# Patient Record
Sex: FEMALE | Race: BLACK OR AFRICAN AMERICAN | Employment: OTHER | URBAN - METROPOLITAN AREA
[De-identification: names, ages, dates, MRNs, and addresses within clinical notes are randomized per-mention and may not be internally consistent; named-entity substitution may affect disease eponyms.]

---

## 2017-01-01 ENCOUNTER — OFFICE VISIT (OUTPATIENT)
Dept: PULMONOLOGY | Facility: CLINIC | Age: 77
End: 2017-01-01
Attending: INTERNAL MEDICINE
Payer: COMMERCIAL

## 2017-01-01 ENCOUNTER — OFFICE VISIT (OUTPATIENT)
Dept: OPHTHALMOLOGY | Facility: CLINIC | Age: 77
End: 2017-01-01
Attending: OPHTHALMOLOGY
Payer: COMMERCIAL

## 2017-01-01 ENCOUNTER — HOSPITAL ENCOUNTER (OUTPATIENT)
Facility: CLINIC | Age: 77
End: 2017-01-01
Attending: INTERNAL MEDICINE | Admitting: INTERNAL MEDICINE

## 2017-01-01 ENCOUNTER — TRANSFERRED RECORDS (OUTPATIENT)
Dept: HEALTH INFORMATION MANAGEMENT | Facility: CLINIC | Age: 77
End: 2017-01-01

## 2017-01-01 ENCOUNTER — OFFICE VISIT (OUTPATIENT)
Dept: INFUSION THERAPY | Facility: CLINIC | Age: 77
End: 2017-01-01
Attending: INTERNAL MEDICINE
Payer: COMMERCIAL

## 2017-01-01 ENCOUNTER — APPOINTMENT (OUTPATIENT)
Dept: INTERVENTIONAL RADIOLOGY/VASCULAR | Facility: CLINIC | Age: 77
End: 2017-01-01
Attending: INTERNAL MEDICINE
Payer: COMMERCIAL

## 2017-01-01 ENCOUNTER — RADIANT APPOINTMENT (OUTPATIENT)
Dept: ULTRASOUND IMAGING | Facility: CLINIC | Age: 77
End: 2017-01-01
Attending: INTERNAL MEDICINE
Payer: COMMERCIAL

## 2017-01-01 ENCOUNTER — HOSPITAL ENCOUNTER (OUTPATIENT)
Facility: CLINIC | Age: 77
Discharge: HOME | End: 2017-08-17
Attending: RADIOLOGY | Admitting: RADIOLOGY
Payer: COMMERCIAL

## 2017-01-01 ENCOUNTER — APPOINTMENT (OUTPATIENT)
Dept: MEDSURG UNIT | Facility: CLINIC | Age: 77
End: 2017-01-01
Attending: INTERNAL MEDICINE
Payer: COMMERCIAL

## 2017-01-01 ENCOUNTER — TELEPHONE (OUTPATIENT)
Dept: ONCOLOGY | Facility: CLINIC | Age: 77
End: 2017-01-01

## 2017-01-01 ENCOUNTER — APPOINTMENT (OUTPATIENT)
Dept: LAB | Facility: CLINIC | Age: 77
End: 2017-01-01
Attending: RADIOLOGY
Payer: COMMERCIAL

## 2017-01-01 ENCOUNTER — DOCUMENTATION ONLY (OUTPATIENT)
Dept: FAMILY MEDICINE | Facility: CLINIC | Age: 77
End: 2017-01-01

## 2017-01-01 ENCOUNTER — HOSPITAL ENCOUNTER (OUTPATIENT)
Facility: CLINIC | Age: 77
Discharge: HOME | End: 2017-08-10
Attending: INTERNAL MEDICINE | Admitting: INTERNAL MEDICINE
Payer: COMMERCIAL

## 2017-01-01 ENCOUNTER — TELEPHONE (OUTPATIENT)
Dept: INTERVENTIONAL RADIOLOGY/VASCULAR | Facility: CLINIC | Age: 77
End: 2017-01-01

## 2017-01-01 ENCOUNTER — TELEPHONE (OUTPATIENT)
Dept: OPHTHALMOLOGY | Facility: CLINIC | Age: 77
End: 2017-01-01

## 2017-01-01 ENCOUNTER — HOSPITAL ENCOUNTER (OUTPATIENT)
Dept: GENERAL RADIOLOGY | Facility: CLINIC | Age: 77
End: 2017-10-30
Attending: INTERNAL MEDICINE
Payer: COMMERCIAL

## 2017-01-01 ENCOUNTER — ONCOLOGY VISIT (OUTPATIENT)
Dept: ONCOLOGY | Facility: CLINIC | Age: 77
End: 2017-01-01
Attending: INTERNAL MEDICINE
Payer: COMMERCIAL

## 2017-01-01 ENCOUNTER — OFFICE VISIT (OUTPATIENT)
Dept: RADIOLOGY | Facility: CLINIC | Age: 77
End: 2017-01-01
Attending: RADIOLOGY
Payer: COMMERCIAL

## 2017-01-01 ENCOUNTER — PRE VISIT (OUTPATIENT)
Dept: PULMONOLOGY | Facility: CLINIC | Age: 77
End: 2017-01-01

## 2017-01-01 ENCOUNTER — OFFICE VISIT (OUTPATIENT)
Dept: FAMILY MEDICINE | Facility: CLINIC | Age: 77
End: 2017-01-01
Payer: COMMERCIAL

## 2017-01-01 ENCOUNTER — DOCUMENTATION ONLY (OUTPATIENT)
Dept: ONCOLOGY | Facility: CLINIC | Age: 77
End: 2017-01-01

## 2017-01-01 ENCOUNTER — OFFICE VISIT (OUTPATIENT)
Dept: GASTROENTEROLOGY | Facility: CLINIC | Age: 77
End: 2017-01-01
Attending: INTERNAL MEDICINE
Payer: COMMERCIAL

## 2017-01-01 ENCOUNTER — APPOINTMENT (OUTPATIENT)
Dept: MEDSURG UNIT | Facility: CLINIC | Age: 77
End: 2017-01-01
Attending: RADIOLOGY
Payer: COMMERCIAL

## 2017-01-01 ENCOUNTER — APPOINTMENT (OUTPATIENT)
Dept: INTERVENTIONAL RADIOLOGY/VASCULAR | Facility: CLINIC | Age: 77
End: 2017-01-01
Attending: PHYSICIAN ASSISTANT
Payer: COMMERCIAL

## 2017-01-01 ENCOUNTER — OFFICE VISIT (OUTPATIENT)
Dept: FAMILY MEDICINE | Facility: CLINIC | Age: 77
End: 2017-01-01

## 2017-01-01 ENCOUNTER — APPOINTMENT (OUTPATIENT)
Dept: INTERVENTIONAL RADIOLOGY/VASCULAR | Facility: CLINIC | Age: 77
End: 2017-01-01
Attending: RADIOLOGY
Payer: COMMERCIAL

## 2017-01-01 ENCOUNTER — TELEPHONE (OUTPATIENT)
Dept: GASTROENTEROLOGY | Facility: CLINIC | Age: 77
End: 2017-01-01

## 2017-01-01 ENCOUNTER — HOSPITAL ENCOUNTER (OUTPATIENT)
Facility: CLINIC | Age: 77
Discharge: HOME | End: 2017-10-30
Attending: INTERNAL MEDICINE | Admitting: INTERNAL MEDICINE
Payer: COMMERCIAL

## 2017-01-01 VITALS
HEART RATE: 96 BPM | OXYGEN SATURATION: 97 % | TEMPERATURE: 98.2 F | DIASTOLIC BLOOD PRESSURE: 77 MMHG | SYSTOLIC BLOOD PRESSURE: 139 MMHG | BODY MASS INDEX: 21.27 KG/M2 | WEIGHT: 116.3 LBS

## 2017-01-01 VITALS
RESPIRATION RATE: 16 BRPM | BODY MASS INDEX: 21.66 KG/M2 | HEART RATE: 92 BPM | TEMPERATURE: 98.1 F | WEIGHT: 118.4 LBS | SYSTOLIC BLOOD PRESSURE: 110 MMHG | DIASTOLIC BLOOD PRESSURE: 72 MMHG | OXYGEN SATURATION: 98 %

## 2017-01-01 VITALS
HEIGHT: 62 IN | DIASTOLIC BLOOD PRESSURE: 79 MMHG | BODY MASS INDEX: 22.26 KG/M2 | TEMPERATURE: 98.2 F | RESPIRATION RATE: 18 BRPM | HEART RATE: 98 BPM | OXYGEN SATURATION: 99 % | SYSTOLIC BLOOD PRESSURE: 119 MMHG | WEIGHT: 121 LBS

## 2017-01-01 VITALS
BODY MASS INDEX: 22.31 KG/M2 | DIASTOLIC BLOOD PRESSURE: 73 MMHG | HEART RATE: 87 BPM | WEIGHT: 122 LBS | RESPIRATION RATE: 18 BRPM | TEMPERATURE: 98 F | SYSTOLIC BLOOD PRESSURE: 117 MMHG | OXYGEN SATURATION: 99 %

## 2017-01-01 VITALS
RESPIRATION RATE: 14 BRPM | DIASTOLIC BLOOD PRESSURE: 78 MMHG | SYSTOLIC BLOOD PRESSURE: 118 MMHG | OXYGEN SATURATION: 99 % | TEMPERATURE: 96.9 F | BODY MASS INDEX: 19.44 KG/M2 | HEART RATE: 103 BPM | WEIGHT: 106.3 LBS

## 2017-01-01 VITALS
OXYGEN SATURATION: 99 % | DIASTOLIC BLOOD PRESSURE: 68 MMHG | RESPIRATION RATE: 16 BRPM | BODY MASS INDEX: 19.38 KG/M2 | HEART RATE: 100 BPM | WEIGHT: 106 LBS | SYSTOLIC BLOOD PRESSURE: 106 MMHG

## 2017-01-01 VITALS
OXYGEN SATURATION: 98 % | HEIGHT: 62 IN | SYSTOLIC BLOOD PRESSURE: 136 MMHG | HEART RATE: 86 BPM | DIASTOLIC BLOOD PRESSURE: 82 MMHG | TEMPERATURE: 97.8 F | BODY MASS INDEX: 23.42 KG/M2 | WEIGHT: 127.3 LBS

## 2017-01-01 VITALS
BODY MASS INDEX: 22.54 KG/M2 | DIASTOLIC BLOOD PRESSURE: 63 MMHG | HEART RATE: 107 BPM | SYSTOLIC BLOOD PRESSURE: 112 MMHG | TEMPERATURE: 96.3 F | RESPIRATION RATE: 16 BRPM | WEIGHT: 119.3 LBS

## 2017-01-01 VITALS
TEMPERATURE: 97.6 F | WEIGHT: 109.4 LBS | BODY MASS INDEX: 20 KG/M2 | SYSTOLIC BLOOD PRESSURE: 133 MMHG | HEART RATE: 101 BPM | OXYGEN SATURATION: 98 % | DIASTOLIC BLOOD PRESSURE: 81 MMHG

## 2017-01-01 VITALS
DIASTOLIC BLOOD PRESSURE: 74 MMHG | SYSTOLIC BLOOD PRESSURE: 132 MMHG | WEIGHT: 126.4 LBS | HEART RATE: 95 BPM | RESPIRATION RATE: 16 BRPM | TEMPERATURE: 97.6 F | OXYGEN SATURATION: 95 % | BODY MASS INDEX: 23.26 KG/M2 | HEIGHT: 62 IN

## 2017-01-01 VITALS
DIASTOLIC BLOOD PRESSURE: 64 MMHG | TEMPERATURE: 97.7 F | RESPIRATION RATE: 16 BRPM | HEART RATE: 80 BPM | WEIGHT: 112 LBS | SYSTOLIC BLOOD PRESSURE: 110 MMHG | OXYGEN SATURATION: 96 % | BODY MASS INDEX: 21.16 KG/M2

## 2017-01-01 VITALS
OXYGEN SATURATION: 95 % | TEMPERATURE: 97.7 F | HEIGHT: 61 IN | HEART RATE: 92 BPM | DIASTOLIC BLOOD PRESSURE: 79 MMHG | WEIGHT: 116.2 LBS | SYSTOLIC BLOOD PRESSURE: 125 MMHG | BODY MASS INDEX: 21.94 KG/M2 | RESPIRATION RATE: 16 BRPM

## 2017-01-01 VITALS
TEMPERATURE: 98.4 F | DIASTOLIC BLOOD PRESSURE: 68 MMHG | OXYGEN SATURATION: 100 % | RESPIRATION RATE: 16 BRPM | WEIGHT: 119.05 LBS | HEART RATE: 102 BPM | HEIGHT: 62 IN | SYSTOLIC BLOOD PRESSURE: 89 MMHG | BODY MASS INDEX: 21.91 KG/M2

## 2017-01-01 VITALS
RESPIRATION RATE: 16 BRPM | WEIGHT: 127.21 LBS | SYSTOLIC BLOOD PRESSURE: 118 MMHG | HEART RATE: 92 BPM | BODY MASS INDEX: 24.02 KG/M2 | TEMPERATURE: 98.3 F | HEIGHT: 61 IN | DIASTOLIC BLOOD PRESSURE: 66 MMHG | OXYGEN SATURATION: 96 %

## 2017-01-01 VITALS
RESPIRATION RATE: 18 BRPM | HEART RATE: 96 BPM | SYSTOLIC BLOOD PRESSURE: 137 MMHG | TEMPERATURE: 97.5 F | OXYGEN SATURATION: 98 % | DIASTOLIC BLOOD PRESSURE: 79 MMHG

## 2017-01-01 VITALS
DIASTOLIC BLOOD PRESSURE: 60 MMHG | SYSTOLIC BLOOD PRESSURE: 105 MMHG | OXYGEN SATURATION: 99 % | TEMPERATURE: 98.4 F | RESPIRATION RATE: 20 BRPM | HEART RATE: 92 BPM

## 2017-01-01 DIAGNOSIS — C22.0 HCC (HEPATOCELLULAR CARCINOMA) (H): Primary | ICD-10-CM

## 2017-01-01 DIAGNOSIS — K59.00 CONSTIPATION, UNSPECIFIED CONSTIPATION TYPE: ICD-10-CM

## 2017-01-01 DIAGNOSIS — R18.8 CIRRHOSIS OF LIVER WITH ASCITES, UNSPECIFIED HEPATIC CIRRHOSIS TYPE (H): Primary | ICD-10-CM

## 2017-01-01 DIAGNOSIS — R18.8 CIRRHOSIS OF LIVER WITH ASCITES, UNSPECIFIED HEPATIC CIRRHOSIS TYPE (H): ICD-10-CM

## 2017-01-01 DIAGNOSIS — R63.0 ANOREXIA: Primary | ICD-10-CM

## 2017-01-01 DIAGNOSIS — R05.9 COUGH: ICD-10-CM

## 2017-01-01 DIAGNOSIS — K74.60 CIRRHOSIS OF LIVER WITH ASCITES, UNSPECIFIED HEPATIC CIRRHOSIS TYPE (H): ICD-10-CM

## 2017-01-01 DIAGNOSIS — B18.2 CHRONIC HEPATITIS C WITHOUT HEPATIC COMA (H): ICD-10-CM

## 2017-01-01 DIAGNOSIS — K59.00 CONSTIPATION, UNSPECIFIED CONSTIPATION TYPE: Primary | ICD-10-CM

## 2017-01-01 DIAGNOSIS — C22.0 HCC (HEPATOCELLULAR CARCINOMA) (H): ICD-10-CM

## 2017-01-01 DIAGNOSIS — K74.60 CIRRHOSIS OF LIVER WITH ASCITES, UNSPECIFIED HEPATIC CIRRHOSIS TYPE (H): Primary | ICD-10-CM

## 2017-01-01 DIAGNOSIS — R18.8 OTHER ASCITES: ICD-10-CM

## 2017-01-01 DIAGNOSIS — Z96.1 PSEUDOPHAKIA: Primary | ICD-10-CM

## 2017-01-01 DIAGNOSIS — R18.8 OTHER ASCITES: Primary | ICD-10-CM

## 2017-01-01 DIAGNOSIS — Z01.818 PREOP GENERAL PHYSICAL EXAM: Primary | ICD-10-CM

## 2017-01-01 DIAGNOSIS — H04.123 DRY EYES, BILATERAL: ICD-10-CM

## 2017-01-01 DIAGNOSIS — B18.2: ICD-10-CM

## 2017-01-01 DIAGNOSIS — K74.69 OTHER CIRRHOSIS OF LIVER (H): Primary | ICD-10-CM

## 2017-01-01 DIAGNOSIS — J91.8 PLEURAL EFFUSION ASSOCIATED WITH HEPATIC DISORDER: Primary | ICD-10-CM

## 2017-01-01 DIAGNOSIS — J90 PLEURAL EFFUSION: ICD-10-CM

## 2017-01-01 DIAGNOSIS — J90 RECURRENT RIGHT PLEURAL EFFUSION: ICD-10-CM

## 2017-01-01 DIAGNOSIS — R91.8 PULMONARY NODULES: ICD-10-CM

## 2017-01-01 DIAGNOSIS — H92.03 OTALGIA OF BOTH EARS: ICD-10-CM

## 2017-01-01 DIAGNOSIS — J45.21 MILD INTERMITTENT ASTHMA WITH ACUTE EXACERBATION: ICD-10-CM

## 2017-01-01 DIAGNOSIS — K76.9 PLEURAL EFFUSION ASSOCIATED WITH HEPATIC DISORDER: Primary | ICD-10-CM

## 2017-01-01 DIAGNOSIS — R05.9 COUGH: Primary | ICD-10-CM

## 2017-01-01 DIAGNOSIS — K70.31 ALCOHOLIC CIRRHOSIS OF LIVER WITH ASCITES (H): ICD-10-CM

## 2017-01-01 DIAGNOSIS — H25.043 POSTERIOR SUBCAPSULAR AGE-RELATED CATARACT OF BOTH EYES: ICD-10-CM

## 2017-01-01 DIAGNOSIS — H25.9 AGE-RELATED CATARACT OF RIGHT EYE, UNSPECIFIED AGE-RELATED CATARACT TYPE: ICD-10-CM

## 2017-01-01 DIAGNOSIS — J45.30 MILD PERSISTENT ASTHMA WITHOUT COMPLICATION: Primary | ICD-10-CM

## 2017-01-01 DIAGNOSIS — K74.69 OTHER CIRRHOSIS OF LIVER (H): ICD-10-CM

## 2017-01-01 DIAGNOSIS — K21.9 GASTROESOPHAGEAL REFLUX DISEASE WITHOUT ESOPHAGITIS: ICD-10-CM

## 2017-01-01 DIAGNOSIS — H25.10 SENILE NUCLEAR SCLEROSIS, UNSPECIFIED LATERALITY: Primary | ICD-10-CM

## 2017-01-01 DIAGNOSIS — H25.13 SENILE NUCLEAR SCLEROSIS, BILATERAL: Primary | ICD-10-CM

## 2017-01-01 DIAGNOSIS — B18.2 CHRONIC HEPATITIS C (H): ICD-10-CM

## 2017-01-01 DIAGNOSIS — K74.60 HEPATIC CIRRHOSIS (H): ICD-10-CM

## 2017-01-01 DIAGNOSIS — H26.9 CATARACT OF BOTH EYES, UNSPECIFIED CATARACT TYPE: Primary | ICD-10-CM

## 2017-01-01 LAB
AFP SERPL-MCNC: 10.8 UG/L (ref 0–8)
AFP SERPL-MCNC: 19.3 UG/L (ref 0–8)
ALBUMIN SERPL-MCNC: 2 G/DL (ref 3.4–5)
ALBUMIN SERPL-MCNC: 2.2 G/DL (ref 3.4–5)
ALBUMIN SERPL-MCNC: 2.3 G/DL (ref 3.4–5)
ALP SERPL-CCNC: 199 U/L (ref 40–150)
ALP SERPL-CCNC: 228 U/L (ref 40–150)
ALP SERPL-CCNC: 239 U/L (ref 40–150)
ALT SERPL W P-5'-P-CCNC: 45 U/L (ref 0–50)
ALT SERPL W P-5'-P-CCNC: 47 U/L (ref 0–50)
ALT SERPL W P-5'-P-CCNC: 66 U/L (ref 0–50)
ANION GAP SERPL CALCULATED.3IONS-SCNC: 5 MMOL/L (ref 3–14)
ANION GAP SERPL CALCULATED.3IONS-SCNC: 7 MMOL/L (ref 3–14)
ANION GAP SERPL CALCULATED.3IONS-SCNC: 8 MMOL/L (ref 3–14)
AST SERPL W P-5'-P-CCNC: 105 U/L (ref 0–45)
AST SERPL W P-5'-P-CCNC: 85 U/L (ref 0–45)
AST SERPL W P-5'-P-CCNC: ABNORMAL U/L (ref 0–45)
BASOPHILS # BLD AUTO: 0 10E9/L (ref 0–0.2)
BASOPHILS # BLD AUTO: 0 10E9/L (ref 0–0.2)
BASOPHILS NFR BLD AUTO: 0.3 %
BASOPHILS NFR BLD AUTO: 0.7 %
BILIRUB DIRECT SERPL-MCNC: 0.7 MG/DL (ref 0–0.2)
BILIRUB DIRECT SERPL-MCNC: 0.7 MG/DL (ref 0–0.2)
BILIRUB DIRECT SERPL-MCNC: 1.1 MG/DL (ref 0–0.2)
BILIRUB SERPL-MCNC: 1.7 MG/DL (ref 0.2–1.3)
BILIRUB SERPL-MCNC: 1.9 MG/DL (ref 0.2–1.3)
BILIRUB SERPL-MCNC: 2.7 MG/DL (ref 0.2–1.3)
BUN SERPL-MCNC: 12 MG/DL (ref 7–30)
BUN SERPL-MCNC: 13 MG/DL (ref 7–30)
BUN SERPL-MCNC: 14 MG/DL (ref 7–30)
CALCIUM SERPL-MCNC: 7.9 MG/DL (ref 8.5–10.1)
CALCIUM SERPL-MCNC: 8.1 MG/DL (ref 8.5–10.1)
CALCIUM SERPL-MCNC: 8.3 MG/DL (ref 8.5–10.1)
CHLORIDE SERPL-SCNC: 103 MMOL/L (ref 94–109)
CHLORIDE SERPL-SCNC: 105 MMOL/L (ref 94–109)
CHLORIDE SERPL-SCNC: 110 MMOL/L (ref 94–109)
CO2 SERPL-SCNC: 21 MMOL/L (ref 20–32)
CO2 SERPL-SCNC: 24 MMOL/L (ref 20–32)
CO2 SERPL-SCNC: 25 MMOL/L (ref 20–32)
CREAT SERPL-MCNC: 0.74 MG/DL (ref 0.52–1.04)
CREAT SERPL-MCNC: 0.75 MG/DL (ref 0.52–1.04)
CREAT SERPL-MCNC: 0.79 MG/DL (ref 0.52–1.04)
DIFFERENTIAL METHOD BLD: ABNORMAL
DIFFERENTIAL METHOD BLD: ABNORMAL
DLCOUNC-%PRED-PRE: 44 %
DLCOUNC-PRE: 8.22 ML/MIN/MMHG
DLCOUNC-PRED: 18.6 ML/MIN/MMHG
EOSINOPHIL # BLD AUTO: 0.1 10E9/L (ref 0–0.7)
EOSINOPHIL # BLD AUTO: 0.2 10E9/L (ref 0–0.7)
EOSINOPHIL NFR BLD AUTO: 3.8 %
EOSINOPHIL NFR BLD AUTO: 5.9 %
ERV-%PRED-PRE: 37 %
ERV-PRE: 0.31 L
ERV-PRED: 0.82 L
ERYTHROCYTE [DISTWIDTH] IN BLOOD BY AUTOMATED COUNT: 14.6 % (ref 10–15)
ERYTHROCYTE [DISTWIDTH] IN BLOOD BY AUTOMATED COUNT: 14.8 % (ref 10–15)
ERYTHROCYTE [DISTWIDTH] IN BLOOD BY AUTOMATED COUNT: 15.6 % (ref 10–15)
ERYTHROCYTE [DISTWIDTH] IN BLOOD BY AUTOMATED COUNT: 15.9 % (ref 10–15)
EXPTIME-PRE: 6.51 SEC
FEF2575-%PRED-PRE: 92 %
FEF2575-PRE: 1.21 L/SEC
FEF2575-PRED: 1.31 L/SEC
FEFMAX-%PRED-PRE: 80 %
FEFMAX-PRE: 2.91 L/SEC
FEFMAX-PRED: 3.61 L/SEC
FEV1-%PRED-PRE: 62 %
FEV1-PRE: 0.97 L
FEV1FEV6-PRE: 86 %
FEV1FEV6-PRED: 79 %
FEV1FVC-PRE: 86 %
FEV1FVC-PRED: 78 %
FEV1SVC-PRE: 84 %
FEV1SVC-PRED: 60 %
FIFMAX-PRE: 1.5 L/SEC
FVC-%PRED-PRE: 55 %
FVC-PRE: 1.13 L
FVC-PRED: 2.03 L
GFR SERPL CREATININE-BSD FRML MDRD: 70 ML/MIN/1.7M2
GFR SERPL CREATININE-BSD FRML MDRD: 74 ML/MIN/1.7M2
GFR SERPL CREATININE-BSD FRML MDRD: 76 ML/MIN/1.7M2
GLUCOSE SERPL-MCNC: 125 MG/DL (ref 70–99)
GLUCOSE SERPL-MCNC: 86 MG/DL (ref 70–99)
GLUCOSE SERPL-MCNC: 92 MG/DL (ref 70–99)
HCT VFR BLD AUTO: 33.2 % (ref 35–47)
HCT VFR BLD AUTO: 34.3 % (ref 35–47)
HCT VFR BLD AUTO: 34.7 % (ref 35–47)
HCT VFR BLD AUTO: 36.3 % (ref 35–47)
HCV RNA SERPL NAA+PROBE-ACNC: ABNORMAL [IU]/ML
HCV RNA SERPL NAA+PROBE-LOG IU: 5.4 LOG IU/ML
HGB BLD-MCNC: 11.3 G/DL (ref 11.7–15.7)
HGB BLD-MCNC: 11.4 G/DL (ref 11.7–15.7)
HGB BLD-MCNC: 11.7 G/DL (ref 11.7–15.7)
HGB BLD-MCNC: 12 G/DL (ref 11.7–15.7)
IC-%PRED-PRE: 47 %
IC-PRE: 0.85 L
IC-PRED: 1.78 L
IMM GRANULOCYTES # BLD: 0 10E9/L (ref 0–0.4)
IMM GRANULOCYTES # BLD: 0 10E9/L (ref 0–0.4)
IMM GRANULOCYTES NFR BLD: 0.2 %
IMM GRANULOCYTES NFR BLD: 0.3 %
INR BLD: 0.9 (ref 0.86–1.14)
INR PPP: 1.46 (ref 0.86–1.14)
INR PPP: 1.5 (ref 0.86–1.14)
INR PPP: 1.56 (ref 0.86–1.14)
INR PPP: 1.64 (ref 0.86–1.14)
LYMPHOCYTES # BLD AUTO: 0.9 10E9/L (ref 0.8–5.3)
LYMPHOCYTES # BLD AUTO: 1 10E9/L (ref 0.8–5.3)
LYMPHOCYTES NFR BLD AUTO: 21 %
LYMPHOCYTES NFR BLD AUTO: 31.1 %
MCH RBC QN AUTO: 33 PG (ref 26.5–33)
MCH RBC QN AUTO: 33.6 PG (ref 26.5–33)
MCH RBC QN AUTO: 34 PG (ref 26.5–33)
MCH RBC QN AUTO: 34.2 PG (ref 26.5–33)
MCHC RBC AUTO-ENTMCNC: 33.1 G/DL (ref 31.5–36.5)
MCHC RBC AUTO-ENTMCNC: 33.2 G/DL (ref 31.5–36.5)
MCHC RBC AUTO-ENTMCNC: 33.7 G/DL (ref 31.5–36.5)
MCHC RBC AUTO-ENTMCNC: 34 G/DL (ref 31.5–36.5)
MCV RBC AUTO: 100 FL (ref 78–100)
MCV RBC AUTO: 102 FL (ref 78–100)
MCV RBC AUTO: 103 FL (ref 78–100)
MCV RBC AUTO: 98 FL (ref 78–100)
MONOCYTES # BLD AUTO: 0.3 10E9/L (ref 0–1.3)
MONOCYTES # BLD AUTO: 0.3 10E9/L (ref 0–1.3)
MONOCYTES NFR BLD AUTO: 7.9 %
MONOCYTES NFR BLD AUTO: 8.3 %
NEUTROPHILS # BLD AUTO: 1.8 10E9/L (ref 1.6–8.3)
NEUTROPHILS # BLD AUTO: 2.6 10E9/L (ref 1.6–8.3)
NEUTROPHILS NFR BLD AUTO: 56.2 %
NEUTROPHILS NFR BLD AUTO: 64.3 %
NRBC # BLD AUTO: 0 10*3/UL
NRBC # BLD AUTO: 0 10*3/UL
NRBC BLD AUTO-RTO: 0 /100
NRBC BLD AUTO-RTO: 0 /100
PLATELET # BLD AUTO: 56 10E9/L (ref 150–450)
PLATELET # BLD AUTO: 62 10E9/L (ref 150–450)
PLATELET # BLD AUTO: 65 10E9/L (ref 150–450)
PLATELET # BLD AUTO: 73 10E9/L (ref 150–450)
PLATELET # BLD AUTO: 79 10E9/L (ref 150–450)
PLATELET # BLD AUTO: 88 10E9/L (ref 150–450)
POTASSIUM SERPL-SCNC: 4.1 MMOL/L (ref 3.4–5.3)
POTASSIUM SERPL-SCNC: 4.4 MMOL/L (ref 3.4–5.3)
POTASSIUM SERPL-SCNC: 4.5 MMOL/L (ref 3.4–5.3)
PROT SERPL-MCNC: 6.6 G/DL (ref 6.8–8.8)
PROT SERPL-MCNC: 6.7 G/DL (ref 6.8–8.8)
PROT SERPL-MCNC: 7 G/DL (ref 6.8–8.8)
RBC # BLD AUTO: 3.32 10E12/L (ref 3.8–5.2)
RBC # BLD AUTO: 3.33 10E12/L (ref 3.8–5.2)
RBC # BLD AUTO: 3.55 10E12/L (ref 3.8–5.2)
RBC # BLD AUTO: 3.57 10E12/L (ref 3.8–5.2)
SODIUM SERPL-SCNC: 134 MMOL/L (ref 133–144)
SODIUM SERPL-SCNC: 135 MMOL/L (ref 133–144)
SODIUM SERPL-SCNC: 140 MMOL/L (ref 133–144)
VA-%PRED-PRE: 38 %
VA-PRE: 1.82 L
VC-%PRED-PRE: 44 %
VC-PRE: 1.15 L
VC-PRED: 2.6 L
WBC # BLD AUTO: 3.1 10E9/L (ref 4–11)
WBC # BLD AUTO: 3.2 10E9/L (ref 4–11)
WBC # BLD AUTO: 3.3 10E9/L (ref 4–11)
WBC # BLD AUTO: 4.1 10E9/L (ref 4–11)

## 2017-01-01 PROCEDURE — 82040 ASSAY OF SERUM ALBUMIN: CPT

## 2017-01-01 PROCEDURE — 85049 AUTOMATED PLATELET COUNT: CPT | Performed by: INTERNAL MEDICINE

## 2017-01-01 PROCEDURE — 40000166 ZZH STATISTIC PP CARE STAGE 1

## 2017-01-01 PROCEDURE — 25000128 H RX IP 250 OP 636: Performed by: PHYSICIAN ASSISTANT

## 2017-01-01 PROCEDURE — 40000168 ZZH STATISTIC PP CARE STAGE 3

## 2017-01-01 PROCEDURE — 25000125 ZZHC RX 250: Mod: ZF | Performed by: INTERNAL MEDICINE

## 2017-01-01 PROCEDURE — 85025 COMPLETE CBC W/AUTO DIFF WBC: CPT | Performed by: INTERNAL MEDICINE

## 2017-01-01 PROCEDURE — 99213 OFFICE O/P EST LOW 20 MIN: CPT | Mod: ZF

## 2017-01-01 PROCEDURE — 99215 OFFICE O/P EST HI 40 MIN: CPT | Mod: ZF

## 2017-01-01 PROCEDURE — 32555 ASPIRATE PLEURA W/ IMAGING: CPT

## 2017-01-01 PROCEDURE — 36415 COLL VENOUS BLD VENIPUNCTURE: CPT | Performed by: RADIOLOGY

## 2017-01-01 PROCEDURE — 85610 PROTHROMBIN TIME: CPT | Performed by: RADIOLOGY

## 2017-01-01 PROCEDURE — 27210903 ZZH KIT CR5

## 2017-01-01 PROCEDURE — 99212 OFFICE O/P EST SF 10 MIN: CPT | Mod: ZF

## 2017-01-01 PROCEDURE — 27211039 ZZH NEEDLE CR2

## 2017-01-01 PROCEDURE — 82247 BILIRUBIN TOTAL: CPT

## 2017-01-01 PROCEDURE — 84075 ASSAY ALKALINE PHOSPHATASE: CPT

## 2017-01-01 PROCEDURE — 84460 ALANINE AMINO (ALT) (SGPT): CPT

## 2017-01-01 PROCEDURE — P9047 ALBUMIN (HUMAN), 25%, 50ML: HCPCS | Mod: ZF | Performed by: INTERNAL MEDICINE

## 2017-01-01 PROCEDURE — 84155 ASSAY OF PROTEIN SERUM: CPT

## 2017-01-01 PROCEDURE — 85027 COMPLETE CBC AUTOMATED: CPT | Performed by: RADIOLOGY

## 2017-01-01 PROCEDURE — 27210732 ZZH ACCESSORY CR1

## 2017-01-01 PROCEDURE — 40000141 ZZH STATISTIC PERIPHERAL IV START W/O US GUIDANCE

## 2017-01-01 PROCEDURE — 82105 ALPHA-FETOPROTEIN SERUM: CPT | Performed by: INTERNAL MEDICINE

## 2017-01-01 PROCEDURE — 99214 OFFICE O/P EST MOD 30 MIN: CPT | Performed by: PHYSICIAN ASSISTANT

## 2017-01-01 PROCEDURE — 25000128 H RX IP 250 OP 636: Mod: ZF | Performed by: INTERNAL MEDICINE

## 2017-01-01 PROCEDURE — 80048 BASIC METABOLIC PNL TOTAL CA: CPT | Performed by: INTERNAL MEDICINE

## 2017-01-01 PROCEDURE — 25000125 ZZHC RX 250: Performed by: RADIOLOGY

## 2017-01-01 PROCEDURE — 85610 PROTHROMBIN TIME: CPT | Mod: QW

## 2017-01-01 PROCEDURE — 82105 ALPHA-FETOPROTEIN SERUM: CPT | Performed by: RADIOLOGY

## 2017-01-01 PROCEDURE — T1013 SIGN LANG/ORAL INTERPRETER: HCPCS | Mod: U3

## 2017-01-01 PROCEDURE — 93000 ELECTROCARDIOGRAM COMPLETE: CPT | Performed by: PHYSICIAN ASSISTANT

## 2017-01-01 PROCEDURE — 99214 OFFICE O/P EST MOD 30 MIN: CPT | Mod: ZF

## 2017-01-01 PROCEDURE — 27210190 US PARACENTESIS

## 2017-01-01 PROCEDURE — 87522 HEPATITIS C REVRS TRNSCRPJ: CPT | Performed by: INTERNAL MEDICINE

## 2017-01-01 PROCEDURE — 76519 ECHO EXAM OF EYE: CPT | Mod: ZF | Performed by: OPHTHALMOLOGY

## 2017-01-01 PROCEDURE — 92015 DETERMINE REFRACTIVE STATE: CPT | Mod: ZF

## 2017-01-01 PROCEDURE — 80076 HEPATIC FUNCTION PANEL: CPT | Performed by: INTERNAL MEDICINE

## 2017-01-01 PROCEDURE — C1769 GUIDE WIRE: HCPCS

## 2017-01-01 PROCEDURE — 99215 OFFICE O/P EST HI 40 MIN: CPT | Mod: ZP | Performed by: INTERNAL MEDICINE

## 2017-01-01 PROCEDURE — 80048 BASIC METABOLIC PNL TOTAL CA: CPT | Performed by: RADIOLOGY

## 2017-01-01 PROCEDURE — 85610 PROTHROMBIN TIME: CPT | Performed by: INTERNAL MEDICINE

## 2017-01-01 PROCEDURE — 76705 ECHO EXAM OF ABDOMEN: CPT

## 2017-01-01 PROCEDURE — 49083 ABD PARACENTESIS W/IMAGING: CPT

## 2017-01-01 PROCEDURE — 36415 COLL VENOUS BLD VENIPUNCTURE: CPT

## 2017-01-01 PROCEDURE — 71020 XR CHEST 2 VW: CPT

## 2017-01-01 PROCEDURE — 36415 COLL VENOUS BLD VENIPUNCTURE: CPT | Performed by: INTERNAL MEDICINE

## 2017-01-01 PROCEDURE — 25000125 ZZHC RX 250

## 2017-01-01 PROCEDURE — 80076 HEPATIC FUNCTION PANEL: CPT | Performed by: RADIOLOGY

## 2017-01-01 RX ORDER — BISACODYL 5 MG/1
5 TABLET, DELAYED RELEASE ORAL DAILY PRN
Qty: 30 TABLET | Refills: 2 | Status: SHIPPED | OUTPATIENT
Start: 2017-01-01 | End: 2018-01-01

## 2017-01-01 RX ORDER — FUROSEMIDE 20 MG
20 TABLET ORAL DAILY
Qty: 180 TABLET | Refills: 1 | Status: SHIPPED | OUTPATIENT
Start: 2017-01-01 | End: 2018-01-01

## 2017-01-01 RX ORDER — PREDNISOLONE ACETATE 10 MG/ML
1 SUSPENSION/ DROPS OPHTHALMIC 4 TIMES DAILY
COMMUNITY
End: 2017-01-01

## 2017-01-01 RX ORDER — PREDNISOLONE ACETATE 10 MG/ML
1 SUSPENSION/ DROPS OPHTHALMIC 3 TIMES DAILY
Qty: 1 BOTTLE | Refills: 1 | Status: SHIPPED | OUTPATIENT
Start: 2017-01-01 | End: 2017-01-01

## 2017-01-01 RX ORDER — CYPROHEPTADINE HYDROCHLORIDE 4 MG/1
4 TABLET ORAL 3 TIMES DAILY
Qty: 90 TABLET | Refills: 11 | Status: SHIPPED | OUTPATIENT
Start: 2017-01-01 | End: 2017-01-01

## 2017-01-01 RX ORDER — POLYETHYLENE GLYCOL 3350 17 G/17G
1 POWDER, FOR SOLUTION ORAL 3 TIMES DAILY PRN
Qty: 510 G | Refills: 3 | Status: SHIPPED | OUTPATIENT
Start: 2017-01-01 | End: 2018-01-01

## 2017-01-01 RX ORDER — ALBUMIN (HUMAN) 12.5 G/50ML
12.5-5 SOLUTION INTRAVENOUS CONTINUOUS PRN
Status: DISCONTINUED | OUTPATIENT
Start: 2017-01-01 | End: 2017-01-01 | Stop reason: HOSPADM

## 2017-01-01 RX ORDER — ALBUMIN (HUMAN) 12.5 G/50ML
12.5 SOLUTION INTRAVENOUS 4 TIMES DAILY PRN
Status: CANCELLED
Start: 2017-01-01

## 2017-01-01 RX ORDER — PREDNISOLONE ACETATE 10 MG/ML
1 SUSPENSION/ DROPS OPHTHALMIC DAILY
COMMUNITY
End: 2017-01-01

## 2017-01-01 RX ORDER — SPIRONOLACTONE 50 MG/1
50 TABLET, FILM COATED ORAL DAILY
Qty: 30 TABLET | Refills: 1 | OUTPATIENT
Start: 2017-01-01

## 2017-01-01 RX ORDER — PREDNISOLONE ACETATE 10 MG/ML
1 SUSPENSION/ DROPS OPHTHALMIC 4 TIMES DAILY
Qty: 5 ML | Refills: 0 | Status: SHIPPED | OUTPATIENT
Start: 2017-01-01 | End: 2017-01-01

## 2017-01-01 RX ORDER — OMEPRAZOLE 40 MG/1
40 CAPSULE, DELAYED RELEASE ORAL DAILY
Qty: 90 CAPSULE | Refills: 3 | Status: SHIPPED | OUTPATIENT
Start: 2017-01-01

## 2017-01-01 RX ORDER — ALBUMIN (HUMAN) 12.5 G/50ML
12.5 SOLUTION INTRAVENOUS 4 TIMES DAILY PRN
Status: DISCONTINUED | OUTPATIENT
Start: 2017-01-01 | End: 2017-01-01 | Stop reason: HOSPADM

## 2017-01-01 RX ORDER — LIDOCAINE 40 MG/G
CREAM TOPICAL
Status: CANCELLED | OUTPATIENT
Start: 2017-01-01

## 2017-01-01 RX ORDER — KETOROLAC TROMETHAMINE 5 MG/ML
1 SOLUTION OPHTHALMIC
COMMUNITY
Start: 2017-01-01 | End: 2017-01-01

## 2017-01-01 RX ORDER — OFLOXACIN 3 MG/ML
1 SOLUTION/ DROPS OPHTHALMIC
COMMUNITY
Start: 2017-01-01 | End: 2017-01-01

## 2017-01-01 RX ORDER — SPIRONOLACTONE 50 MG/1
50 TABLET, FILM COATED ORAL DAILY
Qty: 30 TABLET | OUTPATIENT
Start: 2017-01-01

## 2017-01-01 RX ORDER — PREDNISOLONE ACETATE 10 MG/ML
1 SUSPENSION/ DROPS OPHTHALMIC
COMMUNITY
Start: 2017-01-01 | End: 2017-01-01

## 2017-01-01 RX ORDER — SPIRONOLACTONE 50 MG/1
50 TABLET, FILM COATED ORAL DAILY
Qty: 30 TABLET | Refills: 1 | Status: SHIPPED | OUTPATIENT
Start: 2017-01-01 | End: 2018-01-01

## 2017-01-01 RX ORDER — LIDOCAINE 40 MG/G
CREAM TOPICAL
Status: DISCONTINUED | OUTPATIENT
Start: 2017-01-01 | End: 2017-01-01 | Stop reason: HOSPADM

## 2017-01-01 RX ORDER — OFLOXACIN 3 MG/ML
1 SOLUTION/ DROPS OPHTHALMIC 4 TIMES DAILY
Qty: 2 ML | Refills: 0 | Status: SHIPPED | OUTPATIENT
Start: 2017-01-01 | End: 2017-01-01

## 2017-01-01 RX ORDER — LACTULOSE 20 G/30ML
30 SOLUTION ORAL DAILY
Qty: 946 ML | Refills: 3 | Status: SHIPPED | OUTPATIENT
Start: 2017-01-01 | End: 2018-01-01

## 2017-01-01 RX ORDER — LACTULOSE 20 G/30ML
30 SOLUTION ORAL DAILY
Qty: 946 ML | Refills: 3 | Status: SHIPPED | OUTPATIENT
Start: 2017-01-01 | End: 2017-01-01

## 2017-01-01 RX ORDER — ONDANSETRON 2 MG/ML
4 INJECTION INTRAMUSCULAR; INTRAVENOUS ONCE
Status: COMPLETED | OUTPATIENT
Start: 2017-01-01 | End: 2017-01-01

## 2017-01-01 RX ORDER — ALBUTEROL SULFATE 0.83 MG/ML
1 SOLUTION RESPIRATORY (INHALATION) EVERY 6 HOURS PRN
Qty: 25 VIAL | Refills: 3 | Status: SHIPPED | OUTPATIENT
Start: 2017-01-01

## 2017-01-01 RX ORDER — OFLOXACIN 3 MG/ML
1 SOLUTION/ DROPS OPHTHALMIC 4 TIMES DAILY
COMMUNITY
End: 2017-01-01

## 2017-01-01 RX ORDER — KETOROLAC TROMETHAMINE 5 MG/ML
1 SOLUTION OPHTHALMIC 4 TIMES DAILY
COMMUNITY
End: 2017-01-01

## 2017-01-01 RX ORDER — POLYETHYLENE GLYCOL 3350 17 G/17G
1 POWDER, FOR SOLUTION ORAL DAILY
Qty: 30 PACKET | Refills: 3 | Status: SHIPPED | OUTPATIENT
Start: 2017-01-01 | End: 2017-01-01

## 2017-01-01 RX ADMIN — ALBUMIN HUMAN 25 G: 0.25 SOLUTION INTRAVENOUS at 14:48

## 2017-01-01 RX ADMIN — Medication 10 ML: at 10:05

## 2017-01-01 RX ADMIN — Medication 10 ML: at 13:45

## 2017-01-01 RX ADMIN — LIDOCAINE HYDROCHLORIDE 4 ML: 10 INJECTION, SOLUTION INFILTRATION; PERINEURAL at 09:36

## 2017-01-01 RX ADMIN — LIDOCAINE HYDROCHLORIDE 20 ML: 10 INJECTION, SOLUTION INFILTRATION; PERINEURAL at 09:08

## 2017-01-01 RX ADMIN — LIDOCAINE HYDROCHLORIDE 20 ML: 10 INJECTION, SOLUTION INFILTRATION; PERINEURAL at 14:30

## 2017-01-01 RX ADMIN — ALBUMIN HUMAN 12.5 G: 0.25 SOLUTION INTRAVENOUS at 09:08

## 2017-01-01 RX ADMIN — ALBUMIN HUMAN 12.5 G: 0.25 SOLUTION INTRAVENOUS at 09:14

## 2017-01-01 RX ADMIN — LIDOCAINE HYDROCHLORIDE 20 ML: 10 INJECTION, SOLUTION INFILTRATION; PERINEURAL at 14:48

## 2017-01-01 RX ADMIN — ONDANSETRON 4 MG: 2 INJECTION INTRAMUSCULAR; INTRAVENOUS at 13:30

## 2017-01-01 RX ADMIN — ALBUMIN HUMAN 12.5 G: 0.25 SOLUTION INTRAVENOUS at 15:10

## 2017-01-01 ASSESSMENT — REFRACTION_MANIFEST
OD_CYLINDER: +0.00
OD_SPHERE: +0.50
OD_ADD: +2.50
OD_CYLINDER: +1.00
OS_CYLINDER: +0.75
OS_SPHERE: -0.50
OD_SPHERE: -0.75
OS_CYLINDER: +0.00
OD_AXIS: 000
OD_AXIS: 175
OD_ADD: +2.50
OS_SPHERE: +0.75
OS_AXIS: 000
OS_AXIS: 160
OS_ADD: +2.50
OS_ADD: +2.50

## 2017-01-01 ASSESSMENT — VISUAL ACUITY
OD_SC+: +2
OS_SC: 20/40
OD_SC: 20/100
METHOD: SNELLEN - LINEAR
OD_PH_SC: 20/80
OS_SC+: +2
OS_SC: 20/80
METHOD: SNELLEN - LINEAR
OD_SC: 20/40
OS_SC+: +1
OS_SC: 20/50ECC
OD_SC: 20/50
OS_PH_SC: 20/70+1
OD_SC+: +2
OS_SC+: -2
OS_SC+: -2
OS_SC: 20/70
OS_SC: 20/80
METHOD: SNELLEN - LINEAR
OS_PH_SC: 20/60
OD_PH_SC: 20/50
OD_SC: 20/80
OS_PH_SC: 20/30
METHOD: SNELLEN - LINEAR
METHOD: SNELLEN - LINEAR
OD_PH_SC: 20/50
OD_SC: 20/60
METHOD: SNELLEN - LINEAR
OD_SC: 20/60
OS_SC: 20/40
OD_SC+: +1

## 2017-01-01 ASSESSMENT — PAIN SCALES - GENERAL
PAINLEVEL: NO PAIN (0)
PAINLEVEL: NO PAIN (0)
PAINLEVEL: EXTREME PAIN (8)
PAINLEVEL: MODERATE PAIN (4)
PAINLEVEL: NO PAIN (0)

## 2017-01-01 ASSESSMENT — TONOMETRY
OD_IOP_MMHG: 10
OD_IOP_MMHG: 12
OS_IOP_MMHG: 12
IOP_METHOD: ICARE
OS_IOP_MMHG: 15
OS_IOP_MMHG: 15
IOP_METHOD: TONOPEN
OS_IOP_MMHG: 13
OD_IOP_MMHG: 13
IOP_METHOD: TONOPEN
IOP_METHOD: ICARE
OS_IOP_MMHG: 10
IOP_METHOD: TONOPEN
OD_IOP_MMHG: 08
OD_IOP_MMHG: 15
OS_IOP_MMHG: 11
IOP_METHOD: TONOPEN
OD_IOP_MMHG: 10

## 2017-01-01 ASSESSMENT — REFRACTION_WEARINGRX
OD_CYLINDER: +0.00
OD_ADD: +2.50
OD_AXIS: 000
OS_ADD: +2.50
OD_SPHERE: +0.50
OS_CYLINDER: +0.00
OD_AXIS: 000
OS_CYLINDER: +0.00
OD_AXIS: 000
OS_AXIS: 000
OS_SPHERE: +0.75
OD_SPHERE: +0.50
OD_CYLINDER: +0.00
OS_SPHERE: +0.75
OD_SPHERE: +0.50
OS_CYLINDER: +0.00
OD_ADD: +2.50
OD_ADD: +2.50
OD_CYLINDER: +0.00
OS_AXIS: 000
OS_SPHERE: +0.75
OS_ADD: +2.50
OS_AXIS: 000
OS_ADD: +2.50

## 2017-01-01 ASSESSMENT — ASTHMA QUESTIONNAIRES: ACT_TOTALSCORE: 8

## 2017-01-01 ASSESSMENT — PATIENT HEALTH QUESTIONNAIRE - PHQ9: SUM OF ALL RESPONSES TO PHQ QUESTIONS 1-9: 6

## 2017-01-01 ASSESSMENT — SLIT LAMP EXAM - LIDS
COMMENTS: MEIBOMIAN GLAND DYSFUNCTION, BLEPHARITIS

## 2017-01-01 ASSESSMENT — EXTERNAL EXAM - RIGHT EYE
OD_EXAM: NORMAL

## 2017-01-01 ASSESSMENT — CONF VISUAL FIELD
OD_NORMAL: 1
OD_NORMAL: 1
OS_NORMAL: 1
OD_NORMAL: 1
OS_NORMAL: 1
OD_NORMAL: 1
OS_NORMAL: 1
OS_NORMAL: 1

## 2017-01-01 ASSESSMENT — CUP TO DISC RATIO
OS_RATIO: 0.45
OS_RATIO: 0.3
OS_RATIO: 0.5
OS_RATIO: 0.5
OD_RATIO: 0.4
OD_RATIO: 0.3

## 2017-01-01 ASSESSMENT — EXTERNAL EXAM - LEFT EYE
OS_EXAM: NORMAL

## 2017-01-01 ASSESSMENT — PAIN DESCRIPTION - DESCRIPTORS: DESCRIPTORS: ACHING

## 2017-01-03 ENCOUNTER — OFFICE VISIT (OUTPATIENT)
Dept: GASTROENTEROLOGY | Facility: CLINIC | Age: 77
End: 2017-01-03
Attending: INTERNAL MEDICINE
Payer: COMMERCIAL

## 2017-01-03 VITALS
SYSTOLIC BLOOD PRESSURE: 125 MMHG | TEMPERATURE: 97.7 F | WEIGHT: 109.4 LBS | HEART RATE: 85 BPM | BODY MASS INDEX: 20 KG/M2 | DIASTOLIC BLOOD PRESSURE: 75 MMHG

## 2017-01-03 DIAGNOSIS — K74.69 OTHER CIRRHOSIS OF LIVER (H): ICD-10-CM

## 2017-01-03 DIAGNOSIS — C22.0 HCC (HEPATOCELLULAR CARCINOMA) (H): ICD-10-CM

## 2017-01-03 DIAGNOSIS — C22.0 HCC (HEPATOCELLULAR CARCINOMA) (H): Primary | ICD-10-CM

## 2017-01-03 LAB
ALBUMIN SERPL-MCNC: 2.2 G/DL (ref 3.4–5)
ALP SERPL-CCNC: 256 U/L (ref 40–150)
ALT SERPL W P-5'-P-CCNC: 44 U/L (ref 0–50)
ANION GAP SERPL CALCULATED.3IONS-SCNC: 6 MMOL/L (ref 3–14)
AST SERPL W P-5'-P-CCNC: 76 U/L (ref 0–45)
BILIRUB DIRECT SERPL-MCNC: 0.7 MG/DL (ref 0–0.2)
BILIRUB SERPL-MCNC: 1.6 MG/DL (ref 0.2–1.3)
BUN SERPL-MCNC: 13 MG/DL (ref 7–30)
CALCIUM SERPL-MCNC: 8.6 MG/DL (ref 8.5–10.1)
CHLORIDE SERPL-SCNC: 107 MMOL/L (ref 94–109)
CO2 SERPL-SCNC: 24 MMOL/L (ref 20–32)
CREAT SERPL-MCNC: 0.75 MG/DL (ref 0.52–1.04)
ERYTHROCYTE [DISTWIDTH] IN BLOOD BY AUTOMATED COUNT: 15.9 % (ref 10–15)
GFR SERPL CREATININE-BSD FRML MDRD: 75 ML/MIN/1.7M2
GLUCOSE SERPL-MCNC: 95 MG/DL (ref 70–99)
HCT VFR BLD AUTO: 33 % (ref 35–47)
HGB BLD-MCNC: 11.1 G/DL (ref 11.7–15.7)
INR PPP: 1.42 (ref 0.86–1.14)
MCH RBC QN AUTO: 33.9 PG (ref 26.5–33)
MCHC RBC AUTO-ENTMCNC: 33.6 G/DL (ref 31.5–36.5)
MCV RBC AUTO: 101 FL (ref 78–100)
PLATELET # BLD AUTO: 78 10E9/L (ref 150–450)
POTASSIUM SERPL-SCNC: 3.9 MMOL/L (ref 3.4–5.3)
PROT SERPL-MCNC: 7.6 G/DL (ref 6.8–8.8)
RBC # BLD AUTO: 3.27 10E12/L (ref 3.8–5.2)
SODIUM SERPL-SCNC: 137 MMOL/L (ref 133–144)
WBC # BLD AUTO: 2.7 10E9/L (ref 4–11)

## 2017-01-03 PROCEDURE — 80076 HEPATIC FUNCTION PANEL: CPT | Performed by: INTERNAL MEDICINE

## 2017-01-03 PROCEDURE — 36415 COLL VENOUS BLD VENIPUNCTURE: CPT | Performed by: INTERNAL MEDICINE

## 2017-01-03 PROCEDURE — 85610 PROTHROMBIN TIME: CPT | Performed by: INTERNAL MEDICINE

## 2017-01-03 PROCEDURE — 85027 COMPLETE CBC AUTOMATED: CPT | Performed by: INTERNAL MEDICINE

## 2017-01-03 PROCEDURE — 80048 BASIC METABOLIC PNL TOTAL CA: CPT | Performed by: INTERNAL MEDICINE

## 2017-01-03 PROCEDURE — 99212 OFFICE O/P EST SF 10 MIN: CPT | Mod: ZF

## 2017-01-03 ASSESSMENT — PAIN SCALES - GENERAL: PAINLEVEL: MILD PAIN (2)

## 2017-01-03 NOTE — NURSING NOTE
"Chief Complaint   Patient presents with     RECHECK     F/U viral carrier of hepatitis C and hepatocellular carcinoma       Initial /75 mmHg  Pulse 85  Temp(Src) 97.7  F (36.5  C) (Oral)  Wt 49.624 kg (109 lb 6.4 oz) Estimated body mass index is 20 kg/(m^2) as calculated from the following:    Height as of 7/16/15: 1.575 m (5' 2\").    Weight as of this encounter: 49.624 kg (109 lb 6.4 oz).  BP completed using cuff size: small regular  "

## 2017-01-03 NOTE — PROGRESS NOTES
CONSULTING HEALTHCARE PROFESSIONAL:  Felicitas Horton M.D.      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      HISTORY OF PRESENT ILLNESS:  Ms. Portillo is a 77-year-old Citizen of the [place] immigrant who I last saw in 05/2015.  Initially she was followed at [facility] and the diagnosis of hepatitis C was made when she came to us here in 2004.  She declined to have a liver biopsy or treatment with interferon based medications.  Her genotype was 5.  Regardless, since then, she developed cirrhosis of the liver and developed also some fluid retention and eventually with imaging in [place] at the [facility] in 12/2014, she was found to have a 2.5 x 2.4 cm lesion in segment 6.  Regardless, she went to [place], where there are other family members, to get a second opinion and somebody told her that she did not have it and not quite sure how that happened.  When she came back here and we saw, we did do an MRI and it showed a 2.7 cm lesion in segment 6, OPTN 5 and we advised her to have her health care needs done here or go to the [place] and she went to [place].  From there, I do not have the details, but it appears that she had treatment of that and she was declined to go forward for transplantation because of her advanced age.  Symptom wise, today she is telling me she does not have any abdominal distension and no fluid retention in her legs.  She is doing relatively well.  Her appetite is good.  Her weight has gone down a bit.  Otherwise, no nausea, vomiting or abdominal pain.  She is having regular bowel movements.  She did not have any GI bleed nor does she have any signs of hepatic encephalopathy.      Current Outpatient Prescriptions   Medication     ketotifen (ZADITOR/REFRESH ANTI-ITCH) 0.025 % SOLN ophthalmic solution     albuterol (2.5 MG/3ML) 0.083% nebulizer solution     albuterol (PROAIR HFA, PROVENTIL HFA, VENTOLIN HFA) 108 (90 BASE)  MCG/ACT inhaler     fluticasone (FLONASE) 50 MCG/ACT nasal spray     Multiple Vitamins-Iron (DAILY MULTIPLE VITAMIN/IRON) TABS     omeprazole (PRILOSEC) 40 MG capsule     Nutritional Supplements (BOOST CALORIE SMART) LIQD     mometasone-formoterol (DULERA) 200-5 MCG/ACT oral inhaler     No current facility-administered medications for this visit.       PHYSICAL EXAMINATION:   VITAL SIGNS:  As of today, 01/03/2017, blood pressure is 125/75, temperature is 97.7, pulse is 85.  Weight is 49.624 kg, BMI is 27.35.   HEENT:  Eyes are not icteric.  Mucosa moist.   NECK:  Supple, no lymphadenopathy.   CHEST:  Clear to auscultation.   ABDOMEN:  Not distended, bowel sounds are present.  No hepatomegaly and no splenomegaly.   EXTREMITIES:  No edema, no clubbing.   CENTRAL NERVOUS SYSTEM:  Alert and oriented to place, person and time.  No asterixis noted.      IMPRESSION AND PLAN:  Hepatocellular carcinoma.  Ms. Portillo has hepatocellular carcinoma, not quite sure what her status is now.  She did have treatment of her segment 6 lesion at [place].  Results are not clear to me.  She is planning to stay here and not go back to [place].  We will get imaging to see where we are and we will also get her outside records.  Besides that, she will have an appointment with one of our oncologists here and since she is not a transplant candidate because of her advanced age, she will follow with our Oncology group here.  We did explain that to her and we will see what the prognosis is.  Her hepatitis C was not treated because of her HCC.  We will discuss that on her return.  We will see her here in 3 months, at which time we expect to have most of that information.  She has a low platelet count and not quite sure if she had an upper endoscopy.  She might need one.      This was a 25-minute visit of which more than 50% was spent in explaining to the patient what our plan of care was.  We answered all of her questions.      cc:  Primary care  physician

## 2017-01-03 NOTE — MR AVS SNAPSHOT
After Visit Summary   1/3/2017    Yue Portillo    MRN: [id number]           Patient Information     Date Of Birth          1940        Visit Information        Provider Department      1/3/2017 11:05 AM Tala Meyer MD; [employer] [facility] Hepatology        Today's Diagnoses     HCC (hepatocellular carcinoma) (H)    -  1     Other cirrhosis of liver (H)            Follow-ups after your visit        Additional Services     Onc/Heme Adult Referral                 Follow-up notes from your care team     Return in about 3 months (around 4/3/2017).      Your next 10 appointments already scheduled     Jan 17, 2017  9:00 AM   MR ABDOMEN W/O & W CONTRAST with UUMR1   [facility], [place], [facility] ([facility], [facility])    [address] [address] [phone]           Take your medicines as usual, unless your doctor tells you not to. Bring a list of your current medicines to your exam (including vitamins, minerals and over-the-counter drugs). Also bring the results of similar scans you may have had.    The day before your exam, drink extra fluids at least six 8-ounce glasses (unless your doctor tells you to restrict your fluids).   Have a blood test (creatinine test) within 30 days of your exam. Go to your clinic or Diagnostic Imaging Department for this test.   Do not eat or drink for 6 hours prior to exam.  The MRI machine uses a strong magnet. Please wear clothes without metal (snaps, zippers). A sweatsuit works well, or we may give you a hospital gown.  Please remove any body piercings and hair extensions before you arrive. You will also remove watches, jewelry, hairpins, wallets, dentures, partial dental plates and hearing aids. You may wear contact lenses, and you may be able to wear your rings. We have a safe place to keep your personal items, but it is safer to leave them at home.   **IMPORTANT** THE  INSTRUCTIONS BELOW ARE ONLY FOR THOSE PATIENTS WHO HAVE BEEN TOLD THEY WILL RECEIVE SEDATION OR GENERAL ANESTHESIA DURING THEIR MRI PROCEDURE:  IF YOU WILL RECEIVE SEDATION (take medicine to help you relax during your exam):   You must get the medicine from your doctor before you arrive. Bring the medicine to the exam. Do not take it at home.   Arrive one hour early. Bring someone who can take you home after the test. Your medicine will make you sleepy. After the exam, you may not drive, take a bus or take a taxi by yourself.   No eating 8 hours before your exam. You may have clear liquids up until 4 hours before your exam. (Clear liquids include water, clear tea, black coffee and fruit juice without pulp.)  IF YOU WILL RECEIVE ANESTHESIA (be asleep for your exam):   Arrive 1 1/2 hours early. Bring someone who can take you home after the test. You may not drive, take a bus or take a taxi by yourself.   No eating 8 hours before your exam. You may have clear liquids up until 4 hours before your exam. (Clear liquids include water, clear tea, black coffee and fruit juice without pulp.)  If you have any questions, please contact your Imaging Department exam site.            Apr 05, 2017 10:00 AM   Lab with  LAB   [facility] ([facility])    [address] [place] [address] [id number] [phone]            Apr 05, 2017 10:50 AM   (Arrive by 10:35 AM)   Return General Liver with Tala Meyer MD   [facility]at[facility] ([facility])    [address] [place] [address] [id number] [phone]              Future tests that were ordered for you today     Open Future Orders        Priority Expected Expires Ordered    MRI Abdomen w & w/o contrast* Routine  2/2/2017 1/3/2017    Hepatitis C RNA quantitative Routine  2/2/2017 1/3/2017    AFP tumor marker Routine  2/2/2017 1/3/2017            Who to contact     If you have  "questions or need follow up information about today's clinic visit or your schedule please contact [facility]PAT[facility] directly at [phone].  Normal or non-critical lab and imaging results will be communicated to you by [employer], letter or phone within 4 business days after the clinic has received the results. If you do not hear from us within 7 days, please contact the clinic through [employer] or phone. If you have a critical or abnormal lab result, we will notify you by phone as soon as possible.  Submit refill requests through [employer] or call your pharmacy and they will forward the refill request to us. Please allow 3 business days for your refill to be completed.          Additional Information About Your Visit        [employer] Information     [employer] lets you send messages to your doctor, view your test results, renew your prescriptions, schedule appointments and more. To sign up, go to [email or web address] . Click on \"Log in\" on the left side of the screen, which will take you to the Welcome page. Then click on \"Sign up Now\" on the right side of the page.     You will be asked to enter the access code listed below, as well as some personal information. Please follow the directions to create your username and password.     Your access code is: [email or web address]  Expires: 3/29/2017  6:30 AM     Your access code will  in 90 days. If you need help or a new code, please call your [place] clinic or [phone].        Care EveryWhere ID     This is your Care EveryWhere ID. This could be used by other organizations to access your [place] medical records  [id number]        Your Vitals Were     Pulse Temperature                85 97.7  F (36.5  C) (Oral)           Blood Pressure from Last 3 Encounters:   17 125/75   16 125/79   16 125/79    Weight from Last 3 Encounters:   17 49.624 kg (109 lb 6.4 oz)   16 52.073 kg (114 lb 12.8 oz)   16 52.073 kg (114 lb 12.8 oz)            "   We Performed the Following     Onc/Heme Adult Referral        Primary Care Provider Office Phone # Fax #    Felicitas Horton [phone]       [facility] [address] [address]        Thank you!     Thank you for choosing [facility]  for your care. Our goal is always to provide you with excellent care. Hearing back from our patients is one way we can continue to improve our services. Please take a few minutes to complete the written survey that you may receive in the mail after your visit with us. Thank you!             Your Updated Medication List - Protect others around you: Learn how to safely use, store and throw away your medicines at www.disposemymeds.org.          This list is accurate as of: 1/3/17 12:38 PM.  Always use your most recent med list.                   Brand Name Dispense Instructions for use    * albuterol (2.5 MG/3ML) 0.083% neb solution     25 vial    Take 1 vial (2.5 mg) by nebulization every 6 hours as needed for shortness of breath / dyspnea or wheezing       * albuterol 108 (90 BASE) MCG/ACT Inhaler    PROAIR HFA/PROVENTIL HFA/VENTOLIN HFA    1 Inhaler    Inhale 2 puffs into the lungs every 6 hours as needed for shortness of breath / dyspnea or wheezing       BOOST CALORIE SMART Liqd     60 Bottle    Take 2 Cans by mouth daily       DAILY MULTIPLE VITAMIN/IRON Tabs     30 tablet    Take 1 tablet by mouth daily       fluticasone 50 MCG/ACT spray    FLONASE    1 Bottle    Spray 2 sprays into both nostrils daily       ketotifen 0.025 % Soln ophthalmic solution    ZADITOR/REFRESH ANTI-ITCH    1 Bottle    Place 1 drop into both eyes every 12 hours as needed       mometasone-formoterol 200-5 MCG/ACT oral inhaler    DULERA    1 Inhaler    Inhale 2 puffs into the lungs 2 times daily       omeprazole 40 MG capsule    priLOSEC    90 capsule    Take 1 capsule (40 mg) by mouth daily Take 30-60 minutes before a meal.       * Notice:  This  list has 2 medication(s) that are the same as other medications prescribed for you. Read the directions carefully, and ask your doctor or other care provider to review them with you.

## 2017-01-06 ENCOUNTER — TELEPHONE (OUTPATIENT)
Dept: FAMILY MEDICINE | Facility: CLINIC | Age: 77
End: 2017-01-06

## 2017-01-06 NOTE — TELEPHONE ENCOUNTER
[facility] phone call message- patient requesting results:    Test: Lab    Date of test: 12/22/2016    Additional Comments: Patients son, Rivera, called requesting Hepatitis C results.  Please call to discuss.  Thank you!    OK to leave a message on voice mail? Yes    Primary language: Palestinian      needed? No    Call taken on January 6, 2017 at 4:11 PM by Kimberli Bajwa

## 2017-01-06 NOTE — TELEPHONE ENCOUNTER
Message routed to PCP to place lab letter or result comment for Hep C results.    Lay Encarnacion RN

## 2017-01-09 NOTE — TELEPHONE ENCOUNTER
Called patient's son via language line. Relayed lab result letter. Verbalized understanding. No further questions at this time.    Teresa Mcdonough RN

## 2017-01-17 ENCOUNTER — HOSPITAL ENCOUNTER (OUTPATIENT)
Dept: MRI IMAGING | Facility: CLINIC | Age: 77
Discharge: HOME | End: 2017-01-17
Attending: INTERNAL MEDICINE | Admitting: INTERNAL MEDICINE
Payer: COMMERCIAL

## 2017-01-17 DIAGNOSIS — C22.0 HCC (HEPATOCELLULAR CARCINOMA) (H): ICD-10-CM

## 2017-01-17 DIAGNOSIS — K74.69 OTHER CIRRHOSIS OF LIVER (H): ICD-10-CM

## 2017-01-17 PROCEDURE — A9585 GADOBUTROL INJECTION: HCPCS | Performed by: INTERNAL MEDICINE

## 2017-01-17 PROCEDURE — 25500045 ZZH RX 255: Performed by: INTERNAL MEDICINE

## 2017-01-17 PROCEDURE — 74183 MRI ABD W/O CNTR FLWD CNTR: CPT

## 2017-01-17 RX ORDER — GADOBUTROL 604.72 MG/ML
7.5 INJECTION INTRAVENOUS ONCE
Status: COMPLETED | OUTPATIENT
Start: 2017-01-17 | End: 2017-01-17

## 2017-01-17 RX ADMIN — GADOBUTROL 5 ML: 604.72 INJECTION INTRAVENOUS at 09:38

## 2017-01-17 NOTE — LETTER
January 24, 2017       TO: Yue A Bandar  [address]  [place] [address]       Dear Ms. Portillo,    We are writing to inform you of your test results. Your MRI shows cirrhosis and evidence of portal hypertension. It also shows posttreatment changes in hepatic segment 6 without convincing evidence for recurrent or residual hepatocellular carcinoma.  Will follow that with imaging every 3-6 months.    Resulted Orders   MRI Abdomen w & w/o contrast*    Narrative    MRI ABDOMEN    CLINICAL HISTORY:  Hepatocellular carcinoma, reportedly, history of  treatment to a segment 6 lesion at male.    TECHNIQUE: Images were acquired with and without intravenous contrast  through the abdomen. The following MR images were acquired: TrueFISP,  multiplanar T2 weighted, axial T1 in/out of phase, diffusion-weighted.  Multiplanar T1-weighted images with fat saturation were before  contrast administration and at multiple time points following the  administration of intravenous contrast. Contrast dose: 7.5 mL Gadavist    FINDINGS:    Comparison study: 1/13/2015    Liver: Nodular liver contour, with lacy T2 hyperintensity suggesting  fibrosis and cirrhosis. Post treatment changes and hepatic segment 6  (best visualized on series 6 image 34). No concerning perilesional  nodular enhancement to suggest recurrent/residual disease. No  arterially enhancing lesion is identified. No suspicious washout or  pseudocapsule.    Gallbladder: Collapsed, with apparent gallbladder wall thickening and  mild pericholecystic fluid, which may be in part due to ascites and  portal hypertension.    Spleen: Borderline enlarged.    Kidneys: Nonenhancing cyst in the posterior portion of the right  kidney, with high T1 signal on precontrast imaging, likely a  hemorrhagic/proteinaceous cyst. No solid mass.    Adrenal glands: Normal.    Pancreas: Normal.    Bowel: There are a few areas of small bowel wall thickening, possibly  related to portal  enteropathy. No evidence for obstruction.    Lymph nodes: No adenopathy.    Blood vessels: Patent where visualized. The portal vein is enlarged.  Mild paraesophageal varices. Recanalized umbilical vein.    Lung bases: Clear.    Bones and soft tissues: No aggressive appearing bony lesion or acute  fracture. Subcutaneous fat and musculature is unremarkable. The    Mesentery and abdominal wall: Normal.    Ascites: Mild to moderate ascites.      Impression    IMPRESSION:    1. Cirrhosis and evidence of portal hypertension.  2. Posttreatment changes in hepatic segment 6 without convincing  evidence for recurrent or residual hepatocellular carcinoma.  3. Based on this exam only, the patient is within Arpit criteria.      OPTN/LIRADS definitions.  LIRADS v2014.    LIRADS 1:  Definitely benign.  LIRADS 2:  Probably benign.  LIRADS 3:  Indeterminate for HCC.  LIRADS 4:  Probably HCC.  LIRADS M:  Probably malignant.  Not specific for HCC.    OPTN 5a:  Diagnostic for HCC.  < 2 cm.  OPTN 5b:  Diagnostic for HCC.  > Or = 2 cm and < 5 cm.  OPTN 5x:  Diagnostic for HCC.  > Or = 5 cm.  OPTN 5T:  Previously treated HCC.  At the [facility],  this category is invoked if there is suspicion of residual tumor.    Arpit criteria for liver transplantation:    1. Presence of no HCCs greater than 5 cm. One HCC measuring 3-5 cm is  allowed if no other HCCs are present.  2. Maximum of 3 HCCs measuring 3 cm or less.  3. No vascular invasion.  4. No extrahepatic metastases.    I have personally reviewed the examination and initial interpretation  and I agree with the findings.    MILO VOSS MD         It was a pleasure to see you at your recent visit. Please let me know if you have any questions or concerns.     Clinic Staff - [phone] option 3     Sincerely,     Tala Meyer MD  [address], Mail [id number]  [place]  [address].

## 2017-01-23 ENCOUNTER — TELEPHONE (OUTPATIENT)
Dept: GASTROENTEROLOGY | Facility: CLINIC | Age: 77
End: 2017-01-23

## 2017-01-23 NOTE — TELEPHONE ENCOUNTER
Fred (son) called re: 1/17/17 ab MRI. Would like interpretation. Can be reached at [phone]. Detailed VM fine. Message sent to hep pool.

## 2017-01-24 NOTE — ADDENDUM NOTE
Encounter addended by: Danielito Navarrete LPN on: 1/24/2017  9:09 AM<BR>     Documentation filed: Letters, Message

## 2017-01-25 NOTE — TELEPHONE ENCOUNTER
Writer spoke with son regarding the interpretations of the MRI. Pt's son requested sooner appointment with Dr. Meyer to discuss in person along with Hep C treatment. Writer will pass along the request but Pt's son was also made aware that Dr. Meyer tends to run 3 months out.

## 2017-03-09 ENCOUNTER — OFFICE VISIT (OUTPATIENT)
Dept: FAMILY MEDICINE | Facility: CLINIC | Age: 77
End: 2017-03-09

## 2017-03-09 VITALS
OXYGEN SATURATION: 99 % | SYSTOLIC BLOOD PRESSURE: 109 MMHG | DIASTOLIC BLOOD PRESSURE: 74 MMHG | HEART RATE: 77 BPM | WEIGHT: 111.2 LBS | TEMPERATURE: 98 F | BODY MASS INDEX: 20.34 KG/M2 | RESPIRATION RATE: 18 BRPM

## 2017-03-09 DIAGNOSIS — H92.03 OTALGIA OF BOTH EARS: ICD-10-CM

## 2017-03-09 DIAGNOSIS — J31.0 CHRONIC RHINITIS: ICD-10-CM

## 2017-03-09 DIAGNOSIS — Z00.00 ROUTINE GENERAL MEDICAL EXAMINATION AT A HEALTH CARE FACILITY: Primary | ICD-10-CM

## 2017-03-09 DIAGNOSIS — J45.40 MODERATE PERSISTENT ASTHMA WITHOUT COMPLICATION: ICD-10-CM

## 2017-03-09 DIAGNOSIS — J45.21 MILD INTERMITTENT ASTHMA WITH ACUTE EXACERBATION: ICD-10-CM

## 2017-03-09 LAB
CHOLEST SERPL-MCNC: 96.2 MG/DL (ref 0–200)
CHOLEST/HDLC SERPL: 2.6 {RATIO} (ref 0–5)
HDLC SERPL-MCNC: 36.8 MG/DL
LDLC SERPL CALC-MCNC: 50 MG/DL (ref 0–129)
TRIGL SERPL-MCNC: 48.8 MG/DL (ref 0–150)
VLDL CHOLESTEROL: 9.8 MG/DL (ref 7–32)

## 2017-03-09 NOTE — LETTER
March 10, 2017      Yue Portillo  [address]   [place] [address]        Dear Yue,    Thank you for getting your care at [facility]. Please see below for your test results.    Resulted Orders   Lipid Panel ([facility])   Result Value Ref Range    Cholesterol 96.2 0.0 - 200.0 mg/dL    Cholesterol/HDL Ratio 2.6 0.0 - 5.0    HDL Cholesterol 36.8 (L) >40.0 mg/dL    LDL Cholesterol Calculated 50 0 - 129 mg/dL    Triglycerides 48.8 0.0 - 150.0 mg/dL    VLDL Cholesterol 9.8 7.0 - 32.0 mg/dL     Your cholesterol is very good.    Sincerely,    Felicitas Horton MD

## 2017-03-09 NOTE — MR AVS SNAPSHOT
After Visit Summary   3/9/2017    Yue Portillo    MRN: [id number]           Patient Information     Date Of Birth          1940        Visit Information        Provider Department      3/9/2017 2:40 PM Felicitas Horton MD [facility]        Today's Diagnoses     Routine general medical examination at a health care facility    -  1    Otalgia of both ears           Follow-ups after your visit        Your next 10 appointments already scheduled     Apr 05, 2017 10:00 AM CDT   Lab with  LAB   [facility] ([facility])    [address] [place] [id number] [phone]            Apr 05, 2017 10:50 AM CDT   (Arrive by 10:35 AM)   Return General Liver with Tala Meyer MD   [facility]atology ([facility])    [address] [place] [id number] [phone]              Who to contact     Please call your clinic at [phone] to:    Ask questions about your health    Make or cancel appointments    Discuss your medicines    Learn about your test results    Speak to your doctor   If you have compliments or concerns about an experience at your clinic, or if you wish to file a complaint, please contact [facility] Patient Relations at [phone] or email us at [email or web address]         Additional Information About Your Visit        MyChart Information     Skoovyt is an electronic gateway that provides easy, online access to your medical records. With MVP Vault, you can request a clinic appointment, read your test results, renew a prescription or communicate with your care team.     To sign up for Skoovyt visit the website at [email or web address]   You will be asked to enter the access code listed below, as well as some personal information. Please follow the directions to create your username and password.     Your  access code is: [email or web address]  Expires: 3/29/2017  6:30 AM     Your access code will  in 90 days. If you need help or a new code, please contact your [facility] or call [phone] for assistance.        Care EveryWhere ID     This is your Care EveryWhere ID. This could be used by other organizations to access your [facility] medical records  [id number]        Your Vitals Were     Pulse Temperature Respirations Pulse Oximetry Breastfeeding? BMI (Body Mass Index)    77 98  F (36.7  C) (Oral) 18 99% No 20.34 kg/m2       Blood Pressure from Last 3 Encounters:   17 109/74   17 125/75   16 125/79    Weight from Last 3 Encounters:   17 111 lb 3.2 oz (50.4 kg)   17 109 lb 6.4 oz (49.6 kg)   16 114 lb 12.8 oz (52.1 kg)              We Performed the Following     Lipid Panel ([facility])          Today's Medication Changes          These changes are accurate as of: 3/9/17  3:59 PM.  If you have any questions, ask your nurse or doctor.               Start taking these medicines.        Dose/Directions    Acetic Acid-Aluminum Acetate 2 % Soln   Used for:  Otalgia of both ears   Started by:  Felicitas Horton MD        4 drops every 3 hours   Quantity:  60 mL   Refills:  1            Where to get your medicines      These medications were sent to [address] [address]     Phone:  [phone]     Acetic Acid-Aluminum Acetate 2 % Soln                Primary Care Provider Office Phone # Fax #    Felicitas Horton [phone]       [facility] [address]  [facility] [address]        Thank you!     Thank you for choosing [facility]  for your care. Our goal is always to provide you with excellent care. Hearing back from our patients is one way we can continue to improve our services. Please take a few  minutes to complete the written survey that you may receive in the mail after your visit with us. Thank you!             Your Updated Medication List - Protect others around you: Learn how to safely use, store and throw away your medicines at www.disposemymeds.org.          This list is accurate as of: 3/9/17  3:59 PM.  Always use your most recent med list.                   Brand Name Dispense Instructions for use    Acetic Acid-Aluminum Acetate 2 % Soln     60 mL    4 drops every 3 hours       * albuterol (2.5 MG/3ML) 0.083% neb solution     25 vial    Take 1 vial (2.5 mg) by nebulization every 6 hours as needed for shortness of breath / dyspnea or wheezing       * albuterol 108 (90 BASE) MCG/ACT Inhaler    PROAIR HFA/PROVENTIL HFA/VENTOLIN HFA    1 Inhaler    Inhale 2 puffs into the lungs every 6 hours as needed for shortness of breath / dyspnea or wheezing       BOOST CALORIE SMART Liqd     60 Bottle    Take 2 Cans by mouth daily       DAILY MULTIPLE VITAMIN/IRON Tabs     30 tablet    Take 1 tablet by mouth daily       fluticasone 50 MCG/ACT spray    FLONASE    1 Bottle    Spray 2 sprays into both nostrils daily       ketotifen 0.025 % Soln ophthalmic solution    ZADITOR/REFRESH ANTI-ITCH    1 Bottle    Place 1 drop into both eyes every 12 hours as needed       mometasone-formoterol 200-5 MCG/ACT oral inhaler    DULERA    1 Inhaler    Inhale 2 puffs into the lungs 2 times daily       omeprazole 40 MG capsule    priLOSEC    90 capsule    Take 1 capsule (40 mg) by mouth daily Take 30-60 minutes before a meal.       * Notice:  This list has 2 medication(s) that are the same as other medications prescribed for you. Read the directions carefully, and ask your doctor or other care provider to review them with you.

## 2017-03-10 RX ORDER — FLUTICASONE PROPIONATE 50 MCG
2 SPRAY, SUSPENSION (ML) NASAL DAILY
Qty: 1 BOTTLE | Refills: 11 | Status: SHIPPED | OUTPATIENT
Start: 2017-03-10 | End: 2018-01-01

## 2017-03-10 RX ORDER — ALBUTEROL SULFATE 90 UG/1
2 AEROSOL, METERED RESPIRATORY (INHALATION) EVERY 6 HOURS PRN
Qty: 1 INHALER | Refills: 11 | Status: ON HOLD | OUTPATIENT
Start: 2017-03-10 | End: 2018-01-01

## 2017-03-10 NOTE — PROGRESS NOTES
SUBJECTIVE:  The patient is here for followup.  She had a few complaints.  She was in a little bit of right upper quadrant pain and some itching and pain in her ears.  She was interested in being checked for diabetes and wanted some refills of her medications.  She was also asking about her MRI report from January that Dr. Meyer, her GI doctor had ordered.  Otherwise sounds like she is pretty stable as far as her liver disease.  She is due for checking her lipids today and was fine for doing that.  When I looked at her chart, she actually had a glucose fairly recently with the labs that Dr. Meyer had ordered so she was fine with not checking that again.      OBJECTIVE:  On exam, the patient is in no acute distress.  Vital signs are stable, again with the pain that she was mentioning was actually a right upper quadrant pain which I told her was probably related to her liver.  I reviewed her MRI report which actually sounded quite good as far as not showing signs of her liver cancer anymore but showing cirrhosis which I told her she was known to have that for a long time from her hepatitis C. I did look at her ears which looked normal as far as not having any sign of infection.      ASSESSMENT:  Stable medical problems.      PLAN:  I wrote for [place] drops for the discomfort in her ears.  I refilled a bunch of medications that she wanted refilled.  We will check her lipids today and looking at Dr. Meyer's note from January, he was going to send her to Oncology, but that did not seem to happen, so I said I would go ahead and send him a note did that fall through or what was the situation with that.  Otherwise, she does have an appointment with Dr. Meyer fairly soon.      Visit length was 25 minutes, all spent counseling about her chronic liver disease and other medications.

## 2017-03-14 ENCOUNTER — TELEPHONE (OUTPATIENT)
Dept: GASTROENTEROLOGY | Facility: CLINIC | Age: 77
End: 2017-03-14

## 2017-03-14 NOTE — TELEPHONE ENCOUNTER
[id number]  used to contact patient and updated her via phone. Patient repeated back information. Confirmed with  scheduling that  is scheduled for upcoming 3/16/17 appointment with Dr. Ayala. No further actions needed.

## 2017-03-16 ENCOUNTER — ONCOLOGY VISIT (OUTPATIENT)
Dept: ONCOLOGY | Facility: CLINIC | Age: 77
End: 2017-03-16
Attending: INTERNAL MEDICINE
Payer: COMMERCIAL

## 2017-03-16 VITALS
HEIGHT: 62 IN | RESPIRATION RATE: 18 BRPM | SYSTOLIC BLOOD PRESSURE: 109 MMHG | OXYGEN SATURATION: 100 % | BODY MASS INDEX: 20.8 KG/M2 | TEMPERATURE: 97.4 F | WEIGHT: 113 LBS | DIASTOLIC BLOOD PRESSURE: 75 MMHG | HEART RATE: 85 BPM

## 2017-03-16 DIAGNOSIS — C22.0 HCC (HEPATOCELLULAR CARCINOMA) (H): Primary | ICD-10-CM

## 2017-03-16 PROCEDURE — 99212 OFFICE O/P EST SF 10 MIN: CPT | Mod: ZF

## 2017-03-16 PROCEDURE — 99205 OFFICE O/P NEW HI 60 MIN: CPT | Mod: ZP | Performed by: INTERNAL MEDICINE

## 2017-03-16 ASSESSMENT — PAIN SCALES - GENERAL: PAINLEVEL: NO PAIN (0)

## 2017-03-16 NOTE — LETTER
3/16/2017       RE: Yue Portillo  [address]  [facility] [address]     Dear Colleague,    Thank you for referring your patient, Yue Portillo, to the [facility]. Please see a copy of my visit note below.    Oncology Initial visit:  Date on this visit: 3/16/2017    Yue Portillo  is referred by Dr.Mohamed Martinez Meyer for an oncology consultation. She requires evaluation for diagnosis of HCC    Primary Physician: Felicitas Horton     History Of Present Illness:  A professional interpretor helps with the translation.Patient's two sons also accompany her for the visit today.       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 Initially she did not get treatment for that but then in Jan 2015 she had a repeat MRI done showing a 2.7 cm OPTN5B lesion in segment 6.  She went to [place] and apparently had no evidence of metastasis, She had liver directed therapy done in May 2015. Because of advanced age, liver transplantation was not offered.Then around 12/2015 or 01/2016, she was found to have another small lesion in the liver, but I am not sure exactly where, for which she underwent what seems like radioembolization procedure in early 2016.  Repeat scans after that showed no evidence of residual cancer as per the patient.       She recently again saw Dr Meyer and had repeat MRI showing post treatment changes with no evidence of residual/recurrent cancer.She tells me that she has been doing well.  She has very occasional mild burning in the right upper quadrant but otherwise, she continues to eat and drink well without any nausea, vomiting, diarrhea or constipation.  Her weight is stable.  She denies any new swellings.  Denies any neuropathy.  No trouble breathing.  She has decent energy and otherwise she continues to do well.          ROS:  A comprehensive ROS was otherwise neg      Past Medical/Surgical History:  Past Medical History   Diagnosis Date     Mild intermittent asthma    Hep C- untreated  HCC as mentioned above    Past Surgical History   Procedure Laterality Date     No history of surgery       H pylori mike screen       was treated for h pylori     Eye surgery       Cancer History:   As above      Allergies:  Allergies as of 2017 - Tello as Reviewed 2017   Allergen Reaction Noted     Dust mites Cough 10/03/2016     Seasonal allergies  2014     Current Medications:  Current Outpatient Prescriptions   Medication Sig Dispense Refill     mometasone-formoterol (DULERA) 200-5 MCG/ACT oral inhaler Inhale 2 puffs into the lungs 2 times daily 1 Inhaler 12     fluticasone (FLONASE) 50 MCG/ACT spray Spray 2 sprays into both nostrils daily 1 Bottle 11     albuterol (PROAIR HFA/PROVENTIL HFA/VENTOLIN HFA) 108 (90 BASE) MCG/ACT Inhaler Inhale 2 puffs into the lungs every 6 hours as needed for shortness of breath / dyspnea or wheezing 1 Inhaler 11     ketotifen (ZADITOR/REFRESH ANTI-ITCH) 0.025 % SOLN ophthalmic solution Place 1 drop into both eyes every 12 hours as needed 1 Bottle 9     albuterol (2.5 MG/3ML) 0.083% nebulizer solution Take 1 vial (2.5 mg) by nebulization every 6 hours as needed for shortness of breath / dyspnea or wheezing 25 vial 3     Multiple Vitamins-Iron (DAILY MULTIPLE VITAMIN/IRON) TABS Take 1 tablet by mouth daily 30 tablet 11     omeprazole (PRILOSEC) 40 MG capsule Take 1 capsule (40 mg) by mouth daily Take 30-60 minutes before a meal. 90 capsule 3     Nutritional Supplements (BOOST CALORIE SMART) LIQD Take 2 Cans by mouth daily 60 Bottle 11     Acetic Acid-Aluminum Acetate 2 % SOLN 4 drops every 3 hours 60 mL 1      Family History:  Family History   Problem Relation Age of Onset     Respiratory Father      asthma   No history of cancers.  Patient had 10 kids altogether.  One  in an accident, 1  " after surgery and 2  in childhood.  Six kids are living and all are healthy.       Social History:  Social History     Social History     Marital status: Single     Spouse name: N/A     Number of children: N/A     Years of education: N/A     Occupational History     Not on file.     Social History Main Topics     Smoking status: Never Smoker     Smokeless tobacco: Never Used     Alcohol use No     Drug use: No     Sexual activity: No     Other Topics Concern     Not on file     Social History Narrative   She does not smoke.  Does not drink any alcohol.  She lives with her daughter.       Physical Exam:  /75 (BP Location: Left arm, Patient Position: Chair, Cuff Size: Adult Regular)  Pulse 85  Temp 97.4  F (36.3  C) (Oral)  Resp 18  Ht 1.575 m (5' 2.01\")  Wt 51.3 kg (113 lb)  SpO2 100%  BMI 20.66 kg/m2  CONSTITUTIONAL: no acute distress  EYES: PERRLA, no palor or icterus.   ENT/MOUTH: no mouth lesions. Oropharynx normal  CVS: s1s2 no m r g .   RESPIRATORY: clear to auscultation b/l  GI: soft non tender no hepatosplenomegaly  NEURO: AAOX3  Grossly non focal neuro exam  INTEGUMENT: no obvious rashes  LYMPHATIC: no palpable cervical, supraclavicular, axillary or inguinal LAD  MUSCULOSKELETAL: Unremarkable. No bony tenderness.   EXTREMITIES: no edema  PSYCH: Mentation, mood and affect are normal. Decision making capacity is intact      Laboratory/Imaging Studies  Results for WEI ELDRIDGE (MRN [id number]) as of 3/16/2017 13:58   Ref. Range 1/3/2017 10:50   Sodium Latest Ref Range: 133 - 144 mmol/L 137   Potassium Latest Ref Range: 3.4 - 5.3 mmol/L 3.9   Chloride Latest Ref Range: 94 - 109 mmol/L 107   Carbon Dioxide Latest Ref Range: 20 - 32 mmol/L 24   Urea Nitrogen Latest Ref Range: 7 - 30 mg/dL 13   Creatinine Latest Ref Range: 0.52 - 1.04 mg/dL 0.75   GFR Estimate Latest Ref Range: >60 mL/min/1.7m2 75   GFR Estimate If Black Latest Ref Range: >60 mL/min/1.7m2 >90...   Calcium Latest Ref Range: 8.5 - " 10.1 mg/dL 8.6   Anion Gap Latest Ref Range: 3 - 14 mmol/L 6   Albumin Latest Ref Range: 3.4 - 5.0 g/dL 2.2 (L)   Protein Total Latest Ref Range: 6.8 - 8.8 g/dL 7.6   Bilirubin Total Latest Ref Range: 0.2 - 1.3 mg/dL 1.6 (H)   Alkaline Phosphatase Latest Ref Range: 40 - 150 U/L 256 (H)   ALT Latest Ref Range: 0 - 50 U/L 44   AST Latest Ref Range: 0 - 45 U/L 76 (H)   Bilirubin Direct Latest Ref Range: 0.0 - 0.2 mg/dL 0.7 (H)   Glucose Latest Ref Range: 70 - 99 mg/dL 95   WBC Latest Ref Range: 4.0 - 11.0 10e9/L 2.7 (L)   Hemoglobin Latest Ref Range: 11.7 - 15.7 g/dL 11.1 (L)   Hematocrit Latest Ref Range: 35.0 - 47.0 % 33.0 (L)   Platelet Count Latest Ref Range: 150 - 450 10e9/L 78 (L)   RBC Count Latest Ref Range: 3.8 - 5.2 10e12/L 3.27 (L)   MCV Latest Ref Range: 78 - 100 fl 101 (H)   MCH Latest Ref Range: 26.5 - 33.0 pg 33.9 (H)   MCHC Latest Ref Range: 31.5 - 36.5 g/dL 33.6   RDW Latest Ref Range: 10.0 - 15.0 % 15.9 (H)   INR Latest Ref Range: 0.86 - 1.14  1.42 (H)     MRI 1/17/17  IMPRESSION:   1. Cirrhosis and evidence of portal hypertension.  2. Posttreatment changes in hepatic segment 6 without convincing  evidence for recurrent or residual hepatocellular carcinoma.  3. Based on this exam only, the patient is within Arpit criteria.      ASSESSMENT/PLAN:  Stage I [id number] HCC of the hepatic segment 6 measuring 2.7 cm in greatest dimension, s/p liver directed therapy at [facility] in, now with no evidence of recurrence/residual cancer. Not a transplant candidate due to advanced age    We will try to obtain records from [facility]. If She has had CT Chest/Bone Scan, then I will not repeat it, otherwise I will consider checking CT Chest/Bone Scan to complete metastatic workup  Going forward, I would recommend imaging and labs every 3 months.  She will be due for repeat scans next month.  I will see her after that    Pancytopenia in the setting of cirrhosis and portal hypertension - observe for now. Repeat labs next  month    She will follow with Dr Meyer for Hep C related cirrhosis    All questions answered and she is agreeable and comfortable with the plan      Antwon Ayala     Dictation on: 03/21/2017  3:23 PM by: ANTWON AYALA [[email or web address]]     I was unable to see if any CT Chest or Bone scan was done or not.  We will try to get CT Chest and Bone scan as well    Antwon Ayala      Again, thank you for allowing me to participate in the care of your patient.      Sincerely,    Antwon Ayala MD

## 2017-03-16 NOTE — LETTER
3/16/2017      RE: Yue Portillo  [address]  [facility] [address]       Oncology Initial visit:  Date on this visit: 3/16/2017    Yue Portillo  is referred by Dr.Mohamed Martinez Meyer for an oncology consultation. She requires evaluation for diagnosis of HCC    Primary Physician: Felicitas Horton     History Of Present Illness:  A professional interpretor helps with the translation.Patient's two sons also accompany her for the visit today.       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 Initially she did not get treatment for that but then in Jan 2015 she had a repeat MRI done showing a 2.7 cm OPTN5B lesion in segment 6.  She went to [place] and apparently had no evidence of metastasis, She had liver directed therapy done in May 2015. Because of advanced age, liver transplantation was not offered.Then around 12/2015 or 01/2016, she was found to have another small lesion in the liver, but I am not sure exactly where, for which she underwent what seems like radioembolization procedure in early 2016.  Repeat scans after that showed no evidence of residual cancer as per the patient.       She recently again saw Dr Meyer and had repeat MRI showing post treatment changes with no evidence of residual/recurrent cancer.She tells me that she has been doing well.  She has very occasional mild burning in the right upper quadrant but otherwise, she continues to eat and drink well without any nausea, vomiting, diarrhea or constipation.  Her weight is stable.  She denies any new swellings.  Denies any neuropathy.  No trouble breathing.  She has decent energy and otherwise she continues to do well.         ROS:  A comprehensive ROS was otherwise neg      Past Medical/Surgical History:  Past Medical History   Diagnosis Date     Mild intermittent asthma     Hep C- untreated  HCC as mentioned above    Past Surgical History   Procedure Laterality Date     No history of surgery       H pylori mike screen       was treated for h pylori     Eye surgery       Cancer History:   As above      Allergies:  Allergies as of 2017 - Tello as Reviewed 2017   Allergen Reaction Noted     Dust mites Cough 10/03/2016     Seasonal allergies  2014     Current Medications:  Current Outpatient Prescriptions   Medication Sig Dispense Refill     mometasone-formoterol (DULERA) 200-5 MCG/ACT oral inhaler Inhale 2 puffs into the lungs 2 times daily 1 Inhaler 12     fluticasone (FLONASE) 50 MCG/ACT spray Spray 2 sprays into both nostrils daily 1 Bottle 11     albuterol (PROAIR HFA/PROVENTIL HFA/VENTOLIN HFA) 108 (90 BASE) MCG/ACT Inhaler Inhale 2 puffs into the lungs every 6 hours as needed for shortness of breath / dyspnea or wheezing 1 Inhaler 11     ketotifen (ZADITOR/REFRESH ANTI-ITCH) 0.025 % SOLN ophthalmic solution Place 1 drop into both eyes every 12 hours as needed 1 Bottle 9     albuterol (2.5 MG/3ML) 0.083% nebulizer solution Take 1 vial (2.5 mg) by nebulization every 6 hours as needed for shortness of breath / dyspnea or wheezing 25 vial 3     Multiple Vitamins-Iron (DAILY MULTIPLE VITAMIN/IRON) TABS Take 1 tablet by mouth daily 30 tablet 11     omeprazole (PRILOSEC) 40 MG capsule Take 1 capsule (40 mg) by mouth daily Take 30-60 minutes before a meal. 90 capsule 3     Nutritional Supplements (BOOST CALORIE SMART) LIQD Take 2 Cans by mouth daily 60 Bottle 11     Acetic Acid-Aluminum Acetate 2 % SOLN 4 drops every 3 hours 60 mL 1      Family History:  Family History   Problem Relation Age of Onset     Respiratory Father      asthma   No history of cancers.  Patient had 10 kids altogether.  One  in an accident, 1  after surgery and 2  in childhood.  Six kids are living and all are healthy.       Social History:  Social History     Social History     Marital  "status: Single     Spouse name: N/A     Number of children: N/A     Years of education: N/A     Occupational History     Not on file.     Social History Main Topics     Smoking status: Never Smoker     Smokeless tobacco: Never Used     Alcohol use No     Drug use: No     Sexual activity: No     Other Topics Concern     Not on file     Social History Narrative   She does not smoke.  Does not drink any alcohol.  She lives with her daughter.       Physical Exam:  /75 (BP Location: Left arm, Patient Position: Chair, Cuff Size: Adult Regular)  Pulse 85  Temp 97.4  F (36.3  C) (Oral)  Resp 18  Ht 1.575 m (5' 2.01\")  Wt 51.3 kg (113 lb)  SpO2 100%  BMI 20.66 kg/m2  CONSTITUTIONAL: no acute distress  EYES: PERRLA, no palor or icterus.   ENT/MOUTH: no mouth lesions. Oropharynx normal  CVS: s1s2 no m r g .   RESPIRATORY: clear to auscultation b/l  GI: soft non tender no hepatosplenomegaly  NEURO: AAOX3  Grossly non focal neuro exam  INTEGUMENT: no obvious rashes  LYMPHATIC: no palpable cervical, supraclavicular, axillary or inguinal LAD  MUSCULOSKELETAL: Unremarkable. No bony tenderness.   EXTREMITIES: no edema  PSYCH: Mentation, mood and affect are normal. Decision making capacity is intact      Laboratory/Imaging Studies  Results for WEI ELDRIDGE (MRN [id number]) as of 3/16/2017 13:58   Ref. Range 1/3/2017 10:50   Sodium Latest Ref Range: 133 - 144 mmol/L 137   Potassium Latest Ref Range: 3.4 - 5.3 mmol/L 3.9   Chloride Latest Ref Range: 94 - 109 mmol/L 107   Carbon Dioxide Latest Ref Range: 20 - 32 mmol/L 24   Urea Nitrogen Latest Ref Range: 7 - 30 mg/dL 13   Creatinine Latest Ref Range: 0.52 - 1.04 mg/dL 0.75   GFR Estimate Latest Ref Range: >60 mL/min/1.7m2 75   GFR Estimate If Black Latest Ref Range: >60 mL/min/1.7m2 >90...   Calcium Latest Ref Range: 8.5 - 10.1 mg/dL 8.6   Anion Gap Latest Ref Range: 3 - 14 mmol/L 6   Albumin Latest Ref Range: 3.4 - 5.0 g/dL 2.2 (L)   Protein Total Latest Ref Range: 6.8 - 8.8 " g/dL 7.6   Bilirubin Total Latest Ref Range: 0.2 - 1.3 mg/dL 1.6 (H)   Alkaline Phosphatase Latest Ref Range: 40 - 150 U/L 256 (H)   ALT Latest Ref Range: 0 - 50 U/L 44   AST Latest Ref Range: 0 - 45 U/L 76 (H)   Bilirubin Direct Latest Ref Range: 0.0 - 0.2 mg/dL 0.7 (H)   Glucose Latest Ref Range: 70 - 99 mg/dL 95   WBC Latest Ref Range: 4.0 - 11.0 10e9/L 2.7 (L)   Hemoglobin Latest Ref Range: 11.7 - 15.7 g/dL 11.1 (L)   Hematocrit Latest Ref Range: 35.0 - 47.0 % 33.0 (L)   Platelet Count Latest Ref Range: 150 - 450 10e9/L 78 (L)   RBC Count Latest Ref Range: 3.8 - 5.2 10e12/L 3.27 (L)   MCV Latest Ref Range: 78 - 100 fl 101 (H)   MCH Latest Ref Range: 26.5 - 33.0 pg 33.9 (H)   MCHC Latest Ref Range: 31.5 - 36.5 g/dL 33.6   RDW Latest Ref Range: 10.0 - 15.0 % 15.9 (H)   INR Latest Ref Range: 0.86 - 1.14  1.42 (H)     MRI 1/17/17  IMPRESSION:   1. Cirrhosis and evidence of portal hypertension.  2. Posttreatment changes in hepatic segment 6 without convincing  evidence for recurrent or residual hepatocellular carcinoma.  3. Based on this exam only, the patient is within Arpit criteria.      ASSESSMENT/PLAN:  Stage I [id number] HCC of the hepatic segment 6 measuring 2.7 cm in greatest dimension, s/p liver directed therapy at [place] in, now with no evidence of recurrence/residual cancer. Not a transplant candidate due to advanced age    We will try to obtain records from [place]. If She has had CT Chest/Bone Scan, then I will not repeat it, otherwise I will consider checking CT Chest/Bone Scan to complete metastatic workup  Going forward, I would recommend imaging and labs every 3 months.  She will be due for repeat scans next month.  I will see her after that    Pancytopenia in the setting of cirrhosis and portal hypertension - observe for now. Repeat labs next month    She will follow with Dr Meyer for Hep C related cirrhosis    All questions answered and she is agreeable and comfortable with the plan      Viktoria LUEVANO  Jamie    We got some records from the [facility].  In 05/2015 she underwent a TACE procedure for her original hepatocellular carcinoma.  After that, in 02/2016 she was found to have a 1-cm recurrence.  At that time, her AFP was 5.6.  On 03/07/2016 she underwent microwave ablation of the recurrent tumor.  In 05/2016 her AFP was 5.5 and repeat scans did not show any evidence of recurrence and only showed post-treatment changes.  Then on 09/27/2016 there was suspicion for recurrence at the site of ablation.  She was offered another treatment at that point.  Her AFP was 6.6, but she did not have another treatment.     I was unable to see if any CT Chest or Bone scan was done or not.  We will try to get CT Chest and Bone scan as well        Viktoria Ayala MD

## 2017-03-16 NOTE — MR AVS SNAPSHOT
After Visit Summary   3/16/2017    Yue Portillo    MRN: [id number]           Patient Information     Date Of Birth          1940        Visit Information        Provider Department      3/16/2017 1:45 PM Viktoria Ayala MD; [employer] [facility]        Today's Diagnoses     HCC (hepatocellular carcinoma) (H)    -  1      Care Instructions    Please get records from [place] about treatment for liver cancer and also any imaging / procedures done there    See me back in end of April with scans and labs a few days prior        Follow-ups after your visit        Your next 10 appointments already scheduled     Apr 05, 2017 10:00 AM CDT   Lab with  LAB   [facility] ([facility])    [address] [place] [id number] [phone]            Apr 05, 2017 10:50 AM CDT   (Arrive by 10:35 AM)   Return General Liver with Tala Meyer MD   [facility] Hepatology ([facility])    [address] [place] [id number] [phone]            Apr 26, 2017 10:00 AM CDT   (Arrive by 9:45 AM)   MR ABDOMEN W/O & W CONTRAST with [address] MRI ([facility])    [address] [place] [id number] [phone]           Take your medicines as usual, unless your doctor tells you not to. Bring a list of your current medicines to your exam (including vitamins, minerals and over-the-counter drugs). Also bring the results of similar scans you may have had.    The day before your exam, drink extra fluids at least six 8-ounce glasses (unless your doctor tells you to restrict your fluids).   Have a blood test (creatinine test) within 30 days of your exam. Go to your clinic or Diagnostic Imaging Department for this test.   Do not eat or drink for 6 hours prior to exam.  The MRI machine uses a strong magnet. Please wear  clothes without metal (snaps, zippers). A sweatsuit works well, or we may give you a hospital gown.  Please remove any body piercings and hair extensions before you arrive. You will also remove watches, jewelry, hairpins, wallets, dentures, partial dental plates and hearing aids. You may wear contact lenses, and you may be able to wear your rings. We have a safe place to keep your personal items, but it is safer to leave them at home.   **IMPORTANT** THE INSTRUCTIONS BELOW ARE ONLY FOR THOSE PATIENTS WHO HAVE BEEN TOLD THEY WILL RECEIVE SEDATION OR GENERAL ANESTHESIA DURING THEIR MRI PROCEDURE:  IF YOU WILL RECEIVE SEDATION (take medicine to help you relax during your exam):   You must get the medicine from your doctor before you arrive. Bring the medicine to the exam. Do not take it at home.   Arrive one hour early. Bring someone who can take you home after the test. Your medicine will make you sleepy. After the exam, you may not drive, take a bus or take a taxi by yourself.   No eating 8 hours before your exam. You may have clear liquids up until 4 hours before your exam. (Clear liquids include water, clear tea, black coffee and fruit juice without pulp.)  IF YOU WILL RECEIVE ANESTHESIA (be asleep for your exam):   Arrive 1 1/2 hours early. Bring someone who can take you home after the test. You may not drive, take a bus or take a taxi by yourself.   No eating 8 hours before your exam. You may have clear liquids up until 4 hours before your exam. (Clear liquids include water, clear tea, black coffee and fruit juice without pulp.)  If you have any questions, please contact your Imaging Department exam site.            Apr 26, 2017 11:00 AM CDT   LAB with  LAB    [facility] ([facility])    [address]  [facility] [address] [phone]           Patient must bring picture ID.  Patient should be prepared to give a urine specimen  Please do not eat 10-12 hours  before your appointment if you are coming in fasting for labs on lipids, cholesterol, or glucose (sugar).  Pregnant women should follow their Care Team instructions. Water with medications is okay. Do not drink coffee or other fluids.   If you have concerns about taking  your medications, please ask at office or if scheduling via [employer], send a message by clicking on Secure Messaging, Message Your Care Team.            Apr 27, 2017  4:30 PM CDT   (Arrive by 4:15 PM)   Return Visit with Viktoria Ayala MD   [facility] ([facility])    [address] [place] [id number] [phone]              Future tests that were ordered for you today     Open Future Orders        Priority Expected Expires Ordered    Blood morphology pathology review Routine 4/17/2017 3/16/2018 3/16/2017    Comprehensive metabolic panel Routine 4/17/2017 3/16/2018 3/16/2017    AFP tumor marker Routine 4/17/2017 3/16/2018 3/16/2017    MRI Abdomen w & w/o contrast Routine 4/17/2017 3/16/2018 3/16/2017    *CBC with platelets differential Routine 4/17/2017 3/16/2018 3/16/2017    Reticulocyte count Routine 4/17/2017 3/16/2018 3/16/2017            Who to contact     If you have questions or need follow up information about today's clinic visit or your schedule please contact [facility] directly at [phone].  Normal or non-critical lab and imaging results will be communicated to you by MyChart, letter or phone within 4 business days after the clinic has received the results. If you do not hear from us within 7 days, please contact the clinic through MyChart or phone. If you have a critical or abnormal lab result, we will notify you by phone as soon as possible.  Submit refill requests through [employer] or call your pharmacy and they will forward the refill request to us. Please allow 3 business days for your refill to be completed.          Additional Information  "About Your Visit        MyChart Information     [employer] lets you send messages to your doctor, view your test results, renew your prescriptions, schedule appointments and more. To sign up, go to [email or web address] . Click on \"Log in\" on the left side of the screen, which will take you to the Welcome page. Then click on \"Sign up Now\" on the right side of the page.     You will be asked to enter the access code listed below, as well as some personal information. Please follow the directions to create your username and password.     Your access code is: [email or web address]  Expires: 3/29/2017  7:30 AM     Your access code will  in 90 days. If you need help or a new code, please call your [place] clinic or [phone].        Care EveryWhere ID     This is your Care EveryWhere ID. This could be used by other organizations to access your [place] medical records  [id number]        Your Vitals Were     Pulse Temperature Respirations Height Pulse Oximetry BMI (Body Mass Index)    85 97.4  F (36.3  C) (Oral) 18 1.575 m (5' 2.01\") 100% 20.66 kg/m2       Blood Pressure from Last 3 Encounters:   17 109/75   17 109/74   17 125/75    Weight from Last 3 Encounters:   17 51.3 kg (113 lb)   17 50.4 kg (111 lb 3.2 oz)   17 49.6 kg (109 lb 6.4 oz)               Primary Care Provider Office Phone # Fax #    Felicitas Horton [phone]       [facility] [address] [address]        Thank you!     Thank you for choosing [facility]  for your care. Our goal is always to provide you with excellent care. Hearing back from our patients is one way we can continue to improve our services. Please take a few minutes to complete the written survey that you may receive in the mail after your visit with us. Thank you!             Your Updated Medication List - Protect others around you: Learn how to safely use, store and throw away " your medicines at www.disposemymeds.org.          This list is accurate as of: 3/16/17  3:06 PM.  Always use your most recent med list.                   Brand Name Dispense Instructions for use    Acetic Acid-Aluminum Acetate 2 % Soln     60 mL    4 drops every 3 hours       * albuterol (2.5 MG/3ML) 0.083% neb solution     25 vial    Take 1 vial (2.5 mg) by nebulization every 6 hours as needed for shortness of breath / dyspnea or wheezing       * albuterol 108 (90 BASE) MCG/ACT Inhaler    PROAIR HFA/PROVENTIL HFA/VENTOLIN HFA    1 Inhaler    Inhale 2 puffs into the lungs every 6 hours as needed for shortness of breath / dyspnea or wheezing       BOOST CALORIE SMART Liqd     60 Bottle    Take 2 Cans by mouth daily       DAILY MULTIPLE VITAMIN/IRON Tabs     30 tablet    Take 1 tablet by mouth daily       fluticasone 50 MCG/ACT spray    FLONASE    1 Bottle    Spray 2 sprays into both nostrils daily       ketotifen 0.025 % Soln ophthalmic solution    ZADITOR/REFRESH ANTI-ITCH    1 Bottle    Place 1 drop into both eyes every 12 hours as needed       mometasone-formoterol 200-5 MCG/ACT oral inhaler    DULERA    1 Inhaler    Inhale 2 puffs into the lungs 2 times daily       omeprazole 40 MG capsule    priLOSEC    90 capsule    Take 1 capsule (40 mg) by mouth daily Take 30-60 minutes before a meal.       * Notice:  This list has 2 medication(s) that are the same as other medications prescribed for you. Read the directions carefully, and ask your doctor or other care provider to review them with you.

## 2017-03-16 NOTE — NURSING NOTE
"Yue Portillo is a 77 year old female who presents for:  Chief Complaint   Patient presents with     Oncology Clinic Visit     [facility] Transplant Team        Initial Vitals:  /75 (BP Location: Left arm, Patient Position: Chair, Cuff Size: Adult Regular)  Pulse 85  Temp 97.4  F (36.3  C) (Oral)  Resp 18  Ht 1.575 m (5' 2.01\")  Wt 51.3 kg (113 lb)  SpO2 100%  BMI 20.66 kg/m2 Estimated body mass index is 20.66 kg/(m^2) as calculated from the following:    Height as of this encounter: 1.575 m (5' 2.01\").    Weight as of this encounter: 51.3 kg (113 lb).. Body surface area is 1.5 meters squared. BP completed using cuff size: regular  No Pain (0) No LMP recorded. Patient is postmenopausal. Allergies and medications reviewed.     Medications: Medication refills not needed today.  Pharmacy name entered into [employer]: [facility], [facility] [address] [place] [address] AVE    Comments:     7 minutes for nursing intake (face to face time)   Aretha Preciado LPN        "

## 2017-03-16 NOTE — PROGRESS NOTES
Oncology Initial visit:  Date on this visit: 3/16/2017    Yue Portillo  is referred by Dr.Mohamed Martinez Meyer for an oncology consultation. She requires evaluation for diagnosis of HCC    Primary Physician: Felicitas Horton     History Of Present Illness:  A professional interpretor helps with the translation.Patient's two sons also accompany her for the visit today.       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 Initially she did not get treatment for that but then in Jan 2015 she had a repeat MRI done showing a 2.7 cm OPTN5B lesion in segment 6.  She went to [place] and apparently had no evidence of metastasis, She had liver directed therapy done in May 2015. Because of advanced age, liver transplantation was not offered.Then around 12/2015 or 01/2016, she was found to have another small lesion in the liver, but I am not sure exactly where, for which she underwent what seems like radioembolization procedure in early 2016.  Repeat scans after that showed no evidence of residual cancer as per the patient.       She recently again saw Dr Meyer and had repeat MRI showing post treatment changes with no evidence of residual/recurrent cancer.She tells me that she has been doing well.  She has very occasional mild burning in the right upper quadrant but otherwise, she continues to eat and drink well without any nausea, vomiting, diarrhea or constipation.  Her weight is stable.  She denies any new swellings.  Denies any neuropathy.  No trouble breathing.  She has decent energy and otherwise she continues to do well.         ROS:  A comprehensive ROS was otherwise neg      Past Medical/Surgical History:  Past Medical History   Diagnosis Date     Mild intermittent asthma    Hep C- untreated  HCC as mentioned above    Past Surgical History   Procedure  Laterality Date     No history of surgery       H pylori mike screen       was treated for h pylori     Eye surgery       Cancer History:   As above      Allergies:  Allergies as of 2017 - Tello as Reviewed 2017   Allergen Reaction Noted     Dust mites Cough 10/03/2016     Seasonal allergies  2014     Current Medications:  Current Outpatient Prescriptions   Medication Sig Dispense Refill     mometasone-formoterol (DULERA) 200-5 MCG/ACT oral inhaler Inhale 2 puffs into the lungs 2 times daily 1 Inhaler 12     fluticasone (FLONASE) 50 MCG/ACT spray Spray 2 sprays into both nostrils daily 1 Bottle 11     albuterol (PROAIR HFA/PROVENTIL HFA/VENTOLIN HFA) 108 (90 BASE) MCG/ACT Inhaler Inhale 2 puffs into the lungs every 6 hours as needed for shortness of breath / dyspnea or wheezing 1 Inhaler 11     ketotifen (ZADITOR/REFRESH ANTI-ITCH) 0.025 % SOLN ophthalmic solution Place 1 drop into both eyes every 12 hours as needed 1 Bottle 9     albuterol (2.5 MG/3ML) 0.083% nebulizer solution Take 1 vial (2.5 mg) by nebulization every 6 hours as needed for shortness of breath / dyspnea or wheezing 25 vial 3     Multiple Vitamins-Iron (DAILY MULTIPLE VITAMIN/IRON) TABS Take 1 tablet by mouth daily 30 tablet 11     omeprazole (PRILOSEC) 40 MG capsule Take 1 capsule (40 mg) by mouth daily Take 30-60 minutes before a meal. 90 capsule 3     Nutritional Supplements (BOOST CALORIE SMART) LIQD Take 2 Cans by mouth daily 60 Bottle 11     Acetic Acid-Aluminum Acetate 2 % SOLN 4 drops every 3 hours 60 mL 1      Family History:  Family History   Problem Relation Age of Onset     Respiratory Father      asthma   No history of cancers.  Patient had 10 kids altogether.  One  in an accident, 1  after surgery and 2  in childhood.  Six kids are living and all are healthy.       Social History:  Social History     Social History     Marital status: Single     Spouse name: N/A     Number of children: N/A     Years of  "education: N/A     Occupational History     Not on file.     Social History Main Topics     Smoking status: Never Smoker     Smokeless tobacco: Never Used     Alcohol use No     Drug use: No     Sexual activity: No     Other Topics Concern     Not on file     Social History Narrative   She does not smoke.  Does not drink any alcohol.  She lives with her daughter.       Physical Exam:  /75 (BP Location: Left arm, Patient Position: Chair, Cuff Size: Adult Regular)  Pulse 85  Temp 97.4  F (36.3  C) (Oral)  Resp 18  Ht 1.575 m (5' 2.01\")  Wt 51.3 kg (113 lb)  SpO2 100%  BMI 20.66 kg/m2  CONSTITUTIONAL: no acute distress  EYES: PERRLA, no palor or icterus.   ENT/MOUTH: no mouth lesions. Oropharynx normal  CVS: s1s2 no m r g .   RESPIRATORY: clear to auscultation b/l  GI: soft non tender no hepatosplenomegaly  NEURO: AAOX3  Grossly non focal neuro exam  INTEGUMENT: no obvious rashes  LYMPHATIC: no palpable cervical, supraclavicular, axillary or inguinal LAD  MUSCULOSKELETAL: Unremarkable. No bony tenderness.   EXTREMITIES: no edema  PSYCH: Mentation, mood and affect are normal. Decision making capacity is intact      Laboratory/Imaging Studies  Results for WEI ELDRIDGE (MRN [id number]) as of 3/16/2017 13:58   Ref. Range 1/3/2017 10:50   Sodium Latest Ref Range: 133 - 144 mmol/L 137   Potassium Latest Ref Range: 3.4 - 5.3 mmol/L 3.9   Chloride Latest Ref Range: 94 - 109 mmol/L 107   Carbon Dioxide Latest Ref Range: 20 - 32 mmol/L 24   Urea Nitrogen Latest Ref Range: 7 - 30 mg/dL 13   Creatinine Latest Ref Range: 0.52 - 1.04 mg/dL 0.75   GFR Estimate Latest Ref Range: >60 mL/min/1.7m2 75   GFR Estimate If Black Latest Ref Range: >60 mL/min/1.7m2 >90...   Calcium Latest Ref Range: 8.5 - 10.1 mg/dL 8.6   Anion Gap Latest Ref Range: 3 - 14 mmol/L 6   Albumin Latest Ref Range: 3.4 - 5.0 g/dL 2.2 (L)   Protein Total Latest Ref Range: 6.8 - 8.8 g/dL 7.6   Bilirubin Total Latest Ref Range: 0.2 - 1.3 mg/dL 1.6 (H) "   Alkaline Phosphatase Latest Ref Range: 40 - 150 U/L 256 (H)   ALT Latest Ref Range: 0 - 50 U/L 44   AST Latest Ref Range: 0 - 45 U/L 76 (H)   Bilirubin Direct Latest Ref Range: 0.0 - 0.2 mg/dL 0.7 (H)   Glucose Latest Ref Range: 70 - 99 mg/dL 95   WBC Latest Ref Range: 4.0 - 11.0 10e9/L 2.7 (L)   Hemoglobin Latest Ref Range: 11.7 - 15.7 g/dL 11.1 (L)   Hematocrit Latest Ref Range: 35.0 - 47.0 % 33.0 (L)   Platelet Count Latest Ref Range: 150 - 450 10e9/L 78 (L)   RBC Count Latest Ref Range: 3.8 - 5.2 10e12/L 3.27 (L)   MCV Latest Ref Range: 78 - 100 fl 101 (H)   MCH Latest Ref Range: 26.5 - 33.0 pg 33.9 (H)   MCHC Latest Ref Range: 31.5 - 36.5 g/dL 33.6   RDW Latest Ref Range: 10.0 - 15.0 % 15.9 (H)   INR Latest Ref Range: 0.86 - 1.14  1.42 (H)     MRI 1/17/17  IMPRESSION:   1. Cirrhosis and evidence of portal hypertension.  2. Posttreatment changes in hepatic segment 6 without convincing  evidence for recurrent or residual hepatocellular carcinoma.  3. Based on this exam only, the patient is within [place] criteria.      ASSESSMENT/PLAN:  Stage I [id number] HCC of the hepatic segment 6 measuring 2.7 cm in greatest dimension, s/p liver directed therapy at [facility] in, now with no evidence of recurrence/residual cancer. Not a transplant candidate due to advanced age    We will try to obtain records from [facility]. If She has had CT Chest/Bone Scan, then I will not repeat it, otherwise I will consider checking CT Chest/Bone Scan to complete metastatic workup  Going forward, I would recommend imaging and labs every 3 months.  She will be due for repeat scans next month.  I will see her after that    Pancytopenia in the setting of cirrhosis and portal hypertension - observe for now. Repeat labs next month    She will follow with Dr Meyer for Hep C related cirrhosis    All questions answered and she is agreeable and comfortable with the plan      Viktoria Ayala    We got some records from the [facility].  In 05/2015 she underwent  a TACE procedure for her original hepatocellular carcinoma.  After that, in 02/2016 she was found to have a 1-cm recurrence.  At that time, her AFP was 5.6.  On 03/07/2016 she underwent microwave ablation of the recurrent tumor.  In 05/2016 her AFP was 5.5 and repeat scans did not show any evidence of recurrence and only showed post-treatment changes.  Then on 09/27/2016 there was suspicion for recurrence at the site of ablation.  She was offered another treatment at that point.  Her AFP was 6.6, but she did not have another treatment.     I was unable to see if any CT Chest or Bone scan was done or not.  We will try to get CT Chest and Bone scan as well    Viktoria Ayala

## 2017-03-16 NOTE — PATIENT INSTRUCTIONS
Please get records from [facility] about treatment for liver cancer and also any imaging / procedures done there    See me back in end of April with scans and labs a few days prior

## 2017-03-23 ENCOUNTER — OFFICE VISIT (OUTPATIENT)
Dept: FAMILY MEDICINE | Facility: CLINIC | Age: 77
End: 2017-03-23

## 2017-03-23 VITALS
SYSTOLIC BLOOD PRESSURE: 105 MMHG | OXYGEN SATURATION: 99 % | TEMPERATURE: 97.8 F | WEIGHT: 116.2 LBS | RESPIRATION RATE: 16 BRPM | BODY MASS INDEX: 21.38 KG/M2 | DIASTOLIC BLOOD PRESSURE: 67 MMHG | HEART RATE: 81 BPM | HEIGHT: 62 IN

## 2017-03-23 DIAGNOSIS — R52 GENERALIZED PAIN: ICD-10-CM

## 2017-03-23 DIAGNOSIS — Z00.00 ROUTINE HEALTH MAINTENANCE: ICD-10-CM

## 2017-03-23 DIAGNOSIS — E55.9 VITAMIN D DEFICIENCY: Primary | ICD-10-CM

## 2017-03-23 NOTE — MR AVS SNAPSHOT
After Visit Summary   3/23/2017    Yue Portillo    MRN: [id number]           Patient Information     Date Of Birth          1940        Visit Information        Provider Department      3/23/2017 10:20 AM Felicitas Horton MD [facility]        Today's Diagnoses     Vitamin D deficiency    -  1       Follow-ups after your visit        Your next 10 appointments already scheduled     Apr 05, 2017 10:00 AM CDT   Lab with  LAB   [facility] ([facility])    [address] [place] [id number]   [phone]            Apr 05, 2017 10:50 AM CDT   (Arrive by 10:35 AM)   Return General Liver with Tala Meyer MD   [facility] Hepatology ([facility])    [address] [place] [id number]   [phone]            Apr 26, 2017 10:00 AM CDT   NM INJECTION with [id number]   [facility], Nuclear Medicine ([facility])    [address] [facility] [id number] [phone]            Apr 26, 2017 10:20 AM CDT   CT CHEST W/O CONTRAST with UUCT1   [facility], CT ([facility])    [address] [facility] [id number] [phone]           Please bring any scans or X-rays taken at other hospitals, if similar tests were done. Also bring a list of your medicines, including vitamins, minerals and over-the-counter drugs. It is safest to leave personal items at home.  Be sure to tell your doctor:   If you have any allergies.   If there s any chance you are pregnant.   If you are breastfeeding.   If you have any special needs.  You do not need to do anything special to prepare.  Please wear loose clothing, such as a sweat suit or jogging clothes. Avoid snaps, zippers and other metal. We may ask you to undress and put on a hospital gown.            Apr 26, 2017 11:00 AM  CDT   MR ABDOMEN W/O & W CONTRAST with UUMR2   [facility] [place], MRI ([facility])    [address] [facility] [address] [phone]           Take your medicines as usual, unless your doctor tells you not to. Bring a list of your current medicines to your exam (including vitamins, minerals and over-the-counter drugs). Also bring the results of similar scans you may have had.    The day before your exam, drink extra fluids at least six 8-ounce glasses (unless your doctor tells you to restrict your fluids).   Have a blood test (creatinine test) within 30 days of your exam. Go to your clinic or Diagnostic Imaging Department for this test.   Do not eat or drink for 6 hours prior to exam.  The MRI machine uses a strong magnet. Please wear clothes without metal (snaps, zippers). A sweatsuit works well, or we may give you a hospital gown.  Please remove any body piercings and hair extensions before you arrive. You will also remove watches, jewelry, hairpins, wallets, dentures, partial dental plates and hearing aids. You may wear contact lenses, and you may be able to wear your rings. We have a safe place to keep your personal items, but it is safer to leave them at home.   **IMPORTANT** THE INSTRUCTIONS BELOW ARE ONLY FOR THOSE PATIENTS WHO HAVE BEEN TOLD THEY WILL RECEIVE SEDATION OR GENERAL ANESTHESIA DURING THEIR MRI PROCEDURE:  IF YOU WILL RECEIVE SEDATION (take medicine to help you relax during your exam):   You must get the medicine from your doctor before you arrive. Bring the medicine to the exam. Do not take it at home.   Arrive one hour early. Bring someone who can take you home after the test. Your medicine will make you sleepy. After the exam, you may not drive, take a bus or take a taxi by yourself.   No eating 8 hours before your exam. You may have clear liquids up until 4 hours before your exam. (Clear liquids include water, clear tea, black  coffee and fruit juice without pulp.)  IF YOU WILL RECEIVE ANESTHESIA (be asleep for your exam):   Arrive 1 1/2 hours early. Bring someone who can take you home after the test. You may not drive, take a bus or take a taxi by yourself.   No eating 8 hours before your exam. You may have clear liquids up until 4 hours before your exam. (Clear liquids include water, clear tea, black coffee and fruit juice without pulp.)  If you have any questions, please contact your Imaging Department exam site.            Apr 26, 2017  1:00 PM CDT   NM BONE SCAN WHOLE BODY with [address], Nuclear Medicine ([facility])    [address] [facility] [address] [phone]           Please bring a list of your medicines to the exam. (Include vitamins, minerals and over-the-counter drugs.) You should wear comfortable clothes. Leave your valuables at home. Please bring related prior results and films. Tell your doctor:   If you are breastfeeding or may be pregnant.   If you have had a barium test within the past few days. Barium may change the results of certain exams.   If you think you may need sedation (medicine to help you relax).  You may eat and drink as normal.  Drink plenty of fluids and empty bladder frequently between injection and scans.            Apr 26, 2017  2:30 PM CDT   LAB with ACUTE CARE LAB [facility], Lab ([facility], [facility])    [address] [place] [phone]              Patient must bring picture ID.  Patient should be prepared to give a urine specimen  Please do not eat 10-12 hours before your appointment if you are coming in fasting for labs on lipids, cholesterol, or glucose (sugar).  Pregnant women should follow their Care Team instructions. Water with medications is okay. Do not drink coffee or other fluids.   If you have concerns about taking  your medications, please ask at office  "or if scheduling via Cordium, send a message by clicking on Secure Messaging, Message Your Care Team.            2017  4:30 PM CDT   (Arrive by 4:15 PM)   Return Visit with Viktoria Ayala MD   [facility] ([facility] [place])    [address] [address] [phone]              Who to contact     Please call your clinic at [phone] to:    Ask questions about your health    Make or cancel appointments    Discuss your medicines    Learn about your test results    Speak to your doctor   If you have compliments or concerns about an experience at your clinic, or if you wish to file a complaint, please contact [facility] Patient Relations at [phone] or email us at [email or web address]         Additional Information About Your Visit        Cordium Information     Cordium is an electronic gateway that provides easy, online access to your medical records. With Cordium, you can request a clinic appointment, read your test results, renew a prescription or communicate with your care team.     To sign up for Cordium visit the website at [email or web address]"   You will be asked to enter the access code listed below, as well as some personal information. Please follow the directions to create your username and password.     Your access code is: [email or web address]  Expires: 3/29/2017  7:30 AM     Your access code will  in 90 days. If you need help or a new code, please contact your [facility] or call [phone] for assistance.        Care EveryWhere ID     This is your Care EveryWhere ID. This could be used by other organizations to access your [place] medical records  [id number]        Your Vitals Were     Pulse Temperature Respirations Height Pulse Oximetry Breastfeeding?    81 97.8  F (36.6  C) (Oral) 16 5' 2.01\" (157.5 cm) 99% No    BMI (Body Mass Index)       "             21.25 kg/m2            Blood Pressure from Last 3 Encounters:   03/23/17 105/67   03/16/17 109/75   03/09/17 109/74    Weight from Last 3 Encounters:   03/23/17 116 lb 3.2 oz (52.7 kg)   03/16/17 113 lb (51.3 kg)   03/09/17 111 lb 3.2 oz (50.4 kg)              Today, you had the following     No orders found for display         Today's Medication Changes          These changes are accurate as of: 3/23/17 10:55 AM.  If you have any questions, ask your nurse or doctor.               Start taking these medicines.        Dose/Directions    cholecalciferol 07790 UNITS capsule   Commonly known as:  VITAMIN D3   Used for:  Vitamin D deficiency   Started by:  Felicitas Horton MD        Dose:  1 capsule   Take 1 capsule (50,000 Units) by mouth once a week   Quantity:  12 capsule   Refills:  0            Where to get your medicines      These medications were sent to [facility], [address] [address]     Phone:  [phone]     cholecalciferol 23181 UNITS capsule                Primary Care Provider Office Phone # Fax #    Felicitas Horton [phone]       [facility] [address] [place] [phone]        Thank you!     Thank you for choosing hospitals [facility]  for your care. Our goal is always to provide you with excellent care. Hearing back from our patients is one way we can continue to improve our services. Please take a few minutes to complete the written survey that you may receive in the mail after your visit with us. Thank you!             Your Updated Medication List - Protect others around you: Learn how to safely use, store and throw away your medicines at www.disposemymeds.org.          This list is accurate as of: 3/23/17 10:55 AM.  Always use your most recent med list.                   Brand Name Dispense Instructions for use    Acetic Acid-Aluminum  Acetate 2 % Soln     60 mL    4 drops every 3 hours       * albuterol (2.5 MG/3ML) 0.083% neb solution     25 vial    Take 1 vial (2.5 mg) by nebulization every 6 hours as needed for shortness of breath / dyspnea or wheezing       * albuterol 108 (90 BASE) MCG/ACT Inhaler    PROAIR HFA/PROVENTIL HFA/VENTOLIN HFA    1 Inhaler    Inhale 2 puffs into the lungs every 6 hours as needed for shortness of breath / dyspnea or wheezing       BOOST CALORIE SMART Liqd     60 Bottle    Take 2 Cans by mouth daily       cholecalciferol 24690 UNITS capsule    VITAMIN D3    12 capsule    Take 1 capsule (50,000 Units) by mouth once a week       DAILY MULTIPLE VITAMIN/IRON Tabs     30 tablet    Take 1 tablet by mouth daily       fluticasone 50 MCG/ACT spray    FLONASE    1 Bottle    Spray 2 sprays into both nostrils daily       ketotifen 0.025 % Soln ophthalmic solution    ZADITOR/REFRESH ANTI-ITCH    1 Bottle    Place 1 drop into both eyes every 12 hours as needed       mometasone-formoterol 200-5 MCG/ACT oral inhaler    DULERA    1 Inhaler    Inhale 2 puffs into the lungs 2 times daily       omeprazole 40 MG capsule    priLOSEC    90 capsule    Take 1 capsule (40 mg) by mouth daily Take 30-60 minutes before a meal.       * Notice:  This list has 2 medication(s) that are the same as other medications prescribed for you. Read the directions carefully, and ask your doctor or other care provider to review them with you.

## 2017-03-23 NOTE — PROGRESS NOTES
SUBJECTIVE:  The patient is here with an .  She had some questions about her medications.  Some of them such as spironolactone got stopped by Dr. Meyer at her last visit with him.  She was wondering about being on vitamin D.  She has been having cramps in her fingers and toes and was wondering if that might be because of being low on vitamin D.  I did look at her lab results and her last set of electrolytes I think was in January and was okay.  She is on so little meds right now and none should be really affecting her electrolytes since then.  She really did not want to have any blood drawn today at all although I would have liked to have checked her electrolytes.  She is in general complaining of diffuse myalgias.  She wanted a referral to [facility] for her legs and her shoulders.  She is also complaining of a cough at night and a dry mouth.  Looking over her med list, she was not taking her controller inhaler and so the  tried to explain which medication that was and reminded her to her to take that regularly.      OBJECTIVE:  On exam, the patient is in no acute distress.  Vital signs were stable.  Heart had a regular rate and rhythm without murmurs.  Lungs were actually clear.  She did fill out an ACT form which had a score of 25.  PHQ-9 had a score of zero and DULCE MARIA-7 had a score of zero.      IMPRESSION:  Patient with some myalgias and some muscular cramps, uncertain etiology.      PLAN:  We will go ahead and put her on some high-dose vitamin D since she has been low on that in the past.  I would have preferred to check her level first but she really did not want that.  As far as her breathing, encouraged her to take her controller med regularly and did a referral for [facility].      Visit length was 15 minutes, more than 50% spent counseling about symptoms and plan.        Felicitas Olivera MD

## 2017-03-24 DIAGNOSIS — K74.69 OTHER CIRRHOSIS OF LIVER (H): Primary | ICD-10-CM

## 2017-03-24 ASSESSMENT — ASTHMA QUESTIONNAIRES: ACT_TOTALSCORE: 25

## 2017-03-27 DIAGNOSIS — C22.0 HCC (HEPATOCELLULAR CARCINOMA) (H): ICD-10-CM

## 2017-03-29 ENCOUNTER — PRE VISIT (OUTPATIENT)
Dept: GASTROENTEROLOGY | Facility: CLINIC | Age: 77
End: 2017-03-29

## 2017-03-29 NOTE — TELEPHONE ENCOUNTER
Was the patient contacted by phone and reminded of the upcoming visit? message left via [employer]      Was the patient instructed to bring a current list of all medications to the appointment or instructed to bring in all medication bottles? Yes     Was the patient instructed to arrive prior to the appointment time to have ordered labs drawn? Yes     Were the needed lab orders placed? Yes    Sally Estrada CMA

## 2017-04-05 ENCOUNTER — OFFICE VISIT (OUTPATIENT)
Dept: GASTROENTEROLOGY | Facility: CLINIC | Age: 77
End: 2017-04-05
Attending: INTERNAL MEDICINE
Payer: COMMERCIAL

## 2017-04-05 VITALS
BODY MASS INDEX: 21.64 KG/M2 | OXYGEN SATURATION: 98 % | DIASTOLIC BLOOD PRESSURE: 67 MMHG | HEART RATE: 72 BPM | SYSTOLIC BLOOD PRESSURE: 104 MMHG | TEMPERATURE: 98 F | RESPIRATION RATE: 16 BRPM | WEIGHT: 117.6 LBS | HEIGHT: 62 IN

## 2017-04-05 DIAGNOSIS — C22.0 HCC (HEPATOCELLULAR CARCINOMA) (H): ICD-10-CM

## 2017-04-05 DIAGNOSIS — K74.69 OTHER CIRRHOSIS OF LIVER (H): Primary | ICD-10-CM

## 2017-04-05 DIAGNOSIS — K74.69 OTHER CIRRHOSIS OF LIVER (H): ICD-10-CM

## 2017-04-05 LAB
AFP SERPL-MCNC: 9.5 UG/L (ref 0–8)
ALBUMIN SERPL-MCNC: 2.2 G/DL (ref 3.4–5)
ALP SERPL-CCNC: 250 U/L (ref 40–150)
ALT SERPL W P-5'-P-CCNC: 69 U/L (ref 0–50)
ANION GAP SERPL CALCULATED.3IONS-SCNC: 6 MMOL/L (ref 3–14)
AST SERPL W P-5'-P-CCNC: 98 U/L (ref 0–45)
BASOPHILS # BLD AUTO: 0 10E9/L (ref 0–0.2)
BASOPHILS NFR BLD AUTO: 0 %
BILIRUB DIRECT SERPL-MCNC: 0.9 MG/DL (ref 0–0.2)
BILIRUB SERPL-MCNC: 2.2 MG/DL (ref 0.2–1.3)
BUN SERPL-MCNC: 10 MG/DL (ref 7–30)
CALCIUM SERPL-MCNC: 8.1 MG/DL (ref 8.5–10.1)
CHLORIDE SERPL-SCNC: 107 MMOL/L (ref 94–109)
CO2 SERPL-SCNC: 25 MMOL/L (ref 20–32)
COPATH REPORT: NORMAL
CREAT SERPL-MCNC: 0.81 MG/DL (ref 0.52–1.04)
DIFFERENTIAL METHOD BLD: ABNORMAL
EOSINOPHIL # BLD AUTO: 0.1 10E9/L (ref 0–0.7)
EOSINOPHIL NFR BLD AUTO: 2.9 %
ERYTHROCYTE [DISTWIDTH] IN BLOOD BY AUTOMATED COUNT: 15.6 % (ref 10–15)
GFR SERPL CREATININE-BSD FRML MDRD: 68 ML/MIN/1.7M2
GLUCOSE SERPL-MCNC: 83 MG/DL (ref 70–99)
HCT VFR BLD AUTO: 33.3 % (ref 35–47)
HGB BLD-MCNC: 10.9 G/DL (ref 11.7–15.7)
IMM GRANULOCYTES # BLD: 0 10E9/L (ref 0–0.4)
IMM GRANULOCYTES NFR BLD: 0 %
INR PPP: 1.46 (ref 0.86–1.14)
LYMPHOCYTES # BLD AUTO: 0.8 10E9/L (ref 0.8–5.3)
LYMPHOCYTES NFR BLD AUTO: 24.8 %
MCH RBC QN AUTO: 33.4 PG (ref 26.5–33)
MCHC RBC AUTO-ENTMCNC: 32.7 G/DL (ref 31.5–36.5)
MCV RBC AUTO: 102 FL (ref 78–100)
MONOCYTES # BLD AUTO: 0.2 10E9/L (ref 0–1.3)
MONOCYTES NFR BLD AUTO: 7 %
NEUTROPHILS # BLD AUTO: 2.1 10E9/L (ref 1.6–8.3)
NEUTROPHILS NFR BLD AUTO: 65.3 %
NRBC # BLD AUTO: 0 10*3/UL
NRBC BLD AUTO-RTO: 0 /100
PLATELET # BLD AUTO: 64 10E9/L (ref 150–450)
POTASSIUM SERPL-SCNC: 4.3 MMOL/L (ref 3.4–5.3)
PROT SERPL-MCNC: 7.1 G/DL (ref 6.8–8.8)
RBC # BLD AUTO: 3.26 10E12/L (ref 3.8–5.2)
RETICS # AUTO: 125.2 10E9/L (ref 25–95)
RETICS/RBC NFR AUTO: 3.8 % (ref 0.5–2)
SODIUM SERPL-SCNC: 139 MMOL/L (ref 133–144)
WBC # BLD AUTO: 3.1 10E9/L (ref 4–11)

## 2017-04-05 PROCEDURE — 40000611 ZZHCL STATISTIC MORPHOLOGY W/INTERP HEMEPATH TC 85060: Performed by: INTERNAL MEDICINE

## 2017-04-05 PROCEDURE — 36415 COLL VENOUS BLD VENIPUNCTURE: CPT | Performed by: INTERNAL MEDICINE

## 2017-04-05 PROCEDURE — 82105 ALPHA-FETOPROTEIN SERUM: CPT | Performed by: INTERNAL MEDICINE

## 2017-04-05 PROCEDURE — 82248 BILIRUBIN DIRECT: CPT | Performed by: INTERNAL MEDICINE

## 2017-04-05 PROCEDURE — 99213 OFFICE O/P EST LOW 20 MIN: CPT | Mod: ZF

## 2017-04-05 PROCEDURE — 85610 PROTHROMBIN TIME: CPT | Performed by: INTERNAL MEDICINE

## 2017-04-05 RX ORDER — SPIRONOLACTONE 50 MG/1
50 TABLET, FILM COATED ORAL DAILY
COMMUNITY
End: 2017-01-01

## 2017-04-05 ASSESSMENT — PAIN SCALES - GENERAL: PAINLEVEL: MILD PAIN (3)

## 2017-04-05 NOTE — LETTER
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      HISTORY OF PRESENT ILLNESS:  Ms. Portillo is a 77-year-old [place] immigrant who tells me that she is younger than that age.  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  She did develop a liver lesion which was in segment 6 and it was 2.5 x 2.4.  She was seen in [place] and she had a TIPS procedure and then in 02/2016, she was found to have a 1 cm recurrence and on 03/07/2016, she did have microwave ablation of the recurrent tumor.  In 05/2016, there were no signs of recurrence seen.  On 01/17/2017, I did order an MRI and that MRI shows cirrhosis and evidence of portal hypertension and it also showed post-treatment changes in segment 6 without convincing evidence of recurrence or residual tumor.  Today she comes in after also seeing the oncologist.  Denies any fluid retention.  Appetite is good according to the son and he has gained some weight.  Denies any abdominal pain, nausea, vomiting.  She is moving her bowels like every other day or maybe daily.  She used to have more frequent bowel movements.      Current Outpatient Prescriptions   Medication     spironolactone (ALDACTONE) 50 MG tablet     Nutritional Supplements (BOOST CALORIE SMART) LIQD     cholecalciferol (VITAMIN D3) 43039 UNITS capsule     mometasone-formoterol (DULERA) 200-5 MCG/ACT oral inhaler     fluticasone (FLONASE) 50 MCG/ACT spray     albuterol (PROAIR HFA/PROVENTIL HFA/VENTOLIN HFA) 108 (90 BASE) MCG/ACT Inhaler     albuterol (2.5 MG/3ML) 0.083% nebulizer solution     Multiple Vitamins-Iron (DAILY MULTIPLE VITAMIN/IRON) TABS     omeprazole (PRILOSEC) 40 MG capsule     Acetic Acid-Aluminum Acetate 2 % SOLN     ketotifen (ZADITOR/REFRESH ANTI-ITCH) 0.025 % SOLN ophthalmic solution     No current  facility-administered medications for this visit.        PHYSICAL EXAMINATION:   VITAL SIGNS:  As of today, 04/05/2017, blood pressure is 104/67, temperature is 98 degrees Fahrenheit, pulse is 72, respirations 16, height is 1.575 meters, weight is 117 pounds and 9.6 ounces, BMI is 21.55.   HEENT:  Eyes are not icteric.  Mucosa moist.   NECK:  Supple, no lymphadenopathy.   CHEST:  Clear to auscultation.   ABDOMEN:  Not distended, bowel sounds are present.  No hepatomegaly, no splenomegaly, no shifting dullness noted.   EXTREMITIES:  No edema, no clubbing.   CENTRAL NERVOUS SYSTEM:  Alert and oriented to place, person and time.  No asterixis noted.      IMPRESSION AND PLAN:  Ms. Portillo is a 77-year-old female on paper but claims to be 63 years old.   She has hepatitis C, genotype 5, which was not treated and which led to cirrhosis.  She also had this complicated by hepatocellular carcinoma, which was treated at [place] twice, first with TACE and then with microwave ablation.  On her January MRI, there was no residual tumor.  She saw Oncology and she is going to have more staging to see if she has any lesions outside the liver (metastasis).  Her alpha fetoproteins were not that elevated and were normal.  Plan is to go ahead with the oncologist's ideas regarding that.  Because of her on paper 77 years of age and the fact that the patient is not comfortable with going forward with transplantation, we are not going to pursue that.  She showed some interest in the treatment for hepatitis C but if we have to treat hepatitis C and she is not going for transplantation, for whatever reason it is either that she does not want to or because of the advanced age on paper, it is going to be futile as sooner or later she will have recurrence of the HCC.  We did express that opinion.  They will talk with the family.  They will also see the oncologist.  We can present her at the Selection Conference regarding that.      This was a  25-minute visit of which more than 50% was spent in explaining to the patient what our plan of care was.  We answered all of her questions.      cc:  Primary care physician           Tala Meyer MD

## 2017-04-05 NOTE — MR AVS SNAPSHOT
After Visit Summary   4/5/2017    Yue Portillo    MRN: [id number]           Patient Information     Date Of Birth          1940        Visit Information        Provider Department      4/5/2017 10:30 AM Tala Meyer MD; OLEKSANDR MOLINA [facility] Hepatology         Follow-ups after your visit        Follow-up notes from your care team     Return in about 3 months (around 7/5/2017).      Your next 10 appointments already scheduled     Apr 26, 2017 10:00 AM CDT   NM INJECTION with [id number]   [facility], Nuclear Medicine ([facility])    [address] [place] [id number] [phone]            Apr 26, 2017 10:20 AM CDT   CT CHEST W/O CONTRAST with UUCT1   [facility], CT ([facility])    [address] [place] [id number] [phone]           Please bring any scans or X-rays taken at other hospitals, if similar tests were done. Also bring a list of your medicines, including vitamins, minerals and over-the-counter drugs. It is safest to leave personal items at home.  Be sure to tell your doctor:   If you have any allergies.   If there s any chance you are pregnant.   If you are breastfeeding.   If you have any special needs.  You do not need to do anything special to prepare.  Please wear loose clothing, such as a sweat suit or jogging clothes. Avoid snaps, zippers and other metal. We may ask you to undress and put on a hospital gown.            Apr 26, 2017 11:00 AM CDT   MR ABDOMEN W/O & W CONTRAST with UUMR2   [facility], MRI ([facility])    [address] [facility] [address] [phone]           Take your medicines as usual, unless your doctor tells you not to. Bring a list of your current medicines to your exam (including vitamins, minerals and over-the-counter drugs).  Also bring the results of similar scans you may have had.    The day before your exam, drink extra fluids at least six 8-ounce glasses (unless your doctor tells you to restrict your fluids).   Have a blood test (creatinine test) within 30 days of your exam. Go to your clinic or Diagnostic Imaging Department for this test.   Do not eat or drink for 6 hours prior to exam.  The MRI machine uses a strong magnet. Please wear clothes without metal (snaps, zippers). A sweatsuit works well, or we may give you a hospital gown.  Please remove any body piercings and hair extensions before you arrive. You will also remove watches, jewelry, hairpins, wallets, dentures, partial dental plates and hearing aids. You may wear contact lenses, and you may be able to wear your rings. We have a safe place to keep your personal items, but it is safer to leave them at home.   **IMPORTANT** THE INSTRUCTIONS BELOW ARE ONLY FOR THOSE PATIENTS WHO HAVE BEEN TOLD THEY WILL RECEIVE SEDATION OR GENERAL ANESTHESIA DURING THEIR MRI PROCEDURE:  IF YOU WILL RECEIVE SEDATION (take medicine to help you relax during your exam):   You must get the medicine from your doctor before you arrive. Bring the medicine to the exam. Do not take it at home.   Arrive one hour early. Bring someone who can take you home after the test. Your medicine will make you sleepy. After the exam, you may not drive, take a bus or take a taxi by yourself.   No eating 8 hours before your exam. You may have clear liquids up until 4 hours before your exam. (Clear liquids include water, clear tea, black coffee and fruit juice without pulp.)  IF YOU WILL RECEIVE ANESTHESIA (be asleep for your exam):   Arrive 1 1/2 hours early. Bring someone who can take you home after the test. You may not drive, take a bus or take a taxi by yourself.   No eating 8 hours before your exam. You may have clear liquids up until 4 hours before your exam. (Clear liquids include water, clear tea, black  coffee and fruit juice without pulp.)  If you have any questions, please contact your Imaging Department exam site.            Apr 26, 2017  1:00 PM CDT   NM BONE SCAN WHOLE BODY [address], Nuclear Medicine ([facility])    [address] [place] [id number] [phone]           Please bring a list of your medicines to the exam. (Include vitamins, minerals and over-the-counter drugs.) You should wear comfortable clothes. Leave your valuables at home. Please bring related prior results and films. Tell your doctor:   If you are breastfeeding or may be pregnant.   If you have had a barium test within the past few days. Barium may change the results of certain exams.   If you think you may need sedation (medicine to help you relax).  You may eat and drink as normal.  Drink plenty of fluids and empty bladder frequently between injection and scans.            Apr 26, 2017  2:30 PM CDT   LAB with ACUTE CARE LAB [facility], [place], Lab ([facility])    [address] [place] [id number]              Patient must bring picture ID.  Patient should be prepared to give a urine specimen  Please do not eat 10-12 hours before your appointment if you are coming in fasting for labs on lipids, cholesterol, or glucose (sugar).  Pregnant women should follow their Care Team instructions. Water with medications is okay. Do not drink coffee or other fluids.   If you have concerns about taking  your medications, please ask at office or if scheduling via [employer], send a message by clicking on Secure Messaging, Message Your Care Team.            Apr 27, 2017  4:30 PM CDT   (Arrive by 4:15 PM)   Return Visit with Viktoria Ayala MD   [facility] ([facility])    [address]  2nd Floor  [facility] [id number] [phone]            Jul 26, 2017  8:15 AM CDT  "  Lab with  LAB   [facility] Lab ([facility])    [address]  1st Floor  [facility] [id number] [phone]            2017  9:10 AM CDT   (Arrive by 8:55 AM)   Return General Liver with Tala Meyer MD   [facility] Hepatology ([facility])    [address]  3rd Floor  [facility] [address] [phone]              Who to contact     If you have questions or need follow up information about today's clinic visit or your schedule please contact [facility] HEPATOLOGY directly at [phone].  Normal or non-critical lab and imaging results will be communicated to you by MyChart, letter or phone within 4 business days after the clinic has received the results. If you do not hear from us within 7 days, please contact the clinic through [employer] or phone. If you have a critical or abnormal lab result, we will notify you by phone as soon as possible.  Submit refill requests through [employer] or call your pharmacy and they will forward the refill request to us. Please allow 3 business days for your refill to be completed.          Additional Information About Your Visit        [employer] Information     [employer] lets you send messages to your doctor, view your test results, renew your prescriptions, schedule appointments and more. To sign up, go to [email or web address] . Click on \"Log in\" on the left side of the screen, which will take you to the Welcome page. Then click on \"Sign up Now\" on the right side of the page.     You will be asked to enter the access code listed below, as well as some personal information. Please follow the directions to create your username and password.     Your access code is: [email or web address]  Expires: 7/3/2017  6:30 AM     Your access code will  in 90 days. If you need help or a new code, please call your [place] clinic or [phone].        Care EveryWhere ID     This is your Care EveryWhere ID. " "This could be used by other organizations to access your [facility] medical records  [id number]        Your Vitals Were     Pulse Temperature Respirations Height Pulse Oximetry BMI (Body Mass Index)    72 98  F (36.7  C) (Oral) 16 1.575 m (5' 2.01\") 98% 21.5 kg/m2       Blood Pressure from Last 3 Encounters:   04/05/17 104/67   03/23/17 105/67   03/16/17 109/75    Weight from Last 3 Encounters:   04/05/17 53.3 kg (117 lb 9.6 oz)   03/23/17 52.7 kg (116 lb 3.2 oz)   03/16/17 51.3 kg (113 lb)              Today, you had the following     No orders found for display       Primary Care Provider Office Phone # Fax #    Felicitas Horton [phone]       [facility] [address] [address]        Thank you!     Thank you for choosing [facility] HEPATOLOGY  for your care. Our goal is always to provide you with excellent care. Hearing back from our patients is one way we can continue to improve our services. Please take a few minutes to complete the written survey that you may receive in the mail after your visit with us. Thank you!             Your Updated Medication List - Protect others around you: Learn how to safely use, store and throw away your medicines at www.disposemymeds.org.          This list is accurate as of: 4/5/17 11:49 AM.  Always use your most recent med list.                   Brand Name Dispense Instructions for use    Acetic Acid-Aluminum Acetate 2 % Soln     60 mL    4 drops every 3 hours       * albuterol (2.5 MG/3ML) 0.083% neb solution     25 vial    Take 1 vial (2.5 mg) by nebulization every 6 hours as needed for shortness of breath / dyspnea or wheezing       * albuterol 108 (90 BASE) MCG/ACT Inhaler    PROAIR HFA/PROVENTIL HFA/VENTOLIN HFA    1 Inhaler    Inhale 2 puffs into the lungs every 6 hours as needed for shortness of breath / dyspnea or wheezing       BOOST CALORIE SMART Liqd     90 Bottle    Take 3 Cans by mouth daily       " cholecalciferol 64395 UNITS capsule    VITAMIN D3    12 capsule    Take 1 capsule (50,000 Units) by mouth once a week       DAILY MULTIPLE VITAMIN/IRON Tabs     30 tablet    Take 1 tablet by mouth daily       fluticasone 50 MCG/ACT spray    FLONASE    1 Bottle    Spray 2 sprays into both nostrils daily       ketotifen 0.025 % Soln ophthalmic solution    ZADITOR/REFRESH ANTI-ITCH    1 Bottle    Place 1 drop into both eyes every 12 hours as needed       mometasone-formoterol 200-5 MCG/ACT oral inhaler    DULERA    1 Inhaler    Inhale 2 puffs into the lungs 2 times daily       omeprazole 40 MG capsule    priLOSEC    90 capsule    Take 1 capsule (40 mg) by mouth daily Take 30-60 minutes before a meal.       spironolactone 50 MG tablet    ALDACTONE     Take 50 mg by mouth daily       * Notice:  This list has 2 medication(s) that are the same as other medications prescribed for you. Read the directions carefully, and ask your doctor or other care provider to review them with you.

## 2017-04-05 NOTE — PROGRESS NOTES
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      HISTORY OF PRESENT ILLNESS:  Ms. Portillo is a 77-year-old [place] immigrant who tells me that she is younger than that age.  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  She did develop a liver lesion which was in segment 6 and it was 2.5 x 2.4.  She was seen in [place] and she had a TIPS procedure and then in 02/2016, she was found to have a 1 cm recurrence and on 03/07/2016, she did have microwave ablation of the recurrent tumor.  In 05/2016, there were no signs of recurrence seen.  On 01/17/2017, I did order an MRI and that MRI shows cirrhosis and evidence of portal hypertension and it also showed post-treatment changes in segment 6 without convincing evidence of recurrence or residual tumor.  Today she comes in after also seeing the oncologist.  Denies any fluid retention.  Appetite is good according to the son and he has gained some weight.  Denies any abdominal pain, nausea, vomiting.  She is moving her bowels like every other day or maybe daily.  She used to have more frequent bowel movements.      Current Outpatient Prescriptions   Medication     spironolactone (ALDACTONE) 50 MG tablet     Nutritional Supplements (BOOST CALORIE SMART) LIQD     cholecalciferol (VITAMIN D3) 14742 UNITS capsule     mometasone-formoterol (DULERA) 200-5 MCG/ACT oral inhaler     fluticasone (FLONASE) 50 MCG/ACT spray     albuterol (PROAIR HFA/PROVENTIL HFA/VENTOLIN HFA) 108 (90 BASE) MCG/ACT Inhaler     albuterol (2.5 MG/3ML) 0.083% nebulizer solution     Multiple Vitamins-Iron (DAILY MULTIPLE VITAMIN/IRON) TABS     omeprazole (PRILOSEC) 40 MG capsule     Acetic Acid-Aluminum Acetate 2 % SOLN     ketotifen (ZADITOR/REFRESH ANTI-ITCH) 0.025 % SOLN ophthalmic solution     No current facility-administered medications for this visit.        PHYSICAL EXAMINATION:   VITAL  SIGNS:  As of today, 04/05/2017, blood pressure is 104/67, temperature is 98 degrees Fahrenheit, pulse is 72, respirations 16, height is 1.575 meters, weight is 117 pounds and 9.6 ounces, BMI is 21.55.   HEENT:  Eyes are not icteric.  Mucosa moist.   NECK:  Supple, no lymphadenopathy.   CHEST:  Clear to auscultation.   ABDOMEN:  Not distended, bowel sounds are present.  No hepatomegaly, no splenomegaly, no shifting dullness noted.   EXTREMITIES:  No edema, no clubbing.   CENTRAL NERVOUS SYSTEM:  Alert and oriented to place, person and time.  No asterixis noted.      IMPRESSION AND PLAN:  Ms. Portillo is a 77-year-old female on paper but claims to be 63 years old.   She has hepatitis C, genotype 5, which was not treated and which led to cirrhosis.  She also had this complicated by hepatocellular carcinoma, which was treated at [place] twice, first with TACE and then with microwave ablation.  On her January MRI, there was no residual tumor.  She saw Oncology and she is going to have more staging to see if she has any lesions outside the liver (metastasis).  Her alpha fetoproteins were not that elevated and were normal.  Plan is to go ahead with the oncologist's ideas regarding that.  Because of her on paper 77 years of age and the fact that the patient is not comfortable with going forward with transplantation, we are not going to pursue that.  She showed some interest in the treatment for hepatitis C but if we have to treat hepatitis C and she is not going for transplantation, for whatever reason it is either that she does not want to or because of the advanced age on paper, it is going to be futile as sooner or later she will have recurrence of the HCC.  We did express that opinion.  They will talk with the family.  They will also see the oncologist.  We can present her at the Selection Conference regarding that.      This was a 25-minute visit of which more than 50% was spent in explaining to the patient what our plan  of care was.  We answered all of her questions.      cc:  Primary care physician

## 2017-04-05 NOTE — NURSING NOTE
"Chief Complaint   Patient presents with     RECHECK     Hep C cirrhosis, HCC       Initial /67 (BP Location: Right arm, Patient Position: Chair, Cuff Size: Adult Small)  Pulse 72  Temp 98  F (36.7  C) (Oral)  Resp 16  Ht 1.575 m (5' 2.01\")  Wt 53.3 kg (117 lb 9.6 oz)  SpO2 98%  BMI 21.5 kg/m2 Estimated body mass index is 21.5 kg/(m^2) as calculated from the following:    Height as of this encounter: 1.575 m (5' 2.01\").    Weight as of this encounter: 53.3 kg (117 lb 9.6 oz).  Medication Reconciliation: complete      "

## 2017-04-26 ENCOUNTER — HOSPITAL ENCOUNTER (OUTPATIENT)
Dept: NUCLEAR MEDICINE | Facility: CLINIC | Age: 77
Setting detail: NUCLEAR MEDICINE
End: 2017-04-26
Attending: INTERNAL MEDICINE
Payer: COMMERCIAL

## 2017-04-26 ENCOUNTER — APPOINTMENT (OUTPATIENT)
Dept: LAB | Facility: CLINIC | Age: 77
End: 2017-04-26
Attending: INTERNAL MEDICINE
Payer: COMMERCIAL

## 2017-04-26 ENCOUNTER — HOSPITAL ENCOUNTER (OUTPATIENT)
Dept: MRI IMAGING | Facility: CLINIC | Age: 77
End: 2017-04-26
Attending: INTERNAL MEDICINE
Payer: COMMERCIAL

## 2017-04-26 ENCOUNTER — HOSPITAL ENCOUNTER (OUTPATIENT)
Dept: CT IMAGING | Facility: CLINIC | Age: 77
Discharge: HOME | End: 2017-04-26
Attending: INTERNAL MEDICINE | Admitting: INTERNAL MEDICINE
Payer: COMMERCIAL

## 2017-04-26 DIAGNOSIS — C22.0 HCC (HEPATOCELLULAR CARCINOMA) (H): ICD-10-CM

## 2017-04-26 PROCEDURE — 34300033 ZZH RX 343: Performed by: INTERNAL MEDICINE

## 2017-04-26 PROCEDURE — 78306 BONE IMAGING WHOLE BODY: CPT

## 2017-04-26 PROCEDURE — A9503 TC99M MEDRONATE: HCPCS | Performed by: INTERNAL MEDICINE

## 2017-04-26 RX ORDER — TC 99M MEDRONATE 20 MG/10ML
20-30 INJECTION, POWDER, LYOPHILIZED, FOR SOLUTION INTRAVENOUS ONCE
Status: COMPLETED | OUTPATIENT
Start: 2017-04-26 | End: 2017-04-26

## 2017-04-26 RX ORDER — GADOBUTROL 604.72 MG/ML
7.5 INJECTION INTRAVENOUS ONCE
Status: COMPLETED | OUTPATIENT
Start: 2017-04-26 | End: 2017-04-26

## 2017-04-26 RX ADMIN — TC 99M MEDRONATE 25.78 MCI.: 20 INJECTION, POWDER, LYOPHILIZED, FOR SOLUTION INTRAVENOUS at 10:30

## 2017-04-26 RX ADMIN — GADOBUTROL 5.3 ML: 604.72 INJECTION INTRAVENOUS at 11:30

## 2017-04-27 ENCOUNTER — ONCOLOGY VISIT (OUTPATIENT)
Dept: ONCOLOGY | Facility: CLINIC | Age: 77
End: 2017-04-27
Attending: INTERNAL MEDICINE
Payer: COMMERCIAL

## 2017-04-27 VITALS
WEIGHT: 121.47 LBS | HEIGHT: 62 IN | TEMPERATURE: 98.1 F | SYSTOLIC BLOOD PRESSURE: 132 MMHG | RESPIRATION RATE: 16 BRPM | BODY MASS INDEX: 22.35 KG/M2 | DIASTOLIC BLOOD PRESSURE: 79 MMHG | OXYGEN SATURATION: 100 % | HEART RATE: 95 BPM

## 2017-04-27 DIAGNOSIS — J90 PLEURAL EFFUSION, RIGHT: Primary | ICD-10-CM

## 2017-04-27 DIAGNOSIS — C22.0 HCC (HEPATOCELLULAR CARCINOMA) (H): ICD-10-CM

## 2017-04-27 PROCEDURE — 99215 OFFICE O/P EST HI 40 MIN: CPT | Mod: ZP | Performed by: INTERNAL MEDICINE

## 2017-04-27 PROCEDURE — 99212 OFFICE O/P EST SF 10 MIN: CPT | Mod: ZF

## 2017-04-27 ASSESSMENT — PAIN SCALES - GENERAL: PAINLEVEL: NO PAIN (0)

## 2017-04-27 NOTE — PATIENT INSTRUCTIONS
Schedule Thoracentesis ASAP. She should get labs the same day as Thoracentesis  Refer to IR asap for liver directed therapy    See me back in 6 weeks

## 2017-04-27 NOTE — NURSING NOTE
"Oncology Rooming Note    April 27, 2017 4:26 PM   Yue Portillo is a 50 year old female who presents for: Oncology Clinic Visit    Initial Vitals: /79  Pulse 95  Temp 98.1  F (36.7  C) (Oral)  Resp 16  Ht 1.575 m (5' 2.01\")  Wt 55.1 kg (121 lb 7.6 oz)  SpO2 100%  BMI 22.21 kg/m2 Estimated body mass index is 22.21 kg/(m^2) as calculated from the following:    Height as of this encounter: 1.575 m (5' 2.01\").    Weight as of this encounter: 55.1 kg (121 lb 7.6 oz). Body surface area is 1.55 meters squared.  No Pain (0) Comment: finger stiffness    No LMP recorded. Patient is postmenopausal.  Allergies reviewed: Yes  Medications reviewed: Yes    Medications: Medication refills not needed today.  Pharmacy name entered into [employer]: [facility], [facility] - [address]    Clinical concerns: finger stiffness Dr. Ayala was notified.    5 minutes for nursing intake (face to face time)     Yoana Flores CMA                "

## 2017-04-27 NOTE — LETTER
4/27/2017       RE: Yue Portillo  [address]  [facility] [address]     Dear Colleague,    Thank you for referring your patient, Yue Portillo, to the [facility]. Please see a copy of my visit note below.    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 Initially she did not get treatment for that but then in Jan 2015 she had a repeat MRI done showing a 2.7 cm OPTN5B lesion in segment 6.   In 05/2015 she underwent a TACE procedure for her original hepatocellular carcinoma.  After that, in 02/2016 she was found to have a 1-cm recurrence.  At that time, her AFP was 5.6.  On 03/07/2016 she underwent microwave ablation of the recurrent tumor.  In 05/2016 her AFP was 5.5 and repeat scans did not show any evidence of recurrence and only showed post-treatment changes.  Then on 09/27/2016 there was suspicion for recurrence at the site of ablation.  She was offered another treatment at that point.  Her AFP was 6.6, but she did not have another treatment.     Because of her advanced age, liver transplantation was not offered.    In Jan 2017, her MRI only showed post treatment changes with no evidence of residual/recurrent cancer.    Interval history  Yue comes in today accompanied by her son and tells me that she has been doing well.  She has occasional mild pain beneath the right lower rib/right upper quadrant which is very mild and she is not taking any medications for it.  She denies any nausea, vomiting or change in her bowel movements.  Very occasionally she feels constipated, but for the most her bowel movements are normal.  She denies any recent weight  loss.  She denies any interval infection.  She is not short of breath.  She has occasional cough which is dry.  She denies any bleeding.  She has no new swellings.  She believes her energy is stable to maybe slightly improved.  She is doing regular light household chores.       ROS:  Otherwise a comprehensive ROS was performed and it was negative    I reviewed other hx as below      Past Medical/Surgical History:  Past Medical History:   Diagnosis Date     Mild intermittent asthma    Hep C- untreated  HCC as mentioned above    Past Surgical History:   Procedure Laterality Date     EYE SURGERY       H PYLORI SHYLA SCREEN      was treated for h pylori     NO HISTORY OF SURGERY         Allergies:  Allergies as of 04/27/2017 - Tello as Reviewed 04/26/2017   Allergen Reaction Noted     Dust mites Cough 10/03/2016     Seasonal allergies  06/12/2014     Current Medications:  Current Outpatient Prescriptions   Medication Sig Dispense Refill     spironolactone (ALDACTONE) 50 MG tablet Take 50 mg by mouth daily       Nutritional Supplements (BOOST CALORIE SMART) LIQD Take 3 Cans by mouth daily 90 Bottle 11     cholecalciferol (VITAMIN D3) 67909 UNITS capsule Take 1 capsule (50,000 Units) by mouth once a week 12 capsule 0     mometasone-formoterol (DULERA) 200-5 MCG/ACT oral inhaler Inhale 2 puffs into the lungs 2 times daily 1 Inhaler 12     fluticasone (FLONASE) 50 MCG/ACT spray Spray 2 sprays into both nostrils daily 1 Bottle 11     albuterol (PROAIR HFA/PROVENTIL HFA/VENTOLIN HFA) 108 (90 BASE) MCG/ACT Inhaler Inhale 2 puffs into the lungs every 6 hours as needed for shortness of breath / dyspnea or wheezing 1 Inhaler 11     Acetic Acid-Aluminum Acetate 2 % SOLN 4 drops every 3 hours (Patient not taking: Reported on 4/5/2017) 60 mL 1     ketotifen (ZADITOR/REFRESH ANTI-ITCH) 0.025 % SOLN ophthalmic solution Place 1 drop into both eyes every 12 hours as needed (Patient not taking: Reported on 4/5/2017) 1 Bottle 9     albuterol  (2.5 MG/3ML) 0.083% nebulizer solution Take 1 vial (2.5 mg) by nebulization every 6 hours as needed for shortness of breath / dyspnea or wheezing 25 vial 3     Multiple Vitamins-Iron (DAILY MULTIPLE VITAMIN/IRON) TABS Take 1 tablet by mouth daily 30 tablet 11     omeprazole (PRILOSEC) 40 MG capsule Take 1 capsule (40 mg) by mouth daily Take 30-60 minutes before a meal. 90 capsule 3      Family History:  Family History   Problem Relation Age of Onset     Respiratory Father      asthma   No history of cancers.  Patient had 10 kids altogether.  One  in an accident, 1  after surgery and 2  in childhood.  Six kids are living and all are healthy.       Social History:  Social History     Social History     Marital status: Single     Spouse name: N/A     Number of children: N/A     Years of education: N/A     Occupational History     Not on file.     Social History Main Topics     Smoking status: Never Smoker     Smokeless tobacco: Never Used     Alcohol use No     Drug use: No     Sexual activity: No     Other Topics Concern     Not on file     Social History Narrative   She does not smoke.  Does not drink any alcohol.  She lives with her daughter.       Physical Exam:  There were no vitals taken for this visit.  CONSTITUTIONAL: elderly female in no acute distress  EYES: PERRLA, no palor no icterus.   ENT/MOUTH: no oral lesions. Ears normal  CVS: s1s2 normal   RESPIRATORY: decreased breath sounds at right lung base, otherwise clear chest   GI: Abdomen is soft  Minimal tenderness in RUQ no organomegaly  NEURO: AAOX3  Grossly non focal neuro exam  INTEGUMENT: no concerning skin rashes  LYMPHATIC: no palpable LNs  MUSCULOSKELETAL: Unremarkable. No bony tenderness.   EXTREMITIES: trace pedal edema  PSYCH: Mentation, mood and affect are appropriate. Decision making capacity is intact      Laboratory/Imaging Studies  CBC RESULTS:   Recent Labs   Lab Test  17   1038   WBC  3.1*   RBC  3.26*   HGB  10.9*   HCT   33.3*   MCV  102*   MCH  33.4*   MCHC  32.7   RDW  15.6*   PLT  64*     Recent Labs   Lab Test  04/05/17   1038  01/03/17   1050   NA  139  137   POTASSIUM  4.3  3.9   CHLORIDE  107  107   CO2  25  24   ANIONGAP  6  6   GLC  83  95   BUN  10  13   CR  0.81  0.75   MACY  8.1*  8.6     Liver Function Studies -   Recent Labs   Lab Test  04/05/17   1038   PROTTOTAL  7.1   ALBUMIN  2.2*   BILITOTAL  2.2*   ALKPHOS  250*   AST  98*   ALT  69*     MRI Abdomen 4/26/17  MRI ABDOMEN     CLINICAL HISTORY: Follow-up hepatocellular carcinoma. Additional  history per electronic medical record includes chemoembolization of a  3.3 cm hepatocellular carcinoma in segment 6 5/22/2015. Hepatitis C.     TECHNIQUE: Images were acquired with and without intravenous contrast  through the abdomen. The following MR images were acquired: TrueFISP,  multiplanar T2 weighted, axial T1 in/out of phase, diffusion-weighted.  Multiplanar T1-weighted images with fat saturation were before  contrast administration and at multiple time points following the  administration of intravenous contrast. Contrast dose: 5.3 mL Gadavist     FINDINGS:     Comparison study: MRI abdomen 1/17/2017, same day CT chest for  20/6/2017, CT abdomen 9/27/2016, MRI 1/13/2015      Liver: There is a nodular hepatic contour with lacy T2 hyperintensity  suggestive of fibrosis and cirrhosis. Again noted are posttreatment  changes in hepatic segment 6 (series 7 image 13).      Lesion 1: There is a 1.4 x 1.0 arterially enhancing lesion in hepatic  segment 3 (series 7 image 20). It has questionable washout on portal  venous phase or delayed phase imaging. Subtle pseudocapsule is  identified. There is associated increased T2 signal. There is T2  shine through on diffusion weighted images. This was 8 mm on the prior  exam. There is not evidence of venous invasion. OPTN/LIRADS class  5a-g.     Lesion 2: There is a 2.8 x 1.9 cm arterially enhancing lesion in  hepatic segment 6 (series 7  image 14). This abuts a region of  previous treatment. A small component appears to washout on delayed  imaging. Pseudocapsule is not identified. There is associated  increased T2 signal. There is no increased signal on diffusion  weighted images. Previously this was less distinct. There is not  evidence of venous invasion. OPTN/LIRADS class 5T.     Lesion 3: There is a 1.2 cm arterially enhancing lesion at the hepatic  dome adjacent to the IVC on series 7 image 29, hepatic segment 8. This  demonstrates mild increased signal on T2 and diffusion weighted  imaging. No washout or pseudocapsule. Previously, this measured 9 mm.  It is suspicious for HCC. OPTN/LI-RADS 4.     Additionally, there is a 7 mm focus of arterial phase enhancement  anteriorly in segment 8 on series 7 image 31 near the dome. No  diffusion restriction, washout, or pseudocapsule. It is indeterminate.  LI-RADS 3.     Gallbladder: Partially decompressed. Small filling defects likely  represent calculi. There is a small amount of pericholecystic fluid  which may be related to ascites and portal hypertension.     Spleen: Normal.     Kidneys: A T1 hyperintense nonenhancing structure in the superior pole  of the right kidney likely represents a hemorrhagic or proteinaceous  cyst, unchanged.     Adrenal glands: Normal.     Pancreas: Normal T1 hyperintensity.     Bowel: Normal.     Lymph nodes: No lymphadenopathy.     Blood vessels: The major visualized abdominal vasculature is patent.  Unchanged recanalization of the portal vein.     Lung bases: There is a large right pleural effusion which is better  evaluated on same-day CT chest.     Bones and soft tissues: No acute or suspicious osseous finding.     Mesentery and abdominal wall: Unremarkable.     Ascites: Moderate volume of abdominal ascites.         IMPRESSION:   1. Cirrhosis and evidence of portal hypertension.  2. Adjacent to the treatment zone in hepatic segment 6 is an enlarging  area of arterial  enhancement with abnormal T2 and diffusion signal and  small component of washout suspicious for residual malignancy  measuring 2.8 cm. OPTN 5T.  3. Interval growth of a 1.4 cm lesion in segment 3 which now meets  criteria for HCC. OPTN/5a.  4. Additional 1.2 cm suspicious lesion at the hepatic dome. LI-RADS 4.  5. Indeterminate 7 mm lesion at the hepatic dome. LI-RADS 3.  6. A large right pleural effusion is better evaluated on same-day CT  Chest.    CT Chest 4/26/17      Impression:   1. No evidence of metastatic disease to the chest.  2. 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 Infection cannot completely be excluded.  Attention on follow-up is recommended.  3. Cirrhosis with portal hypertension, new partially visualized  ascites around the liver.  4. Ground glass and nodular opacities in the lingula, atypical  appearance for metastatic disease, favors infection or aspiration.   Recommend 3 month CT chest follow-up.    Bone Scan 4/26/17      IMPRESSION: No evidence of osseous metastatic disease.       ASSESSMENT/PLAN:  HCC of the hepatic segment 6 measuring 2.7 cm in greatest dimension. In 05/2015 she underwent a TACE.  In 02/2016 she was found to have a 1-cm recurrence. On 03/07/2016 she underwent microwave ablation of the recurrent tumor.  She again has evidence of tumor recurrence with a 1.4 cm OPTN 5a lesion in segment 3. She also has OPTN5T lesion in the hepatic segment 6 where she had previous treatment.   Another OPTN 4 lesion is seen in hepatic segment 8   A LIRADS 3 lesion is seen in segment 8    Her AFP is also increased from her previous one but not dramatically.  I would refer her to IR for liver directed therapy    There is a Right sided pleural effusion but no definitive evidence of metastatic disease in chest.  I would like to do both diagnostic and therapeutic thoracentesis      Pancytopenia in the setting of cirrhosis and portal hypertension - stable- We will keep observing her serially    She will follow with Dr Meyer for Hep C related cirrhosis- at this time he is not offering treatment due to active HCC. She and her son had a lot of questions about treating Hep C while she has active cancer. I asked them to discuss their concerns with Dr Meyer as he is the expert when it comes to treating Hep C as e will be able to answer them in a much better way than myself    I would like to see her back in 6 weeks    All questions answered and she is agreeable and comfortable with the plan    A professional interpretor was present throughout the visit            Again, thank you for allowing me to participate in the care of your patient.      Sincerely,    Viktoria Ayaal MD

## 2017-04-27 NOTE — MR AVS SNAPSHOT
After Visit Summary   4/27/2017    Yue Portillo    MRN: [id number]           Patient Information     Date Of Birth          1940        Visit Information        Provider Department      4/27/2017 4:15 PM Viktoria Ayala MD; OLEKSANDR MOLINA [facility]        Today's Diagnoses     Pleural effusion, right    -  1    HCC (hepatocellular carcinoma) (H)          Care Instructions    Schedule Thoracentesis ASAP. She should get labs the same day as Thoracentesis  Refer to IR asap for liver directed therapy    See me back in 6 weeks            Follow-ups after your visit        Additional Services     IR REFERRAL       [facility] IR REFERRAL  Call [phone] to schedule.    IR ONCOLOGY referral  Procedure requested: Liver directed therapy for recurrent HCC  Associated Diagnosis: HCC  Date preferred: asap       The Interventional Radiologist will consult patients for these procedures:    Chemoembolization for Tumor -Referral from ONCOLOGIST only at this time  RFA (radio frequency ablation) Liver lesions, Kidney lesion, Lung lesion  Prostate Embolization for benign prostatic hyperplasia (BPH) ONLY at this time  Y90 embolization of liver lesions- Referral from ONCOLOGIST only at this time    If the procedure requested is not in the list above, please place this referral and call [phone].    The purpose of this referral is to make sure that   You are a candidate for this procedure  Adequate imaging is available to perform necessary procedures if indicated.    Please be aware that coverage of these services is subject to the terms and limitations of your health insurance plan.  Call member services at your health plan with any benefit or coverage questions.       Please bring the following to your appointment:  >>   Any x-rays, CTs or MRIs which have been performed related to this condition.  Please contact the facility where they were done to arrange for  prior to your  scheduled appointment.   We will need both images as well as reports.  Any new CT, MRI or other procedures ordered by your specialist must be performed at a [place] facility or coordinated by your clinic's referral office to ensure proper imaging can be obtained.     >>   List of current medications doses and, schedules.  >>   This referral request   >>   Any documents/labs given to you for this referral                  Your next 10 appointments already scheduled     May 23, 2017  1:00 PM CDT   (Arrive by 12:45 PM)   New Patient Visit with William Ulloa MD   [facility] ([facility])    [address] [place] [address]  2nd Floor  [facility] [id number] [phone]            Jul 26, 2017  8:15 AM CDT   Lab with  LAB   [facility] ([facility])    [address] [place] [address]  1st Floor  [facility] [id number] [phone]            Jul 26, 2017  9:10 AM CDT   (Arrive by 8:55 AM)   Return General Liver with Tala Meyer MD   [facility] ([facility])    [address] [place] [address]  3rd Floor  [facility] [address] [phone]              Who to contact     If you have questions or need follow up information about today's clinic visit or your schedule please contact [facility] directly at [phone].  Normal or non-critical lab and imaging results will be communicated to you by MyChart, letter or phone within 4 business days after the clinic has received the results. If you do not hear from us within 7 days, please contact the clinic through MyChart or phone. If you have a critical or abnormal lab result, we will notify you by phone as soon as possible.  Submit refill requests through [employer] or call your pharmacy and they will forward the refill request to us. Please allow 3 business days for your refill to be completed.          Additional Information About Your Visit       "  MyChart Information     [employer] lets you send messages to your doctor, view your test results, renew your prescriptions, schedule appointments and more. To sign up, go to [email or web address] . Click on \"Log in\" on the left side of the screen, which will take you to the Welcome page. Then click on \"Sign up Now\" on the right side of the page.     You will be asked to enter the access code listed below, as well as some personal information. Please follow the directions to create your username and password.     Your access code is: [email or web address]  Expires: 7/3/2017  6:30 AM     Your access code will  in 90 days. If you need help or a new code, please call your [place] clinic or [phone].        Care EveryWhere ID     This is your Care EveryWhere ID. This could be used by other organizations to access your [place] medical records  [id number]        Your Vitals Were     Pulse Temperature Respirations Height Pulse Oximetry BMI (Body Mass Index)    95 98.1  F (36.7  C) (Oral) 16 1.575 m (5' 2.01\") 100% 22.21 kg/m2       Blood Pressure from Last 3 Encounters:   17 132/79   17 104/67   17 105/67    Weight from Last 3 Encounters:   17 55.1 kg (121 lb 7.6 oz)   17 53.3 kg (117 lb 9.6 oz)   17 52.7 kg (116 lb 3.2 oz)              We Performed the Following     IR REFERRAL        Primary Care Provider Office Phone # Fax #    Felicitas Horton [phone]       [facility] [address] [place] [id number]        Thank you!     Thank you for choosing [facility]  for your care. Our goal is always to provide you with excellent care. Hearing back from our patients is one way we can continue to improve our services. Please take a few minutes to complete the written survey that you may receive in the mail after your visit with us. Thank you!             Your Updated Medication List - Protect others around you: Learn how to " safely use, store and throw away your medicines at www.disposemymeds.org.          This list is accurate as of: 4/27/17 11:59 PM.  Always use your most recent med list.                   Brand Name Dispense Instructions for use    Acetic Acid-Aluminum Acetate 2 % Soln     60 mL    4 drops every 3 hours       * albuterol (2.5 MG/3ML) 0.083% neb solution     25 vial    Take 1 vial (2.5 mg) by nebulization every 6 hours as needed for shortness of breath / dyspnea or wheezing       * albuterol 108 (90 BASE) MCG/ACT Inhaler    PROAIR HFA/PROVENTIL HFA/VENTOLIN HFA    1 Inhaler    Inhale 2 puffs into the lungs every 6 hours as needed for shortness of breath / dyspnea or wheezing       BOOST CALORIE SMART Liqd     90 Bottle    Take 3 Cans by mouth daily       cholecalciferol 21255 UNITS capsule    VITAMIN D3    12 capsule    Take 1 capsule (50,000 Units) by mouth once a week       DAILY MULTIPLE VITAMIN/IRON Tabs     30 tablet    Take 1 tablet by mouth daily       fluticasone 50 MCG/ACT spray    FLONASE    1 Bottle    Spray 2 sprays into both nostrils daily       ketotifen 0.025 % Soln ophthalmic solution    ZADITOR/REFRESH ANTI-ITCH    1 Bottle    Place 1 drop into both eyes every 12 hours as needed       mometasone-formoterol 200-5 MCG/ACT oral inhaler    DULERA    1 Inhaler    Inhale 2 puffs into the lungs 2 times daily       omeprazole 40 MG capsule    priLOSEC    90 capsule    Take 1 capsule (40 mg) by mouth daily Take 30-60 minutes before a meal.       spironolactone 50 MG tablet    ALDACTONE     Take 50 mg by mouth daily       * Notice:  This list has 2 medication(s) that are the same as other medications prescribed for you. Read the directions carefully, and ask your doctor or other care provider to review them with you.

## 2017-04-27 NOTE — PROGRESS NOTES
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 Initially she did not get treatment for that but then in Jan 2015 she had a repeat MRI done showing a 2.7 cm OPTN5B lesion in segment 6.   In 05/2015 she underwent a TACE procedure for her original hepatocellular carcinoma.  After that, in 02/2016 she was found to have a 1-cm recurrence.  At that time, her AFP was 5.6.  On 03/07/2016 she underwent microwave ablation of the recurrent tumor.  In 05/2016 her AFP was 5.5 and repeat scans did not show any evidence of recurrence and only showed post-treatment changes.  Then on 09/27/2016 there was suspicion for recurrence at the site of ablation.  She was offered another treatment at that point.  Her AFP was 6.6, but she did not have another treatment.     Because of her advanced age, liver transplantation was not offered.    In Jan 2017, her MRI only showed post treatment changes with no evidence of residual/recurrent cancer.    Interval history  Yue comes in today accompanied by her son and tells me that she has been doing well.  She has occasional mild pain beneath the right lower rib/right upper quadrant which is very mild and she is not taking any medications for it.  She denies any nausea, vomiting or change in her bowel movements.  Very occasionally she feels constipated, but for the most her bowel movements are normal.  She denies any recent weight loss.  She denies any interval infection.  She is not short of breath.  She has occasional cough which is dry.  She denies any bleeding.  She has no new swellings.  She believes her energy is stable to maybe slightly improved.  She is doing  regular light household chores.       ROS:  Otherwise a comprehensive ROS was performed and it was negative    I reviewed other hx as below      Past Medical/Surgical History:  Past Medical History:   Diagnosis Date     Mild intermittent asthma    Hep C- untreated  HCC as mentioned above    Past Surgical History:   Procedure Laterality Date     EYE SURGERY       H PYLORI SHYLA SCREEN      was treated for h pylori     NO HISTORY OF SURGERY         Allergies:  Allergies as of 04/27/2017 - Tello as Reviewed 04/26/2017   Allergen Reaction Noted     Dust mites Cough 10/03/2016     Seasonal allergies  06/12/2014     Current Medications:  Current Outpatient Prescriptions   Medication Sig Dispense Refill     spironolactone (ALDACTONE) 50 MG tablet Take 50 mg by mouth daily       Nutritional Supplements (BOOST CALORIE SMART) LIQD Take 3 Cans by mouth daily 90 Bottle 11     cholecalciferol (VITAMIN D3) 74721 UNITS capsule Take 1 capsule (50,000 Units) by mouth once a week 12 capsule 0     mometasone-formoterol (DULERA) 200-5 MCG/ACT oral inhaler Inhale 2 puffs into the lungs 2 times daily 1 Inhaler 12     fluticasone (FLONASE) 50 MCG/ACT spray Spray 2 sprays into both nostrils daily 1 Bottle 11     albuterol (PROAIR HFA/PROVENTIL HFA/VENTOLIN HFA) 108 (90 BASE) MCG/ACT Inhaler Inhale 2 puffs into the lungs every 6 hours as needed for shortness of breath / dyspnea or wheezing 1 Inhaler 11     Acetic Acid-Aluminum Acetate 2 % SOLN 4 drops every 3 hours (Patient not taking: Reported on 4/5/2017) 60 mL 1     ketotifen (ZADITOR/REFRESH ANTI-ITCH) 0.025 % SOLN ophthalmic solution Place 1 drop into both eyes every 12 hours as needed (Patient not taking: Reported on 4/5/2017) 1 Bottle 9     albuterol (2.5 MG/3ML) 0.083% nebulizer solution Take 1 vial (2.5 mg) by nebulization every 6 hours as needed for shortness of breath / dyspnea or wheezing 25 vial 3     Multiple Vitamins-Iron (DAILY MULTIPLE VITAMIN/IRON) TABS Take 1 tablet by mouth  daily 30 tablet 11     omeprazole (PRILOSEC) 40 MG capsule Take 1 capsule (40 mg) by mouth daily Take 30-60 minutes before a meal. 90 capsule 3      Family History:  Family History   Problem Relation Age of Onset     Respiratory Father      asthma   No history of cancers.  Patient had 10 kids altogether.  One  in an accident, 1  after surgery and 2  in childhood.  Six kids are living and all are healthy.       Social History:  Social History     Social History     Marital status: Single     Spouse name: N/A     Number of children: N/A     Years of education: N/A     Occupational History     Not on file.     Social History Main Topics     Smoking status: Never Smoker     Smokeless tobacco: Never Used     Alcohol use No     Drug use: No     Sexual activity: No     Other Topics Concern     Not on file     Social History Narrative   She does not smoke.  Does not drink any alcohol.  She lives with her daughter.       Physical Exam:  There were no vitals taken for this visit.  CONSTITUTIONAL: elderly female in no acute distress  EYES: PERRLA, no palor no icterus.   ENT/MOUTH: no oral lesions. Ears normal  CVS: s1s2 normal   RESPIRATORY: decreased breath sounds at right lung base, otherwise clear chest   GI: Abdomen is soft  Minimal tenderness in RUQ no organomegaly  NEURO: AAOX3  Grossly non focal neuro exam  INTEGUMENT: no concerning skin rashes  LYMPHATIC: no palpable LNs  MUSCULOSKELETAL: Unremarkable. No bony tenderness.   EXTREMITIES: trace pedal edema  PSYCH: Mentation, mood and affect are appropriate. Decision making capacity is intact      Laboratory/Imaging Studies  CBC RESULTS:   Recent Labs   Lab Test  17   1038   WBC  3.1*   RBC  3.26*   HGB  10.9*   HCT  33.3*   MCV  102*   MCH  33.4*   MCHC  32.7   RDW  15.6*   PLT  64*     Recent Labs   Lab Test  17   1038  17   1050   NA  139  137   POTASSIUM  4.3  3.9   CHLORIDE  107  107   CO2  25  24   ANIONGAP  6  6   GLC  83  95   BUN  10   13   CR  0.81  0.75   MACY  8.1*  8.6     Liver Function Studies -   Recent Labs   Lab Test  04/05/17   1038   PROTTOTAL  7.1   ALBUMIN  2.2*   BILITOTAL  2.2*   ALKPHOS  250*   AST  98*   ALT  69*     MRI Abdomen 4/26/17  MRI ABDOMEN     CLINICAL HISTORY: Follow-up hepatocellular carcinoma. Additional  history per electronic medical record includes chemoembolization of a  3.3 cm hepatocellular carcinoma in segment 6 5/22/2015. Hepatitis C.     TECHNIQUE: Images were acquired with and without intravenous contrast  through the abdomen. The following MR images were acquired: TrueFISP,  multiplanar T2 weighted, axial T1 in/out of phase, diffusion-weighted.  Multiplanar T1-weighted images with fat saturation were before  contrast administration and at multiple time points following the  administration of intravenous contrast. Contrast dose: 5.3 mL Gadavist     FINDINGS:     Comparison study: MRI abdomen 1/17/2017, same day CT chest for  20/6/2017, CT abdomen 9/27/2016, MRI 1/13/2015      Liver: There is a nodular hepatic contour with lacy T2 hyperintensity  suggestive of fibrosis and cirrhosis. Again noted are posttreatment  changes in hepatic segment 6 (series 7 image 13).      Lesion 1: There is a 1.4 x 1.0 arterially enhancing lesion in hepatic  segment 3 (series 7 image 20). It has questionable washout on portal  venous phase or delayed phase imaging. Subtle pseudocapsule is  identified. There is associated increased T2 signal. There is T2  shine through on diffusion weighted images. This was 8 mm on the prior  exam. There is not evidence of venous invasion. OPTN/LIRADS class  5a-g.     Lesion 2: There is a 2.8 x 1.9 cm arterially enhancing lesion in  hepatic segment 6 (series 7 image 14). This abuts a region of  previous treatment. A small component appears to washout on delayed  imaging. Pseudocapsule is not identified. There is associated  increased T2 signal. There is no increased signal on diffusion  weighted  images. Previously this was less distinct. There is not  evidence of venous invasion. OPTN/LIRADS class 5T.     Lesion 3: There is a 1.2 cm arterially enhancing lesion at the hepatic  dome adjacent to the IVC on series 7 image 29, hepatic segment 8. This  demonstrates mild increased signal on T2 and diffusion weighted  imaging. No washout or pseudocapsule. Previously, this measured 9 mm.  It is suspicious for HCC. OPTN/LI-RADS 4.     Additionally, there is a 7 mm focus of arterial phase enhancement  anteriorly in segment 8 on series 7 image 31 near the dome. No  diffusion restriction, washout, or pseudocapsule. It is indeterminate.  LI-RADS 3.     Gallbladder: Partially decompressed. Small filling defects likely  represent calculi. There is a small amount of pericholecystic fluid  which may be related to ascites and portal hypertension.     Spleen: Normal.     Kidneys: A T1 hyperintense nonenhancing structure in the superior pole  of the right kidney likely represents a hemorrhagic or proteinaceous  cyst, unchanged.     Adrenal glands: Normal.     Pancreas: Normal T1 hyperintensity.     Bowel: Normal.     Lymph nodes: No lymphadenopathy.     Blood vessels: The major visualized abdominal vasculature is patent.  Unchanged recanalization of the portal vein.     Lung bases: There is a large right pleural effusion which is better  evaluated on same-day CT chest.     Bones and soft tissues: No acute or suspicious osseous finding.     Mesentery and abdominal wall: Unremarkable.     Ascites: Moderate volume of abdominal ascites.         IMPRESSION:   1. Cirrhosis and evidence of portal hypertension.  2. Adjacent to the treatment zone in hepatic segment 6 is an enlarging  area of arterial enhancement with abnormal T2 and diffusion signal and  small component of washout suspicious for residual malignancy  measuring 2.8 cm. OPTN 5T.  3. Interval growth of a 1.4 cm lesion in segment 3 which now meets  criteria for HCC.  OPTN/5a.  4. Additional 1.2 cm suspicious lesion at the hepatic dome. LI-RADS 4.  5. Indeterminate 7 mm lesion at the hepatic dome. LI-RADS 3.  6. A large right pleural effusion is better evaluated on same-day CT  Chest.    CT Chest 4/26/17      Impression:   1. No evidence of metastatic disease to the chest.  2. 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 Infection cannot completely be excluded.  Attention on follow-up is recommended.  3. Cirrhosis with portal hypertension, new partially visualized  ascites around the liver.  4. Ground glass and nodular opacities in the lingula, atypical  appearance for metastatic disease, favors infection or aspiration.   Recommend 3 month CT chest follow-up.    Bone Scan 4/26/17      IMPRESSION: No evidence of osseous metastatic disease.       ASSESSMENT/PLAN:  HCC of the hepatic segment 6 measuring 2.7 cm in greatest dimension. In 05/2015 she underwent a TACE.  In 02/2016 she was found to have a 1-cm recurrence. On 03/07/2016 she underwent microwave ablation of the recurrent tumor.  She again has evidence of tumor recurrence with a 1.4 cm OPTN 5a lesion in segment 3. She also has OPTN5T lesion in the hepatic segment 6 where she had previous treatment.   Another OPTN 4 lesion is seen in hepatic segment 8   A LIRADS 3 lesion is seen in segment 8    Her AFP is also increased from her previous one but not dramatically.  I would refer her to IR for liver directed therapy    There is a Right sided pleural effusion but no definitive evidence of metastatic disease in chest.  I would like to do both diagnostic and therapeutic thoracentesis     Pancytopenia in the setting of cirrhosis and portal hypertension - stable- We will keep observing her serially    She will follow with Dr Meyer for Hep C related cirrhosis- at this time he is not offering treatment due to active  HCC. She and her son had a lot of questions about treating Hep C while she has active cancer. I asked them to discuss their concerns with Dr Meyer as he is the expert when it comes to treating Hep C as e will be able to answer them in a much better way than myself    I would like to see her back in 6 weeks    All questions answered and she is agreeable and comfortable with the plan    A professional interpretor was present throughout the visit      Viktoria Ayala

## 2017-05-02 ENCOUNTER — TELEPHONE (OUTPATIENT)
Dept: INTERVENTIONAL RADIOLOGY/VASCULAR | Facility: CLINIC | Age: 77
End: 2017-05-02

## 2017-05-04 ENCOUNTER — APPOINTMENT (OUTPATIENT)
Dept: MEDSURG UNIT | Facility: CLINIC | Age: 77
End: 2017-05-04
Payer: COMMERCIAL

## 2017-05-04 ENCOUNTER — HOSPITAL ENCOUNTER (OUTPATIENT)
Facility: CLINIC | Age: 77
Discharge: HOME | End: 2017-05-04
Attending: INTERNAL MEDICINE | Admitting: INTERNAL MEDICINE
Payer: COMMERCIAL

## 2017-05-04 ENCOUNTER — APPOINTMENT (OUTPATIENT)
Dept: INTERVENTIONAL RADIOLOGY/VASCULAR | Facility: CLINIC | Age: 77
End: 2017-05-04
Attending: INTERNAL MEDICINE
Payer: COMMERCIAL

## 2017-05-04 ENCOUNTER — APPOINTMENT (OUTPATIENT)
Dept: LAB | Facility: CLINIC | Age: 77
End: 2017-05-04
Attending: INTERNAL MEDICINE
Payer: COMMERCIAL

## 2017-05-04 VITALS
TEMPERATURE: 98 F | HEART RATE: 78 BPM | SYSTOLIC BLOOD PRESSURE: 100 MMHG | OXYGEN SATURATION: 98 % | DIASTOLIC BLOOD PRESSURE: 60 MMHG | RESPIRATION RATE: 18 BRPM | HEIGHT: 62 IN | BODY MASS INDEX: 22.31 KG/M2 | WEIGHT: 121.25 LBS

## 2017-05-04 DIAGNOSIS — C22.0 HCC (HEPATOCELLULAR CARCINOMA) (H): ICD-10-CM

## 2017-05-04 DIAGNOSIS — J90 PLEURAL EFFUSION, RIGHT: ICD-10-CM

## 2017-05-04 LAB
APPEARANCE FLD: CLEAR
COLOR FLD: YELLOW
ERYTHROCYTE [DISTWIDTH] IN BLOOD BY AUTOMATED COUNT: 14.9 % (ref 10–15)
GRAM STN SPEC: NORMAL
HCT VFR BLD AUTO: 33.1 % (ref 35–47)
HGB BLD-MCNC: 11.2 G/DL (ref 11.7–15.7)
INR PPP: 1.45 (ref 0.86–1.14)
LDH FLD L TO P-CCNC: 36 U/L
LYMPHOCYTES NFR FLD MANUAL: 32 %
MCH RBC QN AUTO: 33.4 PG (ref 26.5–33)
MCHC RBC AUTO-ENTMCNC: 33.8 G/DL (ref 31.5–36.5)
MCV RBC AUTO: 99 FL (ref 78–100)
MICRO REPORT STATUS: NORMAL
MONOS+MACROS NFR FLD MANUAL: 56 %
NEUTS BAND NFR FLD MANUAL: 12 %
PLATELET # BLD AUTO: 63 10E9/L (ref 150–450)
PROT FLD-MCNC: 0.8 G/DL
RBC # BLD AUTO: 3.35 10E12/L (ref 3.8–5.2)
RBC # FLD: NORMAL /UL
SPECIMEN SOURCE FLD: NORMAL
SPECIMEN SOURCE: NORMAL
WBC # BLD AUTO: 3 10E9/L (ref 4–11)
WBC # FLD AUTO: 155 /UL

## 2017-05-04 PROCEDURE — 32555 ASPIRATE PLEURA W/ IMAGING: CPT

## 2017-05-04 PROCEDURE — 00000102 ZZHCL STATISTIC CYTO WRIGHT STAIN TC: Performed by: INTERNAL MEDICINE

## 2017-05-04 PROCEDURE — 85610 PROTHROMBIN TIME: CPT | Performed by: RADIOLOGY

## 2017-05-04 PROCEDURE — 87070 CULTURE OTHR SPECIMN AEROBIC: CPT | Performed by: INTERNAL MEDICINE

## 2017-05-04 PROCEDURE — 83615 LACTATE (LD) (LDH) ENZYME: CPT | Performed by: INTERNAL MEDICINE

## 2017-05-04 PROCEDURE — 00000155 ZZHCL STATISTIC H-CELL BLOCK W/STAIN: Performed by: INTERNAL MEDICINE

## 2017-05-04 PROCEDURE — 87205 SMEAR GRAM STAIN: CPT | Performed by: INTERNAL MEDICINE

## 2017-05-04 PROCEDURE — 84157 ASSAY OF PROTEIN OTHER: CPT | Performed by: INTERNAL MEDICINE

## 2017-05-04 PROCEDURE — 85027 COMPLETE CBC AUTOMATED: CPT | Performed by: RADIOLOGY

## 2017-05-04 PROCEDURE — 27210732 ZZH ACCESSORY CR1

## 2017-05-04 PROCEDURE — 27211039 ZZH NEEDLE CR2

## 2017-05-04 PROCEDURE — 88112 CYTOPATH CELL ENHANCE TECH: CPT | Performed by: INTERNAL MEDICINE

## 2017-05-04 PROCEDURE — 27210903 ZZH KIT CR5

## 2017-05-04 PROCEDURE — 88305 TISSUE EXAM BY PATHOLOGIST: CPT | Performed by: INTERNAL MEDICINE

## 2017-05-04 PROCEDURE — 40000166 ZZH STATISTIC PP CARE STAGE 1

## 2017-05-04 PROCEDURE — 89051 BODY FLUID CELL COUNT: CPT | Performed by: INTERNAL MEDICINE

## 2017-05-04 RX ORDER — LIDOCAINE 40 MG/G
CREAM TOPICAL
Status: DISCONTINUED | OUTPATIENT
Start: 2017-05-04 | End: 2017-05-04 | Stop reason: HOSPADM

## 2017-05-04 NOTE — PLAN OF CARE
Pt arrived via cart from IR. Right chest incision CDI, no hematoma.  at bedside. Awaiting post procedure CT.

## 2017-05-04 NOTE — PROGRESS NOTES
Interventional Radiology Intra-procedural Nursing Note    Patient Name: Yue Portillo  Medical Record Number: [id number]  Today's Date: May 4, 2017    Start Time: 1250  End of procedure time: 1300  Procedure: Right thoracentesis  Report given to: JYOTI RN  : Julissa [phone]      Rcd pt from 2A accompanied by son and . Consent verified. Pt transported via stretcher to IR-6.     870ml serous pleural fluid from right side removed labs sent as ordered    Pt tolerated procedure well. KASSANDRA Manrique RN,BSN

## 2017-05-04 NOTE — PROCEDURES
Completed ultrasound guided RIGHT-sided thoracentesis. A total of 870 mL clear yellow colored fluid drained from the chest.  A sample of fluid was collected for requested labs. No immediate complication.  Dx: Pleural effusion, right. Althea.

## 2017-05-04 NOTE — DISCHARGE INSTRUCTIONS
[facility],   Interventional Radiology Discharge Instructions Following Thoracentesis        AFTER YOU GO HOME:  ? Resume previous diet and medications  ? Limit strenuous physical activity such as lifting, straining, or exercising for 48 hours.  You may resume normal activity in 24 hours  ? Change gauze at the puncture site as needed to keep site dry.  Leaking of additional fluid is not uncommon during the first 24-48 hours    CALL THE PHYSICIAN:  ? If you develop a fever, shortness of breath, chest pain, cough up blood, excessive bleeding from the thoracentesis site, severe lightheadedness, or fainting call you doctor immediately or come to the closest Emergency Department.  Do not drive yourself.   ? If you have questions or concerns regarding your procedure call the radiologist.      ADDITIONAL INSTRUCTIONS      [facility] INTERVENTIONAL RADIOLOGY DEPARTMENT  Procedure Physician: Basil Oh PAC          Date of procedure: May 4, 2017  Telephone Numbers: [phone]       Monday-Friday 8:00 am to 4:30 pm                                                   [phone]   After 4:30 pm Monday - Friday, Ask for the Interventional Radiologist on call.  Someone is on call 24 hrs/day  [facility] toll free number: [phone]    Monday-Friday 8:00 am to 4:30 pm  [facility] Emergency Dept: [phone]  _

## 2017-05-04 NOTE — IP AVS SNAPSHOT
[address] [address]                                       After Visit Summary   5/4/2017    Yue Portillo    MRN: [id number]           After Visit Summary Signature Page     I have received my discharge instructions, and my questions have been answered. I have discussed any challenges I see with this plan with the nurse or doctor.    ..........................................................................................................................................  Patient/Patient Representative Signature      ..........................................................................................................................................  Patient Representative Print Name and Relationship to Patient    ..................................................               ................................................  Date                                            Time    ..........................................................................................................................................  Reviewed by Signature/Title    ...................................................              ..............................................  Date                                                            Time

## 2017-05-04 NOTE — PROGRESS NOTES
IV discontinued. DC paperwork reviewed with patient and . Escorted via wheelchair to lobby for transport home with son.

## 2017-05-04 NOTE — IP AVS SNAPSHOT
MRN:[id number]                      After Visit Summary   5/4/2017    Yue Portillo    MRN: [id number]           Visit Information        Department      5/4/2017 11:23 AM [address] [facility]          Review of your medicines      UNREVIEWED medicines. Ask your doctor about these medicines        Dose / Directions    Acetic Acid-Aluminum Acetate 2 % Soln   Used for:  Otalgia of both ears        4 drops every 3 hours   Quantity:  60 mL   Refills:  1       * albuterol (2.5 MG/3ML) 0.083% neb solution   Used for:  Mild intermittent asthma with acute exacerbation        Dose:  1 vial   Take 1 vial (2.5 mg) by nebulization every 6 hours as needed for shortness of breath / dyspnea or wheezing   Quantity:  25 vial   Refills:  3       * albuterol 108 (90 BASE) MCG/ACT Inhaler   Commonly known as:  PROAIR HFA/PROVENTIL HFA/VENTOLIN HFA   Used for:  Moderate persistent asthma without complication        Dose:  2 puff   Inhale 2 puffs into the lungs every 6 hours as needed for shortness of breath / dyspnea or wheezing   Quantity:  1 Inhaler   Refills:  11       BOOST CALORIE SMART Liqd   Used for:  HCC (hepatocellular carcinoma) (H)        Dose:  3 Can   Take 3 Cans by mouth daily   Quantity:  90 Bottle   Refills:  11       cholecalciferol 94429 UNITS capsule   Commonly known as:  VITAMIN D3   Used for:  Vitamin D deficiency        Dose:  1 capsule   Take 1 capsule (50,000 Units) by mouth once a week   Quantity:  12 capsule   Refills:  0       DAILY MULTIPLE VITAMIN/IRON Tabs   Used for:  Chronic hepatitis C without hepatic coma (H)        Dose:  1 tablet   Take 1 tablet by mouth daily   Quantity:  30 tablet   Refills:  11       fluticasone 50 MCG/ACT spray   Commonly known as:  FLONASE   Used for:  Chronic rhinitis        Dose:  2 spray   Spray 2 sprays into both nostrils daily   Quantity:  1 Bottle   Refills:  11       ketotifen 0.025 % Soln ophthalmic solution   Commonly known as:  ZADITOR/REFRESH  ANTI-ITCH   Used for:  Chronic allergic conjunctivitis        Dose:  1 drop   Place 1 drop into both eyes every 12 hours as needed   Quantity:  1 Bottle   Refills:  9       mometasone-formoterol 200-5 MCG/ACT oral inhaler   Commonly known as:  DULERA   Used for:  Moderate persistent asthma without complication        Dose:  2 puff   Inhale 2 puffs into the lungs 2 times daily   Quantity:  1 Inhaler   Refills:  12       omeprazole 40 MG capsule   Commonly known as:  priLOSEC   Used for:  Gastroesophageal reflux disease without esophagitis        Dose:  40 mg   Take 1 capsule (40 mg) by mouth daily Take 30-60 minutes before a meal.   Quantity:  90 capsule   Refills:  3       spironolactone 50 MG tablet   Commonly known as:  ALDACTONE        Dose:  50 mg   Take 50 mg by mouth daily   Refills:  0       * Notice:  This list has 2 medication(s) that are the same as other medications prescribed for you. Read the directions carefully, and ask your doctor or other care provider to review them with you.             Protect others around you: Learn how to safely use, store and throw away your medicines at www.disposemymeds.org.         Follow-ups after your visit        Your next 10 appointments already scheduled     May 23, 2017  1:00 PM CDT   (Arrive by 12:45 PM)   New Patient Visit with William Ulloa MD   [facility] ([facility])    [address] [place] [id number]   [phone]            Jun 08, 2017  3:30 PM CDT   (Arrive by 3:15 PM)   Return Visit with Viktoria Ayala MD   [facility] ([facility])    [address] [place] [address]  2nd [facility] [address]   [phone]            Jul 26, 2017  8:15 AM CDT   Lab with  LAB   [facility])    [address] [place] [address]   [phone]            Jul 26, 2017  9:10 AM CDT   (Arrive by  "8:55 AM)   Return General Liver with Tala Meyer MD   [facility] ([facility])    [address]  3rd Floor  [facility] [address] [phone]               Care Instructions        Further instructions from your care team       [facility],   Interventional Radiology Discharge Instructions Following Thoracentesis        AFTER YOU GO HOME:  ? Resume previous diet and medications  ? Limit strenuous physical activity such as lifting, straining, or exercising for 48 hours.  You may resume normal activity in 24 hours  ? Change gauze at the puncture site as needed to keep site dry.  Leaking of additional fluid is not uncommon during the first 24-48 hours    CALL THE PHYSICIAN:  ? If you develop a fever, shortness of breath, chest pain, cough up blood, excessive bleeding from the thoracentesis site, severe lightheadedness, or fainting call you doctor immediately or come to the closest Emergency Department.  Do not drive yourself.   ? If you have questions or concerns regarding your procedure call the radiologist.      ADDITIONAL INSTRUCTIONS      [facility] INTERVENTIONAL RADIOLOGY DEPARTMENT  Procedure Physician: Basil Oh PAC          Date of procedure: May 4, 2017  Telephone Numbers: [phone]       Monday-Friday 8:00 am to 4:30 pm                                                   [phone]   After 4:30 pm Monday - Friday, Ask for the Interventional Radiologist on call.  Someone is on call 24 hrs/day  [facility] toll free number: [phone]    Monday-Friday 8:00 am to 4:30 pm  [facility] Emergency Dept: [phone]  _             Additional Information About Your Visit        [employer] Information     [employer] lets you send messages to your doctor, view your test results, renew your prescriptions, schedule appointments and more. To sign up, go to [email or web address] . Click on \"Log in\" on the left side of the screen, which will take you to " "the Welcome page. Then click on \"Sign up Now\" on the right side of the page.     You will be asked to enter the access code listed below, as well as some personal information. Please follow the directions to create your username and password.     Your access code is: [email or web address]  Expires: 7/3/2017  6:30 AM     Your access code will  in 90 days. If you need help or a new code, please call your [facility] clinic or [phone].        Care EveryWhere ID     This is your Care EveryWhere ID. This could be used by other organizations to access your [facility] medical records  [id number]        Your Vitals Were     Blood Pressure Pulse Temperature Respirations Height Weight    100/60 (BP Location: Right arm) 90 98  F (36.7  C) (Oral) 18 1.575 m (5' 2\") 55 kg (121 lb 4.1 oz)    Pulse Oximetry BMI (Body Mass Index)                98% 22.18 kg/m2           Primary Care Provider Office Phone # Fax #    Felicitas Horton [phone]      Thank you!     Thank you for choosing [facility] for your care. Our goal is always to provide you with excellent care. Hearing back from our patients is one way we can continue to improve our services. Please take a few minutes to complete the written survey that you may receive in the mail after you visit with us. Thank you!             Medication List: This is a list of all your medications and when to take them. Check marks below indicate your daily home schedule. Keep this list as a reference.      Medications           Morning Afternoon Evening Bedtime As Needed    Acetic Acid-Aluminum Acetate 2 % Soln   4 drops every 3 hours                                * albuterol (2.5 MG/3ML) 0.083% neb solution   Take 1 vial (2.5 mg) by nebulization every 6 hours as needed for shortness of breath / dyspnea or wheezing                                * albuterol 108 (90 BASE) MCG/ACT Inhaler   Commonly known as:  PROAIR HFA/PROVENTIL HFA/VENTOLIN HFA   Inhale 2 puffs into the " lungs every 6 hours as needed for shortness of breath / dyspnea or wheezing                                BOOST CALORIE SMART Liqd   Take 3 Cans by mouth daily                                cholecalciferol 82200 UNITS capsule   Commonly known as:  VITAMIN D3   Take 1 capsule (50,000 Units) by mouth once a week                                DAILY MULTIPLE VITAMIN/IRON Tabs   Take 1 tablet by mouth daily                                fluticasone 50 MCG/ACT spray   Commonly known as:  FLONASE   Spray 2 sprays into both nostrils daily                                ketotifen 0.025 % Soln ophthalmic solution   Commonly known as:  ZADITOR/REFRESH ANTI-ITCH   Place 1 drop into both eyes every 12 hours as needed                                mometasone-formoterol 200-5 MCG/ACT oral inhaler   Commonly known as:  DULERA   Inhale 2 puffs into the lungs 2 times daily                                omeprazole 40 MG capsule   Commonly known as:  priLOSEC   Take 1 capsule (40 mg) by mouth daily Take 30-60 minutes before a meal.                                spironolactone 50 MG tablet   Commonly known as:  ALDACTONE   Take 50 mg by mouth daily                                * Notice:  This list has 2 medication(s) that are the same as other medications prescribed for you. Read the directions carefully, and ask your doctor or other care provider to review them with you.

## 2017-05-08 LAB — COPATH REPORT: NORMAL

## 2017-05-09 LAB
BACTERIA SPEC CULT: NO GROWTH
MICRO REPORT STATUS: NORMAL
SPECIMEN SOURCE: NORMAL

## 2017-05-10 ENCOUNTER — OFFICE VISIT (OUTPATIENT)
Dept: FAMILY MEDICINE | Facility: CLINIC | Age: 77
End: 2017-05-10

## 2017-05-10 VITALS
RESPIRATION RATE: 16 BRPM | TEMPERATURE: 98.1 F | BODY MASS INDEX: 21.91 KG/M2 | OXYGEN SATURATION: 96 % | SYSTOLIC BLOOD PRESSURE: 111 MMHG | WEIGHT: 119.8 LBS | DIASTOLIC BLOOD PRESSURE: 68 MMHG | HEART RATE: 82 BPM

## 2017-05-10 DIAGNOSIS — K59.00 CONSTIPATION, UNSPECIFIED CONSTIPATION TYPE: ICD-10-CM

## 2017-05-10 DIAGNOSIS — J45.21 MILD INTERMITTENT ASTHMA WITH ACUTE EXACERBATION: ICD-10-CM

## 2017-05-10 DIAGNOSIS — J45.30 MILD PERSISTENT ASTHMA WITHOUT COMPLICATION: ICD-10-CM

## 2017-05-10 DIAGNOSIS — H10.45 CHRONIC ALLERGIC CONJUNCTIVITIS: Primary | ICD-10-CM

## 2017-05-10 RX ORDER — BISACODYL 5 MG/1
5 TABLET, DELAYED RELEASE ORAL DAILY PRN
Qty: 30 TABLET | Refills: 2 | Status: SHIPPED | OUTPATIENT
Start: 2017-05-10 | End: 2017-01-01

## 2017-05-10 RX ORDER — CETIRIZINE HYDROCHLORIDE 10 MG/1
10 TABLET ORAL EVERY EVENING
Qty: 30 TABLET | Refills: 6 | Status: SHIPPED | OUTPATIENT
Start: 2017-05-10 | End: 2018-01-01

## 2017-05-10 RX ORDER — ALBUTEROL SULFATE 0.83 MG/ML
1 SOLUTION RESPIRATORY (INHALATION) EVERY 6 HOURS PRN
Qty: 25 VIAL | Refills: 3 | Status: SHIPPED | OUTPATIENT
Start: 2017-05-10 | End: 2017-01-01

## 2017-05-10 RX ORDER — OLOPATADINE HYDROCHLORIDE 1 MG/ML
1 SOLUTION/ DROPS OPHTHALMIC 2 TIMES DAILY
Qty: 5 ML | Refills: 3 | Status: SHIPPED | OUTPATIENT
Start: 2017-05-10 | End: 2017-01-01

## 2017-05-10 NOTE — PATIENT INSTRUCTIONS
Here is the plan from today's visit    1. Mild intermittent asthma with acute exacerbation  - albuterol (2.5 MG/3ML) 0.083% neb solution; Take 1 vial (2.5 mg) by nebulization every 6 hours as needed for shortness of breath / dyspnea or wheezing  Dispense: 25 vial; Refill: 3    2. Chronic allergic conjunctivitis  - olopatadine (PATANOL) 0.1 % ophthalmic solution; Place 1 drop into both eyes 2 times daily  Dispense: 5 mL; Refill: 3  - cetirizine (ZYRTEC) 10 MG tablet; Take 1 tablet (10 mg) by mouth every evening  Dispense: 30 tablet; Refill: 6    3. Constipation, unspecified constipation type  - bisacodyl (DULCOLAX) 5 MG EC tablet; Take 1 tablet (5 mg) by mouth daily as needed for constipation  Dispense: 30 tablet; Refill: 2    Follow-up with no improvement or worsening of symptoms.     Thank you for coming to [facility] today.  Lab Testing:  **If you had lab testing today and your results are reassuring or normal they will be mailed to you or sent through [employer] within 7 days.   **If the lab tests need quick action we will call you with the results.  The phone number we will call with results is # [phone] (home) . If this is not the best number please call our clinic and change the number.  Medication Refills:  If you need any refills please call your pharmacy and they will contact us.   If you need to  your refill at a new pharmacy, please contact the new pharmacy directly. The new pharmacy will help you get your medications transferred faster.   Scheduling:  If you have any concerns about today's visit or wish to schedule another appointment please call our office during normal business hours [phone] (8-5:00 M-F)  If a referral was made to a [facility] and you don't get a call from central scheduling please call [phone].  If a Mammogram was ordered for you at [facility] call [phone] to schedule or change your appointment.  If you had an  XRay/CT/Ultrasound/MRI ordered the number is [phone] to schedule or change your radiology appointment.   Medical Concerns:  If you have urgent medical concerns please call [phone] at any time of the day.  If you have a medical emergency please call [phone].

## 2017-05-10 NOTE — MR AVS SNAPSHOT
After Visit Summary   5/10/2017    Yue Portillo    MRN: [id number]           Patient Information     Date Of Birth          1940        Visit Information        Provider Department      5/10/2017 4:20 PM Yelena Astorga APRN CNP [facility]        Today's Diagnoses     Chronic allergic conjunctivitis    -  1    Mild intermittent asthma with acute exacerbation        Constipation, unspecified constipation type          Care Instructions    Here is the plan from today's visit    1. Mild intermittent asthma with acute exacerbation  - albuterol (2.5 MG/3ML) 0.083% neb solution; Take 1 vial (2.5 mg) by nebulization every 6 hours as needed for shortness of breath / dyspnea or wheezing  Dispense: 25 vial; Refill: 3    2. Chronic allergic conjunctivitis  - olopatadine (PATANOL) 0.1 % ophthalmic solution; Place 1 drop into both eyes 2 times daily  Dispense: 5 mL; Refill: 3  - cetirizine (ZYRTEC) 10 MG tablet; Take 1 tablet (10 mg) by mouth every evening  Dispense: 30 tablet; Refill: 6    3. Constipation, unspecified constipation type  - bisacodyl (DULCOLAX) 5 MG EC tablet; Take 1 tablet (5 mg) by mouth daily as needed for constipation  Dispense: 30 tablet; Refill: 2    Follow-up with no improvement or worsening of symptoms.     Thank you for coming to [facility] today.  Lab Testing:  **If you had lab testing today and your results are reassuring or normal they will be mailed to you or sent through [employer] within 7 days.   **If the lab tests need quick action we will call you with the results.  The phone number we will call with results is # [phone] (home) . If this is not the best number please call our clinic and change the number.  Medication Refills:  If you need any refills please call your pharmacy and they will contact us.   If you need to  your refill at a new pharmacy, please contact the new pharmacy directly. The new pharmacy will help you get your  medications transferred faster.   Scheduling:  If you have any concerns about today's visit or wish to schedule another appointment please call our office during normal business hours [phone] (8-5:00 M-F)  If a referral was made to a [facility] and you don't get a call from central scheduling please call [phone].  If a Mammogram was ordered for you at [facility] call [phone] to schedule or change your appointment.  If you had an XRay/CT/Ultrasound/MRI ordered the number is [phone] to schedule or change your radiology appointment.   Medical Concerns:  If you have urgent medical concerns please call [phone] at any time of the day.  If you have a medical emergency please call [phone].          Follow-ups after your visit        Your next 10 appointments already scheduled     May 23, 2017  1:00 PM CDT   (Arrive by 12:45 PM)   New Patient Visit with William Ulloa MD   [facility] ([facility])    [address] [place] [address]  2nd [facility] [id number] [phone]            Jun 08, 2017  3:30 PM CDT   (Arrive by 3:15 PM)   Return Visit with Viktoria Ayala MD   [facility] ([facility])    [address] [place] [address]  2nd [facility] [id number] [phone]            Jul 26, 2017  8:15 AM CDT   Lab with  LAB   [facility] ([facility])    [address] [place] [address]  1st [facility] [id number] [phone]            Jul 26, 2017  9:10 AM CDT   (Arrive by 8:55 AM)   Return General Liver with Tala Meyer MD   [facility] ([facility])    [address] [place] [address]  3rd [facility] [id number] [phone]              Who to contact     Please call your clinic at [phone] to:    Ask questions about your health    Make or cancel appointments    Discuss your medicines    Learn about your  test results    Speak to your doctor   If you have compliments or concerns about an experience at your clinic, or if you wish to file a complaint, please contact [facility] Patient Relations at [phone] or email us at [email or web address]         Additional Information About Your Visit        [employer] Information     [employer] is an electronic gateway that provides easy, online access to your medical records. With [employer], you can request a clinic appointment, read your test results, renew a prescription or communicate with your care team.     To sign up for [employer] visit the website at [email or web address]   You will be asked to enter the access code listed below, as well as some personal information. Please follow the directions to create your username and password.     Your access code is: [email or web address]  Expires: 7/3/2017  6:30 AM     Your access code will  in 90 days. If you need help or a new code, please contact your [facility] or call [phone] for assistance.        Care EveryWhere ID     This is your Care EveryWhere ID. This could be used by other organizations to access your [place] medical records  [id number]        Your Vitals Were     Pulse Temperature Respirations Pulse Oximetry BMI (Body Mass Index)       82 98.1  F (36.7  C) (Oral) 16 96% 21.91 kg/m2        Blood Pressure from Last 3 Encounters:   05/10/17 111/68   17 100/60   17 132/79    Weight from Last 3 Encounters:   05/10/17 119 lb 12.8 oz (54.3 kg)   17 121 lb 4.1 oz (55 kg)   17 121 lb 7.6 oz (55.1 kg)              Today, you had the following     No orders found for display         Today's Medication Changes          These changes are accurate as of: 5/10/17  4:37 PM.  If you have any questions, ask your nurse or doctor.               Start taking these medicines.        Dose/Directions    bisacodyl 5 MG EC tablet   Commonly known  as:  DULCOLAX   Used for:  Constipation, unspecified constipation type   Started by:  Yelena Astorga APRN CNP        Dose:  5 mg   Take 1 tablet (5 mg) by mouth daily as needed for constipation   Quantity:  30 tablet   Refills:  2       cetirizine 10 MG tablet   Commonly known as:  zyrTEC   Used for:  Chronic allergic conjunctivitis   Started by:  Yelena Astorga APRN CNP        Dose:  10 mg   Take 1 tablet (10 mg) by mouth every evening   Quantity:  30 tablet   Refills:  6       olopatadine 0.1 % ophthalmic solution   Commonly known as:  PATANOL   Used for:  Chronic allergic conjunctivitis   Started by:  Yelena Astorga APRN CNP        Dose:  1 drop   Place 1 drop into both eyes 2 times daily   Quantity:  5 mL   Refills:  3         Stop taking these medicines if you haven't already. Please contact your care team if you have questions.     Acetic Acid-Aluminum Acetate 2 % Soln   Stopped by:  Yelena Astorga APRN CNP           ketotifen 0.025 % Soln ophthalmic solution   Commonly known as:  ZADITOR/REFRESH ANTI-ITCH   Stopped by:  Yelena Astorga APRN CNP                Where to get your medicines      These medications were sent to [facility], [address] [address]     Phone:  [phone]     albuterol (2.5 MG/3ML) 0.083% neb solution    bisacodyl 5 MG EC tablet    cetirizine 10 MG tablet    olopatadine 0.1 % ophthalmic solution                Primary Care Provider Office Phone # Fax #    Felicitasfinn Horton [phone]       [facility] [address]   [facility] [address]        Thank you!     Thank you for choosing [facility]  for your care. Our goal is always to provide you with excellent care. Hearing back from our patients is one way we can continue to improve our services. Please take a few minutes to complete the written survey that you may  receive in the mail after your visit with us. Thank you!             Your Updated Medication List - Protect others around you: Learn how to safely use, store and throw away your medicines at www.disposemymeds.org.          This list is accurate as of: 5/10/17  4:37 PM.  Always use your most recent med list.                   Brand Name Dispense Instructions for use    * albuterol 108 (90 BASE) MCG/ACT Inhaler    PROAIR HFA/PROVENTIL HFA/VENTOLIN HFA    1 Inhaler    Inhale 2 puffs into the lungs every 6 hours as needed for shortness of breath / dyspnea or wheezing       * albuterol (2.5 MG/3ML) 0.083% neb solution     25 vial    Take 1 vial (2.5 mg) by nebulization every 6 hours as needed for shortness of breath / dyspnea or wheezing       bisacodyl 5 MG EC tablet    DULCOLAX    30 tablet    Take 1 tablet (5 mg) by mouth daily as needed for constipation       BOOST CALORIE SMART Liqd     90 Bottle    Take 3 Cans by mouth daily       cetirizine 10 MG tablet    zyrTEC    30 tablet    Take 1 tablet (10 mg) by mouth every evening       cholecalciferol 78348 UNITS capsule    VITAMIN D3    12 capsule    Take 1 capsule (50,000 Units) by mouth once a week       DAILY MULTIPLE VITAMIN/IRON Tabs     30 tablet    Take 1 tablet by mouth daily       fluticasone 50 MCG/ACT spray    FLONASE    1 Bottle    Spray 2 sprays into both nostrils daily       mometasone-formoterol 200-5 MCG/ACT oral inhaler    DULERA    1 Inhaler    Inhale 2 puffs into the lungs 2 times daily       olopatadine 0.1 % ophthalmic solution    PATANOL    5 mL    Place 1 drop into both eyes 2 times daily       omeprazole 40 MG capsule    priLOSEC    90 capsule    Take 1 capsule (40 mg) by mouth daily Take 30-60 minutes before a meal.       spironolactone 50 MG tablet    ALDACTONE     Take 50 mg by mouth daily       * Notice:  This list has 2 medication(s) that are the same as other medications prescribed for you. Read the directions carefully, and ask your doctor  or other care provider to review them with you.

## 2017-05-10 NOTE — PROGRESS NOTES
HPI:       Yue Portillo is a 77 year old who presents for the following  Patient presents with:  Ear Problem: x's 4 mo. Itching and painful bilateral ears  Cough: 4 months of a dry cough and worsening.     1.  Bilateral ear itching and irritation in both canals.  No hearing changes.  No tinnitus.    Previously prescribed Vosol for ear itching and she states that itching has worsened after medication.     2.  Cough - non-productive.  No shortness of breath or wheezing currently.  Has had shortness of breath with lying down at night.    History of asthma.  She reports that she is using Dulera inhaler 2 times daily and Albuterol inhaler at night primarily and as needed during the day.     3. Wants a different medication than ketotifen eye drops - states that this hasn't been helpful for itchy eyes.     Is not currently taking an antihistamine.        A [employer]  was used for  this visit.        Problem, Medication and Allergy Lists were reviewed and are current.  Patient is an established patient of this clinic.         Review of Systems:   Review of Systems   Constitutional: Negative.    HENT: Positive for ear pain, rhinorrhea and sneezing. Negative for hearing loss and tinnitus.    Eyes: Positive for itching.   Respiratory: Positive for cough. Negative for shortness of breath and wheezing.    Cardiovascular: Negative.    Gastrointestinal: Negative.              Physical Exam:   Patient Vitals for the past 24 hrs:   BP Temp Temp src Pulse Resp SpO2 Weight   05/10/17 1616 111/68 98.1  F (36.7  C) Oral 82 16 96 % 119 lb 12.8 oz (54.3 kg)     Body mass index is 21.91 kg/(m^2).  Vitals were reviewed and were normal     Physical Exam   Constitutional: She appears well-nourished. No distress.   HENT:   Right Ear: Tympanic membrane and ear canal normal.   Left Ear: Tympanic membrane and ear canal normal.   Nose: Mucosal edema present.   Mouth/Throat: No posterior oropharyngeal edema or posterior oropharyngeal  erythema.   Eyes: Conjunctivae are normal. Right eye exhibits no discharge. Left eye exhibits no discharge.   Cardiovascular: Normal rate and regular rhythm.    Pulmonary/Chest: Effort normal and breath sounds normal. No respiratory distress. She has no wheezes. She has no rales.     Assessment and Plan     Yue was seen today for ear problem and cough.    Diagnoses and all orders for this visit:    Chronic allergic conjunctivitis  -     olopatadine (PATANOL) 0.1 % ophthalmic solution; Place 1 drop into both eyes 2 times daily  -     cetirizine (ZYRTEC) 10 MG tablet; Take 1 tablet (10 mg) by mouth every evening    Mild persistent asthma  -     albuterol (2.5 MG/3ML) 0.083% neb solution; Take 1 vial (2.5 mg) by nebulization every 6 hours as needed for shortness of breath / dyspnea or wheezing  -     Patient education completed regarding Dulera inhaler use as a controller medication and prn use of Albuterol.     Constipation, unspecified constipation type  -     bisacodyl (DULCOLAX) 5 MG EC tablet; Take 1 tablet (5 mg) by mouth daily as needed for constipation  -     Patient education completed regarding dietary modifications.      Follow-up with no improvement or worsening.    Over 50% of 25 minute appointment spent on counseling and education regarding above issues.     Options for treatment and follow-up care were reviewed with the patient. Yue BARRON Portillo  engaged in the decision making process and verbalized understanding of the options discussed and agreed with the final plan.    Yelena Astorga, CORBY CNP

## 2017-05-11 ASSESSMENT — ENCOUNTER SYMPTOMS
COUGH: 1
GASTROINTESTINAL NEGATIVE: 1
WHEEZING: 0
SHORTNESS OF BREATH: 0
RHINORRHEA: 1
EYE ITCHING: 1
CARDIOVASCULAR NEGATIVE: 1
CONSTITUTIONAL NEGATIVE: 1

## 2017-05-22 DIAGNOSIS — C22.0 HCC (HEPATOCELLULAR CARCINOMA) (H): Primary | ICD-10-CM

## 2017-06-08 NOTE — PATIENT INSTRUCTIONS
Please schedule appointment with IR- referral already given on 4/27/17    Start Miralax once daily for constipation    Increase Prilosec to 20 mg twice daily for acid reflux    Depending on your follow up with IR, I will see you back in a couple of months with labs prior

## 2017-06-08 NOTE — LETTER
6/8/2017       RE: Yue Portillo  [address]   [facility] [address]     Dear Colleague,    Thank you for referring your patient, Yue Portillo, to the [facility]. Please see a copy of my visit note below.    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 Initially she did not get treatment for that but then in Jan 2015 she had a repeat MRI done showing a 2.7 cm OPTN5B lesion in segment 6.   In 05/2015 she underwent a TACE procedure for her original hepatocellular carcinoma.  After that, in 02/2016 she was found to have a 1-cm recurrence.  At that time, her AFP was 5.6.  On 03/07/2016 she underwent microwave ablation of the recurrent tumor.  In 05/2016 her AFP was 5.5 and repeat scans did not show any evidence of recurrence and only showed post-treatment changes.  Then on 09/27/2016 there was suspicion for recurrence at the site of ablation.  She was offered another treatment at that point.  Her AFP was 6.6, but she did not have another treatment.     Because of her advanced age, liver transplantation was not offered.    In Jan 2017, her MRI only showed post treatment changes with no evidence of residual/recurrent cancer.    In April 2017, she again had evidence of tumor recurrence with a 1.4 cm OPTN 5a lesion in segment 3. She also has OPTN5T lesion in the hepatic segment 6 where she had previous treatment.   Another OPTN 4 lesion is seen in hepatic segment 8   A LIRADS 3 lesion is seen in segment 8    She also had right-sided pleural effusion.  I referred her to IR and also got the diagnostic and therapeutic thoracentesis done.  It was a nonmalignant  effusion.  She comes in for followup today.      INTERVAL HISTORY:  She tells me that she did not go to IR because she did not want to talk to them.  Over this time, she has felt relatively okay.  She does complain of off and on bloating and pain in her abdomen without any nausea or vomiting.  She does complain of constipation.  She has tried Dulcolax and milk of magnesia, but it does not help her too much.  She does have off and on dry cough.  She thinks her energy is about the same.  She has not noticed any new swelling.  She denies any other pain.  She has had no interval infections and no bleeding.      REVIEW OF SYSTEMS:  Otherwise, a comprehensive review of the systems was negative.      I reviewed other history in Epic as below.       Does complain of acid reflux      Past Medical/Surgical History:  Past Medical History:   Diagnosis Date     Mild intermittent asthma    Hep C- untreated  HCC as mentioned above    Past Surgical History:   Procedure Laterality Date     EYE SURGERY       H PYLORI SHYLA SCREEN      was treated for h pylori     NO HISTORY OF SURGERY         Allergies:  Allergies as of 06/08/2017 - Tello as Reviewed 06/08/2017   Allergen Reaction Noted     Dust mites Cough 10/03/2016     Seasonal allergies  06/12/2014     Current Medications:  Current Outpatient Prescriptions   Medication Sig Dispense Refill     albuterol (2.5 MG/3ML) 0.083% neb solution Take 1 vial (2.5 mg) by nebulization every 6 hours as needed for shortness of breath / dyspnea or wheezing 25 vial 3     olopatadine (PATANOL) 0.1 % ophthalmic solution Place 1 drop into both eyes 2 times daily 5 mL 3     cetirizine (ZYRTEC) 10 MG tablet Take 1 tablet (10 mg) by mouth every evening 30 tablet 6     bisacodyl (DULCOLAX) 5 MG EC tablet Take 1 tablet (5 mg) by mouth daily as needed for constipation 30 tablet 2     Nutritional Supplements (BOOST CALORIE SMART) LIQD Take 3 Cans by mouth daily 90 Bottle 11     cholecalciferol (VITAMIN D3)  "88214 UNITS capsule Take 1 capsule (50,000 Units) by mouth once a week 12 capsule 0     mometasone-formoterol (DULERA) 200-5 MCG/ACT oral inhaler Inhale 2 puffs into the lungs 2 times daily 1 Inhaler 12     fluticasone (FLONASE) 50 MCG/ACT spray Spray 2 sprays into both nostrils daily 1 Bottle 11     albuterol (PROAIR HFA/PROVENTIL HFA/VENTOLIN HFA) 108 (90 BASE) MCG/ACT Inhaler Inhale 2 puffs into the lungs every 6 hours as needed for shortness of breath / dyspnea or wheezing 1 Inhaler 11     Multiple Vitamins-Iron (DAILY MULTIPLE VITAMIN/IRON) TABS Take 1 tablet by mouth daily 30 tablet 11     omeprazole (PRILOSEC) 40 MG capsule Take 1 capsule (40 mg) by mouth daily Take 30-60 minutes before a meal. 90 capsule 3      Family History:  Family History   Problem Relation Age of Onset     Respiratory Father      asthma   No history of cancers.  Patient had 10 kids altogether.  One  in an accident, 1  after surgery and 2  in childhood.  Six kids are living and all are healthy.       Social History:  Social History     Social History     Marital status: Single     Spouse name: N/A     Number of children: N/A     Years of education: N/A     Occupational History     Not on file.     Social History Main Topics     Smoking status: Never Smoker     Smokeless tobacco: Never Used     Alcohol use No     Drug use: No     Sexual activity: No     Other Topics Concern     Not on file     Social History Narrative   She does not smoke.  Does not drink any alcohol.  She lives with her daughter.       Physical Exam:  /74  Pulse 95  Temp 97.6  F (36.4  C) (Oral)  Resp 16  Ht 1.575 m (5' 2.01\")  Wt 57.3 kg (126 lb 6.4 oz)  SpO2 95%  BMI 23.11 kg/m2  PHYSICAL EXAMINATION:    CONSTITUTIONAL:  She is an elderly female in no apparent distress.   EYES:  Pupils are equal and reactive to light and accommodation without any pallor or icterus.   ENT:  Ears are unremarkable.  Mouth without oral lesions.   CARDIOVASCULAR:  " S1, S2 audible.  No murmurs, rubs or gallops.   LUNGS:  She has decreased breath sounds at the right lung base.  Otherwise, chest is  clear to auscultation bilaterally.    GASTROINTESTINAL:  Abdomen is  soft.  It is bloated.  There is possible ascites, no hepatosplenomegaly, and it is nontender.     INTEGUMENT:  No concerning skin rashes.   LYMPHATICS:  No cervical, supraclavicular or axillary lymph nodes palpable.   EXTREMITIES:  Trace pedal edema bilaterally.   PSYCHIATRIC:  Mood and affect are normal.       Laboratory/Imaging Studies  Previous results reviewed    CBC RESULTS:   Recent Labs   Lab Test  04/05/17   1038   WBC  3.1*   RBC  3.26*   HGB  10.9*   HCT  33.3*   MCV  102*   MCH  33.4*   MCHC  32.7   RDW  15.6*   PLT  64*     Recent Labs   Lab Test  04/05/17   1038  01/03/17   1050   NA  139  137   POTASSIUM  4.3  3.9   CHLORIDE  107  107   CO2  25  24   ANIONGAP  6  6   GLC  83  95   BUN  10  13   CR  0.81  0.75   MACY  8.1*  8.6     Liver Function Studies -   Recent Labs   Lab Test  04/05/17   1038   PROTTOTAL  7.1   ALBUMIN  2.2*   BILITOTAL  2.2*   ALKPHOS  250*   AST  98*   ALT  69*     MRI Abdomen 4/26/17  MRI ABDOMEN     CLINICAL HISTORY: Follow-up hepatocellular carcinoma. Additional  history per electronic medical record includes chemoembolization of a  3.3 cm hepatocellular carcinoma in segment 6 5/22/2015. Hepatitis C.     TECHNIQUE: Images were acquired with and without intravenous contrast  through the abdomen. The following MR images were acquired: TrueFISP,  multiplanar T2 weighted, axial T1 in/out of phase, diffusion-weighted.  Multiplanar T1-weighted images with fat saturation were before  contrast administration and at multiple time points following the  administration of intravenous contrast. Contrast dose: 5.3 mL Gadavist     FINDINGS:     Comparison study: MRI abdomen 1/17/2017, same day CT chest for  20/6/2017, CT abdomen 9/27/2016, MRI 1/13/2015      Liver: There is a nodular hepatic  contour with lacy T2 hyperintensity  suggestive of fibrosis and cirrhosis. Again noted are posttreatment  changes in hepatic segment 6 (series 7 image 13).      Lesion 1: There is a 1.4 x 1.0 arterially enhancing lesion in hepatic  segment 3 (series 7 image 20). It has questionable washout on portal  venous phase or delayed phase imaging. Subtle pseudocapsule is  identified. There is associated increased T2 signal. There is T2  shine through on diffusion weighted images. This was 8 mm on the prior  exam. There is not evidence of venous invasion. OPTN/LIRADS class  5a-g.     Lesion 2: There is a 2.8 x 1.9 cm arterially enhancing lesion in  hepatic segment 6 (series 7 image 14). This abuts a region of  previous treatment. A small component appears to washout on delayed  imaging. Pseudocapsule is not identified. There is associated  increased T2 signal. There is no increased signal on diffusion  weighted images. Previously this was less distinct. There is not  evidence of venous invasion. OPTN/LIRADS class 5T.     Lesion 3: There is a 1.2 cm arterially enhancing lesion at the hepatic  dome adjacent to the IVC on series 7 image 29, hepatic segment 8. This  demonstrates mild increased signal on T2 and diffusion weighted  imaging. No washout or pseudocapsule. Previously, this measured 9 mm.  It is suspicious for HCC. OPTN/LI-RADS 4.     Additionally, there is a 7 mm focus of arterial phase enhancement  anteriorly in segment 8 on series 7 image 31 near the dome. No  diffusion restriction, washout, or pseudocapsule. It is indeterminate.  LI-RADS 3.     Gallbladder: Partially decompressed. Small filling defects likely  represent calculi. There is a small amount of pericholecystic fluid  which may be related to ascites and portal hypertension.     Spleen: Normal.     Kidneys: A T1 hyperintense nonenhancing structure in the superior pole  of the right kidney likely represents a hemorrhagic or proteinaceous  cyst,  unchanged.     Adrenal glands: Normal.     Pancreas: Normal T1 hyperintensity.     Bowel: Normal.     Lymph nodes: No lymphadenopathy.     Blood vessels: The major visualized abdominal vasculature is patent.  Unchanged recanalization of the portal vein.     Lung bases: There is a large right pleural effusion which is better  evaluated on same-day CT chest.     Bones and soft tissues: No acute or suspicious osseous finding.     Mesentery and abdominal wall: Unremarkable.     Ascites: Moderate volume of abdominal ascites.         IMPRESSION:   1. Cirrhosis and evidence of portal hypertension.  2. Adjacent to the treatment zone in hepatic segment 6 is an enlarging  area of arterial enhancement with abnormal T2 and diffusion signal and  small component of washout suspicious for residual malignancy  measuring 2.8 cm. OPTN 5T.  3. Interval growth of a 1.4 cm lesion in segment 3 which now meets  criteria for HCC. OPTN/5a.  4. Additional 1.2 cm suspicious lesion at the hepatic dome. LI-RADS 4.  5. Indeterminate 7 mm lesion at the hepatic dome. LI-RADS 3.  6. A large right pleural effusion is better evaluated on same-day CT  Chest.    CT Chest 4/26/17      Impression:   1. No evidence of metastatic disease to the chest.  2. 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 Infection cannot completely be excluded.  Attention on follow-up is recommended.  3. Cirrhosis with portal hypertension, new partially visualized  ascites around the liver.  4. Ground glass and nodular opacities in the lingula, atypical  appearance for metastatic disease, favors infection or aspiration.   Recommend 3 month CT chest follow-up.    Bone Scan 4/26/17      IMPRESSION: No evidence of osseous metastatic disease.       ASSESSMENT/PLAN:  HCC of the hepatic segment 6 measuring 2.7 cm in greatest dimension. In 05/2015 she underwent a TACE.   In 02/2016 she was found to have a 1-cm recurrence. On 03/07/2016 she underwent microwave ablation of the recurrent tumor.  She again has evidence of tumor recurrence with a 1.4 cm OPTN 5a lesion in segment 3. She also has OPTN5T lesion in the hepatic segment 6 where she had previous treatment.   Another OPTN 4 lesion is seen in hepatic segment 8   A LIRADS 3 lesion is seen in segment 8      ASSESSMENT AND PLAN:  Her alpha-fetoprotein also increased a little bit, but not very significantly.  I referred her to Interventional Radiology last time, but she did not follow up on that.  We again discussed this whole situation in detail and I tried to convince her to talk to an interventional radiologist to see if any further liver-directed therapy is possible or not.  If no additional liver-directed therapy is possible, then potentially we can use sorafenib or potentially can be a candidate for Pexa-Vec trial, but at this time she is not interested in any chemotherapy whatsoever, so I doubt that she would agree to taking sorafenib at any point.  At this time, I believe that we have to make sure whether further liver-directed therapy could be performed or not.  I again told her that she should make the appointment with IR as soon as possible.  Further imaging would be done based upon their recommendation.  I have not scheduled further imaging as of now.        We discussed about the dry cough and that this is likely related to the right-sided pleural effusion which is basically because of her underlying liver disease.  I did diagnostic and therapeutic paracentesis, and that did not actually help her a lot.  It seems like a transudate, and the cytology was also negative.  I do not plan to do more thoracentesis at this time.      We discussed about her constipation.  I prescribed MiraLax for that.      We also discussed about acid reflux.  I told her to increase the omeprazole dose to twice a day.        She has pancytopenia  in the setting of cirrhosis and portal hypertension and currently since she is asymptomatic, we will keep observing that.        She has hepatitis C-related cirrhosis and she has followup planned with Dr. Meyer.       She tells me that she is going to talk to the interventional radiologist.  I would like to see her back in a couple of months with labs prior.       A professional  was present throughout my interaction with her.  All of her questions were answered to her satisfaction.  She seems to be agreeable and comfortable with this plan.         Viktoria Ayala

## 2017-06-08 NOTE — PROGRESS NOTES
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 Initially she did not get treatment for that but then in Jan 2015 she had a repeat MRI done showing a 2.7 cm OPTN5B lesion in segment 6.   In 05/2015 she underwent a TACE procedure for her original hepatocellular carcinoma.  After that, in 02/2016 she was found to have a 1-cm recurrence.  At that time, her AFP was 5.6.  On 03/07/2016 she underwent microwave ablation of the recurrent tumor.  In 05/2016 her AFP was 5.5 and repeat scans did not show any evidence of recurrence and only showed post-treatment changes.  Then on 09/27/2016 there was suspicion for recurrence at the site of ablation.  She was offered another treatment at that point.  Her AFP was 6.6, but she did not have another treatment.     Because of her advanced age, liver transplantation was not offered.    In Jan 2017, her MRI only showed post treatment changes with no evidence of residual/recurrent cancer.    In April 2017, she again had evidence of tumor recurrence with a 1.4 cm OPTN 5a lesion in segment 3. She also has OPTN5T lesion in the hepatic segment 6 where she had previous treatment.   Another OPTN 4 lesion is seen in hepatic segment 8   A LIRADS 3 lesion is seen in segment 8    She also had right-sided pleural effusion.  I referred her to IR and also got the diagnostic and therapeutic thoracentesis done.  It was a nonmalignant effusion.  She comes in for followup today.      INTERVAL HISTORY:  She tells me that she did not go to IR because she did not want to talk to them.  Over this time, she has felt relatively okay.  She does complain of off and on bloating and pain  in her abdomen without any nausea or vomiting.  She does complain of constipation.  She has tried Dulcolax and milk of magnesia, but it does not help her too much.  She does have off and on dry cough.  She thinks her energy is about the same.  She has not noticed any new swelling.  She denies any other pain.  She has had no interval infections and no bleeding.      REVIEW OF SYSTEMS:  Otherwise, a comprehensive review of the systems was negative.      I reviewed other history in Epic as below.       Does complain of acid reflux      Past Medical/Surgical History:  Past Medical History:   Diagnosis Date     Mild intermittent asthma    Hep C- untreated  HCC as mentioned above    Past Surgical History:   Procedure Laterality Date     EYE SURGERY       H PYLORI SHYLA SCREEN      was treated for h pylori     NO HISTORY OF SURGERY         Allergies:  Allergies as of 06/08/2017 - Tello as Reviewed 06/08/2017   Allergen Reaction Noted     Dust mites Cough 10/03/2016     Seasonal allergies  06/12/2014     Current Medications:  Current Outpatient Prescriptions   Medication Sig Dispense Refill     albuterol (2.5 MG/3ML) 0.083% neb solution Take 1 vial (2.5 mg) by nebulization every 6 hours as needed for shortness of breath / dyspnea or wheezing 25 vial 3     olopatadine (PATANOL) 0.1 % ophthalmic solution Place 1 drop into both eyes 2 times daily 5 mL 3     cetirizine (ZYRTEC) 10 MG tablet Take 1 tablet (10 mg) by mouth every evening 30 tablet 6     bisacodyl (DULCOLAX) 5 MG EC tablet Take 1 tablet (5 mg) by mouth daily as needed for constipation 30 tablet 2     Nutritional Supplements (BOOST CALORIE SMART) LIQD Take 3 Cans by mouth daily 90 Bottle 11     cholecalciferol (VITAMIN D3) 05306 UNITS capsule Take 1 capsule (50,000 Units) by mouth once a week 12 capsule 0     mometasone-formoterol (DULERA) 200-5 MCG/ACT oral inhaler Inhale 2 puffs into the lungs 2 times daily 1 Inhaler 12     fluticasone (FLONASE) 50 MCG/ACT spray  "Spray 2 sprays into both nostrils daily 1 Bottle 11     albuterol (PROAIR HFA/PROVENTIL HFA/VENTOLIN HFA) 108 (90 BASE) MCG/ACT Inhaler Inhale 2 puffs into the lungs every 6 hours as needed for shortness of breath / dyspnea or wheezing 1 Inhaler 11     Multiple Vitamins-Iron (DAILY MULTIPLE VITAMIN/IRON) TABS Take 1 tablet by mouth daily 30 tablet 11     omeprazole (PRILOSEC) 40 MG capsule Take 1 capsule (40 mg) by mouth daily Take 30-60 minutes before a meal. 90 capsule 3      Family History:  Family History   Problem Relation Age of Onset     Respiratory Father      asthma   No history of cancers.  Patient had 10 kids altogether.  One  in an accident, 1  after surgery and 2  in childhood.  Six kids are living and all are healthy.       Social History:  Social History     Social History     Marital status: Single     Spouse name: N/A     Number of children: N/A     Years of education: N/A     Occupational History     Not on file.     Social History Main Topics     Smoking status: Never Smoker     Smokeless tobacco: Never Used     Alcohol use No     Drug use: No     Sexual activity: No     Other Topics Concern     Not on file     Social History Narrative   She does not smoke.  Does not drink any alcohol.  She lives with her daughter.       Physical Exam:  /74  Pulse 95  Temp 97.6  F (36.4  C) (Oral)  Resp 16  Ht 1.575 m (5' 2.01\")  Wt 57.3 kg (126 lb 6.4 oz)  SpO2 95%  BMI 23.11 kg/m2  PHYSICAL EXAMINATION:    CONSTITUTIONAL:  She is an elderly female in no apparent distress.   EYES:  Pupils are equal and reactive to light and accommodation without any pallor or icterus.   ENT:  Ears are unremarkable.  Mouth without oral lesions.   CARDIOVASCULAR:  S1, S2 audible.  No murmurs, rubs or gallops.   LUNGS:  She has decreased breath sounds at the right lung base.  Otherwise, chest is  clear to auscultation bilaterally.    GASTROINTESTINAL:  Abdomen is  soft.  It is bloated.  There is possible " ascites, no hepatosplenomegaly, and it is nontender.     INTEGUMENT:  No concerning skin rashes.   LYMPHATICS:  No cervical, supraclavicular or axillary lymph nodes palpable.   EXTREMITIES:  Trace pedal edema bilaterally.   PSYCHIATRIC:  Mood and affect are normal.       Laboratory/Imaging Studies  Previous results reviewed    CBC RESULTS:   Recent Labs   Lab Test  04/05/17   1038   WBC  3.1*   RBC  3.26*   HGB  10.9*   HCT  33.3*   MCV  102*   MCH  33.4*   MCHC  32.7   RDW  15.6*   PLT  64*     Recent Labs   Lab Test  04/05/17   1038  01/03/17   1050   NA  139  137   POTASSIUM  4.3  3.9   CHLORIDE  107  107   CO2  25  24   ANIONGAP  6  6   GLC  83  95   BUN  10  13   CR  0.81  0.75   MACY  8.1*  8.6     Liver Function Studies -   Recent Labs   Lab Test  04/05/17   1038   PROTTOTAL  7.1   ALBUMIN  2.2*   BILITOTAL  2.2*   ALKPHOS  250*   AST  98*   ALT  69*     MRI Abdomen 4/26/17  MRI ABDOMEN     CLINICAL HISTORY: Follow-up hepatocellular carcinoma. Additional  history per electronic medical record includes chemoembolization of a  3.3 cm hepatocellular carcinoma in segment 6 5/22/2015. Hepatitis C.     TECHNIQUE: Images were acquired with and without intravenous contrast  through the abdomen. The following MR images were acquired: TrueFISP,  multiplanar T2 weighted, axial T1 in/out of phase, diffusion-weighted.  Multiplanar T1-weighted images with fat saturation were before  contrast administration and at multiple time points following the  administration of intravenous contrast. Contrast dose: 5.3 mL Gadavist     FINDINGS:     Comparison study: MRI abdomen 1/17/2017, same day CT chest for  20/6/2017, CT abdomen 9/27/2016, MRI 1/13/2015      Liver: There is a nodular hepatic contour with lacy T2 hyperintensity  suggestive of fibrosis and cirrhosis. Again noted are posttreatment  changes in hepatic segment 6 (series 7 image 13).      Lesion 1: There is a 1.4 x 1.0 arterially enhancing lesion in hepatic  segment 3  (series 7 image 20). It has questionable washout on portal  venous phase or delayed phase imaging. Subtle pseudocapsule is  identified. There is associated increased T2 signal. There is T2  shine through on diffusion weighted images. This was 8 mm on the prior  exam. There is not evidence of venous invasion. OPTN/LIRADS class  5a-g.     Lesion 2: There is a 2.8 x 1.9 cm arterially enhancing lesion in  hepatic segment 6 (series 7 image 14). This abuts a region of  previous treatment. A small component appears to washout on delayed  imaging. Pseudocapsule is not identified. There is associated  increased T2 signal. There is no increased signal on diffusion  weighted images. Previously this was less distinct. There is not  evidence of venous invasion. OPTN/LIRADS class 5T.     Lesion 3: There is a 1.2 cm arterially enhancing lesion at the hepatic  dome adjacent to the IVC on series 7 image 29, hepatic segment 8. This  demonstrates mild increased signal on T2 and diffusion weighted  imaging. No washout or pseudocapsule. Previously, this measured 9 mm.  It is suspicious for HCC. OPTN/LI-RADS 4.     Additionally, there is a 7 mm focus of arterial phase enhancement  anteriorly in segment 8 on series 7 image 31 near the dome. No  diffusion restriction, washout, or pseudocapsule. It is indeterminate.  LI-RADS 3.     Gallbladder: Partially decompressed. Small filling defects likely  represent calculi. There is a small amount of pericholecystic fluid  which may be related to ascites and portal hypertension.     Spleen: Normal.     Kidneys: A T1 hyperintense nonenhancing structure in the superior pole  of the right kidney likely represents a hemorrhagic or proteinaceous  cyst, unchanged.     Adrenal glands: Normal.     Pancreas: Normal T1 hyperintensity.     Bowel: Normal.     Lymph nodes: No lymphadenopathy.     Blood vessels: The major visualized abdominal vasculature is patent.  Unchanged recanalization of the portal  vein.     Lung bases: There is a large right pleural effusion which is better  evaluated on same-day CT chest.     Bones and soft tissues: No acute or suspicious osseous finding.     Mesentery and abdominal wall: Unremarkable.     Ascites: Moderate volume of abdominal ascites.         IMPRESSION:   1. Cirrhosis and evidence of portal hypertension.  2. Adjacent to the treatment zone in hepatic segment 6 is an enlarging  area of arterial enhancement with abnormal T2 and diffusion signal and  small component of washout suspicious for residual malignancy  measuring 2.8 cm. OPTN 5T.  3. Interval growth of a 1.4 cm lesion in segment 3 which now meets  criteria for HCC. OPTN/5a.  4. Additional 1.2 cm suspicious lesion at the hepatic dome. LI-RADS 4.  5. Indeterminate 7 mm lesion at the hepatic dome. LI-RADS 3.  6. A large right pleural effusion is better evaluated on same-day CT  Chest.    CT Chest 4/26/17      Impression:   1. No evidence of metastatic disease to the chest.  2. 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 Infection cannot completely be excluded.  Attention on follow-up is recommended.  3. Cirrhosis with portal hypertension, new partially visualized  ascites around the liver.  4. Ground glass and nodular opacities in the lingula, atypical  appearance for metastatic disease, favors infection or aspiration.   Recommend 3 month CT chest follow-up.    Bone Scan 4/26/17      IMPRESSION: No evidence of osseous metastatic disease.       ASSESSMENT/PLAN:  HCC of the hepatic segment 6 measuring 2.7 cm in greatest dimension. In 05/2015 she underwent a TACE.  In 02/2016 she was found to have a 1-cm recurrence. On 03/07/2016 she underwent microwave ablation of the recurrent tumor.  She again has evidence of tumor recurrence with a 1.4 cm OPTN 5a lesion in segment 3. She also has OPTN5T lesion in the  hepatic segment 6 where she had previous treatment.   Another OPTN 4 lesion is seen in hepatic segment 8   A LIRADS 3 lesion is seen in segment 8      ASSESSMENT AND PLAN:  Her alpha-fetoprotein also increased a little bit, but not very significantly.  I referred her to Interventional Radiology last time, but she did not follow up on that.  We again discussed this whole situation in detail and I tried to convince her to talk to an interventional radiologist to see if any further liver-directed therapy is possible or not.  If no additional liver-directed therapy is possible, then potentially we can use sorafenib or potentially can be a candidate for Pexa-Vec trial, but at this time she is not interested in any chemotherapy whatsoever, so I doubt that she would agree to taking sorafenib at any point.  At this time, I believe that we have to make sure whether further liver-directed therapy could be performed or not.  I again told her that she should make the appointment with IR as soon as possible.  Further imaging would be done based upon their recommendation.  I have not scheduled further imaging as of now.        We discussed about the dry cough and that this is likely related to the right-sided pleural effusion which is basically because of her underlying liver disease.  I did diagnostic and therapeutic paracentesis, and that did not actually help her a lot.  It seems like a transudate, and the cytology was also negative.  I do not plan to do more thoracentesis at this time.      We discussed about her constipation.  I prescribed MiraLax for that.      We also discussed about acid reflux.  I told her to increase the omeprazole dose to twice a day.        She has pancytopenia in the setting of cirrhosis and portal hypertension and currently since she is asymptomatic, we will keep observing that.        She has hepatitis C-related cirrhosis and she has followup planned with Dr. Meyer.       She tells me that she is  going to talk to the interventional radiologist.  I would like to see her back in a couple of months with labs prior.       A professional  was present throughout my interaction with her.  All of her questions were answered to her satisfaction.  She seems to be agreeable and comfortable with this plan.         Viktoria Ayala

## 2017-06-08 NOTE — MR AVS SNAPSHOT
After Visit Summary   6/8/2017    Yue Portillo    MRN: [id number]           Patient Information     Date Of Birth          1940        Visit Information        Provider Department      6/8/2017 3:15 PM Viktoria Ayala MD; [employer] [facility]        Today's Diagnoses     Constipation, unspecified constipation type    -  1    HCC (hepatocellular carcinoma) (H)          Care Instructions    Please schedule appointment with IR- referral already given on 4/27/17    Start Miralax once daily for constipation    Increase Prilosec to 20 mg twice daily for acid reflux    Depending on your follow up with IR, I will see you back in a couple of months with labs prior              Follow-ups after your visit        Your next 10 appointments already scheduled     Jun 20, 2017  2:45 PM CDT   (Arrive by 2:30 PM)   New Patient Visit with William Ulloa MD   [facility] ([facility])    [address] [place] [address]  2nd [facility] [id number] [phone]            Jul 19, 2017  8:30 AM CDT   Lab with  LAB   [facility] ([facility])    [address] [place] [address]  [address]st [facility] [id number] [phone]            Jul 19, 2017  9:30 AM CDT   (Arrive by 9:15 AM)   Return General Liver with Tala Meyer MD   [facility] ([facility])    [address] [place] [address]  3rd Floor  [facility] [id number] [phone]              Who to contact     If you have questions or need follow up information about today's clinic visit or your schedule please contact [facility] directly at [phone].  Normal or non-critical lab and imaging results will be communicated to you by MyChart, letter or phone within 4 business days after the clinic has received the results. If you do not hear from us within 7 days, please contact the clinic through MyChart or  "phone. If you have a critical or abnormal lab result, we will notify you by phone as soon as possible.  Submit refill requests through [employer] or call your pharmacy and they will forward the refill request to us. Please allow 3 business days for your refill to be completed.          Additional Information About Your Visit        [employer] Information     [employer] lets you send messages to your doctor, view your test results, renew your prescriptions, schedule appointments and more. To sign up, go to [email or web address] . Click on \"Log in\" on the left side of the screen, which will take you to the Welcome page. Then click on \"Sign up Now\" on the right side of the page.     You will be asked to enter the access code listed below, as well as some personal information. Please follow the directions to create your username and password.     Your access code is: [email or web address]  Expires: 7/3/2017  6:30 AM     Your access code will  in 90 days. If you need help or a new code, please call your [place] clinic or [phone].        Care EveryWhere ID     This is your Care EveryWhere ID. This could be used by other organizations to access your [place] medical records  [id number]        Your Vitals Were     Pulse Temperature Respirations Height Pulse Oximetry BMI (Body Mass Index)    95 97.6  F (36.4  C) (Oral) 16 1.575 m (5' 2.01\") 95% 23.11 kg/m2       Blood Pressure from Last 3 Encounters:   17 132/74   05/10/17 111/68   17 100/60    Weight from Last 3 Encounters:   17 57.3 kg (126 lb 6.4 oz)   05/10/17 54.3 kg (119 lb 12.8 oz)   17 55 kg (121 lb 4.1 oz)              Today, you had the following     No orders found for display         Today's Medication Changes          These changes are accurate as of: 17  4:32 PM.  If you have any questions, ask your nurse or doctor.               Start taking these medicines.        Dose/Directions    polyethylene glycol Packet   Commonly known as:  " MIRALAX/GLYCOLAX   Used for:  Constipation, unspecified constipation type   Started by:  Viktoria Ayala MD        Dose:  1 packet   Take 17 g by mouth daily   Quantity:  30 packet   Refills:  3         Stop taking these medicines if you haven't already. Please contact your care team if you have questions.     spironolactone 50 MG tablet   Commonly known as:  ALDACTONE   Stopped by:  Viktoria Ayala MD                Where to get your medicines      These medications were sent to [facility] [address] [facility]  [address] [facility] [address]     Phone:  [phone]     polyethylene glycol Packet                Primary Care Provider Office Phone # Fax #    Felicitas Horton [phone]       [facility] [address]   [facility] [address]        Thank you!     Thank you for choosing [facility]  for your care. Our goal is always to provide you with excellent care. Hearing back from our patients is one way we can continue to improve our services. Please take a few minutes to complete the written survey that you may receive in the mail after your visit with us. Thank you!             Your Updated Medication List - Protect others around you: Learn how to safely use, store and throw away your medicines at www.disposemymeds.org.          This list is accurate as of: 6/8/17  4:32 PM.  Always use your most recent med list.                   Brand Name Dispense Instructions for use    * albuterol 108 (90 BASE) MCG/ACT Inhaler    PROAIR HFA/PROVENTIL HFA/VENTOLIN HFA    1 Inhaler    Inhale 2 puffs into the lungs every 6 hours as needed for shortness of breath / dyspnea or wheezing       * albuterol (2.5 MG/3ML) 0.083% neb solution     25 vial    Take 1 vial (2.5 mg) by nebulization every 6 hours as needed for shortness of breath / dyspnea or wheezing       bisacodyl 5 MG EC tablet    DULCOLAX    30 tablet    Take 1  tablet (5 mg) by mouth daily as needed for constipation       BOOST CALORIE SMART Liqd     90 Bottle    Take 3 Cans by mouth daily       cetirizine 10 MG tablet    zyrTEC    30 tablet    Take 1 tablet (10 mg) by mouth every evening       cholecalciferol 88003 UNITS capsule    VITAMIN D3    12 capsule    Take 1 capsule (50,000 Units) by mouth once a week       DAILY MULTIPLE VITAMIN/IRON Tabs     30 tablet    Take 1 tablet by mouth daily       fluticasone 50 MCG/ACT spray    FLONASE    1 Bottle    Spray 2 sprays into both nostrils daily       mometasone-formoterol 200-5 MCG/ACT oral inhaler    DULERA    1 Inhaler    Inhale 2 puffs into the lungs 2 times daily       olopatadine 0.1 % ophthalmic solution    PATANOL    5 mL    Place 1 drop into both eyes 2 times daily       omeprazole 40 MG capsule    priLOSEC    90 capsule    Take 1 capsule (40 mg) by mouth daily Take 30-60 minutes before a meal.       polyethylene glycol Packet    MIRALAX/GLYCOLAX    30 packet    Take 17 g by mouth daily       * Notice:  This list has 2 medication(s) that are the same as other medications prescribed for you. Read the directions carefully, and ask your doctor or other care provider to review them with you.

## 2017-06-08 NOTE — NURSING NOTE
"Oncology Rooming Note    June 8, 2017 3:22 PM   Yue Portillo is a 77 year old female who presents for:    Chief Complaint   Patient presents with     Oncology Clinic Visit     Return: HCC hepatocellular carcinoma     Initial Vitals: /74  Pulse 95  Temp 97.6  F (36.4  C) (Oral)  Resp 16  Ht 1.575 m (5' 2.01\")  Wt 57.3 kg (126 lb 6.4 oz)  SpO2 95%  BMI 23.11 kg/m2 Estimated body mass index is 23.11 kg/(m^2) as calculated from the following:    Height as of this encounter: 1.575 m (5' 2.01\").    Weight as of this encounter: 57.3 kg (126 lb 6.4 oz). Body surface area is 1.58 meters squared.  No Pain (0) Comment: Data Unavailable   No LMP recorded. Patient is postmenopausal.  Allergies reviewed: Yes  Medications reviewed: Yes    Medications: Medication refills not needed today.  Pharmacy name entered into [employer]: [facility], [facility] [place], [place] - [address] [place] [address]    Clinical concerns: no new concerns     6 minutes for nursing intake (face to face time)     Marysol Chandler CMA                "

## 2017-06-12 NOTE — TELEPHONE ENCOUNTER
ONCOLOGY SOCIAL WORK  D) Yue is a 77-yr-old Somalian-speaking woman with liver cancer, hx of Hepatitis C, headaches and depression  I) distress screen tool for depression  A) Chart review  [facility].  She receives services for PCA from her dtr Zuly (in the past she received Adult Day care two days a week that helped with her depression to get out, but this has become more difficult the past year or so, so does not sound like she still receives that service).  P) Left message for  Mary Bedolla to see if she is still involved or would be in the future, given she has a rapport with the pt.  Will further assess case at that time for SW involvement    Elida Ramirez, [facility]  [phone]    Response from  Mary Bedolla that she is following up with the pt/family.  Asked that she address pt's depression, and informed her both myself and psychologist Pablo Perez are available for support as needed.  Elida Ramirez, [facility]  [phone]

## 2017-06-13 NOTE — PROGRESS NOTES
Oncology Nutrition:  Reason for Contact:  Patient was contacted by phone due to reporting concerns about ability to eat (8) and her request to see dietitian on the Oncology Distress Screening tool.   Sent message to [facility] to schedule pt to see/speak with oncology dietitian per her request on the distress screening.        Sofia Pimentel RD, LD  Oncology Dietitian  [phone]  Pager: [phone]

## 2017-06-20 NOTE — LETTER
6/20/2017      RE: Yue Portillo  [address]   [facility] [address]       No notes on file    William Ulloa MD

## 2017-06-20 NOTE — MR AVS SNAPSHOT
After Visit Summary   6/20/2017    Yue Portillo    MRN: [id number]           Patient Information     Date Of Birth          1940        Visit Information        Provider Department      6/20/2017 2:30 PM William Ulloa MD; [employer] [facility]        Today's Diagnoses     HCC (hepatocellular carcinoma) (H)           Follow-ups after your visit        Your next 10 appointments already scheduled     Jul 17, 2017  7:45 AM CDT   (Arrive by 7:30 AM)   MR ABDOMEN W/O & W CONTRAST with [id number]   [facility] [place] [facility] ([facility])    [address] [address] [phone]           Take your medicines as usual, unless your doctor tells you not to. Bring a list of your current medicines to your exam (including vitamins, minerals and over-the-counter drugs). Also bring the results of similar scans you may have had.    The day before your exam, drink extra fluids at least six 8-ounce glasses (unless your doctor tells you to restrict your fluids).   Have a blood test (creatinine test) within 30 days of your exam. Go to your clinic or Diagnostic Imaging Department for this test.   Do not eat or drink for 6 hours prior to exam.  The MRI machine uses a strong magnet. Please wear clothes without metal (snaps, zippers). A sweatsuit works well, or we may give you a hospital gown.  Please remove any body piercings and hair extensions before you arrive. You will also remove watches, jewelry, hairpins, wallets, dentures, partial dental plates and hearing aids. You may wear contact lenses, and you may be able to wear your rings. We have a safe place to keep your personal items, but it is safer to leave them at home.   **IMPORTANT** THE INSTRUCTIONS BELOW ARE ONLY FOR THOSE PATIENTS WHO HAVE BEEN TOLD THEY WILL RECEIVE SEDATION OR GENERAL ANESTHESIA DURING THEIR MRI PROCEDURE:  IF YOU WILL RECEIVE SEDATION (take  medicine to help you relax during your exam):   You must get the medicine from your doctor before you arrive. Bring the medicine to the exam. Do not take it at home.   Arrive one hour early. Bring someone who can take you home after the test. Your medicine will make you sleepy. After the exam, you may not drive, take a bus or take a taxi by yourself.   No eating 8 hours before your exam. You may have clear liquids up until 4 hours before your exam. (Clear liquids include water, clear tea, black coffee and fruit juice without pulp.)  IF YOU WILL RECEIVE ANESTHESIA (be asleep for your exam):   Arrive 1 1/2 hours early. Bring someone who can take you home after the test. You may not drive, take a bus or take a taxi by yourself.   No eating 8 hours before your exam. You may have clear liquids up until 4 hours before your exam. (Clear liquids include water, clear tea, black coffee and fruit juice without pulp.)  If you have any questions, please contact your Imaging Department exam site.            Jul 19, 2017  8:30 AM CDT   Lab with  LAB   [facility] ([facility])    [address] [place] [address] [phone]            Jul 19, 2017  9:30 AM CDT   (Arrive by 9:15 AM)   Return General Liver with Tala Meyer MD   [facility] ([facility])    [address] [place] [address] [phone]              Future tests that were ordered for you today     Open Future Orders        Priority Expected Expires Ordered    MR Abdomen w/o & w Contrast Routine 6/20/2017 6/20/2018 6/20/2017            Who to contact     If you have questions or need follow up information about today's clinic visit or your schedule please contact [facility] directly at [phone].  Normal or non-critical lab and imaging results will be communicated to you by MyChart, letter or phone within 4 business  "days after the clinic has received the results. If you do not hear from us within 7 days, please contact the clinic through [employer] or phone. If you have a critical or abnormal lab result, we will notify you by phone as soon as possible.  Submit refill requests through [employer] or call your pharmacy and they will forward the refill request to us. Please allow 3 business days for your refill to be completed.          Additional Information About Your Visit        [employer] Information     [employer] lets you send messages to your doctor, view your test results, renew your prescriptions, schedule appointments and more. To sign up, go to [email or web address] . Click on \"Log in\" on the left side of the screen, which will take you to the Welcome page. Then click on \"Sign up Now\" on the right side of the page.     You will be asked to enter the access code listed below, as well as some personal information. Please follow the directions to create your username and password.     Your access code is: [email or web address]  Expires: 7/3/2017  6:30 AM     Your access code will  in 90 days. If you need help or a new code, please call your [place] clinic or [phone].        Care EveryWhere ID     This is your Care EveryWhere ID. This could be used by other organizations to access your [facility] medical records  [id number]         Blood Pressure from Last 3 Encounters:   17 132/74   05/10/17 111/68   17 100/60    Weight from Last 3 Encounters:   17 57.3 kg (126 lb 6.4 oz)   05/10/17 54.3 kg (119 lb 12.8 oz)   17 55 kg (121 lb 4.1 oz)              We Performed the Following     AFP tumor marker     Basic metabolic panel     CBC with platelets     Hepatic panel     INR        Primary Care Provider Office Phone # Fax #    Felicitas Horton [phone]       [facility] [address] [address]        Thank you!     Thank you for choosing [facility]  for your care. Our goal is always to provide you with excellent care. Hearing back from our patients is one way we can continue to improve our services. Please take a few minutes to complete the written survey that you may receive in the mail after your visit with us. Thank you!             Your Updated Medication List - Protect others around you: Learn how to safely use, store and throw away your medicines at www.disposemymeds.org.          This list is accurate as of: 6/20/17  3:33 PM.  Always use your most recent med list.                   Brand Name Dispense Instructions for use    * albuterol 108 (90 BASE) MCG/ACT Inhaler    PROAIR HFA/PROVENTIL HFA/VENTOLIN HFA    1 Inhaler    Inhale 2 puffs into the lungs every 6 hours as needed for shortness of breath / dyspnea or wheezing       * albuterol (2.5 MG/3ML) 0.083% neb solution     25 vial    Take 1 vial (2.5 mg) by nebulization every 6 hours as needed for shortness of breath / dyspnea or wheezing       bisacodyl 5 MG EC tablet    DULCOLAX    30 tablet    Take 1 tablet (5 mg) by mouth daily as needed for constipation       BOOST CALORIE SMART Liqd     90 Bottle    Take 3 Cans by mouth daily       cetirizine 10 MG tablet    zyrTEC    30 tablet    Take 1 tablet (10 mg) by mouth every evening       cholecalciferol 75533 UNITS capsule    VITAMIN D3    12 capsule    Take 1 capsule (50,000 Units) by mouth once a week       DAILY MULTIPLE VITAMIN/IRON Tabs     30 tablet    Take 1 tablet by mouth daily       fluticasone 50 MCG/ACT spray    FLONASE    1 Bottle    Spray 2 sprays into both nostrils daily       mometasone-formoterol 200-5 MCG/ACT oral inhaler    DULERA    1 Inhaler    Inhale 2 puffs into the lungs 2 times daily       olopatadine 0.1 % ophthalmic solution    PATANOL    5 mL    Place 1 drop into both eyes 2 times daily       omeprazole 40 MG capsule    priLOSEC    90 capsule    Take 1 capsule (40 mg) by mouth daily Take 30-60 minutes before a  meal.       polyethylene glycol Packet    MIRALAX/GLYCOLAX    30 packet    Take 17 g by mouth daily       * Notice:  This list has 2 medication(s) that are the same as other medications prescribed for you. Read the directions carefully, and ask your doctor or other care provider to review them with you.

## 2017-06-20 NOTE — Clinical Note
6/20/2017       RE: Yue Portillo  [address]   [facility] [address]     Dear Colleague,    Thank you for referring your patient, Yue Portillo, to the [facility]. Please see a copy of my visit note below.    No notes on file    Again, thank you for allowing me to participate in the care of your patient.      Sincerely,    William Ulloa MD

## 2017-07-06 NOTE — NURSING NOTE
name: Pia ENCARNACION  Language: Pap[place]inean   Agency: [employer]   Phone number: [phone]    Sadia Atkinson CMA

## 2017-07-06 NOTE — PROGRESS NOTES
S: Pt c/o blurry eyes for 3 mos. Saw eye doctor not at  who said has cataracts and should have surgery. Cataracts bilateral, but L worse than R. Pt concerned that other health problems might affect surgery, so wants to see eye doctor at the  who will have access to records.  Otherwise doing OK. Has an MRI scheduled for mid July with f/u visit with Dr. Meyer shortly afterwards.    Also had a form for me to fill out re changing her apartment. Currently is next to noisy stairs with banging of door to stairs. Wakes her up at night and she gets frightened. Wants to be moved to a quieter location.    O: VSS. Pr in no distress.    A: Cataracts per pt report.    P: Ophthalmology referral at the .  Form completed. Copy made for chart. Original to pt.    Visit length 25 min, all spent counseling re sxs and plan and completing form.

## 2017-07-06 NOTE — MR AVS SNAPSHOT
After Visit Summary   7/6/2017    Yue Portillo    MRN: [id number]           Patient Information     Date Of Birth          1940        Visit Information        Provider Department      7/6/2017 8:20 AM Felicitas Horton MD [facility]         Follow-ups after your visit        Your next 10 appointments already scheduled     Jul 17, 2017  7:45 AM CDT   (Arrive by 7:30 AM)   MR ABDOMEN W/O & W CONTRAST with [id number]   [facility] ([facility])    [address] [place] [address] [phone]           Take your medicines as usual, unless your doctor tells you not to. Bring a list of your current medicines to your exam (including vitamins, minerals and over-the-counter drugs). Also bring the results of similar scans you may have had.    The day before your exam, drink extra fluids at least six 8-ounce glasses (unless your doctor tells you to restrict your fluids).   Have a blood test (creatinine test) within 30 days of your exam. Go to your clinic or Diagnostic Imaging Department for this test.   Do not eat or drink for 6 hours prior to exam.  The MRI machine uses a strong magnet. Please wear clothes without metal (snaps, zippers). A sweatsuit works well, or we may give you a hospital gown.  Please remove any body piercings and hair extensions before you arrive. You will also remove watches, jewelry, hairpins, wallets, dentures, partial dental plates and hearing aids. You may wear contact lenses, and you may be able to wear your rings. We have a safe place to keep your personal items, but it is safer to leave them at home.   **IMPORTANT** THE INSTRUCTIONS BELOW ARE ONLY FOR THOSE PATIENTS WHO HAVE BEEN TOLD THEY WILL RECEIVE SEDATION OR GENERAL ANESTHESIA DURING THEIR MRI PROCEDURE:  IF YOU WILL RECEIVE SEDATION (take medicine to help you relax during your exam):   You must get the medicine from your doctor  before you arrive. Bring the medicine to the exam. Do not take it at home.   Arrive one hour early. Bring someone who can take you home after the test. Your medicine will make you sleepy. After the exam, you may not drive, take a bus or take a taxi by yourself.   No eating 8 hours before your exam. You may have clear liquids up until 4 hours before your exam. (Clear liquids include water, clear tea, black coffee and fruit juice without pulp.)  IF YOU WILL RECEIVE ANESTHESIA (be asleep for your exam):   Arrive 1 1/2 hours early. Bring someone who can take you home after the test. You may not drive, take a bus or take a taxi by yourself.   No eating 8 hours before your exam. You may have clear liquids up until 4 hours before your exam. (Clear liquids include water, clear tea, black coffee and fruit juice without pulp.)  If you have any questions, please contact your Imaging Department exam site.            Jul 19, 2017  8:30 AM CDT   Lab with  LAB   [facility] ([facility])    [address] [place] [address] [phone]            Jul 19, 2017  9:30 AM CDT   (Arrive by 9:15 AM)   Return General Liver with Tala Meyer MD   [facility]at[facility] ([facility])    [address] [place] [address] [phone]              Who to contact     Please call your clinic at [phone] to:    Ask questions about your health    Make or cancel appointments    Discuss your medicines    Learn about your test results    Speak to your doctor   If you have compliments or concerns about an experience at your clinic, or if you wish to file a complaint, please contact [facility] Patient Relations at [phone] or email us at [email or web address]         Additional Information About Your Visit        [employer] Information     [employer] is an electronic gateway that provides easy,  online access to your medical records. With [employer], you can request a clinic appointment, read your test results, renew a prescription or communicate with your care team.     To sign up for [employer] visit the website at [email or web address]   You will be asked to enter the access code listed below, as well as some personal information. Please follow the directions to create your username and password.     Your access code is: [email or web address]  Expires: 10/1/2017  6:30 AM     Your access code will  in 90 days. If you need help or a new code, please contact your [facility] or call [phone] for assistance.        Care EveryWhere ID     This is your Care EveryWhere ID. This could be used by other organizations to access your [place] medical records  [id number]        Your Vitals Were     Pulse Temperature Respirations Pulse Oximetry Breastfeeding? BMI (Body Mass Index)    87 98  F (36.7  C) (Oral) 18 99% No 22.31 kg/m2       Blood Pressure from Last 3 Encounters:   17 117/73   17 132/74   05/10/17 111/68    Weight from Last 3 Encounters:   17 122 lb (55.3 kg)   17 126 lb 6.4 oz (57.3 kg)   05/10/17 119 lb 12.8 oz (54.3 kg)              Today, you had the following     No orders found for display       Primary Care Provider Office Phone # Fax #    Felicitas Truman Horton [phone]       [facility] [address] [address]        Equal Access to Services     [facility] CenterVICTORIANO : Hadii nico muñoz hadasho Solou, waaxda luqadaha, qaybta veronicaalrosana priest. So [facility] [phone].    ATENCIÓN: Si habla español, tiene a martinez disposición servicios gratuitos de asistencia lingüística. Llame al [phone].    We comply with applicable federal civil rights laws and [place] laws. We do not discriminate on the basis of race, color, national origin, age, disability sex, sexual  orientation or gender identity.            Thank you!     Thank you for choosing [facility]  for your care. Our goal is always to provide you with excellent care. Hearing back from our patients is one way we can continue to improve our services. Please take a few minutes to complete the written survey that you may receive in the mail after your visit with us. Thank you!             Your Updated Medication List - Protect others around you: Learn how to safely use, store and throw away your medicines at www.disposemymeds.org.          This list is accurate as of: 7/6/17  8:40 AM.  Always use your most recent med list.                   Brand Name Dispense Instructions for use Diagnosis    * albuterol 108 (90 BASE) MCG/ACT Inhaler    PROAIR HFA/PROVENTIL HFA/VENTOLIN HFA    1 Inhaler    Inhale 2 puffs into the lungs every 6 hours as needed for shortness of breath / dyspnea or wheezing    Moderate persistent asthma without complication       * albuterol (2.5 MG/3ML) 0.083% neb solution     25 vial    Take 1 vial (2.5 mg) by nebulization every 6 hours as needed for shortness of breath / dyspnea or wheezing    Mild intermittent asthma with acute exacerbation       bisacodyl 5 MG EC tablet    DULCOLAX    30 tablet    Take 1 tablet (5 mg) by mouth daily as needed for constipation    Constipation, unspecified constipation type       BOOST CALORIE SMART Liqd     90 Bottle    Take 3 Cans by mouth daily    HCC (hepatocellular carcinoma) (H)       cetirizine 10 MG tablet    zyrTEC    30 tablet    Take 1 tablet (10 mg) by mouth every evening    Chronic allergic conjunctivitis       cholecalciferol 42287 UNITS capsule    VITAMIN D3    12 capsule    Take 1 capsule (50,000 Units) by mouth once a week    Vitamin D deficiency       DAILY MULTIPLE VITAMIN/IRON Tabs     30 tablet    Take 1 tablet by mouth daily    Chronic hepatitis C without hepatic coma (H)       fluticasone 50 MCG/ACT spray    FLONASE    1 Bottle     Spray 2 sprays into both nostrils daily    Chronic rhinitis       mometasone-formoterol 200-5 MCG/ACT oral inhaler    DULERA    1 Inhaler    Inhale 2 puffs into the lungs 2 times daily    Moderate persistent asthma without complication       olopatadine 0.1 % ophthalmic solution    PATANOL    5 mL    Place 1 drop into both eyes 2 times daily    Chronic allergic conjunctivitis       omeprazole 40 MG capsule    priLOSEC    90 capsule    Take 1 capsule (40 mg) by mouth daily Take 30-60 minutes before a meal.    Gastroesophageal reflux disease without esophagitis       polyethylene glycol Packet    MIRALAX/GLYCOLAX    30 packet    Take 17 g by mouth daily    Constipation, unspecified constipation type       * Notice:  This list has 2 medication(s) that are the same as other medications prescribed for you. Read the directions carefully, and ask your doctor or other care provider to review them with you.

## 2017-07-19 NOTE — MR AVS SNAPSHOT
After Visit Summary   7/19/2017    Yue Portillo    MRN: [id number]           Patient Information     Date Of Birth          1940        Visit Information        Provider Department      7/19/2017 9:15 AM Tala Meyer MD; [employer] [facility] Hepatology        Today's Diagnoses     Cirrhosis of liver with ascites, unspecified hepatic cirrhosis type (H)    -  1    HCC (hepatocellular carcinoma) (H)           Follow-ups after your visit        Follow-up notes from your care team     Return in about 3 months (around 10/19/2017).      Your next 10 appointments already scheduled     Jul 26, 2017  9:45 AM CDT   NEW CORNEA with Ahmet Farr MD   [facility] Clinic ([facility])    [address]  [address]  [address] [address]  [facility] [address]   [phone]            Aug 08, 2017  2:30 PM CDT   (Arrive by 2:15 PM)   New Patient Visit with William Ulloa MD   [facility] ([facility])    [address] [place] [address]  [address] [facility] [address] [phone]            Aug 10, 2017  6:00 PM CDT   (Arrive by 5:45 PM)   Return Visit with Viktoria Ayala MD   [facility] ([facility])    [address] [place] [address]  [address] [facility] [address]   [phone]            Nov 08, 2017 10:30 AM CST   Lab with  LAB   [facility] ([facility])    [address] [place] [address]  [address] [facility] [address]   [phone]            Nov 08, 2017 11:30 AM CST   (Arrive by 11:15 AM)   Return General Liver with Kierra Morrison MD   [facility] Hepatology ([facility])    [address] [place] [address]  [address] [facility] [address] [phone]              Who to contact     If you have questions or need follow up information about today's clinic visit or your schedule please contact [facility] HEPATOLOGY directly at  "[phone].  Normal or non-critical lab and imaging results will be communicated to you by MyChart, letter or phone within 4 business days after the clinic has received the results. If you do not hear from us within 7 days, please contact the clinic through [employer] or phone. If you have a critical or abnormal lab result, we will notify you by phone as soon as possible.  Submit refill requests through [employer] or call your pharmacy and they will forward the refill request to us. Please allow 3 business days for your refill to be completed.          Additional Information About Your Visit        [employer] Information     [employer] lets you send messages to your doctor, view your test results, renew your prescriptions, schedule appointments and more. To sign up, go to [email or web address] . Click on \"Log in\" on the left side of the screen, which will take you to the Welcome page. Then click on \"Sign up Now\" on the right side of the page.     You will be asked to enter the access code listed below, as well as some personal information. Please follow the directions to create your username and password.     Your access code is: [email or web address]  Expires: 10/1/2017  6:30 AM     Your access code will  in 90 days. If you need help or a new code, please call your [place] clinic or [phone].        Care EveryWhere ID     This is your Care EveryWhere ID. This could be used by other organizations to access your [place] medical records  [id number]        Your Vitals Were     Pulse Temperature Height Pulse Oximetry BMI (Body Mass Index)       86 97.8  F (36.6  C) (Oral) 1.568 m (5' 1.75\") 98% 23.47 kg/m2        Blood Pressure from Last 3 Encounters:   17 136/82   17 117/73   17 132/74    Weight from Last 3 Encounters:   17 57.7 kg (127 lb 4.8 oz)   17 55.3 kg (122 lb)   17 57.3 kg (126 lb 6.4 oz)              Today, you had the following     No orders found for display         Today's " Medication Changes          These changes are accurate as of: 7/19/17 10:43 AM.  If you have any questions, ask your nurse or doctor.               Start taking these medicines.        Dose/Directions    lactulose 20 GM/30ML Soln   Used for:  Cirrhosis of liver with ascites, unspecified hepatic cirrhosis type (H), HCC (hepatocellular carcinoma) (H)   Started by:  Tala Meyer MD        Dose:  30 mL   Take 30 mLs by mouth daily   Quantity:  946 mL   Refills:  3            Where to get your medicines      These medications were sent to [facility], [facility] [address]  [address] [facility] [address]     Phone:  [phone]     lactulose 20 GM/30ML Soln                Primary Care Provider Office Phone # Fax #    Felicitas Horton [phone]       [facility] [address]   [facility] [address]        Equal Access to Services     LUKE DUVAL : Hadii nico muñoz hadasho Soomaali, waaxda luqadaha, qaybta kaalmada adeegyada, rosana ricks . So [facility] [phone].    ATENCIÓN: Si habla español, tiene a martniez disposición servicios gratuitos de asistencia lingüística. Llame al [phone].    We comply with applicable federal civil rights laws and [place] laws. We do not discriminate on the basis of race, color, national origin, age, disability sex, sexual orientation or gender identity.            Thank you!     Thank you for choosing [facility] HEPATOLOGY  for your care. Our goal is always to provide you with excellent care. Hearing back from our patients is one way we can continue to improve our services. Please take a few minutes to complete the written survey that you may receive in the mail after your visit with us. Thank you!             Your Updated Medication List - Protect others around you: Learn how to safely use, store and throw away your medicines at www.disposemymeds.org.          This  list is accurate as of: 7/19/17 10:43 AM.  Always use your most recent med list.                   Brand Name Dispense Instructions for use Diagnosis    * albuterol 108 (90 BASE) MCG/ACT Inhaler    PROAIR HFA/PROVENTIL HFA/VENTOLIN HFA    1 Inhaler    Inhale 2 puffs into the lungs every 6 hours as needed for shortness of breath / dyspnea or wheezing    Moderate persistent asthma without complication       * albuterol (2.5 MG/3ML) 0.083% neb solution     25 vial    Take 1 vial (2.5 mg) by nebulization every 6 hours as needed for shortness of breath / dyspnea or wheezing    Mild intermittent asthma with acute exacerbation       bisacodyl 5 MG EC tablet    DULCOLAX    30 tablet    Take 1 tablet (5 mg) by mouth daily as needed for constipation    Constipation, unspecified constipation type       BOOST CALORIE SMART Liqd     90 Bottle    Take 3 Cans by mouth daily    HCC (hepatocellular carcinoma) (H)       cetirizine 10 MG tablet    zyrTEC    30 tablet    Take 1 tablet (10 mg) by mouth every evening    Chronic allergic conjunctivitis       cholecalciferol 48955 UNITS capsule    VITAMIN D3    12 capsule    Take 1 capsule (50,000 Units) by mouth once a week    Vitamin D deficiency       DAILY MULTIPLE VITAMIN/IRON Tabs     30 tablet    Take 1 tablet by mouth daily    Chronic hepatitis C without hepatic coma (H)       fluticasone 50 MCG/ACT spray    FLONASE    1 Bottle    Spray 2 sprays into both nostrils daily    Chronic rhinitis       lactulose 20 GM/30ML Soln     946 mL    Take 30 mLs by mouth daily    Cirrhosis of liver with ascites, unspecified hepatic cirrhosis type (H), HCC (hepatocellular carcinoma) (H)       mometasone-formoterol 200-5 MCG/ACT oral inhaler    DULERA    1 Inhaler    Inhale 2 puffs into the lungs 2 times daily    Moderate persistent asthma without complication       olopatadine 0.1 % ophthalmic solution    PATANOL    5 mL    Place 1 drop into both eyes 2 times daily    Chronic allergic  conjunctivitis       omeprazole 40 MG capsule    priLOSEC    90 capsule    Take 1 capsule (40 mg) by mouth daily Take 30-60 minutes before a meal.    Gastroesophageal reflux disease without esophagitis       * Notice:  This list has 2 medication(s) that are the same as other medications prescribed for you. Read the directions carefully, and ask your doctor or other care provider to review them with you.

## 2017-07-19 NOTE — LETTER
7/19/2017       RE: Yue Portillo  [address]   [facility] [facility] [address]     Dear Colleague,    Thank you for referring your patient, Yue Portillo, to the [facility] HEPATOLOGY at [facility]. Please see a copy of my visit note below.    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     Subjective:  77-year-old French immigrant who tells me that she is younger than that age.  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  She did develop a liver lesion which was in segment 6 and it was 2.5 x 2.4.  She was seen in [place] and she had a TACE procedure and then in 02/2016, she was found to have a 1 cm recurrence and on 03/07/2016, she did have microwave ablation of the recurrent tumor.  In 05/2016, there were no signs of recurrence seen.  On 01/17/2017, I did order an MRI and that MRI shows cirrhosis and evidence of portal hypertension and it also showed post-treatment changes in segment 6 without convincing evidence of recurrence or residual tumor. On 4/26/2017 the MRI shows adjacent to the treatment zone in hepatic segment 6 is an enlarging area of arterial enhancement with abnormal T2 and diffusion signal and small component of washout suspicious for residual malignancy measuring 2.8 cm. OPTN 5T and interval growth of a 1.4 cm lesion in segment 3 which now meets  criteria for HCC. OPTN/5a. These lesions on the last MRI on 7/17/2017 shows the first to have increased in size.    Today she  denies jaundice, has lower extremity edema. Has  abdominal distension but no lethargy or confusion. Has no  melena, hematemesis or hematochezia. Patient denies fevers, sweats or chills.  Weight stable.        Medical hx Surgical hx   Past Medical History:   Diagnosis Date     Mild intermittent asthma       Past Surgical History:    Procedure Laterality Date     EYE SURGERY       H PYLORI SHYLA SCREEN      was treated for h pylori     NO HISTORY OF SURGERY            Medications  Prior to Admission medications    Medication Sig Start Date End Date Taking? Authorizing Provider   lactulose 20 GM/30ML SOLN Take 30 mLs by mouth daily 7/19/17   Tala Meyer MD   albuterol (2.5 MG/3ML) 0.083% neb solution Take 1 vial (2.5 mg) by nebulization every 6 hours as needed for shortness of breath / dyspnea or wheezing 5/10/17   Yelena Astorga APRN CNP   olopatadine (PATANOL) 0.1 % ophthalmic solution Place 1 drop into both eyes 2 times daily 5/10/17   Yelena Astorga APRN CNP   cetirizine (ZYRTEC) 10 MG tablet Take 1 tablet (10 mg) by mouth every evening 5/10/17   Yelena Astorga APRN CNP   bisacodyl (DULCOLAX) 5 MG EC tablet Take 1 tablet (5 mg) by mouth daily as needed for constipation 5/10/17   Yelena Astorga APRN CNP   Nutritional Supplements (BOOST CALORIE SMART) LIQD Take 3 Cans by mouth daily 3/27/17   Felicitas Horton MD   cholecalciferol (VITAMIN D3) 62512 UNITS capsule Take 1 capsule (50,000 Units) by mouth once a week 3/23/17   Felicitas Horton MD   mometasone-formoterol (DULERA) 200-5 MCG/ACT oral inhaler Inhale 2 puffs into the lungs 2 times daily 3/10/17   Felictias Horton MD   fluticasone (FLONASE) 50 MCG/ACT spray Spray 2 sprays into both nostrils daily 3/10/17   Felicitas Horton MD   albuterol (PROAIR HFA/PROVENTIL HFA/VENTOLIN HFA) 108 (90 BASE) MCG/ACT Inhaler Inhale 2 puffs into the lungs every 6 hours as needed for shortness of breath / dyspnea or wheezing 3/10/17   Felicitas Horton MD   Multiple Vitamins-Iron (DAILY MULTIPLE VITAMIN/IRON) TABS Take 1 tablet by mouth daily 9/28/16   Felicitas Horton MD   omeprazole (PRILOSEC) 40 MG capsule Take 1 capsule (40 mg) by mouth daily Take 30-60 minutes before a meal. 9/28/16   Felicitas Horton MD  "      Allergies  Allergies   Allergen Reactions     Dust Mites Cough     Sneezing      Seasonal Allergies        Review of systems  A 10-point review of systems was negative    Examination  /82 (BP Location: Left arm, Patient Position: Chair, Cuff Size: Adult Small)  Pulse 86  Temp 97.8  F (36.6  C) (Oral)  Ht 1.568 m (5' 1.75\")  Wt 57.7 kg (127 lb 4.8 oz)  SpO2 98%  BMI 23.47 kg/m2  Body mass index is 23.47 kg/(m^2).    Gen- well, NAD, A+Ox3, normal color  Lym- no palpable LAD  CVS- RRR  RS- CTA  Abd- Distended, bowel sounds are present.  No hepatomegaly, no splenomegaly. There is positive  shifting dullness noted.   Extr- hands normal, no RODRIGUE  Skin- no rash or jaundice  Neuro- no asterixis  Psych- normal mood    Laboratory  Lab Results   Component Value Date     07/19/2017    POTASSIUM 4.4 07/19/2017    CHLORIDE 110 07/19/2017    CO2 25 07/19/2017    BUN 12 07/19/2017    CR 0.79 07/19/2017       Lab Results   Component Value Date    BILITOTAL 1.7 07/19/2017    ALT 47 07/19/2017    AST 85 07/19/2017    ALKPHOS 199 07/19/2017       Lab Results   Component Value Date    ALBUMIN 2.2 07/19/2017    PROTTOTAL 6.6 07/19/2017        Lab Results   Component Value Date    WBC 3.1 07/19/2017    HGB 11.4 07/19/2017     07/19/2017    PLT 56 07/19/2017       Lab Results   Component Value Date    INR 1.46 07/19/2017       Radiology    Assessment  77 year old female on paper but  Who claims to be 63 years old.   She has hepatitis C, genotype 5, which was not treated and which led to cirrhosis.  She also had this complicated by hepatocellular carcinoma, which was treated at [place] twice, first with TACE and then with microwave ablation.  On her January MRI, there was no residual tumor but now has two OPTN5 lesions which are growing. Needs to see IR and oncology. Her alpha fetoproteins were not that elevated and were normal. She is as of now outside the Arpit criteria.        Plan  -F/U with oncology and " IR.  -We can present her at the Selection Conference regarding that.   -Return visit in 3 months.    This was a 25-minute visit of which more than 50% was spent in explaining to the patient what our plan of care was.  We answered all of her questions        Tala Meyer MD  Hepatology  [facility]

## 2017-07-19 NOTE — PROGRESS NOTES
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     Subjective:  77-year-old [place]inamese immigrant who tells me that she is younger than that age.  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  She did develop a liver lesion which was in segment 6 and it was 2.5 x 2.4.  She was seen in [place] and she had a TACE procedure and then in 02/2016, she was found to have a 1 cm recurrence and on 03/07/2016, she did have microwave ablation of the recurrent tumor.  In 05/2016, there were no signs of recurrence seen.  On 01/17/2017, I did order an MRI and that MRI shows cirrhosis and evidence of portal hypertension and it also showed post-treatment changes in segment 6 without convincing evidence of recurrence or residual tumor. On 4/26/2017 the MRI shows adjacent to the treatment zone in hepatic segment 6 is an enlarging area of arterial enhancement with abnormal T2 and diffusion signal and small component of washout suspicious for residual malignancy measuring 2.8 cm. OPTN 5T and interval growth of a 1.4 cm lesion in segment 3 which now meets  criteria for HCC. OPTN/5a. These lesions on the last MRI on 7/17/2017 shows the first to have increased in size.    Today she  denies jaundice, has lower extremity edema. Has  abdominal distension but no lethargy or confusion. Has no  melena, hematemesis or hematochezia. Patient denies fevers, sweats or chills.  Weight stable.        Medical hx Surgical hx   Past Medical History:   Diagnosis Date     Mild intermittent asthma       Past Surgical History:   Procedure Laterality Date     EYE SURGERY       H PYLORI SHYLA SCREEN      was treated for h pylori     NO HISTORY OF SURGERY            Medications  Prior to Admission medications    Medication Sig Start Date End Date Taking? Authorizing Provider   lactulose 20 GM/30ML SOLN Take 30 mLs  by mouth daily 7/19/17   Tala Meyer MD   albuterol (2.5 MG/3ML) 0.083% neb solution Take 1 vial (2.5 mg) by nebulization every 6 hours as needed for shortness of breath / dyspnea or wheezing 5/10/17   Yelena Astorga APRN CNP   olopatadine (PATANOL) 0.1 % ophthalmic solution Place 1 drop into both eyes 2 times daily 5/10/17   Yelena Astorga APRN CNP   cetirizine (ZYRTEC) 10 MG tablet Take 1 tablet (10 mg) by mouth every evening 5/10/17   Yelena Astorga APRN CNP   bisacodyl (DULCOLAX) 5 MG EC tablet Take 1 tablet (5 mg) by mouth daily as needed for constipation 5/10/17   Yelena Astorga APRN CNP   Nutritional Supplements (BOOST CALORIE SMART) LIQD Take 3 Cans by mouth daily 3/27/17   Felicitas Horton MD   cholecalciferol (VITAMIN D3) 18104 UNITS capsule Take 1 capsule (50,000 Units) by mouth once a week 3/23/17   Felicitas Horton MD   mometasone-formoterol (DULERA) 200-5 MCG/ACT oral inhaler Inhale 2 puffs into the lungs 2 times daily 3/10/17   Felicitas Horton MD   fluticasone (FLONASE) 50 MCG/ACT spray Spray 2 sprays into both nostrils daily 3/10/17   Felicitas Horton MD   albuterol (PROAIR HFA/PROVENTIL HFA/VENTOLIN HFA) 108 (90 BASE) MCG/ACT Inhaler Inhale 2 puffs into the lungs every 6 hours as needed for shortness of breath / dyspnea or wheezing 3/10/17   Felicitas Horton MD   Multiple Vitamins-Iron (DAILY MULTIPLE VITAMIN/IRON) TABS Take 1 tablet by mouth daily 9/28/16   Felicitas Horton MD   omeprazole (PRILOSEC) 40 MG capsule Take 1 capsule (40 mg) by mouth daily Take 30-60 minutes before a meal. 9/28/16   Felicitas Horton MD       Allergies  Allergies   Allergen Reactions     Dust Mites Cough     Sneezing      Seasonal Allergies        Review of systems  A 10-point review of systems was negative    Examination  /82 (BP Location: Left arm, Patient Position: Chair, Cuff Size: Adult Small)  Pulse 86  Temp 97.8  " F (36.6  C) (Oral)  Ht 1.568 m (5' 1.75\")  Wt 57.7 kg (127 lb 4.8 oz)  SpO2 98%  BMI 23.47 kg/m2  Body mass index is 23.47 kg/(m^2).    Gen- well, NAD, A+Ox3, normal color  Lym- no palpable LAD  CVS- RRR  RS- CTA  Abd- Distended, bowel sounds are present.  No hepatomegaly, no splenomegaly. There is positive  shifting dullness noted.   Extr- hands normal, no RODRIGUE  Skin- no rash or jaundice  Neuro- no asterixis  Psych- normal mood    Laboratory  Lab Results   Component Value Date     07/19/2017    POTASSIUM 4.4 07/19/2017    CHLORIDE 110 07/19/2017    CO2 25 07/19/2017    BUN 12 07/19/2017    CR 0.79 07/19/2017       Lab Results   Component Value Date    BILITOTAL 1.7 07/19/2017    ALT 47 07/19/2017    AST 85 07/19/2017    ALKPHOS 199 07/19/2017       Lab Results   Component Value Date    ALBUMIN 2.2 07/19/2017    PROTTOTAL 6.6 07/19/2017        Lab Results   Component Value Date    WBC 3.1 07/19/2017    HGB 11.4 07/19/2017     07/19/2017    PLT 56 07/19/2017       Lab Results   Component Value Date    INR 1.46 07/19/2017       Radiology    Assessment  77 year old female on paper but  Who claims to be 63 years old.   She has hepatitis C, genotype 5, which was not treated and which led to cirrhosis.  She also had this complicated by hepatocellular carcinoma, which was treated at [place] twice, first with TACE and then with microwave ablation.  On her January MRI, there was no residual tumor but now has two OPTN5 lesions which are growing. Needs to see IR and oncology. Her alpha fetoproteins were not that elevated and were normal. She is as of now outside the Arpit criteria.        Plan  -F/U with oncology and IR.  -We can present her at the Selection Conference regarding that.   -Return visit in 3 months.    This was a 25-minute visit of which more than 50% was spent in explaining to the patient what our plan of care was.  We answered all of her questions        Tala Meyer, " MD  Hepatology  [facility]

## 2017-07-26 NOTE — MR AVS SNAPSHOT
After Visit Summary   7/26/2017    Yue Portillo    MRN: [id number]           Patient Information     Date Of Birth          1940        Visit Information        Provider Department      7/26/2017 9:30 AM Ahmet Farr MD; [facility]        Today's Diagnoses     Senile nuclear sclerosis, bilateral    -  1    Posterior subcapsular age-related cataract of both eyes        Dry eyes, bilateral           Follow-ups after your visit        Follow-up notes from your care team     Return for Follow Up after cataract surgery.      Your next 10 appointments already scheduled     Aug 08, 2017  2:30 PM CDT   (Arrive by 2:15 PM)   New Patient Visit with William Ulloa MD   [facility] ([facility])    [address] [place] [address]  [address]nd [facility] [id number] [phone]            Aug 10, 2017  6:00 PM CDT   (Arrive by 5:45 PM)   Return Visit with Viktoria Ayala MD   [facility] ([facility])    [address] [facility]  2nd [facility] [id number] [phone]            Aug 23, 2017  3:15 PM CDT   Post-Op with Ahmet Farr MD   Eye Clinic ([facility])    [address]  [address]  9[address] Clin [address] [place] [id number] [phone]            Aug 30, 2017  2:15 PM CDT   Post-Op with Ahmet Farr MD   Eye Clinic ([facility])    [address]  [address]  9[address] Clin [address] [place] [address] [phone]            Sep 13, 2017  2:15 PM CDT   Post-Op with Ahmet Farr MD   Eye Clinic ([facility])    [address]  [address]  9[address] Clin [address]a  [facility] [address] [phone]            Sep 20, 2017  2:15 PM CDT   Post-Op with Ahmet Farr MD   Eye Clinic ([facility])    [address]  [address]  [address] Clin [address] [place] [address]    [phone]            Oct 11, 2017  2:15 PM CDT   Post-Op with Ahmet Farr MD   Eye Clinic ([facility])    [address]  [address] [facility]  9th Fl Clin 9a  [facility] [address] [phone]            Nov 08, 2017 10:30 AM CST   Lab with  LAB   [facility] ([facility])    [address]  1st Floor  [facility] [id number] [phone]            Nov 08, 2017 11:30 AM CST   (Arrive by 11:15 AM)   Return General Liver with Kierra Morrison MD   [facility] ([facility])    [address]  3rd Floor  [facility] [address] [phone]              Future tests that were ordered for you today     Open Future Orders        Priority Expected Expires Ordered    IOL Biometry w/ IOL calc OU (both eye) Routine  1/26/2019 7/26/2017            Who to contact     Please call your clinic at [phone] to:    Ask questions about your health    Make or cancel appointments    Discuss your medicines    Learn about your test results    Speak to your doctor   If you have compliments or concerns about an experience at your clinic, or if you wish to file a complaint, please contact [facility] Patient Relations at [phone] or email us at [email or web address]         Additional Information About Your Visit        [employer] Information     [employer] is an electronic gateway that provides easy, online access to your medical records. With [employer], you can request a clinic appointment, read your test results, renew a prescription or communicate with your care team.     To sign up for [employer] visit the website at [email or web address]   You will be asked to enter the access code listed below, as well as some personal information. Please follow the directions to create your username and password.     Your access code is: [email or web address]  Expires: 10/1/2017  6:30 AM     Your  access code will  in 90 days. If you need help or a new code, please contact your [facility] or call [phone] for assistance.        Care EveryWhere ID     This is your Care EveryWhere ID. This could be used by other organizations to access your [place] medical records  [id number]         Blood Pressure from Last 3 Encounters:   17 136/82   17 117/73   17 132/74    Weight from Last 3 Encounters:   17 57.7 kg (127 lb 4.8 oz)   17 55.3 kg (122 lb)   17 57.3 kg (126 lb 6.4 oz)              We Performed the Following     Paige-Operative Worksheet (Ant Seg)        Primary Care Provider Office Phone # Fax #    Felicitas Horton [phone]       [facility] [address] [address]        Equal Access to Services     LUKE DUVAL : Hadii aad ku hadasho Soomaali, waaxda luqadaha, qaybta kaalmada adeegyada, waxay idiin haydesiraen lexi ricks . So [facility] [phone].    ATENCIÓN: Si habla español, tiene a martinez disposición servicios gratuitos de asistencia lingüística. Yonathan al [phone].    We comply with applicable federal civil rights laws and [place] laws. We do not discriminate on the basis of race, color, national origin, age, disability sex, sexual orientation or gender identity.            Thank you!     Thank you for choosing EYE CLINIC  for your care. Our goal is always to provide you with excellent care. Hearing back from our patients is one way we can continue to improve our services. Please take a few minutes to complete the written survey that you may receive in the mail after your visit with us. Thank you!             Your Updated Medication List - Protect others around you: Learn how to safely use, store and throw away your medicines at www.disposemymeds.org.          This list is accurate as of: 17  1:10 PM.  Always use your most recent med list.                   Brand  Name Dispense Instructions for use Diagnosis    * albuterol 108 (90 BASE) MCG/ACT Inhaler    PROAIR HFA/PROVENTIL HFA/VENTOLIN HFA    1 Inhaler    Inhale 2 puffs into the lungs every 6 hours as needed for shortness of breath / dyspnea or wheezing    Moderate persistent asthma without complication       * albuterol (2.5 MG/3ML) 0.083% neb solution     25 vial    Take 1 vial (2.5 mg) by nebulization every 6 hours as needed for shortness of breath / dyspnea or wheezing    Mild intermittent asthma with acute exacerbation       bisacodyl 5 MG EC tablet    DULCOLAX    30 tablet    Take 1 tablet (5 mg) by mouth daily as needed for constipation    Constipation, unspecified constipation type       BOOST CALORIE SMART Liqd     90 Bottle    Take 3 Cans by mouth daily    HCC (hepatocellular carcinoma) (H)       cetirizine 10 MG tablet    zyrTEC    30 tablet    Take 1 tablet (10 mg) by mouth every evening    Chronic allergic conjunctivitis       cholecalciferol 84515 UNITS capsule    VITAMIN D3    12 capsule    Take 1 capsule (50,000 Units) by mouth once a week    Vitamin D deficiency       DAILY MULTIPLE VITAMIN/IRON Tabs     30 tablet    Take 1 tablet by mouth daily    Chronic hepatitis C without hepatic coma (H)       fluticasone 50 MCG/ACT spray    FLONASE    1 Bottle    Spray 2 sprays into both nostrils daily    Chronic rhinitis       lactulose 20 GM/30ML Soln     946 mL    Take 30 mLs by mouth daily    Cirrhosis of liver with ascites, unspecified hepatic cirrhosis type (H), HCC (hepatocellular carcinoma) (H)       mometasone-formoterol 200-5 MCG/ACT oral inhaler    DULERA    1 Inhaler    Inhale 2 puffs into the lungs 2 times daily    Moderate persistent asthma without complication       olopatadine 0.1 % ophthalmic solution    PATANOL    5 mL    Place 1 drop into both eyes 2 times daily    Chronic allergic conjunctivitis       omeprazole 40 MG capsule    priLOSEC    90 capsule    Take 1 capsule (40 mg) by mouth daily Take  30-60 minutes before a meal.    Gastroesophageal reflux disease without esophagitis       * Notice:  This list has 2 medication(s) that are the same as other medications prescribed for you. Read the directions carefully, and ask your doctor or other care provider to review them with you.

## 2017-07-26 NOTE — PROGRESS NOTES
CC: Cataract OU    HPI: Yue Portillo is a 77 year old female who is referred from [facility] by Dr Horton for evaluation for vision decrease over the last few months. Patient reports that vision has been gradually getting worse over the last few months. Also has foreign body sensation and itching in both of her eyes, and she uses artificial tears intermittently.     Was given MRx by eye doctor about 4 months ago.    Denies photopsias, dark spots. Few floaters.     POHx  Denies    Medical  Asthma  Liver disease ? Hepatocellular cancer    Soc Hx  Denies Smoking    Fam Hx  Denies glaucoma, ARMD    Dry eyes/MGD  -Artificial tears QID    Cataracts  Dilates to: 5.5 OD, 5.0 OS mm  Alpha blockers/Flomax: None  Trauma/Pseudoxfoliation: None  Fuchs dystrophy/guttae: None    Diabetes: No  Anticoagulation: None    We discussed the risks and benefits of cataract surgery in the both eyes, and informed consent was obtained.  Proceed with CE/IOL OD and then OS.    Surgical plan:  RBB/MAC (non english)    Howard Lan MD    ~~~~~~~~~~~~~~~~~~~~~~~~~~~~~~~~~~~~~~~~~~~~~~~~~~~~~~~~~~~~~~~~    Complete documentation of historical and exam elements from today's encounter can be found in the full encounter summary report (not reduplicated in this progress note). I personally obtained the chief complaint(s) and history of present illness.  I confirmed and edited as necessary the review of systems, past medical/surgical history, family history, social history, and examination findings as documented by others; and I examined the patient myself. I personally reviewed the relevant tests, images, and reports as documented above. I formulated and edited as necessary the assessment and plan and discussed the findings and management plan with the patient and family.    I personally viewed the [imaging] and I agree with the interpretation as documented by the resident/fellow and edited by me as appropriate.    Ahmet Farr MD

## 2017-08-07 NOTE — TELEPHONE ENCOUNTER
Request for medication refill:    Date of last visit at clinic: 7-6-17    Please complete refill if appropriate and CLOSE ENCOUNTER.    Closing the encounter signifies the refill is complete.    If refill has been denied, please complete the smart phrase .smirefuse and route it to the [facility] RN TRIAGE pool to inform the patient and the pharmacy.    Katey Garduno

## 2017-08-08 NOTE — PROGRESS NOTES
INTERVENTIONAL RADIOLOGY CONSULTATION    Name: Yue Portillo  Age: 77 year old   Referring Physician: Dr. Ayala   REASON FOR REFERRAL: HCC     HPI: Mrs Portillo is a HCV patient with cirrhosis and HCC previously treated with TACE followed by ablation at [place].  She presents with recurrent disease in the right lobe and a new left lobe lesion on imaging.    Liver function appears stable over the past couple of years with Tbilis in the 2 range.    She has recently been experiencing severe abdominal bloating and some loss of appetite.  She explains that she had significant weight loss and was very sick after her 2015 chemoembolization.  She also did not tolerate the microwave ablation well.    She has increasing nightime cough.    PAST MEDICAL HISTORY:   Past Medical History:   Diagnosis Date     Liver disease      Mild intermittent asthma        PAST SURGICAL HISTORY:   Past Surgical History:   Procedure Laterality Date     EYE SURGERY       H PYLORI SHYLA SCREEN      was treated for h pylori     NO HISTORY OF SURGERY         FAMILY HISTORY:   Family History   Problem Relation Age of Onset     Respiratory Father      asthma     DIABETES Son      Glaucoma No family hx of      Macular Degeneration No family hx of      Retinal detachment No family hx of      Amblyopia No family hx of        SOCIAL HISTORY:   Social History   Substance Use Topics     Smoking status: Never Smoker     Smokeless tobacco: Never Used     Alcohol use No       PROBLEM LIST:   Patient Active Problem List    Diagnosis Date Noted     Mild persistent asthma without complication 05/09/2016     Priority: Medium     HCC (hepatocellular carcinoma) (H) 12/22/2014     Priority: Medium     Diagnosed at [place]. In records from September 2014. Son does not believe pt has cancer. See note 7/13/15.       Cirrhosis of liver (H) 01/23/2014     Priority: Medium     Postmenopausal bleeding 04/01/2013     Priority: Medium     4/2013-HPV negative, EMB done, pelvic U/S  done; referred to OB/GYN for possible D&C.       Vitamin D deficiency 01/04/2013     Priority: Medium     Ear pain 11/26/2012     Priority: Medium     Unknown etiology, controlled with dermotic       Health Care Home 09/11/2012     Priority: Medium     Tier 1  DX V65.8 REPLACED WITH [facility]ALTH CARE HOME (04/08/2013)       Allergic rhinitis 09/11/2012     Priority: Medium     Problem list name updated by automated process. 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) 09/11/2012     Priority: Medium     Chronic allergic conjunctivitis 09/11/2012     Priority: Medium     Chronic tension type headache 09/11/2012     Priority: Medium     Constipation 09/11/2012     Priority: Medium     Depression 09/11/2012     Priority: Medium     Esophageal reflux 09/11/2012     Priority: Medium     Generalized osteoarthritis 09/11/2012     Priority: Medium     Generalized pain 09/11/2012     Priority: Medium     Heartburn 09/11/2012     Priority: Medium     Insomnia 09/11/2012     Priority: Medium     Lightheadedness 09/11/2012     Priority: Medium     Lower back pain 09/11/2012     Priority: Medium       MEDICATIONS:   Prescription Medications as of 8/8/2017             omeprazole (PRILOSEC) 40 MG capsule Take 1 capsule (40 mg) by mouth daily Take 30-60 minutes before a meal.    lactulose 20 GM/30ML SOLN Take 30 mLs by mouth daily    albuterol (2.5 MG/3ML) 0.083% neb solution Take 1 vial (2.5 mg) by nebulization every 6 hours as needed for shortness of breath / dyspnea or wheezing    olopatadine (PATANOL) 0.1 % ophthalmic solution Place 1 drop into both eyes 2 times daily    cetirizine (ZYRTEC) 10 MG tablet Take 1 tablet (10 mg) by mouth every evening    bisacodyl (DULCOLAX) 5 MG EC tablet Take 1 tablet (5 mg) by mouth daily as needed for constipation    Nutritional Supplements (BOOST CALORIE SMART) LIQD Take 3 Cans by mouth daily    cholecalciferol (VITAMIN D3) 98212 UNITS capsule Take 1 capsule (50,000 Units) by  mouth once a week    mometasone-formoterol (DULERA) 200-5 MCG/ACT oral inhaler Inhale 2 puffs into the lungs 2 times daily    fluticasone (FLONASE) 50 MCG/ACT spray Spray 2 sprays into both nostrils daily    albuterol (PROAIR HFA/PROVENTIL HFA/VENTOLIN HFA) 108 (90 BASE) MCG/ACT Inhaler Inhale 2 puffs into the lungs every 6 hours as needed for shortness of breath / dyspnea or wheezing    Multiple Vitamins-Iron (DAILY MULTIPLE VITAMIN/IRON) TABS Take 1 tablet by mouth daily          ALLERGIES:   Dust mites and Seasonal allergies      Physical Examination:   VITALS:   /79 (BP Location: Right leg, Patient Position: Chair, Cuff Size: Adult Regular)  Pulse 96  Temp 97.5  F (36.4  C) (Oral)  Resp 18  SpO2 98%  Constitutional: healthy, alert and no distress    Labs:    AFP:  7/17: 10.8  6/17: 19.3  4/17 9.5  5/15 6.9    BMP RESULTS:  Lab Results   Component Value Date     07/19/2017    POTASSIUM 4.4 07/19/2017    CHLORIDE 110 (H) 07/19/2017    CO2 25 07/19/2017    ANIONGAP 5 07/19/2017    GLC 86 07/19/2017    BUN 12 07/19/2017    CR 0.79 07/19/2017    GFRESTIMATED 70 07/19/2017    GFRESTBLACK 84 07/19/2017    MACY 8.1 (L) 07/19/2017        CBC RESULTS:  Lab Results   Component Value Date    WBC 3.1 (L) 07/19/2017    RBC 3.33 (L) 07/19/2017    HGB 11.4 (L) 07/19/2017    HCT 34.3 (L) 07/19/2017     (H) 07/19/2017    MCH 34.2 (H) 07/19/2017    MCHC 33.2 07/19/2017    RDW 15.6 (H) 07/19/2017    PLT 56 (L) 07/19/2017       INR/PTT:  Lab Results   Component Value Date    INR 1.46 (H) 07/19/2017    PTT 32 03/20/2007       Diagnostic studies:  MRI 7/17/17 shows increasing size and more mass like shape of the treated s6 lesion.  There has also been an increase in size of the LR5 segment 2 lesion measuring 1.5 cm since 4/26/17.  This lesion is new since 1/17/17.    Assessment   1. This HCV cirrhotic patient had a segment 6 HCC which was previously treated with TACE in 2015 and then ablated in 2016 at [place].   MRIs fom 4/26/17 and 7/17/17 both show increasing mass like enhancement in the segment 6 ablation bed.  These also show an enlarging left lobe mass.  Her AFP, while not significantly elevated, has been increasing since 2015.  She reports not having tolerated microwave ablation or TACE well previously.    2. Patients abdominal distention and bloating is likely secondary to increasing ascites as shown on MRI.  She hasn't yet had a paracentesis here.  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        Plan   1. Tentatively plan cTACE of both segment 6 and segment 2 HCCs. She also wants to consider Y90 radioembolization because she did not tolerate TACE well before. This would of course require 2 staged deliveries as well as planning.    If we do move forward with TACE, we will plan to perform microwave ablation or TARE if there is any evidence of residual tumor at 1 mo f/u MRI due to resistance of the segment 6 lesion to prior treatments.    2. Paracentesis and thoracentesis asap.    I have seen the patient with the resident and agree with the note.      Vladimir Bond MD  Vascular and Interventional Radiology Fellow  [facility]      CC  Patient Care Team:  Felicitas Horton MD as PCP - General (Family Practice)  Tala Meyer MD as MD (Gastroenterology)  Mary Rosenberg as [facility] Coordinator  Antwon Ayala MD as MD (Hematology & Oncology)  William Ulloa MD as MD (Vascular and Interventional Radiology)  Ahmet Farr MD as MD (Ophthalmology)  William Ulloa MD as MD (Vascular and Interventional Radiology)  ANTWON AYALA

## 2017-08-08 NOTE — NURSING NOTE
Chief Complaint   Patient presents with     Consult For     hcc     Brittaney Samuel RN, BSN  Interventional Radiology Nurse Coordinator   Phone: [phone]

## 2017-08-08 NOTE — LETTER
8/8/2017       RE: Yue Portillo  [address]   [facility] [address]     Dear Colleague,    Thank you for referring your patient, Yue Portillo, to the [facility]. Please see a copy of my visit note below.        INTERVENTIONAL RADIOLOGY CONSULTATION    Name: Yue Portillo  Age: 77 year old   Referring Physician: Dr. Ayala   REASON FOR REFERRAL: HCC     HPI: Mrs Portillo is a HCV patient with cirrhosis and HCC previously treated with TACE followed by ablation at [place].  She presents with recurrent disease in the right lobe and a new left lobe lesion on imaging.    Liver function appears stable over the past couple of years with Tbilis in the 2 range.    She has recently been experiencing severe abdominal bloating and some loss of appetite.  She explains that she had significant weight loss and was very sick after her 2015 chemoembolization.  She also did not tolerate the microwave ablation well.    She has increasing nightime cough.    PAST MEDICAL HISTORY:   Past Medical History:   Diagnosis Date     Liver disease      Mild intermittent asthma        PAST SURGICAL HISTORY:   Past Surgical History:   Procedure Laterality Date     EYE SURGERY       H PYLORI SHYLA SCREEN      was treated for h pylori     NO HISTORY OF SURGERY         FAMILY HISTORY:   Family History   Problem Relation Age of Onset     Respiratory Father      asthma     DIABETES Son      Glaucoma No family hx of      Macular Degeneration No family hx of      Retinal detachment No family hx of      Amblyopia No family hx of        SOCIAL HISTORY:   Social History   Substance Use Topics     Smoking status: Never Smoker     Smokeless tobacco: Never Used     Alcohol use No       PROBLEM LIST:   Patient Active Problem List    Diagnosis Date Noted     Mild persistent asthma without complication 05/09/2016     Priority: Medium     HCC (hepatocellular carcinoma) (H) 12/22/2014     Priority: Medium     Diagnosed at [place]. In records from  September 2014. Son does not believe pt has cancer. See note 7/13/15.       Cirrhosis of liver (H) 01/23/2014     Priority: Medium     Postmenopausal bleeding 04/01/2013     Priority: Medium     4/2013-HPV negative, EMB done, pelvic U/S done; referred to OB/GYN for possible D&C.       Vitamin D deficiency 01/04/2013     Priority: Medium     Ear pain 11/26/2012     Priority: Medium     Unknown etiology, controlled with dermotic       Health Care Home 09/11/2012     Priority: Medium     Tier 1  DX V65.8 REPLACED WITH 80531 HEALTH CARE HOME (04/08/2013)       Allergic rhinitis 09/11/2012     Priority: Medium     Problem list name updated by automated process. 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) 09/11/2012     Priority: Medium     Chronic allergic conjunctivitis 09/11/2012     Priority: Medium     Chronic tension type headache 09/11/2012     Priority: Medium     Constipation 09/11/2012     Priority: Medium     Depression 09/11/2012     Priority: Medium     Esophageal reflux 09/11/2012     Priority: Medium     Generalized osteoarthritis 09/11/2012     Priority: Medium     Generalized pain 09/11/2012     Priority: Medium     Heartburn 09/11/2012     Priority: Medium     Insomnia 09/11/2012     Priority: Medium     Lightheadedness 09/11/2012     Priority: Medium     Lower back pain 09/11/2012     Priority: Medium       MEDICATIONS:   Prescription Medications as of 8/8/2017             omeprazole (PRILOSEC) 40 MG capsule Take 1 capsule (40 mg) by mouth daily Take 30-60 minutes before a meal.    lactulose 20 GM/30ML SOLN Take 30 mLs by mouth daily    albuterol (2.5 MG/3ML) 0.083% neb solution Take 1 vial (2.5 mg) by nebulization every 6 hours as needed for shortness of breath / dyspnea or wheezing    olopatadine (PATANOL) 0.1 % ophthalmic solution Place 1 drop into both eyes 2 times daily    cetirizine (ZYRTEC) 10 MG tablet Take 1 tablet (10 mg) by mouth every evening    bisacodyl (DULCOLAX) 5 MG EC  tablet Take 1 tablet (5 mg) by mouth daily as needed for constipation    Nutritional Supplements (BOOST CALORIE SMART) LIQD Take 3 Cans by mouth daily    cholecalciferol (VITAMIN D3) 84534 UNITS capsule Take 1 capsule (50,000 Units) by mouth once a week    mometasone-formoterol (DULERA) 200-5 MCG/ACT oral inhaler Inhale 2 puffs into the lungs 2 times daily    fluticasone (FLONASE) 50 MCG/ACT spray Spray 2 sprays into both nostrils daily    albuterol (PROAIR HFA/PROVENTIL HFA/VENTOLIN HFA) 108 (90 BASE) MCG/ACT Inhaler Inhale 2 puffs into the lungs every 6 hours as needed for shortness of breath / dyspnea or wheezing    Multiple Vitamins-Iron (DAILY MULTIPLE VITAMIN/IRON) TABS Take 1 tablet by mouth daily          ALLERGIES:   Dust mites and Seasonal allergies      Physical Examination:   VITALS:   /79 (BP Location: Right leg, Patient Position: Chair, Cuff Size: Adult Regular)  Pulse 96  Temp 97.5  F (36.4  C) (Oral)  Resp 18  SpO2 98%  Constitutional: healthy, alert and no distress    Labs:    AFP:  7/17: 10.8  6/17: 19.3  4/17 9.5  5/15 6.9    BMP RESULTS:  Lab Results   Component Value Date     07/19/2017    POTASSIUM 4.4 07/19/2017    CHLORIDE 110 (H) 07/19/2017    CO2 25 07/19/2017    ANIONGAP 5 07/19/2017    GLC 86 07/19/2017    BUN 12 07/19/2017    CR 0.79 07/19/2017    GFRESTIMATED 70 07/19/2017    GFRESTBLACK 84 07/19/2017    MACY 8.1 (L) 07/19/2017        CBC RESULTS:  Lab Results   Component Value Date    WBC 3.1 (L) 07/19/2017    RBC 3.33 (L) 07/19/2017    HGB 11.4 (L) 07/19/2017    HCT 34.3 (L) 07/19/2017     (H) 07/19/2017    MCH 34.2 (H) 07/19/2017    MCHC 33.2 07/19/2017    RDW 15.6 (H) 07/19/2017    PLT 56 (L) 07/19/2017       INR/PTT:  Lab Results   Component Value Date    INR 1.46 (H) 07/19/2017    PTT 32 03/20/2007       Diagnostic studies:  MRI 7/17/17 shows increasing size and more mass like shape of the treated s6 lesion.  There has also been an increase in size of the LR5  segment 2 lesion measuring 1.5 cm since 4/26/17.  This lesion is new since 1/17/17.    Assessment   1. This HCV cirrhotic patient had a segment 6 HCC which was previously treated with TACE in 2015 and then ablated in 2016 at [place].  MRIs fom 4/26/17 and 7/17/17 both show increasing mass like enhancement in the segment 6 ablation bed.  These also show an enlarging left lobe mass.  Her AFP, while not significantly elevated, has been increasing since 2015.  She reports not having tolerated microwave ablation or TACE well previously.    2. Patients abdominal distention and bloating is likely secondary to increasing ascites as shown on MRI.  She hasn't yet had a paracentesis here.  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        Plan   1. Tentatively plan cTACE of both segment 6 and segment 2 HCCs. She also wants to consider Y90 radioembolization because she did not tolerate TACE well before. This would of course require 2 staged deliveries as well as planning.    If we do move forward with TACE, we will plan to perform microwave ablation or TARE if there is any evidence of residual tumor at 1 mo f/u MRI due to resistance of the segment 6 lesion to prior treatments.    2. Paracentesis and thoracentesis asap.    I have seen the patient with the resident and agree with the note.      Vladimir Bond MD  Vascular and Interventional Radiology Fellow  [facility]  Patient Care Team:  Felicitas Horton MD as PCP - General (Family Practice)  Tala Meyer MD as MD (Gastroenterology)  Mary Rosenberg as [facility] Coordinator  Viktoria Ayala MD as MD (Hematology & Oncology)  Ahmet Farr MD as MD (Ophthalmology)

## 2017-08-08 NOTE — MR AVS SNAPSHOT
After Visit Summary   8/8/2017    Yue Portillo    MRN: [id number]           Patient Information     Date Of Birth          1940        Visit Information        Provider Department      8/8/2017 2:15 PM William lUloa MD; [facility]        Today's Diagnoses     HCC (hepatocellular carcinoma) (H)    -  1      Care Instructions    Please check into the Gold waiting room at the [facility] at 730am for a 9am procedure    Please have no food and milk products 6 hours prior to 9am  No clear liquids 2 hours prior to 9am    Please make sure you have a  to bring you home afterwards.     All morning meds are o.k to take prior to 7am.    Brittaney Samuel, RN, BSN  Interventional Radiology Nurse Coordinator   Phone: [phone]            Follow-ups after your visit        Your next 10 appointments already scheduled     Aug 10, 2017  5:45 PM CDT   Return Visit with Viktoria Ayala MD   [facility] ([facility])    [address]  2nd Floor  [facility] [address] [phone]            Aug 23, 2017  3:15 PM CDT   Post-Op with Ahmet Farr MD   [facility] Clinic ([facility])    [address]  [address]  [address]th [address] Clin 9a  [facility] [id number] [phone]            Aug 30, 2017  2:15 PM CDT   Post-Op with Ahmet Farr MD   Eye Clinic ([facility])    [address]  [address]  [address] [place] [id number] [phone]            Sep 13, 2017  2:15 PM CDT   Post-Op with Ahmet Farr MD   Eye Clinic ([facility])    [address]  [address]  [address] Clin 9a  [facility] [id number] [phone]            Sep 20, 2017  2:15 PM CDT   Post-Op with Ahmet Farr MD   Eye Clinic ([facility])    [address]  [address]  [address] Clin 9a  [facility] [id number] [phone]     "        Oct 11, 2017  2:15 PM CDT   Post-Op with Ahmet Farr MD   Eye Clinic ([facility])    [address]  [address]  9th [address] Clin 9a  [facility] [id number] [phone]            Nov 08, 2017 10:30 AM CST   Lab with  LAB   [facility] ([facility])    [address]  1st Floor  [facility] [id number] [phone]            Nov 08, 2017 11:30 AM CST   (Arrive by 11:15 AM)   Return General Liver with Kierra Morrison MD   [facility] ([facility])    [address]  3rd Floor  [facility] [id number] [phone]              Future tests that were ordered for you today     Open Future Orders        Priority Expected Expires Ordered    IR Thoracentesis Routine  8/8/2018 8/8/2017    IR Paracentesis Routine  8/8/2018 8/8/2017            Who to contact     If you have questions or need follow up information about today's clinic visit or your schedule please contact [facility] directly at [phone].  Normal or non-critical lab and imaging results will be communicated to you by MyChart, letter or phone within 4 business days after the clinic has received the results. If you do not hear from us within 7 days, please contact the clinic through [facility] or phone. If you have a critical or abnormal lab result, we will notify you by phone as soon as possible.  Submit refill requests through [employer] or call your pharmacy and they will forward the refill request to us. Please allow 3 business days for your refill to be completed.          Additional Information About Your Visit        [employer] Information     [employer] lets you send messages to your doctor, view your test results, renew your prescriptions, schedule appointments and more. To sign up, go to [email or web address] . Click on \"Log in\" on the left side of the screen, which will take you to the Welcome page. Then click " "on \"Sign up Now\" on the right side of the page.     You will be asked to enter the access code listed below, as well as some personal information. Please follow the directions to create your username and password.     Your access code is: [email or web address]  Expires: 10/1/2017  6:30 AM     Your access code will  in 90 days. If you need help or a new code, please call your [facility] or [phone].        Care EveryWhere ID     This is your Care EveryWhere ID. This could be used by other organizations to access your [facility] medical records  [id number]        Your Vitals Were     Pulse Temperature Respirations Pulse Oximetry          96 97.5  F (36.4  C) (Oral) 18 98%         Blood Pressure from Last 3 Encounters:   17 137/79   17 136/82   17 117/73    Weight from Last 3 Encounters:   17 57.7 kg (127 lb 4.8 oz)   17 55.3 kg (122 lb)   17 57.3 kg (126 lb 6.4 oz)               Primary Care Provider Office Phone # Fax #    Felicitas Truman Horton [phone]       [facility] [address] [address]        Equal Access to Services     LUKE DUVAL : Hadii nico stoneo Solou, waaxda luqadaha, qaybta kaalmada aderenny, rosana ricks . So [facility] [phone].    ATENCIÓN: Si habla español, tiene a martienz disposición servicios gratuitos de asistencia lingüística. Llame al [phone].    We comply with applicable federal civil rights laws and [place] laws. We do not discriminate on the basis of race, color, national origin, age, disability sex, sexual orientation or gender identity.            Thank you!     Thank you for choosing [facility]  for your care. Our goal is always to provide you with excellent care. Hearing back from our patients is one way we can continue to improve our services. Please take a few minutes to complete the written survey that you may receive in the mail after " your visit with us. Thank you!             Your Updated Medication List - Protect others around you: Learn how to safely use, store and throw away your medicines at www.disposemymeds.org.          This list is accurate as of: 8/8/17  3:29 PM.  Always use your most recent med list.                   Brand Name Dispense Instructions for use Diagnosis    * albuterol 108 (90 BASE) MCG/ACT Inhaler    PROAIR HFA/PROVENTIL HFA/VENTOLIN HFA    1 Inhaler    Inhale 2 puffs into the lungs every 6 hours as needed for shortness of breath / dyspnea or wheezing    Moderate persistent asthma without complication       * albuterol (2.5 MG/3ML) 0.083% neb solution     25 vial    Take 1 vial (2.5 mg) by nebulization every 6 hours as needed for shortness of breath / dyspnea or wheezing    Mild intermittent asthma with acute exacerbation       bisacodyl 5 MG EC tablet    DULCOLAX    30 tablet    Take 1 tablet (5 mg) by mouth daily as needed for constipation    Constipation, unspecified constipation type       BOOST CALORIE SMART Liqd     90 Bottle    Take 3 Cans by mouth daily    HCC (hepatocellular carcinoma) (H)       cetirizine 10 MG tablet    zyrTEC    30 tablet    Take 1 tablet (10 mg) by mouth every evening    Chronic allergic conjunctivitis       cholecalciferol 30157 UNITS capsule    VITAMIN D3    12 capsule    Take 1 capsule (50,000 Units) by mouth once a week    Vitamin D deficiency       DAILY MULTIPLE VITAMIN/IRON Tabs     30 tablet    Take 1 tablet by mouth daily    Chronic hepatitis C without hepatic coma (H)       fluticasone 50 MCG/ACT spray    FLONASE    1 Bottle    Spray 2 sprays into both nostrils daily    Chronic rhinitis       lactulose 20 GM/30ML Soln     946 mL    Take 30 mLs by mouth daily    Cirrhosis of liver with ascites, unspecified hepatic cirrhosis type (H), HCC (hepatocellular carcinoma) (H)       mometasone-formoterol 200-5 MCG/ACT oral inhaler    DULERA    1 Inhaler    Inhale 2 puffs into the lungs 2  times daily    Moderate persistent asthma without complication       olopatadine 0.1 % ophthalmic solution    PATANOL    5 mL    Place 1 drop into both eyes 2 times daily    Chronic allergic conjunctivitis       omeprazole 40 MG capsule    priLOSEC    90 capsule    Take 1 capsule (40 mg) by mouth daily Take 30-60 minutes before a meal.    Gastroesophageal reflux disease without esophagitis       * Notice:  This list has 2 medication(s) that are the same as other medications prescribed for you. Read the directions carefully, and ask your doctor or other care provider to review them with you.

## 2017-08-08 NOTE — PATIENT INSTRUCTIONS
Please check into the Gold waiting room at the [facility] at 730am for a 9am procedure    Please have no food and milk products 6 hours prior to 9am  No clear liquids 2 hours prior to 9am    Please make sure you have a  to bring you home afterwards.     All morning meds are o.k to take prior to 7am.    Brittaney Samuel RN, BSN  Interventional Radiology Nurse Coordinator   Phone: [phone]

## 2017-08-10 NOTE — PROGRESS NOTES
Pt received post Thora only, para not done per no need per US. Pt awake and alert, denies pain; site right lateral chest is dry and intact; declines any oral intake. VIGNESH Bradley here requests thora appt in one week.  present for all cares. Son at bedside.

## 2017-08-10 NOTE — PROGRESS NOTES
Interventional Radiology Intra-procedural Nursing Note    Patient Name: Yue Portillo  Medical Record Number: [id number]  Today's Date: August 10, 2017    Start Time: 0925  (thoracentesis [id number])  End of procedure time: 0952  Procedure: right thoracentesis, (paracentesis was not indicated by chong rey after ultrasound)  Report given to: 2a RN  Time pt departs:  1000  : Present for entire case.  Provider:  Chong rey pa-c and cheryl hutchins Pa-C    Patient transported to IR room 6 via cart with  present.  Patient attached to vital sign monitors.      Right Thoracentesis removed 1600 cc fluid    Lidocaine used for topical anesthetic.    Patient transported back to 2a via cart.  Patient tolerated procedure well.    Other Notes:     Uriel Jones

## 2017-08-10 NOTE — IP AVS SNAPSHOT
[address] [address]                                       After Visit Summary   8/10/2017    Yue Portillo    MRN: [id number]           After Visit Summary Signature Page     I have received my discharge instructions, and my questions have been answered. I have discussed any challenges I see with this plan with the nurse or doctor.    ..........................................................................................................................................  Patient/Patient Representative Signature      ..........................................................................................................................................  Patient Representative Print Name and Relationship to Patient    ..................................................               ................................................  Date                                            Time    ..........................................................................................................................................  Reviewed by Signature/Title    ...................................................              ..............................................  Date                                                            Time

## 2017-08-10 NOTE — DISCHARGE INSTRUCTIONS
[facility],   Interventional Radiology Discharge Instructions Following Thoracentesis        AFTER YOU GO HOME:  ? Resume previous diet and medications  ? Limit strenuous physical activity such as lifting, straining, or exercising for 48 hours.  You may resume normal activity in 24 hours  ? Change gauze at the puncture site as needed to keep site dry.  Leaking of additional fluid is not uncommon during the first 24-48 hours    CALL THE PHYSICIAN:  ? If you develop a fever, shortness of breath, chest pain, cough up blood, excessive bleeding from the thoracentesis site, severe lightheadedness, or fainting call you doctor immediately or come to the closest Emergency Department.  Do not drive yourself.   ? If you have questions or concerns regarding your procedure call the radiologist.      ADDITIONAL INSTRUCTIONS --NONE      [facility] INTERVENTIONAL RADIOLOGY DEPARTMENT  Procedure Physician:   VIGNESH WU                              Date of procedure: August 10, 2017  Telephone Numbers: [phone]       Monday-Friday 8:00 am to 4:30 pm                                                   [phone]   After 4:30 pm Monday - Friday, Ask for the Interventional Radiologist on call.  Someone is on call 24 hrs/day  [facility] toll free number: [phone]    Monday-Friday 8:00 am to 4:30 pm  [facility] Emergency Dept: [phone]  _

## 2017-08-10 NOTE — IP AVS SNAPSHOT
MRN:[id number]                      After Visit Summary   8/10/2017    Yue Portillo    MRN: [id number]           Visit Information        Department      8/10/2017  7:30 AM [address] [facility]          Review of your medicines      UNREVIEWED medicines. Ask your doctor about these medicines        Dose / Directions    * albuterol 108 (90 BASE) MCG/ACT Inhaler   Commonly known as:  PROAIR HFA/PROVENTIL HFA/VENTOLIN HFA   Used for:  Moderate persistent asthma without complication        Dose:  2 puff   Inhale 2 puffs into the lungs every 6 hours as needed for shortness of breath / dyspnea or wheezing   Quantity:  1 Inhaler   Refills:  11       * albuterol (2.5 MG/3ML) 0.083% neb solution   Used for:  Mild intermittent asthma with acute exacerbation        Dose:  1 vial   Take 1 vial (2.5 mg) by nebulization every 6 hours as needed for shortness of breath / dyspnea or wheezing   Quantity:  25 vial   Refills:  3       bisacodyl 5 MG EC tablet   Commonly known as:  DULCOLAX   Used for:  Constipation, unspecified constipation type        Dose:  5 mg   Take 1 tablet (5 mg) by mouth daily as needed for constipation   Quantity:  30 tablet   Refills:  2       BOOST CALORIE SMART Liqd   Used for:  HCC (hepatocellular carcinoma) (H)        Dose:  3 Can   Take 3 Cans by mouth daily   Quantity:  90 Bottle   Refills:  11       cetirizine 10 MG tablet   Commonly known as:  zyrTEC   Used for:  Chronic allergic conjunctivitis        Dose:  10 mg   Take 1 tablet (10 mg) by mouth every evening   Quantity:  30 tablet   Refills:  6       cholecalciferol 94757 UNITS capsule   Commonly known as:  VITAMIN D3   Used for:  Vitamin D deficiency        Dose:  1 capsule   Take 1 capsule (50,000 Units) by mouth once a week   Quantity:  12 capsule   Refills:  0       DAILY MULTIPLE VITAMIN/IRON Tabs   Used for:  Chronic hepatitis C without hepatic coma (H)        Dose:  1 tablet   Take 1 tablet by mouth daily   Quantity:  30  tablet   Refills:  11       fluticasone 50 MCG/ACT spray   Commonly known as:  FLONASE   Used for:  Chronic rhinitis        Dose:  2 spray   Spray 2 sprays into both nostrils daily   Quantity:  1 Bottle   Refills:  11       lactulose 20 GM/30ML Soln   Used for:  Cirrhosis of liver with ascites, unspecified hepatic cirrhosis type (H), HCC (hepatocellular carcinoma) (H)        Dose:  30 mL   Take 30 mLs by mouth daily   Quantity:  946 mL   Refills:  3       mometasone-formoterol 200-5 MCG/ACT oral inhaler   Commonly known as:  DULERA   Used for:  Moderate persistent asthma without complication        Dose:  2 puff   Inhale 2 puffs into the lungs 2 times daily   Quantity:  1 Inhaler   Refills:  12       olopatadine 0.1 % ophthalmic solution   Commonly known as:  PATANOL   Used for:  Chronic allergic conjunctivitis        Dose:  1 drop   Place 1 drop into both eyes 2 times daily   Quantity:  5 mL   Refills:  3       omeprazole 40 MG capsule   Commonly known as:  priLOSEC   Used for:  Gastroesophageal reflux disease without esophagitis        Dose:  40 mg   Take 1 capsule (40 mg) by mouth daily Take 30-60 minutes before a meal.   Quantity:  90 capsule   Refills:  3       * Notice:  This list has 2 medication(s) that are the same as other medications prescribed for you. Read the directions carefully, and ask your doctor or other care provider to review them with you.             Protect others around you: Learn how to safely use, store and throw away your medicines at www.disposemymeds.org.         Follow-ups after your visit        Your next 10 appointments already scheduled     Aug 10, 2017  5:45 PM CDT   Return Visit with Viktoria Ayala MD   [facility] ([facility])    [address] [place] [id number] [phone]            Aug 17, 2017  9:30 AM CDT   Procedure - 2.5 hour with U2A ROOM 7   [address] [facility]  [place], [facility])    [address] [facility] [address]               Aug 17, 2017 11:00 AM CDT   IR THORACENTESIS with UUIR6   [facility] [place], Interventional Radiology ([facility])    [address] [address]   [phone]           1. Laboratory test are to be obtained by your doctor prior to the exam (Hgb/Hct, platelet count, INR) 2. Someone will need to drive you to and from the hospital if you feel you may need sedation for the procedure. 3. Bring a list of all drugs you are taking; include supplements and over-the-counter medications. 4. Wear comfortable clothes and leave your valuables at home. 5. If you are or may be pregnant, be sure to contact your doctor or a Radiology nurse prior to the day of the exam. 6. If you have diabetes, check with your doctor or a Radiology nurse to see if your insulin needs to be adjusted for the exam. 7. If you are taking Coumadin (to thin your blood) please contact your doctor or a Radiology nurse at least 3 days before the exam for special instructions (only need the INR to be <2.0). 8. The day before your exam you may eat your regular diet. Drink no alcoholic beverages for 24 hours prior to the exam. 9. Do not eat any solid food or milk products for 6 hours prior to the exam. You may drink clear liquids until 2 hours prior to the exam. Clear liquids include the following: water, Jell-O, clear broth, apple juice or any noncarbonated drink that you can see through (no pop!) 10. The morning of the exam you may brush your teeth and take medications as directed with a sip of water. 11. Tell the Radiology nurse if you have any allergies. 12. If the tube needs to remain in place you will remain in the hospital until the tube can be removed 13. If the tube is removed immediately you may leave the hospital following your exam. However, if you received sedation you will need to stay 1-2 hours. You may  be asked to stay longer and have a chest x-ray sometime after the procedure. 14. If you received sedation, you cannot drive until morning, and an adult must be with you until then. If you live more than one hour away you should stay in the [place] area overnight.            Aug 23, 2017  3:15 PM CDT   Post-Op with Ahmet Farr MD   [facility] ([facility])    [address]  [address]  [address] Clin [address] [place] [address]   [phone]            Aug 30, 2017  2:15 PM CDT   Post-Op with Ahmet Farr MD   [facility] ([facility])    [address]  [address]  [address] Clin [address] [place] [address]   [phone]            Sep 13, 2017  2:15 PM CDT   Post-Op with Ahmet Farr MD   [facility] ([facility])    [address]  [address]  [address] Clin [address] [place] [address]   [phone]            Sep 20, 2017  2:15 PM CDT   Post-Op with Ahmet Farr MD   [facility] Clinic ([facility])    [address]  [address]  [address] Clin [address] [place] [address]   [phone]            Oct 11, 2017  2:15 PM CDT   Post-Op with Ahmet Farr MD   [facility] Clinic ([facility])    [address]  [address]  [address] Clin [address] [place] [address]   [phone]            Nov 08, 2017 10:30 AM CST   Lab with  LAB   [facility] ([facility])    [address]  1st Floor  [facility] [id number] [phone]            Nov 08, 2017 11:30 AM CST   (Arrive by 11:15 AM)   Return General Liver with Kierra Morrison MD   [facility] Hepatology ([facility])    [address]  3rd Floor  [facility] [id number] [phone]               Care Instructions        Further instructions from your care team       [facility],   Interventional Radiology Discharge Instructions Following  "Thoracentesis        AFTER YOU GO HOME:  ? Resume previous diet and medications  ? Limit strenuous physical activity such as lifting, straining, or exercising for 48 hours.  You may resume normal activity in 24 hours  ? Change gauze at the puncture site as needed to keep site dry.  Leaking of additional fluid is not uncommon during the first 24-48 hours    CALL THE PHYSICIAN:  ? If you develop a fever, shortness of breath, chest pain, cough up blood, excessive bleeding from the thoracentesis site, severe lightheadedness, or fainting call you doctor immediately or come to the closest Emergency Department.  Do not drive yourself.   ? If you have questions or concerns regarding your procedure call the radiologist.      ADDITIONAL INSTRUCTIONS --NONE      [facility] INTERVENTIONAL RADIOLOGY DEPARTMENT  Procedure Physician:   VIGNESH WU                              Date of procedure: August 10, 2017  Telephone Numbers: [phone]       Monday-Friday 8:00 am to 4:30 pm                                                   [phone]   After 4:30 pm Monday - Friday, Ask for the Interventional Radiologist on call.  Someone is on call 24 hrs/day  [facility] toll free number: [phone]    Monday-Friday 8:00 am to 4:30 pm  [facility] Emergency Dept: [phone]  _             Additional Information About Your Visit        [employer]ver Information     [employer]ver lets you send messages to your doctor, view your test results, renew your prescriptions, schedule appointments and more. To sign up, go to [email or web address] . Click on \"Log in\" on the left side of the screen, which will take you to the Welcome page. Then click on \"Sign up Now\" on the right side of the page.     You will be asked to enter the access code listed below, as well as some personal information. Please follow the directions to create your username and password.     Your access code is: [email or web address]  Expires: 10/1/2017  6:30 AM     Your access code will  in 90 days. " "If you need help or a new code, please call your [facility] clinic or [phone].        Care EveryWhere ID     This is your Care EveryWhere ID. This could be used by other organizations to access your [facility] medical records  [id number]        Your Vitals Were     Blood Pressure Pulse Temperature Respirations Height Weight    118/63 96 98.3  F (36.8  C) (Oral) 16 1.549 m (5' 1\") 57.7 kg (127 lb 3.3 oz)    Pulse Oximetry BMI (Body Mass Index)                100% 24.04 kg/m2           Primary Care Provider Office Phone # Fax #    Felicitas Truman Horton [phone]      Equal Access to Services     LUKE DUVAL : Hadii nico stoneo Solou, waaxda luqadaha, qaybta kaalmada adeegyada, rosana ricks . So [facility] [phone].    ATENCIÓN: Si habla español, tiene a martinez disposición servicios gratuitos de asistencia lingüística. Ll[facility] [phone].    We comply with applicable federal civil rights laws and [place] laws. We do not discriminate on the basis of race, color, national origin, age, disability sex, sexual orientation or gender identity.            Thank you!     Thank you for choosing [facility] for your care. Our goal is always to provide you with excellent care. Hearing back from our patients is one way we can continue to improve our services. Please take a few minutes to complete the written survey that you may receive in the mail after you visit with us. Thank you!             Medication List: This is a list of all your medications and when to take them. Check marks below indicate your daily home schedule. Keep this list as a reference.      Medications           Morning Afternoon Evening Bedtime As Needed    * albuterol 108 (90 BASE) MCG/ACT Inhaler   Commonly known as:  PROAIR HFA/PROVENTIL HFA/VENTOLIN HFA   Inhale 2 puffs into the lungs every 6 hours as needed for shortness of breath / dyspnea or wheezing                                * albuterol (2.5 " MG/3ML) 0.083% neb solution   Take 1 vial (2.5 mg) by nebulization every 6 hours as needed for shortness of breath / dyspnea or wheezing                                bisacodyl 5 MG EC tablet   Commonly known as:  DULCOLAX   Take 1 tablet (5 mg) by mouth daily as needed for constipation                                BOOST CALORIE SMART Liqd   Take 3 Cans by mouth daily                                cetirizine 10 MG tablet   Commonly known as:  zyrTEC   Take 1 tablet (10 mg) by mouth every evening                                cholecalciferol 20206 UNITS capsule   Commonly known as:  VITAMIN D3   Take 1 capsule (50,000 Units) by mouth once a week                                DAILY MULTIPLE VITAMIN/IRON Tabs   Take 1 tablet by mouth daily                                fluticasone 50 MCG/ACT spray   Commonly known as:  FLONASE   Spray 2 sprays into both nostrils daily                                lactulose 20 GM/30ML Soln   Take 30 mLs by mouth daily                                mometasone-formoterol 200-5 MCG/ACT oral inhaler   Commonly known as:  DULERA   Inhale 2 puffs into the lungs 2 times daily                                olopatadine 0.1 % ophthalmic solution   Commonly known as:  PATANOL   Place 1 drop into both eyes 2 times daily                                omeprazole 40 MG capsule   Commonly known as:  priLOSEC   Take 1 capsule (40 mg) by mouth daily Take 30-60 minutes before a meal.                                * Notice:  This list has 2 medication(s) that are the same as other medications prescribed for you. Read the directions carefully, and ask your doctor or other care provider to review them with you.

## 2017-08-10 NOTE — PROGRESS NOTES
Pt steady on her feet; declined PO; Site remains flat and dry. Discharge instructions reviewed with pt and son per ; copy to son. Dishcharged to home per wheelchair with family. Future appt given to pt.

## 2017-08-10 NOTE — PROCEDURES
Interventional Radiology Brief Post Procedure Note    Procedure: IR THORACENTESIS, IR PARACENTESIS    Proceduralist: Jaron Steiner PA-C and Seven Colon PA-C    Assistant: None    Time Out: Prior to the start of the procedure and with procedural staff participation, I verbally confirmed the patient s identity using two indicators, relevant allergies, that the procedure was appropriate and matched the consent or emergent situation, and that the correct equipment/implants were available. Immediately prior to starting the procedure I conducted the Time Out with the procedural staff and re-confirmed the patient s name, procedure, and site/side. (The Joint Commission universal protocol was followed.)  Yes    Medications   Medication Event Details Admin User Admin Time   lidocaine 1 % 1 mL Medication Given Dose: 4 mL; Route: Other; Comment: totals for case Uriel Jones RN 8/10/2017  9:36 AM       Sedation: None. Local Anesthestic used    Findings: Completed ultrasound guided therapeutic thoracentesis. A total of 1600 mL of clear yellow fluid was drained from the pleural space. Staged thoracentesis was offered but patient was not able to wait for a second round of drainage. Following the patient's thoracentesis we evaluated the patient's abdomen with limited ultrasound exam. The previously noted abdominal fluid collection was no longer visible. Additionally, we evaluated the left side of the abdomen and there was not a fluid pocket present that would be amenable to drainage. At this time we did not perform a paracentesis.     Estimated Blood Loss: Minimal    SPECIMENS: None    Complications: 1. None     Condition: Stable    Plan: Follow-up per primary team.     Comments: See dictated procedure note for full details.    Jaron Steiner PA-C

## 2017-08-10 NOTE — PROGRESS NOTES
Pt arrives to 2a for thora and para. Pre procedure assessment completed. H&P is up to date. Consent needs to be signed. Pts son in gold waiting room.  at bedside.

## 2017-08-17 NOTE — PROGRESS NOTES
Patient here today for therapeutic thoracentesis. 1000 mL of clear yellow fluid drained from right pleural space. Patient began coughing consistently so drainage was stopped. Cathter kept in place for a staged thoracentesis. Large amount of ascites remains.     Limited US of abdomen performed prior to procedure today showing a small amount of ascites fluid. Will recheck with limited abdominal US after thoracentesis completed and possibly perform paracentesis if indicated.     Cornelia Fleming PA-C

## 2017-08-17 NOTE — PROGRESS NOTES
Interventional Radiology Intra-procedural Nursing Note    Patient Name: Yue Portillo  Medical Record Number: [id number]  Today's Date: August 17, 2017    Start Time: 1130  End of procedure time: 1145  Procedure: thoracentesis  Report given to: Kisha HOLLOWAY  Time pt departs:  1150  : lili Mercado Notes: pt from 2 a to IR 6. Pt a/o x 3, consent on file, all questions answered. Lido 5 cc, 1000 cc fluid obtained.  BALDOMERO MEDEIROS

## 2017-08-17 NOTE — DISCHARGE INSTRUCTIONS
[facility],   Interventional Radiology Discharge Instructions Following Thoracentesis        AFTER YOU GO HOME:  ? Resume previous diet and medications  ? Limit strenuous physical activity such as lifting, straining, or exercising for 48 hours.  You may resume normal activity in 24 hours  ? Change gauze at the puncture site as needed to keep site dry.  Leaking of additional fluid is not uncommon during the first 24-48 hours    CALL THE PHYSICIAN:  ? If you develop a fever, shortness of breath, chest pain, cough up blood, excessive bleeding from the thoracentesis site, severe lightheadedness, or fainting call you doctor immediately or come to the closest Emergency Department.  Do not drive yourself.   ? If you have questions or concerns regarding your procedure call the radiologist.          [facility] INTERVENTIONAL RADIOLOGY DEPARTMENT  Procedure Physician: Cornelia MEDELLIN                              Date of procedure: August 17, 2017  Telephone Numbers: [phone]       Monday-Friday 8:00 am to 4:30 pm                                                   [phone]   After 4:30 pm Monday - Friday, Ask for the Interventional Radiologist on call.  Someone is on call 24 hrs/day  [facility] toll free number: [phone]    Monday-Friday 8:00 am to 4:30 pm  [facility] Emergency Dept: [phone]  _

## 2017-08-17 NOTE — PROCEDURES
Interventional Radiology Brief Post Procedure Note    Procedure: IR PARACENTESIS    Proceduralist: Temo Barahona PA-C    Assistant: MAGDA Lees    Time Out: Prior to the start of the procedure and with procedural staff participation, I verbally confirmed the patient s identity using two indicators, relevant allergies, that the procedure was appropriate and matched the consent or emergent situation, and that the correct equipment/implants were available. Immediately prior to starting the procedure I conducted the Time Out with the procedural staff and re-confirmed the patient s name, procedure, and site/side. (The Joint Commission universal protocol was followed.)  Yes    Medications   Medication Event Details Admin User Admin Time       Sedation: None. Local Anesthestic used    Findings: Ultrasound-guided paracentesis at RLQ. Return or straw-colored fluid.     Estimated Blood Loss: Minimal    Fluoroscopy Time:  none    SPECIMENS: None    Complications: 1. None     Condition: Stable    Plan: Follow-up per primary team.     Comments: See dictated procedure note for full details.    Alban Barahona PA-C

## 2017-08-17 NOTE — PROGRESS NOTES
Pt received from IR with RN post second thoracentesis and paracentesis. Pt awake and alert, family at bedside;  present for all cares. Pt declined any PO. Sites R mid back and R Abdomen.

## 2017-08-17 NOTE — IP AVS SNAPSHOT
[address] [address]                                       After Visit Summary   8/17/2017    Yue Portillo    MRN: [id number]           After Visit Summary Signature Page     I have received my discharge instructions, and my questions have been answered. I have discussed any challenges I see with this plan with the nurse or doctor.    ..........................................................................................................................................  Patient/Patient Representative Signature      ..........................................................................................................................................  Patient Representative Print Name and Relationship to Patient    ..................................................               ................................................  Date                                            Time    ..........................................................................................................................................  Reviewed by Signature/Title    ...................................................              ..............................................  Date                                                            Time

## 2017-08-17 NOTE — PROGRESS NOTES
Yue returned to 2a after having 1 liter of fluid removed via thoracentesis.  The drain remains in place and she will return to Interventional radiology in one hour.  She is declining food or fluids at this time.

## 2017-08-17 NOTE — PROGRESS NOTES
Yue is here for a thoracentesis.  Pre-procedure assessment is complete.  Labs have been collected.  INR is 0.9.  She was consented for this same procedure a week ago.

## 2017-08-17 NOTE — PROGRESS NOTES
Interventional Radiology Intra-procedural Nursing Note    Patient Name: Yue Portillo  Medical Record Number: [id number]  Today's Date: August 17, 2017    Start Time: 1305  End of procedure time: 1415  Procedure: staged thoracentesis and paracentesis  Report given to: david HOLLOWAY  Time pt departs:  1420  : lili Mercado Notes: pt returns to IR for 2nd thoracentesis. 750 cc fld obtained. Pt nauseated after end of thoracentesis with zofran given and charted. 1400cc ascites fluid obtained.    BALDOMERO MEDEIROS

## 2017-08-17 NOTE — IP AVS SNAPSHOT
MRN:[id number]                      After Visit Summary   8/17/2017    Yue Portillo    MRN: [id number]           Visit Information        Department      8/17/2017  9:31 AM [address] [facility]          Review of your medicines      UNREVIEWED medicines. Ask your doctor about these medicines        Dose / Directions    * albuterol 108 (90 BASE) MCG/ACT Inhaler   Commonly known as:  PROAIR HFA/PROVENTIL HFA/VENTOLIN HFA   Used for:  Moderate persistent asthma without complication        Dose:  2 puff   Inhale 2 puffs into the lungs every 6 hours as needed for shortness of breath / dyspnea or wheezing   Quantity:  1 Inhaler   Refills:  11       * albuterol (2.5 MG/3ML) 0.083% neb solution   Used for:  Mild intermittent asthma with acute exacerbation        Dose:  1 vial   Take 1 vial (2.5 mg) by nebulization every 6 hours as needed for shortness of breath / dyspnea or wheezing   Quantity:  25 vial   Refills:  3       bisacodyl 5 MG EC tablet   Commonly known as:  DULCOLAX   Used for:  Constipation, unspecified constipation type        Dose:  5 mg   Take 1 tablet (5 mg) by mouth daily as needed for constipation   Quantity:  30 tablet   Refills:  2       BOOST CALORIE SMART Liqd   Used for:  HCC (hepatocellular carcinoma) (H)        Dose:  3 Can   Take 3 Cans by mouth daily   Quantity:  90 Bottle   Refills:  11       cetirizine 10 MG tablet   Commonly known as:  zyrTEC   Used for:  Chronic allergic conjunctivitis        Dose:  10 mg   Take 1 tablet (10 mg) by mouth every evening   Quantity:  30 tablet   Refills:  6       cholecalciferol 26879 UNITS capsule   Commonly known as:  VITAMIN D3   Used for:  Vitamin D deficiency        Dose:  1 capsule   Take 1 capsule (50,000 Units) by mouth once a week   Quantity:  12 capsule   Refills:  0       DAILY MULTIPLE VITAMIN/IRON Tabs   Used for:  Chronic hepatitis C without hepatic coma (H)        Dose:  1 tablet   Take 1 tablet by mouth daily   Quantity:  30  tablet   Refills:  11       fluticasone 50 MCG/ACT spray   Commonly known as:  FLONASE   Used for:  Chronic rhinitis        Dose:  2 spray   Spray 2 sprays into both nostrils daily   Quantity:  1 Bottle   Refills:  11       lactulose 20 GM/30ML Soln   Used for:  Cirrhosis of liver with ascites, unspecified hepatic cirrhosis type (H), HCC (hepatocellular carcinoma) (H)        Dose:  30 mL   Take 30 mLs by mouth daily   Quantity:  946 mL   Refills:  3       mometasone-formoterol 200-5 MCG/ACT oral inhaler   Commonly known as:  DULERA   Used for:  Moderate persistent asthma without complication        Dose:  2 puff   Inhale 2 puffs into the lungs 2 times daily   Quantity:  1 Inhaler   Refills:  12       olopatadine 0.1 % ophthalmic solution   Commonly known as:  PATANOL   Used for:  Chronic allergic conjunctivitis        Dose:  1 drop   Place 1 drop into both eyes 2 times daily   Quantity:  5 mL   Refills:  3       omeprazole 40 MG capsule   Commonly known as:  priLOSEC   Used for:  Gastroesophageal reflux disease without esophagitis        Dose:  40 mg   Take 1 capsule (40 mg) by mouth daily Take 30-60 minutes before a meal.   Quantity:  90 capsule   Refills:  3       * Notice:  This list has 2 medication(s) that are the same as other medications prescribed for you. Read the directions carefully, and ask your doctor or other care provider to review them with you.             Protect others around you: Learn how to safely use, store and throw away your medicines at www.disposemymeds.org.         Follow-ups after your visit        Your next 10 appointments already scheduled     Aug 23, 2017  3:15 PM CDT   Post-Op with Ahmet Farr MD   Eye Clinic ([facility])    [address]  [address] [place] [address]  9th Fl Clin [address] [place] [id number]   [phone]            Aug 30, 2017  2:15 PM CDT   Post-Op with Ahmet Farr MD   Eye Clinic ([facility])    [facility]  [address] [place] [address]  St [address]  9th Fl Clin 9a  [facility] [address]   [phone]            Sep 13, 2017  2:15 PM CDT   Post-Op with Ahmet Farr MD   Eye Clinic ([facility])    [address]  [address]   9th [address] Clin 9a  [facility] [address]   [phone]            Sep 20, 2017  2:15 PM CDT   Post-Op with Ahmet Farr MD   Eye Clinic ([facility])    [address]  [address]   9th [address] Clin 9a  [facility] [address]   [phone]            Oct 11, 2017  2:15 PM CDT   Post-Op with Ahmet Farr MD   Eye Clinic ([facility])    [address]  [address] [facility]  9th Fl Clin 9a  [facility] [address]   [phone]            Nov 08, 2017 10:30 AM CST   Lab with  LAB   [facility] Lab ([facility])    [address] [facility]  1st [facility] [address]   [phone]            Nov 08, 2017 11:30 AM CST   (Arrive by 11:15 AM)   Return General Liver with Kierra Morrison MD   [facility] Hepatology ([facility])    [address] [place] [id number] [phone]               Care Instructions        Further instructions from your care team       [facility],   Interventional Radiology Discharge Instructions Following Thoracentesis        AFTER YOU GO HOME:  ? Resume previous diet and medications  ? Limit strenuous physical activity such as lifting, straining, or exercising for 48 hours.  You may resume normal activity in 24 hours  ? Change gauze at the puncture site as needed to keep site dry.  Leaking of additional fluid is not uncommon during the first 24-48 hours    CALL THE PHYSICIAN:  ? If you develop a fever, shortness of breath, chest pain, cough up blood, excessive bleeding from the thoracentesis site, severe lightheadedness, or fainting call you doctor immediately or come to the closest Emergency Department.  Do not drive  "yourself.   ? If you have questions or concerns regarding your procedure call the radiologist.          [facility] INTERVENTIONAL RADIOLOGY DEPARTMENT  Procedure Physician: Cornelia MEDELLIN                              Date of procedure: 2017  Telephone Numbers: [phone]       Monday-Friday 8:00 am to 4:30 pm                                                   [phone]   After 4:30 pm Monday - Friday, Ask for the Interventional Radiologist on call.  Someone is on call 24 hrs/day  [facility] toll free number: [phone]    Monday-Friday 8:00 am to 4:30 pm  [facility] Emergency Dept: [phone]  _             Additional Information About Your Visit        [employer] Information     [employer] lets you send messages to your doctor, view your test results, renew your prescriptions, schedule appointments and more. To sign up, go to [email or web address] . Click on \"Log in\" on the left side of the screen, which will take you to the Welcome page. Then click on \"Sign up Now\" on the right side of the page.     You will be asked to enter the access code listed below, as well as some personal information. Please follow the directions to create your username and password.     Your access code is: [email or web address]  Expires: 10/1/2017  6:30 AM     Your access code will  in 90 days. If you need help or a new code, please call your [facility] clinic or [phone].        Care EveryWhere ID     This is your Care EveryWhere ID. This could be used by other organizations to access your [facility] medical records  [id number]        Your Vitals Were     Blood Pressure Pulse Temperature Respirations Pulse Oximetry       106/56 92 98.4  F (36.9  C) 20 99%        Primary Care Provider Office Phone # Fax #    Felicitas Horton [phone]      Equal Access to Services     LUKE DUVAL AH: Mukul stoneo Somichealali, waaxda luqadaha, qaybta kaalmada adeegyada, rosana lance. So [facility] " [phone].    ATENCIÓN: Si fiordaliza jose, tiene a martinez disposición servicios gratuitos de asistencia lingüística. Yonathan rodriguez [phone].    We comply with applicable federal civil rights laws and [place] laws. We do not discriminate on the basis of race, color, national origin, age, disability sex, sexual orientation or gender identity.            Thank you!     Thank you for choosing [facility] for your care. Our goal is always to provide you with excellent care. Hearing back from our patients is one way we can continue to improve our services. Please take a few minutes to complete the written survey that you may receive in the mail after you visit with us. Thank you!             Medication List: This is a list of all your medications and when to take them. Check marks below indicate your daily home schedule. Keep this list as a reference.      Medications           Morning Afternoon Evening Bedtime As Needed    * albuterol 108 (90 BASE) MCG/ACT Inhaler   Commonly known as:  PROAIR HFA/PROVENTIL HFA/VENTOLIN HFA   Inhale 2 puffs into the lungs every 6 hours as needed for shortness of breath / dyspnea or wheezing                                * albuterol (2.5 MG/3ML) 0.083% neb solution   Take 1 vial (2.5 mg) by nebulization every 6 hours as needed for shortness of breath / dyspnea or wheezing                                bisacodyl 5 MG EC tablet   Commonly known as:  DULCOLAX   Take 1 tablet (5 mg) by mouth daily as needed for constipation                                BOOST CALORIE SMART Liqd   Take 3 Cans by mouth daily                                cetirizine 10 MG tablet   Commonly known as:  zyrTEC   Take 1 tablet (10 mg) by mouth every evening                                cholecalciferol 67577 UNITS capsule   Commonly known as:  VITAMIN D3   Take 1 capsule (50,000 Units) by mouth once a week                                DAILY MULTIPLE VITAMIN/IRON Tabs   Take 1 tablet by mouth daily                                 fluticasone 50 MCG/ACT spray   Commonly known as:  FLONASE   Spray 2 sprays into both nostrils daily                                lactulose 20 GM/30ML Soln   Take 30 mLs by mouth daily                                mometasone-formoterol 200-5 MCG/ACT oral inhaler   Commonly known as:  DULERA   Inhale 2 puffs into the lungs 2 times daily                                olopatadine 0.1 % ophthalmic solution   Commonly known as:  PATANOL   Place 1 drop into both eyes 2 times daily                                omeprazole 40 MG capsule   Commonly known as:  priLOSEC   Take 1 capsule (40 mg) by mouth daily Take 30-60 minutes before a meal.                                * Notice:  This list has 2 medication(s) that are the same as other medications prescribed for you. Read the directions carefully, and ask your doctor or other care provider to review them with you.

## 2017-08-17 NOTE — PROGRESS NOTES
Pt did not want to eat anything, just wants to go home.  D/C instructions went over with and given to pt and her son, all questions answered.   at the bedside.  PIV D/C'ed, catheter intact.  1515-Pt D/C'ed to home with his son.

## 2017-08-21 NOTE — NURSING NOTE
"Chief Complaint   Patient presents with     Pre-Op Exam       Initial /64  Pulse 80  Temp 97.7  F (36.5  C) (Tympanic)  Resp 16  Wt 112 lb (50.8 kg)  SpO2 96%  BMI 21.16 kg/m2 Estimated body mass index is 21.16 kg/(m^2) as calculated from the following:    Height as of 8/10/17: 5' 1\" (1.549 m).    Weight as of this encounter: 112 lb (50.8 kg).  Medication Reconciliation: complete       Marysol Galo MA     "

## 2017-08-21 NOTE — PROGRESS NOTES
Formerly named [facility]  [address] [place] [address] [phone]  Dept: [phone]    PRE-OP EVALUATION:  Today's date: 2017    Yue Portillo (: 1940) presents for pre-operative evaluation assessment as requested by Dr. BAKER.  She requires evaluation and anesthesia risk assessment prior to undergoing surgery/procedure for treatment of cataract removal.  Proposed procedure: Right eye cataract surgery     Date of Surgery/ Procedure: 17  Time of Surgery/ Procedure: 7:15 am   Hospital/Surgical Facility: [facility]   Primary Physician: Felicitas Horton  Type of Anesthesia Anticipated: to be determined    Patient has a Health Care Directive or Living Will:  NO    1. NO - Do you have a history of heart attack, stroke, stent, bypass or surgery on an artery in the head, neck, heart or legs?  2. NO - Do you ever have any pain or discomfort in your chest?  3. NO - Do you have a history of  Heart Failure?  4. NO - Are you troubled by shortness of breath when: walking on the level, up a slight hill or at night?  5. NO - Do you currently have a cold, bronchitis or other respiratory infection?  6. YES - Do you have a cough, shortness of breath or wheezing? Recent cold and history of asthma  7. NO - Do you sometimes get pains in the calves of your legs when you walk?  8. NO - Do you or anyone in your family have previous history of blood clots?  9. NO - Do you or does anyone in your family have a serious bleeding problem such as prolonged bleeding following surgeries or cuts?  10. NO - Have you ever had problems with anemia or been told to take iron pills?  11. NO - Have you had any abnormal blood loss such as black, tarry or bloody stools, or abnormal vaginal bleeding?  12. NO - Have you ever had a blood transfusion?  13. NO - Have you or any of your relatives ever had problems with anesthesia?  14. NO - Do you have sleep apnea, excessive snoring or daytime drowsiness?  15.  NO - Do you have any prosthetic heart valves?  16. NO - Do you have prosthetic joints?  17. NO - Is there any chance that you may be pregnant?        HPI:                                                      Brief HPI related to upcoming procedure: history of right eye cataract with need for removal    See problem list for active medical problems.  Problems all longstanding and stable, except as noted/documented.  See ROS for pertinent symptoms related to these conditions.    ASTHMA - Patient has a longstanding history of moderate-severe Asthma. Patient has been doing well overall noting COUGH and continues on medication regimen consisting of dulera daily with albuterol rescue inhaler without adverse reactions or side effects.      MEDICAL HISTORY:                                                    Patient Active Problem List    Diagnosis Date Noted     Mild persistent asthma without complication 05/09/2016     Priority: Medium     HCC (hepatocellular carcinoma) (H) 12/22/2014     Priority: Medium     Diagnosed at [facility]. In records from September 2014. Son does not believe pt has cancer. See note 7/13/15.       Cirrhosis of liver (H) 01/23/2014     Priority: Medium     Postmenopausal bleeding 04/01/2013     Priority: Medium     4/2013-HPV negative, EMB done, pelvic U/S done; referred to OB/GYN for possible D&C.       Vitamin D deficiency 01/04/2013     Priority: Medium     Ear pain 11/26/2012     Priority: Medium     Unknown etiology, controlled with dermotic       Health Care Home 09/11/2012     Priority: Medium     Tier 1  DX V65.8 REPLACED WITH 55[facility] HEALTH CARE HOME (04/08/2013)       Allergic rhinitis 09/11/2012     Priority: Medium     Problem list name updated by automated process. 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) 09/11/2012     Priority: Medium     Chronic allergic conjunctivitis 09/11/2012     Priority: Medium     Chronic tension type headache 09/11/2012     Priority: Medium      Constipation 09/11/2012     Priority: Medium     Depression 09/11/2012     Priority: Medium     Esophageal reflux 09/11/2012     Priority: Medium     Generalized osteoarthritis 09/11/2012     Priority: Medium     Generalized pain 09/11/2012     Priority: Medium     Heartburn 09/11/2012     Priority: Medium     Insomnia 09/11/2012     Priority: Medium     Lightheadedness 09/11/2012     Priority: Medium     Lower back pain 09/11/2012     Priority: Medium      Past Medical History:   Diagnosis Date     Liver disease      Mild intermittent asthma      Past Surgical History:   Procedure Laterality Date     EYE SURGERY       H PYLORI SHYLA SCREEN      was treated for h pylori     NO HISTORY OF SURGERY       Current Outpatient Prescriptions   Medication Sig Dispense Refill     omeprazole (PRILOSEC) 40 MG capsule Take 1 capsule (40 mg) by mouth daily Take 30-60 minutes before a meal. 90 capsule 3     lactulose 20 GM/30ML SOLN Take 30 mLs by mouth daily 946 mL 3     albuterol (2.5 MG/3ML) 0.083% neb solution Take 1 vial (2.5 mg) by nebulization every 6 hours as needed for shortness of breath / dyspnea or wheezing 25 vial 3     olopatadine (PATANOL) 0.1 % ophthalmic solution Place 1 drop into both eyes 2 times daily 5 mL 3     cetirizine (ZYRTEC) 10 MG tablet Take 1 tablet (10 mg) by mouth every evening 30 tablet 6     bisacodyl (DULCOLAX) 5 MG EC tablet Take 1 tablet (5 mg) by mouth daily as needed for constipation 30 tablet 2     Nutritional Supplements (BOOST CALORIE SMART) LIQD Take 3 Cans by mouth daily 90 Bottle 11     cholecalciferol (VITAMIN D3) 59358 UNITS capsule Take 1 capsule (50,000 Units) by mouth once a week 12 capsule 0     mometasone-formoterol (DULERA) 200-5 MCG/ACT oral inhaler Inhale 2 puffs into the lungs 2 times daily 1 Inhaler 12     fluticasone (FLONASE) 50 MCG/ACT spray Spray 2 sprays into both nostrils daily 1 Bottle 11     albuterol (PROAIR HFA/PROVENTIL HFA/VENTOLIN HFA) 108 (90 BASE) MCG/ACT Inhaler  Inhale 2 puffs into the lungs every 6 hours as needed for shortness of breath / dyspnea or wheezing 1 Inhaler 11     Multiple Vitamins-Iron (DAILY MULTIPLE VITAMIN/IRON) TABS Take 1 tablet by mouth daily 30 tablet 11     OTC products: None, except as noted above    Allergies   Allergen Reactions     Dust Mites Cough     Sneezing      Seasonal Allergies       Latex Allergy: NO    Social History   Substance Use Topics     Smoking status: Never Smoker     Smokeless tobacco: Never Used     Alcohol use No     History   Drug Use No       REVIEW OF SYSTEMS:                                                    C: NEGATIVE for fever, chills, change in weight  E/M: NEGATIVE for ear, mouth and throat problems  R: NEGATIVE for significant cough or SOB  CV: NEGATIVE for chest pain, palpitations or peripheral edema    EXAM:                                                    /64  Pulse 80  Temp 97.7  F (36.5  C) (Tympanic)  Resp 16  Wt 112 lb (50.8 kg)  SpO2 96%  BMI 21.16 kg/m2  GENERAL APPEARANCE: healthy, alert and no distress  HENT: ear canals and TM's normal and nose and mouth without ulcers or lesions  RESP: lungs clear to auscultation - no rales, rhonchi or wheezes  CV: regular rate and rhythm, normal S1 S2, no S3 or S4 and no murmur, click or rub   ABDOMEN: soft, nontender, no HSM or masses and bowel sounds normal  NEURO: Normal strength and tone, sensory exam grossly normal, mentation intact and speech normal    DIAGNOSTICS:                                                    EKG: appears normal, NSR, normal axis, normal intervals, no acute ST/T changes c/w ischemia, no LVH by voltage criteria, unchanged from previous tracings    Recent Labs   Lab Test  08/17/17   1033  08/17/17   1030  07/19/17   0853  06/20/17   1533   01/30/12   1408   HGB   --   11.7  11.4*  12.0   < >   --    PLT   --   73*  56*  62*   < >   --    INR  0.9   --   1.46*  1.50*   < >   --    NA   --    --   140  135   < >   --    POTASSIUM    --    --   4.4  4.1   < >   --    CR   --    --   0.79  0.74   < >   --    A1C   --    --    --    --    --   6.3*    < > = values in this interval not displayed.        IMPRESSION:                                                    Reason for surgery/procedure: right eye cataract removal  Diagnosis/reason for consult: medical management/pre-op    The proposed surgical procedure is considered LOW risk.    REVISED CARDIAC RISK INDEX  The patient has the following serious cardiovascular risks for perioperative complications such as (MI, PE, VFib and 3  AV Block):  No serious cardiac risks  INTERPRETATION: 0 risks: Class I (very low risk - 0.4% complication rate)    The patient has the following additional risks for perioperative complications:  No identified additional risks      ICD-10-CM    1. Preop general physical exam Z01.818 EKG 12-lead complete w/read - Clinics     CANCELED: Comprehensive metabolic panel     CANCELED: Hemoglobin   2. Age-related cataract of right eye, unspecified age-related cataract type H25.9        RECOMMENDATIONS:                                                      --Patient is to take all scheduled medications on the day of surgery EXCEPT for modifications listed below.    APPROVAL GIVEN to proceed with proposed procedure, without further diagnostic evaluation       Signed Electronically by: Lynn Murray PA-C    Copy of this evaluation report is provided to requesting physician.    [facility] Preop Guidelines

## 2017-08-21 NOTE — LETTER
2017      [facility] HIAWATHA  [address] [place] [id number] [phone]  Dept: [phone]    PRE-OP EVALUATION:  Today's date: 2017    Yue Portillo (: 1940) presents for pre-operative evaluation assessment as requested by Dr. BAKER.  She requires evaluation and anesthesia risk assessment prior to undergoing surgery/procedure for treatment of cataract removal.  Proposed procedure: Right eye cataract surgery     Date of Surgery/ Procedure: 17  Time of Surgery/ Procedure: 7:15 am   Hospital/Surgical Facility: [facility]   Primary Physician: Felicitas Horton  Type of Anesthesia Anticipated: to be determined    Patient has a Health Care Directive or Living Will:  NO    1. NO - Do you have a history of heart attack, stroke, stent, bypass or surgery on an artery in the head, neck, heart or legs?  2. NO - Do you ever have any pain or discomfort in your chest?  3. NO - Do you have a history of  Heart Failure?  4. NO - Are you troubled by shortness of breath when: walking on the level, up a slight hill or at night?  5. NO - Do you currently have a cold, bronchitis or other respiratory infection?  6. YES - Do you have a cough, shortness of breath or wheezing? Recent cold and history of asthma  7. NO - Do you sometimes get pains in the calves of your legs when you walk?  8. NO - Do you or anyone in your family have previous history of blood clots?  9. NO - Do you or does anyone in your family have a serious bleeding problem such as prolonged bleeding following surgeries or cuts?  10. NO - Have you ever had problems with anemia or been told to take iron pills?  11. NO - Have you had any abnormal blood loss such as black, tarry or bloody stools, or abnormal vaginal bleeding?  12. NO - Have you ever had a blood transfusion?  13. NO - Have you or any of your relatives ever had problems with anesthesia?  14. NO - Do you have sleep apnea, excessive snoring or  daytime drowsiness?  15. NO - Do you have any prosthetic heart valves?  16. NO - Do you have prosthetic joints?  17. NO - Is there any chance that you may be pregnant?        HPI:                                                      Brief HPI related to upcoming procedure: history of right eye cataract with need for removal    See problem list for active medical problems.  Problems all longstanding and stable, except as noted/documented.  See ROS for pertinent symptoms related to these conditions.    ASTHMA - Patient has a longstanding history of moderate-severe Asthma . Patient has been doing well overall noting COUGH and continues on medication regimen consisting of dulera daily with albuterol rescue inhaler without adverse reactions or side effects.      MEDICAL HISTORY:                                                    Patient Active Problem List    Diagnosis Date Noted     Mild persistent asthma without complication 05/09/2016     Priority: Medium     HCC (hepatocellular carcinoma) (H) 12/22/2014     Priority: Medium     Diagnosed at [facility]. In records from September 2014. Son does not believe pt has cancer. See note 7/13/15.       Cirrhosis of liver (H) 01/23/2014     Priority: Medium     Postmenopausal bleeding 04/01/2013     Priority: Medium     4/2013-HPV negative, EMB done, pelvic U/S done; referred to OB/GYN for possible D&C.       Vitamin D deficiency 01/04/2013     Priority: Medium     Ear pain 11/26/2012     Priority: Medium     Unknown etiology, controlled with dermotic       Health Care Home 09/11/2012     Priority: Medium     Tier 1  DX V65.8 REPLACED WITH 13667 HEALTH CARE HOME (04/08/2013)       Allergic rhinitis 09/11/2012     Priority: Medium     Problem list name updated by automated process. 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) 09/11/2012     Priority: Medium     Chronic allergic conjunctivitis 09/11/2012     Priority: Medium     Chronic tension type headache 09/11/2012      Priority: Medium     Constipation 09/11/2012     Priority: Medium     Depression 09/11/2012     Priority: Medium     Esophageal reflux 09/11/2012     Priority: Medium     Generalized osteoarthritis 09/11/2012     Priority: Medium     Generalized pain 09/11/2012     Priority: Medium     Heartburn 09/11/2012     Priority: Medium     Insomnia 09/11/2012     Priority: Medium     Lightheadedness 09/11/2012     Priority: Medium     Lower back pain 09/11/2012     Priority: Medium      Past Medical History:   Diagnosis Date     Liver disease      Mild intermittent asthma      Past Surgical History:   Procedure Laterality Date     EYE SURGERY       H PYLORI SHYLA SCREEN      was treated for h pylori     NO HISTORY OF SURGERY       Current Outpatient Prescriptions   Medication Sig Dispense Refill     omeprazole (PRILOSEC) 40 MG capsule Take 1 capsule (40 mg) by mouth daily Take 30-60 minutes before a meal. 90 capsule 3     lactulose 20 GM/30ML SOLN Take 30 mLs by mouth daily 946 mL 3     albuterol (2.5 MG/3ML) 0.083% neb solution Take 1 vial (2.5 mg) by nebulization every 6 hours as needed for shortness of breath / dyspnea or wheezing 25 vial 3     olopatadine (PATANOL) 0.1 % ophthalmic solution Place 1 drop into both eyes 2 times daily 5 mL 3     cetirizine (ZYRTEC) 10 MG tablet Take 1 tablet (10 mg) by mouth every evening 30 tablet 6     bisacodyl (DULCOLAX) 5 MG EC tablet Take 1 tablet (5 mg) by mouth daily as needed for constipation 30 tablet 2     Nutritional Supplements (BOOST CALORIE SMART) LIQD Take 3 Cans by mouth daily 90 Bottle 11     cholecalciferol (VITAMIN D3) 35201 UNITS capsule Take 1 capsule (50,000 Units) by mouth once a week 12 capsule 0     mometasone-formoterol (DULERA) 200-5 MCG/ACT oral inhaler Inhale 2 puffs into the lungs 2 times daily 1 Inhaler 12     fluticasone (FLONASE) 50 MCG/ACT spray Spray 2 sprays into both nostrils daily 1 Bottle 11     albuterol (PROAIR HFA/PROVENTIL HFA/VENTOLIN HFA) 108 (90  BASE) MCG/ACT Inhaler Inhale 2 puffs into the lungs every 6 hours as needed for shortness of breath / dyspnea or wheezing 1 Inhaler 11     Multiple Vitamins-Iron (DAILY MULTIPLE VITAMIN/IRON) TABS Take 1 tablet by mouth daily 30 tablet 11     OTC products: None, except as noted above    Allergies   Allergen Reactions     Dust Mites Cough     Sneezing      Seasonal Allergies       Latex Allergy: NO    Social History   Substance Use Topics     Smoking status: Never Smoker     Smokeless tobacco: Never Used     Alcohol use No     History   Drug Use No       REVIEW OF SYSTEMS:                                                    C: NEGATIVE for fever, chills, change in weight  E/M: NEGATIVE for ear, mouth and throat problems  R: NEGATIVE for significant cough or SOB  CV: NEGATIVE for chest pain, palpitations or peripheral edema    EXAM:                                                    /64  Pulse 80  Temp 97.7  F (36.5  C) (Tympanic)  Resp 16  Wt 112 lb (50.8 kg)  SpO2 96%  BMI 21.16 kg/m2  GENERAL APPEARANCE: healthy, alert and no distress  HENT: ear canals and TM's normal and nose and mouth without ulcers or lesions  RESP: lungs clear to auscultation - no rales, rhonchi or wheezes  CV: regular rate and rhythm, normal S1 S2, no S3 or S4 and no murmur, click or rub   ABDOMEN: soft, nontender, no HSM or masses and bowel sounds normal  NEURO: Normal strength and tone, sensory exam grossly normal, mentation intact and speech normal    DIAGNOSTICS:                                                    EKG: appears normal, NSR, normal axis, normal intervals, no acute ST/T changes c/w ischemia, no LVH by voltage criteria, unchanged from previous tracings    Recent Labs   Lab Test  08/17/17   1033  08/17/17   1030  07/19/17   0853  06/20/17   1533   01/30/12   1408   HGB   --   11.7  11.4*  12.0   < >   --    PLT   --   73*  56*  62*   < >   --    INR  0.9   --   1.46*  1.50*   < >   --    NA   --    --   140  135   < >    --    POTASSIUM   --    --   4.4  4.1   < >   --    CR   --    --   0.79  0.74   < >   --    A1C   --    --    --    --    --   6.3*    < > = values in this interval not displayed.        IMPRESSION:                                                    Reason for surgery/procedure: right eye cataract removal  Diagnosis/reason for consult: medical management/pre-op    The proposed surgical procedure is considered LOW risk.    REVISED CARDIAC RISK INDEX  The patient has the following serious cardiovascular risks for perioperative complications such as (MI, PE, VFib and 3  AV Block):  No serious cardiac risks  INTERPRETATION: 0 risks: Class I (very low risk - 0.4% complication rate)    The patient has the following additional risks for perioperative complications:  No identified additional risks      ICD-10-CM    1. Preop general physical exam Z01.818 EKG 12-lead complete w/read - Clinics     CANCELED: Comprehensive metabolic panel     CANCELED: Hemoglobin   2. Age-related cataract of right eye, unspecified age-related cataract type H25.9        RECOMMENDATIONS:                                                      --Patient is to take all scheduled medications on the day of surgery EXCEPT for modifications listed below.    APPROVAL GIVEN to proceed with proposed procedure, without further diagnostic evaluation       Signed Electronically by: Lynn Murray PA-C    Copy of this evaluation report is provided to requesting physician.    [facility] Preop Guidelines

## 2017-08-21 NOTE — MR AVS SNAPSHOT
After Visit Summary   8/21/2017    Yue Portillo    MRN: [id number]           Patient Information     Date Of Birth          1940        Visit Information        Provider Department      8/21/2017 2:40 PM Lynn Murray PA-C Formerly named [facility]        Today's Diagnoses     Preop general physical exam    -  1    Age-related cataract of right eye, unspecified age-related cataract type          Care Instructions      Before Your Surgery      Call your surgeon if there is any change in your health. This includes signs of a cold or flu (such as a sore throat, runny nose, cough, rash or fever).    Do not smoke, drink alcohol or take over the counter medicine (unless your surgeon or primary care doctor tells you to) for the 24 hours before and after surgery.    If you take prescribed drugs: Follow your doctor s orders about which medicines to take and which to stop until after surgery.    Eating and drinking prior to surgery: follow the instructions from your surgeon    Take a shower or bath the night before surgery. Use the soap your surgeon gave you to gently clean your skin. If you do not have soap from your surgeon, use your regular soap. Do not shave or scrub the surgery site.  Wear clean pajamas and have clean sheets on your bed.           Follow-ups after your visit        Follow-up notes from your care team     Return if symptoms worsen or fail to improve.      Your next 10 appointments already scheduled     Aug 21, 2017  2:40 PM CDT   Pre-Op physical with Lynn Murray PA-C   Formerly named [facility] (Formerly named [facility])    [address] [place] [address] [phone]            Aug 23, 2017  3:00 PM CDT   Post-Op with Ahmet Farr MD   Eye Clinic ([facility])    [address] [facility]  [address] [facility]  [address] [id number] [phone]            Aug 30, 2017  2:15 PM CDT   Post-Op with Ahmet Watts  MD Yordan   Eye Clinic ([facility])    [address]  [address]  [address]  [facility] [address]   [phone]            Sep 13, 2017  2:15 PM CDT   Post-Op with Ahmet Farr MD   Eye Clinic ([facility])    [address]  [address]  [address]  [facility] [address]   [phone]            Sep 20, 2017  2:15 PM CDT   Post-Op with Ahmet Farr MD   Eye Clinic ([facility])    [address]  [address]  [address]  [facility] [address]   [phone]            Oct 11, 2017  2:15 PM CDT   Post-Op with Ahmet Farr MD   Eye Clinic ([facility])    [address]  [address]  [address]  [facility] [address]   [phone]            Nov 08, 2017 10:30 AM CST   Lab with  LAB   [facility] ([facility])    [address]  1st Floor  [facility] [address] [phone]            Nov 08, 2017 11:30 AM CST   (Arrive by 11:15 AM)   Return General Liver with Kierra Morrison MD   [facility]atology ([facility])    [address]  3rd Floor  [facility] [id number] [phone]              Who to contact     If you have questions or need follow up information about today's clinic visit or your schedule please contact [facility] directly at [phone].  Normal or non-critical lab and imaging results will be communicated to you by MyChart, letter or phone within 4 business days after the clinic has received the results. If you do not hear from us within 7 days, please contact the clinic through MyChart or phone. If you have a critical or abnormal lab result, we will notify you by phone as soon as possible.  Submit refill requests through [employer] or call your pharmacy and they will forward the refill request to us. Please allow 3 business days for your refill to be completed.        "   Additional Information About Your Visit        MyChart Information     [employer] lets you send messages to your doctor, view your test results, renew your prescriptions, schedule appointments and more. To sign up, go to [email or web address] . Click on \"Log in\" on the left side of the screen, which will take you to the Welcome page. Then click on \"Sign up Now\" on the right side of the page.     You will be asked to enter the access code listed below, as well as some personal information. Please follow the directions to create your username and password.     Your access code is: [email or web address]  Expires: 10/1/2017  6:30 AM     Your access code will  in 90 days. If you need help or a new code, please call your [facility] clinic or [phone].        Care EveryWhere ID     This is your [facility] EveryWhere ID. This could be used by other organizations to access your [facility] medical records  [id number]        Your Vitals Were     Pulse Temperature Respirations Pulse Oximetry BMI (Body Mass Index)       80 97.7  F (36.5  C) (Tympanic) 16 96% 21.16 kg/m2        Blood Pressure from Last 3 Encounters:   17 110/64   17 105/60   08/10/17 118/66    Weight from Last 3 Encounters:   17 112 lb (50.8 kg)   08/10/17 127 lb 3.3 oz (57.7 kg)   17 127 lb 4.8 oz (57.7 kg)              We Performed the Following     EKG 12-lead complete w/read - Clinics        Primary Care Provider Office Phone # Fax #    Felicitas Horton [phone]       [address] [address]        Equal Access to Services     LUKE DUVAL : Mukul Jose, lyudmila rivera, [address]. So [facility] [phone].    ATENCIÓN: Si habla español, tiene a martinez disposición servicios gratuitos de asistencia lingüística. Llame al [phone].    We comply with applicable federal civil rights laws and [place] laws. We do not " discriminate on the basis of race, color, national origin, age, disability sex, sexual orientation or gender identity.            Thank you!     Thank you for choosing [facility]  for your care. Our goal is always to provide you with excellent care. Hearing back from our patients is one way we can continue to improve our services. Please take a few minutes to complete the written survey that you may receive in the mail after your visit with us. Thank you!             Your Updated Medication List - Protect others around you: Learn how to safely use, store and throw away your medicines at www.disposemymeds.org.          This list is accurate as of: 8/21/17 10:59 AM.  Always use your most recent med list.                   Brand Name Dispense Instructions for use Diagnosis    * albuterol 108 (90 BASE) MCG/ACT Inhaler    PROAIR HFA/PROVENTIL HFA/VENTOLIN HFA    1 Inhaler    Inhale 2 puffs into the lungs every 6 hours as needed for shortness of breath / dyspnea or wheezing    Moderate persistent asthma without complication       * albuterol (2.5 MG/3ML) 0.083% neb solution     25 vial    Take 1 vial (2.5 mg) by nebulization every 6 hours as needed for shortness of breath / dyspnea or wheezing    Mild intermittent asthma with acute exacerbation       bisacodyl 5 MG EC tablet    DULCOLAX    30 tablet    Take 1 tablet (5 mg) by mouth daily as needed for constipation    Constipation, unspecified constipation type       BOOST CALORIE SMART Liqd     90 Bottle    Take 3 Cans by mouth daily    HCC (hepatocellular carcinoma) (H)       cetirizine 10 MG tablet    zyrTEC    30 tablet    Take 1 tablet (10 mg) by mouth every evening    Chronic allergic conjunctivitis       cholecalciferol 41674 UNITS capsule    VITAMIN D3    12 capsule    Take 1 capsule (50,000 Units) by mouth once a week    Vitamin D deficiency       DAILY MULTIPLE VITAMIN/IRON Tabs     30 tablet    Take 1 tablet by mouth daily    Chronic hepatitis C  without hepatic coma (H)       fluticasone 50 MCG/ACT spray    FLONASE    1 Bottle    Spray 2 sprays into both nostrils daily    Chronic rhinitis       lactulose 20 GM/30ML Soln     946 mL    Take 30 mLs by mouth daily    Cirrhosis of liver with ascites, unspecified hepatic cirrhosis type (H), HCC (hepatocellular carcinoma) (H)       mometasone-formoterol 200-5 MCG/ACT oral inhaler    DULERA    1 Inhaler    Inhale 2 puffs into the lungs 2 times daily    Moderate persistent asthma without complication       olopatadine 0.1 % ophthalmic solution    PATANOL    5 mL    Place 1 drop into both eyes 2 times daily    Chronic allergic conjunctivitis       omeprazole 40 MG capsule    priLOSEC    90 capsule    Take 1 capsule (40 mg) by mouth daily Take 30-60 minutes before a meal.    Gastroesophageal reflux disease without esophagitis       * Notice:  This list has 2 medication(s) that are the same as other medications prescribed for you. Read the directions carefully, and ask your doctor or other care provider to review them with you.

## 2017-08-22 NOTE — PROGRESS NOTES
Called pt to further follow up on her availability for TACE treatment of her HCC. Used outside [facility] .   I did offer Monday 8/28 in which she states that at this time she has a lot of appts especially with her eye doctor.     She states that she will call me back when she is ready to schedule and move forward.     Brittaney Saumel RN, BSN  Interventional Radiology Nurse Coordinator   Phone: [phone]

## 2017-08-23 NOTE — PROGRESS NOTES
Yue Portillo is a 77 year old  who is 1 day s/p cataract extraction with intraocular lens placement right eye    Off to a good start.    Medications in the surgical eye:    prednisolone acetate 1% four times a day     Ketorolac 0.4% four times a day     Ofloxacin four times a day      Limit heavy lifting, bending over, and heavy exertion. Light aerobic activity is acceptable. Avoid swimming pools, hot tubs, or saunas for 3-4 weeks.   Wear the protective shield at night for the next seven days, and call for increased redness, discharge, pain, or decreased vision.    Return to clinic in approximately 1 week, earlier as needed.     Howard Lan MD    ~~~~~~~~~~~~~~~~~~~~~~~~~~~~~~~~~~~~~~~~~~~~~~~~~~~~~~~~~~~~~~~~    Complete documentation of historical and exam elements from today's encounter can be found in the full encounter summary report (not reduplicated in this progress note). I personally obtained the chief complaint(s) and history of present illness.  I confirmed and edited as necessary the review of systems, past medical/surgical history, family history, social history, and examination findings as documented by others; and I examined the patient myself. I personally reviewed the relevant tests, images, and reports as documented above. I formulated and edited as necessary the assessment and plan and discussed the findings and management plan with the patient and family.    Ahmet Farr MD

## 2017-08-23 NOTE — MR AVS SNAPSHOT
After Visit Summary   8/23/2017    Yue Portillo    MRN: [id number]           Patient Information     Date Of Birth          1940        Visit Information        Provider Department      8/23/2017 3:00 PM Ahmet Farr MD; [facility]        Today's Diagnoses     Pseudophakia - Right Eye    -  1    Dry eyes, bilateral - Both Eyes           Follow-ups after your visit        Follow-up notes from your care team     Return in about 1 week (around 8/30/2017) for Follow Up.      Your next 10 appointments already scheduled     Aug 30, 2017  2:15 PM CDT   Post-Op with Ahmet Farr MD   Eye Clinic ([facility])    [address]  [address]  [address] Clin [address]a  [facility] [address]   [phone]            Sep 13, 2017  2:15 PM CDT   Post-Op with Ahmet Farr MD   Eye Clinic ([facility])    [address]  [address]  [address] [place] [address] [phone]            Sep 20, 2017  2:15 PM CDT   Post-Op with Ahmet Farr MD   Eye Clinic ([facility])    [address]  [address]  [address] Clin 9a  [facility] [address] [phone]            Oct 11, 2017  2:15 PM CDT   Post-Op with Ahmet Farr MD   Eye Clinic ([facility])    [address]  [address]  [address] Clin [address]a  [facility] [address]   [phone]            Nov 08, 2017 10:30 AM CST   Lab with  LAB   [facility] ([facility])    [address]  1st Floor  [facility] [id number] [phone]            Nov 08, 2017 11:30 AM CST   (Arrive by 11:15 AM)   Return General Liver with Kierra Morrison MD   [facility] ([facility])    [address]  3rd Floor  [facility] [id number] [phone]              Who to contact     Please call your clinic at [phone] to:    Ask questions about your  health    Make or cancel appointments    Discuss your medicines    Learn about your test results    Speak to your doctor   If you have compliments or concerns about an experience at your clinic, or if you wish to file a complaint, please contact [facility] Patient Relations at [phone] or email us at [email or web address]         Additional Information About Your Visit        [employer] Information     [employer] is an electronic gateway that provides easy, online access to your medical records. With Ewireless, you can request a clinic appointment, read your test results, renew a prescription or communicate with your care team.     To sign up for Ewireless visit the website at [email or web address]   You will be asked to enter the access code listed below, as well as some personal information. Please follow the directions to create your username and password.     Your access code is: [email or web address]  Expires: 10/1/2017  6:30 AM     Your access code will  in 90 days. If you need help or a new code, please contact your [facility] or call [phone] for assistance.        Care EveryWhere ID     This is your Care EveryWhere ID. This could be used by other organizations to access your [place] medical records  [id number]         Blood Pressure from Last 3 Encounters:   17 110/64   17 105/60   08/10/17 118/66    Weight from Last 3 Encounters:   17 50.8 kg (112 lb)   08/10/17 57.7 kg (127 lb 3.3 oz)   17 57.7 kg (127 lb 4.8 oz)              Today, you had the following     No orders found for display       Primary Care Provider Office Phone # Fax #    Felicitas Horton [phone]       [address] [address]        Equal Access to Services     LUKE DUVAL : Mukul Jose, wapauline rivera, qarosana arevalo.  So [facility] [phone].    ATENCIÓN: Si fiordaliza jose, tiene a martinez disposición servicios gratuitos de asistencia lingüística. Yonathan rodriguez [phone].    We comply with applicable federal civil rights laws and [place] laws. We do not discriminate on the basis of race, color, national origin, age, disability sex, sexual orientation or gender identity.            Thank you!     Thank you for choosing EYE CLINIC  for your care. Our goal is always to provide you with excellent care. Hearing back from our patients is one way we can continue to improve our services. Please take a few minutes to complete the written survey that you may receive in the mail after your visit with us. Thank you!             Your Updated Medication List - Protect others around you: Learn how to safely use, store and throw away your medicines at www.disposemymeds.org.          This list is accurate as of: 8/23/17 11:59 PM.  Always use your most recent med list.                   Brand Name Dispense Instructions for use Diagnosis    * albuterol 108 (90 BASE) MCG/ACT Inhaler    PROAIR HFA/PROVENTIL HFA/VENTOLIN HFA    1 Inhaler    Inhale 2 puffs into the lungs every 6 hours as needed for shortness of breath / dyspnea or wheezing    Moderate persistent asthma without complication       * albuterol (2.5 MG/3ML) 0.083% neb solution     25 vial    Take 1 vial (2.5 mg) by nebulization every 6 hours as needed for shortness of breath / dyspnea or wheezing    Mild intermittent asthma with acute exacerbation       bisacodyl 5 MG EC tablet    DULCOLAX    30 tablet    Take 1 tablet (5 mg) by mouth daily as needed for constipation    Constipation, unspecified constipation type       BOOST CALORIE SMART Liqd     90 Bottle    Take 3 Cans by mouth daily    HCC (hepatocellular carcinoma) (H)       cetirizine 10 MG tablet    zyrTEC    30 tablet    Take 1 tablet (10 mg) by mouth every evening    Chronic allergic conjunctivitis       cholecalciferol 52927 UNITS capsule     VITAMIN D3    12 capsule    Take 1 capsule (50,000 Units) by mouth once a week    Vitamin D deficiency       DAILY MULTIPLE VITAMIN/IRON Tabs     30 tablet    Take 1 tablet by mouth daily    Chronic hepatitis C without hepatic coma (H)       fluticasone 50 MCG/ACT spray    FLONASE    1 Bottle    Spray 2 sprays into both nostrils daily    Chronic rhinitis       lactulose 20 GM/30ML Soln     946 mL    Take 30 mLs by mouth daily    Cirrhosis of liver with ascites, unspecified hepatic cirrhosis type (H), HCC (hepatocellular carcinoma) (H)       mometasone-formoterol 200-5 MCG/ACT oral inhaler    DULERA    1 Inhaler    Inhale 2 puffs into the lungs 2 times daily    Moderate persistent asthma without complication       olopatadine 0.1 % ophthalmic solution    PATANOL    5 mL    Place 1 drop into both eyes 2 times daily    Chronic allergic conjunctivitis       omeprazole 40 MG capsule    priLOSEC    90 capsule    Take 1 capsule (40 mg) by mouth daily Take 30-60 minutes before a meal.    Gastroesophageal reflux disease without esophagitis       * Notice:  This list has 2 medication(s) that are the same as other medications prescribed for you. Read the directions carefully, and ask your doctor or other care provider to review them with you.

## 2017-08-30 NOTE — MR AVS SNAPSHOT
After Visit Summary   8/30/2017    Yue Portillo    MRN: [id number]           Patient Information     Date Of Birth          1940        Visit Information        Provider Department      8/30/2017 2:00 PM Ahmet Farr MD; [facility]        Today's Diagnoses     Pseudophakia - Right Eye    -  1    Dry eyes, bilateral - Both Eyes          Care Instructions    Right eye    Prednisolone 1 drop 4 times a day for 1 week, then 3 times a day for 1 week, 2 times a day for 1 week, then once a day for 1 week and stop    Stop ofloxacin and ketorolac eyedrops          Follow-ups after your visit        Your next 10 appointments already scheduled     Sep 13, 2017  2:15 PM CDT   Post-Op with Ahmet Farr MD   Eye Clinic ([facility])    [address]  [address]  [facility] [address]   [phone]            Sep 20, 2017  2:15 PM CDT   Post-Op with Ahmet Farr MD   Eye Clinic ([facility])    [address]  [address]   [address] [address]  [facility] [address]   [phone]            Oct 11, 2017  2:15 PM CDT   Post-Op with Ahmet Farr MD   Eye Clinic ([facility])    [address]  [address]   [address] [address]  [facility] [address]   [phone]            Nov 08, 2017 10:30 AM CST   Lab with  LAB   [facility] ([facility])    [address] [id number] [phone]            Nov 08, 2017 11:30 AM CST   (Arrive by 11:15 AM)   Return General Liver with Kierra Morrison MD   [facility]at[facility] ([facility])    [address] [place] [id number] [phone]              Who to contact     Please call your clinic at [phone] to:    Ask questions about your health    Make or cancel appointments    Discuss your medicines    Learn about your test  results    Speak to your doctor   If you have compliments or concerns about an experience at your clinic, or if you wish to file a complaint, please contact [facility] Patient Relations at [phone] or email us at [email or web address]         Additional Information About Your Visit        GetIntentharYellowBrck Information     KloudCatch is an electronic gateway that provides easy, online access to your medical records. With KloudCatch, you can request a clinic appointment, read your test results, renew a prescription or communicate with your care team.     To sign up for KloudCatch visit the website at [email or web address]   You will be asked to enter the access code listed below, as well as some personal information. Please follow the directions to create your username and password.     Your access code is: [email or web address]  Expires: 10/1/2017  6:30 AM     Your access code will  in 90 days. If you need help or a new code, please contact your [facility] or call [phone] for assistance.        Care EveryWhere ID     This is your Care EveryWhere ID. This could be used by other organizations to access your [place] medical records  [id number]         Blood Pressure from Last 3 Encounters:   17 110/64   17 105/60   08/10/17 118/66    Weight from Last 3 Encounters:   17 50.8 kg (112 lb)   08/10/17 57.7 kg (127 lb 3.3 oz)   17 57.7 kg (127 lb 4.8 oz)              Today, you had the following     No orders found for display         Today's Medication Changes          These changes are accurate as of: 17  5:22 PM.  If you have any questions, ask your nurse or doctor.               These medicines have changed or have updated prescriptions.        Dose/Directions    prednisoLONE acetate 1 % ophthalmic susp   Commonly known as:  PRED FORTE   This may have changed:    - how to take this  - when to take this   Used for:  Pseudophakia    Changed by:  Ahmet Farr MD        Dose:  1 drop   Place 1 drop into the right eye 3 times daily   Quantity:  1 Bottle   Refills:  1            Where to get your medicines      These medications were sent to [facility] [address] [facility]  [address] [facility] [address]     Phone:  [phone]     prednisoLONE acetate 1 % ophthalmic susp                Primary Care Provider Office Phone # Fax #    Felicitas Truman Horton [phone]       [address]   [facility] [address]        Equal Access to Services     [facility]: Hadii nico muñoz hadasho Solou, waaxda luqadaha, qaybta kaalmada fatimah, rosana ricks . So [facility] [phone].    ATENCIÓN: Si habla español, tiene a martinez disposición servicios gratuitos de asistencia lingüística. [facility] [phone].    We comply with applicable federal civil rights laws and [place] laws. We do not discriminate on the basis of race, color, national origin, age, disability sex, sexual orientation or gender identity.            Thank you!     Thank you for choosing EYE CLINIC  for your care. Our goal is always to provide you with excellent care. Hearing back from our patients is one way we can continue to improve our services. Please take a few minutes to complete the written survey that you may receive in the mail after your visit with us. Thank you!             Your Updated Medication List - Protect others around you: Learn how to safely use, store and throw away your medicines at www.disposemymeds.org.          This list is accurate as of: 8/30/17  5:22 PM.  Always use your most recent med list.                   Brand Name Dispense Instructions for use Diagnosis    * albuterol 108 (90 BASE) MCG/ACT Inhaler    PROAIR HFA/PROVENTIL HFA/VENTOLIN HFA    1 Inhaler    Inhale 2 puffs into the lungs every 6 hours as needed for shortness of breath / dyspnea or  wheezing    Moderate persistent asthma without complication       * albuterol (2.5 MG/3ML) 0.083% neb solution     25 vial    Take 1 vial (2.5 mg) by nebulization every 6 hours as needed for shortness of breath / dyspnea or wheezing    Mild intermittent asthma with acute exacerbation       bisacodyl 5 MG EC tablet    DULCOLAX    30 tablet    Take 1 tablet (5 mg) by mouth daily as needed for constipation    Constipation, unspecified constipation type       BOOST CALORIE SMART Liqd     90 Bottle    Take 3 Cans by mouth daily    HCC (hepatocellular carcinoma) (H)       cetirizine 10 MG tablet    zyrTEC    30 tablet    Take 1 tablet (10 mg) by mouth every evening    Chronic allergic conjunctivitis       cholecalciferol 18647 UNITS capsule    VITAMIN D3    12 capsule    Take 1 capsule (50,000 Units) by mouth once a week    Vitamin D deficiency       DAILY MULTIPLE VITAMIN/IRON Tabs     30 tablet    Take 1 tablet by mouth daily    Chronic hepatitis C without hepatic coma (H)       fluticasone 50 MCG/ACT spray    FLONASE    1 Bottle    Spray 2 sprays into both nostrils daily    Chronic rhinitis       lactulose 20 GM/30ML Soln     946 mL    Take 30 mLs by mouth daily    Cirrhosis of liver with ascites, unspecified hepatic cirrhosis type (H), HCC (hepatocellular carcinoma) (H)       mometasone-formoterol 200-5 MCG/ACT oral inhaler    DULERA    1 Inhaler    Inhale 2 puffs into the lungs 2 times daily    Moderate persistent asthma without complication       olopatadine 0.1 % ophthalmic solution    PATANOL    5 mL    Place 1 drop into both eyes 2 times daily    Chronic allergic conjunctivitis       omeprazole 40 MG capsule    priLOSEC    90 capsule    Take 1 capsule (40 mg) by mouth daily Take 30-60 minutes before a meal.    Gastroesophageal reflux disease without esophagitis       prednisoLONE acetate 1 % ophthalmic susp    PRED FORTE    1 Bottle    Place 1 drop into the right eye 3 times daily    Pseudophakia       * Notice:   This list has 2 medication(s) that are the same as other medications prescribed for you. Read the directions carefully, and ask your doctor or other care provider to review them with you.

## 2017-08-30 NOTE — PROGRESS NOTES
Yue Portillo is a 77 year old  who is 1 week s/p cataract extraction with intraocular lens placement right eye    Meds: pred QID, oflox QID, acular QID    Off to a good start. AC well formed. IOL well positioned. Vision should improve once corneal sutures are removed - defer suture removal until POM#1    Medications in the surgical eye:    Taper prednisolone acetate 1% 4-3-2-1 x 1 week each and stop   Stop Ketorolac  And Ofloxacin four times a day      Limit heavy lifting, bending over, and heavy exertion. Light aerobic activity is acceptable. Avoid swimming pools, hot tubs, or saunas for 3-4 weeks.  Call for increased redness, discharge, pain, or decreased vision.    Left eye scheduled on 9/12/17. Follow up postop, earlier as needed.     FERNANDO Fernandez  Cornea fellow    ~~~~~~~~~~~~~~~~~~~~~~~~~~~~~~~~~~~~~~~~~~~~~~~~~~~~~~~~~~~~~~~~    Complete documentation of historical and exam elements from today's encounter can be found in the full encounter summary report (not reduplicated in this progress note). I personally obtained the chief complaint(s) and history of present illness.  I confirmed and edited as necessary the review of systems, past medical/surgical history, family history, social history, and examination findings as documented by others; and I examined the patient myself. I personally reviewed the relevant tests, images, and reports as documented above. I formulated and edited as necessary the assessment and plan and discussed the findings and management plan with the patient and family.    Ahmet Farr MD

## 2017-08-30 NOTE — PATIENT INSTRUCTIONS
Right eye    Prednisolone 1 drop 4 times a day for 1 week, then 3 times a day for 1 week, 2 times a day for 1 week, then once a day for 1 week and stop    Stop ofloxacin and ketorolac eyedrops

## 2017-09-08 NOTE — MR AVS SNAPSHOT
After Visit Summary   9/8/2017    Yue Portillo    MRN: [id number]           Patient Information     Date Of Birth          1940        Visit Information        Provider Department      9/8/2017 12:50 PM Felicitas Horton MD [facility]        Today's Diagnoses     Anorexia    -  1    Otalgia of both ears           Follow-ups after your visit        Your next 10 appointments already scheduled     Sep 13, 2017  2:15 PM CDT   Post-Op with Ahmet Farr MD   Eye Clinic ([facility])    [address]  [address]   [address] [address]  [place] [address]   [phone]            Sep 20, 2017  2:15 PM CDT   Post-Op with Ahmet Farr MD   Eye Clinic ([facility])    [address]  [address]  [address] [address]  [place] [address]   [phone]            Oct 11, 2017  2:15 PM CDT   Post-Op with Ahmet Farr MD   Eye Clinic ([facility])    [address]  [address]  [address] [address]  [place] [address]   [phone]            Nov 08, 2017 10:30 AM CST   Lab with  LAB   [facility] ([facility])    [address]  [address] [place] [facility] [address] [phone]            Nov 08, 2017 11:30 AM CST   (Arrive by 11:15 AM)   Return General Liver with Kierra Morrison MD   [facility] ([facility])    [address]  [address] [facility] [address] [phone]              Who to contact     Please call your clinic at [phone] to:    Ask questions about your health    Make or cancel appointments    Discuss your medicines    Learn about your test results    Speak to your doctor   If you have compliments or concerns about an experience at your clinic, or if you wish to file a complaint, please contact [facility] Patient Relations at [phone] or email us at  "[email or web address]         Additional Information About Your Visit        Ko[employer] Information     Travel.ru is an electronic gateway that provides easy, online access to your medical records. With Travel.ru, you can request a clinic appointment, read your test results, renew a prescription or communicate with your care team.     To sign up for [email or web address] visit the website at [email or web address]   You will be asked to enter the access code listed below, as well as some personal information. Please follow the directions to create your username and password.     Your access code is: [email or web address]  Expires: 10/1/2017  6:30 AM     Your access code will  in 90 days. If you need help or a new code, please contact your [facility] or call [phone] for assistance.        Care EveryWhere ID     This is your Care EveryWhere ID. This could be used by other organizations to access your [facility] medical records  [id number]        Your Vitals Were     Pulse Temperature Respirations Height Pulse Oximetry BMI (Body Mass Index)    92 97.7  F (36.5  C) (Oral) 16 5' 1\" (154.9 cm) 95% 21.96 kg/m2       Blood Pressure from Last 3 Encounters:   17 125/79   17 110/64   17 105/60    Weight from Last 3 Encounters:   17 116 lb 3.2 oz (52.7 kg)   17 112 lb (50.8 kg)   08/10/17 127 lb 3.3 oz (57.7 kg)              Today, you had the following     No orders found for display         Today's Medication Changes          These changes are accurate as of: 17  1:40 PM.  If you have any questions, ask your nurse or doctor.               Start taking these medicines.        Dose/Directions    Acetic Acid-Aluminum Acetate 2 % Soln   Used for:  Otalgia of both ears   Started by:  Felicitas Horton MD        4 drops every 3 hours   Quantity:  60 mL   Refills:  1       cyproheptadine 4 MG tablet   Commonly known as:  PERIACTIN   Used for:  Anorexia   Started " by:  Felicitas Horton MD        Dose:  4 mg   Take 1 tablet (4 mg) by mouth 3 times daily For stimulating the appetite   Quantity:  90 tablet   Refills:  11            Where to get your medicines      [facility] [place] [place] - [address] [facility] [address] [facility]  [address] [address]     Phone:  [phone]     Acetic Acid-Aluminum Acetate 2 % Soln    cyproheptadine 4 MG tablet                Primary Care Provider Office Phone # Fax #    Felicitas Horton [phone]       [address]   [facility] [address]        Equal Access to Services     LUKE DUVAL : Mukul stoneo Solou, waaxda luqadaha, qaybta kaalmada adekeliyada, rosana ricks . So [facility] [phone].    ATENCIÓN: Si habla español, tiene a martinez disposición servicios gratuitos de asistencia lingüística. Llame al [phone].    We comply with applicable federal civil rights laws and [place] laws. We do not discriminate on the basis of race, color, national origin, age, disability sex, sexual orientation or gender identity.            Thank you!     Thank you for choosing [facility]  for your care. Our goal is always to provide you with excellent care. Hearing back from our patients is one way we can continue to improve our services. Please take a few minutes to complete the written survey that you may receive in the mail after your visit with us. Thank you!             Your Updated Medication List - Protect others around you: Learn how to safely use, store and throw away your medicines at www.disposemymeds.org.          This list is accurate as of: 9/8/17  1:40 PM.  Always use your most recent med list.                   Brand Name Dispense Instructions for use Diagnosis    Acetic Acid-Aluminum Acetate 2 % Soln     60 mL    4 drops every 3 hours    Otalgia of both ears       * albuterol 108 (90  BASE) MCG/ACT Inhaler    PROAIR HFA/PROVENTIL HFA/VENTOLIN HFA    1 Inhaler    Inhale 2 puffs into the lungs every 6 hours as needed for shortness of breath / dyspnea or wheezing    Moderate persistent asthma without complication       * albuterol (2.5 MG/3ML) 0.083% neb solution     25 vial    Take 1 vial (2.5 mg) by nebulization every 6 hours as needed for shortness of breath / dyspnea or wheezing    Mild intermittent asthma with acute exacerbation       bisacodyl 5 MG EC tablet    DULCOLAX    30 tablet    Take 1 tablet (5 mg) by mouth daily as needed for constipation    Constipation, unspecified constipation type       BOOST CALORIE SMART Liqd     90 Bottle    Take 3 Cans by mouth daily    HCC (hepatocellular carcinoma) (H)       cetirizine 10 MG tablet    zyrTEC    30 tablet    Take 1 tablet (10 mg) by mouth every evening    Chronic allergic conjunctivitis       cholecalciferol 35642 UNITS capsule    VITAMIN D3    12 capsule    Take 1 capsule (50,000 Units) by mouth once a week    Vitamin D deficiency       cyproheptadine 4 MG tablet    PERIACTIN    90 tablet    Take 1 tablet (4 mg) by mouth 3 times daily For stimulating the appetite    Anorexia       DAILY MULTIPLE VITAMIN/IRON Tabs     30 tablet    Take 1 tablet by mouth daily    Chronic hepatitis C without hepatic coma (H)       fluticasone 50 MCG/ACT spray    FLONASE    1 Bottle    Spray 2 sprays into both nostrils daily    Chronic rhinitis       lactulose 20 GM/30ML Soln     946 mL    Take 30 mLs by mouth daily    Cirrhosis of liver with ascites, unspecified hepatic cirrhosis type (H), HCC (hepatocellular carcinoma) (H)       mometasone-formoterol 200-5 MCG/ACT oral inhaler    DULERA    1 Inhaler    Inhale 2 puffs into the lungs 2 times daily    Moderate persistent asthma without complication       olopatadine 0.1 % ophthalmic solution    PATANOL    5 mL    Place 1 drop into both eyes 2 times daily    Chronic allergic conjunctivitis       omeprazole 40 MG  capsule    priLOSEC    90 capsule    Take 1 capsule (40 mg) by mouth daily Take 30-60 minutes before a meal.    Gastroesophageal reflux disease without esophagitis       prednisoLONE acetate 1 % ophthalmic susp    PRED FORTE    1 Bottle    Place 1 drop into the right eye 3 times daily    Pseudophakia       * Notice:  This list has 2 medication(s) that are the same as other medications prescribed for you. Read the directions carefully, and ask your doctor or other care provider to review them with you.

## 2017-09-09 NOTE — PROGRESS NOTES
SUBJECTIVE:  The patient is here with a couple of issues.  One has to do with her apartment.  I had filled out a form before for her.  At that time the issue was mainly that her apartment was next to the elevator, and it was very noisy and keeping her up at night.  People were knocking on her door.  Apparently, that was not enough to make any change in her apartment, so now she has another form to see if some other issue will be enough to get her apartment changed.  One that sounds pretty reasonable to me is that her shower is not handicapped accessible, so like a shower chair will not fit in it, which she needs because of her weakness.  Another issue is on the 6th floor, there is only one elevator that works, and certainly if there is a fire, she would be too weak to go up and down the stairs.  Then a third issue, which I am not sure like it will have an impact, is all of her neighbors are male.  If she is feeling ill and needs to ask for help, she would not feel comfortable asking any of her neighbors for help.  I said certainly I can put down all of these reasons.  I think the one that would have the most weight is that the shower is not handicapped accessible.  She also is wondering if there is anything that can be taken for improving her appetite, and I talked about Periactin.  She feels like her abdominal fluid is reaccumulating.  She does have her chronic hepatitis, and she said that she had a lot of fluid removed in August.  Just on a quick look in the chart, I was unable to see that.  She is usually followed by Dr. Meyer for her liver problems.  She said she was on one medicine now for removing the fluid, where she used to be on two.  Again, I am having trouble finding this information in the chart.  I saw that she used to be on furosemide and spironolactone.  I do not see either of those on her medicine list right now.  I am a little reluctant to make any change not knowing what she is taking right now,  and generally Dr. Meyer has been the one who has been dealing with her ascites.        OBJECTIVE:  On exam, the patient is in no acute distress.  Vital signs are stable.  I see her weight is up 4 pounds since 08/21.  Again, she says her appetite is down, and this possibly could be related to fluid reaccumulation.  No further exam was done.      IMPRESSION:  Form filling out related to her apartment.  Other symptoms probably related to her cancer and chronic liver disease.      PLAN:  I filled out her form and made a copy for us for the chart.  I did prescribe Periactin for her, but did not do anything about her diuretic meds, since it is not clear in the chart what she is even taking now.  I think it would be better for Dr. Meyer to potentially handle that.        Visit length was 25 minutes, all spent counseling about these issues and plan.

## 2017-09-13 NOTE — NURSING NOTE
Chief Complaints and History of Present Illnesses   Patient presents with     Post Op (Ophthalmology) Left Eye      1 day after cataract surgery     HPI    Last Eye Exam:  8/30/17   Affected eye(s):  Left   Symptoms:        Duration:  1 day   Frequency:  Constant       Do you have eye pain now?:  No      Comments:  Yue is here today 1 day post op after cataract surgery LE.  Upon removal of her eye parch, the LE looks good. She says she can see with her LE.  She slept well last night, but her LE was itchy.  She says she thinks she is getting a stitch removed from her right eye today.      Misael Coats COT 2:45 PM September 13, 2017

## 2017-09-13 NOTE — PROGRESS NOTES
Yue Portillo is a 77 year old  who is 3 week s/p cataract extraction with intraocular lens placement right eye and POD#1 CE PCIOL, capsular tension ring, anterior vitrectomy OS    Meds: pred QID, oflox QID, acular QID    Off to a good start. AC well formed. IOL well positioned. Vision should improve once corneal sutures are removed - defer suture removal until POM#1 OU    Medications in the surgical eye:    Taper prednisolone acetate 1% 4-3-2-1 x 1 week each and stop   Start Ketorolac  And Ofloxacin four times a day  Left eye    Limit heavy lifting, bending over, and heavy exertion. Light aerobic activity is acceptable. Avoid swimming pools, hot tubs, or saunas for 3-4 weeks.  Call for increased redness, discharge, pain, or decreased vision.     F/u 1 week    Jose Miguel Del Cid, DO  Cornea Fellow      ~~~~~~~~~~~~~~~~~~~~~~~~~~~~~~~~~~~~~~~~~~~~~~~~~~~~~~~~~~~~~~~~    Complete documentation of historical and exam elements from today's encounter can be found in the full encounter summary report (not reduplicated in this progress note). I personally obtained the chief complaint(s) and history of present illness.  I confirmed and edited as necessary the review of systems, past medical/surgical history, family history, social history, and examination findings as documented by others; and I examined the patient myself. I personally reviewed the relevant tests, images, and reports as documented above. I formulated and edited as necessary the assessment and plan and discussed the findings and management plan with the patient and family.    Ahmet Farr MD

## 2017-09-13 NOTE — MR AVS SNAPSHOT
After Visit Summary   9/13/2017    Yue Portillo    MRN: [id number]           Patient Information     Date Of Birth          1940        Visit Information        Provider Department      9/13/2017 2:00 PM Ahmet Farr MD; [facility]        Today's Diagnoses     Pseudophakia - Both Eyes    -  1      Care Instructions    Prednisolone 2x/day right eye, 4x/day left eye (pink)  Ofloxacin 4x/day both eyes (tan)  Ketorolac 4x/day left eye (gray)          Follow-ups after your visit        Follow-up notes from your care team     Return in about 1 week (around 9/20/2017).      Your next 10 appointments already scheduled     Sep 20, 2017  2:15 PM CDT   Post-Op with Ahmet Farr MD   Eye Clinic ([facility])    [address]  [address]  [facility] [address] [phone]            Oct 11, 2017  2:15 PM CDT   Post-Op with Ahmet Farr MD   Eye Clinic ([facility])    [address]  [address]  [facility] [address] [phone]            Nov 08, 2017 10:30 AM CST   Lab with  LAB   [facility] ([facility])    [address]  [address] [facility] [address] [phone]            Nov 08, 2017 11:30 AM CST   (Arrive by 11:15 AM)   Return General Liver with Kierra Morrison MD   [facility] ([facility])    [address] [place] [address]  [address] [facility] [address] [phone]              Who to contact     Please call your clinic at [phone] to:    Ask questions about your health    Make or cancel appointments    Discuss your medicines    Learn about your test results    Speak to your doctor   If you have compliments or concerns about an experience at your clinic, or if you wish to file a complaint, please contact [facility] Patient Relations at [phone] or email us at  [email or web address]         Additional Information About Your Visit        Tas[employer]ehart Information     Tas[employer] is an electronic gateway that provides easy, online access to your medical records. With Google, you can request a clinic appointment, read your test results, renew a prescription or communicate with your care team.     To sign up for Google visit the website at [email or web address]   You will be asked to enter the access code listed below, as well as some personal information. Please follow the directions to create your username and password.     Your access code is: [email or web address]  Expires: 10/1/2017  6:30 AM     Your access code will  in 90 days. If you need help or a new code, please contact your [facility] or call [phone] for assistance.        Care EveryWhere ID     This is your Care EveryWhere ID. This could be used by other organizations to access your [place] medical records  [id number]         Blood Pressure from Last 3 Encounters:   17 125/79   17 110/64   17 105/60    Weight from Last 3 Encounters:   17 52.7 kg (116 lb 3.2 oz)   17 50.8 kg (112 lb)   08/10/17 57.7 kg (127 lb 3.3 oz)              Today, you had the following     No orders found for display       Primary Care Provider Office Phone # Fax #    Felicitas Truman Horton [phone]       [address] [address]        Equal Access to Services     [facility]: Hadii aad ku hadasho Soomaali, waaxda luqadaha, qaybta kaalmada fatimah, rosana ricks . So [facility] [phone].    ATENCIÓN: Si habla español, tiene a martinez disposición servicios gratuitos de asistencia lingüística. Llame al [phone].    We comply with applicable federal civil rights laws and [place] laws. We do not discriminate on the basis of race, color, national origin, age, disability sex, sexual orientation or  gender identity.            Thank you!     Thank you for choosing EYE CLINIC  for your care. Our goal is always to provide you with excellent care. Hearing back from our patients is one way we can continue to improve our services. Please take a few minutes to complete the written survey that you may receive in the mail after your visit with us. Thank you!             Your Updated Medication List - Protect others around you: Learn how to safely use, store and throw away your medicines at www.disposemymeds.org.          This list is accurate as of: 9/13/17 11:59 PM.  Always use your most recent med list.                   Brand Name Dispense Instructions for use Diagnosis    Acetic Acid-Aluminum Acetate 2 % Soln     60 mL    4 drops every 3 hours    Otalgia of both ears       * albuterol 108 (90 BASE) MCG/ACT Inhaler    PROAIR HFA/PROVENTIL HFA/VENTOLIN HFA    1 Inhaler    Inhale 2 puffs into the lungs every 6 hours as needed for shortness of breath / dyspnea or wheezing    Moderate persistent asthma without complication       * albuterol (2.5 MG/3ML) 0.083% neb solution     25 vial    Take 1 vial (2.5 mg) by nebulization every 6 hours as needed for shortness of breath / dyspnea or wheezing    Mild intermittent asthma with acute exacerbation       bisacodyl 5 MG EC tablet    DULCOLAX    30 tablet    Take 1 tablet (5 mg) by mouth daily as needed for constipation    Constipation, unspecified constipation type       BOOST CALORIE SMART Liqd     90 Bottle    Take 3 Cans by mouth daily    HCC (hepatocellular carcinoma) (H)       cetirizine 10 MG tablet    zyrTEC    30 tablet    Take 1 tablet (10 mg) by mouth every evening    Chronic allergic conjunctivitis       cholecalciferol 72103 UNITS capsule    VITAMIN D3    12 capsule    Take 1 capsule (50,000 Units) by mouth once a week    Vitamin D deficiency       cyproheptadine 4 MG tablet    PERIACTIN    90 tablet    Take 1 tablet (4 mg) by mouth 3 times daily For stimulating  the appetite    Anorexia       DAILY MULTIPLE VITAMIN/IRON Tabs     30 tablet    Take 1 tablet by mouth daily    Chronic hepatitis C without hepatic coma (H)       fluticasone 50 MCG/ACT spray    FLONASE    1 Bottle    Spray 2 sprays into both nostrils daily    Chronic rhinitis       ketorolac 0.5 % ophthalmic solution    ACULAR     Place 1 drop Into the left eye 4 times daily        lactulose 20 GM/30ML Soln     946 mL    Take 30 mLs by mouth daily    Cirrhosis of liver with ascites, unspecified hepatic cirrhosis type (H), HCC (hepatocellular carcinoma) (H)       mometasone-formoterol 200-5 MCG/ACT oral inhaler    DULERA    1 Inhaler    Inhale 2 puffs into the lungs 2 times daily    Moderate persistent asthma without complication       ofloxacin 0.3 % ophthalmic solution    OCUFLOX     Place 1 drop Into the left eye 4 times daily        olopatadine 0.1 % ophthalmic solution    PATANOL    5 mL    Place 1 drop into both eyes 2 times daily    Chronic allergic conjunctivitis       omeprazole 40 MG capsule    priLOSEC    90 capsule    Take 1 capsule (40 mg) by mouth daily Take 30-60 minutes before a meal.    Gastroesophageal reflux disease without esophagitis       * prednisoLONE acetate 1 % ophthalmic susp    PRED FORTE     Place 1 drop Into the left eye 4 times daily        * prednisoLONE acetate 1 % ophthalmic susp    PRED FORTE    1 Bottle    Place 1 drop into the right eye 3 times daily    Pseudophakia       * Notice:  This list has 4 medication(s) that are the same as other medications prescribed for you. Read the directions carefully, and ask your doctor or other care provider to review them with you.

## 2017-09-13 NOTE — PATIENT INSTRUCTIONS
Prednisolone 2x/day right eye, 4x/day left eye (pink)  Ofloxacin 4x/day both eyes (tan)  Ketorolac 4x/day left eye (gray)

## 2017-09-19 NOTE — PROGRESS NOTES
"Visit to the client's home for annual health risk assessment.  An  was present.    Current situation/living environment/hospitalization: Client is a 77 year old Nigerian female who lives alone in a tidy apartment in the [facility]. Her daughter He was present today as well. Upon arrival, she was in the bathroom, and then came out with DTR's help. the last 6 month, she has not sustained any falls, though is susceptible to falling. She had one hospital stay in the last year. She went to [facility] with weakness and diagnosed with sepsis and E.Coli.     Activities of daily living (ADL)/instrumental activities of daily living (IADL) and functional issues: She reports to needing assistance with her ADL's, such as bathing, dressing, personal grooming, setting up to eat (can feed herself), toileting, ambulating (SBA) and transferring/bed mobility at times, depending on her weakness.  She is having trouble when laying down at night, due to the fluid buildup in her abdomen. Has been going to her specialist to have fluid drained. Yue also requires assistance with IADL's, which includes cooking, cleaning, laundry, errands, medication management (set up and reminders) and setting up medical appointments/mail/bills.     Health concerns/updates: She reports to not being able to eat much due to feeling \"full\" from her abdominal bloating. Also, when she lays down flat at night, breathing can be difficult, per her report. She states she does not have much of an appetite, partly due to the fluid build up.    Cognition/mental health: She is generally good spirits and reports no depressive symptoms. She is forgetful at times and needs reminders to take her medications.  She feels well supported at home both formally and informally.    Additional info: CM will continue to monitor and reassess in 6 months, sooner if needed. Client and dtr know to call CM in the interim should the need arise. Asked that I write her a " support letter for her apartment building. She is on the 6th floor and would like to move to a lower level, for a few reasons. One being she is requesting a handicapped shower, two she is on a floor with all men and if she needs help, she doesn't feel comfortable asking them for help and three, if a fire was in the building, she would have a difficult time doing the stairs.  Letter written for her.    Client's Plan of Care consists of:  PCA (7 1/2 hours a day), Homemaking (5 hrs/week), and Boost for nutrition. She also has a hospital bed and gets other DME prn from [facility].    Mary Rosenberg, LSW  [phone]

## 2017-09-20 NOTE — PROGRESS NOTES
Yue Portillo is a 77 year old  who is 4 week s/p cataract extraction with intraocular lens placement right eye 8/22/17  and POW#1 CE PCIOL 9/12/17, capsular tension ring, anterior vitrectomy OS    Meds: pred QID, oflox QID, acular QID    Off to a good start. AC well formed. IOL well positioned. Vision should improve once corneal sutures are removed - defer suture removal until POM#1 OS    Medications in the surgical eye:    Taper prednisolone acetate 1% 3-2-1 x 1 week each and stop   STOP Ketorolac     Ofloxacin four times OU x 1 week   All remaining sutures removed    Limit heavy lifting, bending over, and heavy exertion. Light aerobic activity is acceptable. Avoid swimming pools, hot tubs, or saunas for 3-4 weeks.  Call for increased redness, discharge, pain, or decreased vision.     F/u 3 weeks, MRx OU, DFE OU    Jose Miguel Del Cid, DO  Cornea Fellow    ~~~~~~~~~~~~~~~~~~~~~~~~~~~~~~~~~~~~~~~~~~~~~~~~~~~~~~~~~~~~~~~~    Complete documentation of historical and exam elements from today's encounter can be found in the full encounter summary report (not reduplicated in this progress note). I personally obtained the chief complaint(s) and history of present illness.  I confirmed and edited as necessary the review of systems, past medical/surgical history, family history, social history, and examination findings as documented by others; and I examined the patient myself. I personally reviewed the relevant tests, images, and reports as documented above. I formulated and edited as necessary the assessment and plan and discussed the findings and management plan with the patient and family.    Ahmet Farr MD

## 2017-09-20 NOTE — MR AVS SNAPSHOT
After Visit Summary   9/20/2017    Yue Portillo    MRN: [id number]           Patient Information     Date Of Birth          1940        Visit Information        Provider Department      9/20/2017 2:00 PM Ahmet Farr MD; LANGUAGE [facility]        Today's Diagnoses     Pseudophakia - Both Eyes    -  1      Care Instructions    PINK Top - three times a day in the left eye for 1 week, then twice a day in the left eye for 1 week, then daily for 1 week, STOP    GREY Top - STOP    TAN Top - four times a day in both eyes for 1 week, then STOP          Follow-ups after your visit        Follow-up notes from your care team     Return in about 3 weeks (around 10/11/2017).      Your next 10 appointments already scheduled     Oct 11, 2017  2:15 PM CDT   Post-Op with Ahmet Farr MD   Eye Clinic ([facility])    [address]  [address]  [address] [address]  [facility] [id number] [phone]            Nov 08, 2017 10:30 AM CST   Lab with  LAB   [facility] ([facility])    [address]  [address] [facility] [id number] [phone]            Nov 08, 2017 11:30 AM CST   (Arrive by 11:15 AM)   Return General Liver with Kierra Morrison MD   [facility] Hepatology ([facility])    [address]  [address] [facility] [id number] [phone]              Who to contact     Please call your clinic at [phone] to:    Ask questions about your health    Make or cancel appointments    Discuss your medicines    Learn about your test results    Speak to your doctor   If you have compliments or concerns about an experience at your clinic, or if you wish to file a complaint, please contact [facility] Patient Relations at [phone] or email us at [email or web address]         Additional Information About Your Visit        MyChart Information      I.Systemst is an electronic gateway that provides easy, online access to your medical records. With VendRx, you can request a clinic appointment, read your test results, renew a prescription or communicate with your care team.     To sign up for VendRx visit the website at [email or web address]   You will be asked to enter the access code listed below, as well as some personal information. Please follow the directions to create your username and password.     Your access code is: [email or web address]  Expires: 10/1/2017  6:30 AM     Your access code will  in 90 days. If you need help or a new code, please contact your [facility] or call [phone] for assistance.        Care EveryWhere ID     This is your Care EveryWhere ID. This could be used by other organizations to access your [place] medical records  [id number]         Blood Pressure from Last 3 Encounters:   17 125/79   17 110/64   17 105/60    Weight from Last 3 Encounters:   17 52.7 kg (116 lb 3.2 oz)   17 50.8 kg (112 lb)   08/10/17 57.7 kg (127 lb 3.3 oz)              Today, you had the following     No orders found for display       Primary Care Provider Office Phone # Fax #    Felicitas Horton [phone]       [address] [address]        Equal Access to Services     [facility] : Hadii nico muñoz hadasho Solou, waaxda luqadaha, qaybta kaalmada fatimah, rosana ricks . So [facility] [phone].    ATENCIÓN: Si jola rebeca, tiene a martinez disposición servicios gratuitos de asistencia lingüística. Llame al [phone].    We comply with applicable federal civil rights laws and [place] laws. We do not discriminate on the basis of race, color, national origin, age, disability sex, sexual orientation or gender identity.            Thank you!     Thank you for choosing EYE CLINIC  for your care. Our goal is always  to provide you with excellent care. Hearing back from our patients is one way we can continue to improve our services. Please take a few minutes to complete the written survey that you may receive in the mail after your visit with us. Thank you!             Your Updated Medication List - Protect others around you: Learn how to safely use, store and throw away your medicines at www.disposemymeds.org.          This list is accurate as of: 9/20/17  3:45 PM.  Always use your most recent med list.                   Brand Name Dispense Instructions for use Diagnosis    Acetic Acid-Aluminum Acetate 2 % Soln     60 mL    4 drops every 3 hours    Otalgia of both ears       * albuterol 108 (90 BASE) MCG/ACT Inhaler    PROAIR HFA/PROVENTIL HFA/VENTOLIN HFA    1 Inhaler    Inhale 2 puffs into the lungs every 6 hours as needed for shortness of breath / dyspnea or wheezing    Moderate persistent asthma without complication       * albuterol (2.5 MG/3ML) 0.083% neb solution     25 vial    Take 1 vial (2.5 mg) by nebulization every 6 hours as needed for shortness of breath / dyspnea or wheezing    Mild intermittent asthma with acute exacerbation       bisacodyl 5 MG EC tablet    DULCOLAX    30 tablet    Take 1 tablet (5 mg) by mouth daily as needed for constipation    Constipation, unspecified constipation type       BOOST CALORIE SMART Liqd     90 Bottle    Take 3 Cans by mouth daily    HCC (hepatocellular carcinoma) (H)       cetirizine 10 MG tablet    zyrTEC    30 tablet    Take 1 tablet (10 mg) by mouth every evening    Chronic allergic conjunctivitis       cholecalciferol 18237 UNITS capsule    VITAMIN D3    12 capsule    Take 1 capsule (50,000 Units) by mouth once a week    Vitamin D deficiency       cyproheptadine 4 MG tablet    PERIACTIN    90 tablet    Take 1 tablet (4 mg) by mouth 3 times daily For stimulating the appetite    Anorexia       DAILY MULTIPLE VITAMIN/IRON Tabs     30 tablet    Take 1 tablet by mouth daily     Chronic hepatitis C without hepatic coma (H)       fluticasone 50 MCG/ACT spray    FLONASE    1 Bottle    Spray 2 sprays into both nostrils daily    Chronic rhinitis       ketorolac 0.5 % ophthalmic solution    ACULAR     Place 1 drop Into the left eye 4 times daily        lactulose 20 GM/30ML Soln     946 mL    Take 30 mLs by mouth daily    Cirrhosis of liver with ascites, unspecified hepatic cirrhosis type (H), HCC (hepatocellular carcinoma) (H)       mometasone-formoterol 200-5 MCG/ACT oral inhaler    DULERA    1 Inhaler    Inhale 2 puffs into the lungs 2 times daily    Moderate persistent asthma without complication       ofloxacin 0.3 % ophthalmic solution    OCUFLOX     Place 1 drop Into the left eye 4 times daily        olopatadine 0.1 % ophthalmic solution    PATANOL    5 mL    Place 1 drop into both eyes 2 times daily    Chronic allergic conjunctivitis       omeprazole 40 MG capsule    priLOSEC    90 capsule    Take 1 capsule (40 mg) by mouth daily Take 30-60 minutes before a meal.    Gastroesophageal reflux disease without esophagitis       * prednisoLONE acetate 1 % ophthalmic susp    PRED FORTE     Place 1 drop Into the left eye 4 times daily        * prednisoLONE acetate 1 % ophthalmic susp    PRED FORTE    1 Bottle    Place 1 drop into the right eye 3 times daily    Pseudophakia       * Notice:  This list has 4 medication(s) that are the same as other medications prescribed for you. Read the directions carefully, and ask your doctor or other care provider to review them with you.

## 2017-09-20 NOTE — PATIENT INSTRUCTIONS
PINK Top - three times a day in the left eye for 1 week, then twice a day in the left eye for 1 week, then daily for 1 week, STOP    GREY Top - STOP    TAN Top - four times a day in both eyes for 1 week, then STOP

## 2017-09-20 NOTE — NURSING NOTE
Chief Complaints and History of Present Illnesses   Patient presents with     Eye Problem     F/U     HPI    Symptoms:              Comments:  Yue is a 77 year old female presenting for her 4 week s/p IOL.     Happy with post-op results.     Digna WISE 2:45 PM September 20, 2017

## 2017-10-09 NOTE — TELEPHONE ENCOUNTER
Request for medication refill:    Date of last visit at clinic: 9/8/17    Please complete refill if appropriate and CLOSE ENCOUNTER.    Closing the encounter signifies the refill is complete.    If refill has been denied, please complete the smart phrase .smirefuse and route it to the [facility] RN TRIAGE pool to inform the patient and the pharmacy.    Sadia Atkinson, CMA

## 2017-10-11 NOTE — NURSING NOTE
Chief Complaints and History of Present Illnesses   Patient presents with     Post Op (Ophthalmology) Both Eyes     HPI    Affected eye(s):  Both   Symptoms:     Blurred vision         Do you have eye pain now?:  No      Comments:  Post op for both eyes.  The patient notes she has a little blurry vision especially for near vision.    RYLEE Bonilla 2:20 PM 10/11/2017

## 2017-10-11 NOTE — MR AVS SNAPSHOT
After Visit Summary   10/11/2017    Yue Portillo    MRN: [id number]           Patient Information     Date Of Birth          1940        Visit Information        Provider Department      10/11/2017 2:00 PM Ahmet Farr MD; LANGUAGE [facility]        Today's Diagnoses     Pseudophakia - Both Eyes    -  1    Dry eyes, bilateral - Both Eyes           Follow-ups after your visit        Follow-up notes from your care team     Return in about 6 months (around 4/11/2018).      Your next 10 appointments already scheduled     Nov 08, 2017 10:30 AM CST   Lab with  LAB   [facility] ([facility])    [address]  1st [address]  [place] [address] [phone]            Nov 08, 2017 11:30 AM CST   (Arrive by 11:15 AM)   Return General Liver with Kierra Morrison MD   [facility] Hepatology ([facility])    [address]  3rd Floor  [place] [facility] [address] [phone]              Who to contact     Please call your clinic at [phone] to:    Ask questions about your health    Make or cancel appointments    Discuss your medicines    Learn about your test results    Speak to your doctor   If you have compliments or concerns about an experience at your clinic, or if you wish to file a complaint, please contact [facility] Patient Relations at [phone] or email us at [email or web address]         Additional Information About Your Visit        MyChart Information     ShareThe is an electronic gateway that provides easy, online access to your medical records. With ShareThe, you can request a clinic appointment, read your test results, renew a prescription or communicate with your care team.     To sign up for Talkot visit the website at [email or web address]   You will be asked to enter the access code listed below, as well as some personal information. Please follow the  directions to create your username and password.     Your access code is: [email or web address]  Expires: 2018  6:31 AM     Your access code will  in 90 days. If you need help or a new code, please contact your [facility] or call [phone] for assistance.        Care EveryWhere ID     This is your Care EveryWhere ID. This could be used by other organizations to access your [place] medical records  [id number]         Blood Pressure from Last 3 Encounters:   17 125/79   17 110/64   17 105/60    Weight from Last 3 Encounters:   17 52.7 kg (116 lb 3.2 oz)   17 50.8 kg (112 lb)   08/10/17 57.7 kg (127 lb 3.3 oz)              Today, you had the following     No orders found for display       Primary Care Provider Office Phone # Fax #    Felicitas Horton [phone]       [address] [address]        Equal Access to Services     [facility] : Hadii nico muñoz hadhemalatha Solou, waaxda nicole, qaybta ryan sheridan, rosana ricks . So [facility] [phone].    ATENCIÓN: Si habla español, tiene a martinez disposición servicios gratuitos de asistencia lingüística. [facility] [phone].    We comply with applicable federal civil rights laws and [place] laws. We do not discriminate on the basis of race, color, national origin, age, disability, sex, sexual orientation, or gender identity.            Thank you!     Thank you for choosing EYE CLINIC  for your care. Our goal is always to provide you with excellent care. Hearing back from our patients is one way we can continue to improve our services. Please take a few minutes to complete the written survey that you may receive in the mail after your visit with us. Thank you!             Your Updated Medication List - Protect others around you: Learn how to safely use, store and throw away your medicines at www.disposemymeds.org.          This  list is accurate as of: 10/11/17 11:59 PM.  Always use your most recent med list.                   Brand Name Dispense Instructions for use Diagnosis    Acetic Acid-Aluminum Acetate 2 % Soln     60 mL    4 drops every 3 hours    Otalgia of both ears       * albuterol 108 (90 BASE) MCG/ACT Inhaler    PROAIR HFA/PROVENTIL HFA/VENTOLIN HFA    1 Inhaler    Inhale 2 puffs into the lungs every 6 hours as needed for shortness of breath / dyspnea or wheezing    Moderate persistent asthma without complication       * albuterol (2.5 MG/3ML) 0.083% neb solution     25 vial    Take 1 vial (2.5 mg) by nebulization every 6 hours as needed for shortness of breath / dyspnea or wheezing    Mild intermittent asthma with acute exacerbation       bisacodyl 5 MG EC tablet    DULCOLAX    30 tablet    Take 1 tablet (5 mg) by mouth daily as needed for constipation    Constipation, unspecified constipation type       BOOST CALORIE SMART Liqd     90 Bottle    Take 3 Cans by mouth daily    HCC (hepatocellular carcinoma) (H)       cetirizine 10 MG tablet    zyrTEC    30 tablet    Take 1 tablet (10 mg) by mouth every evening    Chronic allergic conjunctivitis       cholecalciferol 80615 UNITS capsule    VITAMIN D3    12 capsule    Take 1 capsule (50,000 Units) by mouth once a week    Vitamin D deficiency       cyproheptadine 4 MG tablet    PERIACTIN    90 tablet    Take 1 tablet (4 mg) by mouth 3 times daily For stimulating the appetite    Anorexia       DAILY MULTIPLE VITAMIN/IRON Tabs     30 tablet    Take 1 tablet by mouth daily    Chronic hepatitis C without hepatic coma (H)       fluticasone 50 MCG/ACT spray    FLONASE    1 Bottle    Spray 2 sprays into both nostrils daily    Chronic rhinitis       ketorolac 0.5 % ophthalmic solution    ACULAR     Place 1 drop Into the left eye 4 times daily        lactulose 20 GM/30ML Soln     946 mL    Take 30 mLs by mouth daily    Cirrhosis of liver with ascites, unspecified hepatic cirrhosis type (H),  HCC (hepatocellular carcinoma) (H)       mometasone-formoterol 200-5 MCG/ACT oral inhaler    DULERA    1 Inhaler    Inhale 2 puffs into the lungs 2 times daily    Moderate persistent asthma without complication       ofloxacin 0.3 % ophthalmic solution    OCUFLOX     Place 1 drop Into the left eye 4 times daily        olopatadine 0.1 % ophthalmic solution    PATANOL    5 mL    Place 1 drop into both eyes 2 times daily    Chronic allergic conjunctivitis       omeprazole 40 MG capsule    priLOSEC    90 capsule    Take 1 capsule (40 mg) by mouth daily Take 30-60 minutes before a meal.    Gastroesophageal reflux disease without esophagitis       * prednisoLONE acetate 1 % ophthalmic susp    PRED FORTE     Place 1 drop Into the left eye 4 times daily        * prednisoLONE acetate 1 % ophthalmic susp    PRED FORTE     Place 1 drop into both eyes daily        * prednisoLONE acetate 1 % ophthalmic susp    PRED FORTE    1 Bottle    Place 1 drop into the right eye 3 times daily    Pseudophakia       * prednisoLONE acetate 1 % ophthalmic susp    PRED FORTE    5 mL    Place 1 drop Into the left eye 4 times daily    Pseudophakia       * Notice:  This list has 6 medication(s) that are the same as other medications prescribed for you. Read the directions carefully, and ask your doctor or other care provider to review them with you.

## 2017-10-11 NOTE — PROGRESS NOTES
Yue Portillo is a 77 year old  who is 4 week s/p cataract extraction with intraocular lens placement right eye 8/22/17  and POW#1 CE PCIOL 9/12/17, capsular tension ring, anterior vitrectomy OS    Meds: pred QID, oflox QID, acular QID    Doing well both eyes.  Patient has some irritation but vision has been good.  She denies pain, flashes, floaters, or redness.    Medications in the surgical eye:    Stop Pred   ATs PRN       MRx given today    F/u 6-12 months, sooner as needed    Jose Miguel Del Cid, DO  Cornea Fellow      ~~~~~~~~~~~~~~~~~~~~~~~~~~~~~~~~~~~~~~~~~~~~~~~~~~~~~~~~~~~~~~~~    Complete documentation of historical and exam elements from today's encounter can be found in the full encounter summary report (not reduplicated in this progress note). I personally obtained the chief complaint(s) and history of present illness.  I confirmed and edited as necessary the review of systems, past medical/surgical history, family history, social history, and examination findings as documented by others; and I examined the patient myself. I personally reviewed the relevant tests, images, and reports as documented above. I formulated and edited as necessary the assessment and plan and discussed the findings and management plan with the patient and family.    Ahmet Farr MD

## 2017-10-13 NOTE — PROGRESS NOTES
Writer spoke with Dr. Meyer regarding paracentesis ordering. Ok'd to order paracentesis. Therapy plan in place incase monthly PRN paracentesis is needed. IR paracentesis in place as per message request.    Patient's daughter requesting scheduling assistance.     FW: Dr. Meyer's patient - Needs orders for abdominal fluid drainage  Received: Today       Sofia Ziegler LPN Guidarelli, Jacob, LPN       Phone Number: [phone]                       Previous Messages       ----- Message -----      From: Raimundo Key      Sent: 10/13/2017  12:26 PM        To: Hepatology Nurses-   Subject: Dr. Meyer's patient - Needs orders for abdo*     Good afternoon team,     Mary, , called in today in regards to patient requesting to get abdominal fluid drainage. According to Interventional Radiology, patient needs orders entered from Dr. Meyer before procedure can be done. Please enter in orders at your earliest convenience. If you have any questions please contact pt's daughter He at [phone].     Thank you,   Raimundo Key

## 2017-10-23 NOTE — PROGRESS NOTES
Paracentesis Nursing Note  Yue Portillo presents today to [facility] for a paracentesis.    During today's appointment orders from Tala Meyer MD  were completed.    Progress Note:  Patient identification verified by name and date of birth.  Assessment completed.  Vitals monitored throughout appointment and recorded in Doc Flowsheets.  See proceduralist note in ultrasound.    Date of consent or authorization: 10/23/17.  Invasive Procedure Safety Checklist was completed and sent for scanning.     Paracentesis performed by Mark Lombardi MD Radiology.    The following labs were communicated to provider performing paracentesis:  Lab Results   Component Value Date    PLT 79 10/23/2017       Total amount of ascites fluid drained: 2.2 liters.  Color of ascites fluid: yellow.  Total amount of albumin given: 25  grams.    Patient tolerated procedure well.    Post procedure,denies pain or discomfort post paracentesis.      Discharge Plan:  Discharge instructions were reviewed with patient.  Patient/Representative verbalized understanding and all questions were answered.   Discharged from [facility] in stable condition.    Megha Browne RN    Administrations This Visit     albumin human 25 % injection 12.5 g     Admin Date Action Dose Route Administered By             10/23/2017 New Bag 12.5 g Intravenous Megha Browne RN                    lidocaine 1 % 20 mL     Admin Date Action Dose Route Administered By             10/23/2017 Given 20 mL Other Megha Browne RN                          /63  Pulse 107  Temp 96.3  F (35.7  C) (Oral)  Resp 16  Wt 57.2 kg (126 lb 1.7 oz)  BMI 23.83 kg/m2

## 2017-10-23 NOTE — MR AVS SNAPSHOT
After Visit Summary   10/23/2017    Yue Portillo    MRN: [id number]           Patient Information     Date Of Birth          1940        Visit Information        Provider Department      10/23/2017 7:30 AM Provider, [facility]; [employer];  40 ATC M [facility]        Today's Diagnoses     HCC (hepatocellular carcinoma) (H)    -  1    Cirrhosis of liver with ascites, unspecified hepatic cirrhosis type (H)          Care Instructions    DISCHARGE INSTRUCTIONS FOLLOWING ABDOMINAL PARACENTESIS    After you go home:    No strenuous activity for 24 hours    Resume your regular diet    Limit fluid intake for the first 48 hours to no more than 2 quarts per day.  There should be minimal drainage from the needle site.  If drainage does occur and soaks through the bandage, apply gentle pressure with your hand for 5 minutes.    Notify MD for the following:    Excessive drainage    Excessive swelling, redness or tenderness at the needle site    Fever greater than 101 degrees F    Dizziness or light-headedness when getting up or walking      IF THIS IS A MEDICAL EMERGENCY, CALL [phone]    If you have any questions or concerns    Contact the Hepatology Clinic:   [phone]    If you are a post-transplant patient, contact the Transplant Office:  [phone]    If this is after hours, contact the hospital :  [phone] and as to have the GI resident on call paged                   I have received and understand my discharge instructions and I have all of my personal belongings.          ------------------------------------------------------        ---------------------------------------------------    Patient / Significant Other's Signature     Relationship            Follow-ups after your visit        Your next 10 appointments already scheduled     Nov 03, 2017  2:00 PM CDT   Paracentesis Visit with [facility] Spec Inf Para Provider,  39 ATC   M  "[facility] ([facility])    [address]  2nd Floor  [facility] [address]   [phone]            Nov 08, 2017 10:30 AM CST   Lab with UC LAB   [facility] ([facility])    [address] [place] [address]  1st Floor  [facility] [address]   [phone]            Nov 08, 2017 11:30 AM CST   (Arrive by 11:15 AM)   Return General Liver with Kierra Morrison MD   [facility] ([facility])    [address] [place] [address]  3rd Floor  [facility] [address]   [phone]            Nov 09, 2017  2:00 PM CST   Paracentesis Visit with  Spec Inf Para Provider, UC 39 ATC   [facility] ([facility])    [address] [place] [address]  [address] [employer] [address] [phone]              Who to contact     If you have questions or need follow up information about today's clinic visit or your schedule please contact [facility] directly at [phone].  Normal or non-critical lab and imaging results will be communicated to you by MyChart, letter or phone within 4 business days after the clinic has received the results. If you do not hear from us within 7 days, please contact the clinic through [employer] or phone. If you have a critical or abnormal lab result, we will notify you by phone as soon as possible.  Submit refill requests through [employer] or call your pharmacy and they will forward the refill request to us. Please allow 3 business days for your refill to be completed.          Additional Information About Your Visit        [employer]eron Information     [employer] lets you send messages to your doctor, view your test results, renew your prescriptions, schedule appointments and more. To sign up, go to [email or web address] . Click on \"Log in\" on the left side of the " "screen, which will take you to the Welcome page. Then click on \"Sign up Now\" on the right side of the page.     You will be asked to enter the access code listed below, as well as some personal information. Please follow the directions to create your username and password.     Your access code is: [email or web address]  Expires: 2018  6:31 AM     Your access code will  in 90 days. If you need help or a new code, please call your [facility] or [phone].        Care EveryWhere ID     This is your Care EveryWhere ID. This could be used by other organizations to access your [facility] medical records  [id number]        Your Vitals Were     Pulse Temperature Respirations BMI (Body Mass Index)          107 96.3  F (35.7  C) (Oral) 16 23.83 kg/m2         Blood Pressure from Last 3 Encounters:   10/23/17 112/63   17 125/79   17 110/64    Weight from Last 3 Encounters:   10/23/17 57.2 kg (126 lb 1.7 oz)   17 52.7 kg (116 lb 3.2 oz)   17 50.8 kg (112 lb)              We Performed the Following     Platelet count     US Paracentesis        Primary Care Provider Office Phone # Fax #    Felicitas Horton [phone]       [address] [address]        Equal Access to Services     [facility]: Hadii nico stoneo Damon, waaxda luesme, qaybta veronicaalorlando sheridan, rosana ricks . So [facility] [phone].    ATENCIÓN: Si habla español, tiene a martinez disposición servicios gratuitos de asistencia lingüística. Llame al [phone].    We comply with applicable federal civil rights laws and [place] laws. We do not discriminate on the basis of race, color, national origin, age, disability, sex, sexual orientation, or gender identity.            Thank you!     Thank you for choosing [facility]  for your care. Our goal is always to provide you with excellent care. Hearing back from " our patients is one way we can continue to improve our services. Please take a few minutes to complete the written survey that you may receive in the mail after your visit with us. Thank you!             Your Updated Medication List - Protect others around you: Learn how to safely use, store and throw away your medicines at www.disposemymeds.org.          This list is accurate as of: 10/23/17  9:34 AM.  Always use your most recent med list.                   Brand Name Dispense Instructions for use Diagnosis    Acetic Acid-Aluminum Acetate 2 % Soln     60 mL    4 drops every 3 hours    Otalgia of both ears       * albuterol 108 (90 BASE) MCG/ACT Inhaler    PROAIR HFA/PROVENTIL HFA/VENTOLIN HFA    1 Inhaler    Inhale 2 puffs into the lungs every 6 hours as needed for shortness of breath / dyspnea or wheezing    Moderate persistent asthma without complication       * albuterol (2.5 MG/3ML) 0.083% neb solution     25 vial    Take 1 vial (2.5 mg) by nebulization every 6 hours as needed for shortness of breath / dyspnea or wheezing    Mild intermittent asthma with acute exacerbation       bisacodyl 5 MG EC tablet    DULCOLAX    30 tablet    Take 1 tablet (5 mg) by mouth daily as needed for constipation    Constipation, unspecified constipation type       BOOST CALORIE SMART Liqd     90 Bottle    Take 3 Cans by mouth daily    HCC (hepatocellular carcinoma) (H)       cetirizine 10 MG tablet    zyrTEC    30 tablet    Take 1 tablet (10 mg) by mouth every evening    Chronic allergic conjunctivitis       cholecalciferol 57151 UNITS capsule    VITAMIN D3    12 capsule    Take 1 capsule (50,000 Units) by mouth once a week    Vitamin D deficiency       cyproheptadine 4 MG tablet    PERIACTIN    90 tablet    Take 1 tablet (4 mg) by mouth 3 times daily For stimulating the appetite    Anorexia       DAILY MULTIPLE VITAMIN/IRON Tabs     30 tablet    Take 1 tablet by mouth daily    Chronic hepatitis C without hepatic coma (H)        fluticasone 50 MCG/ACT spray    FLONASE    1 Bottle    Spray 2 sprays into both nostrils daily    Chronic rhinitis       ketorolac 0.5 % ophthalmic solution    ACULAR     Place 1 drop Into the left eye 4 times daily        lactulose 20 GM/30ML Soln     946 mL    Take 30 mLs by mouth daily    Cirrhosis of liver with ascites, unspecified hepatic cirrhosis type (H), HCC (hepatocellular carcinoma) (H)       mometasone-formoterol 200-5 MCG/ACT oral inhaler    DULERA    1 Inhaler    Inhale 2 puffs into the lungs 2 times daily    Moderate persistent asthma without complication       ofloxacin 0.3 % ophthalmic solution    OCUFLOX     Place 1 drop Into the left eye 4 times daily        olopatadine 0.1 % ophthalmic solution    PATANOL    5 mL    Place 1 drop into both eyes 2 times daily    Chronic allergic conjunctivitis       omeprazole 40 MG capsule    priLOSEC    90 capsule    Take 1 capsule (40 mg) by mouth daily Take 30-60 minutes before a meal.    Gastroesophageal reflux disease without esophagitis       * prednisoLONE acetate 1 % ophthalmic susp    PRED FORTE     Place 1 drop Into the left eye 4 times daily        * prednisoLONE acetate 1 % ophthalmic susp    PRED FORTE     Place 1 drop into both eyes daily        * prednisoLONE acetate 1 % ophthalmic susp    PRED FORTE    1 Bottle    Place 1 drop into the right eye 3 times daily    Pseudophakia       * prednisoLONE acetate 1 % ophthalmic susp    PRED FORTE    5 mL    Place 1 drop Into the left eye 4 times daily    Pseudophakia       * Notice:  This list has 6 medication(s) that are the same as other medications prescribed for you. Read the directions carefully, and ask your doctor or other care provider to review them with you.

## 2017-10-23 NOTE — PATIENT INSTRUCTIONS
DISCHARGE INSTRUCTIONS FOLLOWING ABDOMINAL PARACENTESIS    After you go home:    No strenuous activity for 24 hours    Resume your regular diet    Limit fluid intake for the first 48 hours to no more than 2 quarts per day.  There should be minimal drainage from the needle site.  If drainage does occur and soaks through the bandage, apply gentle pressure with your hand for 5 minutes.    Notify MD for the following:    Excessive drainage    Excessive swelling, redness or tenderness at the needle site    Fever greater than 101 degrees F    Dizziness or light-headedness when getting up or walking      IF THIS IS A MEDICAL EMERGENCY, CALL [phone]    If you have any questions or concerns    Contact the Hepatology Clinic:   [phone]    If you are a post-transplant patient, contact the Transplant Office:  [phone]    If this is after hours, contact the hospital :  [phone] and as to have the GI resident on call paged                   I have received and understand my discharge instructions and I have all of my personal belongings.          ------------------------------------------------------        ---------------------------------------------------    Patient / Significant Other's Signature     Relationship

## 2017-10-25 NOTE — TELEPHONE ENCOUNTER
RN attempted to reach daughter (per appointment note states to call the daughter) and inform medication needs to be refilled by Dr. Meyer not PCP.    Teresa Mcdonough RN

## 2017-10-30 NOTE — PROGRESS NOTES
Returned to unit following third drainage from thoracentesis. Alert and orient. No complaint of nausea or discomfort. Respiratory status stable. Vital signs within normal limits.

## 2017-10-30 NOTE — IP AVS SNAPSHOT
MRN:[id number]                      After Visit Summary   10/30/2017    Yue Portillo    MRN: [id number]           Visit Information        Department      10/30/2017 12:29 PM [address] [facility] [place]          Review of your medicines      UNREVIEWED medicines. Ask your doctor about these medicines        Dose / Directions    * albuterol 108 (90 BASE) MCG/ACT Inhaler   Commonly known as:  PROAIR HFA/PROVENTIL HFA/VENTOLIN HFA   Used for:  Moderate persistent asthma without complication        Dose:  2 puff   Inhale 2 puffs into the lungs every 6 hours as needed for shortness of breath / dyspnea or wheezing   Quantity:  1 Inhaler   Refills:  11       * albuterol (2.5 MG/3ML) 0.083% neb solution   Used for:  Mild intermittent asthma with acute exacerbation        Dose:  1 vial   Take 1 vial (2.5 mg) by nebulization every 6 hours as needed for shortness of breath / dyspnea or wheezing   Quantity:  25 vial   Refills:  3       bisacodyl 5 MG EC tablet   Commonly known as:  DULCOLAX   Used for:  Constipation, unspecified constipation type        Dose:  5 mg   Take 1 tablet (5 mg) by mouth daily as needed for constipation   Quantity:  30 tablet   Refills:  2       BOOST CALORIE SMART Liqd   Used for:  HCC (hepatocellular carcinoma) (H)        Dose:  3 Can   Take 3 Cans by mouth daily   Quantity:  90 Bottle   Refills:  11       cetirizine 10 MG tablet   Commonly known as:  zyrTEC   Used for:  Chronic allergic conjunctivitis        Dose:  10 mg   Take 1 tablet (10 mg) by mouth every evening   Quantity:  30 tablet   Refills:  6       cholecalciferol 72682 UNITS capsule   Commonly known as:  VITAMIN D3   Used for:  Vitamin D deficiency        Dose:  1 capsule   Take 1 capsule (50,000 Units) by mouth once a week   Quantity:  12 capsule   Refills:  0       cyproheptadine 4 MG tablet   Commonly known as:  PERIACTIN   Used for:  Anorexia        Dose:  4 mg   Take 1 tablet (4 mg) by mouth 3 times daily For  stimulating the appetite   Quantity:  90 tablet   Refills:  11       DAILY MULTIPLE VITAMIN/IRON Tabs   Used for:  Chronic hepatitis C without hepatic coma (H)        Dose:  1 tablet   Take 1 tablet by mouth daily   Quantity:  30 tablet   Refills:  11       fluticasone 50 MCG/ACT spray   Commonly known as:  FLONASE   Used for:  Chronic rhinitis        Dose:  2 spray   Spray 2 sprays into both nostrils daily   Quantity:  1 Bottle   Refills:  11       ketorolac 0.5 % ophthalmic solution   Commonly known as:  ACULAR        Dose:  1 drop   Place 1 drop Into the left eye 4 times daily   Refills:  0       lactulose 20 GM/30ML Soln   Used for:  Cirrhosis of liver with ascites, unspecified hepatic cirrhosis type (H), HCC (hepatocellular carcinoma) (H)        Dose:  30 mL   Take 30 mLs by mouth daily   Quantity:  946 mL   Refills:  3       mometasone-formoterol 200-5 MCG/ACT oral inhaler   Commonly known as:  DULERA   Used for:  Moderate persistent asthma without complication        Dose:  2 puff   Inhale 2 puffs into the lungs 2 times daily   Quantity:  1 Inhaler   Refills:  12       ofloxacin 0.3 % ophthalmic solution   Commonly known as:  OCUFLOX        Dose:  1 drop   Place 1 drop Into the left eye 4 times daily   Refills:  0       olopatadine 0.1 % ophthalmic solution   Commonly known as:  PATANOL   Used for:  Chronic allergic conjunctivitis        Dose:  1 drop   Place 1 drop into both eyes 2 times daily   Quantity:  5 mL   Refills:  3       omeprazole 40 MG capsule   Commonly known as:  priLOSEC   Used for:  Gastroesophageal reflux disease without esophagitis        Dose:  40 mg   Take 1 capsule (40 mg) by mouth daily Take 30-60 minutes before a meal.   Quantity:  90 capsule   Refills:  3       * prednisoLONE acetate 1 % ophthalmic susp   Commonly known as:  PRED FORTE        Dose:  1 drop   Place 1 drop Into the left eye 4 times daily   Refills:  0       * prednisoLONE acetate 1 % ophthalmic susp   Commonly known as:   PRED FORTE        Dose:  1 drop   Place 1 drop into both eyes daily   Refills:  0       * prednisoLONE acetate 1 % ophthalmic susp   Commonly known as:  PRED FORTE   Used for:  Pseudophakia        Dose:  1 drop   Place 1 drop into the right eye 3 times daily   Quantity:  1 Bottle   Refills:  1       * prednisoLONE acetate 1 % ophthalmic susp   Commonly known as:  PRED FORTE   Used for:  Pseudophakia        Dose:  1 drop   Place 1 drop Into the left eye 4 times daily   Quantity:  5 mL   Refills:  0       * Notice:  This list has 6 medication(s) that are the same as other medications prescribed for you. Read the directions carefully, and ask your doctor or other care provider to review them with you.             Protect others around you: Learn how to safely use, store and throw away your medicines at www.disposemymeds.org.         Follow-ups after your visit        Your next 10 appointments already scheduled     Nov 08, 2017 10:30 AM CST   Lab with  LAB   [facility] Lab ([facility])    [address] [place] [address]  1st Floor  [facility] [id number] [phone]            Nov 08, 2017 11:30 AM CST   (Arrive by 11:15 AM)   Return General Liver with Kierra Morrison MD   [facility] Hepatology ([facility])    [address] [place] [address]  3rd Floor  [facility] [id number] [phone]            Nov 09, 2017  2:00 PM CST   Paracentesis Visit with  Spec Inf Para Provider,  39 ATC   [facility] Procedure ([facility])    [address] [facility]  2nd Floor  [facility] [id number] [phone]               Care Instructions        Further instructions from your care team       [facility],   Interventional Radiology Discharge Instructions Following Thoracentesis        AFTER YOU GO HOME:  ? Resume previous diet and medications  ? Limit strenuous physical activity such as lifting,  "straining, or exercising for 48 hours.  You may resume normal activity in 24 hours  ? Change gauze at the puncture site as needed to keep site dry.  Leaking of additional fluid is not uncommon during the first 24-48 hours    CALL THE PHYSICIAN:  ? If you develop a fever, shortness of breath, chest pain, cough up blood, excessive bleeding from the thoracentesis site, severe lightheadedness, or fainting call you doctor immediately or come to the closest Emergency Department.  Do not drive yourself.   ? If you have questions or concerns regarding your procedure call the radiologist.      ADDITIONAL INSTRUCTIONS ***      [facility] INTERVENTIONAL RADIOLOGY DEPARTMENT  Procedure Physician:  Dr. Jacinto                                Date of procedure: 2017    Telephone Numbers: [phone]       Monday-Friday 8:00 am to 4:30 pm                                                   [phone]   After 4:30 pm Monday - Friday, Ask for the Interventional Radiologist on call.  Someone is on call 24 hrs/day  [facility] toll free number: [phone]    Monday-Friday 8:00 am to 4:30 pm  [facility] Emergency Dept: [phone]  _             Additional Information About Your Visit        [employer] Information     [employer] lets you send messages to your doctor, view your test results, renew your prescriptions, schedule appointments and more. To sign up, go to [email or web address] . Click on \"Log in\" on the left side of the screen, which will take you to the Welcome page. Then click on \"Sign up Now\" on the right side of the page.     You will be asked to enter the access code listed below, as well as some personal information. Please follow the directions to create your username and password.     Your access code is: [email or web address]  Expires: 2018  6:31 AM     Your access code will  in 90 days. If you need help or a new code, please call your [place] clinic or [phone].        Care EveryWhere ID     This is your Care " "EveryWhere ID. This could be used by other organizations to access your [facility] medical records  [id number]        Your Vitals Were     Blood Pressure Pulse Temperature Respirations Height Weight    89/68 102 98.4  F (36.9  C) (Oral) 16 1.575 m (5' 2\") 54 kg (119 lb 0.8 oz)    Pulse Oximetry BMI (Body Mass Index)                100% 21.77 kg/m2           Primary Care Provider Office Phone # Fax #    Felicitas Horton [phone]      Equal Access to Services     [facility]: Hadii aad ku hadasho Soomaali, waaxda luqadaha, qaybta kaalmada adekeliyakiara, rosana ricks . So [facility] [phone].    ATENCIÓN: Si habla español, tiene a martinez disposición servicios gratuitos de asistencia lingüística. [facility] [phone].    We comply with applicable federal civil rights laws and [place] laws. We do not discriminate on the basis of race, color, national origin, age, disability, sex, sexual orientation, or gender identity.            Thank you!     Thank you for choosing [facility] for your care. Our goal is always to provide you with excellent care. Hearing back from our patients is one way we can continue to improve our services. Please take a few minutes to complete the written survey that you may receive in the mail after you visit with us. Thank you!             Medication List: This is a list of all your medications and when to take them. Check marks below indicate your daily home schedule. Keep this list as a reference.      Medications           Morning Afternoon Evening Bedtime As Needed    * albuterol 108 (90 BASE) MCG/ACT Inhaler   Commonly known as:  PROAIR HFA/PROVENTIL HFA/VENTOLIN HFA   Inhale 2 puffs into the lungs every 6 hours as needed for shortness of breath / dyspnea or wheezing                                * albuterol (2.5 MG/3ML) 0.083% neb solution   Take 1 vial (2.5 mg) by nebulization every 6 hours as needed for shortness of breath / dyspnea or " wheezing                                bisacodyl 5 MG EC tablet   Commonly known as:  DULCOLAX   Take 1 tablet (5 mg) by mouth daily as needed for constipation                                BOOST CALORIE SMART Liqd   Take 3 Cans by mouth daily                                cetirizine 10 MG tablet   Commonly known as:  zyrTEC   Take 1 tablet (10 mg) by mouth every evening                                cholecalciferol 89944 UNITS capsule   Commonly known as:  VITAMIN D3   Take 1 capsule (50,000 Units) by mouth once a week                                cyproheptadine 4 MG tablet   Commonly known as:  PERIACTIN   Take 1 tablet (4 mg) by mouth 3 times daily For stimulating the appetite                                DAILY MULTIPLE VITAMIN/IRON Tabs   Take 1 tablet by mouth daily                                fluticasone 50 MCG/ACT spray   Commonly known as:  FLONASE   Spray 2 sprays into both nostrils daily                                ketorolac 0.5 % ophthalmic solution   Commonly known as:  ACULAR   Place 1 drop Into the left eye 4 times daily                                lactulose 20 GM/30ML Soln   Take 30 mLs by mouth daily                                mometasone-formoterol 200-5 MCG/ACT oral inhaler   Commonly known as:  DULERA   Inhale 2 puffs into the lungs 2 times daily                                ofloxacin 0.3 % ophthalmic solution   Commonly known as:  OCUFLOX   Place 1 drop Into the left eye 4 times daily                                olopatadine 0.1 % ophthalmic solution   Commonly known as:  PATANOL   Place 1 drop into both eyes 2 times daily                                omeprazole 40 MG capsule   Commonly known as:  priLOSEC   Take 1 capsule (40 mg) by mouth daily Take 30-60 minutes before a meal.                                * prednisoLONE acetate 1 % ophthalmic susp   Commonly known as:  PRED FORTE   Place 1 drop Into the left eye 4 times daily                                *  prednisoLONE acetate 1 % ophthalmic susp   Commonly known as:  PRED FORTE   Place 1 drop into both eyes daily                                * prednisoLONE acetate 1 % ophthalmic susp   Commonly known as:  PRED FORTE   Place 1 drop into the right eye 3 times daily                                * prednisoLONE acetate 1 % ophthalmic susp   Commonly known as:  PRED FORTE   Place 1 drop Into the left eye 4 times daily                                * Notice:  This list has 6 medication(s) that are the same as other medications prescribed for you. Read the directions carefully, and ask your doctor or other care provider to review them with you.

## 2017-10-30 NOTE — PROGRESS NOTES
1548 Pt arrived back on 2a after draining of additional liter from right chest. Drain remains in place.  at and pt's son at BS.

## 2017-10-30 NOTE — PROGRESS NOTES
Patient Name: Yue Portillo  Medical Record Number: [id number]  Today's Date: 10/30/2017    Procedure: thoracentesis   Proceduralist: Dr. Jacinto   No sedation      Procedure start time: 1455  Procedure end time: 1528    Report given to: JYOTI RN  : Quinn Lowe     Other Notes: Pt arrived to IR room 6 from . Pt has questions or concerns regarding procedure. Dr. Jacinto notified all questions answered no further questions. Pt positioned sitting upright  and monitored per protocol. Pt tolerated procedure without any noted complications. Pt transferred back to  1.8L removed per MD. Pt returning in one hour to drain again.

## 2017-10-30 NOTE — PROGRESS NOTES
1535 Pt arrived on 2a post thoracentesis with drain left in place. No sedation given. VSS Ra. Pt declines fluids at this time.  present. 1625 Pt returned to IR to drain remaining fluid.

## 2017-10-30 NOTE — PROCEDURES
Interventional Radiology Brief Post Procedure Note    Procedure: IR THORACENTESIS    Proceduralist: Earnest Jacinto MD    Assistant: Tello Lan DO    Time Out: Prior to the start of the procedure and with procedural staff participation, I verbally confirmed the patient s identity using two indicators, relevant allergies, that the procedure was appropriate and matched the consent or emergent situation, and that the correct equipment/implants were available. Immediately prior to starting the procedure I conducted the Time Out with the procedural staff and re-confirmed the patient s name, procedure, and site/side. (The Joint Commission universal protocol was followed.)  Yes    Medications   Medication Event Details Admin User Admin Time       Sedation: None. Local Anesthestic used    Findings: US-guided, right thoracentesis with aspiration of 1.8L, 1.0L, and 600mL of clear, yellow fluid during first, second, and third hour stages, respectively, with total 3.4L aspirated.    Estimated Blood Loss: Minimal    SPECIMENS: None    Complications: None     Condition: Stable    Plan: Transfer to PACU for post-procedure monitoring and discharge to home after criteria is met.    Comments: See dictated procedure note for full details.    Tello Lan DO

## 2017-10-30 NOTE — PROGRESS NOTES
Interventional Radiology Intra-procedural Nursing Note    Patient Name: Yue Portillo  Medical Record Number: [id number]  Today's Date: October 30, 2017    Start Time: 1631  End of procedure time: 1640  Procedure: Right Thoracentesis, Staged, 2nd Draining  Report given to: Megha MONTES RN  Time pt departs: 1642  : Chrissy Ball    D: Pt here via cart, accompanied by RN.  No c/o pain.  VS baseline upon arrival.  Pt positioned in a seated position, leaning of a table.  I: Monitored pt during procedure.  A: Pt started coughing after about 900mL drained, 1,000mL drained in 2nd staging.  P: Pt to return in 1h for 3rd stage of draining more fluid.  Pt knows the plan and is okay with this.    Other Notes:     LETTY REYES

## 2017-10-30 NOTE — IP AVS SNAPSHOT
[address] [address]                                       After Visit Summary   10/30/2017    Yue Portillo    MRN: [id number]           After Visit Summary Signature Page     I have received my discharge instructions, and my questions have been answered. I have discussed any challenges I see with this plan with the nurse or doctor.    ..........................................................................................................................................  Patient/Patient Representative Signature      ..........................................................................................................................................  Patient Representative Print Name and Relationship to Patient    ..................................................               ................................................  Date                                            Time    ..........................................................................................................................................  Reviewed by Signature/Title    ...................................................              ..............................................  Date                                                            Time

## 2017-10-30 NOTE — PROGRESS NOTES
Interventional Radiology Intra-procedural Nursing Note    Patient Name: Yue Portillo  Medical Record Number: [id number]  Today's Date: October 30, 2017    Start Time: 1748  End of procedure time: 1755  Procedure: Right Thoracentensis, Stage 3  Provider: Amber AhmadiD  Report given to: JYOTI Shelley RN  Time pt departs:  1800    D: Pt here via cart, accompanied by RN.  Pt positioned in a seated position leaning over procedure table.  VS baseline upon arrival.  I: Monitored pt during procedure.  A: Pt tolerated the procedure well. Pt started coughing after 500mL drained.  Fluid is clear and yellow.  600 mL drained.  P:Pt care to continue on 2A until discharge.    Other Notes:     LETTY REYES

## 2017-10-31 NOTE — PROGRESS NOTES
Discharge instructions given and pt voiced understanding. No scripts needed from pharmacy. Right upper back site is soft and flat. No hematoma. Respiratory status is stable. No difficulties breathing. Up walking in room. Ate poorly for dinner. Urinating adequate amounts. No complaint of discomfort. Adequate for discharge. Discharged to home with family.

## 2017-11-02 NOTE — MR AVS SNAPSHOT
After Visit Summary   11/2/2017    Yue Portillo    MRN: [id number]           Patient Information     Date Of Birth          1940        Visit Information        Provider Department      11/2/2017 8:20 AM Felicitas Horton MD [facility]        Today's Diagnoses     Constipation, unspecified constipation type        Mild intermittent asthma with acute exacerbation        Chronic hepatitis C without hepatic coma (H)           Follow-ups after your visit        Your next 10 appointments already scheduled     Nov 08, 2017 10:30 AM CST   Lab with  LAB   [facility] ([facility])    [address] [place] [address]  [facility] [id number] [phone]            Nov 08, 2017 11:30 AM CST   (Arrive by 11:15 AM)   Return General Liver with Kierra Morrison MD   [facility] Hepatology ([facility])    [address] [place] [id number] [phone]            Nov 09, 2017  2:00 PM CST   Paracentesis Visit with  Spec Inf Para Provider,  39 ATC   [facility] Advanced [facility] [place] [facility] ([facility])    [address] [place] [id number] [phone]              Who to contact     Please call your clinic at [phone] to:    Ask questions about your health    Make or cancel appointments    Discuss your medicines    Learn about your test results    Speak to your doctor   If you have compliments or concerns about an experience at your clinic, or if you wish to file a complaint, please contact [facility] Patient Relations at [phone] or email us at [email or web address]         Additional Information About Your Visit        [employer] Information     [employer] is an electronic gateway that provides easy, online access to your medical records. With Smart[employer], you can request a clinic  appointment, read your test results, renew a prescription or communicate with your care team.     To sign up for [employer] visit the website at [email or web address]   You will be asked to enter the access code listed below, as well as some personal information. Please follow the directions to create your username and password.     Your access code is: [email or web address]  Expires: 2018  6:31 AM     Your access code will  in 90 days. If you need help or a new code, please contact your [facility] or call [phone] for assistance.        Care EveryWhere ID     This is your Care EveryWhere ID. This could be used by other organizations to access your [facility] [place] medical records  [id number]        Your Vitals Were     Pulse Temperature Respirations Pulse Oximetry BMI (Body Mass Index)       92 98.1  F (36.7  C) (Oral) 16 98% 21.66 kg/m2        Blood Pressure from Last 3 Encounters:   17 110/72   10/30/17 (!) 89/68   10/23/17 112/63    Weight from Last 3 Encounters:   17 118 lb 6.4 oz (53.7 kg)   10/30/17 119 lb 0.8 oz (54 kg)   10/23/17 119 lb 4.8 oz (54.1 kg)              Today, you had the following     No orders found for display         Today's Medication Changes          These changes are accurate as of: 17  9:04 AM.  If you have any questions, ask your nurse or doctor.               Start taking these medicines.        Dose/Directions    spironolactone 50 MG tablet   Commonly known as:  ALDACTONE   Used for:  Chronic hepatitis C without hepatic coma (H)   Started by:  Felicitas Horton MD        Dose:  50 mg   Take 1 tablet (50 mg) by mouth daily   Quantity:  30 tablet   Refills:  1            Where to get your medicines      These medications were sent to [facility] [address] [facility]  [address] [facility] [address]     Phone:  [phone]     albuterol (2.5 MG/3ML) 0.083% neb solution    bisacodyl 5  MG EC tablet    spironolactone 50 MG tablet                Primary Care Provider Office Phone # Fax #    Felicitas Truman Horton [phone]       [address] [address]        Equal Access to Services     ANJANADAILY SIMIN : Hadii aad ku hadchipo Somichealali, waaxda luqadaha, qaybta kaalmada adecindyda, rosana laran adekeli davidson laaishamanjeet casi. So [facility] [phone].    ATENCIÓN: Si habla español, tiene a martinez disposición servicios gratuitos de asistencia lingüística. [facility] [phone].    We comply with applicable federal civil rights laws and [place] laws. We do not discriminate on the basis of race, color, national origin, age, disability, sex, sexual orientation, or gender identity.            Thank you!     Thank you for choosing [facility]  for your care. Our goal is always to provide you with excellent care. Hearing back from our patients is one way we can continue to improve our services. Please take a few minutes to complete the written survey that you may receive in the mail after your visit with us. Thank you!             Your Updated Medication List - Protect others around you: Learn how to safely use, store and throw away your medicines at www.disposemymeds.org.          This list is accurate as of: 11/2/17  9:04 AM.  Always use your most recent med list.                   Brand Name Dispense Instructions for use Diagnosis    * albuterol 108 (90 BASE) MCG/ACT Inhaler    PROAIR HFA/PROVENTIL HFA/VENTOLIN HFA    1 Inhaler    Inhale 2 puffs into the lungs every 6 hours as needed for shortness of breath / dyspnea or wheezing    Moderate persistent asthma without complication       * albuterol (2.5 MG/3ML) 0.083% neb solution     25 vial    Take 1 vial (2.5 mg) by nebulization every 6 hours as needed for shortness of breath / dyspnea or wheezing    Mild intermittent asthma with acute exacerbation       bisacodyl 5 MG EC tablet    DULCOLAX    30  tablet    Take 1 tablet (5 mg) by mouth daily as needed for constipation    Constipation, unspecified constipation type       BOOST CALORIE SMART Liqd     90 Bottle    Take 3 Cans by mouth daily    HCC (hepatocellular carcinoma) (H)       cetirizine 10 MG tablet    zyrTEC    30 tablet    Take 1 tablet (10 mg) by mouth every evening    Chronic allergic conjunctivitis       cholecalciferol 92545 UNITS capsule    VITAMIN D3    12 capsule    Take 1 capsule (50,000 Units) by mouth once a week    Vitamin D deficiency       cyproheptadine 4 MG tablet    PERIACTIN    90 tablet    Take 1 tablet (4 mg) by mouth 3 times daily For stimulating the appetite    Anorexia       DAILY MULTIPLE VITAMIN/IRON Tabs     30 tablet    Take 1 tablet by mouth daily    Chronic hepatitis C without hepatic coma (H)       fluticasone 50 MCG/ACT spray    FLONASE    1 Bottle    Spray 2 sprays into both nostrils daily    Chronic rhinitis       ketorolac 0.5 % ophthalmic solution    ACULAR     Place 1 drop Into the left eye 4 times daily        lactulose 20 GM/30ML Soln     946 mL    Take 30 mLs by mouth daily    Cirrhosis of liver with ascites, unspecified hepatic cirrhosis type (H), HCC (hepatocellular carcinoma) (H)       mometasone-formoterol 200-5 MCG/ACT oral inhaler    DULERA    1 Inhaler    Inhale 2 puffs into the lungs 2 times daily    Moderate persistent asthma without complication       ofloxacin 0.3 % ophthalmic solution    OCUFLOX     Place 1 drop Into the left eye 4 times daily        olopatadine 0.1 % ophthalmic solution    PATANOL    5 mL    Place 1 drop into both eyes 2 times daily    Chronic allergic conjunctivitis       omeprazole 40 MG capsule    priLOSEC    90 capsule    Take 1 capsule (40 mg) by mouth daily Take 30-60 minutes before a meal.    Gastroesophageal reflux disease without esophagitis       * prednisoLONE acetate 1 % ophthalmic susp    PRED FORTE     Place 1 drop Into the left eye 4 times daily        * prednisoLONE  acetate 1 % ophthalmic susp    PRED FORTE     Place 1 drop into both eyes daily        * prednisoLONE acetate 1 % ophthalmic susp    PRED FORTE    1 Bottle    Place 1 drop into the right eye 3 times daily    Pseudophakia       * prednisoLONE acetate 1 % ophthalmic susp    PRED FORTE    5 mL    Place 1 drop Into the left eye 4 times daily    Pseudophakia       spironolactone 50 MG tablet    ALDACTONE    30 tablet    Take 1 tablet (50 mg) by mouth daily    Chronic hepatitis C without hepatic coma (H)       * Notice:  This list has 6 medication(s) that are the same as other medications prescribed for you. Read the directions carefully, and ask your doctor or other care provider to review them with you.

## 2017-11-02 NOTE — PROGRESS NOTES
SUBJECTIVE:  The patient is here with 2 main issues.      She has a hepatocellular carcinoma with has had pleural effusions and ascites with frequent paracentesis.  She just had a thoracentesis she said on the right, 3 days ago.  They removed 3.4 liters.  She is complaining of trouble breathing.  She said it was no better at all after that the fluid was removed.  She says she is having trouble with basically her asthma.  She is using albuterol inhaler 3 times a day.  She says she is taking her Dulera twice a day.  She says she is doing a lot of coughing which sounds like more at night time.  She had a neb machine but she does not have any of the liquid medication to go in.  She feels like the neb machine works better for her.      Her second issue is constipation.  This has been an ongoing issue.  She does have lactulose, but she says that is too sweet and she cannot tolerate it.  She does not have much of an appetite.  Looking in her chart, she has had Dulcolax ordered in the spring.  She does not remember taking that.  In the remote past she had senna also.  She does not remember about that one way or the other.  When I mentioned MiraLax I did not see it ordered for her, but she thinks she has had that and that it does not work.  She was willing to try the Dulcolax because she does not remember having that before.  She is also actually interested in trying an enema so I said that would be fine.  She had been on a diuretic in the past and somewhere along the line that got stopped and she wants to get back on that.  She does have an appointment with Dr. Meyer, her liver specialist, on the 8th of this month.      OBJECTIVE:  On exam, the patient is in no acute distress, speaking easily, not appearing short of breath.  Her vitals are normal including respiratory rate and O2 saturation.  She did fill out an ACT form and had a score of 8 and a PHQ-9 and had a score of 6.  Heart had a regular rate and rhythm without  murmurs.  When I had her take bigger breaths to listen to her lungs that did trigger coughing spells.  She did have good air movement bilaterally.  No wheezing was heard throughout.      IMPRESSION:  Patient with hepatocellular carcinoma with frequent ascites and pleural effusions with also history of underlying lung disease, which I think is what was causing her problems now as well as the constipation.      PLAN:  I wrote for the albuterol nebulizer liquid for her to use that as needed.  Will restart her spironolactone 50 mg daily.  Wrote for Dulcolax tablets and Fleet enema.  Keep her appointment on the 8th with Dr. Meyer.      Visit length was 40 minutes, more than 50% spent in counseling about her multiple chronic issues.

## 2017-11-02 NOTE — NURSING NOTE
name: Pia ENCARNACION  Language: Czech   Agency: [facility]   Phone number: [phone]    Katey Garduno

## 2017-11-07 NOTE — TELEPHONE ENCOUNTER
LVM yesterday and today with pt son number to see if we could schedule a sooner appt. No response or call back as of today.    Indu Jiménez COA 10:25 AM November 7, 2017

## 2017-11-07 NOTE — TELEPHONE ENCOUNTER
----- Message from Temo Frazier sent at 11/6/2017  1:53 PM CST -----  Regarding: Symptomatic pt  Contact: [phone]  Mary from [facility] called about pt having symptoms regarding Dr. Farr surgery pt had on 8/22. Pt doesn't feel her eye is right. Pt is scheduled for an appt with Dr. Farr on 12/4 but would like to see Dr. Farr sooner because of symptoms. Please call back at the number above (will be pt's son answering who does not need ).     Thank you,    Temo Frazier  Call Center    Please DO NOT send this message and/or reply back to sender. Call Center Representatives DO NOT respond to messages.

## 2017-11-08 NOTE — NURSING NOTE
"Chief Complaint   Patient presents with     RECHECK     Cirrhosis, HCC       Initial /79 (BP Location: Left arm, Patient Position: Sitting, Cuff Size: Adult Regular)  Pulse 98  Temp 98.2  F (36.8  C) (Oral)  Resp 18  Ht 1.575 m (5' 2.01\")  Wt 54.9 kg (121 lb)  SpO2 99%  BMI 22.13 kg/m2 Estimated body mass index is 22.13 kg/(m^2) as calculated from the following:    Height as of this encounter: 1.575 m (5' 2.01\").    Weight as of this encounter: 54.9 kg (121 lb).  Medication Reconciliation: complete     Sally Estrada [facility]  11/8/2017 11:44 AM        "

## 2017-11-08 NOTE — LETTER
11/8/2017      RE: Yue Portillo  [address]   [facility] [address]       GI CLINIC VISIT    CC/REFERRING MD:  Felicitas Horton  REASON FOR CONSULTATION:   The pt is a 77 year old female who I was asked to see in consultation at the request of Dr. Felicitas Horton for follow up of HCV cirrhosis complicated by HCC.     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     # HCC- 4 x 2.8cm segment 6 and 1.5 x 1.4cm segment 2  # TACE 2015 and microwave ablation 2016 segment 6  Did not follow up with IR or oncology. She is concerned about deterioration of liver function and generalized well-being after another treatment of liver directed therapy, which she noted following TACE in 2015. In addition, she was hoping to get vision issues addressed. As it was left, the plan was to consider cTACE of segment 6 and segment 2 HCCs vs Y90 radioembolization. Given her (appropriate) concern, we will instead have her discuss with oncology regarding sorafenib treatment.  - repeat liver MRI, ideally after paracentesis  - oncology follow up to discuss sorafenib treatment, message sent to Dr Ayala  - not a liver transplant candidate    # Decompensated HCV (genotype 5) cirrhosis complicated by ascites and hepatic hydrothorax  Needing to undergo paracentesis every 1-2 weeks and thoracentesis every 3 months. Notes decreased appetite and early satiety related to ascites. Not currently on furosemide.   - start furosemide 20mg QD  - if well tolerated (i.e. no significant renal impairment or electrolyte abnormalities), suggest increasing lasix to 30 or 40mg QD and spironolactone to 75 or 100mg QD (in keeping with 2:5 ratio)- this can be adjusted in primary care and hepatology clinic   - continue paracenteses, thoracenteses PRN    #  Constipation  Describes hard, desiccated, [id number] stool. No blood or melena. Likely dietary.   - miralax up to TID  - did not like lactulose due to sweetness  - continue dulcolax    # Non productive, occasionally blood cough  May be related to hepatic hydrothorax, although doesn't improve after thoracentesis. No improvement with bronchodilators. There was concern for indeterminate lung lesion in April with recommendation for 3 month follow up, although it doesn't appear this was completed.   - repeat chest CT, ideally after thoracentesis  - pulmonology referral    RTC 3 months.    Thank you for this consultation.  It was a pleasure to participate in the care of this patient; please contact us with any further questions.     Seen and discussed with attending, Dr Meyer.     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     Since her last hepatology appointment, she met with Dr Ayala of oncology and Dr Ulloa of IR for consideration of liver directed therapy of HCC. She did not follow up after these initial appointments, due to wanting to get vision issues taken care of and due to concern of worsening of liver function and generalized health following additional HCC treatments (which she perceived happened after initial treatment in 2015).    In regard to symptoms, she notices decreased appetite and early satiety related to accumulation of ascites in between paracenteses every 1-2 weeks. She notes dyspnea with exertion. More troublesome is her cough, which is worse at night but present throughout the day. She will occasionally bring up white phlegm. If she is coughing a lot, she may bring up a small  amount of blood. She does not have any improvement with bronchodilator treatment. Similarly, she doesn't have any improvement following thoracentesis. The cough will keep her up at night. She had a CT in April 2017 which demonstrated ground glass and nodular opacities in the lingula which were favored to represent infection/aspiration, although 3 month follow up was recommended and not yet completed.     She also notes constipation, having to pass a hard, desiccated BM daily. This is sometimes painful. She has tried 'a lot' of medications and hasn't noted improvement. It is not clear how much miralax she is taking at this time, but she is agreeable to increasing to TID and titrating down from there.      ROS:    A complete 10 point ROS was negative unless noted in HPI.     PROBLEM LIST  Patient Active Problem List    Diagnosis Date Noted     Mild persistent asthma without complication 05/09/2016     Priority: Medium     HCC (hepatocellular carcinoma) (H) 12/22/2014     Priority: Medium     Diagnosed at [facility]. In records from September 2014. Son does not believe pt has cancer. See note 7/13/15.       Cirrhosis of liver (H) 01/23/2014     Priority: Medium     Postmenopausal bleeding 04/01/2013     Priority: Medium     4/2013-HPV negative, EMB done, pelvic U/S done; referred to OB/GYN for possible D&C.       Vitamin D deficiency 01/04/2013     Priority: Medium     Ear pain 11/26/2012     Priority: Medium     Unknown etiology, controlled with dermotic       Health Care Home 09/11/2012     Priority: Medium     Tier 1  DX V65.8 REPLACED WITH 89346 HEALTH CARE HOME (04/08/2013)       Allergic rhinitis 09/11/2012     Priority: Medium     Problem list name updated by automated process. 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) 09/11/2012     Priority: Medium     Chronic allergic conjunctivitis 09/11/2012     Priority: Medium     Chronic tension type headache 09/11/2012     Priority: Medium     Constipation  09/11/2012     Priority: Medium     Depression 09/11/2012     Priority: Medium     Esophageal reflux 09/11/2012     Priority: Medium     Generalized osteoarthritis 09/11/2012     Priority: Medium     Generalized pain 09/11/2012     Priority: Medium     Heartburn 09/11/2012     Priority: Medium     Insomnia 09/11/2012     Priority: Medium     Lightheadedness 09/11/2012     Priority: Medium     Lower back pain 09/11/2012     Priority: Medium     PERTINENT PAST MEDICAL HISTORY:  Past Medical History:   Diagnosis Date     Liver disease      Mild intermittent asthma      PREVIOUS SURGERIES:  Past Surgical History:   Procedure Laterality Date     CATARACT IOL, RT/LT Right 08/22/2017    s/p CE/IOL right eye     EYE SURGERY       H PYLORI SHYLA SCREEN      was treated for h pylori     NO HISTORY OF SURGERY       ALLERGIES:  Allergies   Allergen Reactions     Dust Mites Cough     Sneezing      Seasonal Allergies      PERTINENT MEDICATIONS:    Current Outpatient Prescriptions:      polyethylene glycol (MIRALAX) powder, Take 17 g (1 capful) by mouth 3 times daily as needed for constipation, Disp: 510 g, Rfl: 3     furosemide (LASIX) 20 MG tablet, Take 1 tablet (20 mg) by mouth daily, Disp: 180 tablet, Rfl: 1     bisacodyl (DULCOLAX) 5 MG EC tablet, Take 1 tablet (5 mg) by mouth daily as needed for constipation, Disp: 30 tablet, Rfl: 2     albuterol (2.5 MG/3ML) 0.083% neb solution, Take 1 vial (2.5 mg) by nebulization every 6 hours as needed for shortness of breath / dyspnea or wheezing, Disp: 25 vial, Rfl: 3     spironolactone (ALDACTONE) 50 MG tablet, Take 1 tablet (50 mg) by mouth daily, Disp: 30 tablet, Rfl: 1     Multiple Vitamins-Iron (DAILY MULTIPLE VITAMIN/IRON) TABS, Take 1 tablet by mouth daily, Disp: 30 tablet, Rfl: 11     omeprazole (PRILOSEC) 40 MG capsule, Take 1 capsule (40 mg) by mouth daily Take 30-60 minutes before a meal., Disp: 90 capsule, Rfl: 3     lactulose 20 GM/30ML SOLN, Take 30 mLs by mouth daily,  "Disp: 946 mL, Rfl: 3     cetirizine (ZYRTEC) 10 MG tablet, Take 1 tablet (10 mg) by mouth every evening, Disp: 30 tablet, Rfl: 6     Nutritional Supplements (BOOST CALORIE SMART) LIQD, Take 3 Cans by mouth daily, Disp: 90 Bottle, Rfl: 11     mometasone-formoterol (DULERA) 200-5 MCG/ACT oral inhaler, Inhale 2 puffs into the lungs 2 times daily, Disp: 1 Inhaler, Rfl: 12     fluticasone (FLONASE) 50 MCG/ACT spray, Spray 2 sprays into both nostrils daily, Disp: 1 Bottle, Rfl: 11     albuterol (PROAIR HFA/PROVENTIL HFA/VENTOLIN HFA) 108 (90 BASE) MCG/ACT Inhaler, Inhale 2 puffs into the lungs every 6 hours as needed for shortness of breath / dyspnea or wheezing, Disp: 1 Inhaler, Rfl: 11    SOCIAL HISTORY:  Social History     Social History     Marital status: Single     Spouse name: N/A     Number of children: N/A     Years of education: N/A     Occupational History     Not on file.     Social History Main Topics     Smoking status: Never Smoker     Smokeless tobacco: Never Used     Alcohol use No     Drug use: No     Sexual activity: No     Other Topics Concern     Not on file     Social History Narrative     FAMILY HISTORY:  Family History   Problem Relation Age of Onset     Respiratory Father      asthma     DIABETES Son      Glaucoma No family hx of      Macular Degeneration No family hx of      Retinal detachment No family hx of      Amblyopia No family hx of      Past/family/social history reviewed and no changes    PHYSICAL EXAMINATION:  Constitutional: aaox3, cooperative, pleasant, not dyspneic/diaphoretic, no acute distress  Vitals reviewed: /79 (BP Location: Left arm, Patient Position: Sitting, Cuff Size: Adult Regular)  Pulse 98  Temp 98.2  F (36.8  C) (Oral)  Resp 18  Ht 1.575 m (5' 2.01\")  Wt 54.9 kg (121 lb)  SpO2 99%  BMI 22.13 kg/m2  Wt:   Wt Readings from Last 2 Encounters:   11/08/17 54.9 kg (121 lb)   11/02/17 53.7 kg (118 lb 6.4 oz)      Eyes: Sclera " anicteric/injected  Ears/nose/mouth/throat: Normal oropharynx without ulcers or exudate, mucus membranes moist, hearing intact  Neck: supple  CV: RRR   Respiratory: Unlabored breathing, decreased breath sounds on right side to mid lung  Lymph: No lymphadenopathy  Abd: Distended, +bs, unable to appreciate hepatosplenomegaly, nontender, no peritoneal signs  Skin: warm, perfused, no jaundice  Psych: Normal affect  MSK: Normal gait    PERTINENT STUDIES:  MELD-Na score: 13 at 7/19/2017  8:53 AM  MELD score: 13 at 7/19/2017  8:53 AM  Calculated from:  Serum Creatinine: 0.79 mg/dL (Rounded to 1) at 7/19/2017  8:53 AM  Serum Sodium: 140 mmol/L (Rounded to 137) at 7/19/2017  8:53 AM  Total Bilirubin: 1.7 mg/dL at 7/19/2017  8:53 AM  INR(ratio): 1.46 at 7/19/2017  8:53 AM  Age: 77 years     Attestation:  This patient has been seen and evaluated by me, Tala Meyer.  Discussed with the house staff team or resident(s) and agree with the findings and plan in this note.       Frank Funez MD

## 2017-11-08 NOTE — MR AVS SNAPSHOT
After Visit Summary   11/8/2017    Yue Portillo    MRN: [id number]           Patient Information     Date Of Birth          1940        Visit Information        Provider Department      11/8/2017 11:15 AM Frank Funez MD; [employer] [facility] Hepatology        Today's Diagnoses     Constipation, unspecified constipation type    -  1    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)        HCC (hepatocellular carcinoma) (H)        Cough          Care Instructions    It was a pleasure taking care of you today.  I've included a brief summary of our discussion and care plan from today's visit below.  Please review this information with your primary care provider.  _______________________________________________________________________    My recommendations are summarized as follows:  1. Start furosemide 20mg once per day.   2. Take miralax up to 3 times per day for constipation.   3. Schedule liver MRI and chest CT.   4. Follow up with oncology for consideration of sorafenib.     --    Return to GI Clinic in 3 months to review your progress.    _______________________________________________________________________    Who do I call with any questions after my visit?  Please be in touch if there are any further questions that arise following today's visit.  There are multiple ways to contact your gastroenterology care team.        During business hours, you may reach a Gastroenterology nurse at [phone] and choose option 3.         To schedule or reschedule an appointment, please call [phone].       You can always send a secure message through [employer].  [employer] messages are answered by your nurse or doctor typically within 24 hours.  Please allow extra time on weekends and holidays.        For urgent/emergent questions after business hours, you may reach the on-call GI Fellow by contacting the [facility] at [phone].     How will I get the results of any tests  ordered?    You will receive all of your results.  If you have signed up for [employer], any tests ordered at your visit will be available to you after your physician reviews them.  Typically this takes 1-2 weeks.  If there are urgent results that require a change in your care plan, your physician or nurse will call you to discuss the next steps.      What is MyChart?  Client Outlook is a secure way for you to access all of your healthcare records from the [facility].  It is a web based computer program, so you can sign on to it from any location.  It also allows you to send secure messages to your care team.  I recommend signing up for [employer] access if you have not already done so and are comfortable with using a computer.      How to I schedule a follow-up visit?  If you did not schedule a follow-up visit today, please call [phone] to schedule a follow-up office visit.        Sincerely,    Dick Tolentino MD  Fellow  [facility]  Division of Gastroenterology          Follow-ups after your visit        Additional Services     PULMONARY MEDICINE REFERRAL       Your provider has referred you to: [facility]: Lung Disease and Pulmonary Clinic [facility] [phone]   [email or web address]    Please be aware that coverage of these services is subject to the terms and limitations of your health insurance plan.  Call member services at your health plan with any benefit or coverage questions.      Please bring the following with you to your appointment:    (1) Any X-Rays, CTs or MRIs which have been performed.  Contact the facility where they were done to arrange for  prior to your scheduled appointment.    (2) List of current medications   (3) This referral request   (4) Any documents/labs given to you for this referral                  Follow-up notes from your care team     Return in about 3 months (around 2/8/2018).      Your next 10  appointments already scheduled     Nov 09, 2017  2:00 PM CST   Paracentesis Visit with  Spec Inf Para Provider, UC 39 ATC   [facility] ([facility])    [address]  2nd Floor  [facility] [id number] [phone]            Nov 18, 2017  7:45 AM CST   (Arrive by 7:30 AM)   MR ABDOMEN W/O & W CONTRAST with MR1   [facility] ([facility])    [address]  1st [facility] [id number] [phone]           Take your medicines as usual, unless your doctor tells you not to. Bring a list of your current medicines to your exam (including vitamins, minerals and over-the-counter drugs). Also bring the results of similar scans you may have had.    The day before your exam, drink extra fluids at least six 8-ounce glasses (unless your doctor tells you to restrict your fluids).   Have a blood test (creatinine test) within 30 days of your exam. Go to your clinic or Diagnostic Imaging Department for this test.   Do not eat or drink for 6 hours prior to exam.  The MRI machine uses a strong magnet. Please wear clothes without metal (snaps, zippers). A sweatsuit works well, or we may give you a hospital gown.  Please remove any body piercings and hair extensions before you arrive. You will also remove watches, jewelry, hairpins, wallets, dentures, partial dental plates and hearing aids. You may wear contact lenses, and you may be able to wear your rings. We have a safe place to keep your personal items, but it is safer to leave them at home.   **IMPORTANT** THE INSTRUCTIONS BELOW ARE ONLY FOR THOSE PATIENTS WHO HAVE BEEN TOLD THEY WILL RECEIVE SEDATION OR GENERAL ANESTHESIA DURING THEIR MRI PROCEDURE:  IF YOU WILL RECEIVE SEDATION (take medicine to help you relax during your exam):   You must get the medicine from your doctor before you arrive. Bring the medicine to the exam. Do not take  it at home.   Arrive one hour early. Bring someone who can take you home after the test. Your medicine will make you sleepy. After the exam, you may not drive, take a bus or take a taxi by yourself.   No eating 8 hours before your exam. You may have clear liquids up until 4 hours before your exam. (Clear liquids include water, clear tea, black coffee and fruit juice without pulp.)  IF YOU WILL RECEIVE ANESTHESIA (be asleep for your exam):   Arrive 1 1/2 hours early. Bring someone who can take you home after the test. You may not drive, take a bus or take a taxi by yourself.   No eating 8 hours before your exam. You may have clear liquids up until 4 hours before your exam. (Clear liquids include water, clear tea, black coffee and fruit juice without pulp.)  If you have any questions, please contact your Imaging Department exam site.            Nov 18, 2017  8:40 AM CST   (Arrive by 8:25 AM)   CT CHEST W CONTRAST with UCCT1   [facility] [place] [facility] ([facility])    [address] [address] [phone]           Please bring any scans or X-rays taken at other hospitals, if similar tests were done. Also bring a list of your medicines, including vitamins, minerals and over-the-counter drugs. It is safest to leave personal items at home.  Be sure to tell your doctor:   If you have any allergies.   If there s any chance you are pregnant.   If you are breastfeeding.   If you have any special needs.  You will have contrast for this exam. To prepare:   Do not eat or drink for 2 hours before your exam. If you need to take medicine, you may take it with small sips of water. (We may ask you to take liquid medicine as well.)   The day before your exam, drink extra fluids at least six 8-ounce glasses (unless your doctor tells you to restrict your fluids).  Patients over 70 or patients with diabetes or kidney problems:   If you haven t had a blood test (creatinine  test) within the last 30 days, go to your clinic or Diagnostic Imaging Department for this test.  If you have diabetes:   If your kidney function is normal, continue taking your metformin (Avandamet, Glucophage, Glucovance, Metaglip) on the day of your exam.   If your kidney function is abnormal, wait 48 hours before restarting this medicine.  Please wear loose clothing, such as a sweat suit or jogging clothes. Avoid snaps, zippers and other metal. We may ask you to undress and put on a hospital gown.  If you have any questions, please call the Imaging Department where you will have your exam.            Nov 30, 2017  1:30 PM CST   (Arrive by 1:15 PM)   Return Visit with Viktoria Ayala MD   [facility] ([facility])    [address] [place] [address]  2nd [facility] [id number] [phone]            Dec 04, 2017  1:15 PM CST   RETURN CORNEA with Ahmet Farr MD   Eye Clinic ([facility])    [address]  [address] [facility]  [address] [address]  [facility] [address]   [phone]            Dec 11, 2017  7:30 AM CST   FULL PULMONARY FUNCTION with  PFL A   [facility] Pulmonary Function Testing ([facility])    [address] [place] [address]  3rd [facility] [address]   [phone]            Dec 11, 2017  8:30 AM CST   (Arrive by 8:15 AM)   New Patient Visit with Darlene Renteria MD   [facility] ([facility])    [address] [place] [address]  3rd [facility] [address]   [phone]            Feb 14, 2018 10:15 AM CST   Lab with  LAB   [facility] Lab ([facility])    [address] [place] [address]  1st [facility] [address] [phone]            Feb 14, 2018 11:10 AM CST   (Arrive by 10:55 AM)   Return General Liver with Tala Meyer MD   [facility] Hepatology ([facility])    [address] [place] [address]  [address]  3rd Floor  [facility] [address] [phone]              Future tests that were ordered for you today     Open Future Orders        Priority Expected Expires Ordered    Pulmonary function test Routine 11/8/2017 5/7/2018 11/8/2017    XR Chest 2 Views Routine 11/8/2017 5/7/2018 11/8/2017    MR Abdomen w/o & w Contrast Routine  11/8/2018 11/8/2017    CT Chest w Contrast Routine  11/8/2018 11/8/2017            Who to contact     If you have questions or need follow up information about today's clinic visit or your schedule please contact [facility] HEPATOLOGY directly at [phone].  Normal or non-critical lab and imaging results will be communicated to you by MyChart, letter or phone within 4 business days after the clinic has received the results. If you do not hear from us within 7 days, please contact the clinic through MyChart or phone. If you have a critical or abnormal lab result, we will notify you by phone as soon as possible.  Submit refill requests through [employer] or call your pharmacy and they will forward the refill request to us. Please allow 3 business days for your refill to be completed.          Additional Information About Your Visit        Care EveryWhere ID     This is your Care EveryWhere ID. This could be used by other organizations to access your [place] medical records  [id number]        Your Vitals Were     Pulse Temperature Respirations Height Pulse Oximetry BMI (Body Mass Index)    98 98.2  F (36.8  C) (Oral) 18 1.575 m (5' 2.01\") 99% 22.13 kg/m2       Blood Pressure from Last 3 Encounters:   11/08/17 119/79   11/02/17 110/72   10/30/17 (!) 89/68    Weight from Last 3 Encounters:   11/08/17 54.9 kg (121 lb)   11/02/17 53.7 kg (118 lb 6.4 oz)   10/30/17 54 kg (119 lb 0.8 oz)              We Performed the Following     PULMONARY MEDICINE REFERRAL          Today's Medication Changes          These changes are accurate as of: 11/8/17  2:16 PM.  If you have any questions, ask " your nurse or doctor.               Start taking these medicines.        Dose/Directions    furosemide 20 MG tablet   Commonly known as:  LASIX   Used for:  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)   Started by:  Frank Funez MD        Dose:  20 mg   Take 1 tablet (20 mg) by mouth daily   Quantity:  180 tablet   Refills:  1       polyethylene glycol powder   Commonly known as:  MIRALAX   Used for:  Constipation, unspecified constipation type   Started by:  Frank Funez MD        Dose:  1 capful   Take 17 g (1 capful) by mouth 3 times daily as needed for constipation   Quantity:  510 g   Refills:  3         Stop taking these medicines if you haven't already. Please contact your care team if you have questions.     cyproheptadine 4 MG tablet   Commonly known as:  PERIACTIN   Stopped by:  Frank Funez MD                Where to get your medicines      These medications were sent to [facility] [address] [facility] [address]  [address] [facility] [address] [place] [address]    Hours:  TRANSPLANT PHONE NUMBER [phone] Phone:  [phone]     furosemide 20 MG tablet    polyethylene glycol powder                Primary Care Provider Office Phone # Fax #    Felicitas Horton [phone]       [address] [address]        Equal Access to Services     LUKE DUVAL AH: Hadii nico ku hadasho Soomaali, waaxda luqadaha, qaybta kaalmada adeegyada, waxay kj lance. So [facility] [phone].    ATENCIÓN: Si habla español, tiene a martinez disposición servicios gratuitos de asistencia lingüística. Llame al [phone].    We comply with applicable federal civil rights laws and [place] laws. We do not discriminate on the basis of race, color, national origin, age, disability, sex, sexual orientation, or gender identity.            Thank you!     Thank you for choosing [facility] HEPATOLOGY  for your care. Our goal is  always to provide you with excellent care. Hearing back from our patients is one way we can continue to improve our services. Please take a few minutes to complete the written survey that you may receive in the mail after your visit with us. Thank you!             Your Updated Medication List - Protect others around you: Learn how to safely use, store and throw away your medicines at www.disposemymeds.org.          This list is accurate as of: 11/8/17  2:16 PM.  Always use your most recent med list.                   Brand Name Dispense Instructions for use Diagnosis    * albuterol 108 (90 BASE) MCG/ACT Inhaler    PROAIR HFA/PROVENTIL HFA/VENTOLIN HFA    1 Inhaler    Inhale 2 puffs into the lungs every 6 hours as needed for shortness of breath / dyspnea or wheezing    Moderate persistent asthma without complication       * albuterol (2.5 MG/3ML) 0.083% neb solution     25 vial    Take 1 vial (2.5 mg) by nebulization every 6 hours as needed for shortness of breath / dyspnea or wheezing    Mild intermittent asthma with acute exacerbation       bisacodyl 5 MG EC tablet    DULCOLAX    30 tablet    Take 1 tablet (5 mg) by mouth daily as needed for constipation    Constipation, unspecified constipation type       BOOST CALORIE SMART Liqd     90 Bottle    Take 3 Cans by mouth daily    HCC (hepatocellular carcinoma) (H)       cetirizine 10 MG tablet    zyrTEC    30 tablet    Take 1 tablet (10 mg) by mouth every evening    Chronic allergic conjunctivitis       DAILY MULTIPLE VITAMIN/IRON Tabs     30 tablet    Take 1 tablet by mouth daily    Chronic hepatitis C without hepatic coma (H)       fluticasone 50 MCG/ACT spray    FLONASE    1 Bottle    Spray 2 sprays into both nostrils daily    Chronic rhinitis       furosemide 20 MG tablet    LASIX    180 tablet    Take 1 tablet (20 mg) by mouth daily    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)       lactulose 20 GM/30ML Soln     946 mL    Take 30 mLs by mouth daily     Cirrhosis of liver with ascites, unspecified hepatic cirrhosis type (H), HCC (hepatocellular carcinoma) (H)       mometasone-formoterol 200-5 MCG/ACT oral inhaler    DULERA    1 Inhaler    Inhale 2 puffs into the lungs 2 times daily    Moderate persistent asthma without complication       omeprazole 40 MG capsule    priLOSEC    90 capsule    Take 1 capsule (40 mg) by mouth daily Take 30-60 minutes before a meal.    Gastroesophageal reflux disease without esophagitis       polyethylene glycol powder    MIRALAX    510 g    Take 17 g (1 capful) by mouth 3 times daily as needed for constipation    Constipation, unspecified constipation type       spironolactone 50 MG tablet    ALDACTONE    30 tablet    Take 1 tablet (50 mg) by mouth daily    Chronic hepatitis C without hepatic coma (H)       * Notice:  This list has 2 medication(s) that are the same as other medications prescribed for you. Read the directions carefully, and ask your doctor or other care provider to review them with you.

## 2017-11-08 NOTE — PATIENT INSTRUCTIONS
It was a pleasure taking care of you today.  I've included a brief summary of our discussion and care plan from today's visit below.  Please review this information with your primary care provider.  _______________________________________________________________________    My recommendations are summarized as follows:  1. Start furosemide 20mg once per day.   2. Take miralax up to 3 times per day for constipation.   3. Schedule liver MRI and chest CT.   4. Follow up with oncology for consideration of sorafenib.     --    Return to GI Clinic in 3 months to review your progress.    _______________________________________________________________________    Who do I call with any questions after my visit?  Please be in touch if there are any further questions that arise following today's visit.  There are multiple ways to contact your gastroenterology care team.        During business hours, you may reach a Gastroenterology nurse at [phone] and choose option 3.         To schedule or reschedule an appointment, please call [phone].       You can always send a secure message through [employer].  [employer] messages are answered by your nurse or doctor typically within 24 hours.  Please allow extra time on weekends and holidays.        For urgent/emergent questions after business hours, you may reach the on-call GI Fellow by contacting the [facility] at [phone].     How will I get the results of any tests ordered?    You will receive all of your results.  If you have signed up for [employer], any tests ordered at your visit will be available to you after your physician reviews them.  Typically this takes 1-2 weeks.  If there are urgent results that require a change in your care plan, your physician or nurse will call you to discuss the next steps.      What is [employer]?  [employer] is a secure way for you to access all of your healthcare records from the [facility].  It is a web based  computer program, so you can sign on to it from any location.  It also allows you to send secure messages to your care team.  I recommend signing up for [employer] access if you have not already done so and are comfortable with using a computer.      How to I schedule a follow-up visit?  If you did not schedule a follow-up visit today, please call [phone] to schedule a follow-up office visit.        Sincerely,    Dick Tolentino MD  Fellow  [facility]  Division of Gastroenterology

## 2017-11-09 NOTE — MR AVS SNAPSHOT
After Visit Summary   11/9/2017    Yue Portillo    MRN: [id number]           Patient Information     Date Of Birth          1940        Visit Information        Provider Department      11/9/2017 2:00 PM Provider, [facility]; [employer]; [facility] Specialty and Procedure        Today's Diagnoses     HCC (hepatocellular carcinoma) (H)    -  1    Cirrhosis of liver with ascites, unspecified hepatic cirrhosis type (H)          Care Instructions      Paracentesis  Your healthcare provider recommends that you have paracentesis. This is a procedure to remove extra fluid from your belly (abdomen). A needle is used to drain the fluid. A small sample of fluid may be taken and tested for problems. If the fluid buildup is causing discomfort or pain, all of the fluid may be drained. To do this, a tube is attached to the needle. The fluid is drained into a container that sits outside of the body. If symptoms are severe, paracentesis may be done as an emergency procedure. Otherwise, it will be scheduled ahead of time. Read on to learn more about paracentesis and how it works.     Understanding ascites  Many of the body s organs, including the liver and intestines, are inside the belly (abdomen). The organs are covered in a thin membrane called the peritoneum. The peritoneum has 2 layers. It makes a fluid that allows the layers to glide smoothly past each other. If this fluid builds up in the belly, the condition is called ascites. Ascites causes pain and discomfort. It can also make it hard to breathe. Fluid can build up for a number of reasons. These include chronic liver disease (cirrhosis), heart or kidney failure, and cancer. Your provider can tell you more about the cause of your ascites.  How paracentesis works  The goal of paracentesis may be to help diagnose the cause of the excess fluid. Or, the goal may be to drain excess fluid from the abdomen. In  some cases, fluid returns and the procedure needs to be repeated.  Before the procedure    Tell your provider about any medicines you are taking. This includes all prescription medicines, over-the-counter medicines, street drugs, herbs, vitamins, and other supplements.    Tell your provider about any allergies you have.    Before the procedure begins, you ll be asked to empty your bladder. This helps prevent injury to the bladder during the procedure. If needed, a thin tube (Bourne catheter) may be placed into your bladder to drain urine during the procedure. This tube is removed after the procedure.    An IV (intravenous) line may be put into a vein in your arm or hand. This line supplies fluids and medicines.  During the procedure    You are awake during the procedure.    An imaging method called ultrasound may be used to guide the procedure. It shows live images of the inside of your belly on a video screen. This helps the provider find the site of the excess fluid inside your belly and decide where to insert the needle.    A numbing medicine (local anesthesia) is injected into your belly where the needle will be inserted.    Once the skin is numb, the provider carefully inserts the needle into the belly. This causes the needle to fill with fluid.    The needle may be removed with only a small sample of fluid. This sample is sent to a lab for testing. Getting a sample takes about 10 to 15 minutes.    Or, a tube may be attached to the needle so that more of the excess fluid can be drained. The tube may be taped or stitched into place. This keeps it from pulling the needle out of your belly.    How long it takes to drain all of the fluid varies for each person. In most cases, it takes about 30 minutes. Your provider will let you know if the procedure is expected to take longer than usual.    Once all of the fluid is drained, the needle and tube are removed.    Pressure is put on the puncture site to stop any fluid  leakage or bleeding.    A small bandage is placed over the puncture site. Albumin may be given during or after the procedure to prevent low blood pressure or kidney problems.  After the procedure  You may be taken to a recovery room to rest after the procedure. If you are in pain, you will be given medicine as needed. You will likely be sent home 1 to 2 hours after the procedure is done. When you leave the hospital, have an adult family member or friend drive you home. If you are staying in the hospital, you will return to your hospital room.  Risks and possible complications of paracentesis  This procedure is considered safe. But like all procedures, it carries some risks. These include the following:    Bleeding    Infection    Injury to structures in the belly    Fast drop in blood pressure   Recovering at home    If needed, your provider can prescribe or recommend pain medicines for you to take at home. Take these exactly as directed. If you stopped taking other medicines before the procedure, ask your provider when you can start them again.    You may remove the bandage 24 hours after the procedure.    Take it easy for 24 hours after the procedure. Avoid physical activity until your provider says it s OK.  Follow-up care  Make a follow-up appointment with your provider as directed. During your follow-up visit, your provider will check your healing. Let your provider know how you are feeling. You can also discuss the cause of your ascites and if any more treatment is needed.   When to seek medical care  Call your healthcare provider if you notice any of the following after the procedure:    A fever of 100.4 F (38.0 C) or higher    Trouble breathing    Pain that does not go away even after taking pain medicine    Belly pain not caused by having the skin punctured    Bleeding from the puncture site    More than a small amount of fluid leakage from the puncture site    Swollen belly    Signs of infection at the  puncture site. These include increased pain, redness, or swelling, as well as warmth or bad-smelling drainage.    Blood in your urine    Dizziness, lightheadedness, or fainting   Date Last Reviewed: 7/1/2016 2000-2017 [employer]. [address] [place] [address]. All rights reserved. This information is not intended as a substitute for professional medical care. Always follow your healthcare professional's instructions.                Follow-ups after your visit        Your next 10 appointments already scheduled     Nov 18, 2017  7:45 AM CST   (Arrive by 7:30 AM)   MR ABDOMEN W/O & W CONTRAST with [address] [facility] [place] [facility] ([facility] [place])    [address] [facility]  1st Floor  [facility] [address] [phone]           Take your medicines as usual, unless your doctor tells you not to. Bring a list of your current medicines to your exam (including vitamins, minerals and over-the-counter drugs). Also bring the results of similar scans you may have had.    The day before your exam, drink extra fluids at least six 8-ounce glasses (unless your doctor tells you to restrict your fluids).   Have a blood test (creatinine test) within 30 days of your exam. Go to your clinic or Diagnostic Imaging Department for this test.   Do not eat or drink for 6 hours prior to exam.  The MRI machine uses a strong magnet. Please wear clothes without metal (snaps, zippers). A sweatsuit works well, or we may give you a hospital gown.  Please remove any body piercings and hair extensions before you arrive. You will also remove watches, jewelry, hairpins, wallets, dentures, partial dental plates and hearing aids. You may wear contact lenses, and you may be able to wear your rings. We have a safe place to keep your personal items, but it is safer to leave them at home.   **IMPORTANT** THE INSTRUCTIONS BELOW ARE ONLY FOR THOSE PATIENTS WHO HAVE BEEN TOLD THEY WILL RECEIVE SEDATION OR  GENERAL ANESTHESIA DURING THEIR MRI PROCEDURE:  IF YOU WILL RECEIVE SEDATION (take medicine to help you relax during your exam):   You must get the medicine from your doctor before you arrive. Bring the medicine to the exam. Do not take it at home.   Arrive one hour early. Bring someone who can take you home after the test. Your medicine will make you sleepy. After the exam, you may not drive, take a bus or take a taxi by yourself.   No eating 8 hours before your exam. You may have clear liquids up until 4 hours before your exam. (Clear liquids include water, clear tea, black coffee and fruit juice without pulp.)  IF YOU WILL RECEIVE ANESTHESIA (be asleep for your exam):   Arrive 1 1/2 hours early. Bring someone who can take you home after the test. You may not drive, take a bus or take a taxi by yourself.   No eating 8 hours before your exam. You may have clear liquids up until 4 hours before your exam. (Clear liquids include water, clear tea, black coffee and fruit juice without pulp.)  If you have any questions, please contact your Imaging Department exam site.            Nov 18, 2017  8:40 AM CST   (Arrive by 8:25 AM)   CT CHEST W CONTRAST with UCCT1   [facility] [place] [facility] ([facility])    [address] [address] [phone]           Please bring any scans or X-rays taken at other hospitals, if similar tests were done. Also bring a list of your medicines, including vitamins, minerals and over-the-counter drugs. It is safest to leave personal items at home.  Be sure to tell your doctor:   If you have any allergies.   If there s any chance you are pregnant.   If you are breastfeeding.   If you have any special needs.  You will have contrast for this exam. To prepare:   Do not eat or drink for 2 hours before your exam. If you need to take medicine, you may take it with small sips of water. (We may ask you to take liquid medicine as well.)   The  day before your exam, drink extra fluids at least six 8-ounce glasses (unless your doctor tells you to restrict your fluids).  Patients over 70 or patients with diabetes or kidney problems:   If you haven t had a blood test (creatinine test) within the last 30 days, go to your clinic or Diagnostic Imaging Department for this test.  If you have diabetes:   If your kidney function is normal, continue taking your metformin (Avandamet, Glucophage, Glucovance, Metaglip) on the day of your exam.   If your kidney function is abnormal, wait 48 hours before restarting this medicine.  Please wear loose clothing, such as a sweat suit or jogging clothes. Avoid snaps, zippers and other metal. We may ask you to undress and put on a hospital gown.  If you have any questions, please call the Imaging Department where you will have your exam.            Nov 30, 2017  1:30 PM CST   (Arrive by 1:15 PM)   Return Visit with Viktoria Ayala MD   [facility] ([facility])    [address] [place] [address]  [address] [facility] [address]   [phone]            Dec 04, 2017  1:15 PM CST   RETURN CORNEA with Ahmet Farr MD   [facility] ([facility])    [address]  [address] [facility]  [address] [address]  [facility] [address]   [phone]            Dec 11, 2017  7:30 AM CST   FULL PULMONARY FUNCTION with  PFL A   [facility] Function Testing ([facility])    [address] [facility]  [address]rd [facility] [address]   [phone]            Dec 11, 2017  8:30 AM CST   (Arrive by 8:15 AM)   New Patient Visit with Darlene Renteria MD   [facility] ([facility])    [address] [place] [address]  [address] [facility] [address]   [phone]            Feb 14, 2018 10:15 AM CST   Lab with  LAB   [facility] ([facility])    [address] [place] [address]  Floor  [facility] [id number] [phone]            Feb 14, 2018 11:10 AM CST   (Arrive by 10:55 AM)   Return General Liver with Tala Meyer MD   [facility] ([facility])    [address] [facility]  3rd Floor  [facility] [id number] [phone]              Who to contact     If you have questions or need follow up information about today's clinic visit or your schedule please contact [facility] [place] SPECIALTY AND PROCEDURE directly at [phone].  Normal or non-critical lab and imaging results will be communicated to you by MyChart, letter or phone within 4 business days after the clinic has received the results. If you do not hear from us within 7 days, please contact the clinic through MyChart or phone. If you have a critical or abnormal lab result, we will notify you by phone as soon as possible.  Submit refill requests through e994 or call your pharmacy and they will forward the refill request to us. Please allow 3 business days for your refill to be completed.          Additional Information About Your Visit        Care EveryWhere ID     This is your Care EveryWhere ID. This could be used by other organizations to access your [place] medical records  [id number]        Your Vitals Were     Pulse Temperature Pulse Oximetry BMI (Body Mass Index)          96 98.2  F (36.8  C) (Oral) 97% 21.27 kg/m2         Blood Pressure from Last 3 Encounters:   11/09/17 139/77   11/08/17 119/79   11/02/17 110/72    Weight from Last 3 Encounters:   11/09/17 52.8 kg (116 lb 4.8 oz)   11/08/17 54.9 kg (121 lb)   11/02/17 53.7 kg (118 lb 6.4 oz)              We Performed the Following     Basic metabolic panel     CBC with platelets differential     Hepatic panel     INR     US Paracentesis        Primary Care Provider Office Phone # Fax #    Felicitas Horton [phone]       [address]  [address]        Equal Access to Services     [facility] : Hadii nico muñoz berthaderrick Nainali, wahollyda melodieleylaha, qawillie veronicamontekiara sheridan, rosana lance. So [facility] [phone].    ATENCIÓN: Si habla español, tiene a martinez disposición servicios gratuitos de asistencia lingüística. Cliveame al [phone].    We comply with applicable federal civil rights laws and [place] laws. We do not discriminate on the basis of race, color, national origin, age, disability, sex, sexual orientation, or gender identity.            Thank you!     Thank you for choosing [facility] SPECIALTY AND PROCEDURE  for your care. Our goal is always to provide you with excellent care. Hearing back from our patients is one way we can continue to improve our services. Please take a few minutes to complete the written survey that you may receive in the mail after your visit with us. Thank you!             Your Updated Medication List - Protect others around you: Learn how to safely use, store and throw away your medicines at www.disposemymeds.org.          This list is accurate as of: 11/9/17 11:59 PM.  Always use your most recent med list.                   Brand Name Dispense Instructions for use Diagnosis    * albuterol 108 (90 BASE) MCG/ACT Inhaler    PROAIR HFA/PROVENTIL HFA/VENTOLIN HFA    1 Inhaler    Inhale 2 puffs into the lungs every 6 hours as needed for shortness of breath / dyspnea or wheezing    Moderate persistent asthma without complication       * albuterol (2.5 MG/3ML) 0.083% neb solution     25 vial    Take 1 vial (2.5 mg) by nebulization every 6 hours as needed for shortness of breath / dyspnea or wheezing    Mild intermittent asthma with acute exacerbation       bisacodyl 5 MG EC tablet    DULCOLAX    30 tablet    Take 1 tablet (5 mg) by mouth daily as needed for constipation    Constipation, unspecified constipation type       BOOST CALORIE SMART Liqd     90 Bottle    Take 3 Cans  by mouth daily    HCC (hepatocellular carcinoma) (H)       cetirizine 10 MG tablet    zyrTEC    30 tablet    Take 1 tablet (10 mg) by mouth every evening    Chronic allergic conjunctivitis       DAILY MULTIPLE VITAMIN/IRON Tabs     30 tablet    Take 1 tablet by mouth daily    Chronic hepatitis C without hepatic coma (H)       fluticasone 50 MCG/ACT spray    FLONASE    1 Bottle    Spray 2 sprays into both nostrils daily    Chronic rhinitis       furosemide 20 MG tablet    LASIX    180 tablet    Take 1 tablet (20 mg) by mouth daily    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)       lactulose 20 GM/30ML Soln     946 mL    Take 30 mLs by mouth daily    Cirrhosis of liver with ascites, unspecified hepatic cirrhosis type (H), HCC (hepatocellular carcinoma) (H)       mometasone-formoterol 200-5 MCG/ACT oral inhaler    DULERA    1 Inhaler    Inhale 2 puffs into the lungs 2 times daily    Moderate persistent asthma without complication       omeprazole 40 MG capsule    priLOSEC    90 capsule    Take 1 capsule (40 mg) by mouth daily Take 30-60 minutes before a meal.    Gastroesophageal reflux disease without esophagitis       polyethylene glycol powder    MIRALAX    510 g    Take 17 g (1 capful) by mouth 3 times daily as needed for constipation    Constipation, unspecified constipation type       spironolactone 50 MG tablet    ALDACTONE    30 tablet    Take 1 tablet (50 mg) by mouth daily    Chronic hepatitis C without hepatic coma (H)       * Notice:  This list has 2 medication(s) that are the same as other medications prescribed for you. Read the directions carefully, and ask your doctor or other care provider to review them with you.

## 2017-11-09 NOTE — PATIENT INSTRUCTIONS
Paracentesis  Your healthcare provider recommends that you have paracentesis. This is a procedure to remove extra fluid from your belly (abdomen). A needle is used to drain the fluid. A small sample of fluid may be taken and tested for problems. If the fluid buildup is causing discomfort or pain, all of the fluid may be drained. To do this, a tube is attached to the needle. The fluid is drained into a container that sits outside of the body. If symptoms are severe, paracentesis may be done as an emergency procedure. Otherwise, it will be scheduled ahead of time. Read on to learn more about paracentesis and how it works.     Understanding ascites  Many of the body s organs, including the liver and intestines, are inside the belly (abdomen). The organs are covered in a thin membrane called the peritoneum. The peritoneum has 2 layers. It makes a fluid that allows the layers to glide smoothly past each other. If this fluid builds up in the belly, the condition is called ascites. Ascites causes pain and discomfort. It can also make it hard to breathe. Fluid can build up for a number of reasons. These include chronic liver disease (cirrhosis), heart or kidney failure, and cancer. Your provider can tell you more about the cause of your ascites.  How paracentesis works  The goal of paracentesis may be to help diagnose the cause of the excess fluid. Or, the goal may be to drain excess fluid from the abdomen. In some cases, fluid returns and the procedure needs to be repeated.  Before the procedure    Tell your provider about any medicines you are taking. This includes all prescription medicines, over-the-counter medicines, street drugs, herbs, vitamins, and other supplements.    Tell your provider about any allergies you have.    Before the procedure begins, you ll be asked to empty your bladder. This helps prevent injury to the bladder during the procedure. If needed, a thin tube (Bourne catheter) may be placed into your  bladder to drain urine during the procedure. This tube is removed after the procedure.    An IV (intravenous) line may be put into a vein in your arm or hand. This line supplies fluids and medicines.  During the procedure    You are awake during the procedure.    An imaging method called ultrasound may be used to guide the procedure. It shows live images of the inside of your belly on a video screen. This helps the provider find the site of the excess fluid inside your belly and decide where to insert the needle.    A numbing medicine (local anesthesia) is injected into your belly where the needle will be inserted.    Once the skin is numb, the provider carefully inserts the needle into the belly. This causes the needle to fill with fluid.    The needle may be removed with only a small sample of fluid. This sample is sent to a lab for testing. Getting a sample takes about 10 to 15 minutes.    Or, a tube may be attached to the needle so that more of the excess fluid can be drained. The tube may be taped or stitched into place. This keeps it from pulling the needle out of your belly.    How long it takes to drain all of the fluid varies for each person. In most cases, it takes about 30 minutes. Your provider will let you know if the procedure is expected to take longer than usual.    Once all of the fluid is drained, the needle and tube are removed.    Pressure is put on the puncture site to stop any fluid leakage or bleeding.    A small bandage is placed over the puncture site. Albumin may be given during or after the procedure to prevent low blood pressure or kidney problems.  After the procedure  You may be taken to a recovery room to rest after the procedure. If you are in pain, you will be given medicine as needed. You will likely be sent home 1 to 2 hours after the procedure is done. When you leave the hospital, have an adult family member or friend drive you home. If you are staying in the hospital, you will  return to your hospital room.  Risks and possible complications of paracentesis  This procedure is considered safe. But like all procedures, it carries some risks. These include the following:    Bleeding    Infection    Injury to structures in the belly    Fast drop in blood pressure   Recovering at home    If needed, your provider can prescribe or recommend pain medicines for you to take at home. Take these exactly as directed. If you stopped taking other medicines before the procedure, ask your provider when you can start them again.    You may remove the bandage 24 hours after the procedure.    Take it easy for 24 hours after the procedure. Avoid physical activity until your provider says it s OK.  Follow-up care  Make a follow-up appointment with your provider as directed. During your follow-up visit, your provider will check your healing. Let your provider know how you are feeling. You can also discuss the cause of your ascites and if any more treatment is needed.   When to seek medical care  Call your healthcare provider if you notice any of the following after the procedure:    A fever of 100.4 F (38.0 C) or higher    Trouble breathing    Pain that does not go away even after taking pain medicine    Belly pain not caused by having the skin punctured    Bleeding from the puncture site    More than a small amount of fluid leakage from the puncture site    Swollen belly    Signs of infection at the puncture site. These include increased pain, redness, or swelling, as well as warmth or bad-smelling drainage.    Blood in your urine    Dizziness, lightheadedness, or fainting   Date Last Reviewed: 7/1/2016 2000-2017 [employer]. [address] [place] [address], [place] [address]. All rights reserved. This information is not intended as a substitute for professional medical care. Always follow your healthcare professional's instructions.

## 2017-11-09 NOTE — PROGRESS NOTES
GI CLINIC VISIT    CC/REFERRING MD:  Felicitas Horton  REASON FOR CONSULTATION:   The pt is a 77 year old female who I was asked to see in consultation at the request of Dr. Felicitas Horton for follow up of HCV cirrhosis complicated by HCC.     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     # HCC- 4 x 2.8cm segment 6 and 1.5 x 1.4cm segment 2  # TACE 2015 and microwave ablation 2016 segment 6  Did not follow up with IR or oncology. She is concerned about deterioration of liver function and generalized well-being after another treatment of liver directed therapy, which she noted following TACE in 2015. In addition, she was hoping to get vision issues addressed. As it was left, the plan was to consider cTACE of segment 6 and segment 2 HCCs vs Y90 radioembolization. Given her (appropriate) concern, we will instead have her discuss with oncology regarding sorafenib treatment.  - repeat liver MRI, ideally after paracentesis  - oncology follow up to discuss sorafenib treatment, message sent to Dr Ayala  - not a liver transplant candidate    # Decompensated HCV (genotype 5) cirrhosis complicated by ascites and hepatic hydrothorax  Needing to undergo paracentesis every 1-2 weeks and thoracentesis every 3 months. Notes decreased appetite and early satiety related to ascites. Not currently on furosemide.   - start furosemide 20mg QD  - if well tolerated (i.e. no significant renal impairment or electrolyte abnormalities), suggest increasing lasix to 30 or 40mg QD and spironolactone to 75 or 100mg QD (in keeping with 2:5 ratio)- this can be adjusted in primary care and hepatology clinic   - continue paracenteses, thoracenteses PRN    # Constipation  Describes hard, desiccated, [id number] stool. No blood or melena. Likely dietary.    - miralax up to TID  - did not like lactulose due to sweetness  - continue dulcolax    # Non productive, occasionally blood cough  May be related to hepatic hydrothorax, although doesn't improve after thoracentesis. No improvement with bronchodilators. There was concern for indeterminate lung lesion in April with recommendation for 3 month follow up, although it doesn't appear this was completed.   - repeat chest CT, ideally after thoracentesis  - pulmonology referral    RTC 3 months.    Thank you for this consultation.  It was a pleasure to participate in the care of this patient; please contact us with any further questions.     Seen and discussed with attending, Dr Meyer.     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     Since her last hepatology appointment, she met with Dr Ayala of oncology and Dr Ulloa of IR for consideration of liver directed therapy of HCC. She did not follow up after these initial appointments, due to wanting to get vision issues taken care of and due to concern of worsening of liver function and generalized health following additional HCC treatments (which she perceived happened after initial treatment in 2015).    In regard to symptoms, she notices decreased appetite and early satiety related to accumulation of ascites in between paracenteses every 1-2 weeks. She notes dyspnea with exertion. More troublesome is her cough, which is worse at night but present throughout the day. She will occasionally bring up white phlegm. If she is coughing a lot, she may bring up a small amount of blood. She does not have any improvement with bronchodilator treatment. Similarly, she doesn't  have any improvement following thoracentesis. The cough will keep her up at night. She had a CT in April 2017 which demonstrated ground glass and nodular opacities in the lingula which were favored to represent infection/aspiration, although 3 month follow up was recommended and not yet completed.     She also notes constipation, having to pass a hard, desiccated BM daily. This is sometimes painful. She has tried 'a lot' of medications and hasn't noted improvement. It is not clear how much miralax she is taking at this time, but she is agreeable to increasing to TID and titrating down from there.      ROS:    A complete 10 point ROS was negative unless noted in HPI.     PROBLEM LIST  Patient Active Problem List    Diagnosis Date Noted     Mild persistent asthma without complication 05/09/2016     Priority: Medium     HCC (hepatocellular carcinoma) (H) 12/22/2014     Priority: Medium     Diagnosed at [facility]. In records from September 2014. Son does not believe pt has cancer. See note 7/13/15.       Cirrhosis of liver (H) 01/23/2014     Priority: Medium     Postmenopausal bleeding 04/01/2013     Priority: Medium     4/2013-HPV negative, EMB done, pelvic U/S done; referred to OB/GYN for possible D&C.       Vitamin D deficiency 01/04/2013     Priority: Medium     Ear pain 11/26/2012     Priority: Medium     Unknown etiology, controlled with dermotic       Health Care Home 09/11/2012     Priority: Medium     Tier 1  DX V65.8 REPLACED WITH [facility] C[facility] HOME (04/08/2013)       Allergic rhinitis 09/11/2012     Priority: Medium     Problem list name updated by automated process. 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) 09/11/2012     Priority: Medium     Chronic allergic conjunctivitis 09/11/2012     Priority: Medium     Chronic tension type headache 09/11/2012     Priority: Medium     Constipation 09/11/2012     Priority: Medium     Depression 09/11/2012     Priority: Medium     Esophageal reflux  09/11/2012     Priority: Medium     Generalized osteoarthritis 09/11/2012     Priority: Medium     Generalized pain 09/11/2012     Priority: Medium     Heartburn 09/11/2012     Priority: Medium     Insomnia 09/11/2012     Priority: Medium     Lightheadedness 09/11/2012     Priority: Medium     Lower back pain 09/11/2012     Priority: Medium     PERTINENT PAST MEDICAL HISTORY:  Past Medical History:   Diagnosis Date     Liver disease      Mild intermittent asthma      PREVIOUS SURGERIES:  Past Surgical History:   Procedure Laterality Date     CATARACT IOL, RT/LT Right 08/22/2017    s/p CE/IOL right eye     EYE SURGERY       H PYLORI SHYLA SCREEN      was treated for h pylori     NO HISTORY OF SURGERY       ALLERGIES:  Allergies   Allergen Reactions     Dust Mites Cough     Sneezing      Seasonal Allergies      PERTINENT MEDICATIONS:    Current Outpatient Prescriptions:      polyethylene glycol (MIRALAX) powder, Take 17 g (1 capful) by mouth 3 times daily as needed for constipation, Disp: 510 g, Rfl: 3     furosemide (LASIX) 20 MG tablet, Take 1 tablet (20 mg) by mouth daily, Disp: 180 tablet, Rfl: 1     bisacodyl (DULCOLAX) 5 MG EC tablet, Take 1 tablet (5 mg) by mouth daily as needed for constipation, Disp: 30 tablet, Rfl: 2     albuterol (2.5 MG/3ML) 0.083% neb solution, Take 1 vial (2.5 mg) by nebulization every 6 hours as needed for shortness of breath / dyspnea or wheezing, Disp: 25 vial, Rfl: 3     spironolactone (ALDACTONE) 50 MG tablet, Take 1 tablet (50 mg) by mouth daily, Disp: 30 tablet, Rfl: 1     Multiple Vitamins-Iron (DAILY MULTIPLE VITAMIN/IRON) TABS, Take 1 tablet by mouth daily, Disp: 30 tablet, Rfl: 11     omeprazole (PRILOSEC) 40 MG capsule, Take 1 capsule (40 mg) by mouth daily Take 30-60 minutes before a meal., Disp: 90 capsule, Rfl: 3     lactulose 20 GM/30ML SOLN, Take 30 mLs by mouth daily, Disp: 946 mL, Rfl: 3     cetirizine (ZYRTEC) 10 MG tablet, Take 1 tablet (10 mg) by mouth every evening,  "Disp: 30 tablet, Rfl: 6     Nutritional Supplements (BOOST CALORIE SMART) LIQD, Take 3 Cans by mouth daily, Disp: 90 Bottle, Rfl: 11     mometasone-formoterol (DULERA) 200-5 MCG/ACT oral inhaler, Inhale 2 puffs into the lungs 2 times daily, Disp: 1 Inhaler, Rfl: 12     fluticasone (FLONASE) 50 MCG/ACT spray, Spray 2 sprays into both nostrils daily, Disp: 1 Bottle, Rfl: 11     albuterol (PROAIR HFA/PROVENTIL HFA/VENTOLIN HFA) 108 (90 BASE) MCG/ACT Inhaler, Inhale 2 puffs into the lungs every 6 hours as needed for shortness of breath / dyspnea or wheezing, Disp: 1 Inhaler, Rfl: 11    SOCIAL HISTORY:  Social History     Social History     Marital status: Single     Spouse name: N/A     Number of children: N/A     Years of education: N/A     Occupational History     Not on file.     Social History Main Topics     Smoking status: Never Smoker     Smokeless tobacco: Never Used     Alcohol use No     Drug use: No     Sexual activity: No     Other Topics Concern     Not on file     Social History Narrative     FAMILY HISTORY:  Family History   Problem Relation Age of Onset     Respiratory Father      asthma     DIABETES Son      Glaucoma No family hx of      Macular Degeneration No family hx of      Retinal detachment No family hx of      Amblyopia No family hx of      Past/family/social history reviewed and no changes    PHYSICAL EXAMINATION:  Constitutional: aaox3, cooperative, pleasant, not dyspneic/diaphoretic, no acute distress  Vitals reviewed: /79 (BP Location: Left arm, Patient Position: Sitting, Cuff Size: Adult Regular)  Pulse 98  Temp 98.2  F (36.8  C) (Oral)  Resp 18  Ht 1.575 m (5' 2.01\")  Wt 54.9 kg (121 lb)  SpO2 99%  BMI 22.13 kg/m2  Wt:   Wt Readings from Last 2 Encounters:   11/08/17 54.9 kg (121 lb)   11/02/17 53.7 kg (118 lb 6.4 oz)      Eyes: Sclera anicteric/injected  Ears/nose/mouth/throat: Normal oropharynx without ulcers or exudate, mucus membranes moist, hearing intact  Neck: " supple  CV: RRR   Respiratory: Unlabored breathing, decreased breath sounds on right side to mid lung  Lymph: No lymphadenopathy  Abd: Distended, +bs, unable to appreciate hepatosplenomegaly, nontender, no peritoneal signs  Skin: warm, perfused, no jaundice  Psych: Normal affect  MSK: Normal gait    PERTINENT STUDIES:  MELD-Na score: 13 at 7/19/2017  8:53 AM  MELD score: 13 at 7/19/2017  8:53 AM  Calculated from:  Serum Creatinine: 0.79 mg/dL (Rounded to 1) at 7/19/2017  8:53 AM  Serum Sodium: 140 mmol/L (Rounded to 137) at 7/19/2017  8:53 AM  Total Bilirubin: 1.7 mg/dL at 7/19/2017  8:53 AM  INR(ratio): 1.46 at 7/19/2017  8:53 AM  Age: 77 years     Attestation:  This patient has been seen and evaluated by me, Tala Meyer.  Discussed with the house staff team or resident(s) and agree with the findings and plan in this note.

## 2017-11-09 NOTE — LETTER
November 13, 2017       TO: Yue MART Bandar  [address]   [facility] [address]       Dear Ms. Portillo,    We are writing to inform you of your test results.  Your liver function tests are abnormal, and blood sugar elevated.    Resulted Orders   CBC with platelets differential   Result Value Ref Range    WBC 4.1 4.0 - 11.0 10e9/L    RBC Count 3.32 (L) 3.8 - 5.2 10e12/L    Hemoglobin 11.3 (L) 11.7 - 15.7 g/dL    Hematocrit 33.2 (L) 35.0 - 47.0 %     78 - 100 fl    MCH 34.0 (H) 26.5 - 33.0 pg    MCHC 34.0 31.5 - 36.5 g/dL    RDW 15.9 (H) 10.0 - 15.0 %    Platelet Count 88 (L) 150 - 450 10e9/L    Diff Method Automated Method     % Neutrophils 64.3 %    % Lymphocytes 21.0 %    % Monocytes 7.9 %    % Eosinophils 5.9 %    % Basophils 0.7 %    % Immature Granulocytes 0.2 %    Nucleated RBCs 0 0 /100    Absolute Neutrophil 2.6 1.6 - 8.3 10e9/L    Absolute Lymphocytes 0.9 0.8 - 5.3 10e9/L    Absolute Monocytes 0.3 0.0 - 1.3 10e9/L    Absolute Eosinophils 0.2 0.0 - 0.7 10e9/L    Absolute Basophils 0.0 0.0 - 0.2 10e9/L    Abs Immature Granulocytes 0.0 0 - 0.4 10e9/L    Absolute Nucleated RBC 0.0    Basic metabolic panel   Result Value Ref Range    Sodium 134 133 - 144 mmol/L    Potassium 4.5 3.4 - 5.3 mmol/L      Comment:      Specimen slightly hemolyzed, potassium may be falsely elevated    Chloride 105 94 - 109 mmol/L    Carbon Dioxide 21 20 - 32 mmol/L    Anion Gap 8 3 - 14 mmol/L    Glucose 125 (H) 70 - 99 mg/dL    Urea Nitrogen 14 7 - 30 mg/dL    Creatinine 0.75 0.52 - 1.04 mg/dL    GFR Estimate 74 >60 mL/min/1.7m2      Comment:      Non  GFR Calc    GFR Estimate If Black >90 >60 mL/min/1.7m2      Comment:       GFR Calc    Calcium 7.9 (L) 8.5 - 10.1 mg/dL   INR   Result Value Ref Range    INR 1.64 (H) 0.86 - 1.14   Hepatic panel   Result Value Ref Range    Bilirubin Direct 1.1 (H) 0.0 - 0.2 mg/dL    Bilirubin Total 2.7 (H) 0.2 - 1.3 mg/dL    Albumin 2.0 (L) 3.4 - 5.0  g/dL    Protein Total 6.7 (L) 6.8 - 8.8 g/dL    Alkaline Phosphatase 239 (H) 40 - 150 U/L    ALT 45 0 - 50 U/L    AST Canceled, Test credited 0 - 45 U/L      Comment:      Unsatisfactory specimen - hemolyzed  Documentation to follow  NOTIFIED MIGUE CERRATO RN 11/9/17 1555 BY AN             It was a pleasure to see you at your recent visit. Please let me know if you have any questions or concerns.     Clinic Staff - [phone] option 3     Sincerely,     Tala Meyer MD  [address] [place] [address], Mail Code [id number]  [place]  [address].

## 2017-11-09 NOTE — PROGRESS NOTES
Paracentesis Nursing Note  Yue Portillo presents today to [facility] for a paracentesis.    During today's appointment orders from  Tala Meyer MD were completed.    Progress Note:  Patient identification verified by name and date of birth.  Assessment completed.  Vitals monitored throughout appointment and recorded in Doc Flowsheets.  See proceduralist note in ultrasound.    Date of consent or authorization: 10/23.  Invasive Procedure Safety Checklist was completed and sent for scanning.     Paracentesis performed by Jaron Steiner PA-C .    The following labs were communicated to provider performing paracentesis:  Lab Results   Component Value Date    PLT 79 10/23/2017       Total amount of ascites fluid drained: 2.2 liters.  Color of ascites fluid: yellow and clear.  Total amount of albumin given: 25  grams.    Patient tolerated procedure well.    Post procedure,denies pain or discomfort post paracentesis.      Discharge Plan:  Discharge instructions were reviewed with patient.  Patient/Representative verbalized understanding and all questions were answered.   Discharged from [facility] in stable condition.    Mell Espinosa RN      Administrations This Visit     albumin human 25 % injection 12.5 g     Admin Date Action Dose Route Administered By             11/09/2017 New Bag 25 g Intravenous Mell Espinosa RN                    lidocaine 1 % 20 mL     Admin Date Action Dose Route Administered By             11/09/2017 Given by Other Clinician 20 mL Other Mell Espinosa RN                          Temp 98.2  F (36.8  C) (Oral)  Wt 55.8 kg (123 lb)  SpO2 97%  BMI 22.49 kg/m2

## 2017-11-13 NOTE — ADDENDUM NOTE
Encounter addended by: Chrissy Mcclure on: 11/13/2017  2:59 PM<BR>     Actions taken: Visit Navigator Flowsheet section accepted, Charge Capture section accepted

## 2017-11-13 NOTE — ADDENDUM NOTE
Encounter addended by: Chrissy Mcclure on: 11/13/2017  3:01 PM<BR>     Actions taken: Visit Navigator Flowsheet section accepted, Charge Capture section accepted

## 2017-11-30 NOTE — PROGRESS NOTES
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 Initially she did not get treatment for that but then in Jan 2015 she had a repeat MRI done showing a 2.7 cm OPTN5B lesion in segment 6.   In 05/2015 she underwent a TACE procedure for her original hepatocellular carcinoma.  After that, in 02/2016 she was found to have a 1-cm recurrence.  At that time, her AFP was 5.6.  On 03/07/2016 she underwent microwave ablation of the recurrent tumor.  In 05/2016 her AFP was 5.5 and repeat scans did not show any evidence of recurrence and only showed post-treatment changes.  Then on 09/27/2016 there was suspicion for recurrence at the site of ablation.  She was offered another treatment at that point.  Her AFP was 6.6, but she did not have another treatment.     Because of her advanced age, liver transplantation was not offered.    In Jan 2017, her MRI only showed post treatment changes with no evidence of residual/recurrent cancer.    In April 2017, she again had evidence of tumor recurrence with a 1.4 cm OPTN 5a lesion in segment 3. She also has OPTN5T lesion in the hepatic segment 6 where she had previous treatment.   Another OPTN 4 lesion is seen in hepatic segment 8   A LIRADS 3 lesion is seen in segment 8    I had advised her to follow up with IR but she did not follow up on that. On my previous visit I had again talked to her in detail regarding the importance of following with IR to discuss liver directed therapies    Most recent MRI 7/17/17 showed increase in size of Right lobe lesion and there is evidence of left lobe mass as well. She eventually met with IR and plan to proceed with TACE, but  did not receive it    She now has decompensated HCV (genotype 5) cirrhosis complicated by ascites and hepatic hydrothorax  Needing to undergo paracentesis every 1-2 weeks and thoracentesis every 3 months      Previously she had diagnostic and therapeutic thoracentesis done.  It was a nonmalignant effusion.     INTERVAL HISTORY:  She comes in today accompanied by her son.  She tells me that she is feeling more weak as compared to previously.  She is resting most of the time at home.  At times she has labored breathing.  This gets improved when she gets the thoracentesis.  The last one was done in late October.  She initially had a cough, but that has resolved.  She also feels abdominal bloating and abdominal discomfort which gets better after she gets therapeutic paracentesis.  She denies any nausea or vomiting.  She has no diarrhea or constipation.  She has no bleeding.  She has no other swelling.  She has no other pain.  She has had no serious infections.       REVIEW OF SYSTEMS:  Otherwise, a comprehensive review of the systems was performed and it was negative.         I reviewed other history in Epic as below.         Past Medical/Surgical History:  Past Medical History:   Diagnosis Date     Liver disease      Mild intermittent asthma    Hep C- untreated  HCC as mentioned above    Past Surgical History:   Procedure Laterality Date     CATARACT IOL, RT/LT Right 08/22/2017    s/p CE/IOL right eye     EYE SURGERY       H PYLORI SHYLA SCREEN      was treated for h pylori     NO HISTORY OF SURGERY         Allergies:  Allergies as of 11/30/2017 - Tello as Reviewed 11/30/2017   Allergen Reaction Noted     Dust mites Cough 10/03/2016     Seasonal allergies  06/12/2014     Current Medications:  Current Outpatient Prescriptions   Medication Sig Dispense Refill     polyethylene glycol (MIRALAX) powder Take 17 g (1 capful) by mouth 3 times daily as needed for constipation 510 g 3     furosemide (LASIX) 20 MG tablet Take 1  tablet (20 mg) by mouth daily 180 tablet 1     bisacodyl (DULCOLAX) 5 MG EC tablet Take 1 tablet (5 mg) by mouth daily as needed for constipation 30 tablet 2     albuterol (2.5 MG/3ML) 0.083% neb solution Take 1 vial (2.5 mg) by nebulization every 6 hours as needed for shortness of breath / dyspnea or wheezing 25 vial 3     spironolactone (ALDACTONE) 50 MG tablet Take 1 tablet (50 mg) by mouth daily 30 tablet 1     Multiple Vitamins-Iron (DAILY MULTIPLE VITAMIN/IRON) TABS Take 1 tablet by mouth daily 30 tablet 11     omeprazole (PRILOSEC) 40 MG capsule Take 1 capsule (40 mg) by mouth daily Take 30-60 minutes before a meal. 90 capsule 3     lactulose 20 GM/30ML SOLN Take 30 mLs by mouth daily 946 mL 3     cetirizine (ZYRTEC) 10 MG tablet Take 1 tablet (10 mg) by mouth every evening 30 tablet 6     Nutritional Supplements (BOOST CALORIE SMART) LIQD Take 3 Cans by mouth daily 90 Bottle 11     mometasone-formoterol (DULERA) 200-5 MCG/ACT oral inhaler Inhale 2 puffs into the lungs 2 times daily 1 Inhaler 12     fluticasone (FLONASE) 50 MCG/ACT spray Spray 2 sprays into both nostrils daily 1 Bottle 11     albuterol (PROAIR HFA/PROVENTIL HFA/VENTOLIN HFA) 108 (90 BASE) MCG/ACT Inhaler Inhale 2 puffs into the lungs every 6 hours as needed for shortness of breath / dyspnea or wheezing 1 Inhaler 11      Family History:  Family History   Problem Relation Age of Onset     Respiratory Father      asthma     DIABETES Son      Glaucoma No family hx of      Macular Degeneration No family hx of      Retinal detachment No family hx of      Amblyopia No family hx of    No history of cancers.  Patient had 10 kids altogether.  One  in an accident, 1  after surgery and 2  in childhood.  Six kids are living and all are healthy.       Social History:  Social History     Social History     Marital status: Single     Spouse name: N/A     Number of children: N/A     Years of education: N/A     Occupational History     Not on file.      Social History Main Topics     Smoking status: Never Smoker     Smokeless tobacco: Never Used     Alcohol use No     Drug use: No     Sexual activity: No     Other Topics Concern     Not on file     Social History Narrative   She does not smoke.  Does not drink any alcohol.  She lives with her daughter.       Physical Exam:  /78  Pulse 103  Temp 96.9  F (36.1  C) (Oral)  Resp 14  Wt 48.2 kg (106 lb 4.8 oz)  SpO2 99%  BMI 19.44 kg/m2  CONSTITUTIONAL: No apparent distress  EYES: PERRLA, without pallor, but there is mild jaundice  ENT/MOUTH: Ears unremarkable. No oral lesions  CVS: s1s2 normal  RESPIRATORY: decreased breath sounds on the right side almost throughout the right lung field  GI: Abdomen is soft and mild tenderness in the epigastrium. No HSM. I could not appreciate gross ascites  NEURO: He is alert and oriented ×3  INTEGUMENT: no concerning his skin rashes   LYMPHATIC: no palpable lymphadenopathy  MUSCULOSKELETAL: Unremarkable. No bony tenderness.   EXTREMITIES: no pedal edema  PSYCH: Mentation, mood and affect are appropriate          Laboratory/Imaging Studies  Results for WEI ELDRIDGE (MRN [id number]) as of 11/30/2017 15:11   Ref. Range 11/9/2017 14:23   Sodium Latest Ref Range: 133 - 144 mmol/L 134   Potassium Latest Ref Range: 3.4 - 5.3 mmol/L 4.5   Chloride Latest Ref Range: 94 - 109 mmol/L 105   Carbon Dioxide Latest Ref Range: 20 - 32 mmol/L 21   Urea Nitrogen Latest Ref Range: 7 - 30 mg/dL 14   Creatinine Latest Ref Range: 0.52 - 1.04 mg/dL 0.75   GFR Estimate Latest Ref Range: >60 mL/min/1.7m2 74   GFR Estimate If Black Latest Ref Range: >60 mL/min/1.7m2 >90   Calcium Latest Ref Range: 8.5 - 10.1 mg/dL 7.9 (L)   Anion Gap Latest Ref Range: 3 - 14 mmol/L 8   Albumin Latest Ref Range: 3.4 - 5.0 g/dL 2.0 (L)   Protein Total Latest Ref Range: 6.8 - 8.8 g/dL 6.7 (L)   Bilirubin Total Latest Ref Range: 0.2 - 1.3 mg/dL 2.7 (H)   Alkaline Phosphatase Latest Ref Range: 40 - 150 U/L 239  (H)   ALT Latest Ref Range: 0 - 50 U/L 45   AST Latest Ref Range: 0 - 45 U/L Canceled, Test cr...   Bilirubin Direct Latest Ref Range: 0.0 - 0.2 mg/dL 1.1 (H)   Glucose Latest Ref Range: 70 - 99 mg/dL 125 (H)   WBC Latest Ref Range: 4.0 - 11.0 10e9/L 4.1   Hemoglobin Latest Ref Range: 11.7 - 15.7 g/dL 11.3 (L)   Hematocrit Latest Ref Range: 35.0 - 47.0 % 33.2 (L)   Platelet Count Latest Ref Range: 150 - 450 10e9/L 88 (L)   RBC Count Latest Ref Range: 3.8 - 5.2 10e12/L 3.32 (L)   MCV Latest Ref Range: 78 - 100 fl 100   MCH Latest Ref Range: 26.5 - 33.0 pg 34.0 (H)   MCHC Latest Ref Range: 31.5 - 36.5 g/dL 34.0   RDW Latest Ref Range: 10.0 - 15.0 % 15.9 (H)   Diff Method Unknown Automated Method   % Neutrophils Latest Units: % 64.3   % Lymphocytes Latest Units: % 21.0   % Monocytes Latest Units: % 7.9   % Eosinophils Latest Units: % 5.9   % Basophils Latest Units: % 0.7   % Immature Granulocytes Latest Units: % 0.2   Nucleated RBCs Latest Ref Range: 0 /100 0   Absolute Neutrophil Latest Ref Range: 1.6 - 8.3 10e9/L 2.6   Absolute Lymphocytes Latest Ref Range: 0.8 - 5.3 10e9/L 0.9   Absolute Monocytes Latest Ref Range: 0.0 - 1.3 10e9/L 0.3     MRI ABDOMEN 11/18/17  MRI ABDOMEN     CLINICAL HISTORY:  According to the medical record, prior  chemoembolization of a 3.3 cm hepatocellular carcinoma in segment 6  5/22/2015. After recurrence patient underwent microwave ablation of  the recurrent tumor (2016). Recurrent lesions. Follow-up study.     TECHNIQUE: Images were acquired with and without intravenous contrast  through the abdomen. The following MR images were acquired: TrueFISP,  multiplanar T2 weighted, axial T1 in/out of phase, diffusion-weighted.  Multiplanar T1-weighted images with fat saturation were before  contrast administration and at multiple time points following the  administration of intravenous contrast. Contrast dose: 10cc's Eovist     FINDINGS:     Comparison study: MR 7/17/2017. 4/26/2017;  1/17/2017.     Liver: Cirrhotic configuration of the liver parenchyma with  hypertrophy of the left lobe relative to the right. Nodular hepatic  contour with irregular surface and heterogeneous parenchymal signal  including reticular T2 single and delayed contrast of enhancement  compatible with fibrosis. No evidence for hepatic steatosis or iron  deposition. Recanalization of the umbilical vein. Dilated main portal  vein and dilated splenic vein. Hepatic veins and portal venous system  are patent.     Again seen are posttreatment changes of chemoembolization and  microwave ablation in segment 6.     Lesion 1: Previously treated segment 6 lesion measures 3.8 x 3.0 cm  and shows arterial phase hyperenhancement (series 11, image 39). On  prior MR 7/17/2017, this lesion measured 4.0 x 2.8 cm.  It currently  does show convincing washout on portal venous or delayed phase  imaging. Pseudocapsule is is not identified. There is mild associated  increased T2 signal.  There is mildly increased signal on diffusion  weighted images. OPTN class 5T.     Lesion 2: There is a 1.6 x 2.0 cm, previously measuring 1.5 x 1.4 cm  arterially enhancing lesion in hepatic segment  2 (series 11 image  31). It does washout on portal venous or delayed phase imaging.  Pseudocapsule is identified. There is questionable associated  increased T2 signal.  There is increased signal on diffusion weighted  images. OPTN/LIRADS class 5A.     Lesion 3: There is a 1.8 x 1.5 cm previously measuring arterially  enhancing lesion at the hepatic dome, adjacent to the IVC, (series 11  image 24). Previously this lesion measured 1.3 cm. It does not washout  on portal venous or delayed phase imaging. Pseudocapsule is not  identified. There is mild associated increased T2 signal.  There  mildly increased signal on diffusion weighted images. OPTN/LIRADS  class 4.     Lesion 4: There is a 1.6 x 1.3 cm previously measuring 1.2 x 1.0 cm  arterially enhancing lesion in  hepatic segment  8 (series 11 image  21). There is not definitive washout on portal venous phase or delayed  phase imaging. No restricted diffusion. OPTN/LIRADS class 3.     Lesion 5: New 1.1 cm arterial enhancing lesion in hepatic segment 2,  series 11 image 29 with no washout, pseudocapsule or restricted  diffusion. Numerous additional foci of arterial enhancement throughout  the liver parenchyma with no definite washout, overall increased in  number and size from the prior study also with no pseudocapsule,  indeterminate for HCC. OPTN/LIRADS 3.     Gallbladder: Normal. Previously seen gallstones are not well  visualized.     Spleen: The spleen is not enlarged. No focal lesions.     Kidneys: Normal in size and position. No hydronephrosis. Subcentimeter  bright T2 foci, tiny renal cysts, the largest of which measures 1.1 cm  in the superior pole of the right kidney with internal hemorrhagic or  proteinaceous material.     Adrenal glands: Within normal limits.     Pancreas: Preserved increased precontrast T1 signal. No focal lesions.  Main pancreatic duct is normal diameter. No pancreas divisum.     Bowel: No dilated loops of bowel. Mild diffuse bowel wall thickening  likely representing portal enteropathy.     Lymph nodes: Prominent retroperitoneal, mesenteric and earnest hepatis  lymph nodes, likely reactive.     Blood vessels: Portosystemic collaterals with periesophageal varices  as well as gastric fundal varices. There are also portosystemic  collaterals at the splenic hilum. Recanalization of the umbilical  vein. Dilated portal vein. Dilated splenic vein. Hepatic venous system  and portal venous system are patent. No abdominal aortic aneurysm.     Lung bases: Large heterogeneous right pleural effusion with scattered  T2 low signal, potentially hemorrhagic products. Associated  atelectasis. Trace left pleural effusion. No pericardial effusion.  Cardiac size within normal limits.  Bones and soft tissues: No  suspicious lesions in the bones.  Degenerative changes of the spine.     Mesentery, abdominal wall and ascites: Diffuse mesenteric edema.  Moderate to large volume of ascites. Diffuse anasarca. There are foci  of T2 low signal within the ascitic fluid, likely hemorrhagic  products.         IMPRESSION:  1. Cirrhosis and evidence of portal hypertension including moderate to  large volume of ascites, large right pleural effusion, trace left  pleural effusion, portosystemic collaterals, mesenteric edema and  anasarca.  2. Previously treated segment 6 lesion is grossly unchanged in size  when compared to MR 7/17/2017. Suspicious enhancement is again seen,  likely representing recurrent or residual tumor. OPTN 5T.  3. A segment 2 lesion now shows washout and pseudocapsule formation,  new since prior MR, OPTN 5A.  4. Multiple additional indeterminate arterially enhancing lesions  (LIRADS 3 and 4) throughout the liver parenchyma, several which have  slightly increased in size.  5. Based on this examination alone, the patient is within [facility]  criteria.         CT CHEST W/O CONTRAST 11/18/2017 9:27 AM     Comparison: blank      History: cough, h/o indeterminate lung lesion which was recommended to  have f/u in 3 months (April 2017)- please try to coordinate after  thoracentesis; Cough     Technique: CT of the chest obtained without intravenous contrast.  Axial, coronal, and sagittal reconstructions were obtained and  reviewed.     Findings:     Chest: Heart size within normal limits. No pericardial effusion. No  enlarged mediastinal, hilar, or axillary lymph nodes. Thoracic aorta  and main pulmonary artery normal caliber. Thyroid is homogeneous.     Trachea is patent, however there is narrowing of the right mainstem  bronchus. Increased right pleural effusion which compresses the  majority of the right lung and shifts the mediastinum leftward. Mild  left bronchiectasis. Mild interlobular septal thickening and  patchy  groundglass opacity in the lingula in a bronchovascular distribution  slightly increased from the prior CT. There is a solid component  measuring 6 x 4 mm, previously 6 x 3 mm. Previously seen upper lingula  a ground glass opacities have resolved. Unchanged calcified granuloma  in the lingula.      Upper abdomen: Partially visualized at least moderate ascites.  Scattered calcifications in the bilateral renal collecting system,  which may be due to excretion of contrast related to the patient's  same day abdominal MRI. Small cirrhotic appearing liver which is not  significantly changed since 4/26/2017.     Bones/soft tissues: No suspicious bony lesions. Diffuse soft tissue  fat stranding, consistent with anasarca.         Impression:   1. Increased large right pleural effusion with near complete filling  of the right hemithorax and compression of the right lung and leftward  shift of the mediastinum. Right lung is unable to be appropriately  evaluated given the significant compression.  2. Increased lingular groundglass opacity with a stable solid  component measuring an average of 5 mm in axial dimension. The  previously seen groundglass opacity in the upper lingula has resolved.  Overall, findings favor infection, however a repeat follow-up in 3  months should be obtained given the patient's history of malignancy.  Recommend coordination of the CT with thoracentesis, so the right lung  may be evaluated following drainage.      ASSESSMENT/PLAN:    HCC of the hepatic segment 6 measuring 2.7 cm in greatest dimension. In 05/2015 she underwent a TACE.  In 02/2016 she was found to have a 1-cm recurrence. On 03/07/2016 she underwent microwave ablation of the recurrent tumor.  She again has evidence of tumor recurrence and now she has another OPTN 5a lesion in S2 in addition to several LIRADS 3 and 4 lesions.  1.  More importantly, she has decompensated cirrhosis with recurrent ascites and right-sided pleural  effusions.  We discussed this whole situation in detail.  She is Child-Casarez class C.  Because of this, I do not think that she is a good candidate for liver-directed therapies.  Due to the same condition, I do not believe that she is going to have clinical benefit from sorafenib.  Her ECOG is also very poor at 3.  Patients who generally benefit from sorafenib are the ones with good performance status with a Child Casarez Class A but it could be considered in Child Casarez Class B patients as well. For Child Casarez Class C especially with a poor performance status, sorafenib is not a good choice.  We discussed this whole situation in detail and discussed the incurable nature of the disease.  I discussed with her that in my opinion, treating her cancer with either liver-directed therapies or systemically with sorafenib would most likely cause more harm than good and I do not recommend to treat her cancer aggressively.  We discussed that rather than that, I would recommend that we focus all our effort on making her comfortable so that she does not suffer and manage her symptoms.  I recommend Palliative Care/Hospice approach.  She is willing to meet with Palliative Care but has not decided about hospice as of yet.  I will arrange a meeting with Palliative Care for her.     2.  For the recurrent right-sided pleural effusion causing shortness of breath, I recommend that she undergo IR thoracentesis.  I also recommend that potentially in the future we can keep the pigtail catheter in place so that she can have it drained on an as-needed basis.  The same could be done for therapeutic paracentesis.   3.  For decompensated liver disease, she follows with Dr. Meyer.  She gets repeated therapeutic paracentesis which she will continue to get.  She is on diuretics and she will continue the care of decompensated cirrhosis through Dr. Meyer.    4.  Thrombocytopenia in the setting of advanced cirrhosis and portal hypertension.  At this  time, she is asymptomatic from it, and at this time no active intervention is needed for that.    5.  Both the patient and her son had a lot of questions regarding her disease and overall prognosis.  I answered them to the best of my ability.  At the end of the discussion, I feel that they have a good understanding of the seriousness of the situation.  All of their questions were answered and they were satisfied with the plan mentioned above.      She will follow with Palliative Care and Hepatology and see me on an as-needed basis.         Viktoria Ayala

## 2017-11-30 NOTE — MR AVS SNAPSHOT
After Visit Summary   11/30/2017    Yue Portillo    MRN: [id number]           Patient Information     Date Of Birth          1940        Visit Information        Provider Department      11/30/2017 1:15 PM Viktoria Ayala MD; OLEKSANDR MOLINA [facility]        Today's Diagnoses     HCC (hepatocellular carcinoma) (H)    -  1    Cirrhosis of liver with ascites, unspecified hepatic cirrhosis type (H)        Recurrent right pleural effusion          Care Instructions    Arrange IR Thoracentesis asap    Get therapeutic paracentesis as needed    Refer to palliative care    Follow with Hepatology    See me back as needed              Follow-ups after your visit        Additional Services     PALLIATIVE CARE REFERRAL       Your provider has referred you to Palliative Care Services.    To schedule an Outpatient Palliative Care Referral appointment, please call: PREFERRED PROVIDERS:.    Please be aware that coverage of these services is subject to the terms and limitations of your health insurance plan.  Call member services at your health plan with any benefit or coverage questions.      Please bring the following with you to your appointment:    (1) Any X-Rays, CTs or MRIs which have been performed.  Contact the facility where they were done to arrange for  prior to your scheduled appointment.   (2) If you have recently seen a provider outside of the [facility], please bring your most recent clinic note and/or imaging results  (3) List of current medications - please bring ALL of the medications that you are currently taking (in their original bottles) to your appointment  (4) This referral request  (5) Any documents/labs given to you for this referral    Services Requested: Evaluate and treat symptoms (including writing prescriptions)    Please complete the following questions:  1. What is patient's life-limiting diagnosis? Decompensated cirrhosis and HCC  2.  What is the reason for the referral? Manage symptoms and discuss goals of care  3. What is the patient's prognosis? months    Palliative Care Definition:    Palliative Care is specialized medical care for people with serious illness.  This type of care is focused on providing patients with relief from symptoms, pain and stresses of serious illness - whatever the diagnosis may be.  The goal of Palliative Care is to improve quality of life for both the patient and the family.  Palliative Care is provided by a team of doctors, nurses and other specialists who work with the patient's other doctors to provide an extra layer of support.  Palliative Care is appropriate at any age and at any stage in a serious illness, and can be provided together with curative treatment.                  Your next 10 appointments already scheduled     Dec 04, 2017  1:00 PM CST   RETURN CORNEA with Ahmet Farr MD   [facility] Clinic ([facility])    [address]  [address] [facility]  [address] [address]  [facility] [address]   [phone]            Dec 05, 2017 12:00 PM CST   Procedure - 2.5 hour with U2A ROOM 11   [address] [facility] [place] [place] ([facility])    [address] [address]               Dec 05, 2017  1:30 PM CST   IR THORACENTESIS with UUIR7   [facility] [place], Interventional Radiology ([facility])    [address] [facility] [address] [phone]           1. Laboratory test are to be obtained by your doctor prior to the exam (Hgb/Hct, platelet count, INR) 2. Someone will need to drive you to and from the hospital if you feel you may need sedation for the procedure. 3. Bring a list of all drugs you are taking; include supplements and over-the-counter medications. 4. Wear comfortable clothes and leave your valuables at home. 5. If you are or may be pregnant, be sure to contact your doctor or a  Radiology nurse prior to the day of the exam. 6. If you have diabetes, check with your doctor or a Radiology nurse to see if your insulin needs to be adjusted for the exam. 7. If you are taking Coumadin (to thin your blood) please contact your doctor or a Radiology nurse at least 3 days before the exam for special instructions (only need the INR to be <2.0). 8. The day before your exam you may eat your regular diet. Drink no alcoholic beverages for 24 hours prior to the exam. 9. Do not eat any solid food or milk products for 6 hours prior to the exam. You may drink clear liquids until 2 hours prior to the exam. Clear liquids include the following: water, Jell-O, clear broth, apple juice or any noncarbonated drink that you can see through (no pop!) 10. The morning of the exam you may brush your teeth and take medications as directed with a sip of water. 11. Tell the Radiology nurse if you have any allergies. 12. If the tube needs to remain in place you will remain in the hospital until the tube can be removed 13. If the tube is removed immediately you may leave the hospital following your exam. However, if you received sedation you will need to stay 1-2 hours. You may be asked to stay longer and have a chest x-ray sometime after the procedure. 14. If you received sedation, you cannot drive until morning, and an adult must be with you until then. If you live more than one hour away you should stay in the [place] area overnight.            Dec 11, 2017  7:30 AM CST   FULL PULMONARY FUNCTION with  PFL A   [facility] Pulmonary Function Testing ([facility])    [address] [place] [address] [phone]            Dec 11, 2017  8:30 AM CST   (Arrive by 8:15 AM)   New Patient Visit with Darlene Renteria MD   [facility] ([facility])    [address] [place] [address]    [phone]            Dec 13, 2017  2:00 PM CST   Paracentesis Visit with  Spec Inf Para Provider, UC 39 ATC   [facility] Specialty and Procedure ([facility])    [address]  2nd Floor  [facility] [id number] [phone]            Feb 14, 2018 10:15 AM CST   Lab with KIEL LAB   [facility] ([facility])    [address]  1st Floor  [facility] [address] [phone]            Feb 14, 2018 11:10 AM CST   (Arrive by 10:55 AM)   Return General Liver with Tala Meyer MD   [facility] ([facility])    [address] [place] [address]  3rd Floor  [facility] [address] [phone]              Future tests that were ordered for you today     Open Future Orders        Priority Expected Expires Ordered    IR Thoracentesis ASAP  11/30/2018 11/30/2017            Who to contact     If you have questions or need follow up information about today's clinic visit or your schedule please contact [facility] directly at [phone].  Normal or non-critical lab and imaging results will be communicated to you by MyChart, letter or phone within 4 business days after the clinic has received the results. If you do not hear from us within 7 days, please contact the clinic through MyChart or phone. If you have a critical or abnormal lab result, we will notify you by phone as soon as possible.  Submit refill requests through [employer] or call your pharmacy and they will forward the refill request to us. Please allow 3 business days for your refill to be completed.          Additional Information About Your Visit        Care EveryWhere ID     This is your Care EveryWhere ID. This could be used by other organizations to access your [facility] medical records  [id number]        Your Vitals Were     Pulse Temperature Respirations Pulse Oximetry BMI (Body Mass Index)       103  96.9  F (36.1  C) (Oral) 14 99% 19.44 kg/m2        Blood Pressure from Last 3 Encounters:   11/30/17 118/78   11/09/17 139/77   11/08/17 119/79    Weight from Last 3 Encounters:   11/30/17 48.2 kg (106 lb 4.8 oz)   11/09/17 52.8 kg (116 lb 4.8 oz)   11/08/17 54.9 kg (121 lb)              We Performed the Following     PALLIATIVE CARE REFERRAL        Primary Care Provider Office Phone # Fax #    Felicitas Horton [phone]       [address] [address]        Equal Access to Services     LUKE DUVAL : Hadii nico Jose, wapauline rivera, qawaldemarta kaalmada fatimah, rosana ricks . So [facility] [phone].    ATENCIÓN: Si habla español, tiene a martinez disposición servicios gratuitos de asistencia lingüística. [facility] [phone].    We comply with applicable federal civil rights laws and [place] laws. We do not discriminate on the basis of race, color, national origin, age, disability, sex, sexual orientation, or gender identity.            Thank you!     Thank you for choosing [facility]  for your care. Our goal is always to provide you with excellent care. Hearing back from our patients is one way we can continue to improve our services. Please take a few minutes to complete the written survey that you may receive in the mail after your visit with us. Thank you!             Your Updated Medication List - Protect others around you: Learn how to safely use, store and throw away your medicines at www.disposemymeds.org.          This list is accurate as of: 11/30/17  3:16 PM.  Always use your most recent med list.                   Brand Name Dispense Instructions for use Diagnosis    * albuterol 108 (90 BASE) MCG/ACT Inhaler    PROAIR HFA/PROVENTIL HFA/VENTOLIN HFA    1 Inhaler    Inhale 2 puffs into the lungs every 6 hours as needed for shortness of breath / dyspnea or wheezing    Moderate persistent asthma without  complication       * albuterol (2.5 MG/3ML) 0.083% neb solution     25 vial    Take 1 vial (2.5 mg) by nebulization every 6 hours as needed for shortness of breath / dyspnea or wheezing    Mild intermittent asthma with acute exacerbation       bisacodyl 5 MG EC tablet    DULCOLAX    30 tablet    Take 1 tablet (5 mg) by mouth daily as needed for constipation    Constipation, unspecified constipation type       BOOST CALORIE SMART Liqd     90 Bottle    Take 3 Cans by mouth daily    HCC (hepatocellular carcinoma) (H)       cetirizine 10 MG tablet    zyrTEC    30 tablet    Take 1 tablet (10 mg) by mouth every evening    Chronic allergic conjunctivitis       DAILY MULTIPLE VITAMIN/IRON Tabs     30 tablet    Take 1 tablet by mouth daily    Chronic hepatitis C without hepatic coma (H)       fluticasone 50 MCG/ACT spray    FLONASE    1 Bottle    Spray 2 sprays into both nostrils daily    Chronic rhinitis       furosemide 20 MG tablet    LASIX    180 tablet    Take 1 tablet (20 mg) by mouth daily    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)       lactulose 20 GM/30ML Soln     946 mL    Take 30 mLs by mouth daily    Cirrhosis of liver with ascites, unspecified hepatic cirrhosis type (H), HCC (hepatocellular carcinoma) (H)       mometasone-formoterol 200-5 MCG/ACT oral inhaler    DULERA    1 Inhaler    Inhale 2 puffs into the lungs 2 times daily    Moderate persistent asthma without complication       omeprazole 40 MG capsule    priLOSEC    90 capsule    Take 1 capsule (40 mg) by mouth daily Take 30-60 minutes before a meal.    Gastroesophageal reflux disease without esophagitis       polyethylene glycol powder    MIRALAX    510 g    Take 17 g (1 capful) by mouth 3 times daily as needed for constipation    Constipation, unspecified constipation type       spironolactone 50 MG tablet    ALDACTONE    30 tablet    Take 1 tablet (50 mg) by mouth daily    Chronic hepatitis C without hepatic coma (H)       * Notice:  This list  has 2 medication(s) that are the same as other medications prescribed for you. Read the directions carefully, and ask your doctor or other care provider to review them with you.

## 2017-11-30 NOTE — NURSING NOTE
"Oncology Rooming Note    November 30, 2017 2:16 PM   Yue Portillo is a 77 year old female who presents for:    Chief Complaint   Patient presents with     Oncology Clinic Visit     HCC     Initial Vitals: /78  Pulse 103  Temp 96.9  F (36.1  C) (Oral)  Resp 14  Wt 48.2 kg (106 lb 4.8 oz)  SpO2 99%  BMI 19.44 kg/m2 Estimated body mass index is 19.44 kg/(m^2) as calculated from the following:    Height as of 11/8/17: 1.575 m (5' 2.01\").    Weight as of this encounter: 48.2 kg (106 lb 4.8 oz). Body surface area is 1.45 meters squared.  Extreme Pain (8) Comment: stomach   No LMP recorded. Patient is postmenopausal.  Allergies reviewed: Yes  Medications reviewed: Yes    Medications: Medication refills not needed today.  Pharmacy name entered into [employer]: [facility] - [address]    Clinical concerns: Patient states there are no new concerns to discuss with provider.  Dr Ayala was not notified.    6 minutes for nursing intake (face to face time)     Latisha Melo CMA              "

## 2017-11-30 NOTE — PATIENT INSTRUCTIONS
Arrange IR Thoracentesis asap    Get therapeutic paracentesis as needed    Refer to palliative care    Follow with Hepatology    See me back as needed

## 2017-11-30 NOTE — LETTER
11/30/2017       RE: Yue Portillo  [address]   [facility] [address]     Dear Colleague,    Thank you for referring your patient, Yue Portillo, to the [facility]. Please see a copy of my visit note below.    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 Initially she did not get treatment for that but then in Jan 2015 she had a repeat MRI done showing a 2.7 cm OPTN5B lesion in segment 6.   In 05/2015 she underwent a TACE procedure for her original hepatocellular carcinoma.  After that, in 02/2016 she was found to have a 1-cm recurrence.  At that time, her AFP was 5.6.  On 03/07/2016 she underwent microwave ablation of the recurrent tumor.  In 05/2016 her AFP was 5.5 and repeat scans did not show any evidence of recurrence and only showed post-treatment changes.  Then on 09/27/2016 there was suspicion for recurrence at the site of ablation.  She was offered another treatment at that point.  Her AFP was 6.6, but she did not have another treatment.     Because of her advanced age, liver transplantation was not offered.    In Jan 2017, her MRI only showed post treatment changes with no evidence of residual/recurrent cancer.    In April 2017, she again had evidence of tumor recurrence with a 1.4 cm OPTN 5a lesion in segment 3. She also has OPTN5T lesion in the hepatic segment 6 where she had previous treatment.   Another OPTN 4 lesion is seen in hepatic segment 8   A LIRADS 3 lesion is seen in segment 8    I had advised her to follow up with IR but she did not follow up on that. On my previous visit I had again talked to her in detail regarding the  importance of following with IR to discuss liver directed therapies    Most recent MRI 7/17/17 showed increase in size of Right lobe lesion and there is evidence of left lobe mass as well. She eventually met with IR and plan to proceed with TACE, but did not receive it    She now has decompensated HCV (genotype 5) cirrhosis complicated by ascites and hepatic hydrothorax  Needing to undergo paracentesis every 1-2 weeks and thoracentesis every 3 months      Previously she had diagnostic and therapeutic thoracentesis done.  It was a nonmalignant effusion.     INTERVAL HISTORY:  She comes in today accompanied by her son.  She tells me that she is feeling more weak as compared to previously.  She is resting most of the time at home.  At times she has labored breathing.  This gets improved when she gets the thoracentesis.  The last one was done in late October.  She initially had a cough, but that has resolved.  She also feels abdominal bloating and abdominal discomfort which gets better after she gets therapeutic paracentesis.  She denies any nausea or vomiting.  She has no diarrhea or constipation.  She has no bleeding.  She has no other swelling.  She has no other pain.  She has had no serious infections.       REVIEW OF SYSTEMS:  Otherwise, a comprehensive review of the systems was performed and it was negative.         I reviewed other history in Epic as below.         Past Medical/Surgical History:  Past Medical History:   Diagnosis Date     Liver disease      Mild intermittent asthma    Hep C- untreated  HCC as mentioned above    Past Surgical History:   Procedure Laterality Date     CATARACT IOL, RT/LT Right 08/22/2017    s/p CE/IOL right eye     EYE SURGERY       H PYLORI SHYLA SCREEN      was treated for h pylori     NO HISTORY OF SURGERY         Allergies:  Allergies as of 11/30/2017 - Tello as Reviewed 11/30/2017   Allergen Reaction Noted     Dust mites Cough 10/03/2016     Seasonal allergies  06/12/2014      Current Medications:  Current Outpatient Prescriptions   Medication Sig Dispense Refill     polyethylene glycol (MIRALAX) powder Take 17 g (1 capful) by mouth 3 times daily as needed for constipation 510 g 3     furosemide (LASIX) 20 MG tablet Take 1 tablet (20 mg) by mouth daily 180 tablet 1     bisacodyl (DULCOLAX) 5 MG EC tablet Take 1 tablet (5 mg) by mouth daily as needed for constipation 30 tablet 2     albuterol (2.5 MG/3ML) 0.083% neb solution Take 1 vial (2.5 mg) by nebulization every 6 hours as needed for shortness of breath / dyspnea or wheezing 25 vial 3     spironolactone (ALDACTONE) 50 MG tablet Take 1 tablet (50 mg) by mouth daily 30 tablet 1     Multiple Vitamins-Iron (DAILY MULTIPLE VITAMIN/IRON) TABS Take 1 tablet by mouth daily 30 tablet 11     omeprazole (PRILOSEC) 40 MG capsule Take 1 capsule (40 mg) by mouth daily Take 30-60 minutes before a meal. 90 capsule 3     lactulose 20 GM/30ML SOLN Take 30 mLs by mouth daily 946 mL 3     cetirizine (ZYRTEC) 10 MG tablet Take 1 tablet (10 mg) by mouth every evening 30 tablet 6     Nutritional Supplements (BOOST CALORIE SMART) LIQD Take 3 Cans by mouth daily 90 Bottle 11     mometasone-formoterol (DULERA) 200-5 MCG/ACT oral inhaler Inhale 2 puffs into the lungs 2 times daily 1 Inhaler 12     fluticasone (FLONASE) 50 MCG/ACT spray Spray 2 sprays into both nostrils daily 1 Bottle 11     albuterol (PROAIR HFA/PROVENTIL HFA/VENTOLIN HFA) 108 (90 BASE) MCG/ACT Inhaler Inhale 2 puffs into the lungs every 6 hours as needed for shortness of breath / dyspnea or wheezing 1 Inhaler 11      Family History:  Family History   Problem Relation Age of Onset     Respiratory Father      asthma     DIABETES Son      Glaucoma No family hx of      Macular Degeneration No family hx of      Retinal detachment No family hx of      Amblyopia No family hx of    No history of cancers.  Patient had 10 kids altogether.  One  in an accident, 1  after surgery and 2  in  childhood.  Six kids are living and all are healthy.       Social History:  Social History     Social History     Marital status: Single     Spouse name: N/A     Number of children: N/A     Years of education: N/A     Occupational History     Not on file.     Social History Main Topics     Smoking status: Never Smoker     Smokeless tobacco: Never Used     Alcohol use No     Drug use: No     Sexual activity: No     Other Topics Concern     Not on file     Social History Narrative   She does not smoke.  Does not drink any alcohol.  She lives with her daughter.       Physical Exam:  /78  Pulse 103  Temp 96.9  F (36.1  C) (Oral)  Resp 14  Wt 48.2 kg (106 lb 4.8 oz)  SpO2 99%  BMI 19.44 kg/m2  CONSTITUTIONAL: No apparent distress  EYES: PERRLA, without pallor, but there is mild jaundice  ENT/MOUTH: Ears unremarkable. No oral lesions  CVS: s1s2 normal  RESPIRATORY: decreased breath sounds on the right side almost throughout the right lung field  GI: Abdomen is soft and mild tenderness in the epigastrium. No HSM. I could not appreciate gross ascites  NEURO: He is alert and oriented ×3  INTEGUMENT: no concerning his skin rashes   LYMPHATIC: no palpable lymphadenopathy  MUSCULOSKELETAL: Unremarkable. No bony tenderness.   EXTREMITIES: no pedal edema  PSYCH: Mentation, mood and affect are appropriate          Laboratory/Imaging Studies  Results for WEI ELDRIDGE (MRN [id number]) as of 11/30/2017 15:11   Ref. Range 11/9/2017 14:23   Sodium Latest Ref Range: 133 - 144 mmol/L 134   Potassium Latest Ref Range: 3.4 - 5.3 mmol/L 4.5   Chloride Latest Ref Range: 94 - 109 mmol/L 105   Carbon Dioxide Latest Ref Range: 20 - 32 mmol/L 21   Urea Nitrogen Latest Ref Range: 7 - 30 mg/dL 14   Creatinine Latest Ref Range: 0.52 - 1.04 mg/dL 0.75   GFR Estimate Latest Ref Range: >60 mL/min/1.7m2 74   GFR Estimate If Black Latest Ref Range: >60 mL/min/1.7m2 >90   Calcium Latest Ref Range: 8.5 - 10.1 mg/dL 7.9 (L)   Anion Gap Latest  Ref Range: 3 - 14 mmol/L 8   Albumin Latest Ref Range: 3.4 - 5.0 g/dL 2.0 (L)   Protein Total Latest Ref Range: 6.8 - 8.8 g/dL 6.7 (L)   Bilirubin Total Latest Ref Range: 0.2 - 1.3 mg/dL 2.7 (H)   Alkaline Phosphatase Latest Ref Range: 40 - 150 U/L 239 (H)   ALT Latest Ref Range: 0 - 50 U/L 45   AST Latest Ref Range: 0 - 45 U/L Canceled, Test cr...   Bilirubin Direct Latest Ref Range: 0.0 - 0.2 mg/dL 1.1 (H)   Glucose Latest Ref Range: 70 - 99 mg/dL 125 (H)   WBC Latest Ref Range: 4.0 - 11.0 10e9/L 4.1   Hemoglobin Latest Ref Range: 11.7 - 15.7 g/dL 11.3 (L)   Hematocrit Latest Ref Range: 35.0 - 47.0 % 33.2 (L)   Platelet Count Latest Ref Range: 150 - 450 10e9/L 88 (L)   RBC Count Latest Ref Range: 3.8 - 5.2 10e12/L 3.32 (L)   MCV Latest Ref Range: 78 - 100 fl 100   MCH Latest Ref Range: 26.5 - 33.0 pg 34.0 (H)   MCHC Latest Ref Range: 31.5 - 36.5 g/dL 34.0   RDW Latest Ref Range: 10.0 - 15.0 % 15.9 (H)   Diff Method Unknown Automated Method   % Neutrophils Latest Units: % 64.3   % Lymphocytes Latest Units: % 21.0   % Monocytes Latest Units: % 7.9   % Eosinophils Latest Units: % 5.9   % Basophils Latest Units: % 0.7   % Immature Granulocytes Latest Units: % 0.2   Nucleated RBCs Latest Ref Range: 0 /100 0   Absolute Neutrophil Latest Ref Range: 1.6 - 8.3 10e9/L 2.6   Absolute Lymphocytes Latest Ref Range: 0.8 - 5.3 10e9/L 0.9   Absolute Monocytes Latest Ref Range: 0.0 - 1.3 10e9/L 0.3     MRI ABDOMEN 11/18/17  MRI ABDOMEN     CLINICAL HISTORY:  According to the medical record, prior  chemoembolization of a 3.3 cm hepatocellular carcinoma in segment 6  5/22/2015. After recurrence patient underwent microwave ablation of  the recurrent tumor (2016). Recurrent lesions. Follow-up study.     TECHNIQUE: Images were acquired with and without intravenous contrast  through the abdomen. The following MR images were acquired: TrueFISP,  multiplanar T2 weighted, axial T1 in/out of phase, diffusion-weighted.  Multiplanar  T1-weighted images with fat saturation were before  contrast administration and at multiple time points following the  administration of intravenous contrast. Contrast dose: 10cc's Eovist     FINDINGS:     Comparison study: MR 7/17/2017. 4/26/2017; 1/17/2017.     Liver: Cirrhotic configuration of the liver parenchyma with  hypertrophy of the left lobe relative to the right. Nodular hepatic  contour with irregular surface and heterogeneous parenchymal signal  including reticular T2 single and delayed contrast of enhancement  compatible with fibrosis. No evidence for hepatic steatosis or iron  deposition. Recanalization of the umbilical vein. Dilated main portal  vein and dilated splenic vein. Hepatic veins and portal venous system  are patent.     Again seen are posttreatment changes of chemoembolization and  microwave ablation in segment 6.     Lesion 1: Previously treated segment 6 lesion measures 3.8 x 3.0 cm  and shows arterial phase hyperenhancement (series 11, image 39). On  prior MR 7/17/2017, this lesion measured 4.0 x 2.8 cm.  It currently  does show convincing washout on portal venous or delayed phase  imaging. Pseudocapsule is is not identified. There is mild associated  increased T2 signal.  There is mildly increased signal on diffusion  weighted images. OPTN class 5T.     Lesion 2: There is a 1.6 x 2.0 cm, previously measuring 1.5 x 1.4 cm  arterially enhancing lesion in hepatic segment  2 (series 11 image  31). It does washout on portal venous or delayed phase imaging.  Pseudocapsule is identified. There is questionable associated  increased T2 signal.  There is increased signal on diffusion weighted  images. OPTN/LIRADS class 5A.     Lesion 3: There is a 1.8 x 1.5 cm previously measuring arterially  enhancing lesion at the hepatic dome, adjacent to the IVC, (series 11  image 24). Previously this lesion measured 1.3 cm. It does not washout  on portal venous or delayed phase imaging. Pseudocapsule is  not  identified. There is mild associated increased T2 signal.  There  mildly increased signal on diffusion weighted images. OPTN/LIRADS  class 4.     Lesion 4: There is a 1.6 x 1.3 cm previously measuring 1.2 x 1.0 cm  arterially enhancing lesion in hepatic segment  8 (series 11 image  21). There is not definitive washout on portal venous phase or delayed  phase imaging. No restricted diffusion. OPTN/LIRADS class 3.     Lesion 5: New 1.1 cm arterial enhancing lesion in hepatic segment 2,  series 11 image 29 with no washout, pseudocapsule or restricted  diffusion. Numerous additional foci of arterial enhancement throughout  the liver parenchyma with no definite washout, overall increased in  number and size from the prior study also with no pseudocapsule,  indeterminate for HCC. OPTN/LIRADS 3.     Gallbladder: Normal. Previously seen gallstones are not well  visualized.     Spleen: The spleen is not enlarged. No focal lesions.     Kidneys: Normal in size and position. No hydronephrosis. Subcentimeter  bright T2 foci, tiny renal cysts, the largest of which measures 1.1 cm  in the superior pole of the right kidney with internal hemorrhagic or  proteinaceous material.     Adrenal glands: Within normal limits.     Pancreas: Preserved increased precontrast T1 signal. No focal lesions.  Main pancreatic duct is normal diameter. No pancreas divisum.     Bowel: No dilated loops of bowel. Mild diffuse bowel wall thickening  likely representing portal enteropathy.     Lymph nodes: Prominent retroperitoneal, mesenteric and earnest hepatis  lymph nodes, likely reactive.     Blood vessels: Portosystemic collaterals with periesophageal varices  as well as gastric fundal varices. There are also portosystemic  collaterals at the splenic hilum. Recanalization of the umbilical  vein. Dilated portal vein. Dilated splenic vein. Hepatic venous system  and portal venous system are patent. No abdominal aortic aneurysm.     Lung bases: Large  heterogeneous right pleural effusion with scattered  T2 low signal, potentially hemorrhagic products. Associated  atelectasis. Trace left pleural effusion. No pericardial effusion.  Cardiac size within normal limits.  Bones and soft tissues: No suspicious lesions in the bones.  Degenerative changes of the spine.     Mesentery, abdominal wall and ascites: Diffuse mesenteric edema.  Moderate to large volume of ascites. Diffuse anasarca. There are foci  of T2 low signal within the ascitic fluid, likely hemorrhagic  products.         IMPRESSION:  1. Cirrhosis and evidence of portal hypertension including moderate to  large volume of ascites, large right pleural effusion, trace left  pleural effusion, portosystemic collaterals, mesenteric edema and  anasarca.  2. Previously treated segment 6 lesion is grossly unchanged in size  when compared to MR 7/17/2017. Suspicious enhancement is again seen,  likely representing recurrent or residual tumor. OPTN 5T.  3. A segment 2 lesion now shows washout and pseudocapsule formation,  new since prior MR, OPTN 5A.  4. Multiple additional indeterminate arterially enhancing lesions  (LIRADS 3 and 4) throughout the liver parenchyma, several which have  slightly increased in size.  5. Based on this examination alone, the patient is within [place]  criteria.         CT CHEST W/O CONTRAST 11/18/2017 9:27 AM     Comparison: blank      History: cough, h/o indeterminate lung lesion which was recommended to  have f/u in 3 months (April 2017)- please try to coordinate after  thoracentesis; Cough     Technique: CT of the chest obtained without intravenous contrast.  Axial, coronal, and sagittal reconstructions were obtained and  reviewed.     Findings:     Chest: Heart size within normal limits. No pericardial effusion. No  enlarged mediastinal, hilar, or axillary lymph nodes. Thoracic aorta  and main pulmonary artery normal caliber. Thyroid is homogeneous.     Trachea is patent, however there is  narrowing of the right mainstem  bronchus. Increased right pleural effusion which compresses the  majority of the right lung and shifts the mediastinum leftward. Mild  left bronchiectasis. Mild interlobular septal thickening and patchy  groundglass opacity in the lingula in a bronchovascular distribution  slightly increased from the prior CT. There is a solid component  measuring 6 x 4 mm, previously 6 x 3 mm. Previously seen upper lingula  a ground glass opacities have resolved. Unchanged calcified granuloma  in the lingula.      Upper abdomen: Partially visualized at least moderate ascites.  Scattered calcifications in the bilateral renal collecting system,  which may be due to excretion of contrast related to the patient's  same day abdominal MRI. Small cirrhotic appearing liver which is not  significantly changed since 4/26/2017.     Bones/soft tissues: No suspicious bony lesions. Diffuse soft tissue  fat stranding, consistent with anasarca.         Impression:   1. Increased large right pleural effusion with near complete filling  of the right hemithorax and compression of the right lung and leftward  shift of the mediastinum. Right lung is unable to be appropriately  evaluated given the significant compression.  2. Increased lingular groundglass opacity with a stable solid  component measuring an average of 5 mm in axial dimension. The  previously seen groundglass opacity in the upper lingula has resolved.  Overall, findings favor infection, however a repeat follow-up in 3  months should be obtained given the patient's history of malignancy.  Recommend coordination of the CT with thoracentesis, so the right lung  may be evaluated following drainage.      ASSESSMENT/PLAN:    HCC of the hepatic segment 6 measuring 2.7 cm in greatest dimension. In 05/2015 she underwent a TACE.  In 02/2016 she was found to have a 1-cm recurrence. On 03/07/2016 she underwent microwave ablation of the recurrent tumor.  She again has  evidence of tumor recurrence and now she has another OPTN 5a lesion in S2 in addition to several LIRADS 3 and 4 lesions.  1.  More importantly, she has decompensated cirrhosis with recurrent ascites and right-sided pleural effusions.  We discussed this whole situation in detail.  She is Child-Casarez class C.  Because of this, I do not think that she is a good candidate for liver-directed therapies.  Due to the same condition, I do not believe that she is going to have clinical benefit from sorafenib.  Her ECOG is also very poor at 3.  Patients who generally benefit from sorafenib are the ones with good performance status with a Child Casarez Class A but it could be considered in Child Casarez Class B patients as well. For Child Casarez Class C especially with a poor performance status, sorafenib is not a good choice.  We discussed this whole situation in detail and discussed the incurable nature of the disease.  I discussed with her that in my opinion, treating her cancer with either liver-directed therapies or systemically with sorafenib would most likely cause more harm than good and I do not recommend to treat her cancer aggressively.  We discussed that rather than that, I would recommend that we focus all our effort on making her comfortable so that she does not suffer and manage her symptoms.  I recommend Palliative Care/Hospice approach.  She is willing to meet with Palliative Care but has not decided about hospice as of yet.  I will arrange a meeting with Palliative Care for her.     2.  For the recurrent right-sided pleural effusion causing shortness of breath, I recommend that she undergo IR thoracentesis.  I also recommend that potentially in the future we can keep the pigtail catheter in place so that she can have it drained on an as-needed basis.  The same could be done for therapeutic paracentesis.   3.  For decompensated liver disease, she follows with Dr. Meyer.  She gets repeated therapeutic paracentesis which  she will continue to get.  She is on diuretics and she will continue the care of decompensated cirrhosis through Dr. Meyer.    4.  Thrombocytopenia in the setting of advanced cirrhosis and portal hypertension.  At this time, she is asymptomatic from it, and at this time no active intervention is needed for that.    5.  Both the patient and her son had a lot of questions regarding her disease and overall prognosis.  I answered them to the best of my ability.  At the end of the discussion, I feel that they have a good understanding of the seriousness of the situation.  All of their questions were answered and they were satisfied with the plan mentioned above.      She will follow with Palliative Care and Hepatology and see me on an as-needed basis.         Viktoria Ayala

## 2017-12-07 NOTE — TELEPHONE ENCOUNTER
12/11/17APPT INFO    Date /Time: 12/11/17   Reason for Appt: Unproductive, sometimes bloody, Cough   Ref Provider/Clinic: Dr Funez, Heptology   Are there internal records? If yes, list: Yes  See Above   Patient Contact (Y/N) & Call Details: No referred   Action: Reviewed records; Records are in EPIC     OUTSIDE RECORDS CHECKLIST     CLINIC NAME COMMENTS REC (x) IMG (x)

## 2017-12-11 NOTE — PATIENT INSTRUCTIONS
It was nice to meet you today.    We talked a little about your cough and the fluid collection around your lung (that is caused by your liver).    We also talked about a small (5mm) pulmonary nodule- this is probably related to an infection, but we should repeat a CT scan to make sure that it is going away/resolving.    I will see you back in clinic after the next CT scan.    Darlene Renteria         Your Thoracentesis is scheduled for Monday Janurary 22nd. Check in at 11:30am for a 1:00pm procedure. Check in to the [facility] at [address] ([facility] Waiting Room). Nothing to eat or drink 6 hours prior to your procedure, you may eat breakfast if it's very early. Your CT Scan of your Chest will be completed after your procedure (2:20pm) this will be done in the [facility] Waiting room of the [facility].     If you have any questions, you can reach our clinic at [phone] (option [phone]).

## 2017-12-11 NOTE — LETTER
12/11/2017       RE: Yue Portillo  [address]   [facility] [address]     Dear Colleague,    Thank you for referring your patient, Yue Portillo, to the [facility] at [facility]. Please see a copy of my visit note below.    CHIEF COMPLAINT:  Cough and pulmonary nodule.      HISTORY OF PRESENT ILLNESS:  The patient is a 77-year-old woman here for evaluation of cough.  She has a history of hepatocellular carcinoma related to HCV cirrhosis and recurrent pleural effusions.  She also has a previous diagnosis of asthma since approximately 2002.  Over the course of the last several years she has noticed ongoing chronic cough.  She says that it is not productive, mostly dry.  She is not sure if there are any things that trigger her cough.  She says that oftentimes before she has a thoracentesis she develops worsening cough.  She also notices that her breathing is a little bit more labored and she has a little bit more trouble with shortness of breath.  She says that after she has a thoracentesis done her cough will resolve over the course of the next 2 days or so before gradually returning.  She has a prescription for mometasone/formoterol, which she uses 2 puffs twice daily for her longstanding history of asthma.  She says that asthma was diagnosed after moving to the [place].  She did not have asthma when she was living in [facility].  She does have albuterol that she uses 3-4 times a week and she also has a nebulizer which she uses once to twice a week.  Sometimes when her cough is worse she will use albuterol more frequently, but she is not sure if it helps or not.  She does get occasional chest tightness.  She also has occasional chest pain on the left that is non-exertional in nature.      PAST MEDICAL HISTORY:   1.  HCC.   2.  Asthma.   3.  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      FAMILY HISTORY:  Father had  asthma, and she has a son with diabetes.      SOCIAL HISTORY:  She is a lifetime nonsmoker.  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  She has been in [place] since 2004.  She has no pets.      REVIEW OF SYSTEMS:  A full 14-point review of systems was done and is negative except as noted above in the HPI.      PHYSICAL EXAMINATION:   VITAL SIGNS:  Blood pressure 106/68, pulse is 100, respiratory rate is 16, SpO2 is 99% on room air.  BMI is 19.38.   GENERAL APPEARANCE:  Elderly woman in no distress.   EYES:  PERRL.  No conjunctival injection.   HEENT:  Nasal mucosa is within normal limits.   MOUTH:  Oral mucosa is moist.   CHEST:  Good air movement on the left.  Dullness to percussion in the left base with bronchial breath sounds.   CARDIOVASCULAR:  Regular rate and rhythm, normal S1, S2, no murmurs, rubs or gallops.  There is 2+ lower extremity edema noted.   MUSCULOSKELETAL:  No clubbing or cyanosis.   SKIN:  No rash on limited exam.   NEUROLOGIC:  Mentation appears intact and speech is fluent through .   PSYCHIATRIC:  Affect appears appropriate.      RESULTS:  Pulmonary function testing from today shows restrictive spirometry with low DLCO.        CT scan from 11/2017.  Reviewed directly by me.  Formal impression by Dr. Prasad.   1.  Increase large right pleural effusion with near complete filling of the right hemithorax and compression of the right lung leftward shift to mediastinum.  Right lung is unable to be appropriately evaluated given the significant depression.   2.  Increase ground-glass opacity with stable solid component measuring an average of 5 mm in axial dimension.  The previously seen ground-glass opacity in the right upper lingula has resolved.  Overall, findings favor infection; however, repeat followup in 3 3 months should be obtained given the patient's history of malignancy.  Recommended coordination of the CT with  thoracentesis of the right lung maybe evaluated following drainage.      LABORATORY:  Thoracentesis fluid from 05/2017, 155 white blood cells, 32% lymphocytes, 56% monos and macros, 12% neutrophils, LDH is 36 and protein is 0.8.      ASSESSMENT AND PLAN:  The patient is a 77-year-old woman here for evaluation of cough and pulmonary nodule.     1.  Cough.  The patient has a longstanding history of chronic cough.  She does have a previous history of asthma for which she has been taking mometasone/formoterol.  She reports increasing cough with reaccumulation of fluid with resolution/improvement following thoracentesis.  I would expect more cough with reexpansion than with a stable hepatic hydrothorax, however, it is possibly these 2 are related.  I would consider asthma as a likely contributor to her chronic cough as well.  I would continue with her current regimen.  Overall, I suspect that expectant management with asthma medications and thoracentesis is reasonable.     2.  Hepatic hydrothorax.  Would recommend intermittent thoracentesis for symptomatic purposes.  This was discussed with the patient and her son today.  Given desire for evaluation of pulmonary nodules would coordinate next thoracentesis with a CT scan.     3.  A 5 mm pulmonary nodule in the left lingula.  Agree with radiologist recommendation for followup of 3 months.  I placed an order for a therapeutic thoracentesis as well as a CT scan to be done at the end of January for further evaluation.  I will have her come back and see me to discuss this after the CT scan has been done.  I did talk to her today about the pulmonary nodule.  She and her son were not aware that she had a pulmonary nodule and are interested in ongoing evaluation.      I will have her come back and see me in approximately 4 months.         JET CABEZAS MD             D: 12/11/2017 10:09   T: 12/11/2017 11:32   MT: SHREYAS      Name:     WEI ELDRIDGE   MRN:      [id number]         Account:      [id number]   :      1940           Service Date: 2017      Document: [id number]        Again, thank you for allowing me to participate in the care of your patient.      Sincerely,    Darlene Renteria MD

## 2017-12-11 NOTE — NURSING NOTE
Chief Complaint   Patient presents with     Consult     Patient is being seenf or consutlation of breathing issues      Latisha Infante CMA at 9:08 AM on 12/11/2017

## 2017-12-11 NOTE — PROGRESS NOTES
CHIEF COMPLAINT:  Cough and pulmonary nodule.      HISTORY OF PRESENT ILLNESS:  The patient is a 77-year-old woman here for evaluation of cough.  She has a history of hepatocellular carcinoma related to HCV cirrhosis and recurrent pleural effusions.  She also has a previous diagnosis of asthma since approximately 2002.  Over the course of the last several years she has noticed ongoing chronic cough.  She says that it is not productive, mostly dry.  She is not sure if there are any things that trigger her cough.  She says that oftentimes before she has a thoracentesis she develops worsening cough.  She also notices that her breathing is a little bit more labored and she has a little bit more trouble with shortness of breath.  She says that after she has a thoracentesis done her cough will resolve over the course of the next 2 days or so before gradually returning.  She has a prescription for mometasone/formoterol, which she uses 2 puffs twice daily for her longstanding history of asthma.  She says that asthma was diagnosed after [place] to the [place].  She did not have asthma when she was living in [facility].  She does have albuterol that she uses 3-4 times a week and she also has a nebulizer which she uses once to twice a week.  Sometimes when her cough is worse she will use albuterol more frequently, but she is not sure if it helps or not.  She does get occasional chest tightness.  She also has occasional chest pain on the left that is non-exertional in nature.      PAST MEDICAL HISTORY:   1.  HCC.   2.  Asthma.   3.  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      FAMILY HISTORY:  Father had asthma, and she has a son with diabetes.      SOCIAL HISTORY:  She is a lifetime nonsmoker.  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  She has been in [place] since 2004.  She has no pets.      REVIEW OF SYSTEMS:  A full 14-point review of systems was  done and is negative except as noted above in the HPI.      PHYSICAL EXAMINATION:   VITAL SIGNS:  Blood pressure 106/68, pulse is 100, respiratory rate is 16, SpO2 is 99% on room air.  BMI is 19.38.   GENERAL APPEARANCE:  Elderly woman in no distress.   EYES:  PERRL.  No conjunctival injection.   HEENT:  Nasal mucosa is within normal limits.   MOUTH:  Oral mucosa is moist.   CHEST:  Good air movement on the left.  Dullness to percussion in the left base with bronchial breath sounds.   CARDIOVASCULAR:  Regular rate and rhythm, normal S1, S2, no murmurs, rubs or gallops.  There is 2+ lower extremity edema noted.   MUSCULOSKELETAL:  No clubbing or cyanosis.   SKIN:  No rash on limited exam.   NEUROLOGIC:  Mentation appears intact and speech is fluent through .   PSYCHIATRIC:  Affect appears appropriate.      RESULTS:  Pulmonary function testing from today shows restrictive spirometry with low DLCO.        CT scan from 11/2017.  Reviewed directly by me.  Formal impression by Dr. Prasad.   1.  Increase large right pleural effusion with near complete filling of the right hemithorax and compression of the right lung leftward shift to mediastinum.  Right lung is unable to be appropriately evaluated given the significant depression.   2.  Increase ground-glass opacity with stable solid component measuring an average of 5 mm in axial dimension.  The previously seen ground-glass opacity in the right upper lingula has resolved.  Overall, findings favor infection; however, repeat followup in 3 3 months should be obtained given the patient's history of malignancy.  Recommended coordination of the CT with thoracentesis of the right lung maybe evaluated following drainage.      LABORATORY:  Thoracentesis fluid from 05/2017, 155 white blood cells, 32% lymphocytes, 56% monos and macros, 12% neutrophils, LDH is 36 and protein is 0.8.      ASSESSMENT AND PLAN:  The patient is a 77-year-old woman here for evaluation of  cough and pulmonary nodule.     1.  Cough.  The patient has a longstanding history of chronic cough.  She does have a previous history of asthma for which she has been taking mometasone/formoterol.  She reports increasing cough with reaccumulation of fluid with resolution/improvement following thoracentesis.  I would expect more cough with reexpansion than with a stable hepatic hydrothorax, however, it is possibly these 2 are related.  I would consider asthma as a likely contributor to her chronic cough as well.  I would continue with her current regimen.  Overall, I suspect that expectant management with asthma medications and thoracentesis is reasonable.     2.  Hepatic hydrothorax.  Would recommend intermittent thoracentesis for symptomatic purposes.  This was discussed with the patient and her son today.  Given desire for evaluation of pulmonary nodules would coordinate next thoracentesis with a CT scan.     3.  A 5 mm pulmonary nodule in the left lingula.  Agree with radiologist recommendation for followup of 3 months.  I placed an order for a therapeutic thoracentesis as well as a CT scan to be done at the end of January for further evaluation.  I will have her come back and see me to discuss this after the CT scan has been done.  I did talk to her today about the pulmonary nodule.  She and her son were not aware that she had a pulmonary nodule and are interested in ongoing evaluation.      I will have her come back and see me in approximately 4 months.         JET CABEZAS MD             D: 2017 10:09   T: 2017 11:32   MT: SHREYAS      Name:     WEI ELDRIDGE   MRN:      [id number]        Account:      [id number]   :      1940           Service Date: 2017      Document: [id number]

## 2017-12-11 NOTE — LETTER
12/11/2017      RE: Yue Portillo  [address]   [facility] [address]       Dear Colleague,    Thank you for the opportunity to participate in the care of your patient, Yue Portillo, at the [facility] at [facility]. Please see a copy of my visit note below.    CHIEF COMPLAINT:  Cough and pulmonary nodule.      HISTORY OF PRESENT ILLNESS:  The patient is a 77-year-old woman here for evaluation of cough.  She has a history of hepatocellular carcinoma related to HCV cirrhosis and recurrent pleural effusions.  She also has a previous diagnosis of asthma since approximately 2002.  Over the course of the last several years she has noticed ongoing chronic cough.  She says that it is not productive, mostly dry.  She is not sure if there are any things that trigger her cough.  She says that oftentimes before she has a thoracentesis she develops worsening cough.  She also notices that her breathing is a little bit more labored and she has a little bit more trouble with shortness of breath.  She says that after she has a thoracentesis done her cough will resolve over the course of the next 2 days or so before gradually returning.  She has a prescription for mometasone/formoterol, which she uses 2 puffs twice daily for her longstanding history of asthma.  She says that asthma was diagnosed after moving to the [place].  She did not have asthma when she was living in [facility].  She does have albuterol that she uses 3-4 times a week and she also has a nebulizer which she uses once to twice a week.  Sometimes when her cough is worse she will use albuterol more frequently, but she is not sure if it helps or not.  She does get occasional chest tightness.  She also has occasional chest pain on the left that is non-exertional in nature.      PAST MEDICAL HISTORY:   1.  HCC.   2.  Asthma.   3.  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       FAMILY HISTORY:  Father had asthma, and she has a son with diabetes.      SOCIAL HISTORY:  She is a lifetime nonsmoker.  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.  She has been in [place] since 2004.  She has no pets.      REVIEW OF SYSTEMS:  A full 14-point review of systems was done and is negative except as noted above in the HPI.      PHYSICAL EXAMINATION:   VITAL SIGNS:  Blood pressure 106/68, pulse is 100, respiratory rate is 16, SpO2 is 99% on room air.  BMI is 19.38.   GENERAL APPEARANCE:  Elderly woman in no distress.   EYES:  PERRL.  No conjunctival injection.   HEENT:  Nasal mucosa is within normal limits.   MOUTH:  Oral mucosa is moist.   CHEST:  Good air movement on the left.  Dullness to percussion in the left base with bronchial breath sounds.   CARDIOVASCULAR:  Regular rate and rhythm, normal S1, S2, no murmurs, rubs or gallops.  There is 2+ lower extremity edema noted.   MUSCULOSKELETAL:  No clubbing or cyanosis.   SKIN:  No rash on limited exam.   NEUROLOGIC:  Mentation appears intact and speech is fluent through .   PSYCHIATRIC:  Affect appears appropriate.      RESULTS:  Pulmonary function testing from today shows restrictive spirometry with low DLCO.        CT scan from 11/2017.  Reviewed directly by me.  Formal impression by Dr. Prasad.   1.  Increase large right pleural effusion with near complete filling of the right hemithorax and compression of the right lung leftward shift to mediastinum.  Right lung is unable to be appropriately evaluated given the significant depression.   2.  Increase ground-glass opacity with stable solid component measuring an average of 5 mm in axial dimension.  The previously seen ground-glass opacity in the right upper lingula has resolved.  Overall, findings favor infection; however, repeat followup in 3 3 months should be obtained given the patient's history of malignancy.  Recommended  coordination of the CT with thoracentesis of the right lung maybe evaluated following drainage.      LABORATORY:  Thoracentesis fluid from 05/2017, 155 white blood cells, 32% lymphocytes, 56% monos and macros, 12% neutrophils, LDH is 36 and protein is 0.8.      ASSESSMENT AND PLAN:  The patient is a 77-year-old woman here for evaluation of cough and pulmonary nodule.     1.  Cough.  The patient has a longstanding history of chronic cough.  She does have a previous history of asthma for which she has been taking mometasone/formoterol.  She reports increasing cough with reaccumulation of fluid with resolution/improvement following thoracentesis.  I would expect more cough with reexpansion than with a stable hepatic hydrothorax, however, it is possibly these 2 are related.  I would consider asthma as a likely contributor to her chronic cough as well.  I would continue with her current regimen.  Overall, I suspect that expectant management with asthma medications and thoracentesis is reasonable.     2.  Hepatic hydrothorax.  Would recommend intermittent thoracentesis for symptomatic purposes.  This was discussed with the patient and her son today.  Given desire for evaluation of pulmonary nodules would coordinate next thoracentesis with a CT scan.     3.  A 5 mm pulmonary nodule in the left lingula.  Agree with radiologist recommendation for followup of 3 months.  I placed an order for a therapeutic thoracentesis as well as a CT scan to be done at the end of January for further evaluation.  I will have her come back and see me to discuss this after the CT scan has been done.  I did talk to her today about the pulmonary nodule.  She and her son were not aware that she had a pulmonary nodule and are interested in ongoing evaluation.      I will have her come back and see me in approximately 4 months.         JET CABEZAS MD             D: 12/11/2017 10:09   T: 12/11/2017 11:32   MT: SHREYAS      Name:     WEI ELDRIDGE    MRN:      [id number]        Account:      [id number]   :      1940           Service Date: 2017      Document: [id number]

## 2017-12-11 NOTE — MR AVS SNAPSHOT
After Visit Summary   12/11/2017    Yue Portillo    MRN: [id number]           Patient Information     Date Of Birth          1940        Visit Information        Provider Department      12/11/2017 8:15 AM Darlene Renteria MD; [employer] [facility]        Today's Diagnoses     Pleural effusion associated with hepatic disorder    -  1    Cough        Pulmonary nodules          Care Instructions    It was nice to meet you today.    We talked a little about your cough and the fluid collection around your lung (that is caused by your liver).    We also talked about a small (5mm) pulmonary nodule- this is probably related to an infection, but we should repeat a CT scan to make sure that it is going away/resolving.    I will see you back in clinic after the next CT scan.    Darlene Renteria         Your Thoracentesis is scheduled for Monday Janurary 22nd. Check in at 11:30am for a 1:00pm procedure. Check in to the [facility] at [address] ([facility] Waiting Room). Nothing to eat or drink 6 hours prior to your procedure, you may eat breakfast if it's very early. Your CT Scan of your Chest will be completed after your procedure (2:20pm) this will be done in the [facility] Waiting room of the [facility].     If you have any questions, you can reach our clinic at [phone] (option 6).           Follow-ups after your visit        Follow-up notes from your care team     Return in about 4 months (around 4/11/2018).      Your next 10 appointments already scheduled     Dec 13, 2017  2:00 PM CST   Paracentesis Visit with  Spec Inf Para Provider,  39 ATC   [facility] Specialty [facility] Procedure ([facility])    [address] [facility] [facility]  2nd [facility] [id number] [phone]            Jan 11, 2018  1:00 PM CST   (Arrive by 12:45 PM)   New Patient Visit with Niraj  MD Mindy   [facility] ([facility])    [address]  2nd Floor  [facility] [address]   [phone]            Jan 22, 2018 11:30 AM CST   Procedure - 2.5 hour with U2A ROOM 5   [address] [facility] ([facility])    [address] [facility] [address]               Jan 22, 2018  1:00 PM CST   (Arrive by 12:45 PM)   IR THORACENTESIS with UUIR2   [facility] [place], Interventional Radiology ([facility])    [address] [facility] [address]   [phone]           1. Laboratory test are to be obtained by your doctor prior to the exam (Hgb/Hct, platelet count, INR) 2. Someone will need to drive you to and from the hospital if you feel you may need sedation for the procedure. 3. Bring a list of all drugs you are taking; include supplements and over-the-counter medications. 4. Wear comfortable clothes and leave your valuables at home. 5. If you are or may be pregnant, be sure to contact your doctor or a Radiology nurse prior to the day of the exam. 6. If you have diabetes, check with your doctor or a Radiology nurse to see if your insulin needs to be adjusted for the exam. 7. If you are taking Coumadin (to thin your blood) please contact your doctor or a Radiology nurse at least 3 days before the exam for special instructions (only need the INR to be <2.0). 8. The day before your exam you may eat your regular diet. Drink no alcoholic beverages for 24 hours prior to the exam. 9. Do not eat any solid food or milk products for 6 hours prior to the exam. You may drink clear liquids until 2 hours prior to the exam. Clear liquids include the following: water, Jell-O, clear broth, apple juice or any noncarbonated drink that you can see through (no pop!) 10. The morning of the exam you may brush your teeth and take medications as directed with a sip of water. 11.  Tell the Radiology nurse if you have any allergies. 12. If the tube needs to remain in place you will remain in the hospital until the tube can be removed 13. If the tube is removed immediately you may leave the hospital following your exam. However, if you received sedation you will need to stay 1-2 hours. You may be asked to stay longer and have a chest x-ray sometime after the procedure. 14. If you received sedation, you cannot drive until morning, and an adult must be with you until then. If you live more than one hour away you should stay in the [place] area overnight.            Jan 22, 2018  2:20 PM CST   (Arrive by 2:05 PM)   CT CHEST W/O CONTRAST with UUCT1   [facility], [place], [place] ([facility], [facility])    [address] [facility] [address] [phone]           Please bring any scans or X-rays taken at other hospitals, if similar tests were done. Also bring a list of your medicines, including vitamins, minerals and over-the-counter drugs. It is safest to leave personal items at home.  Be sure to tell your doctor:   If you have any allergies.   If there s any chance you are pregnant.   If you are breastfeeding.   If you have any special needs.  You do not need to do anything special to prepare.  Please wear loose clothing, such as a sweat suit or jogging clothes. Avoid snaps, zippers and other metal. We may ask you to undress and put on a hospital gown.            Feb 05, 2018  2:30 PM CST   (Arrive by 2:15 PM)   Return Visit with Darlene Renteria MD   [facility] ([facility] [place])    [address] [place] [address]  3rd [facility] [id number] [phone]            Feb 14, 2018 10:15 AM CST   Lab with  LAB   [facility] ([facility])    [address] [place] [id number] [phone]            Feb 14, 2018 11:10 AM CST   (Arrive by 10:55  AM)   Return General Liver with Tala Meyer MD   [facility] ([facility])    [address]  3rd Floor  [facility] [address] [phone]              Future tests that were ordered for you today     Open Future Orders        Priority Expected Expires Ordered    IR Thoracentesis Routine  12/11/2018 12/11/2017    CT Chest w/o Contrast Routine  12/11/2018 12/11/2017            Who to contact     If you have questions or need follow up information about today's clinic visit or your schedule please contact [facility] directly at [phone].  Normal or non-critical lab and imaging results will be communicated to you by MyChart, letter or phone within 4 business days after the clinic has received the results. If you do not hear from us within 7 days, please contact the clinic through MyChart or phone. If you have a critical or abnormal lab result, we will notify you by phone as soon as possible.  Submit refill requests through [employer] or call your pharmacy and they will forward the refill request to us. Please allow 3 business days for your refill to be completed.          Additional Information About Your Visit        Care EveryWhere ID     This is your Care EveryWhere ID. This could be used by other organizations to access your [place] medical records  [id number]        Your Vitals Were     Pulse Respirations Pulse Oximetry BMI (Body Mass Index)          100 16 99% 19.38 kg/m2         Blood Pressure from Last 3 Encounters:   12/11/17 106/68   11/30/17 118/78   11/09/17 139/77    Weight from Last 3 Encounters:   12/11/17 48.1 kg (106 lb)   11/30/17 48.2 kg (106 lb 4.8 oz)   11/09/17 52.8 kg (116 lb 4.8 oz)               Primary Care Provider Office Phone # Fax #    Felicitas Horton [phone]       [address]   [facility] [address]        Equal Access to Services     LUKE DUVAL :  Hadii aad toni zhouchipo Somichealali, waaxda luqadaha, qaybta kaalorlando sheridan, rosana jungin hayaan anetakeli davidson laaishamanjeet casi. So [facility] [phone].    ATENCIÓN: Si fiordaliza jose, tiene a martinez disposición servicios gratuitos de asistencia lingüística. Llame al [phone].    We comply with applicable federal civil rights laws and [place] laws. We do not discriminate on the basis of race, color, national origin, age, disability, sex, sexual orientation, or gender identity.            Thank you!     Thank you for choosing [facility]  for your care. Our goal is always to provide you with excellent care. Hearing back from our patients is one way we can continue to improve our services. Please take a few minutes to complete the written survey that you may receive in the mail after your visit with us. Thank you!             Your Updated Medication List - Protect others around you: Learn how to safely use, store and throw away your medicines at www.disposemymeds.org.          This list is accurate as of: 12/11/17  2:28 PM.  Always use your most recent med list.                   Brand Name Dispense Instructions for use Diagnosis    * albuterol 108 (90 BASE) MCG/ACT Inhaler    PROAIR HFA/PROVENTIL HFA/VENTOLIN HFA    1 Inhaler    Inhale 2 puffs into the lungs every 6 hours as needed for shortness of breath / dyspnea or wheezing    Moderate persistent asthma without complication       * albuterol (2.5 MG/3ML) 0.083% neb solution     25 vial    Take 1 vial (2.5 mg) by nebulization every 6 hours as needed for shortness of breath / dyspnea or wheezing    Mild intermittent asthma with acute exacerbation       bisacodyl 5 MG EC tablet    DULCOLAX    30 tablet    Take 1 tablet (5 mg) by mouth daily as needed for constipation    Constipation, unspecified constipation type       BOOST CALORIE SMART Liqd     90 Bottle    Take 3 Cans by mouth daily    HCC (hepatocellular carcinoma) (H)       cetirizine 10 MG  tablet    zyrTEC    30 tablet    Take 1 tablet (10 mg) by mouth every evening    Chronic allergic conjunctivitis       DAILY MULTIPLE VITAMIN/IRON Tabs     30 tablet    Take 1 tablet by mouth daily    Chronic hepatitis C without hepatic coma (H)       fluticasone 50 MCG/ACT spray    FLONASE    1 Bottle    Spray 2 sprays into both nostrils daily    Chronic rhinitis       furosemide 20 MG tablet    LASIX    180 tablet    Take 1 tablet (20 mg) by mouth daily    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)       lactulose 20 GM/30ML Soln     946 mL    Take 30 mLs by mouth daily    Cirrhosis of liver with ascites, unspecified hepatic cirrhosis type (H), HCC (hepatocellular carcinoma) (H)       mometasone-formoterol 200-5 MCG/ACT oral inhaler    DULERA    1 Inhaler    Inhale 2 puffs into the lungs 2 times daily    Moderate persistent asthma without complication       omeprazole 40 MG capsule    priLOSEC    90 capsule    Take 1 capsule (40 mg) by mouth daily Take 30-60 minutes before a meal.    Gastroesophageal reflux disease without esophagitis       polyethylene glycol powder    MIRALAX    510 g    Take 17 g (1 capful) by mouth 3 times daily as needed for constipation    Constipation, unspecified constipation type       spironolactone 50 MG tablet    ALDACTONE    30 tablet    Take 1 tablet (50 mg) by mouth daily    Chronic hepatitis C without hepatic coma (H)       * Notice:  This list has 2 medication(s) that are the same as other medications prescribed for you. Read the directions carefully, and ask your doctor or other care provider to review them with you.

## 2017-12-13 NOTE — MR AVS SNAPSHOT
After Visit Summary   12/13/2017    Yue Portillo    MRN: [id number]           Patient Information     Date Of Birth          1940        Visit Information        Provider Department      12/13/2017 2:00 PM Provider, Uc Spec Inf Para; [employer];  39 ATC [facility] Specialty [facility] Procedure        Today's Diagnoses     HCC (hepatocellular carcinoma) (H)    -  1    Cirrhosis of liver with ascites, unspecified hepatic cirrhosis type (H)          Care Instructions    Dear Yue BARRON Portillo    Thank you for choosing [facility] ([facility]) for your procedure.  The following information is a summary of our appointment as well as important reminders.      Additional information: You had a paracentesis today with 0.9 liters removed and received 12.5 grams Albumin.    We look forward in seeing you on your next appointment here at [facility].  Please don t hesitate to call us at [phone] to reschedule any of your appointments or to speak with one of the [facility] registered nurses.  It was a pleasure taking care of you today.    Sincerely,    [facility]  Specialty Infusion & Procedure Center  [address] [place] [address]  [address]  Phone:  [phone]          Follow-ups after your visit        Your next 10 appointments already scheduled     Jan 11, 2018  1:00 PM CST   (Arrive by 12:45 PM)   New Patient Visit with Niraj Guillen MD   [facility] ([facility])    [address] [place] [address] [phone]            Jan 22, 2018 11:30 AM CST   Procedure - 2.5 hour with U2A ROOM 5   [address]A [facility] ([facility])    [address] [facility] [address]               Jan 22, 2018  1:00 PM CST   (Arrive by 12:45 PM)   IR THORACENTESIS with UUIR2   [facility] [place],  Interventional Radiology ([facility] [place])    [address] [facility] [id number] [phone]           1. Laboratory test are to be obtained by your doctor prior to the exam (Hgb/Hct, platelet count, INR) 2. Someone will need to drive you to and from the hospital if you feel you may need sedation for the procedure. 3. Bring a list of all drugs you are taking; include supplements and over-the-counter medications. 4. Wear comfortable clothes and leave your valuables at home. 5. If you are or may be pregnant, be sure to contact your doctor or a Radiology nurse prior to the day of the exam. 6. If you have diabetes, check with your doctor or a Radiology nurse to see if your insulin needs to be adjusted for the exam. 7. If you are taking Coumadin (to thin your blood) please contact your doctor or a Radiology nurse at least 3 days before the exam for special instructions (only need the INR to be <2.0). 8. The day before your exam you may eat your regular diet. Drink no alcoholic beverages for 24 hours prior to the exam. 9. Do not eat any solid food or milk products for 6 hours prior to the exam. You may drink clear liquids until 2 hours prior to the exam. Clear liquids include the following: water, Jell-O, clear broth, apple juice or any noncarbonated drink that you can see through (no pop!) 10. The morning of the exam you may brush your teeth and take medications as directed with a sip of water. 11. Tell the Radiology nurse if you have any allergies. 12. If the tube needs to remain in place you will remain in the hospital until the tube can be removed 13. If the tube is removed immediately you may leave the hospital following your exam. However, if you received sedation you will need to stay 1-2 hours. You may be asked to stay longer and have a chest x-ray sometime after the procedure. 14. If you received sedation, you cannot drive until morning, and an adult  must be with you until then. If you live more than one hour away you should stay in the [place] area overnight.            Jan 22, 2018  2:20 PM CST   (Arrive by 2:05 PM)   CT CHEST W/O CONTRAST with UUCT1   [facility], [place], [place] ([facility])    [address] [facility] [address] [phone]           Please bring any scans or X-rays taken at other hospitals, if similar tests were done. Also bring a list of your medicines, including vitamins, minerals and over-the-counter drugs. It is safest to leave personal items at home.  Be sure to tell your doctor:   If you have any allergies.   If there s any chance you are pregnant.   If you are breastfeeding.   If you have any special needs.  You do not need to do anything special to prepare.  Please wear loose clothing, such as a sweat suit or jogging clothes. Avoid snaps, zippers and other metal. We may ask you to undress and put on a hospital gown.            Feb 05, 2018  2:30 PM CST   (Arrive by 2:15 PM)   Return Visit with Darlene Rentreia MD   [facility] ([facility] [place])    [address] [facility] [facility]  3rd [facility] [id number] [phone]            Feb 14, 2018 10:15 AM CST   Lab with  LAB   [facility] ([facility])    [address] [place] [id number] [phone]            Feb 14, 2018 11:10 AM CST   (Arrive by 10:55 AM)   Return General Liver with Tala Meyer MD   [facility] Hepatology ([facility])    [address] [facility]  3rd [facility] [id number] [phone]              Who to contact     If you have questions or need follow up information about today's clinic visit or your schedule please contact [facility] SPECIALTY AND PROCEDURE directly at [phone].  Normal or non-critical lab and imaging  results will be communicated to you by MyChart, letter or phone within 4 business days after the clinic has received the results. If you do not hear from us within 7 days, please contact the clinic through MyChart or phone. If you have a critical or abnormal lab result, we will notify you by phone as soon as possible.  Submit refill requests through [employer] or call your pharmacy and they will forward the refill request to us. Please allow 3 business days for your refill to be completed.          Additional Information About Your Visit        Care EveryWhere ID     This is your Care EveryWhere ID. This could be used by other organizations to access your [place] medical records  [id number]        Your Vitals Were     Pulse Temperature Pulse Oximetry BMI (Body Mass Index)          101 97.6  F (36.4  C) (Oral) 98% 20 kg/m2         Blood Pressure from Last 3 Encounters:   12/13/17 133/81   12/11/17 106/68   11/30/17 118/78    Weight from Last 3 Encounters:   12/13/17 49.6 kg (109 lb 6.4 oz)   12/11/17 48.1 kg (106 lb)   11/30/17 48.2 kg (106 lb 4.8 oz)              We Performed the Following     Platelet count     US Paracentesis        Primary Care Provider Office Phone # Fax #    Felicitas Truman Horton [phone]       [address] [address]        Equal Access to Services     [facility]: Hadii nico Jose, waaxda nicole, qaybta veronicaalorlando sheridan, rosana ricks . So [facility] [phone].    ATENCIÓN: Si habla español, tiene a martinez disposición servicios gratuitos de asistencia lingüística. Yonathan al [phone].    We comply with applicable federal civil rights laws and [place] laws. We do not discriminate on the basis of race, color, national origin, age, disability, sex, sexual orientation, or gender identity.            Thank you!     Thank you for choosing [facility] SPECIALTY AND PROCEDURE  for your  care. Our goal is always to provide you with excellent care. Hearing back from our patients is one way we can continue to improve our services. Please take a few minutes to complete the written survey that you may receive in the mail after your visit with us. Thank you!             Your Updated Medication List - Protect others around you: Learn how to safely use, store and throw away your medicines at www.disposemymeds.org.          This list is accurate as of: 12/13/17  5:35 PM.  Always use your most recent med list.                   Brand Name Dispense Instructions for use Diagnosis    * albuterol 108 (90 BASE) MCG/ACT Inhaler    PROAIR HFA/PROVENTIL HFA/VENTOLIN HFA    1 Inhaler    Inhale 2 puffs into the lungs every 6 hours as needed for shortness of breath / dyspnea or wheezing    Moderate persistent asthma without complication       * albuterol (2.5 MG/3ML) 0.083% neb solution     25 vial    Take 1 vial (2.5 mg) by nebulization every 6 hours as needed for shortness of breath / dyspnea or wheezing    Mild intermittent asthma with acute exacerbation       bisacodyl 5 MG EC tablet    DULCOLAX    30 tablet    Take 1 tablet (5 mg) by mouth daily as needed for constipation    Constipation, unspecified constipation type       BOOST CALORIE SMART Liqd     90 Bottle    Take 3 Cans by mouth daily    HCC (hepatocellular carcinoma) (H)       cetirizine 10 MG tablet    zyrTEC    30 tablet    Take 1 tablet (10 mg) by mouth every evening    Chronic allergic conjunctivitis       DAILY MULTIPLE VITAMIN/IRON Tabs     30 tablet    Take 1 tablet by mouth daily    Chronic hepatitis C without hepatic coma (H)       fluticasone 50 MCG/ACT spray    FLONASE    1 Bottle    Spray 2 sprays into both nostrils daily    Chronic rhinitis       furosemide 20 MG tablet    LASIX    180 tablet    Take 1 tablet (20 mg) by mouth daily    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)       lactulose 20 GM/30ML Soln     946 mL    Take 30 mLs by  mouth daily    Cirrhosis of liver with ascites, unspecified hepatic cirrhosis type (H), HCC (hepatocellular carcinoma) (H)       mometasone-formoterol 200-5 MCG/ACT oral inhaler    DULERA    1 Inhaler    Inhale 2 puffs into the lungs 2 times daily    Moderate persistent asthma without complication       omeprazole 40 MG capsule    priLOSEC    90 capsule    Take 1 capsule (40 mg) by mouth daily Take 30-60 minutes before a meal.    Gastroesophageal reflux disease without esophagitis       polyethylene glycol powder    MIRALAX    510 g    Take 17 g (1 capful) by mouth 3 times daily as needed for constipation    Constipation, unspecified constipation type       spironolactone 50 MG tablet    ALDACTONE    30 tablet    Take 1 tablet (50 mg) by mouth daily    Chronic hepatitis C without hepatic coma (H)       * Notice:  This list has 2 medication(s) that are the same as other medications prescribed for you. Read the directions carefully, and ask your doctor or other care provider to review them with you.

## 2017-12-13 NOTE — PATIENT INSTRUCTIONS
Dear Yue Portillo    Thank you for choosing [facility] ([facility]) for your procedure.  The following information is a summary of our appointment as well as important reminders.      Additional information: You had a paracentesis today with 0.9 liters removed and received 12.5 grams Albumin.    We look forward in seeing you on your next appointment here at [facility].  Please don t hesitate to call us at [phone] to reschedule any of your appointments or to speak with one of the [facility] registered nurses.  It was a pleasure taking care of you today.    Sincerely,    [facility]  Specialty Infusion & Procedure Center  [address] [place] [address]  [address]  Phone:  [phone]

## 2017-12-13 NOTE — PROGRESS NOTES
Paracentesis Nursing Note  Yue Portillo presents today to [facility] for a paracentesis.    During today's appointment orders from Tala Meyer MD were completed.    Progress Note:  Patient identification verified by name and date of birth.  Assessment completed.  Vitals monitored throughout appointment and recorded in Doc Flowsheets.  See proceduralist note in ultrasound.    Date of consent or authorization: 12/13/2017.  Invasive Procedure Safety Checklist was completed and sent for scanning.     Paracentesis performed by VIGNESH Abernathy.    The following labs were communicated to provider performing paracentesis: PLts=65 today  Lab Results   Component Value Date    PLT 88 11/09/2017       Total amount of ascites fluid drained: 0.9 liters.  Color of ascites fluid: straw colored.  Total amount of albumin given: 12.5  grams.    Patient tolerated procedure well.    Post procedure,denies pain or discomfort post paracentesis.    Discharge Plan:  Discharge instructions were reviewed with patient.  Patient/Representative verbalized understanding and all questions were answered.   Discharged from [facility] in stable condition.    JANES REYES RN        There were no vitals taken for this visit.

## 2018-01-01 ENCOUNTER — HOSPITAL ENCOUNTER (OUTPATIENT)
Facility: AMBULATORY SURGERY CENTER | Age: 78
End: 2018-05-11
Attending: PHYSICIAN ASSISTANT
Payer: COMMERCIAL

## 2018-01-01 ENCOUNTER — APPOINTMENT (OUTPATIENT)
Dept: INTERVENTIONAL RADIOLOGY/VASCULAR | Facility: CLINIC | Age: 78
End: 2018-01-01
Attending: INTERNAL MEDICINE
Payer: COMMERCIAL

## 2018-01-01 ENCOUNTER — APPOINTMENT (OUTPATIENT)
Dept: CT IMAGING | Facility: CLINIC | Age: 78
DRG: 871 | End: 2018-01-01
Payer: COMMERCIAL

## 2018-01-01 ENCOUNTER — RADIANT APPOINTMENT (OUTPATIENT)
Dept: ULTRASOUND IMAGING | Facility: CLINIC | Age: 78
End: 2018-01-01
Attending: INTERNAL MEDICINE
Payer: COMMERCIAL

## 2018-01-01 ENCOUNTER — TELEPHONE (OUTPATIENT)
Dept: INTERVENTIONAL RADIOLOGY/VASCULAR | Facility: CLINIC | Age: 78
End: 2018-01-01

## 2018-01-01 ENCOUNTER — PATIENT OUTREACH (OUTPATIENT)
Dept: FAMILY MEDICINE | Facility: CLINIC | Age: 78
End: 2018-01-01

## 2018-01-01 ENCOUNTER — APPOINTMENT (OUTPATIENT)
Dept: GENERAL RADIOLOGY | Facility: CLINIC | Age: 78
DRG: 871 | End: 2018-01-01
Attending: EMERGENCY MEDICINE
Payer: COMMERCIAL

## 2018-01-01 ENCOUNTER — APPOINTMENT (OUTPATIENT)
Dept: GENERAL RADIOLOGY | Facility: CLINIC | Age: 78
DRG: 871 | End: 2018-01-01
Attending: ORTHOPAEDIC SURGERY
Payer: COMMERCIAL

## 2018-01-01 ENCOUNTER — HOSPITAL ENCOUNTER (OUTPATIENT)
Dept: CT IMAGING | Facility: CLINIC | Age: 78
End: 2018-01-22
Attending: INTERNAL MEDICINE | Admitting: INTERNAL MEDICINE
Payer: COMMERCIAL

## 2018-01-01 ENCOUNTER — APPOINTMENT (OUTPATIENT)
Dept: INTERVENTIONAL RADIOLOGY/VASCULAR | Facility: CLINIC | Age: 78
DRG: 436 | End: 2018-01-01
Payer: COMMERCIAL

## 2018-01-01 ENCOUNTER — APPOINTMENT (OUTPATIENT)
Dept: INTERVENTIONAL RADIOLOGY/VASCULAR | Facility: CLINIC | Age: 78
DRG: 435 | End: 2018-01-01
Attending: INTERNAL MEDICINE
Payer: COMMERCIAL

## 2018-01-01 ENCOUNTER — APPOINTMENT (OUTPATIENT)
Dept: OCCUPATIONAL THERAPY | Facility: CLINIC | Age: 78
DRG: 871 | End: 2018-01-01
Attending: STUDENT IN AN ORGANIZED HEALTH CARE EDUCATION/TRAINING PROGRAM
Payer: COMMERCIAL

## 2018-01-01 ENCOUNTER — HOSPITAL ENCOUNTER (INPATIENT)
Facility: CLINIC | Age: 78
LOS: 5 days | Discharge: HOME HEALTH | DRG: 871 | End: 2018-04-06
Attending: EMERGENCY MEDICINE | Admitting: FAMILY MEDICINE
Payer: COMMERCIAL

## 2018-01-01 ENCOUNTER — RADIANT APPOINTMENT (OUTPATIENT)
Dept: RADIOLOGY | Facility: AMBULATORY SURGERY CENTER | Age: 78
End: 2018-01-01
Attending: HOSPITALIST
Payer: COMMERCIAL

## 2018-01-01 ENCOUNTER — RADIANT APPOINTMENT (OUTPATIENT)
Dept: ULTRASOUND IMAGING | Facility: CLINIC | Age: 78
End: 2018-01-01
Attending: HOSPITALIST
Payer: COMMERCIAL

## 2018-01-01 ENCOUNTER — OFFICE VISIT (OUTPATIENT)
Dept: INTERPRETER SERVICES | Facility: CLINIC | Age: 78
End: 2018-01-01
Payer: COMMERCIAL

## 2018-01-01 ENCOUNTER — CARE COORDINATION (OUTPATIENT)
Dept: CARE COORDINATION | Facility: CLINIC | Age: 78
End: 2018-01-01

## 2018-01-01 ENCOUNTER — APPOINTMENT (OUTPATIENT)
Dept: GENERAL RADIOLOGY | Facility: CLINIC | Age: 78
DRG: 871 | End: 2018-01-01
Payer: COMMERCIAL

## 2018-01-01 ENCOUNTER — HOSPITAL ENCOUNTER (OUTPATIENT)
Facility: CLINIC | Age: 78
Discharge: HOME | End: 2018-04-24
Attending: INTERNAL MEDICINE | Admitting: INTERNAL MEDICINE
Payer: COMMERCIAL

## 2018-01-01 ENCOUNTER — HOSPITAL ENCOUNTER (INPATIENT)
Facility: CLINIC | Age: 78
LOS: 8 days | Discharge: HOME | DRG: 436 | End: 2018-03-25
Attending: EMERGENCY MEDICINE | Admitting: INTERNAL MEDICINE
Payer: COMMERCIAL

## 2018-01-01 ENCOUNTER — DOCUMENTATION ONLY (OUTPATIENT)
Dept: FAMILY MEDICINE | Facility: CLINIC | Age: 78
End: 2018-01-01

## 2018-01-01 ENCOUNTER — SURGERY (OUTPATIENT)
Age: 78
End: 2018-01-01

## 2018-01-01 ENCOUNTER — APPOINTMENT (OUTPATIENT)
Dept: GENERAL RADIOLOGY | Facility: CLINIC | Age: 78
DRG: 436 | End: 2018-01-01
Attending: HOSPITALIST
Payer: COMMERCIAL

## 2018-01-01 ENCOUNTER — APPOINTMENT (OUTPATIENT)
Dept: CARDIOLOGY | Facility: CLINIC | Age: 78
End: 2018-01-01
Payer: COMMERCIAL

## 2018-01-01 ENCOUNTER — OFFICE VISIT (OUTPATIENT)
Dept: INFUSION THERAPY | Facility: CLINIC | Age: 78
End: 2018-01-01
Attending: INTERNAL MEDICINE
Payer: COMMERCIAL

## 2018-01-01 ENCOUNTER — APPOINTMENT (OUTPATIENT)
Dept: OCCUPATIONAL THERAPY | Facility: CLINIC | Age: 78
DRG: 436 | End: 2018-01-01
Attending: EMERGENCY MEDICINE
Payer: COMMERCIAL

## 2018-01-01 ENCOUNTER — APPOINTMENT (OUTPATIENT)
Dept: GENERAL RADIOLOGY | Facility: CLINIC | Age: 78
DRG: 435 | End: 2018-01-01
Attending: EMERGENCY MEDICINE
Payer: COMMERCIAL

## 2018-01-01 ENCOUNTER — TELEPHONE (OUTPATIENT)
Dept: ONCOLOGY | Facility: CLINIC | Age: 78
End: 2018-01-01

## 2018-01-01 ENCOUNTER — OFFICE VISIT (OUTPATIENT)
Dept: FAMILY MEDICINE | Facility: CLINIC | Age: 78
End: 2018-01-01
Payer: COMMERCIAL

## 2018-01-01 ENCOUNTER — HOSPITAL ENCOUNTER (EMERGENCY)
Facility: CLINIC | Age: 78
Discharge: HOME | End: 2018-05-04
Attending: FAMILY MEDICINE | Admitting: FAMILY MEDICINE
Payer: COMMERCIAL

## 2018-01-01 ENCOUNTER — APPOINTMENT (OUTPATIENT)
Dept: INTERVENTIONAL RADIOLOGY/VASCULAR | Facility: CLINIC | Age: 78
End: 2018-01-01
Payer: COMMERCIAL

## 2018-01-01 ENCOUNTER — TRANSFERRED RECORDS (OUTPATIENT)
Dept: HEALTH INFORMATION MANAGEMENT | Facility: CLINIC | Age: 78
End: 2018-01-01

## 2018-01-01 ENCOUNTER — RADIANT APPOINTMENT (OUTPATIENT)
Dept: ULTRASOUND IMAGING | Facility: CLINIC | Age: 78
DRG: 435 | End: 2018-01-01
Attending: HOSPITALIST
Payer: COMMERCIAL

## 2018-01-01 ENCOUNTER — HOSPITAL ENCOUNTER (OUTPATIENT)
Facility: CLINIC | Age: 78
Discharge: HOME | End: 2018-01-22
Attending: INTERNAL MEDICINE | Admitting: INTERNAL MEDICINE
Payer: COMMERCIAL

## 2018-01-01 ENCOUNTER — APPOINTMENT (OUTPATIENT)
Dept: GENERAL RADIOLOGY | Facility: CLINIC | Age: 78
End: 2018-01-01
Attending: FAMILY MEDICINE
Payer: COMMERCIAL

## 2018-01-01 ENCOUNTER — HOSPITAL ENCOUNTER (OUTPATIENT)
Facility: CLINIC | Age: 78
Setting detail: OBSERVATION
Discharge: HOME | End: 2018-02-15
Attending: EMERGENCY MEDICINE | Admitting: INTERNAL MEDICINE
Payer: COMMERCIAL

## 2018-01-01 ENCOUNTER — APPOINTMENT (OUTPATIENT)
Dept: MEDSURG UNIT | Facility: CLINIC | Age: 78
End: 2018-01-01
Attending: INTERNAL MEDICINE
Payer: COMMERCIAL

## 2018-01-01 ENCOUNTER — APPOINTMENT (OUTPATIENT)
Dept: GENERAL RADIOLOGY | Facility: CLINIC | Age: 78
DRG: 436 | End: 2018-01-01
Attending: INTERNAL MEDICINE
Payer: COMMERCIAL

## 2018-01-01 ENCOUNTER — APPOINTMENT (OUTPATIENT)
Dept: PHYSICAL THERAPY | Facility: CLINIC | Age: 78
DRG: 436 | End: 2018-01-01
Attending: EMERGENCY MEDICINE
Payer: COMMERCIAL

## 2018-01-01 ENCOUNTER — HOSPITAL ENCOUNTER (OUTPATIENT)
Facility: CLINIC | Age: 78
Discharge: HOME | End: 2018-03-12
Attending: INTERNAL MEDICINE | Admitting: INTERNAL MEDICINE
Payer: COMMERCIAL

## 2018-01-01 ENCOUNTER — APPOINTMENT (OUTPATIENT)
Dept: INTERVENTIONAL RADIOLOGY/VASCULAR | Facility: CLINIC | Age: 78
End: 2018-01-01
Attending: NURSE PRACTITIONER
Payer: COMMERCIAL

## 2018-01-01 ENCOUNTER — APPOINTMENT (OUTPATIENT)
Dept: MRI IMAGING | Facility: CLINIC | Age: 78
DRG: 436 | End: 2018-01-01
Attending: INTERNAL MEDICINE
Payer: COMMERCIAL

## 2018-01-01 ENCOUNTER — HOSPITAL ENCOUNTER (EMERGENCY)
Facility: CLINIC | Age: 78
Discharge: HOME | End: 2018-04-13
Attending: EMERGENCY MEDICINE | Admitting: EMERGENCY MEDICINE
Payer: COMMERCIAL

## 2018-01-01 ENCOUNTER — OFFICE VISIT (OUTPATIENT)
Dept: GASTROENTEROLOGY | Facility: CLINIC | Age: 78
End: 2018-01-01
Attending: INTERNAL MEDICINE
Payer: COMMERCIAL

## 2018-01-01 ENCOUNTER — OFFICE VISIT (OUTPATIENT)
Dept: PULMONOLOGY | Facility: CLINIC | Age: 78
End: 2018-01-01
Attending: INTERNAL MEDICINE
Payer: COMMERCIAL

## 2018-01-01 ENCOUNTER — APPOINTMENT (OUTPATIENT)
Dept: CT IMAGING | Facility: CLINIC | Age: 78
End: 2018-01-01
Attending: EMERGENCY MEDICINE
Payer: COMMERCIAL

## 2018-01-01 ENCOUNTER — OFFICE VISIT (OUTPATIENT)
Dept: INFUSION THERAPY | Facility: CLINIC | Age: 78
DRG: 435 | End: 2018-01-01
Attending: INTERNAL MEDICINE
Payer: COMMERCIAL

## 2018-01-01 ENCOUNTER — APPOINTMENT (OUTPATIENT)
Dept: GENERAL RADIOLOGY | Facility: CLINIC | Age: 78
DRG: 436 | End: 2018-01-01
Attending: EMERGENCY MEDICINE
Payer: COMMERCIAL

## 2018-01-01 ENCOUNTER — APPOINTMENT (OUTPATIENT)
Dept: GENERAL RADIOLOGY | Facility: CLINIC | Age: 78
End: 2018-01-01
Attending: EMERGENCY MEDICINE
Payer: COMMERCIAL

## 2018-01-01 ENCOUNTER — TELEPHONE (OUTPATIENT)
Dept: FAMILY MEDICINE | Facility: CLINIC | Age: 78
End: 2018-01-01

## 2018-01-01 ENCOUNTER — HOSPITAL ENCOUNTER (INPATIENT)
Facility: CLINIC | Age: 78
LOS: 1 days | Discharge: HOME | DRG: 435 | End: 2018-05-19
Attending: EMERGENCY MEDICINE | Admitting: FAMILY MEDICINE
Payer: COMMERCIAL

## 2018-01-01 ENCOUNTER — HOSPITAL ENCOUNTER (EMERGENCY)
Facility: CLINIC | Age: 78
Discharge: HOME | End: 2018-02-12
Attending: FAMILY MEDICINE | Admitting: FAMILY MEDICINE
Payer: COMMERCIAL

## 2018-01-01 VITALS
RESPIRATION RATE: 18 BRPM | OXYGEN SATURATION: 97 % | HEART RATE: 122 BPM | BODY MASS INDEX: 20.87 KG/M2 | DIASTOLIC BLOOD PRESSURE: 75 MMHG | WEIGHT: 113.4 LBS | HEIGHT: 62 IN | SYSTOLIC BLOOD PRESSURE: 124 MMHG | TEMPERATURE: 98.2 F

## 2018-01-01 VITALS
BODY MASS INDEX: 20.08 KG/M2 | TEMPERATURE: 97.6 F | RESPIRATION RATE: 16 BRPM | DIASTOLIC BLOOD PRESSURE: 54 MMHG | WEIGHT: 109.8 LBS | HEART RATE: 98 BPM | OXYGEN SATURATION: 100 % | SYSTOLIC BLOOD PRESSURE: 98 MMHG

## 2018-01-01 VITALS
HEART RATE: 89 BPM | TEMPERATURE: 97.8 F | SYSTOLIC BLOOD PRESSURE: 102 MMHG | BODY MASS INDEX: 18.15 KG/M2 | RESPIRATION RATE: 16 BRPM | OXYGEN SATURATION: 100 % | DIASTOLIC BLOOD PRESSURE: 61 MMHG | WEIGHT: 106.3 LBS | HEIGHT: 64 IN

## 2018-01-01 VITALS
HEART RATE: 80 BPM | SYSTOLIC BLOOD PRESSURE: 115 MMHG | DIASTOLIC BLOOD PRESSURE: 66 MMHG | TEMPERATURE: 97.9 F | RESPIRATION RATE: 18 BRPM | OXYGEN SATURATION: 98 %

## 2018-01-01 VITALS
TEMPERATURE: 97.8 F | SYSTOLIC BLOOD PRESSURE: 100 MMHG | OXYGEN SATURATION: 100 % | WEIGHT: 97.5 LBS | BODY MASS INDEX: 17.83 KG/M2 | DIASTOLIC BLOOD PRESSURE: 59 MMHG

## 2018-01-01 VITALS
OXYGEN SATURATION: 98 % | WEIGHT: 114.9 LBS | HEART RATE: 94 BPM | HEIGHT: 62 IN | RESPIRATION RATE: 16 BRPM | TEMPERATURE: 97 F | SYSTOLIC BLOOD PRESSURE: 94 MMHG | BODY MASS INDEX: 21.14 KG/M2 | DIASTOLIC BLOOD PRESSURE: 56 MMHG

## 2018-01-01 VITALS
WEIGHT: 100.63 LBS | DIASTOLIC BLOOD PRESSURE: 61 MMHG | SYSTOLIC BLOOD PRESSURE: 115 MMHG | HEIGHT: 62 IN | HEART RATE: 84 BPM | OXYGEN SATURATION: 98 % | BODY MASS INDEX: 18.52 KG/M2 | TEMPERATURE: 98 F | RESPIRATION RATE: 16 BRPM

## 2018-01-01 VITALS
TEMPERATURE: 98.3 F | BODY MASS INDEX: 19.24 KG/M2 | OXYGEN SATURATION: 99 % | WEIGHT: 105.2 LBS | DIASTOLIC BLOOD PRESSURE: 48 MMHG | HEART RATE: 85 BPM | SYSTOLIC BLOOD PRESSURE: 91 MMHG

## 2018-01-01 VITALS
DIASTOLIC BLOOD PRESSURE: 50 MMHG | SYSTOLIC BLOOD PRESSURE: 91 MMHG | WEIGHT: 113.5 LBS | TEMPERATURE: 97.3 F | RESPIRATION RATE: 16 BRPM | BODY MASS INDEX: 20.76 KG/M2

## 2018-01-01 VITALS
SYSTOLIC BLOOD PRESSURE: 113 MMHG | HEART RATE: 88 BPM | RESPIRATION RATE: 16 BRPM | DIASTOLIC BLOOD PRESSURE: 65 MMHG | OXYGEN SATURATION: 100 % | BODY MASS INDEX: 19.28 KG/M2 | WEIGHT: 105.4 LBS | TEMPERATURE: 97.9 F

## 2018-01-01 VITALS
HEIGHT: 62 IN | BODY MASS INDEX: 19.32 KG/M2 | HEART RATE: 101 BPM | WEIGHT: 105 LBS | OXYGEN SATURATION: 99 % | DIASTOLIC BLOOD PRESSURE: 62 MMHG | SYSTOLIC BLOOD PRESSURE: 109 MMHG | TEMPERATURE: 98.4 F | RESPIRATION RATE: 16 BRPM

## 2018-01-01 VITALS
RESPIRATION RATE: 18 BRPM | SYSTOLIC BLOOD PRESSURE: 104 MMHG | HEART RATE: 85 BPM | TEMPERATURE: 96.4 F | DIASTOLIC BLOOD PRESSURE: 58 MMHG | OXYGEN SATURATION: 99 %

## 2018-01-01 VITALS
RESPIRATION RATE: 18 BRPM | TEMPERATURE: 98.2 F | OXYGEN SATURATION: 98 % | HEART RATE: 100 BPM | DIASTOLIC BLOOD PRESSURE: 58 MMHG | SYSTOLIC BLOOD PRESSURE: 106 MMHG

## 2018-01-01 VITALS
BODY MASS INDEX: 18.18 KG/M2 | WEIGHT: 99.4 LBS | SYSTOLIC BLOOD PRESSURE: 108 MMHG | OXYGEN SATURATION: 100 % | TEMPERATURE: 98.1 F | HEART RATE: 93 BPM | DIASTOLIC BLOOD PRESSURE: 57 MMHG

## 2018-01-01 VITALS
BODY MASS INDEX: 21.4 KG/M2 | TEMPERATURE: 98.5 F | HEART RATE: 93 BPM | OXYGEN SATURATION: 100 % | SYSTOLIC BLOOD PRESSURE: 119 MMHG | WEIGHT: 117 LBS | RESPIRATION RATE: 16 BRPM | DIASTOLIC BLOOD PRESSURE: 75 MMHG

## 2018-01-01 VITALS
RESPIRATION RATE: 26 BRPM | SYSTOLIC BLOOD PRESSURE: 100 MMHG | HEIGHT: 62 IN | WEIGHT: 113.5 LBS | BODY MASS INDEX: 20.89 KG/M2 | OXYGEN SATURATION: 100 % | DIASTOLIC BLOOD PRESSURE: 63 MMHG | TEMPERATURE: 98.1 F

## 2018-01-01 VITALS
HEART RATE: 93 BPM | OXYGEN SATURATION: 100 % | WEIGHT: 112 LBS | HEIGHT: 62 IN | SYSTOLIC BLOOD PRESSURE: 138 MMHG | BODY MASS INDEX: 20.61 KG/M2 | RESPIRATION RATE: 16 BRPM | DIASTOLIC BLOOD PRESSURE: 87 MMHG

## 2018-01-01 VITALS
RESPIRATION RATE: 16 BRPM | OXYGEN SATURATION: 98 % | DIASTOLIC BLOOD PRESSURE: 66 MMHG | HEART RATE: 96 BPM | TEMPERATURE: 97.9 F | SYSTOLIC BLOOD PRESSURE: 100 MMHG

## 2018-01-01 VITALS
WEIGHT: 109.79 LBS | SYSTOLIC BLOOD PRESSURE: 102 MMHG | DIASTOLIC BLOOD PRESSURE: 68 MMHG | RESPIRATION RATE: 16 BRPM | BODY MASS INDEX: 20.2 KG/M2 | HEIGHT: 62 IN | OXYGEN SATURATION: 98 % | TEMPERATURE: 98.3 F | HEART RATE: 94 BPM

## 2018-01-01 VITALS
BODY MASS INDEX: 19.85 KG/M2 | RESPIRATION RATE: 20 BRPM | HEART RATE: 114 BPM | DIASTOLIC BLOOD PRESSURE: 57 MMHG | HEIGHT: 62 IN | WEIGHT: 107.9 LBS | TEMPERATURE: 97.4 F | OXYGEN SATURATION: 97 % | SYSTOLIC BLOOD PRESSURE: 100 MMHG

## 2018-01-01 VITALS — HEART RATE: 147 BPM | DIASTOLIC BLOOD PRESSURE: 76 MMHG | SYSTOLIC BLOOD PRESSURE: 126 MMHG | OXYGEN SATURATION: 99 %

## 2018-01-01 VITALS
TEMPERATURE: 98.2 F | OXYGEN SATURATION: 100 % | HEART RATE: 89 BPM | DIASTOLIC BLOOD PRESSURE: 51 MMHG | RESPIRATION RATE: 16 BRPM | SYSTOLIC BLOOD PRESSURE: 93 MMHG

## 2018-01-01 DIAGNOSIS — R18.8 CIRRHOSIS OF LIVER WITH ASCITES, UNSPECIFIED HEPATIC CIRRHOSIS TYPE (H): ICD-10-CM

## 2018-01-01 DIAGNOSIS — R18.8 CIRRHOSIS OF LIVER WITH ASCITES, UNSPECIFIED HEPATIC CIRRHOSIS TYPE (H): Primary | ICD-10-CM

## 2018-01-01 DIAGNOSIS — R07.0 THROAT PAIN: ICD-10-CM

## 2018-01-01 DIAGNOSIS — J90 PLEURAL EFFUSION, RIGHT: ICD-10-CM

## 2018-01-01 DIAGNOSIS — J90 RECURRENT RIGHT PLEURAL EFFUSION: ICD-10-CM

## 2018-01-01 DIAGNOSIS — C22.0 HCC (HEPATOCELLULAR CARCINOMA) (H): ICD-10-CM

## 2018-01-01 DIAGNOSIS — C22.0 HCC (HEPATOCELLULAR CARCINOMA) (H): Primary | ICD-10-CM

## 2018-01-01 DIAGNOSIS — K21.9 GASTROESOPHAGEAL REFLUX DISEASE WITHOUT ESOPHAGITIS: Primary | ICD-10-CM

## 2018-01-01 DIAGNOSIS — J90 PLEURAL EFFUSION: ICD-10-CM

## 2018-01-01 DIAGNOSIS — K74.60 CIRRHOSIS OF LIVER WITH ASCITES, UNSPECIFIED HEPATIC CIRRHOSIS TYPE (H): ICD-10-CM

## 2018-01-01 DIAGNOSIS — J91.8 PLEURAL EFFUSION ASSOCIATED WITH HEPATIC DISORDER: ICD-10-CM

## 2018-01-01 DIAGNOSIS — R06.02 SOB (SHORTNESS OF BREATH): ICD-10-CM

## 2018-01-01 DIAGNOSIS — J90 PLEURAL EFFUSION: Primary | ICD-10-CM

## 2018-01-01 DIAGNOSIS — B18.2 CHRONIC HEPATITIS C WITHOUT HEPATIC COMA (H): ICD-10-CM

## 2018-01-01 DIAGNOSIS — H10.45 CHRONIC ALLERGIC CONJUNCTIVITIS: ICD-10-CM

## 2018-01-01 DIAGNOSIS — B18.2: ICD-10-CM

## 2018-01-01 DIAGNOSIS — K59.00 CONSTIPATION, UNSPECIFIED CONSTIPATION TYPE: ICD-10-CM

## 2018-01-01 DIAGNOSIS — E87.5 HYPERKALEMIA: ICD-10-CM

## 2018-01-01 DIAGNOSIS — K74.60 CIRRHOSIS OF LIVER WITH ASCITES, UNSPECIFIED HEPATIC CIRRHOSIS TYPE (H): Primary | ICD-10-CM

## 2018-01-01 DIAGNOSIS — K70.31 ALCOHOLIC CIRRHOSIS OF LIVER WITH ASCITES (H): ICD-10-CM

## 2018-01-01 DIAGNOSIS — J30.89 CHRONIC NONSEASONAL ALLERGIC RHINITIS DUE TO POLLEN: Primary | ICD-10-CM

## 2018-01-01 DIAGNOSIS — R91.8 PULMONARY NODULES: ICD-10-CM

## 2018-01-01 DIAGNOSIS — R06.00 DYSPNEA, UNSPECIFIED TYPE: ICD-10-CM

## 2018-01-01 DIAGNOSIS — K65.2 SBP (SPONTANEOUS BACTERIAL PERITONITIS) (H): ICD-10-CM

## 2018-01-01 DIAGNOSIS — J45.40 MODERATE PERSISTENT ASTHMA WITHOUT COMPLICATION: ICD-10-CM

## 2018-01-01 DIAGNOSIS — K72.10 END-STAGE LIVER DISEASE (H): ICD-10-CM

## 2018-01-01 DIAGNOSIS — H04.123 DRY EYES: Primary | ICD-10-CM

## 2018-01-01 DIAGNOSIS — R68.2 DRY MOUTH: ICD-10-CM

## 2018-01-01 DIAGNOSIS — K76.9 PLEURAL EFFUSION ASSOCIATED WITH HEPATIC DISORDER: ICD-10-CM

## 2018-01-01 DIAGNOSIS — R18.8 ASCITES OF LIVER: ICD-10-CM

## 2018-01-01 DIAGNOSIS — R00.0 SINUS TACHYCARDIA: ICD-10-CM

## 2018-01-01 DIAGNOSIS — Z98.890 S/P THORACENTESIS: ICD-10-CM

## 2018-01-01 DIAGNOSIS — J45.30 MILD PERSISTENT ASTHMA WITHOUT COMPLICATION: Primary | ICD-10-CM

## 2018-01-01 DIAGNOSIS — D64.9 ANEMIA, UNSPECIFIED TYPE: ICD-10-CM

## 2018-01-01 DIAGNOSIS — E87.1 HYPONATREMIA: ICD-10-CM

## 2018-01-01 DIAGNOSIS — R62.7 ADULT FAILURE TO THRIVE: ICD-10-CM

## 2018-01-01 DIAGNOSIS — B18.2: Primary | ICD-10-CM

## 2018-01-01 LAB
ABO + RH BLD: NORMAL
ALBUMIN FLD-MCNC: 0.2 G/DL
ALBUMIN FLD-MCNC: 0.3 G/DL
ALBUMIN FLD-MCNC: 0.4 G/DL
ALBUMIN SERPL-MCNC: 1.7 G/DL (ref 3.4–5)
ALBUMIN SERPL-MCNC: 1.8 G/DL (ref 3.4–5)
ALBUMIN SERPL-MCNC: 1.9 G/DL (ref 3.4–5)
ALBUMIN SERPL-MCNC: 2 G/DL (ref 3.4–5)
ALBUMIN SERPL-MCNC: 2 G/DL (ref 3.4–5)
ALBUMIN SERPL-MCNC: 2.1 G/DL (ref 3.4–5)
ALBUMIN SERPL-MCNC: 2.1 G/DL (ref 3.4–5)
ALBUMIN SERPL-MCNC: 2.2 G/DL (ref 3.4–5)
ALBUMIN SERPL-MCNC: 2.2 G/DL (ref 3.4–5)
ALBUMIN SERPL-MCNC: 2.3 G/DL (ref 3.4–5)
ALBUMIN SERPL-MCNC: 2.4 G/DL (ref 3.4–5)
ALBUMIN SERPL-MCNC: 2.5 G/DL (ref 3.4–5)
ALBUMIN SERPL-MCNC: 2.5 G/DL (ref 3.4–5)
ALBUMIN SERPL-MCNC: 2.9 G/DL (ref 3.4–5)
ALBUMIN UR-MCNC: 10 MG/DL
ALBUMIN UR-MCNC: 30 MG/DL
ALP SERPL-CCNC: 152 U/L (ref 40–150)
ALP SERPL-CCNC: 170 U/L (ref 40–150)
ALP SERPL-CCNC: 198 U/L (ref 40–150)
ALP SERPL-CCNC: 206 U/L (ref 40–150)
ALP SERPL-CCNC: 214 U/L (ref 40–150)
ALP SERPL-CCNC: 224 U/L (ref 40–150)
ALP SERPL-CCNC: 232 U/L (ref 40–150)
ALP SERPL-CCNC: 243 U/L (ref 40–150)
ALP SERPL-CCNC: 249 U/L (ref 40–150)
ALP SERPL-CCNC: 258 U/L (ref 40–150)
ALP SERPL-CCNC: 259 U/L (ref 40–150)
ALP SERPL-CCNC: 263 U/L (ref 40–150)
ALP SERPL-CCNC: 264 U/L (ref 40–150)
ALP SERPL-CCNC: 266 U/L (ref 40–150)
ALP SERPL-CCNC: 298 U/L (ref 40–150)
ALP SERPL-CCNC: 309 U/L (ref 40–150)
ALP SERPL-CCNC: 336 U/L (ref 40–150)
ALT SERPL W P-5'-P-CCNC: 100 U/L (ref 0–50)
ALT SERPL W P-5'-P-CCNC: 31 U/L (ref 0–50)
ALT SERPL W P-5'-P-CCNC: 38 U/L (ref 0–50)
ALT SERPL W P-5'-P-CCNC: 40 U/L (ref 0–50)
ALT SERPL W P-5'-P-CCNC: 41 U/L (ref 0–50)
ALT SERPL W P-5'-P-CCNC: 42 U/L (ref 0–50)
ALT SERPL W P-5'-P-CCNC: 43 U/L (ref 0–50)
ALT SERPL W P-5'-P-CCNC: 44 U/L (ref 0–50)
ALT SERPL W P-5'-P-CCNC: 45 U/L (ref 0–50)
ALT SERPL W P-5'-P-CCNC: 45 U/L (ref 0–50)
ALT SERPL W P-5'-P-CCNC: 50 U/L (ref 0–50)
ALT SERPL W P-5'-P-CCNC: 51 U/L (ref 0–50)
ALT SERPL W P-5'-P-CCNC: 52 U/L (ref 0–50)
ALT SERPL W P-5'-P-CCNC: 54 U/L (ref 0–50)
ALT SERPL W P-5'-P-CCNC: 54 U/L (ref 0–50)
ALT SERPL W P-5'-P-CCNC: 62 U/L (ref 0–50)
ALT SERPL W P-5'-P-CCNC: 77 U/L (ref 0–50)
AMMONIA PLAS-SCNC: 15 UMOL/L (ref 10–50)
AMYLASE FLD-CCNC: 25 U/L
AMYLASE FLD-CCNC: 31 U/L
ANION GAP SERPL CALCULATED.3IONS-SCNC: 10 MMOL/L (ref 3–14)
ANION GAP SERPL CALCULATED.3IONS-SCNC: 10 MMOL/L (ref 3–14)
ANION GAP SERPL CALCULATED.3IONS-SCNC: 15 MMOL/L (ref 3–14)
ANION GAP SERPL CALCULATED.3IONS-SCNC: 5 MMOL/L (ref 3–14)
ANION GAP SERPL CALCULATED.3IONS-SCNC: 6 MMOL/L (ref 3–14)
ANION GAP SERPL CALCULATED.3IONS-SCNC: 7 MMOL/L (ref 3–14)
ANION GAP SERPL CALCULATED.3IONS-SCNC: 8 MMOL/L (ref 3–14)
ANION GAP SERPL CALCULATED.3IONS-SCNC: 9 MMOL/L (ref 3–14)
ANION GAP SERPL CALCULATED.3IONS-SCNC: 9 MMOL/L (ref 3–14)
APPEARANCE FLD: CLEAR
APPEARANCE FLD: CLEAR
APPEARANCE FLD: NORMAL
APPEARANCE UR: ABNORMAL
APPEARANCE UR: CLEAR
APTT PPP: 40 SEC (ref 22–37)
APTT PPP: 42 SEC (ref 22–37)
AST SERPL W P-5'-P-CCNC: 100 U/L (ref 0–45)
AST SERPL W P-5'-P-CCNC: 100 U/L (ref 0–45)
AST SERPL W P-5'-P-CCNC: 103 U/L (ref 0–45)
AST SERPL W P-5'-P-CCNC: 110 U/L (ref 0–45)
AST SERPL W P-5'-P-CCNC: 110 U/L (ref 0–45)
AST SERPL W P-5'-P-CCNC: 120 U/L (ref 0–45)
AST SERPL W P-5'-P-CCNC: 122 U/L (ref 0–45)
AST SERPL W P-5'-P-CCNC: 164 U/L (ref 0–45)
AST SERPL W P-5'-P-CCNC: 188 U/L (ref 0–45)
AST SERPL W P-5'-P-CCNC: 65 U/L (ref 0–45)
AST SERPL W P-5'-P-CCNC: 77 U/L (ref 0–45)
AST SERPL W P-5'-P-CCNC: 80 U/L (ref 0–45)
AST SERPL W P-5'-P-CCNC: 84 U/L (ref 0–45)
AST SERPL W P-5'-P-CCNC: 84 U/L (ref 0–45)
AST SERPL W P-5'-P-CCNC: 86 U/L (ref 0–45)
AST SERPL W P-5'-P-CCNC: 94 U/L (ref 0–45)
AST SERPL W P-5'-P-CCNC: 96 U/L (ref 0–45)
BACTERIA SPEC CULT: ABNORMAL
BACTERIA SPEC CULT: ABNORMAL
BACTERIA SPEC CULT: NO GROWTH
BACTERIA SPEC CULT: NORMAL
BASOPHILS # BLD AUTO: 0 10E9/L (ref 0–0.2)
BASOPHILS NFR BLD AUTO: 0 %
BASOPHILS NFR BLD AUTO: 0.1 %
BASOPHILS NFR BLD AUTO: 0.2 %
BASOPHILS NFR BLD AUTO: 0.3 %
BASOPHILS NFR BLD AUTO: 0.3 %
BASOPHILS NFR FLD MANUAL: 1 %
BILIRUB DIRECT SERPL-MCNC: 1.6 MG/DL (ref 0–0.2)
BILIRUB DIRECT SERPL-MCNC: 3 MG/DL (ref 0–0.2)
BILIRUB SERPL-MCNC: 2.1 MG/DL (ref 0.2–1.3)
BILIRUB SERPL-MCNC: 2.6 MG/DL (ref 0.2–1.3)
BILIRUB SERPL-MCNC: 2.7 MG/DL (ref 0.2–1.3)
BILIRUB SERPL-MCNC: 2.7 MG/DL (ref 0.2–1.3)
BILIRUB SERPL-MCNC: 2.8 MG/DL (ref 0.2–1.3)
BILIRUB SERPL-MCNC: 2.8 MG/DL (ref 0.2–1.3)
BILIRUB SERPL-MCNC: 2.9 MG/DL (ref 0.2–1.3)
BILIRUB SERPL-MCNC: 3.1 MG/DL (ref 0.2–1.3)
BILIRUB SERPL-MCNC: 3.1 MG/DL (ref 0.2–1.3)
BILIRUB SERPL-MCNC: 3.4 MG/DL (ref 0.2–1.3)
BILIRUB SERPL-MCNC: 3.5 MG/DL (ref 0.2–1.3)
BILIRUB SERPL-MCNC: 3.6 MG/DL (ref 0.2–1.3)
BILIRUB SERPL-MCNC: 4.7 MG/DL (ref 0.2–1.3)
BILIRUB SERPL-MCNC: 5.4 MG/DL (ref 0.2–1.3)
BILIRUB SERPL-MCNC: 5.5 MG/DL (ref 0.2–1.3)
BILIRUB SERPL-MCNC: 6.2 MG/DL (ref 0.2–1.3)
BILIRUB SERPL-MCNC: 8.4 MG/DL (ref 0.2–1.3)
BILIRUB UR QL STRIP: ABNORMAL
BILIRUB UR QL STRIP: ABNORMAL
BLD GP AB SCN SERPL QL: NORMAL
BLD PROD TYP BPU: NORMAL
BLD UNIT ID BPU: 0
BLOOD BANK CMNT PATIENT-IMP: NORMAL
BLOOD PRODUCT CODE: NORMAL
BPU ID: NORMAL
BUN SERPL-MCNC: 17 MG/DL (ref 7–30)
BUN SERPL-MCNC: 21 MG/DL (ref 7–30)
BUN SERPL-MCNC: 23 MG/DL (ref 7–30)
BUN SERPL-MCNC: 25 MG/DL (ref 7–30)
BUN SERPL-MCNC: 26 MG/DL (ref 7–30)
BUN SERPL-MCNC: 27 MG/DL (ref 7–30)
BUN SERPL-MCNC: 29 MG/DL (ref 7–30)
BUN SERPL-MCNC: 30 MG/DL (ref 7–30)
BUN SERPL-MCNC: 31 MG/DL (ref 7–30)
BUN SERPL-MCNC: 32 MG/DL (ref 7–30)
BUN SERPL-MCNC: 33 MG/DL (ref 7–30)
BUN SERPL-MCNC: 34 MG/DL (ref 7–30)
BUN SERPL-MCNC: 36 MG/DL (ref 7–30)
BUN SERPL-MCNC: 37 MG/DL (ref 7–30)
BUN SERPL-MCNC: 42 MG/DL (ref 7–30)
BUN SERPL-MCNC: 44 MG/DL (ref 7–30)
BUN SERPL-MCNC: 49 MG/DL (ref 7–30)
C DIFF TOX B STL QL: NEGATIVE
CALCIUM SERPL-MCNC: 7.5 MG/DL (ref 8.5–10.1)
CALCIUM SERPL-MCNC: 7.6 MG/DL (ref 8.5–10.1)
CALCIUM SERPL-MCNC: 7.7 MG/DL (ref 8.5–10.1)
CALCIUM SERPL-MCNC: 7.8 MG/DL (ref 8.5–10.1)
CALCIUM SERPL-MCNC: 7.9 MG/DL (ref 8.5–10.1)
CALCIUM SERPL-MCNC: 8 MG/DL (ref 8.5–10.1)
CALCIUM SERPL-MCNC: 8.1 MG/DL (ref 8.5–10.1)
CALCIUM SERPL-MCNC: 8.2 MG/DL (ref 8.5–10.1)
CALCIUM SERPL-MCNC: 8.3 MG/DL (ref 8.5–10.1)
CALCIUM SERPL-MCNC: 8.5 MG/DL (ref 8.5–10.1)
CHLORIDE SERPL-SCNC: 100 MMOL/L (ref 94–109)
CHLORIDE SERPL-SCNC: 101 MMOL/L (ref 94–109)
CHLORIDE SERPL-SCNC: 102 MMOL/L (ref 94–109)
CHLORIDE SERPL-SCNC: 103 MMOL/L (ref 94–109)
CHLORIDE SERPL-SCNC: 104 MMOL/L (ref 94–109)
CHLORIDE SERPL-SCNC: 105 MMOL/L (ref 94–109)
CHLORIDE SERPL-SCNC: 94 MMOL/L (ref 94–109)
CHLORIDE SERPL-SCNC: 96 MMOL/L (ref 94–109)
CHLORIDE SERPL-SCNC: 98 MMOL/L (ref 94–109)
CHOLEST FLD-MCNC: <50 MG/DL
CO2 SERPL-SCNC: 16 MMOL/L (ref 20–32)
CO2 SERPL-SCNC: 17 MMOL/L (ref 20–32)
CO2 SERPL-SCNC: 18 MMOL/L (ref 20–32)
CO2 SERPL-SCNC: 19 MMOL/L (ref 20–32)
CO2 SERPL-SCNC: 21 MMOL/L (ref 20–32)
CO2 SERPL-SCNC: 22 MMOL/L (ref 20–32)
CO2 SERPL-SCNC: 23 MMOL/L (ref 20–32)
CO2 SERPL-SCNC: 24 MMOL/L (ref 20–32)
CO2 SERPL-SCNC: 25 MMOL/L (ref 20–32)
CO2 SERPL-SCNC: 26 MMOL/L (ref 20–32)
CO2 SERPL-SCNC: 27 MMOL/L (ref 20–32)
COLOR FLD: YELLOW
COLOR UR AUTO: ABNORMAL
COLOR UR AUTO: ABNORMAL
COPATH REPORT: NORMAL
CREAT SERPL-MCNC: 0.77 MG/DL (ref 0.52–1.04)
CREAT SERPL-MCNC: 0.85 MG/DL (ref 0.52–1.04)
CREAT SERPL-MCNC: 0.85 MG/DL (ref 0.52–1.04)
CREAT SERPL-MCNC: 0.86 MG/DL (ref 0.52–1.04)
CREAT SERPL-MCNC: 0.86 MG/DL (ref 0.52–1.04)
CREAT SERPL-MCNC: 0.87 MG/DL (ref 0.52–1.04)
CREAT SERPL-MCNC: 0.88 MG/DL (ref 0.52–1.04)
CREAT SERPL-MCNC: 0.91 MG/DL (ref 0.52–1.04)
CREAT SERPL-MCNC: 0.91 MG/DL (ref 0.52–1.04)
CREAT SERPL-MCNC: 0.93 MG/DL (ref 0.52–1.04)
CREAT SERPL-MCNC: 0.94 MG/DL (ref 0.52–1.04)
CREAT SERPL-MCNC: 0.95 MG/DL (ref 0.52–1.04)
CREAT SERPL-MCNC: 1.01 MG/DL (ref 0.52–1.04)
CREAT SERPL-MCNC: 1.02 MG/DL (ref 0.52–1.04)
CREAT SERPL-MCNC: 1.05 MG/DL (ref 0.52–1.04)
CREAT SERPL-MCNC: 1.1 MG/DL (ref 0.52–1.04)
CREAT SERPL-MCNC: 1.11 MG/DL (ref 0.52–1.04)
CREAT SERPL-MCNC: 1.13 MG/DL (ref 0.52–1.04)
CREAT SERPL-MCNC: 1.16 MG/DL (ref 0.52–1.04)
CREAT SERPL-MCNC: 1.27 MG/DL (ref 0.52–1.04)
CREAT SERPL-MCNC: 1.29 MG/DL (ref 0.52–1.04)
CREAT SERPL-MCNC: 1.3 MG/DL (ref 0.52–1.04)
CREAT SERPL-MCNC: 1.34 MG/DL (ref 0.52–1.04)
CREAT SERPL-MCNC: 1.48 MG/DL (ref 0.52–1.04)
CRP SERPL-MCNC: 78 MG/L (ref 0–8)
DIFFERENTIAL METHOD BLD: ABNORMAL
EOSINOPHIL # BLD AUTO: 0 10E9/L (ref 0–0.7)
EOSINOPHIL # BLD AUTO: 0.1 10E9/L (ref 0–0.7)
EOSINOPHIL # BLD AUTO: 0.2 10E9/L (ref 0–0.7)
EOSINOPHIL # BLD AUTO: 0.3 10E9/L (ref 0–0.7)
EOSINOPHIL # BLD AUTO: 0.3 10E9/L (ref 0–0.7)
EOSINOPHIL NFR BLD AUTO: 0 %
EOSINOPHIL NFR BLD AUTO: 0 %
EOSINOPHIL NFR BLD AUTO: 1.5 %
EOSINOPHIL NFR BLD AUTO: 1.7 %
EOSINOPHIL NFR BLD AUTO: 1.8 %
EOSINOPHIL NFR BLD AUTO: 2.2 %
EOSINOPHIL NFR BLD AUTO: 2.6 %
EOSINOPHIL NFR BLD AUTO: 3.3 %
EOSINOPHIL NFR BLD AUTO: 5.6 %
EOSINOPHIL NFR BLD AUTO: 5.7 %
EOSINOPHIL NFR BLD AUTO: 6 %
EOSINOPHIL NFR BLD AUTO: 6.1 %
ERYTHROCYTE [DISTWIDTH] IN BLOOD BY AUTOMATED COUNT: 15.3 % (ref 10–15)
ERYTHROCYTE [DISTWIDTH] IN BLOOD BY AUTOMATED COUNT: 15.4 % (ref 10–15)
ERYTHROCYTE [DISTWIDTH] IN BLOOD BY AUTOMATED COUNT: 15.7 % (ref 10–15)
ERYTHROCYTE [DISTWIDTH] IN BLOOD BY AUTOMATED COUNT: 15.9 % (ref 10–15)
ERYTHROCYTE [DISTWIDTH] IN BLOOD BY AUTOMATED COUNT: 16 % (ref 10–15)
ERYTHROCYTE [DISTWIDTH] IN BLOOD BY AUTOMATED COUNT: 16 % (ref 10–15)
ERYTHROCYTE [DISTWIDTH] IN BLOOD BY AUTOMATED COUNT: 16.1 % (ref 10–15)
ERYTHROCYTE [DISTWIDTH] IN BLOOD BY AUTOMATED COUNT: 16.1 % (ref 10–15)
ERYTHROCYTE [DISTWIDTH] IN BLOOD BY AUTOMATED COUNT: 16.2 % (ref 10–15)
ERYTHROCYTE [DISTWIDTH] IN BLOOD BY AUTOMATED COUNT: 16.3 % (ref 10–15)
ERYTHROCYTE [DISTWIDTH] IN BLOOD BY AUTOMATED COUNT: 16.4 % (ref 10–15)
ERYTHROCYTE [DISTWIDTH] IN BLOOD BY AUTOMATED COUNT: 16.4 % (ref 10–15)
ERYTHROCYTE [DISTWIDTH] IN BLOOD BY AUTOMATED COUNT: 16.5 % (ref 10–15)
ERYTHROCYTE [DISTWIDTH] IN BLOOD BY AUTOMATED COUNT: 16.7 % (ref 10–15)
ERYTHROCYTE [DISTWIDTH] IN BLOOD BY AUTOMATED COUNT: 16.7 % (ref 10–15)
ERYTHROCYTE [DISTWIDTH] IN BLOOD BY AUTOMATED COUNT: 16.9 % (ref 10–15)
ERYTHROCYTE [DISTWIDTH] IN BLOOD BY AUTOMATED COUNT: 17 % (ref 10–15)
ERYTHROCYTE [DISTWIDTH] IN BLOOD BY AUTOMATED COUNT: 17.2 % (ref 10–15)
ERYTHROCYTE [DISTWIDTH] IN BLOOD BY AUTOMATED COUNT: 17.2 % (ref 10–15)
ERYTHROCYTE [DISTWIDTH] IN BLOOD BY AUTOMATED COUNT: 17.3 % (ref 10–15)
ERYTHROCYTE [DISTWIDTH] IN BLOOD BY AUTOMATED COUNT: 17.4 % (ref 10–15)
ERYTHROCYTE [DISTWIDTH] IN BLOOD BY AUTOMATED COUNT: 17.6 % (ref 10–15)
ERYTHROCYTE [DISTWIDTH] IN BLOOD BY AUTOMATED COUNT: 17.7 % (ref 10–15)
FUNGUS SPEC CULT: NORMAL
GFR SERPL CREATININE-BSD FRML MDRD: 34 ML/MIN/1.7M2
GFR SERPL CREATININE-BSD FRML MDRD: 38 ML/MIN/1.7M2
GFR SERPL CREATININE-BSD FRML MDRD: 39 ML/MIN/1.7M2
GFR SERPL CREATININE-BSD FRML MDRD: 40 ML/MIN/1.7M2
GFR SERPL CREATININE-BSD FRML MDRD: 40 ML/MIN/1.7M2
GFR SERPL CREATININE-BSD FRML MDRD: 45 ML/MIN/1.7M2
GFR SERPL CREATININE-BSD FRML MDRD: 46 ML/MIN/1.7M2
GFR SERPL CREATININE-BSD FRML MDRD: 47 ML/MIN/1.7M2
GFR SERPL CREATININE-BSD FRML MDRD: 48 ML/MIN/1.7M2
GFR SERPL CREATININE-BSD FRML MDRD: 50 ML/MIN/1.7M2
GFR SERPL CREATININE-BSD FRML MDRD: 52 ML/MIN/1.7M2
GFR SERPL CREATININE-BSD FRML MDRD: 53 ML/MIN/1.7M2
GFR SERPL CREATININE-BSD FRML MDRD: 56 ML/MIN/1.7M2
GFR SERPL CREATININE-BSD FRML MDRD: 57 ML/MIN/1.7M2
GFR SERPL CREATININE-BSD FRML MDRD: 58 ML/MIN/1.7M2
GFR SERPL CREATININE-BSD FRML MDRD: 59 ML/MIN/1.7M2
GFR SERPL CREATININE-BSD FRML MDRD: 59 ML/MIN/1.7M2
GFR SERPL CREATININE-BSD FRML MDRD: 62 ML/MIN/1.7M2
GFR SERPL CREATININE-BSD FRML MDRD: 63 ML/MIN/1.7M2
GFR SERPL CREATININE-BSD FRML MDRD: 64 ML/MIN/1.7M2
GFR SERPL CREATININE-BSD FRML MDRD: 64 ML/MIN/1.7M2
GFR SERPL CREATININE-BSD FRML MDRD: 72 ML/MIN/1.7M2
GLUCOSE BLDC GLUCOMTR-MCNC: 109 MG/DL (ref 70–99)
GLUCOSE FLD-MCNC: 104 MG/DL
GLUCOSE FLD-MCNC: 111 MG/DL
GLUCOSE FLD-MCNC: 128 MG/DL
GLUCOSE FLD-MCNC: 156 MG/DL
GLUCOSE SERPL-MCNC: 100 MG/DL (ref 70–99)
GLUCOSE SERPL-MCNC: 103 MG/DL (ref 70–99)
GLUCOSE SERPL-MCNC: 103 MG/DL (ref 70–99)
GLUCOSE SERPL-MCNC: 104 MG/DL (ref 70–99)
GLUCOSE SERPL-MCNC: 105 MG/DL (ref 70–99)
GLUCOSE SERPL-MCNC: 108 MG/DL (ref 70–99)
GLUCOSE SERPL-MCNC: 113 MG/DL (ref 70–99)
GLUCOSE SERPL-MCNC: 119 MG/DL (ref 70–99)
GLUCOSE SERPL-MCNC: 119 MG/DL (ref 70–99)
GLUCOSE SERPL-MCNC: 120 MG/DL (ref 70–99)
GLUCOSE SERPL-MCNC: 135 MG/DL (ref 70–99)
GLUCOSE SERPL-MCNC: 144 MG/DL (ref 70–99)
GLUCOSE SERPL-MCNC: 148 MG/DL (ref 70–99)
GLUCOSE SERPL-MCNC: 153 MG/DL (ref 70–99)
GLUCOSE SERPL-MCNC: 165 MG/DL (ref 70–99)
GLUCOSE SERPL-MCNC: 175 MG/DL (ref 70–99)
GLUCOSE SERPL-MCNC: 190 MG/DL (ref 70–99)
GLUCOSE SERPL-MCNC: 231 MG/DL (ref 70–99)
GLUCOSE SERPL-MCNC: 71 MG/DL (ref 70–99)
GLUCOSE SERPL-MCNC: 79 MG/DL (ref 70–99)
GLUCOSE SERPL-MCNC: 84 MG/DL (ref 70–99)
GLUCOSE SERPL-MCNC: 89 MG/DL (ref 70–99)
GLUCOSE SERPL-MCNC: 91 MG/DL (ref 70–99)
GLUCOSE SERPL-MCNC: 96 MG/DL (ref 70–99)
GLUCOSE SERPL-MCNC: 99 MG/DL (ref 70–99)
GLUCOSE UR STRIP-MCNC: NEGATIVE MG/DL
GLUCOSE UR STRIP-MCNC: NEGATIVE MG/DL
GRAM STN SPEC: NORMAL
HCT VFR BLD AUTO: 21 % (ref 35–47)
HCT VFR BLD AUTO: 23.4 % (ref 35–47)
HCT VFR BLD AUTO: 23.9 % (ref 35–47)
HCT VFR BLD AUTO: 24.8 % (ref 35–47)
HCT VFR BLD AUTO: 25.1 % (ref 35–47)
HCT VFR BLD AUTO: 25.6 % (ref 35–47)
HCT VFR BLD AUTO: 25.7 % (ref 35–47)
HCT VFR BLD AUTO: 25.7 % (ref 35–47)
HCT VFR BLD AUTO: 25.9 % (ref 35–47)
HCT VFR BLD AUTO: 26.3 % (ref 35–47)
HCT VFR BLD AUTO: 26.5 % (ref 35–47)
HCT VFR BLD AUTO: 26.6 % (ref 35–47)
HCT VFR BLD AUTO: 26.8 % (ref 35–47)
HCT VFR BLD AUTO: 27.2 % (ref 35–47)
HCT VFR BLD AUTO: 27.4 % (ref 35–47)
HCT VFR BLD AUTO: 27.9 % (ref 35–47)
HCT VFR BLD AUTO: 28 % (ref 35–47)
HCT VFR BLD AUTO: 28.4 % (ref 35–47)
HCT VFR BLD AUTO: 29 % (ref 35–47)
HCT VFR BLD AUTO: 29.2 % (ref 35–47)
HCT VFR BLD AUTO: 31.3 % (ref 35–47)
HCT VFR BLD AUTO: 31.7 % (ref 35–47)
HCT VFR BLD AUTO: 32.1 % (ref 35–47)
HCT VFR BLD AUTO: 32.4 % (ref 35–47)
HCT VFR BLD AUTO: 34.6 % (ref 35–47)
HCV RNA SERPL NAA+PROBE-ACNC: ABNORMAL [IU]/ML
HCV RNA SERPL NAA+PROBE-LOG IU: 4.7 LOG IU/ML
HEMOGLOBIN: 11 G/DL (ref 11.7–15.7)
HGB BLD-MCNC: 10.4 G/DL (ref 11.7–15.7)
HGB BLD-MCNC: 10.5 G/DL (ref 11.7–15.7)
HGB BLD-MCNC: 10.7 G/DL (ref 11.7–15.7)
HGB BLD-MCNC: 10.9 G/DL (ref 11.7–15.7)
HGB BLD-MCNC: 11.8 G/DL (ref 11.7–15.7)
HGB BLD-MCNC: 6.9 G/DL (ref 11.7–15.7)
HGB BLD-MCNC: 8 G/DL (ref 11.7–15.7)
HGB BLD-MCNC: 8 G/DL (ref 11.7–15.7)
HGB BLD-MCNC: 8.3 G/DL (ref 11.7–15.7)
HGB BLD-MCNC: 8.3 G/DL (ref 11.7–15.7)
HGB BLD-MCNC: 8.6 G/DL (ref 11.7–15.7)
HGB BLD-MCNC: 8.7 G/DL (ref 11.7–15.7)
HGB BLD-MCNC: 8.7 G/DL (ref 11.7–15.7)
HGB BLD-MCNC: 8.8 G/DL (ref 11.7–15.7)
HGB BLD-MCNC: 8.9 G/DL (ref 11.7–15.7)
HGB BLD-MCNC: 8.9 G/DL (ref 11.7–15.7)
HGB BLD-MCNC: 9 G/DL (ref 11.7–15.7)
HGB BLD-MCNC: 9.1 G/DL (ref 11.7–15.7)
HGB BLD-MCNC: 9.3 G/DL (ref 11.7–15.7)
HGB BLD-MCNC: 9.4 G/DL (ref 11.7–15.7)
HGB BLD-MCNC: 9.5 G/DL (ref 11.7–15.7)
HGB BLD-MCNC: 9.6 G/DL (ref 11.7–15.7)
HGB BLD-MCNC: 9.7 G/DL (ref 11.7–15.7)
HGB UR QL STRIP: NEGATIVE
HGB UR QL STRIP: NEGATIVE
HYALINE CASTS #/AREA URNS LPF: 36 /LPF (ref 0–2)
HYALINE CASTS #/AREA URNS LPF: 5 /LPF (ref 0–2)
IMM GRANULOCYTES # BLD: 0 10E9/L (ref 0–0.4)
IMM GRANULOCYTES # BLD: 0.1 10E9/L (ref 0–0.4)
IMM GRANULOCYTES NFR BLD: 0 %
IMM GRANULOCYTES NFR BLD: 0.2 %
IMM GRANULOCYTES NFR BLD: 0.3 %
IMM GRANULOCYTES NFR BLD: 0.4 %
IMM GRANULOCYTES NFR BLD: 1.1 %
INR PPP: 1.49 (ref 0.86–1.14)
INR PPP: 1.53 (ref 0.86–1.14)
INR PPP: 1.56 (ref 0.86–1.14)
INR PPP: 1.68 (ref 0.86–1.14)
INR PPP: 1.72 (ref 0.86–1.14)
INR PPP: 1.74 (ref 0.86–1.14)
INR PPP: 1.78 (ref 0.86–1.14)
INR PPP: 1.78 (ref 0.86–1.14)
INR PPP: 1.82 (ref 0.86–1.14)
INR PPP: 1.92 (ref 0.86–1.14)
INR PPP: 1.92 (ref 0.86–1.14)
INR PPP: 1.94 (ref 0.86–1.14)
INR PPP: 1.95 (ref 0.86–1.14)
INR PPP: 1.98 (ref 0.86–1.14)
INR PPP: 1.98 (ref 0.86–1.14)
INR PPP: 1.99 (ref 0.86–1.14)
INR PPP: 2 (ref 0.86–1.14)
INR PPP: 2.04 (ref 0.86–1.14)
INR PPP: 2.04 (ref 0.86–1.14)
INR PPP: 2.05 (ref 0.86–1.14)
INR PPP: 2.06 (ref 0.86–1.14)
INR PPP: 2.08 (ref 0.86–1.14)
INR PPP: 2.21 (ref 0.86–1.14)
INR PPP: 2.29 (ref 0.86–1.14)
INR PPP: 2.41 (ref 0.86–1.14)
INR PPP: 2.57 (ref 0.86–1.14)
INR PPP: 2.89 (ref 0.86–1.14)
INTERPRETATION ECG - MUSE: NORMAL
KETONES UR STRIP-MCNC: 5 MG/DL
KETONES UR STRIP-MCNC: NEGATIVE MG/DL
LACTATE BLD-SCNC: 1 MMOL/L (ref 0.7–2)
LACTATE BLD-SCNC: 1.1 MMOL/L (ref 0.4–1.9)
LACTATE BLD-SCNC: 1.1 MMOL/L (ref 0.7–2)
LACTATE BLD-SCNC: 1.8 MMOL/L (ref 0.7–2)
LACTATE BLD-SCNC: 2.1 MMOL/L (ref 0.4–1.9)
LACTATE BLD-SCNC: 2.1 MMOL/L (ref 0.7–2)
LACTATE BLD-SCNC: 2.3 MMOL/L (ref 0.7–2)
LACTATE BLD-SCNC: 2.4 MMOL/L (ref 0.7–2)
LACTATE BLD-SCNC: 2.8 MMOL/L (ref 0.7–2)
LACTATE BLD-SCNC: 2.8 MMOL/L (ref 0.7–2)
LACTATE BLD-SCNC: 3.4 MMOL/L (ref 0.7–2)
LACTATE BLD-SCNC: 5.6 MMOL/L (ref 0.7–2)
LACTATE BLD-SCNC: 6.5 MMOL/L (ref 0.4–1.9)
LACTATE SERPL-SCNC: 1.6 MMOL/L (ref 0.4–2)
LDH FLD L TO P-CCNC: 36 U/L
LDH FLD L TO P-CCNC: 37 U/L
LDH FLD L TO P-CCNC: 64 U/L
LDH FLD L TO P-CCNC: 64 U/L
LDH SERPL L TO P-CCNC: 162 U/L (ref 81–234)
LDH SERPL L TO P-CCNC: 182 U/L (ref 81–234)
LDH SERPL L TO P-CCNC: 231 U/L (ref 81–234)
LEUKOCYTE ESTERASE UR QL STRIP: ABNORMAL
LEUKOCYTE ESTERASE UR QL STRIP: NEGATIVE
LIPASE SERPL-CCNC: 254 U/L (ref 73–393)
LIPASE SERPL-CCNC: 306 U/L (ref 73–393)
LYMPHOCYTES # BLD AUTO: 0.2 10E9/L (ref 0.8–5.3)
LYMPHOCYTES # BLD AUTO: 0.3 10E9/L (ref 0.8–5.3)
LYMPHOCYTES # BLD AUTO: 0.4 10E9/L (ref 0.8–5.3)
LYMPHOCYTES # BLD AUTO: 0.5 10E9/L (ref 0.8–5.3)
LYMPHOCYTES # BLD AUTO: 0.6 10E9/L (ref 0.8–5.3)
LYMPHOCYTES # BLD AUTO: 0.6 10E9/L (ref 0.8–5.3)
LYMPHOCYTES # BLD AUTO: 0.7 10E9/L (ref 0.8–5.3)
LYMPHOCYTES # BLD AUTO: 0.8 10E9/L (ref 0.8–5.3)
LYMPHOCYTES # BLD AUTO: 0.9 10E9/L (ref 0.8–5.3)
LYMPHOCYTES # BLD AUTO: 0.9 10E9/L (ref 0.8–5.3)
LYMPHOCYTES NFR BLD AUTO: 11 %
LYMPHOCYTES NFR BLD AUTO: 12.8 %
LYMPHOCYTES NFR BLD AUTO: 16.6 %
LYMPHOCYTES NFR BLD AUTO: 17 %
LYMPHOCYTES NFR BLD AUTO: 17.5 %
LYMPHOCYTES NFR BLD AUTO: 17.6 %
LYMPHOCYTES NFR BLD AUTO: 20.7 %
LYMPHOCYTES NFR BLD AUTO: 4.3 %
LYMPHOCYTES NFR BLD AUTO: 4.9 %
LYMPHOCYTES NFR BLD AUTO: 7.4 %
LYMPHOCYTES NFR BLD AUTO: 9.1 %
LYMPHOCYTES NFR BLD AUTO: 9.6 %
LYMPHOCYTES NFR FLD MANUAL: 3 %
LYMPHOCYTES NFR FLD MANUAL: 44 %
LYMPHOCYTES NFR FLD MANUAL: 53 %
LYMPHOCYTES NFR FLD MANUAL: 61 %
LYMPHOCYTES NFR FLD MANUAL: 73 %
LYMPHOCYTES NFR FLD MANUAL: 9 %
Lab: NORMAL
Lab: NORMAL
MAGNESIUM SERPL-MCNC: 2.2 MG/DL (ref 1.6–2.3)
MCH RBC QN AUTO: 32.8 PG (ref 26.5–33)
MCH RBC QN AUTO: 33.1 PG (ref 26.5–33)
MCH RBC QN AUTO: 33.2 PG (ref 26.5–33)
MCH RBC QN AUTO: 33.3 PG (ref 26.5–33)
MCH RBC QN AUTO: 33.3 PG (ref 26.5–33)
MCH RBC QN AUTO: 33.4 PG (ref 26.5–33)
MCH RBC QN AUTO: 33.5 PG (ref 26.5–33)
MCH RBC QN AUTO: 33.5 PG (ref 26.5–33)
MCH RBC QN AUTO: 33.6 PG (ref 26.5–33)
MCH RBC QN AUTO: 33.7 PG (ref 26.5–33)
MCH RBC QN AUTO: 33.8 PG (ref 26.5–33)
MCH RBC QN AUTO: 33.9 PG (ref 26.5–33)
MCH RBC QN AUTO: 34 PG (ref 26.5–33)
MCH RBC QN AUTO: 34.1 PG (ref 26.5–33)
MCH RBC QN AUTO: 34.2 PG (ref 26.5–33)
MCH RBC QN AUTO: 34.3 PG (ref 26.5–33)
MCH RBC QN AUTO: 34.3 PG (ref 26.5–33)
MCH RBC QN AUTO: 34.6 PG (ref 26.5–33)
MCH RBC QN AUTO: 34.7 PG (ref 26.5–33)
MCHC RBC AUTO-ENTMCNC: 31.7 G/DL (ref 31.5–36.5)
MCHC RBC AUTO-ENTMCNC: 32.8 G/DL (ref 31.5–36.5)
MCHC RBC AUTO-ENTMCNC: 32.8 G/DL (ref 31.5–36.5)
MCHC RBC AUTO-ENTMCNC: 32.9 G/DL (ref 31.5–36.5)
MCHC RBC AUTO-ENTMCNC: 33 G/DL (ref 31.5–36.5)
MCHC RBC AUTO-ENTMCNC: 33.1 G/DL (ref 31.5–36.5)
MCHC RBC AUTO-ENTMCNC: 33.2 G/DL (ref 31.5–36.5)
MCHC RBC AUTO-ENTMCNC: 33.2 G/DL (ref 31.5–36.5)
MCHC RBC AUTO-ENTMCNC: 33.3 G/DL (ref 31.5–36.5)
MCHC RBC AUTO-ENTMCNC: 33.5 G/DL (ref 31.5–36.5)
MCHC RBC AUTO-ENTMCNC: 33.6 G/DL (ref 31.5–36.5)
MCHC RBC AUTO-ENTMCNC: 33.8 G/DL (ref 31.5–36.5)
MCHC RBC AUTO-ENTMCNC: 33.9 G/DL (ref 31.5–36.5)
MCHC RBC AUTO-ENTMCNC: 34 G/DL (ref 31.5–36.5)
MCHC RBC AUTO-ENTMCNC: 34.1 G/DL (ref 31.5–36.5)
MCHC RBC AUTO-ENTMCNC: 34.2 G/DL (ref 31.5–36.5)
MCHC RBC AUTO-ENTMCNC: 34.3 G/DL (ref 31.5–36.5)
MCV RBC AUTO: 100 FL (ref 78–100)
MCV RBC AUTO: 101 FL (ref 78–100)
MCV RBC AUTO: 102 FL (ref 78–100)
MCV RBC AUTO: 103 FL (ref 78–100)
MCV RBC AUTO: 105 FL (ref 78–100)
MCV RBC AUTO: 106 FL (ref 78–100)
MCV RBC AUTO: 97 FL (ref 78–100)
MCV RBC AUTO: 97 FL (ref 78–100)
MCV RBC AUTO: 99 FL (ref 78–100)
MCV RBC AUTO: 99 FL (ref 78–100)
MONOCYTES # BLD AUTO: 0.3 10E9/L (ref 0–1.3)
MONOCYTES # BLD AUTO: 0.4 10E9/L (ref 0–1.3)
MONOCYTES # BLD AUTO: 0.4 10E9/L (ref 0–1.3)
MONOCYTES # BLD AUTO: 0.5 10E9/L (ref 0–1.3)
MONOCYTES # BLD AUTO: 0.9 10E9/L (ref 0–1.3)
MONOCYTES NFR BLD AUTO: 10 %
MONOCYTES NFR BLD AUTO: 10.2 %
MONOCYTES NFR BLD AUTO: 12.5 %
MONOCYTES NFR BLD AUTO: 13 %
MONOCYTES NFR BLD AUTO: 18.9 %
MONOCYTES NFR BLD AUTO: 6.3 %
MONOCYTES NFR BLD AUTO: 7.4 %
MONOCYTES NFR BLD AUTO: 7.6 %
MONOCYTES NFR BLD AUTO: 8.9 %
MONOCYTES NFR BLD AUTO: 9.3 %
MONOCYTES NFR BLD AUTO: 9.5 %
MONOCYTES NFR BLD AUTO: 9.8 %
MONOS+MACROS NFR FLD MANUAL: 11 %
MONOS+MACROS NFR FLD MANUAL: 14 %
MONOS+MACROS NFR FLD MANUAL: 4 %
MUCOUS THREADS #/AREA URNS LPF: PRESENT /LPF
MUCOUS THREADS #/AREA URNS LPF: PRESENT /LPF
NEUTROPHILS # BLD AUTO: 2.1 10E9/L (ref 1.6–8.3)
NEUTROPHILS # BLD AUTO: 2.7 10E9/L (ref 1.6–8.3)
NEUTROPHILS # BLD AUTO: 3.2 10E9/L (ref 1.6–8.3)
NEUTROPHILS # BLD AUTO: 3.4 10E9/L (ref 1.6–8.3)
NEUTROPHILS # BLD AUTO: 3.5 10E9/L (ref 1.6–8.3)
NEUTROPHILS # BLD AUTO: 3.5 10E9/L (ref 1.6–8.3)
NEUTROPHILS # BLD AUTO: 3.6 10E9/L (ref 1.6–8.3)
NEUTROPHILS # BLD AUTO: 4 10E9/L (ref 1.6–8.3)
NEUTROPHILS # BLD AUTO: 4.8 10E9/L (ref 1.6–8.3)
NEUTROPHILS # BLD AUTO: 6.9 10E9/L (ref 1.6–8.3)
NEUTROPHILS NFR BLD AUTO: 63.1 %
NEUTROPHILS NFR BLD AUTO: 64.1 %
NEUTROPHILS NFR BLD AUTO: 66.8 %
NEUTROPHILS NFR BLD AUTO: 67.7 %
NEUTROPHILS NFR BLD AUTO: 70.7 %
NEUTROPHILS NFR BLD AUTO: 71.4 %
NEUTROPHILS NFR BLD AUTO: 74 %
NEUTROPHILS NFR BLD AUTO: 75.1 %
NEUTROPHILS NFR BLD AUTO: 78.5 %
NEUTROPHILS NFR BLD AUTO: 79.4 %
NEUTROPHILS NFR BLD AUTO: 87.5 %
NEUTROPHILS NFR BLD AUTO: 88.2 %
NEUTS BAND NFR FLD MANUAL: 10 %
NEUTS BAND NFR FLD MANUAL: 13 %
NEUTS BAND NFR FLD MANUAL: 20 %
NEUTS BAND NFR FLD MANUAL: 29 %
NEUTS BAND NFR FLD MANUAL: 86 %
NEUTS BAND NFR FLD MANUAL: 86 %
NITRATE UR QL: NEGATIVE
NITRATE UR QL: NEGATIVE
NRBC # BLD AUTO: 0 10*3/UL
NRBC BLD AUTO-RTO: 0 /100
NT-PROBNP SERPL-MCNC: 158 PG/ML (ref 0–1800)
NT-PROBNP SERPL-MCNC: 589 PG/ML (ref 0–1800)
NUM BPU REQUESTED: 2
NUM BPU REQUESTED: 3
OTHER CELLS FLD MANUAL: 27 %
OTHER CELLS FLD MANUAL: 27 %
OTHER CELLS FLD MANUAL: 29 %
PH FLD: 7.7 PH
PH UR STRIP: 5 PH (ref 5–7)
PH UR STRIP: 6 PH (ref 5–7)
PLATELET # BLD AUTO: 35 10E9/L (ref 150–450)
PLATELET # BLD AUTO: 44 10E9/L (ref 150–450)
PLATELET # BLD AUTO: 49 10E9/L (ref 150–450)
PLATELET # BLD AUTO: 49 10E9/L (ref 150–450)
PLATELET # BLD AUTO: 56 10E9/L (ref 150–450)
PLATELET # BLD AUTO: 59 10E9/L (ref 150–450)
PLATELET # BLD AUTO: 63 10E9/L (ref 150–450)
PLATELET # BLD AUTO: 63 10E9/L (ref 150–450)
PLATELET # BLD AUTO: 64 10E9/L (ref 150–450)
PLATELET # BLD AUTO: 65 10E9/L (ref 150–450)
PLATELET # BLD AUTO: 66 10E9/L (ref 150–450)
PLATELET # BLD AUTO: 67 10E9/L (ref 150–450)
PLATELET # BLD AUTO: 71 10E9/L (ref 150–450)
PLATELET # BLD AUTO: 72 10E9/L (ref 150–450)
PLATELET # BLD AUTO: 73 10E9/L (ref 150–450)
PLATELET # BLD AUTO: 75 10E9/L (ref 150–450)
PLATELET # BLD AUTO: 75 10E9/L (ref 150–450)
PLATELET # BLD AUTO: 79 10E9/L (ref 150–450)
PLATELET # BLD AUTO: 79 10E9/L (ref 150–450)
PLATELET # BLD AUTO: 82 10E9/L (ref 150–450)
PLATELET # BLD AUTO: 85 10E9/L (ref 150–450)
PLATELET # BLD AUTO: 87 10E9/L (ref 150–450)
PLATELET # BLD AUTO: 87 10E9/L (ref 150–450)
PLATELET # BLD AUTO: 91 10E9/L (ref 150–450)
PLATELET # BLD AUTO: 99 10E9/L (ref 150–450)
POTASSIUM SERPL-SCNC: 3.4 MMOL/L (ref 3.4–5.3)
POTASSIUM SERPL-SCNC: 3.7 MMOL/L (ref 3.4–5.3)
POTASSIUM SERPL-SCNC: 3.8 MMOL/L (ref 3.4–5.3)
POTASSIUM SERPL-SCNC: 3.9 MMOL/L (ref 3.4–5.3)
POTASSIUM SERPL-SCNC: 4 MMOL/L (ref 3.4–5.3)
POTASSIUM SERPL-SCNC: 4.1 MMOL/L (ref 3.4–5.3)
POTASSIUM SERPL-SCNC: 4.2 MMOL/L (ref 3.4–5.3)
POTASSIUM SERPL-SCNC: 4.3 MMOL/L (ref 3.4–5.3)
POTASSIUM SERPL-SCNC: 4.3 MMOL/L (ref 3.4–5.3)
POTASSIUM SERPL-SCNC: 4.4 MMOL/L (ref 3.4–5.3)
POTASSIUM SERPL-SCNC: 4.5 MMOL/L (ref 3.4–5.3)
POTASSIUM SERPL-SCNC: 4.7 MMOL/L (ref 3.4–5.3)
POTASSIUM SERPL-SCNC: 4.7 MMOL/L (ref 3.4–5.3)
POTASSIUM SERPL-SCNC: 4.8 MMOL/L (ref 3.4–5.3)
POTASSIUM SERPL-SCNC: 5 MMOL/L (ref 3.4–5.3)
POTASSIUM SERPL-SCNC: 5.4 MMOL/L (ref 3.4–5.3)
POTASSIUM SERPL-SCNC: 5.5 MMOL/L (ref 3.4–5.3)
POTASSIUM SERPL-SCNC: 5.6 MMOL/L (ref 3.4–5.3)
POTASSIUM SERPL-SCNC: 5.6 MMOL/L (ref 3.4–5.3)
PROCALCITONIN SERPL-MCNC: 0.21 NG/ML
PROT FLD-MCNC: 0.7 G/DL
PROT FLD-MCNC: 0.9 G/DL
PROT SERPL-MCNC: 4.8 G/DL (ref 6.8–8.8)
PROT SERPL-MCNC: 4.8 G/DL (ref 6.8–8.8)
PROT SERPL-MCNC: 5 G/DL (ref 6.8–8.8)
PROT SERPL-MCNC: 5.5 G/DL (ref 6.8–8.8)
PROT SERPL-MCNC: 5.5 G/DL (ref 6.8–8.8)
PROT SERPL-MCNC: 5.6 G/DL (ref 6.8–8.8)
PROT SERPL-MCNC: 5.8 G/DL (ref 6.8–8.8)
PROT SERPL-MCNC: 5.8 G/DL (ref 6.8–8.8)
PROT SERPL-MCNC: 5.9 G/DL (ref 6.8–8.8)
PROT SERPL-MCNC: 6 G/DL (ref 6.8–8.8)
PROT SERPL-MCNC: 6.2 G/DL (ref 6.8–8.8)
PROT SERPL-MCNC: 6.3 G/DL (ref 6.8–8.8)
PROT SERPL-MCNC: 6.5 G/DL (ref 6.8–8.8)
PROT SERPL-MCNC: 6.8 G/DL (ref 6.8–8.8)
PROT SERPL-MCNC: 6.9 G/DL (ref 6.8–8.8)
PROT SERPL-MCNC: 7.5 G/DL (ref 6.8–8.8)
RBC # BLD AUTO: 2.01 10E12/L (ref 3.8–5.2)
RBC # BLD AUTO: 2.33 10E12/L (ref 3.8–5.2)
RBC # BLD AUTO: 2.39 10E12/L (ref 3.8–5.2)
RBC # BLD AUTO: 2.44 10E12/L (ref 3.8–5.2)
RBC # BLD AUTO: 2.55 10E12/L (ref 3.8–5.2)
RBC # BLD AUTO: 2.55 10E12/L (ref 3.8–5.2)
RBC # BLD AUTO: 2.57 10E12/L (ref 3.8–5.2)
RBC # BLD AUTO: 2.58 10E12/L (ref 3.8–5.2)
RBC # BLD AUTO: 2.62 10E12/L (ref 3.8–5.2)
RBC # BLD AUTO: 2.63 10E12/L (ref 3.8–5.2)
RBC # BLD AUTO: 2.65 10E12/L (ref 3.8–5.2)
RBC # BLD AUTO: 2.66 10E12/L (ref 3.8–5.2)
RBC # BLD AUTO: 2.66 10E12/L (ref 3.8–5.2)
RBC # BLD AUTO: 2.68 10E12/L (ref 3.8–5.2)
RBC # BLD AUTO: 2.69 10E12/L (ref 3.8–5.2)
RBC # BLD AUTO: 2.71 10E12/L (ref 3.8–5.2)
RBC # BLD AUTO: 2.75 10E12/L (ref 3.8–5.2)
RBC # BLD AUTO: 2.81 10E12/L (ref 3.8–5.2)
RBC # BLD AUTO: 2.83 10E12/L (ref 3.8–5.2)
RBC # BLD AUTO: 2.87 10E12/L (ref 3.8–5.2)
RBC # BLD AUTO: 3.07 10E12/L (ref 3.8–5.2)
RBC # BLD AUTO: 3.15 10E12/L (ref 3.8–5.2)
RBC # BLD AUTO: 3.2 10E12/L (ref 3.8–5.2)
RBC # BLD AUTO: 3.24 10E12/L (ref 3.8–5.2)
RBC # BLD AUTO: 3.4 10E12/L (ref 3.8–5.2)
RBC # FLD: NORMAL /UL
RBC #/AREA URNS AUTO: 2 /HPF (ref 0–2)
RBC #/AREA URNS AUTO: 9 /HPF (ref 0–2)
SODIUM SERPL-SCNC: 122 MMOL/L (ref 133–144)
SODIUM SERPL-SCNC: 124 MMOL/L (ref 133–144)
SODIUM SERPL-SCNC: 125 MMOL/L (ref 133–144)
SODIUM SERPL-SCNC: 127 MMOL/L (ref 133–144)
SODIUM SERPL-SCNC: 129 MMOL/L (ref 133–144)
SODIUM SERPL-SCNC: 130 MMOL/L (ref 133–144)
SODIUM SERPL-SCNC: 130 MMOL/L (ref 133–144)
SODIUM SERPL-SCNC: 131 MMOL/L (ref 133–144)
SODIUM SERPL-SCNC: 131 MMOL/L (ref 133–144)
SODIUM SERPL-SCNC: 132 MMOL/L (ref 133–144)
SODIUM SERPL-SCNC: 133 MMOL/L (ref 133–144)
SODIUM SERPL-SCNC: 134 MMOL/L (ref 133–144)
SODIUM SERPL-SCNC: 135 MMOL/L (ref 133–144)
SODIUM SERPL-SCNC: 136 MMOL/L (ref 133–144)
SODIUM SERPL-SCNC: 136 MMOL/L (ref 133–144)
SOURCE: ABNORMAL
SOURCE: ABNORMAL
SP GR UR STRIP: 1.02 (ref 1–1.03)
SP GR UR STRIP: 1.02 (ref 1–1.03)
SPECIMEN EXP DATE BLD: NORMAL
SPECIMEN EXP DATE BLD: NORMAL
SPECIMEN SOURCE FLD: NORMAL
SPECIMEN SOURCE: ABNORMAL
SPECIMEN SOURCE: NORMAL
SQUAMOUS #/AREA URNS AUTO: 1 /HPF (ref 0–1)
SQUAMOUS #/AREA URNS AUTO: 1 /HPF (ref 0–1)
TRANS CELLS #/AREA URNS HPF: 2 /HPF (ref 0–1)
TRANS CELLS #/AREA URNS HPF: <1 /HPF (ref 0–1)
TRANSFUSION STATUS PATIENT QL: NORMAL
TRIGL FLD-MCNC: 16 MG/DL
TROPONIN I SERPL-MCNC: <0.015 UG/L (ref 0–0.04)
TROPONIN I SERPL-MCNC: <0.015 UG/L (ref 0–0.04)
UROBILINOGEN UR STRIP-MCNC: 2 MG/DL (ref 0–2)
UROBILINOGEN UR STRIP-MCNC: 2 MG/DL (ref 0–2)
WBC # BLD AUTO: 12.9 10E9/L (ref 4–11)
WBC # BLD AUTO: 2.6 10E9/L (ref 4–11)
WBC # BLD AUTO: 2.7 10E9/L (ref 4–11)
WBC # BLD AUTO: 2.8 10E9/L (ref 4–11)
WBC # BLD AUTO: 3.2 10E9/L (ref 4–11)
WBC # BLD AUTO: 3.3 10E9/L (ref 4–11)
WBC # BLD AUTO: 3.4 10E9/L (ref 4–11)
WBC # BLD AUTO: 3.6 10E9/L (ref 4–11)
WBC # BLD AUTO: 3.7 10E9/L (ref 4–11)
WBC # BLD AUTO: 3.7 10E9/L (ref 4–11)
WBC # BLD AUTO: 3.9 10E9/L (ref 4–11)
WBC # BLD AUTO: 3.9 10E9/L (ref 4–11)
WBC # BLD AUTO: 4 10E9/L (ref 4–11)
WBC # BLD AUTO: 4 10E9/L (ref 4–11)
WBC # BLD AUTO: 4.1 10E9/L (ref 4–11)
WBC # BLD AUTO: 4.3 10E9/L (ref 4–11)
WBC # BLD AUTO: 4.3 10E9/L (ref 4–11)
WBC # BLD AUTO: 4.5 10E9/L (ref 4–11)
WBC # BLD AUTO: 5 10E9/L (ref 4–11)
WBC # BLD AUTO: 5.1 10E9/L (ref 4–11)
WBC # BLD AUTO: 5.1 10E9/L (ref 4–11)
WBC # BLD AUTO: 5.2 10E9/L (ref 4–11)
WBC # BLD AUTO: 5.2 10E9/L (ref 4–11)
WBC # BLD AUTO: 6.1 10E9/L (ref 4–11)
WBC # BLD AUTO: 7.8 10E9/L (ref 4–11)
WBC # FLD AUTO: 103 /UL
WBC # FLD AUTO: 119 /UL
WBC # FLD AUTO: 133 /UL
WBC # FLD AUTO: 3648 /UL
WBC # FLD AUTO: 97 /UL
WBC # FLD AUTO: NORMAL /UL
WBC #/AREA URNS AUTO: 11 /HPF (ref 0–5)
WBC #/AREA URNS AUTO: 9 /HPF (ref 0–5)

## 2018-01-01 PROCEDURE — C1769 GUIDE WIRE: HCPCS

## 2018-01-01 PROCEDURE — 85018 HEMOGLOBIN: CPT | Mod: 91 | Performed by: STUDENT IN AN ORGANIZED HEALTH CARE EDUCATION/TRAINING PROGRAM

## 2018-01-01 PROCEDURE — 36415 COLL VENOUS BLD VENIPUNCTURE: CPT | Performed by: INTERNAL MEDICINE

## 2018-01-01 PROCEDURE — 80048 BASIC METABOLIC PNL TOTAL CA: CPT | Performed by: STUDENT IN AN ORGANIZED HEALTH CARE EDUCATION/TRAINING PROGRAM

## 2018-01-01 PROCEDURE — 93010 ELECTROCARDIOGRAM REPORT: CPT | Mod: Z6 | Performed by: EMERGENCY MEDICINE

## 2018-01-01 PROCEDURE — 12000001 ZZH R&B MED SURG/OB UMMC

## 2018-01-01 PROCEDURE — 84155 ASSAY OF PROTEIN SERUM: CPT | Performed by: STUDENT IN AN ORGANIZED HEALTH CARE EDUCATION/TRAINING PROGRAM

## 2018-01-01 PROCEDURE — 97165 OT EVAL LOW COMPLEX 30 MIN: CPT | Mod: GO | Performed by: OCCUPATIONAL THERAPIST

## 2018-01-01 PROCEDURE — 82042 OTHER SOURCE ALBUMIN QUAN EA: CPT | Performed by: INTERNAL MEDICINE

## 2018-01-01 PROCEDURE — 85025 COMPLETE CBC W/AUTO DIFF WBC: CPT | Performed by: EMERGENCY MEDICINE

## 2018-01-01 PROCEDURE — 85610 PROTHROMBIN TIME: CPT | Performed by: FAMILY MEDICINE

## 2018-01-01 PROCEDURE — 25000132 ZZH RX MED GY IP 250 OP 250 PS 637: Performed by: STUDENT IN AN ORGANIZED HEALTH CARE EDUCATION/TRAINING PROGRAM

## 2018-01-01 PROCEDURE — 93005 ELECTROCARDIOGRAM TRACING: CPT

## 2018-01-01 PROCEDURE — 80048 BASIC METABOLIC PNL TOTAL CA: CPT | Performed by: INTERNAL MEDICINE

## 2018-01-01 PROCEDURE — G0463 HOSPITAL OUTPT CLINIC VISIT: HCPCS | Mod: ZF

## 2018-01-01 PROCEDURE — 36415 COLL VENOUS BLD VENIPUNCTURE: CPT | Performed by: STUDENT IN AN ORGANIZED HEALTH CARE EDUCATION/TRAINING PROGRAM

## 2018-01-01 PROCEDURE — 99233 SBSQ HOSP IP/OBS HIGH 50: CPT | Mod: GC | Performed by: INTERNAL MEDICINE

## 2018-01-01 PROCEDURE — 40000133 ZZH STATISTIC OT WARD VISIT: Performed by: OCCUPATIONAL THERAPIST

## 2018-01-01 PROCEDURE — 80048 BASIC METABOLIC PNL TOTAL CA: CPT | Performed by: EMERGENCY MEDICINE

## 2018-01-01 PROCEDURE — 83605 ASSAY OF LACTIC ACID: CPT | Performed by: STUDENT IN AN ORGANIZED HEALTH CARE EDUCATION/TRAINING PROGRAM

## 2018-01-01 PROCEDURE — 40000166 ZZH STATISTIC PP CARE STAGE 1

## 2018-01-01 PROCEDURE — 83615 LACTATE (LD) (LDH) ENZYME: CPT | Performed by: EMERGENCY MEDICINE

## 2018-01-01 PROCEDURE — 83605 ASSAY OF LACTIC ACID: CPT | Performed by: FAMILY MEDICINE

## 2018-01-01 PROCEDURE — 25000125 ZZHC RX 250: Performed by: STUDENT IN AN ORGANIZED HEALTH CARE EDUCATION/TRAINING PROGRAM

## 2018-01-01 PROCEDURE — 87205 SMEAR GRAM STAIN: CPT | Performed by: INTERNAL MEDICINE

## 2018-01-01 PROCEDURE — P9016 RBC LEUKOCYTES REDUCED: HCPCS | Performed by: STUDENT IN AN ORGANIZED HEALTH CARE EDUCATION/TRAINING PROGRAM

## 2018-01-01 PROCEDURE — 82945 GLUCOSE OTHER FLUID: CPT | Performed by: FAMILY MEDICINE

## 2018-01-01 PROCEDURE — 85025 COMPLETE CBC W/AUTO DIFF WBC: CPT | Performed by: STUDENT IN AN ORGANIZED HEALTH CARE EDUCATION/TRAINING PROGRAM

## 2018-01-01 PROCEDURE — P9047 ALBUMIN (HUMAN), 25%, 50ML: HCPCS | Mod: ZF | Performed by: INTERNAL MEDICINE

## 2018-01-01 PROCEDURE — 81001 URINALYSIS AUTO W/SCOPE: CPT | Performed by: STUDENT IN AN ORGANIZED HEALTH CARE EDUCATION/TRAINING PROGRAM

## 2018-01-01 PROCEDURE — 25000125 ZZHC RX 250: Mod: ZF | Performed by: INTERNAL MEDICINE

## 2018-01-01 PROCEDURE — 85018 HEMOGLOBIN: CPT | Performed by: INTERNAL MEDICINE

## 2018-01-01 PROCEDURE — C1729 CATH, DRAINAGE: HCPCS

## 2018-01-01 PROCEDURE — 49082 ABD PARACENTESIS: CPT

## 2018-01-01 PROCEDURE — 85610 PROTHROMBIN TIME: CPT | Performed by: INTERNAL MEDICINE

## 2018-01-01 PROCEDURE — T1013 SIGN LANG/ORAL INTERPRETER: HCPCS | Mod: U3

## 2018-01-01 PROCEDURE — 99285 EMERGENCY DEPT VISIT HI MDM: CPT | Mod: 25

## 2018-01-01 PROCEDURE — 49083 ABD PARACENTESIS W/IMAGING: CPT | Performed by: ORTHOPAEDIC SURGERY

## 2018-01-01 PROCEDURE — 84295 ASSAY OF SERUM SODIUM: CPT | Performed by: STUDENT IN AN ORGANIZED HEALTH CARE EDUCATION/TRAINING PROGRAM

## 2018-01-01 PROCEDURE — 94640 AIRWAY INHALATION TREATMENT: CPT | Mod: 76

## 2018-01-01 PROCEDURE — 27211039 ZZH NEEDLE CR2

## 2018-01-01 PROCEDURE — 97161 PT EVAL LOW COMPLEX 20 MIN: CPT | Mod: GP

## 2018-01-01 PROCEDURE — 83615 LACTATE (LD) (LDH) ENZYME: CPT | Performed by: FAMILY MEDICINE

## 2018-01-01 PROCEDURE — 25000128 H RX IP 250 OP 636: Performed by: STUDENT IN AN ORGANIZED HEALTH CARE EDUCATION/TRAINING PROGRAM

## 2018-01-01 PROCEDURE — 76604 US EXAM CHEST: CPT

## 2018-01-01 PROCEDURE — 32555 ASPIRATE PLEURA W/ IMAGING: CPT

## 2018-01-01 PROCEDURE — 85730 THROMBOPLASTIN TIME PARTIAL: CPT | Performed by: FAMILY MEDICINE

## 2018-01-01 PROCEDURE — 40000275 ZZH STATISTIC RCP TIME EA 10 MIN

## 2018-01-01 PROCEDURE — 25000125 ZZHC RX 250: Performed by: FAMILY MEDICINE

## 2018-01-01 PROCEDURE — 89051 BODY FLUID CELL COUNT: CPT | Performed by: FAMILY MEDICINE

## 2018-01-01 PROCEDURE — 99221 1ST HOSP IP/OBS SF/LOW 40: CPT | Performed by: INTERNAL MEDICINE

## 2018-01-01 PROCEDURE — 27210903 IR THORACENTESIS

## 2018-01-01 PROCEDURE — 85610 PROTHROMBIN TIME: CPT | Performed by: EMERGENCY MEDICINE

## 2018-01-01 PROCEDURE — 71046 X-RAY EXAM CHEST 2 VIEWS: CPT

## 2018-01-01 PROCEDURE — 32555 ASPIRATE PLEURA W/ IMAGING: CPT | Performed by: ORTHOPAEDIC SURGERY

## 2018-01-01 PROCEDURE — 36415 COLL VENOUS BLD VENIPUNCTURE: CPT | Performed by: FAMILY MEDICINE

## 2018-01-01 PROCEDURE — 88305 TISSUE EXAM BY PATHOLOGIST: CPT | Performed by: INTERNAL MEDICINE

## 2018-01-01 PROCEDURE — 25000132 ZZH RX MED GY IP 250 OP 250 PS 637: Performed by: FAMILY MEDICINE

## 2018-01-01 PROCEDURE — 25000128 H RX IP 250 OP 636: Performed by: EMERGENCY MEDICINE

## 2018-01-01 PROCEDURE — 93010 ELECTROCARDIOGRAM REPORT: CPT | Mod: Z6 | Performed by: FAMILY MEDICINE

## 2018-01-01 PROCEDURE — 12000025 ZZH R&B TRANSPLANT INTERMEDIATE

## 2018-01-01 PROCEDURE — 40000141 ZZH STATISTIC PERIPHERAL IV START W/O US GUIDANCE: Mod: ZF

## 2018-01-01 PROCEDURE — 27210903 ZZH KIT CR5

## 2018-01-01 PROCEDURE — 20000002 ZZH R&B BMT INTERMEDIATE

## 2018-01-01 PROCEDURE — 36415 COLL VENOUS BLD VENIPUNCTURE: CPT | Performed by: HOSPITALIST

## 2018-01-01 PROCEDURE — 99233 SBSQ HOSP IP/OBS HIGH 50: CPT | Mod: GC | Performed by: HOSPITALIST

## 2018-01-01 PROCEDURE — 25000132 ZZH RX MED GY IP 250 OP 250 PS 637: Performed by: EMERGENCY MEDICINE

## 2018-01-01 PROCEDURE — 00000102 ZZHCL STATISTIC CYTO WRIGHT STAIN TC: Performed by: INTERNAL MEDICINE

## 2018-01-01 PROCEDURE — 83690 ASSAY OF LIPASE: CPT | Performed by: FAMILY MEDICINE

## 2018-01-01 PROCEDURE — 99285 EMERGENCY DEPT VISIT HI MDM: CPT | Mod: Z6 | Performed by: EMERGENCY MEDICINE

## 2018-01-01 PROCEDURE — 97165 OT EVAL LOW COMPLEX 30 MIN: CPT | Mod: GO

## 2018-01-01 PROCEDURE — 82945 GLUCOSE OTHER FLUID: CPT | Performed by: INTERNAL MEDICINE

## 2018-01-01 PROCEDURE — 84157 ASSAY OF PROTEIN OTHER: CPT | Performed by: FAMILY MEDICINE

## 2018-01-01 PROCEDURE — 99285 EMERGENCY DEPT VISIT HI MDM: CPT | Mod: 25 | Performed by: FAMILY MEDICINE

## 2018-01-01 PROCEDURE — 00000155 ZZHCL STATISTIC H-CELL BLOCK W/STAIN: Performed by: INTERNAL MEDICINE

## 2018-01-01 PROCEDURE — 85027 COMPLETE CBC AUTOMATED: CPT | Performed by: RADIOLOGY

## 2018-01-01 PROCEDURE — 86901 BLOOD TYPING SEROLOGIC RH(D): CPT | Performed by: STUDENT IN AN ORGANIZED HEALTH CARE EDUCATION/TRAINING PROGRAM

## 2018-01-01 PROCEDURE — 27210732 ZZH ACCESSORY CR1

## 2018-01-01 PROCEDURE — 96374 THER/PROPH/DIAG INJ IV PUSH: CPT

## 2018-01-01 PROCEDURE — 83605 ASSAY OF LACTIC ACID: CPT | Performed by: EMERGENCY MEDICINE

## 2018-01-01 PROCEDURE — P9047 ALBUMIN (HUMAN), 25%, 50ML: HCPCS | Performed by: STUDENT IN AN ORGANIZED HEALTH CARE EDUCATION/TRAINING PROGRAM

## 2018-01-01 PROCEDURE — 25000125 ZZHC RX 250: Performed by: ORTHOPAEDIC SURGERY

## 2018-01-01 PROCEDURE — 86850 RBC ANTIBODY SCREEN: CPT | Performed by: STUDENT IN AN ORGANIZED HEALTH CARE EDUCATION/TRAINING PROGRAM

## 2018-01-01 PROCEDURE — 87070 CULTURE OTHR SPECIMN AEROBIC: CPT | Performed by: FAMILY MEDICINE

## 2018-01-01 PROCEDURE — 49083 ABD PARACENTESIS W/IMAGING: CPT | Performed by: INTERNAL MEDICINE

## 2018-01-01 PROCEDURE — 25000128 H RX IP 250 OP 636: Mod: ZF | Performed by: INTERNAL MEDICINE

## 2018-01-01 PROCEDURE — 27210190 US PARACENTESIS

## 2018-01-01 PROCEDURE — 0W9B3ZZ DRAINAGE OF LEFT PLEURAL CAVITY, PERCUTANEOUS APPROACH: ICD-10-PCS | Performed by: FAMILY MEDICINE

## 2018-01-01 PROCEDURE — 80053 COMPREHEN METABOLIC PANEL: CPT | Performed by: INTERNAL MEDICINE

## 2018-01-01 PROCEDURE — 85027 COMPLETE CBC AUTOMATED: CPT | Performed by: NURSE PRACTITIONER

## 2018-01-01 PROCEDURE — 74183 MRI ABD W/O CNTR FLWD CNTR: CPT

## 2018-01-01 PROCEDURE — 82150 ASSAY OF AMYLASE: CPT | Performed by: EMERGENCY MEDICINE

## 2018-01-01 PROCEDURE — 25000128 H RX IP 250 OP 636: Performed by: FAMILY MEDICINE

## 2018-01-01 PROCEDURE — 0W9G3ZZ DRAINAGE OF PERITONEAL CAVITY, PERCUTANEOUS APPROACH: ICD-10-PCS | Performed by: INTERNAL MEDICINE

## 2018-01-01 PROCEDURE — 25000132 ZZH RX MED GY IP 250 OP 250 PS 637: Performed by: INTERNAL MEDICINE

## 2018-01-01 PROCEDURE — 89051 BODY FLUID CELL COUNT: CPT | Performed by: INTERNAL MEDICINE

## 2018-01-01 PROCEDURE — 80053 COMPREHEN METABOLIC PANEL: CPT | Performed by: HOSPITALIST

## 2018-01-01 PROCEDURE — 93005 ELECTROCARDIOGRAM TRACING: CPT | Performed by: FAMILY MEDICINE

## 2018-01-01 PROCEDURE — 83615 LACTATE (LD) (LDH) ENZYME: CPT | Performed by: STUDENT IN AN ORGANIZED HEALTH CARE EDUCATION/TRAINING PROGRAM

## 2018-01-01 PROCEDURE — 0W993ZZ DRAINAGE OF RIGHT PLEURAL CAVITY, PERCUTANEOUS APPROACH: ICD-10-PCS | Performed by: INTERNAL MEDICINE

## 2018-01-01 PROCEDURE — 0W993ZZ DRAINAGE OF RIGHT PLEURAL CAVITY, PERCUTANEOUS APPROACH: ICD-10-PCS | Performed by: RADIOLOGY

## 2018-01-01 PROCEDURE — 00000146 ZZHCL STATISTIC GLUCOSE BY METER IP

## 2018-01-01 PROCEDURE — 85610 PROTHROMBIN TIME: CPT | Performed by: HOSPITALIST

## 2018-01-01 PROCEDURE — 71045 X-RAY EXAM CHEST 1 VIEW: CPT

## 2018-01-01 PROCEDURE — 84520 ASSAY OF UREA NITROGEN: CPT | Performed by: STUDENT IN AN ORGANIZED HEALTH CARE EDUCATION/TRAINING PROGRAM

## 2018-01-01 PROCEDURE — 49083 ABD PARACENTESIS W/IMAGING: CPT

## 2018-01-01 PROCEDURE — 82945 GLUCOSE OTHER FLUID: CPT | Performed by: EMERGENCY MEDICINE

## 2018-01-01 PROCEDURE — 85025 COMPLETE CBC W/AUTO DIFF WBC: CPT | Performed by: INTERNAL MEDICINE

## 2018-01-01 PROCEDURE — 27210995 ZZH RX 272

## 2018-01-01 PROCEDURE — 36415 COLL VENOUS BLD VENIPUNCTURE: CPT | Performed by: EMERGENCY MEDICINE

## 2018-01-01 PROCEDURE — 85018 HEMOGLOBIN: CPT | Performed by: STUDENT IN AN ORGANIZED HEALTH CARE EDUCATION/TRAINING PROGRAM

## 2018-01-01 PROCEDURE — 25000125 ZZHC RX 250: Performed by: INTERNAL MEDICINE

## 2018-01-01 PROCEDURE — 85018 HEMOGLOBIN: CPT | Performed by: EMERGENCY MEDICINE

## 2018-01-01 PROCEDURE — 84478 ASSAY OF TRIGLYCERIDES: CPT | Performed by: FAMILY MEDICINE

## 2018-01-01 PROCEDURE — 87186 SC STD MICRODIL/AGAR DIL: CPT | Performed by: EMERGENCY MEDICINE

## 2018-01-01 PROCEDURE — P9059 PLASMA, FRZ BETWEEN 8-24HOUR: HCPCS | Performed by: STUDENT IN AN ORGANIZED HEALTH CARE EDUCATION/TRAINING PROGRAM

## 2018-01-01 PROCEDURE — 80053 COMPREHEN METABOLIC PANEL: CPT | Performed by: EMERGENCY MEDICINE

## 2018-01-01 PROCEDURE — 99233 SBSQ HOSP IP/OBS HIGH 50: CPT | Performed by: INTERNAL MEDICINE

## 2018-01-01 PROCEDURE — 87070 CULTURE OTHR SPECIMN AEROBIC: CPT | Performed by: INTERNAL MEDICINE

## 2018-01-01 PROCEDURE — 94640 AIRWAY INHALATION TREATMENT: CPT

## 2018-01-01 PROCEDURE — 93306 TTE W/DOPPLER COMPLETE: CPT

## 2018-01-01 PROCEDURE — 80076 HEPATIC FUNCTION PANEL: CPT | Performed by: INTERNAL MEDICINE

## 2018-01-01 PROCEDURE — 82150 ASSAY OF AMYLASE: CPT | Performed by: FAMILY MEDICINE

## 2018-01-01 PROCEDURE — 85049 AUTOMATED PLATELET COUNT: CPT | Performed by: STUDENT IN AN ORGANIZED HEALTH CARE EDUCATION/TRAINING PROGRAM

## 2018-01-01 PROCEDURE — 40000065 ZZH STATISTIC EKG NON-CHARGEABLE: Performed by: FAMILY MEDICINE

## 2018-01-01 PROCEDURE — 85610 PROTHROMBIN TIME: CPT | Performed by: STUDENT IN AN ORGANIZED HEALTH CARE EDUCATION/TRAINING PROGRAM

## 2018-01-01 PROCEDURE — 99285 EMERGENCY DEPT VISIT HI MDM: CPT | Mod: Z6 | Performed by: FAMILY MEDICINE

## 2018-01-01 PROCEDURE — 89051 BODY FLUID CELL COUNT: CPT | Performed by: STUDENT IN AN ORGANIZED HEALTH CARE EDUCATION/TRAINING PROGRAM

## 2018-01-01 PROCEDURE — 99285 EMERGENCY DEPT VISIT HI MDM: CPT | Mod: 25 | Performed by: EMERGENCY MEDICINE

## 2018-01-01 PROCEDURE — 32555 ASPIRATE PLEURA W/ IMAGING: CPT | Performed by: INTERNAL MEDICINE

## 2018-01-01 PROCEDURE — 85027 COMPLETE CBC AUTOMATED: CPT | Performed by: INTERNAL MEDICINE

## 2018-01-01 PROCEDURE — 82042 OTHER SOURCE ALBUMIN QUAN EA: CPT | Performed by: FAMILY MEDICINE

## 2018-01-01 PROCEDURE — 87205 SMEAR GRAM STAIN: CPT | Performed by: EMERGENCY MEDICINE

## 2018-01-01 PROCEDURE — 85027 COMPLETE CBC AUTOMATED: CPT | Performed by: HOSPITALIST

## 2018-01-01 PROCEDURE — 99233 SBSQ HOSP IP/OBS HIGH 50: CPT | Performed by: HOSPITALIST

## 2018-01-01 PROCEDURE — 87493 C DIFF AMPLIFIED PROBE: CPT | Performed by: INTERNAL MEDICINE

## 2018-01-01 PROCEDURE — 84484 ASSAY OF TROPONIN QUANT: CPT | Performed by: FAMILY MEDICINE

## 2018-01-01 PROCEDURE — 83735 ASSAY OF MAGNESIUM: CPT | Performed by: STUDENT IN AN ORGANIZED HEALTH CARE EDUCATION/TRAINING PROGRAM

## 2018-01-01 PROCEDURE — 25000125 ZZHC RX 250: Performed by: RADIOLOGY

## 2018-01-01 PROCEDURE — 85610 PROTHROMBIN TIME: CPT | Performed by: RADIOLOGY

## 2018-01-01 PROCEDURE — 88112 CYTOPATH CELL ENHANCE TECH: CPT | Performed by: FAMILY MEDICINE

## 2018-01-01 PROCEDURE — 40000556 ZZH STATISTIC PERIPHERAL IV START W US GUIDANCE

## 2018-01-01 PROCEDURE — A9585 GADOBUTROL INJECTION: HCPCS | Performed by: RADIOLOGY

## 2018-01-01 PROCEDURE — 80053 COMPREHEN METABOLIC PANEL: CPT | Performed by: STUDENT IN AN ORGANIZED HEALTH CARE EDUCATION/TRAINING PROGRAM

## 2018-01-01 PROCEDURE — 99220 ZZC INITIAL OBSERVATION CARE,LEVL III: CPT | Mod: AI | Performed by: INTERNAL MEDICINE

## 2018-01-01 PROCEDURE — 89051 BODY FLUID CELL COUNT: CPT | Performed by: EMERGENCY MEDICINE

## 2018-01-01 PROCEDURE — 81003 URINALYSIS AUTO W/O SCOPE: CPT | Performed by: EMERGENCY MEDICINE

## 2018-01-01 PROCEDURE — 49082 ABD PARACENTESIS: CPT | Mod: Z6 | Performed by: EMERGENCY MEDICINE

## 2018-01-01 PROCEDURE — 85018 HEMOGLOBIN: CPT | Performed by: FAMILY MEDICINE

## 2018-01-01 PROCEDURE — 83880 ASSAY OF NATRIURETIC PEPTIDE: CPT | Performed by: FAMILY MEDICINE

## 2018-01-01 PROCEDURE — 97530 THERAPEUTIC ACTIVITIES: CPT | Mod: GP

## 2018-01-01 PROCEDURE — 85027 COMPLETE CBC AUTOMATED: CPT | Performed by: STUDENT IN AN ORGANIZED HEALTH CARE EDUCATION/TRAINING PROGRAM

## 2018-01-01 PROCEDURE — 97535 SELF CARE MNGMENT TRAINING: CPT | Mod: GO | Performed by: OCCUPATIONAL THERAPIST

## 2018-01-01 PROCEDURE — 87075 CULTR BACTERIA EXCEPT BLOOD: CPT | Performed by: FAMILY MEDICINE

## 2018-01-01 PROCEDURE — 87040 BLOOD CULTURE FOR BACTERIA: CPT | Performed by: EMERGENCY MEDICINE

## 2018-01-01 PROCEDURE — 74177 CT ABD & PELVIS W/CONTRAST: CPT

## 2018-01-01 PROCEDURE — 82565 ASSAY OF CREATININE: CPT | Performed by: STUDENT IN AN ORGANIZED HEALTH CARE EDUCATION/TRAINING PROGRAM

## 2018-01-01 PROCEDURE — 25000128 H RX IP 250 OP 636: Performed by: RADIOLOGY

## 2018-01-01 PROCEDURE — 0W9G3ZZ DRAINAGE OF PERITONEAL CAVITY, PERCUTANEOUS APPROACH: ICD-10-PCS | Performed by: STUDENT IN AN ORGANIZED HEALTH CARE EDUCATION/TRAINING PROGRAM

## 2018-01-01 PROCEDURE — 80053 COMPREHEN METABOLIC PANEL: CPT | Performed by: FAMILY MEDICINE

## 2018-01-01 PROCEDURE — 0W9G3ZZ DRAINAGE OF PERITONEAL CAVITY, PERCUTANEOUS APPROACH: ICD-10-PCS | Performed by: EMERGENCY MEDICINE

## 2018-01-01 PROCEDURE — 88305 TISSUE EXAM BY PATHOLOGIST: CPT | Performed by: FAMILY MEDICINE

## 2018-01-01 PROCEDURE — 40000167 ZZH STATISTIC PP CARE STAGE 2

## 2018-01-01 PROCEDURE — 87102 FUNGUS ISOLATION CULTURE: CPT | Performed by: FAMILY MEDICINE

## 2018-01-01 PROCEDURE — 83615 LACTATE (LD) (LDH) ENZYME: CPT | Performed by: INTERNAL MEDICINE

## 2018-01-01 PROCEDURE — 99291 CRITICAL CARE FIRST HOUR: CPT | Mod: Z6 | Performed by: EMERGENCY MEDICINE

## 2018-01-01 PROCEDURE — 87205 SMEAR GRAM STAIN: CPT | Performed by: FAMILY MEDICINE

## 2018-01-01 PROCEDURE — 25000132 ZZH RX MED GY IP 250 OP 250 PS 637: Performed by: MARRIAGE & FAMILY THERAPIST

## 2018-01-01 PROCEDURE — 40000133 ZZH STATISTIC OT WARD VISIT

## 2018-01-01 PROCEDURE — 71046 X-RAY EXAM CHEST 2 VIEWS: CPT | Mod: XU

## 2018-01-01 PROCEDURE — 85027 COMPLETE CBC AUTOMATED: CPT | Performed by: EMERGENCY MEDICINE

## 2018-01-01 PROCEDURE — 87040 BLOOD CULTURE FOR BACTERIA: CPT | Performed by: INTERNAL MEDICINE

## 2018-01-01 PROCEDURE — 27210995 ZZH RX 272: Performed by: RADIOLOGY

## 2018-01-01 PROCEDURE — 96361 HYDRATE IV INFUSION ADD-ON: CPT | Mod: 59

## 2018-01-01 PROCEDURE — 83690 ASSAY OF LIPASE: CPT | Performed by: EMERGENCY MEDICINE

## 2018-01-01 PROCEDURE — 83880 ASSAY OF NATRIURETIC PEPTIDE: CPT | Performed by: STUDENT IN AN ORGANIZED HEALTH CARE EDUCATION/TRAINING PROGRAM

## 2018-01-01 PROCEDURE — 82040 ASSAY OF SERUM ALBUMIN: CPT | Performed by: FAMILY MEDICINE

## 2018-01-01 PROCEDURE — 49083 ABD PARACENTESIS W/IMAGING: CPT | Performed by: STUDENT IN AN ORGANIZED HEALTH CARE EDUCATION/TRAINING PROGRAM

## 2018-01-01 PROCEDURE — 96365 THER/PROPH/DIAG IV INF INIT: CPT

## 2018-01-01 PROCEDURE — 87086 URINE CULTURE/COLONY COUNT: CPT | Performed by: FAMILY MEDICINE

## 2018-01-01 PROCEDURE — 82465 ASSAY BLD/SERUM CHOLESTEROL: CPT | Performed by: FAMILY MEDICINE

## 2018-01-01 PROCEDURE — 86923 COMPATIBILITY TEST ELECTRIC: CPT | Performed by: STUDENT IN AN ORGANIZED HEALTH CARE EDUCATION/TRAINING PROGRAM

## 2018-01-01 PROCEDURE — 84157 ASSAY OF PROTEIN OTHER: CPT | Performed by: INTERNAL MEDICINE

## 2018-01-01 PROCEDURE — 87070 CULTURE OTHR SPECIMN AEROBIC: CPT | Performed by: EMERGENCY MEDICINE

## 2018-01-01 PROCEDURE — 88112 CYTOPATH CELL ENHANCE TECH: CPT | Performed by: INTERNAL MEDICINE

## 2018-01-01 PROCEDURE — 82140 ASSAY OF AMMONIA: CPT | Performed by: EMERGENCY MEDICINE

## 2018-01-01 PROCEDURE — G0463 HOSPITAL OUTPT CLINIC VISIT: HCPCS | Mod: 25

## 2018-01-01 PROCEDURE — 74176 CT ABD & PELVIS W/O CONTRAST: CPT

## 2018-01-01 PROCEDURE — 00000102 ZZHCL STATISTIC CYTO WRIGHT STAIN TC: Performed by: FAMILY MEDICINE

## 2018-01-01 PROCEDURE — 40000193 ZZH STATISTIC PT WARD VISIT

## 2018-01-01 PROCEDURE — 83986 ASSAY PH BODY FLUID NOS: CPT | Performed by: FAMILY MEDICINE

## 2018-01-01 PROCEDURE — 12000008 ZZH R&B INTERMEDIATE UMMC

## 2018-01-01 PROCEDURE — 86140 C-REACTIVE PROTEIN: CPT | Performed by: STUDENT IN AN ORGANIZED HEALTH CARE EDUCATION/TRAINING PROGRAM

## 2018-01-01 PROCEDURE — 84484 ASSAY OF TROPONIN QUANT: CPT | Performed by: EMERGENCY MEDICINE

## 2018-01-01 PROCEDURE — 99223 1ST HOSP IP/OBS HIGH 75: CPT | Performed by: INTERNAL MEDICINE

## 2018-01-01 PROCEDURE — 27210732 IR THORACENTESIS

## 2018-01-01 PROCEDURE — 71250 CT THORAX DX C-: CPT

## 2018-01-01 PROCEDURE — 99239 HOSP IP/OBS DSCHRG MGMT >30: CPT | Mod: GC | Performed by: HOSPITALIST

## 2018-01-01 PROCEDURE — 82040 ASSAY OF SERUM ALBUMIN: CPT | Performed by: EMERGENCY MEDICINE

## 2018-01-01 PROCEDURE — 99223 1ST HOSP IP/OBS HIGH 75: CPT | Mod: GC | Performed by: INTERNAL MEDICINE

## 2018-01-01 PROCEDURE — 86900 BLOOD TYPING SEROLOGIC ABO: CPT | Performed by: STUDENT IN AN ORGANIZED HEALTH CARE EDUCATION/TRAINING PROGRAM

## 2018-01-01 PROCEDURE — G0378 HOSPITAL OBSERVATION PER HR: HCPCS

## 2018-01-01 PROCEDURE — 00000155 ZZHCL STATISTIC H-CELL BLOCK W/STAIN: Performed by: FAMILY MEDICINE

## 2018-01-01 PROCEDURE — 85025 COMPLETE CBC W/AUTO DIFF WBC: CPT | Performed by: FAMILY MEDICINE

## 2018-01-01 PROCEDURE — 85610 PROTHROMBIN TIME: CPT | Performed by: NURSE PRACTITIONER

## 2018-01-01 PROCEDURE — 83605 ASSAY OF LACTIC ACID: CPT | Performed by: INTERNAL MEDICINE

## 2018-01-01 PROCEDURE — 87075 CULTR BACTERIA EXCEPT BLOOD: CPT | Performed by: EMERGENCY MEDICINE

## 2018-01-01 PROCEDURE — 84155 ASSAY OF PROTEIN SERUM: CPT | Performed by: FAMILY MEDICINE

## 2018-01-01 PROCEDURE — 49083 ABD PARACENTESIS W/IMAGING: CPT | Mod: GC | Performed by: ORTHOPAEDIC SURGERY

## 2018-01-01 PROCEDURE — 87522 HEPATITIS C REVRS TRNSCRPJ: CPT | Performed by: INTERNAL MEDICINE

## 2018-01-01 PROCEDURE — 97535 SELF CARE MNGMENT TRAINING: CPT | Mod: GO

## 2018-01-01 PROCEDURE — 82042 OTHER SOURCE ALBUMIN QUAN EA: CPT | Performed by: EMERGENCY MEDICINE

## 2018-01-01 PROCEDURE — 93306 TTE W/DOPPLER COMPLETE: CPT | Mod: 26 | Performed by: INTERNAL MEDICINE

## 2018-01-01 PROCEDURE — 87077 CULTURE AEROBIC IDENTIFY: CPT | Performed by: EMERGENCY MEDICINE

## 2018-01-01 PROCEDURE — 84145 PROCALCITONIN (PCT): CPT | Performed by: INTERNAL MEDICINE

## 2018-01-01 PROCEDURE — 71045 X-RAY EXAM CHEST 1 VIEW: CPT | Mod: XU

## 2018-01-01 PROCEDURE — 27210995 ZZH RX 272: Performed by: STUDENT IN AN ORGANIZED HEALTH CARE EDUCATION/TRAINING PROGRAM

## 2018-01-01 PROCEDURE — 93005 ELECTROCARDIOGRAM TRACING: CPT | Mod: 59 | Performed by: FAMILY MEDICINE

## 2018-01-01 PROCEDURE — 40000344 ZZHCL STATISTIC THAWING COMPONENT: Performed by: STUDENT IN AN ORGANIZED HEALTH CARE EDUCATION/TRAINING PROGRAM

## 2018-01-01 PROCEDURE — 87040 BLOOD CULTURE FOR BACTERIA: CPT | Performed by: STUDENT IN AN ORGANIZED HEALTH CARE EDUCATION/TRAINING PROGRAM

## 2018-01-01 PROCEDURE — 99223 1ST HOSP IP/OBS HIGH 75: CPT | Mod: AI | Performed by: INTERNAL MEDICINE

## 2018-01-01 PROCEDURE — 40000141 ZZH STATISTIC PERIPHERAL IV START W/O US GUIDANCE

## 2018-01-01 PROCEDURE — T1013 SIGN LANG/ORAL INTERPRETER: HCPCS | Mod: U3,ZF

## 2018-01-01 PROCEDURE — 76604 US EXAM CHEST: CPT | Mod: 26 | Performed by: EMERGENCY MEDICINE

## 2018-01-01 RX ORDER — CIPROFLOXACIN 500 MG/1
500 TABLET, FILM COATED ORAL DAILY
Status: DISCONTINUED | OUTPATIENT
Start: 2018-01-01 | End: 2018-01-01 | Stop reason: HOSPADM

## 2018-01-01 RX ORDER — IOPAMIDOL 755 MG/ML
69 INJECTION, SOLUTION INTRAVASCULAR ONCE
Status: COMPLETED | OUTPATIENT
Start: 2018-01-01 | End: 2018-01-01

## 2018-01-01 RX ORDER — LACTULOSE 10 G/15ML
10 SOLUTION ORAL DAILY
Status: DISCONTINUED | OUTPATIENT
Start: 2018-01-01 | End: 2018-01-01 | Stop reason: HOSPADM

## 2018-01-01 RX ORDER — ONDANSETRON 4 MG/1
4 TABLET, ORALLY DISINTEGRATING ORAL EVERY 6 HOURS PRN
Status: DISCONTINUED | OUTPATIENT
Start: 2018-01-01 | End: 2018-01-01 | Stop reason: HOSPADM

## 2018-01-01 RX ORDER — ECHINACEA PURPUREA EXTRACT 125 MG
1 TABLET ORAL 2 TIMES DAILY
Qty: 1 BOTTLE | Refills: 11 | Status: ON HOLD | OUTPATIENT
Start: 2018-01-01 | End: 2018-01-01

## 2018-01-01 RX ORDER — CARBOXYMETHYLCELLULOSE SODIUM 5 MG/ML
1 SOLUTION/ DROPS OPHTHALMIC 2 TIMES DAILY PRN
Status: DISCONTINUED | OUTPATIENT
Start: 2018-01-01 | End: 2018-01-01 | Stop reason: HOSPADM

## 2018-01-01 RX ORDER — SALIVA STIMULANT COMB. NO.3
2 SPRAY, NON-AEROSOL (ML) MUCOUS MEMBRANE 4 TIMES DAILY PRN
Qty: 1 BOTTLE | Refills: 0 | Status: SHIPPED | OUTPATIENT
Start: 2018-01-01

## 2018-01-01 RX ORDER — NALOXONE HYDROCHLORIDE 0.4 MG/ML
.1-.4 INJECTION, SOLUTION INTRAMUSCULAR; INTRAVENOUS; SUBCUTANEOUS
Status: DISCONTINUED | OUTPATIENT
Start: 2018-01-01 | End: 2018-01-01 | Stop reason: HOSPADM

## 2018-01-01 RX ORDER — ALBUMIN (HUMAN) 12.5 G/50ML
12.5 SOLUTION INTRAVENOUS 4 TIMES DAILY PRN
Status: DISCONTINUED | OUTPATIENT
Start: 2018-01-01 | End: 2018-01-01 | Stop reason: HOSPADM

## 2018-01-01 RX ORDER — ALBUMIN (HUMAN) 12.5 G/50ML
12.5 SOLUTION INTRAVENOUS 4 TIMES DAILY PRN
Status: CANCELLED
Start: 2018-01-01

## 2018-01-01 RX ORDER — ALBUTEROL SULFATE 0.83 MG/ML
1 SOLUTION RESPIRATORY (INHALATION) EVERY 6 HOURS PRN
Status: DISCONTINUED | OUTPATIENT
Start: 2018-01-01 | End: 2018-01-01 | Stop reason: HOSPADM

## 2018-01-01 RX ORDER — LIDOCAINE HYDROCHLORIDE 10 MG/ML
10 INJECTION, SOLUTION EPIDURAL; INFILTRATION; INTRACAUDAL; PERINEURAL ONCE
Status: COMPLETED | OUTPATIENT
Start: 2018-01-01 | End: 2018-01-01

## 2018-01-01 RX ORDER — LIDOCAINE HYDROCHLORIDE 10 MG/ML
4 INJECTION, SOLUTION EPIDURAL; INFILTRATION; INTRACAUDAL; PERINEURAL ONCE
Status: DISCONTINUED | OUTPATIENT
Start: 2018-01-01 | End: 2018-01-01 | Stop reason: HOSPADM

## 2018-01-01 RX ORDER — LIDOCAINE HYDROCHLORIDE 10 MG/ML
1-30 INJECTION, SOLUTION EPIDURAL; INFILTRATION; INTRACAUDAL; PERINEURAL
Status: DISCONTINUED | OUTPATIENT
Start: 2018-01-01 | End: 2018-01-01 | Stop reason: HOSPADM

## 2018-01-01 RX ORDER — PHYTONADIONE 5 MG/1
10 TABLET ORAL ONCE
Status: COMPLETED | OUTPATIENT
Start: 2018-01-01 | End: 2018-01-01

## 2018-01-01 RX ORDER — ALBUMIN (HUMAN) 12.5 G/50ML
75 SOLUTION INTRAVENOUS ONCE
Status: COMPLETED | OUTPATIENT
Start: 2018-01-01 | End: 2018-01-01

## 2018-01-01 RX ORDER — OXYCODONE HYDROCHLORIDE 5 MG/1
5 TABLET ORAL EVERY 6 HOURS PRN
Status: DISCONTINUED | OUTPATIENT
Start: 2018-01-01 | End: 2018-01-01

## 2018-01-01 RX ORDER — POLYETHYLENE GLYCOL 3350 17 G/17G
17 POWDER, FOR SOLUTION ORAL 3 TIMES DAILY PRN
Status: DISCONTINUED | OUTPATIENT
Start: 2018-01-01 | End: 2018-01-01 | Stop reason: HOSPADM

## 2018-01-01 RX ORDER — ALBUTEROL SULFATE 0.83 MG/ML
2.5 SOLUTION RESPIRATORY (INHALATION)
Status: DISCONTINUED | OUTPATIENT
Start: 2018-01-01 | End: 2018-01-01 | Stop reason: HOSPADM

## 2018-01-01 RX ORDER — SIMETHICONE 80 MG
80 TABLET,CHEWABLE ORAL ONCE
Status: COMPLETED | OUTPATIENT
Start: 2018-01-01 | End: 2018-01-01

## 2018-01-01 RX ORDER — FUROSEMIDE 20 MG
20 TABLET ORAL DAILY
Status: DISCONTINUED | OUTPATIENT
Start: 2018-01-01 | End: 2018-01-01

## 2018-01-01 RX ORDER — CALCIUM CARBONATE 500 MG/1
500 TABLET, CHEWABLE ORAL DAILY PRN
Status: DISCONTINUED | OUTPATIENT
Start: 2018-01-01 | End: 2018-01-01 | Stop reason: HOSPADM

## 2018-01-01 RX ORDER — MULTIPLE VITAMINS W/ MINERALS TAB 9MG-400MCG
1 TAB ORAL DAILY
Status: DISCONTINUED | OUTPATIENT
Start: 2018-01-01 | End: 2018-01-01 | Stop reason: HOSPADM

## 2018-01-01 RX ORDER — CEFTRIAXONE 2 G/1
2 INJECTION, POWDER, FOR SOLUTION INTRAMUSCULAR; INTRAVENOUS EVERY 24 HOURS
Status: DISCONTINUED | OUTPATIENT
Start: 2018-01-01 | End: 2018-01-01

## 2018-01-01 RX ORDER — LIDOCAINE HYDROCHLORIDE 10 MG/ML
4 INJECTION, SOLUTION EPIDURAL; INFILTRATION; INTRACAUDAL; PERINEURAL ONCE
Status: COMPLETED | OUTPATIENT
Start: 2018-01-01 | End: 2018-01-01

## 2018-01-01 RX ORDER — ALBUMIN (HUMAN) 12.5 G/50ML
25 SOLUTION INTRAVENOUS ONCE
Status: DISCONTINUED | OUTPATIENT
Start: 2018-01-01 | End: 2018-01-01

## 2018-01-01 RX ORDER — ALUMINA, MAGNESIA, AND SIMETHICONE 2400; 2400; 240 MG/30ML; MG/30ML; MG/30ML
30 SUSPENSION ORAL EVERY 4 HOURS PRN
Status: DISCONTINUED | OUTPATIENT
Start: 2018-01-01 | End: 2018-01-01 | Stop reason: HOSPADM

## 2018-01-01 RX ORDER — ONDANSETRON 2 MG/ML
4 INJECTION INTRAMUSCULAR; INTRAVENOUS ONCE
Status: COMPLETED | OUTPATIENT
Start: 2018-01-01 | End: 2018-01-01

## 2018-01-01 RX ORDER — FLUTICASONE PROPIONATE 50 MCG
2 SPRAY, SUSPENSION (ML) NASAL DAILY
Status: ON HOLD | COMMUNITY
End: 2018-01-01

## 2018-01-01 RX ORDER — LIDOCAINE 40 MG/G
CREAM TOPICAL
Status: DISCONTINUED | OUTPATIENT
Start: 2018-01-01 | End: 2018-01-01 | Stop reason: HOSPADM

## 2018-01-01 RX ORDER — SODIUM CHLORIDE 9 MG/ML
INJECTION, SOLUTION INTRAVENOUS CONTINUOUS
Status: DISCONTINUED | OUTPATIENT
Start: 2018-01-01 | End: 2018-01-01 | Stop reason: HOSPADM

## 2018-01-01 RX ORDER — FLUORIDE TOOTHPASTE
10 TOOTHPASTE DENTAL 4 TIMES DAILY
Qty: 473 ML | Refills: 0 | Status: ON HOLD | OUTPATIENT
Start: 2018-01-01 | End: 2018-01-01

## 2018-01-01 RX ORDER — SODIUM CHLORIDE 9 MG/ML
INJECTION, SOLUTION INTRAVENOUS CONTINUOUS
Status: DISCONTINUED | OUTPATIENT
Start: 2018-01-01 | End: 2018-01-01

## 2018-01-01 RX ORDER — POLYETHYLENE GLYCOL 3350 17 G/17G
17 POWDER, FOR SOLUTION ORAL DAILY PRN
Status: DISCONTINUED | OUTPATIENT
Start: 2018-01-01 | End: 2018-01-01 | Stop reason: HOSPADM

## 2018-01-01 RX ORDER — FUROSEMIDE 10 MG/ML
20 INJECTION INTRAMUSCULAR; INTRAVENOUS ONCE
Status: COMPLETED | OUTPATIENT
Start: 2018-01-01 | End: 2018-01-01

## 2018-01-01 RX ORDER — CETIRIZINE HYDROCHLORIDE 10 MG/1
10 TABLET ORAL EVERY EVENING
Status: DISCONTINUED | OUTPATIENT
Start: 2018-01-01 | End: 2018-01-01 | Stop reason: HOSPADM

## 2018-01-01 RX ORDER — CALCIUM CARBONATE 500 MG/1
1 TABLET, CHEWABLE ORAL DAILY PRN
Qty: 150 TABLET | Refills: 0 | Status: SHIPPED | OUTPATIENT
Start: 2018-01-01

## 2018-01-01 RX ORDER — LACTULOSE 10 G/15ML
10 SOLUTION ORAL DAILY
Qty: 1892 ML | Refills: 0 | Status: SHIPPED | OUTPATIENT
Start: 2018-01-01

## 2018-01-01 RX ORDER — TRIAMCINOLONE ACETONIDE 55 UG/1
2 SPRAY, METERED NASAL DAILY
Qty: 1 BOTTLE | Refills: 11 | Status: SHIPPED | OUTPATIENT
Start: 2018-01-01 | End: 2018-01-01

## 2018-01-01 RX ORDER — FLUORIDE TOOTHPASTE
5 TOOTHPASTE DENTAL 4 TIMES DAILY PRN
Status: DISCONTINUED | OUTPATIENT
Start: 2018-01-01 | End: 2018-01-01

## 2018-01-01 RX ORDER — TRAMADOL HYDROCHLORIDE 50 MG/1
25-50 TABLET ORAL EVERY 12 HOURS PRN
Qty: 20 TABLET | Refills: 0 | Status: SHIPPED | OUTPATIENT
Start: 2018-01-01 | End: 2018-01-01

## 2018-01-01 RX ORDER — CEFOTAXIME INJECTION 2 G/1
2 POWDER, FOR SOLUTION INTRAMUSCULAR; INTRAVENOUS EVERY 8 HOURS
Status: DISCONTINUED | OUTPATIENT
Start: 2018-01-01 | End: 2018-01-01

## 2018-01-01 RX ORDER — FUROSEMIDE 10 MG/ML
20 INJECTION INTRAMUSCULAR; INTRAVENOUS ONCE
Status: DISCONTINUED | OUTPATIENT
Start: 2018-01-01 | End: 2018-01-01

## 2018-01-01 RX ORDER — ONDANSETRON 4 MG/1
4 TABLET, ORALLY DISINTEGRATING ORAL EVERY 6 HOURS PRN
Qty: 120 TABLET | Refills: 0 | Status: SHIPPED | OUTPATIENT
Start: 2018-01-01

## 2018-01-01 RX ORDER — SPIRONOLACTONE 50 MG/1
50 TABLET, FILM COATED ORAL DAILY
Qty: 30 TABLET | Refills: 1 | Status: SHIPPED | OUTPATIENT
Start: 2018-01-01

## 2018-01-01 RX ORDER — CIPROFLOXACIN 250 MG/1
500 TABLET, FILM COATED ORAL EVERY 12 HOURS
Status: DISCONTINUED | OUTPATIENT
Start: 2018-01-01 | End: 2018-01-01

## 2018-01-01 RX ORDER — LIDOCAINE 40 MG/G
CREAM TOPICAL
Status: CANCELLED | OUTPATIENT
Start: 2018-01-01

## 2018-01-01 RX ORDER — PIPERACILLIN SODIUM, TAZOBACTAM SODIUM 2; .25 G/10ML; G/10ML
2.25 INJECTION, POWDER, LYOPHILIZED, FOR SOLUTION INTRAVENOUS EVERY 6 HOURS
Status: DISCONTINUED | OUTPATIENT
Start: 2018-01-01 | End: 2018-01-01

## 2018-01-01 RX ORDER — FUROSEMIDE 20 MG
20 TABLET ORAL DAILY
Status: DISCONTINUED | OUTPATIENT
Start: 2018-01-01 | End: 2018-01-01 | Stop reason: HOSPADM

## 2018-01-01 RX ORDER — FLUORIDE TOOTHPASTE
5 TOOTHPASTE DENTAL 4 TIMES DAILY
Status: DISCONTINUED | OUTPATIENT
Start: 2018-01-01 | End: 2018-01-01 | Stop reason: HOSPADM

## 2018-01-01 RX ORDER — FUROSEMIDE 20 MG
20 TABLET ORAL
Status: DISCONTINUED | OUTPATIENT
Start: 2018-01-01 | End: 2018-01-01 | Stop reason: HOSPADM

## 2018-01-01 RX ORDER — ALBUMIN (HUMAN) 12.5 G/50ML
50 SOLUTION INTRAVENOUS ONCE
Status: DISCONTINUED | OUTPATIENT
Start: 2018-01-01 | End: 2018-01-01

## 2018-01-01 RX ORDER — CIPROFLOXACIN 250 MG/1
500 TABLET, FILM COATED ORAL
Status: DISCONTINUED | OUTPATIENT
Start: 2018-01-01 | End: 2018-01-01 | Stop reason: HOSPADM

## 2018-01-01 RX ORDER — OLOPATADINE HYDROCHLORIDE 1 MG/ML
1 SOLUTION/ DROPS OPHTHALMIC 2 TIMES DAILY
Status: ON HOLD | COMMUNITY
End: 2018-01-01

## 2018-01-01 RX ORDER — CEFOTAXIME INJECTION 2 G/1
2 POWDER, FOR SOLUTION INTRAMUSCULAR; INTRAVENOUS ONCE
Status: DISCONTINUED | OUTPATIENT
Start: 2018-01-01 | End: 2018-01-01 | Stop reason: CLARIF

## 2018-01-01 RX ORDER — BENZONATATE 100 MG/1
100 CAPSULE ORAL 3 TIMES DAILY PRN
Status: DISCONTINUED | OUTPATIENT
Start: 2018-01-01 | End: 2018-01-01 | Stop reason: HOSPADM

## 2018-01-01 RX ORDER — CEFTRIAXONE 2 G/1
2 INJECTION, POWDER, FOR SOLUTION INTRAMUSCULAR; INTRAVENOUS ONCE
Status: COMPLETED | OUTPATIENT
Start: 2018-01-01 | End: 2018-01-01

## 2018-01-01 RX ORDER — GADOBUTROL 604.72 MG/ML
0.1 INJECTION INTRAVENOUS ONCE
Status: COMPLETED | OUTPATIENT
Start: 2018-01-01 | End: 2018-01-01

## 2018-01-01 RX ORDER — SALIVA STIMULANT COMB. NO.3
2 SPRAY, NON-AEROSOL (ML) MUCOUS MEMBRANE 4 TIMES DAILY PRN
Status: DISCONTINUED | OUTPATIENT
Start: 2018-01-01 | End: 2018-01-01 | Stop reason: HOSPADM

## 2018-01-01 RX ORDER — ONDANSETRON 2 MG/ML
4 INJECTION INTRAMUSCULAR; INTRAVENOUS EVERY 6 HOURS PRN
Status: DISCONTINUED | OUTPATIENT
Start: 2018-01-01 | End: 2018-01-01 | Stop reason: HOSPADM

## 2018-01-01 RX ORDER — SPIRONOLACTONE 50 MG/1
50 TABLET, FILM COATED ORAL DAILY
Status: DISCONTINUED | OUTPATIENT
Start: 2018-01-01 | End: 2018-01-01

## 2018-01-01 RX ORDER — ALBUMIN (HUMAN) 12.5 G/50ML
50 SOLUTION INTRAVENOUS ONCE
Status: COMPLETED | OUTPATIENT
Start: 2018-01-01 | End: 2018-01-01

## 2018-01-01 RX ORDER — PROCHLORPERAZINE 25 MG
12.5 SUPPOSITORY, RECTAL RECTAL EVERY 12 HOURS PRN
Status: DISCONTINUED | OUTPATIENT
Start: 2018-01-01 | End: 2018-01-01 | Stop reason: HOSPADM

## 2018-01-01 RX ORDER — SPIRONOLACTONE 50 MG/1
50 TABLET, FILM COATED ORAL
Status: DISCONTINUED | OUTPATIENT
Start: 2018-01-01 | End: 2018-01-01

## 2018-01-01 RX ORDER — SPIRONOLACTONE 50 MG/1
50 TABLET, FILM COATED ORAL DAILY
Status: DISCONTINUED | OUTPATIENT
Start: 2018-01-01 | End: 2018-01-01 | Stop reason: HOSPADM

## 2018-01-01 RX ORDER — CETIRIZINE HYDROCHLORIDE 5 MG/1
10 TABLET ORAL EVERY EVENING
Status: DISCONTINUED | OUTPATIENT
Start: 2018-01-01 | End: 2018-01-01 | Stop reason: HOSPADM

## 2018-01-01 RX ORDER — HYDROMORPHONE HYDROCHLORIDE 1 MG/ML
0.5 INJECTION, SOLUTION INTRAMUSCULAR; INTRAVENOUS; SUBCUTANEOUS
Status: DISCONTINUED | OUTPATIENT
Start: 2018-01-01 | End: 2018-01-01

## 2018-01-01 RX ORDER — POLYETHYLENE GLYCOL 3350 17 G/17G
1 POWDER, FOR SOLUTION ORAL 3 TIMES DAILY PRN
Qty: 850 G | Refills: 3 | Status: SHIPPED | OUTPATIENT
Start: 2018-01-01

## 2018-01-01 RX ORDER — METOCLOPRAMIDE HYDROCHLORIDE 5 MG/ML
5 INJECTION INTRAMUSCULAR; INTRAVENOUS EVERY 6 HOURS PRN
Status: DISCONTINUED | OUTPATIENT
Start: 2018-01-01 | End: 2018-01-01 | Stop reason: HOSPADM

## 2018-01-01 RX ORDER — MAGNESIUM HYDROXIDE 1200 MG/15ML
LIQUID ORAL
Status: COMPLETED
Start: 2018-01-01 | End: 2018-01-01

## 2018-01-01 RX ORDER — LIDOCAINE HYDROCHLORIDE 10 MG/ML
INJECTION, SOLUTION EPIDURAL; INFILTRATION; INTRACAUDAL; PERINEURAL
Status: DISCONTINUED
Start: 2018-01-01 | End: 2018-01-01 | Stop reason: HOSPADM

## 2018-01-01 RX ORDER — OXYCODONE HYDROCHLORIDE 5 MG/1
5 TABLET ORAL EVERY 4 HOURS PRN
Status: DISCONTINUED | OUTPATIENT
Start: 2018-01-01 | End: 2018-01-01

## 2018-01-01 RX ORDER — PHYTONADIONE 5 MG/1
10 TABLET ORAL DAILY
Status: COMPLETED | OUTPATIENT
Start: 2018-01-01 | End: 2018-01-01

## 2018-01-01 RX ORDER — TRAMADOL HYDROCHLORIDE 50 MG/1
50 TABLET ORAL EVERY 8 HOURS PRN
Status: DISCONTINUED | OUTPATIENT
Start: 2018-01-01 | End: 2018-01-01 | Stop reason: HOSPADM

## 2018-01-01 RX ORDER — IPRATROPIUM BROMIDE AND ALBUTEROL SULFATE 2.5; .5 MG/3ML; MG/3ML
1 SOLUTION RESPIRATORY (INHALATION) EVERY 6 HOURS PRN
Status: DISCONTINUED | OUTPATIENT
Start: 2018-01-01 | End: 2018-01-01 | Stop reason: HOSPADM

## 2018-01-01 RX ORDER — LIDOCAINE HYDROCHLORIDE AND EPINEPHRINE 10; 10 MG/ML; UG/ML
INJECTION, SOLUTION INFILTRATION; PERINEURAL
Status: DISCONTINUED
Start: 2018-01-01 | End: 2018-01-01 | Stop reason: HOSPADM

## 2018-01-01 RX ORDER — LACTULOSE 10 G/15ML
10 SOLUTION ORAL 3 TIMES DAILY
Status: DISCONTINUED | OUTPATIENT
Start: 2018-01-01 | End: 2018-01-01

## 2018-01-01 RX ORDER — FLUTICASONE PROPIONATE 50 MCG
2 SPRAY, SUSPENSION (ML) NASAL DAILY
Status: DISCONTINUED | OUTPATIENT
Start: 2018-01-01 | End: 2018-01-01 | Stop reason: HOSPADM

## 2018-01-01 RX ORDER — CIPROFLOXACIN 500 MG/1
500 TABLET, FILM COATED ORAL EVERY 24 HOURS
Qty: 30 TABLET | Refills: 3 | Status: SHIPPED | OUTPATIENT
Start: 2018-01-01

## 2018-01-01 RX ORDER — IPRATROPIUM BROMIDE AND ALBUTEROL SULFATE 2.5; .5 MG/3ML; MG/3ML
3 SOLUTION RESPIRATORY (INHALATION) ONCE
Status: COMPLETED | OUTPATIENT
Start: 2018-01-01 | End: 2018-01-01

## 2018-01-01 RX ORDER — ALBUTEROL SULFATE 0.83 MG/ML
1 SOLUTION RESPIRATORY (INHALATION) EVERY 6 HOURS PRN
Status: DISCONTINUED | OUTPATIENT
Start: 2018-01-01 | End: 2018-01-01

## 2018-01-01 RX ORDER — IPRATROPIUM BROMIDE AND ALBUTEROL SULFATE 2.5; .5 MG/3ML; MG/3ML
1 SOLUTION RESPIRATORY (INHALATION) EVERY 6 HOURS PRN
Qty: 360 ML | Refills: 0 | Status: SHIPPED | OUTPATIENT
Start: 2018-01-01

## 2018-01-01 RX ORDER — PROCHLORPERAZINE MALEATE 5 MG
5 TABLET ORAL EVERY 6 HOURS PRN
Status: DISCONTINUED | OUTPATIENT
Start: 2018-01-01 | End: 2018-01-01 | Stop reason: HOSPADM

## 2018-01-01 RX ORDER — SPIRONOLACTONE 25 MG/1
50 TABLET ORAL DAILY
Status: DISCONTINUED | OUTPATIENT
Start: 2018-01-01 | End: 2018-01-01

## 2018-01-01 RX ORDER — OXYCODONE HYDROCHLORIDE 5 MG/1
5 TABLET ORAL ONCE
Status: COMPLETED | OUTPATIENT
Start: 2018-01-01 | End: 2018-01-01

## 2018-01-01 RX ORDER — ACETAMINOPHEN 325 MG/1
650 TABLET ORAL EVERY 6 HOURS PRN
Status: DISCONTINUED | OUTPATIENT
Start: 2018-01-01 | End: 2018-01-01 | Stop reason: HOSPADM

## 2018-01-01 RX ORDER — TRAMADOL HYDROCHLORIDE 50 MG/1
25-50 TABLET ORAL EVERY 8 HOURS PRN
Qty: 40 TABLET | Refills: 0 | Status: SHIPPED | OUTPATIENT
Start: 2018-01-01

## 2018-01-01 RX ORDER — METOCLOPRAMIDE 5 MG/1
5 TABLET ORAL EVERY 6 HOURS PRN
Status: DISCONTINUED | OUTPATIENT
Start: 2018-01-01 | End: 2018-01-01 | Stop reason: HOSPADM

## 2018-01-01 RX ORDER — FUROSEMIDE 20 MG
20 TABLET ORAL DAILY
Qty: 180 TABLET | Refills: 1 | Status: SHIPPED | OUTPATIENT
Start: 2018-01-01

## 2018-01-01 RX ORDER — CETIRIZINE HYDROCHLORIDE 10 MG/1
10 TABLET ORAL EVERY EVENING
Qty: 30 TABLET | Refills: 6 | Status: SHIPPED | OUTPATIENT
Start: 2018-01-01

## 2018-01-01 RX ORDER — CIPROFLOXACIN 250 MG/1
250 TABLET, FILM COATED ORAL
Status: DISCONTINUED | OUTPATIENT
Start: 2018-01-01 | End: 2018-01-01

## 2018-01-01 RX ORDER — ALBUTEROL SULFATE 90 UG/1
2 AEROSOL, METERED RESPIRATORY (INHALATION) EVERY 6 HOURS PRN
Status: DISCONTINUED | OUTPATIENT
Start: 2018-01-01 | End: 2018-01-01 | Stop reason: HOSPADM

## 2018-01-01 RX ADMIN — ALBUMIN HUMAN 12.5 G: 0.25 SOLUTION INTRAVENOUS at 10:31

## 2018-01-01 RX ADMIN — SALINE NASAL SPRAY 1 SPRAY: 1.5 SOLUTION NASAL at 09:37

## 2018-01-01 RX ADMIN — SALINE NASAL SPRAY 1 SPRAY: 1.5 SOLUTION NASAL at 20:42

## 2018-01-01 RX ADMIN — SODIUM CHLORIDE: 9 INJECTION, SOLUTION INTRAVENOUS at 18:39

## 2018-01-01 RX ADMIN — LACTULOSE 10 G: 20 SOLUTION ORAL at 08:13

## 2018-01-01 RX ADMIN — FLUTICASONE FUROATE AND VILANTEROL TRIFENATATE 1 PUFF: 200; 25 POWDER RESPIRATORY (INHALATION) at 09:37

## 2018-01-01 RX ADMIN — SALINE NASAL SPRAY 1 SPRAY: 1.5 SOLUTION NASAL at 09:46

## 2018-01-01 RX ADMIN — FLUTICASONE FUROATE AND VILANTEROL TRIFENATATE 1 PUFF: 200; 25 POWDER RESPIRATORY (INHALATION) at 09:45

## 2018-01-01 RX ADMIN — Medication 5 ML: at 18:38

## 2018-01-01 RX ADMIN — ALBUMIN HUMAN 37.5 G: 0.25 SOLUTION INTRAVENOUS at 15:05

## 2018-01-01 RX ADMIN — OMEPRAZOLE 40 MG: 20 CAPSULE, DELAYED RELEASE ORAL at 07:50

## 2018-01-01 RX ADMIN — FUROSEMIDE 20 MG: 20 TABLET ORAL at 08:08

## 2018-01-01 RX ADMIN — LACTULOSE 10 G: 10 SOLUTION ORAL at 09:10

## 2018-01-01 RX ADMIN — MULTIPLE VITAMINS W/ MINERALS TAB 1 TABLET: TAB at 08:46

## 2018-01-01 RX ADMIN — Medication 5 ML: at 21:06

## 2018-01-01 RX ADMIN — ONDANSETRON 4 MG: 2 INJECTION INTRAMUSCULAR; INTRAVENOUS at 12:09

## 2018-01-01 RX ADMIN — ONDANSETRON 4 MG: 4 TABLET, ORALLY DISINTEGRATING ORAL at 11:08

## 2018-01-01 RX ADMIN — CETIRIZINE HYDROCHLORIDE 10 MG: 10 TABLET, FILM COATED ORAL at 20:24

## 2018-01-01 RX ADMIN — SPIRONOLACTONE 50 MG: 50 TABLET ORAL at 11:52

## 2018-01-01 RX ADMIN — LIDOCAINE HYDROCHLORIDE 20 ML: 10 INJECTION, SOLUTION EPIDURAL; INFILTRATION; INTRACAUDAL; PERINEURAL at 10:10

## 2018-01-01 RX ADMIN — FLUTICASONE PROPIONATE 2 SPRAY: 50 SPRAY, METERED NASAL at 07:38

## 2018-01-01 RX ADMIN — POLYETHYLENE GLYCOL 3350 17 G: 17 POWDER, FOR SOLUTION ORAL at 23:05

## 2018-01-01 RX ADMIN — Medication 5 ML: at 08:08

## 2018-01-01 RX ADMIN — LIDOCAINE HYDROCHLORIDE 15 ML: 10 INJECTION, SOLUTION EPIDURAL; INFILTRATION; INTRACAUDAL; PERINEURAL at 20:07

## 2018-01-01 RX ADMIN — FUROSEMIDE 20 MG: 10 INJECTION, SOLUTION INTRAVENOUS at 09:11

## 2018-01-01 RX ADMIN — SALINE NASAL SPRAY 1 SPRAY: 1.5 SOLUTION NASAL at 19:29

## 2018-01-01 RX ADMIN — OMEPRAZOLE 40 MG: 20 CAPSULE, DELAYED RELEASE ORAL at 08:22

## 2018-01-01 RX ADMIN — OXYCODONE HYDROCHLORIDE 5 MG: 5 TABLET ORAL at 23:59

## 2018-01-01 RX ADMIN — ALUMINUM HYDROXIDE, MAGNESIUM HYDROXIDE, AND DIMETHICONE 30 ML: 400; 400; 40 SUSPENSION ORAL at 15:50

## 2018-01-01 RX ADMIN — SALINE NASAL SPRAY 1 SPRAY: 1.5 SOLUTION NASAL at 19:36

## 2018-01-01 RX ADMIN — ALBUTEROL SULFATE 2.5 MG: 2.5 SOLUTION RESPIRATORY (INHALATION) at 04:29

## 2018-01-01 RX ADMIN — ONDANSETRON 4 MG: 2 INJECTION INTRAMUSCULAR; INTRAVENOUS at 11:55

## 2018-01-01 RX ADMIN — Medication 5 ML: at 16:29

## 2018-01-01 RX ADMIN — FLUTICASONE FUROATE AND VILANTEROL TRIFENATATE 1 PUFF: 200; 25 POWDER RESPIRATORY (INHALATION) at 17:42

## 2018-01-01 RX ADMIN — Medication 5 ML: at 20:24

## 2018-01-01 RX ADMIN — FLUTICASONE PROPIONATE 2 SPRAY: 50 SPRAY, METERED NASAL at 17:42

## 2018-01-01 RX ADMIN — SODIUM CHLORIDE: 9 INJECTION, SOLUTION INTRAVENOUS at 14:44

## 2018-01-01 RX ADMIN — LIDOCAINE HYDROCHLORIDE 20 ML: 10 INJECTION, SOLUTION INFILTRATION; PERINEURAL at 13:14

## 2018-01-01 RX ADMIN — SODIUM CHLORIDE 500 ML: 9 INJECTION, SOLUTION INTRAVENOUS at 17:24

## 2018-01-01 RX ADMIN — CALCIUM CARBONATE (ANTACID) CHEW TAB 500 MG 500 MG: 500 CHEW TAB at 20:30

## 2018-01-01 RX ADMIN — Medication 10 ML: at 13:38

## 2018-01-01 RX ADMIN — OMEPRAZOLE 40 MG: 20 CAPSULE, DELAYED RELEASE ORAL at 09:10

## 2018-01-01 RX ADMIN — ALBUTEROL SULFATE 2.5 MG: 2.5 SOLUTION RESPIRATORY (INHALATION) at 08:30

## 2018-01-01 RX ADMIN — SALINE NASAL SPRAY 1 SPRAY: 1.5 SOLUTION NASAL at 09:01

## 2018-01-01 RX ADMIN — SPIRONOLACTONE 50 MG: 50 TABLET ORAL at 09:28

## 2018-01-01 RX ADMIN — FLUTICASONE FUROATE AND VILANTEROL TRIFENATATE 1 PUFF: 200; 25 POWDER RESPIRATORY (INHALATION) at 08:44

## 2018-01-01 RX ADMIN — OMEPRAZOLE 40 MG: 20 CAPSULE, DELAYED RELEASE ORAL at 09:01

## 2018-01-01 RX ADMIN — SODIUM CHLORIDE: 9 INJECTION, SOLUTION INTRAVENOUS at 11:56

## 2018-01-01 RX ADMIN — CEFTRIAXONE 2 G: 2 INJECTION, POWDER, FOR SOLUTION INTRAMUSCULAR; INTRAVENOUS at 06:12

## 2018-01-01 RX ADMIN — ALBUMIN HUMAN 25 G: 0.25 SOLUTION INTRAVENOUS at 10:39

## 2018-01-01 RX ADMIN — FLUTICASONE FUROATE AND VILANTEROL TRIFENATATE 1 PUFF: 200; 25 POWDER RESPIRATORY (INHALATION) at 09:09

## 2018-01-01 RX ADMIN — CETIRIZINE HYDROCHLORIDE 10 MG: 10 TABLET, FILM COATED ORAL at 21:06

## 2018-01-01 RX ADMIN — CEFTRIAXONE 2 G: 2 INJECTION, POWDER, FOR SOLUTION INTRAMUSCULAR; INTRAVENOUS at 03:52

## 2018-01-01 RX ADMIN — SPIRONOLACTONE 50 MG: 50 TABLET ORAL at 10:03

## 2018-01-01 RX ADMIN — FLUTICASONE PROPIONATE 2 SPRAY: 50 SPRAY, METERED NASAL at 08:09

## 2018-01-01 RX ADMIN — ALBUMIN HUMAN 50 G: 0.25 SOLUTION INTRAVENOUS at 17:41

## 2018-01-01 RX ADMIN — ALUMINUM HYDROXIDE, MAGNESIUM HYDROXIDE, AND DIMETHICONE 30 ML: 400; 400; 40 SUSPENSION ORAL at 12:51

## 2018-01-01 RX ADMIN — Medication 2 SPRAY: at 23:42

## 2018-01-01 RX ADMIN — ONDANSETRON 4 MG: 4 TABLET, ORALLY DISINTEGRATING ORAL at 15:42

## 2018-01-01 RX ADMIN — BENZOCAINE 0.5 ML: 220 SPRAY, METERED PERIODONTAL at 19:29

## 2018-01-01 RX ADMIN — TRAMADOL HYDROCHLORIDE 25 MG: 50 TABLET, FILM COATED ORAL at 10:16

## 2018-01-01 RX ADMIN — OMEPRAZOLE 40 MG: 20 CAPSULE, DELAYED RELEASE ORAL at 09:45

## 2018-01-01 RX ADMIN — OMEPRAZOLE 40 MG: 20 CAPSULE, DELAYED RELEASE ORAL at 08:46

## 2018-01-01 RX ADMIN — ALBUMIN HUMAN 37.5 G: 0.25 SOLUTION INTRAVENOUS at 12:50

## 2018-01-01 RX ADMIN — CETIRIZINE HYDROCHLORIDE 10 MG: 10 TABLET, FILM COATED ORAL at 22:00

## 2018-01-01 RX ADMIN — OXYCODONE HYDROCHLORIDE 5 MG: 5 TABLET ORAL at 16:24

## 2018-01-01 RX ADMIN — ENOXAPARIN SODIUM 40 MG: 40 INJECTION SUBCUTANEOUS at 10:04

## 2018-01-01 RX ADMIN — FUROSEMIDE 20 MG: 20 TABLET ORAL at 11:52

## 2018-01-01 RX ADMIN — ONDANSETRON 4 MG: 2 INJECTION INTRAMUSCULAR; INTRAVENOUS at 14:59

## 2018-01-01 RX ADMIN — FLUTICASONE PROPIONATE 2 SPRAY: 50 SPRAY, METERED NASAL at 09:46

## 2018-01-01 RX ADMIN — MULTIPLE VITAMINS W/ MINERALS TAB 1 TABLET: TAB at 09:45

## 2018-01-01 RX ADMIN — PIPERACILLIN AND TAZOBACTAM 2.25 G: 2; .25 INJECTION, POWDER, LYOPHILIZED, FOR SOLUTION INTRAVENOUS; PARENTERAL at 05:24

## 2018-01-01 RX ADMIN — FLUTICASONE FUROATE AND VILANTEROL TRIFENATATE 1 PUFF: 200; 25 POWDER RESPIRATORY (INHALATION) at 07:37

## 2018-01-01 RX ADMIN — LACTULOSE 10 G: 20 SOLUTION ORAL at 17:41

## 2018-01-01 RX ADMIN — FLUTICASONE FUROATE AND VILANTEROL TRIFENATATE 1 PUFF: 200; 25 POWDER RESPIRATORY (INHALATION) at 10:04

## 2018-01-01 RX ADMIN — LACTULOSE 10 G: 10 SOLUTION ORAL at 08:08

## 2018-01-01 RX ADMIN — OXYCODONE HYDROCHLORIDE 5 MG: 5 TABLET ORAL at 15:42

## 2018-01-01 RX ADMIN — OMEPRAZOLE 40 MG: 20 CAPSULE, DELAYED RELEASE ORAL at 08:08

## 2018-01-01 RX ADMIN — FUROSEMIDE 20 MG: 10 INJECTION, SOLUTION INTRAVENOUS at 16:40

## 2018-01-01 RX ADMIN — FUROSEMIDE 20 MG: 20 TABLET ORAL at 08:12

## 2018-01-01 RX ADMIN — MULTIPLE VITAMINS W/ MINERALS TAB 1 TABLET: TAB at 08:47

## 2018-01-01 RX ADMIN — FLUTICASONE FUROATE AND VILANTEROL TRIFENATATE 1 PUFF: 200; 25 POWDER RESPIRATORY (INHALATION) at 08:17

## 2018-01-01 RX ADMIN — SODIUM CHLORIDE 1000 ML: 9 INJECTION, SOLUTION INTRAVENOUS at 09:39

## 2018-01-01 RX ADMIN — CETIRIZINE HYDROCHLORIDE 10 MG: 10 TABLET, FILM COATED ORAL at 20:08

## 2018-01-01 RX ADMIN — PHYTONADIONE 10 MG: 5 TABLET ORAL at 09:59

## 2018-01-01 RX ADMIN — LACTULOSE 10 G: 20 SOLUTION ORAL at 21:24

## 2018-01-01 RX ADMIN — LIDOCAINE HYDROCHLORIDE 10 ML: 10 INJECTION, SOLUTION EPIDURAL; INFILTRATION; INTRACAUDAL; PERINEURAL at 15:00

## 2018-01-01 RX ADMIN — SODIUM CHLORIDE 500 ML: 9 INJECTION, SOLUTION INTRAVENOUS at 11:08

## 2018-01-01 RX ADMIN — LACTULOSE 10 G: 20 SOLUTION ORAL at 09:44

## 2018-01-01 RX ADMIN — TRAMADOL HYDROCHLORIDE 50 MG: 50 TABLET, COATED ORAL at 01:25

## 2018-01-01 RX ADMIN — SALINE NASAL SPRAY 1 SPRAY: 1.5 SOLUTION NASAL at 07:37

## 2018-01-01 RX ADMIN — TRAMADOL HYDROCHLORIDE 50 MG: 50 TABLET, FILM COATED ORAL at 07:50

## 2018-01-01 RX ADMIN — PIPERACILLIN AND TAZOBACTAM 2.25 G: 2; .25 INJECTION, POWDER, LYOPHILIZED, FOR SOLUTION INTRAVENOUS; PARENTERAL at 22:19

## 2018-01-01 RX ADMIN — FLUTICASONE FUROATE AND VILANTEROL TRIFENATATE 1 PUFF: 200; 25 POWDER RESPIRATORY (INHALATION) at 09:01

## 2018-01-01 RX ADMIN — FLUTICASONE PROPIONATE 2 SPRAY: 50 SPRAY, METERED NASAL at 08:47

## 2018-01-01 RX ADMIN — LACTULOSE 10 G: 10 SOLUTION ORAL at 08:12

## 2018-01-01 RX ADMIN — LACTULOSE 10 G: 20 SOLUTION ORAL at 09:28

## 2018-01-01 RX ADMIN — ALBUMIN HUMAN 12.5 G: 0.25 SOLUTION INTRAVENOUS at 13:22

## 2018-01-01 RX ADMIN — SPIRONOLACTONE 50 MG: 50 TABLET ORAL at 09:10

## 2018-01-01 RX ADMIN — MULTIPLE VITAMINS W/ MINERALS TAB 1 TABLET: TAB at 09:36

## 2018-01-01 RX ADMIN — SALINE NASAL SPRAY 1 SPRAY: 1.5 SOLUTION NASAL at 20:30

## 2018-01-01 RX ADMIN — SODIUM CHLORIDE 1000 ML: 9 INJECTION, SOLUTION INTRAVENOUS at 21:59

## 2018-01-01 RX ADMIN — FLUTICASONE FUROATE AND VILANTEROL TRIFENATATE 1 PUFF: 200; 25 POWDER RESPIRATORY (INHALATION) at 08:13

## 2018-01-01 RX ADMIN — SALINE NASAL SPRAY 1 SPRAY: 1.5 SOLUTION NASAL at 08:09

## 2018-01-01 RX ADMIN — SALINE NASAL SPRAY 1 SPRAY: 1.5 SOLUTION NASAL at 21:24

## 2018-01-01 RX ADMIN — FLUTICASONE PROPIONATE 2 SPRAY: 50 SPRAY, METERED NASAL at 08:46

## 2018-01-01 RX ADMIN — OMEPRAZOLE 40 MG: 20 CAPSULE, DELAYED RELEASE ORAL at 08:14

## 2018-01-01 RX ADMIN — LIDOCAINE HYDROCHLORIDE 15 ML: 10 INJECTION, SOLUTION EPIDURAL; INFILTRATION; INTRACAUDAL; PERINEURAL at 12:50

## 2018-01-01 RX ADMIN — CIPROFLOXACIN HYDROCHLORIDE 250 MG: 250 TABLET, FILM COATED ORAL at 09:10

## 2018-01-01 RX ADMIN — LACTULOSE 10 G: 20 SOLUTION ORAL at 07:37

## 2018-01-01 RX ADMIN — SODIUM CHLORIDE 500 ML: 900 IRRIGANT IRRIGATION at 17:17

## 2018-01-01 RX ADMIN — SODIUM CHLORIDE: 9 INJECTION, SOLUTION INTRAVENOUS at 03:02

## 2018-01-01 RX ADMIN — IOPAMIDOL 69 ML: 755 INJECTION, SOLUTION INTRAVENOUS at 17:46

## 2018-01-01 RX ADMIN — LIDOCAINE HYDROCHLORIDE 10 ML: 10 INJECTION, SOLUTION EPIDURAL; INFILTRATION; INTRACAUDAL; PERINEURAL at 15:05

## 2018-01-01 RX ADMIN — OMEPRAZOLE 40 MG: 20 CAPSULE, DELAYED RELEASE ORAL at 09:27

## 2018-01-01 RX ADMIN — LIDOCAINE HYDROCHLORIDE 15 ML: 10 INJECTION, SOLUTION EPIDURAL; INFILTRATION; INTRACAUDAL; PERINEURAL at 10:34

## 2018-01-01 RX ADMIN — ALBUMIN HUMAN 50 G: 0.25 SOLUTION INTRAVENOUS at 14:48

## 2018-01-01 RX ADMIN — OMEPRAZOLE 40 MG: 20 CAPSULE, DELAYED RELEASE ORAL at 08:47

## 2018-01-01 RX ADMIN — FUROSEMIDE 20 MG: 10 INJECTION, SOLUTION INTRAVENOUS at 10:37

## 2018-01-01 RX ADMIN — ONDANSETRON 4 MG: 2 INJECTION INTRAMUSCULAR; INTRAVENOUS at 09:39

## 2018-01-01 RX ADMIN — FUROSEMIDE 20 MG: 20 TABLET ORAL at 09:29

## 2018-01-01 RX ADMIN — ACETAMINOPHEN 650 MG: 325 TABLET, FILM COATED ORAL at 03:05

## 2018-01-01 RX ADMIN — FLUTICASONE PROPIONATE 2 SPRAY: 50 SPRAY, METERED NASAL at 09:27

## 2018-01-01 RX ADMIN — SODIUM CHLORIDE: 9 INJECTION, SOLUTION INTRAVENOUS at 19:23

## 2018-01-01 RX ADMIN — CEFTRIAXONE 2 G: 2 INJECTION, POWDER, FOR SOLUTION INTRAMUSCULAR; INTRAVENOUS at 03:58

## 2018-01-01 RX ADMIN — LIDOCAINE HYDROCHLORIDE 20 ML: 10 INJECTION, SOLUTION INFILTRATION; PERINEURAL at 14:49

## 2018-01-01 RX ADMIN — OMEPRAZOLE 40 MG: 20 CAPSULE, DELAYED RELEASE ORAL at 08:44

## 2018-01-01 RX ADMIN — FLUTICASONE FUROATE AND VILANTEROL TRIFENATATE 1 PUFF: 200; 25 POWDER RESPIRATORY (INHALATION) at 08:46

## 2018-01-01 RX ADMIN — MULTIPLE VITAMINS W/ MINERALS TAB 1 TABLET: TAB at 17:42

## 2018-01-01 RX ADMIN — PHYTONADIONE 10 MG: 5 TABLET ORAL at 07:37

## 2018-01-01 RX ADMIN — POLYETHYLENE GLYCOL 3350 17 G: 17 POWDER, FOR SOLUTION ORAL at 12:08

## 2018-01-01 RX ADMIN — SALINE NASAL SPRAY 1 SPRAY: 1.5 SOLUTION NASAL at 09:27

## 2018-01-01 RX ADMIN — ALBUTEROL SULFATE 2.5 MG: 2.5 SOLUTION RESPIRATORY (INHALATION) at 04:34

## 2018-01-01 RX ADMIN — LACTULOSE 10 G: 20 SOLUTION ORAL at 07:50

## 2018-01-01 RX ADMIN — LIDOCAINE HYDROCHLORIDE 10 ML: 10 INJECTION, SOLUTION EPIDURAL; INFILTRATION; INTRACAUDAL; PERINEURAL at 15:48

## 2018-01-01 RX ADMIN — LACTULOSE 10 G: 10 SOLUTION ORAL at 08:22

## 2018-01-01 RX ADMIN — TRAMADOL HYDROCHLORIDE 50 MG: 50 TABLET, COATED ORAL at 09:30

## 2018-01-01 RX ADMIN — ALBUTEROL SULFATE 2.5 MG: 2.5 SOLUTION RESPIRATORY (INHALATION) at 19:09

## 2018-01-01 RX ADMIN — Medication 5 ML: at 08:44

## 2018-01-01 RX ADMIN — CEFTRIAXONE 2 G: 2 INJECTION, POWDER, FOR SOLUTION INTRAMUSCULAR; INTRAVENOUS at 03:25

## 2018-01-01 RX ADMIN — SALINE NASAL SPRAY 1 SPRAY: 1.5 SOLUTION NASAL at 08:46

## 2018-01-01 RX ADMIN — FLUTICASONE FUROATE AND VILANTEROL TRIFENATATE 1 PUFF: 200; 25 POWDER RESPIRATORY (INHALATION) at 08:07

## 2018-01-01 RX ADMIN — OXYCODONE HYDROCHLORIDE 5 MG: 5 TABLET ORAL at 12:06

## 2018-01-01 RX ADMIN — ONDANSETRON 4 MG: 2 INJECTION INTRAMUSCULAR; INTRAVENOUS at 08:16

## 2018-01-01 RX ADMIN — SODIUM CHLORIDE, PRESERVATIVE FREE 67 ML: 5 INJECTION INTRAVENOUS at 17:46

## 2018-01-01 RX ADMIN — SODIUM CHLORIDE: 9 INJECTION, SOLUTION INTRAVENOUS at 18:14

## 2018-01-01 RX ADMIN — ALBUTEROL SULFATE 2.5 MG: 2.5 SOLUTION RESPIRATORY (INHALATION) at 16:23

## 2018-01-01 RX ADMIN — TRAMADOL HYDROCHLORIDE 25 MG: 50 TABLET, FILM COATED ORAL at 14:52

## 2018-01-01 RX ADMIN — Medication 5 ML: at 11:35

## 2018-01-01 RX ADMIN — SODIUM CHLORIDE 500 ML: 9 INJECTION, SOLUTION INTRAVENOUS at 05:26

## 2018-01-01 RX ADMIN — ALBUMIN HUMAN 25 G: 0.25 SOLUTION INTRAVENOUS at 10:13

## 2018-01-01 RX ADMIN — TRAMADOL HYDROCHLORIDE 50 MG: 50 TABLET, COATED ORAL at 12:51

## 2018-01-01 RX ADMIN — BENZOCAINE, MENTHOL 1 LOZENGE: 15; 3.6 LOZENGE ORAL at 08:09

## 2018-01-01 RX ADMIN — OMEPRAZOLE 40 MG: 20 CAPSULE, DELAYED RELEASE ORAL at 17:42

## 2018-01-01 RX ADMIN — LIDOCAINE HYDROCHLORIDE 10 ML: 10 INJECTION, SOLUTION EPIDURAL; INFILTRATION; INTRACAUDAL; PERINEURAL at 10:09

## 2018-01-01 RX ADMIN — Medication 5 ML: at 12:29

## 2018-01-01 RX ADMIN — GADOBUTROL 4.5 ML: 604.72 INJECTION INTRAVENOUS at 12:19

## 2018-01-01 RX ADMIN — TRAMADOL HYDROCHLORIDE 50 MG: 50 TABLET, COATED ORAL at 09:44

## 2018-01-01 RX ADMIN — ALBUMIN HUMAN 12.5 G: 0.25 SOLUTION INTRAVENOUS at 13:14

## 2018-01-01 RX ADMIN — SPIRONOLACTONE 50 MG: 50 TABLET ORAL at 08:08

## 2018-01-01 RX ADMIN — PHYTONADIONE 10 MG: 5 TABLET ORAL at 08:46

## 2018-01-01 RX ADMIN — FLUTICASONE FUROATE AND VILANTEROL TRIFENATATE 1 PUFF: 200; 25 POWDER RESPIRATORY (INHALATION) at 08:47

## 2018-01-01 RX ADMIN — IPRATROPIUM BROMIDE AND ALBUTEROL SULFATE 3 ML: .5; 3 SOLUTION RESPIRATORY (INHALATION) at 15:39

## 2018-01-01 RX ADMIN — PHYTONADIONE 2 MG: 10 INJECTION, EMULSION INTRAMUSCULAR; INTRAVENOUS; SUBCUTANEOUS at 08:44

## 2018-01-01 RX ADMIN — SODIUM CHLORIDE: 9 INJECTION, SOLUTION INTRAVENOUS at 12:05

## 2018-01-01 RX ADMIN — CEFTRIAXONE 2 G: 2 INJECTION, POWDER, FOR SOLUTION INTRAMUSCULAR; INTRAVENOUS at 03:59

## 2018-01-01 RX ADMIN — LIDOCAINE HYDROCHLORIDE 4 ML: 10 INJECTION, SOLUTION EPIDURAL; INFILTRATION; INTRACAUDAL; PERINEURAL at 17:13

## 2018-01-01 RX ADMIN — LIDOCAINE HYDROCHLORIDE 2 ML: 10 INJECTION, SOLUTION INFILTRATION; PERINEURAL at 19:38

## 2018-01-01 RX ADMIN — MULTIPLE VITAMINS W/ MINERALS TAB 1 TABLET: TAB at 07:50

## 2018-01-01 RX ADMIN — LACTULOSE 10 G: 20 SOLUTION ORAL at 09:37

## 2018-01-01 RX ADMIN — MULTIPLE VITAMINS W/ MINERALS TAB 1 TABLET: TAB at 08:08

## 2018-01-01 RX ADMIN — CIPROFLOXACIN HYDROCHLORIDE 500 MG: 500 TABLET, FILM COATED ORAL at 08:14

## 2018-01-01 RX ADMIN — Medication 1 MG: at 03:05

## 2018-01-01 RX ADMIN — LACTULOSE 10 G: 20 SOLUTION ORAL at 08:47

## 2018-01-01 RX ADMIN — OMEPRAZOLE 40 MG: 20 CAPSULE, DELAYED RELEASE ORAL at 09:36

## 2018-01-01 RX ADMIN — Medication 5 ML: at 09:10

## 2018-01-01 RX ADMIN — TRAMADOL HYDROCHLORIDE 50 MG: 50 TABLET, COATED ORAL at 23:44

## 2018-01-01 RX ADMIN — LACTULOSE 10 G: 20 SOLUTION ORAL at 08:09

## 2018-01-01 RX ADMIN — CALCIUM CARBONATE (ANTACID) CHEW TAB 500 MG 500 MG: 500 CHEW TAB at 07:37

## 2018-01-01 RX ADMIN — SIMETHICONE CHEW TAB 80 MG 80 MG: 80 TABLET ORAL at 00:24

## 2018-01-01 RX ADMIN — FLUTICASONE PROPIONATE 2 SPRAY: 50 SPRAY, METERED NASAL at 09:37

## 2018-01-01 RX ADMIN — MULTIPLE VITAMINS W/ MINERALS TAB 1 TABLET: TAB at 07:37

## 2018-01-01 RX ADMIN — CIPROFLOXACIN HYDROCHLORIDE 250 MG: 250 TABLET, FILM COATED ORAL at 08:08

## 2018-01-01 RX ADMIN — PHYTONADIONE 10 MG: 5 TABLET ORAL at 08:47

## 2018-01-01 RX ADMIN — ALBUMIN HUMAN 75 G: 0.25 SOLUTION INTRAVENOUS at 18:34

## 2018-01-01 RX ADMIN — TRAMADOL HYDROCHLORIDE 50 MG: 50 TABLET, COATED ORAL at 08:16

## 2018-01-01 RX ADMIN — SODIUM CHLORIDE 2 G: 9 INJECTION, SOLUTION INTRAVENOUS at 02:33

## 2018-01-01 RX ADMIN — FLUTICASONE FUROATE AND VILANTEROL TRIFENATATE 1 PUFF: 200; 25 POWDER RESPIRATORY (INHALATION) at 09:26

## 2018-01-01 RX ADMIN — FUROSEMIDE 20 MG: 20 TABLET ORAL at 10:04

## 2018-01-01 RX ADMIN — SPIRONOLACTONE 50 MG: 50 TABLET ORAL at 09:42

## 2018-01-01 RX ADMIN — LIDOCAINE HYDROCHLORIDE 10 ML: 10 INJECTION, SOLUTION EPIDURAL; INFILTRATION; INTRACAUDAL; PERINEURAL at 10:30

## 2018-01-01 RX ADMIN — OMEPRAZOLE 40 MG: 20 CAPSULE, DELAYED RELEASE ORAL at 07:37

## 2018-01-01 RX ADMIN — PHYTONADIONE 2 MG: 10 INJECTION, EMULSION INTRAMUSCULAR; INTRAVENOUS; SUBCUTANEOUS at 12:56

## 2018-01-01 RX ADMIN — CEFTRIAXONE SODIUM 1 G: 10 INJECTION, POWDER, FOR SOLUTION INTRAVENOUS at 21:24

## 2018-01-01 RX ADMIN — MULTIPLE VITAMINS W/ MINERALS TAB 1 TABLET: TAB at 09:28

## 2018-01-01 RX ADMIN — OMEPRAZOLE 40 MG: 20 CAPSULE, DELAYED RELEASE ORAL at 08:12

## 2018-01-01 RX ADMIN — Medication 5 ML: at 16:40

## 2018-01-01 RX ADMIN — FLUTICASONE FUROATE AND VILANTEROL TRIFENATATE 1 PUFF: 200; 25 POWDER RESPIRATORY (INHALATION) at 09:40

## 2018-01-01 RX ADMIN — SODIUM CHLORIDE 500 ML: 9 INJECTION, SOLUTION INTRAVENOUS at 12:56

## 2018-01-01 RX ADMIN — Medication 5 ML: at 20:08

## 2018-01-01 RX ADMIN — LACTULOSE 10 G: 10 SOLUTION ORAL at 10:04

## 2018-01-01 RX ADMIN — SPIRONOLACTONE 50 MG: 50 TABLET ORAL at 08:12

## 2018-01-01 RX ADMIN — BENZOCAINE, MENTHOL 1 LOZENGE: 15; 3.6 LOZENGE ORAL at 23:51

## 2018-01-01 RX ADMIN — OMEPRAZOLE 40 MG: 20 CAPSULE, DELAYED RELEASE ORAL at 10:03

## 2018-01-01 RX ADMIN — ALBUMIN HUMAN 12.5 G: 0.25 SOLUTION INTRAVENOUS at 10:48

## 2018-01-01 RX ADMIN — SODIUM CHLORIDE 250 ML: 9 INJECTION, SOLUTION INTRAVENOUS at 00:56

## 2018-01-01 RX ADMIN — CETIRIZINE HYDROCHLORIDE 10 MG: 10 TABLET, FILM COATED ORAL at 19:01

## 2018-01-01 RX ADMIN — FLUTICASONE FUROATE AND VILANTEROL TRIFENATATE 1 PUFF: 200; 25 POWDER RESPIRATORY (INHALATION) at 08:09

## 2018-01-01 ASSESSMENT — ENCOUNTER SYMPTOMS
WEAKNESS: 1
WHEEZING: 0
FEVER: 0
COLOR CHANGE: 0
COUGH: 0
FEVER: 0
APPETITE CHANGE: 0
FLANK PAIN: 0
CONFUSION: 0
CHEST TIGHTNESS: 0
SHORTNESS OF BREATH: 0
CONFUSION: 0
CONFUSION: 0
DIARRHEA: 0
ABDOMINAL PAIN: 1
DYSURIA: 0
APPETITE CHANGE: 0
AGITATION: 0
HEMATURIA: 0
ABDOMINAL PAIN: 1
NAUSEA: 0
CONSTIPATION: 1
FACIAL SWELLING: 0
NAUSEA: 0
CONFUSION: 0
WHEEZING: 0
DECREASED CONCENTRATION: 1
SHORTNESS OF BREATH: 0
COUGH: 0
NAUSEA: 1
CONFUSION: 0
DYSPHORIC MOOD: 1
SHORTNESS OF BREATH: 0
FATIGUE: 1
NECK STIFFNESS: 0
ABDOMINAL DISTENTION: 1
DIAPHORESIS: 0
FREQUENCY: 0
COUGH: 1
ABDOMINAL PAIN: 1
COLOR CHANGE: 0
DIFFICULTY URINATING: 0
COLOR CHANGE: 0
NAUSEA: 1
PALPITATIONS: 0
VOMITING: 1
FEVER: 0
COUGH: 0
DIAPHORESIS: 0
WHEEZING: 0
EYE REDNESS: 0
PALPITATIONS: 0
DIAPHORESIS: 0
NAUSEA: 0
BACK PAIN: 1
VOMITING: 0
HEADACHES: 0
HEADACHES: 0
VOMITING: 0
HEADACHES: 0
ARTHRALGIAS: 0
NAUSEA: 1
CHILLS: 0
DIAPHORESIS: 0
COUGH: 1
ACTIVITY CHANGE: 0
FEVER: 0
ABDOMINAL PAIN: 1
NAUSEA: 1
VOMITING: 0
CHILLS: 0
TROUBLE SWALLOWING: 0
ABDOMINAL PAIN: 1
ABDOMINAL PAIN: 0
VOMITING: 1
ACTIVITY CHANGE: 1
ABDOMINAL PAIN: 1
COUGH: 1
SHORTNESS OF BREATH: 1
CONFUSION: 0
DYSURIA: 0
APPETITE CHANGE: 1
DIFFICULTY URINATING: 0
NECK PAIN: 0
CHOKING: 0
NECK STIFFNESS: 0
WEAKNESS: 1
ABDOMINAL DISTENTION: 1
WHEEZING: 0
ACTIVITY CHANGE: 1
FEVER: 0
DIFFICULTY URINATING: 0
DIZZINESS: 0
ABDOMINAL PAIN: 1
NAUSEA: 1
FEVER: 0
FATIGUE: 1
SHORTNESS OF BREATH: 1
EYES NEGATIVE: 1
DYSURIA: 0
DIAPHORESIS: 0
TROUBLE SWALLOWING: 0
BRUISES/BLEEDS EASILY: 1
SHORTNESS OF BREATH: 0
DYSURIA: 0
EYE REDNESS: 0
HEADACHES: 0
BLOOD IN STOOL: 0
TROUBLE SWALLOWING: 0
TROUBLE SWALLOWING: 0
SHORTNESS OF BREATH: 0
NAUSEA: 1
CHILLS: 0
EYE REDNESS: 0
HEADACHES: 0
BACK PAIN: 1
AGITATION: 0
HEADACHES: 0
DYSURIA: 0
HEADACHES: 0
BLOOD IN STOOL: 0
SHORTNESS OF BREATH: 0
CONFUSION: 0
NAUSEA: 0
NECK STIFFNESS: 0
AGITATION: 0
CONFUSION: 0
VOMITING: 0
COUGH: 0
DYSPHORIC MOOD: 1
HEADACHES: 0
DIZZINESS: 0
ABDOMINAL PAIN: 1
ABDOMINAL PAIN: 1
FEVER: 0
ARTHRALGIAS: 0
COUGH: 0
COUGH: 0
FEVER: 0
APPETITE CHANGE: 1
FEVER: 0
COUGH: 0
ABDOMINAL DISTENTION: 1
COUGH: 1
ARTHRALGIAS: 0
AGITATION: 0
CONSTIPATION: 0
FATIGUE: 1
SHORTNESS OF BREATH: 0
FEVER: 0
DIAPHORESIS: 0
DIFFICULTY URINATING: 0
PALPITATIONS: 0
VOMITING: 0
TROUBLE SWALLOWING: 0
DIAPHORESIS: 0
DYSURIA: 0
ABDOMINAL PAIN: 0
CHEST TIGHTNESS: 0
PALPITATIONS: 0
CHILLS: 0
SHORTNESS OF BREATH: 1
VOMITING: 0
HEADACHES: 0
ABDOMINAL PAIN: 1
SHORTNESS OF BREATH: 1
WHEEZING: 0
RESPIRATORY NEGATIVE: 1
WEAKNESS: 0
CONSTIPATION: 1
PALPITATIONS: 0
AGITATION: 0
FEVER: 0
ABDOMINAL DISTENTION: 1
DECREASED CONCENTRATION: 1
FEVER: 0

## 2018-01-01 ASSESSMENT — ACTIVITIES OF DAILY LIVING (ADL)
EATING: 2 - ASSISTIVE PERSON
AMBULATION: 2 - ASSISTIVE PERSON
RETIRED_EATING: 2-->ASSISTIVE PERSON
COGNITION: 0 - NO COGNITION ISSUES REPORTED
RETIRED_COMMUNICATION: 0-->UNDERSTANDS/COMMUNICATES WITHOUT DIFFICULTY
DRESS: 2 - ASSISTIVE PERSON
TOILETING: 2 - ASSISTIVE PERSON
COMMUNICATION: 0 - UNDERSTANDS/COMMUNICATES WITHOUT DIFFICULTY
TRANSFERRING: 2 - ASSISTIVE PERSON
TOILETING: 2-->ASSISTIVE PERSON
BATHING: 2 - ASSISTIVE PERSON
FALL_HISTORY_WITHIN_LAST_SIX_MONTHS: NO
AMBULATION: 2-->ASSISTIVE PERSON
SWALLOWING: 0 - SWALLOWS FOODS/LIQUIDS WITHOUT DIFFICULTY
DRESS: 2-->ASSISTIVE PERSON
TRANSFERRING: 2-->ASSISTIVE PERSON
SWALLOWING: 0-->SWALLOWS FOODS/LIQUIDS WITHOUT DIFFICULTY
BATHING: 2-->ASSISTIVE PERSON

## 2018-01-01 ASSESSMENT — PAIN SCALES - GENERAL
PAINLEVEL: SEVERE PAIN (6)
PAINLEVEL: MODERATE PAIN (4)

## 2018-01-01 ASSESSMENT — PAIN DESCRIPTION - DESCRIPTORS
DESCRIPTORS: ACHING
DESCRIPTORS: PATIENT UNABLE TO DESCRIBE
DESCRIPTORS: ACHING
DESCRIPTORS: ACHING
DESCRIPTORS: ACHING;DISCOMFORT
DESCRIPTORS: CRAMPING;SORE
DESCRIPTORS: ACHING
DESCRIPTORS: ACHING;DISCOMFORT
DESCRIPTORS: ACHING;DISCOMFORT
DESCRIPTORS: ACHING

## 2018-01-01 ASSESSMENT — PATIENT HEALTH QUESTIONNAIRE - PHQ9: SUM OF ALL RESPONSES TO PHQ QUESTIONS 1-9: 8

## 2018-01-01 ASSESSMENT — ASTHMA QUESTIONNAIRES: ACT_TOTALSCORE: 9

## 2018-01-22 NOTE — PROCEDURES
Interventional Radiology Brief Post Procedure Note    Procedure: IR THORACENTESIS    Proceduralist: Alban [facility] R[facility]Y    Assistant: MAGDA Jimenez    Time Out: Prior to the start of the procedure and with procedural staff participation, I verbally confirmed the patient s identity using two indicators, relevant allergies, that the procedure was appropriate and matched the consent or emergent situation, and that the correct equipment/implants were available. Immediately prior to starting the procedure I conducted the Time Out with the procedural staff and re-confirmed the patient s name, procedure, and site/side. (The Joint Commission universal protocol was followed.)  Yes    Medications   Medication Event Details Admin User Admin Time       Sedation: None. Local Anesthestic used    Findings: Attempted repeat aspiration with previously placed right Yueh catheter not successful.  Repeat ultrasound guided right therapeutic thoracentesis performed via a posterior approach.  900 cc of yellow clear pleural fluid drained and tolerated well by patient.  Drainage was stopped due to patient cough.  No immediate complications.      Estimated Blood Loss: Minimal    Fluoroscopy Time: 0 minute(s)    SPECIMENS: None    Complications: 1. None     Condition: Stable    Plan: Follow up per primary team.  No strenuous activity for 3 days.    Comments: See dictated procedure note for full details.    Andrea Pichardo

## 2018-01-22 NOTE — PROGRESS NOTES
1250 Pt on 2a prepped and ready for thoracentesis.  PIV placed and labs sent. Family and  at BS. Pt ready for consent.

## 2018-01-22 NOTE — PROCEDURES
Interventional Radiology Brief Post Procedure Note    Procedure: IR THORACENTESIS    Proceduralist: Alban [facility]    Assistant: MAGDA Jimenez    Time Out: Prior to the start of the procedure and with procedural staff participation, I verbally confirmed the patient s identity using two indicators, relevant allergies, that the procedure was appropriate and matched the consent or emergent situation, and that the correct equipment/implants were available. Immediately prior to starting the procedure I conducted the Time Out with the procedural staff and re-confirmed the patient s name, procedure, and site/side. (The Joint Commission universal protocol was followed.)  Yes    Medications   Medication Event Details Admin User Admin Time       Sedation: None. Local Anesthestic used    Findings: Ultrasound guided right therapeutic thoracentesis with a single posterior approach.  2000 mL of clear serous pleural fluid was removed without immediate complication.      Estimated Blood Loss: Minimal    Fluoroscopy Time:  0 minute(s)    SPECIMENS: None    Complications: 1. None     Condition: Stable    Plan: Patient will rest for 1 hour, and therapeutic thoracentesis will continue per toleration of patient.     Comments: See dictated procedure note for full details.    Andrea Pichardo

## 2018-01-22 NOTE — IP AVS SNAPSHOT
[address] [address]                                       After Visit Summary   1/22/2018    Yue Portillo    MRN: [id number]           After Visit Summary Signature Page     I have received my discharge instructions, and my questions have been answered. I have discussed any challenges I see with this plan with the nurse or doctor.    ..........................................................................................................................................  Patient/Patient Representative Signature      ..........................................................................................................................................  Patient Representative Print Name and Relationship to Patient    ..................................................               ................................................  Date                                            Time    ..........................................................................................................................................  Reviewed by Signature/Title    ...................................................              ..............................................  Date                                                            Time

## 2018-01-22 NOTE — IR NOTE
Patient Name: Yue Portillo  Medical Record Number: [id number]  Today's Date: 1/22/2018    Procedure: thoracentesis  Proceduralist: [place] MEDELLIN    Sedation start time: na  Sedation end time: na  Sedation medications administered: na   Total sedation time: na    Procedure start time: 1400  Puncture time: 1402  Procedure end time: 1430    Report given to: JYOTI RN  : Nahomy    Other Notes: Pt arrived to IR room 2 from . Pt denies any questions or concerns regarding procedure. Pt positioned sitting up leaning over gurnery and monitored per protocol.  2 liters removed from right pleural space. Pt tolerated procedure without any noted complications. Pt transferred back to 2A.

## 2018-01-22 NOTE — IP AVS SNAPSHOT
MRN:[id number]                      After Visit Summary   1/22/2018    Yue Portillo    MRN: [id number]           Visit Information        Department      1/22/2018 11:49 AM [address] [facility]          Review of your medicines      UNREVIEWED medicines. Ask your doctor about these medicines        Dose / Directions    * albuterol 108 (90 BASE) MCG/ACT Inhaler   Commonly known as:  PROAIR HFA/PROVENTIL HFA/VENTOLIN HFA   Used for:  Moderate persistent asthma without complication        Dose:  2 puff   Inhale 2 puffs into the lungs every 6 hours as needed for shortness of breath / dyspnea or wheezing   Quantity:  1 Inhaler   Refills:  11       * albuterol (2.5 MG/3ML) 0.083% neb solution   Used for:  Mild intermittent asthma with acute exacerbation        Dose:  1 vial   Take 1 vial (2.5 mg) by nebulization every 6 hours as needed for shortness of breath / dyspnea or wheezing   Quantity:  25 vial   Refills:  3       bisacodyl 5 MG EC tablet   Commonly known as:  DULCOLAX   Used for:  Constipation, unspecified constipation type        Dose:  5 mg   Take 1 tablet (5 mg) by mouth daily as needed for constipation   Quantity:  30 tablet   Refills:  2       BOOST CALORIE SMART Liqd   Used for:  HCC (hepatocellular carcinoma) (H)        Dose:  3 Can   Take 3 Cans by mouth daily   Quantity:  90 Bottle   Refills:  11       cetirizine 10 MG tablet   Commonly known as:  zyrTEC   Used for:  Chronic allergic conjunctivitis        Dose:  10 mg   Take 1 tablet (10 mg) by mouth every evening   Quantity:  30 tablet   Refills:  6       DAILY MULTIPLE VITAMIN/IRON Tabs   Used for:  Chronic hepatitis C without hepatic coma (H)        Dose:  1 tablet   Take 1 tablet by mouth daily   Quantity:  30 tablet   Refills:  11       fluticasone 50 MCG/ACT spray   Commonly known as:  FLONASE   Used for:  Chronic rhinitis        Dose:  2 spray   Spray 2 sprays into both nostrils daily   Quantity:  1 Bottle   Refills:  11        furosemide 20 MG tablet   Commonly known as:  LASIX   Used for:  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)        Dose:  20 mg   Take 1 tablet (20 mg) by mouth daily   Quantity:  180 tablet   Refills:  1       lactulose 20 GM/30ML Soln   Used for:  Cirrhosis of liver with ascites, unspecified hepatic cirrhosis type (H), HCC (hepatocellular carcinoma) (H)        Dose:  30 mL   Take 30 mLs by mouth daily   Quantity:  946 mL   Refills:  3       mometasone-formoterol 200-5 MCG/ACT oral inhaler   Commonly known as:  DULERA   Used for:  Moderate persistent asthma without complication        Dose:  2 puff   Inhale 2 puffs into the lungs 2 times daily   Quantity:  1 Inhaler   Refills:  12       omeprazole 40 MG capsule   Commonly known as:  priLOSEC   Used for:  Gastroesophageal reflux disease without esophagitis        Dose:  40 mg   Take 1 capsule (40 mg) by mouth daily Take 30-60 minutes before a meal.   Quantity:  90 capsule   Refills:  3       polyethylene glycol powder   Commonly known as:  MIRALAX   Used for:  Constipation, unspecified constipation type        Dose:  1 capful   Take 17 g (1 capful) by mouth 3 times daily as needed for constipation   Quantity:  510 g   Refills:  3       spironolactone 50 MG tablet   Commonly known as:  ALDACTONE   Used for:  Chronic hepatitis C without hepatic coma (H)        Dose:  50 mg   Take 1 tablet (50 mg) by mouth daily   Quantity:  30 tablet   Refills:  1       * Notice:  This list has 2 medication(s) that are the same as other medications prescribed for you. Read the directions carefully, and ask your doctor or other care provider to review them with you.             Protect others around you: Learn how to safely use, store and throw away your medicines at www.disposemymeds.org.         Follow-ups after your visit        Your next 10 appointments already scheduled     Feb 05, 2018  2:30 PM CST   (Arrive by 2:15 PM)   Return Visit with MD MAVERICK Canales  [facility] ([facility])    [address]  [address]  [place] [address]   [phone]            Feb 14, 2018 10:15 AM CST   Lab with  LAB   [facility] ([facility])    [address]  1st Floor  [place] [address]   [phone]            Feb 14, 2018 11:10 AM CST   (Arrive by 10:55 AM)   Return General Liver with Tala Meyer MD   [facility] ([facility])    [address]  [address]  [place] [id number] [phone]               Care Instructions        Further instructions from your care team       [facility],   Interventional Radiology Discharge Instructions Following Thoracentesis        AFTER YOU GO HOME:  ? Resume previous diet and medications  ? Limit strenuous physical activity such as lifting, straining, or exercising for 48 hours.  You may resume normal activity in 24 hours  ? Change gauze at the puncture site as needed to keep site dry.  Leaking of additional fluid is not uncommon during the first 24-48 hours    CALL THE PHYSICIAN:  ? If you develop a fever, shortness of breath, chest pain, cough up blood, excessive bleeding from the thoracentesis site, severe lightheadedness, or fainting call you doctor immediately or come to the closest Emergency Department.  Do not drive yourself.   ? If you have questions or concerns regarding your procedure call the radiologist.      ADDITIONAL INSTRUCTIONS: None      [facility] INTERVENTIONAL RADIOLOGY DEPARTMENT  Procedure Physician: Temo MEDELLIN      Date of procedure: January 22, 2018  Telephone Numbers: [phone]       Monday-Friday 8:00 am to 4:30 pm                                                          [phone]       After 4:30 pm Monday - Friday, Ask for the Interventional Radiologist on call.  Someone is on call 24 hrs/day  [facility] toll free  number: [phone]    Monday-Friday 8:00 am to 4:30 pm  [facility] Emergency Dept: [phone]  _             Additional Information About Your Visit        Care EveryWhere ID     This is your Care EveryWhere ID. This could be used by other organizations to access your [facility] medical records  [id number]        Your Vitals Were     Blood Pressure Pulse Temperature Respirations Pulse Oximetry       118/61 100 98.2  F (36.8  C) (Oral) 18 98%        Primary Care Provider Office Phone # Fax #    Felicitas Truman Horton [phone]      Equal Access to Services     [facility]: Hadii nico muñoz hadhemalatha Jose, waaxda nicole, qaybta veronicaalmakiara sheridan, rosana ricks . [facility] [place] [phone].    ATENCIÓN: Si habla español, tiene a martinez disposición servicios gratuitos de asistencia lingüística. [facility] [phone].    We comply with applicable federal civil rights laws and [place] laws. We do not discriminate on the basis of race, color, national origin, age, disability, sex, sexual orientation, or gender identity.            Thank you!     Thank you for choosing [facility] for your care. Our goal is always to provide you with excellent care. Hearing back from our patients is one way we can continue to improve our services. Please take a few minutes to complete the written survey that you may receive in the mail after you visit with us. Thank you!             Medication List: This is a list of all your medications and when to take them. Check marks below indicate your daily home schedule. Keep this list as a reference.      Medications           Morning Afternoon Evening Bedtime As Needed    * albuterol 108 (90 BASE) MCG/ACT Inhaler   Commonly known as:  PROAIR HFA/PROVENTIL HFA/VENTOLIN HFA   Inhale 2 puffs into the lungs every 6 hours as needed for shortness of breath / dyspnea or wheezing                                * albuterol (2.5 MG/3ML) 0.083% neb solution   Take 1 vial  (2.5 mg) by nebulization every 6 hours as needed for shortness of breath / dyspnea or wheezing                                bisacodyl 5 MG EC tablet   Commonly known as:  DULCOLAX   Take 1 tablet (5 mg) by mouth daily as needed for constipation                                BOOST CALORIE SMART Liqd   Take 3 Cans by mouth daily                                cetirizine 10 MG tablet   Commonly known as:  zyrTEC   Take 1 tablet (10 mg) by mouth every evening                                DAILY MULTIPLE VITAMIN/IRON Tabs   Take 1 tablet by mouth daily                                fluticasone 50 MCG/ACT spray   Commonly known as:  FLONASE   Spray 2 sprays into both nostrils daily                                furosemide 20 MG tablet   Commonly known as:  LASIX   Take 1 tablet (20 mg) by mouth daily                                lactulose 20 GM/30ML Soln   Take 30 mLs by mouth daily                                mometasone-formoterol 200-5 MCG/ACT oral inhaler   Commonly known as:  DULERA   Inhale 2 puffs into the lungs 2 times daily                                omeprazole 40 MG capsule   Commonly known as:  priLOSEC   Take 1 capsule (40 mg) by mouth daily Take 30-60 minutes before a meal.                                polyethylene glycol powder   Commonly known as:  MIRALAX   Take 17 g (1 capful) by mouth 3 times daily as needed for constipation                                spironolactone 50 MG tablet   Commonly known as:  ALDACTONE   Take 1 tablet (50 mg) by mouth daily                                * Notice:  This list has 2 medication(s) that are the same as other medications prescribed for you. Read the directions carefully, and ask your doctor or other care provider to review them with you.

## 2018-01-22 NOTE — PROGRESS NOTES
Rcd pt from Duane RN. Pt prepped and draped ready to go. Timeout at 1600 with Christi BLISS, Judith RTR present and . No s/s acute distress noted. 2.9 L total amount removed from both times. VSS . SBAR called to 2A .

## 2018-01-22 NOTE — DISCHARGE INSTRUCTIONS
[facility],   Interventional Radiology Discharge Instructions Following Thoracentesis        AFTER YOU GO HOME:  ? Resume previous diet and medications  ? Limit strenuous physical activity such as lifting, straining, or exercising for 48 hours.  You may resume normal activity in 24 hours  ? Change gauze at the puncture site as needed to keep site dry.  Leaking of additional fluid is not uncommon during the first 24-48 hours    CALL THE PHYSICIAN:  ? If you develop a fever, shortness of breath, chest pain, cough up blood, excessive bleeding from the thoracentesis site, severe lightheadedness, or fainting call you doctor immediately or come to the closest Emergency Department.  Do not drive yourself.   ? If you have questions or concerns regarding your procedure call the radiologist.      ADDITIONAL INSTRUCTIONS: None      [facility] INTERVENTIONAL RADIOLOGY DEPARTMENT  Procedure Physician: Temo MEDELLIN      Date of procedure: January 22, 2018  Telephone Numbers: [phone]       Monday-Friday 8:00 am to 4:30 pm                                                          [phone]       After 4:30 pm Monday - Friday, Ask for the Interventional Radiologist on call.  Someone is on call 24 hrs/day  [facility] toll free number: [phone]    Monday-Friday 8:00 am to 4:30 pm  [facility] Emergency Dept: [phone]  _

## 2018-01-22 NOTE — PROGRESS NOTES
Pt received from IR with RN; pt awake and alert; thora drainage tube in place on Right lateral chest. Tube capped and dressing dry and intact. Plan for CT before second stage drainage from pleural space. Will keep NPO.

## 2018-01-22 NOTE — PROGRESS NOTES
1625 Pt returned from second drain.  at BS. Pt eating and drinking. 1655 Pt brendon po intake. Dc instructions reviewed with pt and pt's son,  at BS. Pt brendon ambulation. PIV dc'd. 1710 Pt dc'd home acc by son.

## 2018-02-05 NOTE — NURSING NOTE
Chief Complaint   Patient presents with     RECHECK     Follow up procedure/ thora and CT    Félix Rodriguez CMA

## 2018-02-05 NOTE — LETTER
"2/5/2018       RE: Yue Portillo  [address]   [facility] [address]     Dear Colleague,    Thank you for referring your patient, Yue Portillo, to the [facility] at [facility]. Please see a copy of my visit note below.    Service Date: 02/05/2018      CHIEF COMPLAINT:  Pulmonary nodule and upper airway cough syndrome.      HISTORY OF PRESENT ILLNESS:  The patient is an 78-year-old woman here for followup.  At our last visit, we discussed chronic cough, asthma and a pulmonary nodule that was found on the CT scan of her lungs.  She also has a pleural effusion related to cirrhosis.  Since our last visit, she had a thoracentesis followed by a CT scan of her chest to reevaluate the pulmonary nodule.  She says that the thoracentesis was very painful.  She describes them as having gone \"all the way through.\"  After the thoracentesis was done and CT scan was done she went home.  The next morning she had what she describes as abdominal bloating.  She has been experiencing early satiety and bloating since then.  I tried to work through what type of bloating this was and it is unclear to me.  It does not necessarily sound like gas pain, but they are not clearly describing a buildup of fluid in her stomach.  Regardless, she is continuing to have some decrease in her appetite and feels full after drinking as little as a cup of water.  She says that when she lies down at night to sleep she is wheezing.  She has 2 inhalers, but it sounds as if she is using them only before she goes to bed at night.  She is also complaining of a combination of nasal congestion and dryness as well as postnasal drip.  Her son says that whenever she talks she starts to cough.  If she is sitting still, she does not have any symptoms until she starts to talk.  She reports a history of environmental allergies but does not recall any specific treatment.      PAST MEDICAL " HISTORY:   1.  HCC.   2.  Asthma.   3.  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   4.  Upper airway cough syndrome.      FAMILY HISTORY:  Father had asthma, and she has a son with diabetes.      SOCIAL HISTORY:  Lifetime nonsmoker.  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.  She has been in [place] since 2004.  She has no pets.  She is seen today with her son and an .      REVIEW OF SYSTEMS:  A 10-point review of systems was done and is negative except as noted above and in the HPI.      PHYSICAL EXAMINATION:   VITAL SIGNS:  Blood pressure 138/87, pulse is 93, respiratory rate is 16, SpO2 is 100% on room air, BMI is 20.49.   GENERAL APPEARANCE:  Elderly woman in no distress.   EYES:  PERRL.  No conjunctival injection.   HENT:  Nasal mucosa is mildly erythematous, otherwise within normal limits.     MOUTH:  Oral mucosa is moist.  No posterior oropharyngeal erythema or exudate.   RESPIRATORY:  Good air movement bilaterally.  No wheezes, rales or rhonchi.   CARDIOVASCULAR:  Regular rate and rhythm, normal S1, S2.  No murmurs, rubs or gallops.      RESULTS:  CT chest reviewed in clinic today and discussed with the patient and her son.  Formal impression by Dr. Seaman.      IMPRESSION:   1.  Unchanged subtle ground-glass density in the lingula with solid component largely unchanged since 11/18/2017, ongoing followup is recommended.   2.  Improved right pleural effusion with improved aeration in the right lower lobe.   3.  A shrunken and cirrhotic liver with ascites and splenomegaly as well as post-treatment changes in hepatic segment 6.      ASSESSMENT AND PLAN:  The patient is an 78-year-old woman here for ongoing followup.     1.  Hepatic hydrothorax.  The patient had a recent thoracentesis.  She has had no clear changes in her breathing since then but is reporting increasing bloating.  She does have the presence of ascites on CT imaging and  I am wondering if she might have peritoneal fluid in addition to pleural fluid building up.  I did, at their request, place an order for an IR paracentesis.     2.  Pulmonary nodule.  I discussed with the patient and her son in detail.  A 3-month followup chest imaging was unchanged from prior and followup is recommended.  In general, Fleischner Society guidelines for semi solid pulmonary nodules suggest annual followup after an initial 3-6 month demonstration of stability.  I discussed this with them in detail today.  I placed an order for a CT scan to be done in 1 year with a followup visit with me.     3.  Upper airway cough syndrome.  The patient has chronic rhinitis and likely vocal cord irritation from upper airway cough syndrome.  She is complaining of nasal dryness and although she does not have any clear patches of ulceration or eschar it might be that the fluticasone nasal spray that she has been prescribed has been somewhat irritating.  I would recommend that she change to a saline nasal spray twice a day and Triamcinolone nasal spray once daily.  Since she has no prominent complaints of allergies, I would recommend that she more consistently take the cetirizine that has been previously prescribed to her.  This plan was discussed with her today.     4.  Reported history of asthma.  The patient has a personal and family history of reported asthma.  She has a mometasone/formoterol inhaler that she has been taking, I believe at night only.  I would recommend that she start taking it twice daily to help improve the wheeze that she is describing at night.      I will have the patient come back and see me in approximately 1 year or sooner as needed.         JET CABEZAS MD             D: 2018   T: 2018   MT: SHREYAS      Name:     WEI ELDRIDGE   MRN:      [id number]        Account:      [id number]   :      1940           Service Date: 2018      Document: [id number]

## 2018-02-05 NOTE — PROGRESS NOTES
"Service Date: 02/05/2018      CHIEF COMPLAINT:  Pulmonary nodule and upper airway cough syndrome.      HISTORY OF PRESENT ILLNESS:  The patient is an 78-year-old woman here for followup.  At our last visit, we discussed chronic cough, asthma and a pulmonary nodule that was found on the CT scan of her lungs.  She also has a pleural effusion related to cirrhosis.  Since our last visit, she had a thoracentesis followed by a CT scan of her chest to reevaluate the pulmonary nodule.  She says that the thoracentesis was very painful.  She describes them as having gone \"all the way through.\"  After the thoracentesis was done and CT scan was done she went home.  The next morning she had what she describes as abdominal bloating.  She has been experiencing early satiety and bloating since then.  I tried to work through what type of bloating this was and it is unclear to me.  It does not necessarily sound like gas pain, but they are not clearly describing a buildup of fluid in her stomach.  Regardless, she is continuing to have some decrease in her appetite and feels full after drinking as little as a cup of water.  She says that when she lies down at night to sleep she is wheezing.  She has 2 inhalers, but it sounds as if she is using them only before she goes to bed at night.  She is also complaining of a combination of nasal congestion and dryness as well as postnasal drip.  Her son says that whenever she talks she starts to cough.  If she is sitting still, she does not have any symptoms until she starts to talk.  She reports a history of environmental allergies but does not recall any specific treatment.      PAST MEDICAL HISTORY:   1.  HCC.   2.  Asthma.   3.  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   4.  Upper airway cough syndrome.      FAMILY HISTORY:  Father had asthma, and she has a son with diabetes.      SOCIAL HISTORY:  Lifetime nonsmoker.  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.  She has been in [place] since 2004.  She has no pets.  She is seen today with her son and an .      REVIEW OF SYSTEMS:  A 10-point review of systems was done and is negative except as noted above and in the HPI.      PHYSICAL EXAMINATION:   VITAL SIGNS:  Blood pressure 138/87, pulse is 93, respiratory rate is 16, SpO2 is 100% on room air, BMI is 20.49.   GENERAL APPEARANCE:  Elderly woman in no distress.   EYES:  PERRL.  No conjunctival injection.   HENT:  Nasal mucosa is mildly erythematous, otherwise within normal limits.     MOUTH:  Oral mucosa is moist.  No posterior oropharyngeal erythema or exudate.   RESPIRATORY:  Good air movement bilaterally.  No wheezes, rales or rhonchi.   CARDIOVASCULAR:  Regular rate and rhythm, normal S1, S2.  No murmurs, rubs or gallops.      RESULTS:  CT chest reviewed in clinic today and discussed with the patient and her son.  Formal impression by Dr. Seaman.      IMPRESSION:   1.  Unchanged subtle ground-glass density in the lingula with solid component largely unchanged since 11/18/2017, ongoing followup is recommended.   2.  Improved right pleural effusion with improved aeration in the right lower lobe.   3.  A shrunken and cirrhotic liver with ascites and splenomegaly as well as post-treatment changes in hepatic segment 6.      ASSESSMENT AND PLAN:  The patient is an 78-year-old woman here for ongoing followup.     1.  Hepatic hydrothorax.  The patient had a recent thoracentesis.  She has had no clear changes in her breathing since then but is reporting increasing bloating.  She does have the presence of ascites on CT imaging and I am wondering if she might have peritoneal fluid in addition to pleural fluid building up.  I did, at their request, place an order for an IR paracentesis.     2.  Pulmonary nodule.  I discussed with the patient and her son in detail.  A 3-month followup chest imaging was unchanged from prior and  followup is recommended.  In general, Fleischner Society guidelines for semi solid pulmonary nodules suggest annual followup after an initial 3-6 month demonstration of stability.  I discussed this with them in detail today.  I placed an order for a CT scan to be done in 1 year with a followup visit with me.     3.  Upper airway cough syndrome.  The patient has chronic rhinitis and likely vocal cord irritation from upper airway cough syndrome.  She is complaining of nasal dryness and although she does not have any clear patches of ulceration or eschar it might be that the fluticasone nasal spray that she has been prescribed has been somewhat irritating.  I would recommend that she change to a saline nasal spray twice a day and Triamcinolone nasal spray once daily.  Since she has no prominent complaints of allergies, I would recommend that she more consistently take the cetirizine that has been previously prescribed to her.  This plan was discussed with her today.     4.  Reported history of asthma.  The patient has a personal and family history of reported asthma.  She has a mometasone/formoterol inhaler that she has been taking, I believe at night only.  I would recommend that she start taking it twice daily to help improve the wheeze that she is describing at night.      I will have the patient come back and see me in approximately 1 year or sooner as needed.         JET CABEZAS MD             D: 2018   T: 2018   MT: SHREYAS      Name:     WEI ELDRIDGE   MRN:      [id number]        Account:      [id number]   :      1940           Service Date: 2018      Document: [id number]

## 2018-02-05 NOTE — PATIENT INSTRUCTIONS
It was nice to see you again today. We talked about several things:    1) pulmonary nodule (lung spot): the follow up CT scan shows no change in the size of the nodule, which is good. We should repeat the CT scan in 12 months to make sure it isn't changing.    2) nasal drainage: I would use a saline spray twice a day. Once a day please try the nasacort (replaces flonase). Please start taking Zyrtec (also known as ceterizine) once at night. Nasal drainage and allergies likely make you cough- treating it will help.    3) asthma: take the dulera TWICE every day. Rinse your mouth out afterward.    4) stomach bloating. I ordered a stomach drainage using ultrasound guidance in radiology. If there is not enough fluid to drain, the radiologist will not drain the fluid.    Darlene Renteria

## 2018-02-05 NOTE — MR AVS SNAPSHOT
After Visit Summary   2/5/2018    Yue Portillo    MRN: [id number]           Patient Information     Date Of Birth          1940        Visit Information        Provider Department      2/5/2018 2:15 PM Darlene Renteria MD; [facility]        Today's Diagnoses     Chronic nonseasonal allergic rhinitis due to pollen    -  1    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)        Pulmonary nodules          Care Instructions    It was nice to see you again today. We talked about several things:    1) pulmonary nodule (lung spot): the follow up CT scan shows no change in the size of the nodule, which is good. We should repeat the CT scan in 12 months to make sure it isn't changing.    2) nasal drainage: I would use a saline spray twice a day. Once a day please try the nasacort (replaces flonase). Please start taking Zyrtec (also known as ceterizine) once at night. Nasal drainage and allergies likely make you cough- treating it will help.    3) asthma: take the dulera TWICE every day. Rinse your mouth out afterward.    4) stomach bloating. I ordered a stomach drainage using ultrasound guidance in radiology. If there is not enough fluid to drain, the radiologist will not drain the fluid.    Darlene Renteria            Follow-ups after your visit        Follow-up notes from your care team     Return in about 1 year (around 2/5/2019).      Your next 10 appointments already scheduled     Feb 14, 2018 10:15 AM CST   Lab with  LAB   [facility] ([facility])    [address]  1st Floor  [facility] [address] [phone]            Feb 14, 2018 11:10 AM CST   (Arrive by 10:55 AM)   Return General Liver with Tala Meyer MD   [facility] ([facility])    [address]  [address]  [facility] [address] [phone]            Feb 04, 2019  12:40 PM CST   (Arrive by 12:25 PM)   CT CHEST W/O CONTRAST with UCCT1   [facility] ([facility])    [address]  1st [address]  [facility] [address] [phone]           Please bring any scans or X-rays taken at other hospitals, if similar tests were done. Also bring a list of your medicines, including vitamins, minerals and over-the-counter drugs. It is safest to leave personal items at home.  Be sure to tell your doctor:   If you have any allergies.   If there s any chance you are pregnant.   If you are breastfeeding.   If you have any special needs.  You do not need to do anything special to prepare.  Please wear loose clothing, such as a sweat suit or jogging clothes. Avoid snaps, zippers and other metal. We may ask you to undress and put on a hospital gown.            Feb 04, 2019  1:00 PM CST   (Arrive by 12:45 PM)   Return Visit with Darlene Renteria MD   [facility] ([facility])    [address] [address] [phone]              Future tests that were ordered for you today     Open Future Orders        Priority Expected Expires Ordered    CT Chest w/o Contrast Routine  2/5/2019 2/5/2018    IR Paracentesis Routine  2/5/2019 2/5/2018            Who to contact     If you have questions or need follow up information about today's clinic visit or your schedule please contact [facility] directly at [phone].  Normal or non-critical lab and imaging results will be communicated to you by MyChart, letter or phone within 4 business days after the clinic has received the results. If you do not hear from us within 7 days, please contact the clinic through MyChart or phone. If you have a critical or abnormal lab result, we will notify you by phone as soon as possible.  Submit refill requests through [facility] or call your  "pharmacy and they will forward the refill request to us. Please allow 3 business days for your refill to be completed.          Additional Information About Your Visit        Care EveryWhere ID     This is your Care EveryWhere ID. This could be used by other organizations to access your [facility] medical records  [id number]        Your Vitals Were     Pulse Respirations Height Pulse Oximetry BMI (Body Mass Index)       93 16 1.575 m (5' 2\") 100% 20.49 kg/m2        Blood Pressure from Last 3 Encounters:   02/05/18 138/87   01/22/18 106/58   12/13/17 133/81    Weight from Last 3 Encounters:   02/05/18 50.8 kg (112 lb)   12/13/17 49.6 kg (109 lb 6.4 oz)   12/11/17 48.1 kg (106 lb)                 Today's Medication Changes          These changes are accurate as of 2/5/18  4:01 PM.  If you have any questions, ask your nurse or doctor.               Start taking these medicines.        Dose/Directions    sodium chloride 0.65 % nasal spray   Commonly known as:  SALINE MIST   Used for:  Chronic nonseasonal allergic rhinitis due to pollen   Started by:  Darlene Renteria MD        Dose:  1 spray   Spray 1 spray into both nostrils 2 times daily   Quantity:  1 Bottle   Refills:  11       triamcinolone 55 MCG/ACT Inhaler   Commonly known as:  NASACORT AQ   Used for:  Chronic nonseasonal allergic rhinitis due to pollen   Started by:  Darlene Renteria MD        Dose:  2 spray   Spray 2 sprays into both nostrils daily   Quantity:  1 Bottle   Refills:  11         Stop taking these medicines if you haven't already. Please contact your care team if you have questions.     fluticasone 50 MCG/ACT spray   Commonly known as:  FLONASE   Stopped by:  Darlene Renteria MD                Where to get your medicines      [facility] [place] [address] [address]     Phone:  [phone]     sodium " chloride 0.65 % nasal spray    triamcinolone 55 MCG/ACT Inhaler                Primary Care Provider Office Phone # Fax #    Felicitas Truman Horton [phone]       [address] [address]        Equal Access to Services     LUKE DUVAL : Hadii nico ku hadchipo Somichealali, waaxda luqadaha, qaybta kaalmada adecindykiara, rosana harmonyarielally lance. So [facility] [phone].    ATENCIÓN: Si habla español, tiene a martinez disposición servicios gratuitos de asistencia lingüística. [facility] [phone].    We comply with applicable federal civil rights laws and [place] laws. We do not discriminate on the basis of race, color, national origin, age, disability, sex, sexual orientation, or gender identity.            Thank you!     Thank you for choosing [facility]  for your care. Our goal is always to provide you with excellent care. Hearing back from our patients is one way we can continue to improve our services. Please take a few minutes to complete the written survey that you may receive in the mail after your visit with us. Thank you!             Your Updated Medication List - Protect others around you: Learn how to safely use, store and throw away your medicines at www.disposemymeds.org.          This list is accurate as of 2/5/18  4:01 PM.  Always use your most recent med list.                   Brand Name Dispense Instructions for use Diagnosis    * albuterol 108 (90 BASE) MCG/ACT Inhaler    PROAIR HFA/PROVENTIL HFA/VENTOLIN HFA    1 Inhaler    Inhale 2 puffs into the lungs every 6 hours as needed for shortness of breath / dyspnea or wheezing    Moderate persistent asthma without complication       * albuterol (2.5 MG/3ML) 0.083% neb solution     25 vial    Take 1 vial (2.5 mg) by nebulization every 6 hours as needed for shortness of breath / dyspnea or wheezing    Mild intermittent asthma with acute exacerbation       bisacodyl 5 MG EC  tablet    DULCOLAX    30 tablet    Take 1 tablet (5 mg) by mouth daily as needed for constipation    Constipation, unspecified constipation type       BOOST CALORIE SMART Liqd     90 Bottle    Take 3 Cans by mouth daily    HCC (hepatocellular carcinoma) (H)       cetirizine 10 MG tablet    zyrTEC    30 tablet    Take 1 tablet (10 mg) by mouth every evening    Chronic allergic conjunctivitis       DAILY MULTIPLE VITAMIN/IRON Tabs     30 tablet    Take 1 tablet by mouth daily    Chronic hepatitis C without hepatic coma (H)       furosemide 20 MG tablet    LASIX    180 tablet    Take 1 tablet (20 mg) by mouth daily    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)       lactulose 20 GM/30ML Soln     946 mL    Take 30 mLs by mouth daily    Cirrhosis of liver with ascites, unspecified hepatic cirrhosis type (H), HCC (hepatocellular carcinoma) (H)       mometasone-formoterol 200-5 MCG/ACT oral inhaler    DULERA    1 Inhaler    Inhale 2 puffs into the lungs 2 times daily    Moderate persistent asthma without complication       omeprazole 40 MG capsule    priLOSEC    90 capsule    Take 1 capsule (40 mg) by mouth daily Take 30-60 minutes before a meal.    Gastroesophageal reflux disease without esophagitis       polyethylene glycol powder    MIRALAX    510 g    Take 17 g (1 capful) by mouth 3 times daily as needed for constipation    Constipation, unspecified constipation type       sodium chloride 0.65 % nasal spray    SALINE MIST    1 Bottle    Spray 1 spray into both nostrils 2 times daily    Chronic nonseasonal allergic rhinitis due to pollen       spironolactone 50 MG tablet    ALDACTONE    30 tablet    Take 1 tablet (50 mg) by mouth daily    Chronic hepatitis C without hepatic coma (H)       triamcinolone 55 MCG/ACT Inhaler    NASACORT AQ    1 Bottle    Spray 2 sprays into both nostrils daily    Chronic nonseasonal allergic rhinitis due to pollen       * Notice:  This list has 2 medication(s) that are the same as other  medications prescribed for you. Read the directions carefully, and ask your doctor or other care provider to review them with you.

## 2018-02-12 NOTE — ED AVS SNAPSHOT
[facility] [place], Emergency Department    [address] [place] [address]    Phone:  [phone]                                       Yue Portillo   MRN: [id number]    Department:  [facility], Emergency Department   Date of Visit:  2/12/2018           After Visit Summary Signature Page     I have received my discharge instructions, and my questions have been answered. I have discussed any challenges I see with this plan with the nurse or doctor.    ..........................................................................................................................................  Patient/Patient Representative Signature      ..........................................................................................................................................  Patient Representative Print Name and Relationship to Patient    ..................................................               ................................................  Date                                            Time    ..........................................................................................................................................  Reviewed by Signature/Title    ...................................................              ..............................................  Date                                                            Time

## 2018-02-12 NOTE — Clinical Note
Bed request comments: HCC with recurrent right pleural effusion with sob, orthopnea, n/v decrease po.

## 2018-02-12 NOTE — ED AVS SNAPSHOT
" [facility], Emergency Department    [address] [address]    Phone:  [phone]                                       Yue Portillo   MRN: [id number]    Department:  [facility], Emergency Department   Date of Visit:  2/12/2018           Patient Information     Date Of Birth          1940        Your diagnoses for this visit were:     Recurrent right pleural effusion     SOB (shortness of breath)     HCC (hepatocellular carcinoma) (H)        You were seen by Frank Sood MD.        Discharge Instructions       Home.  You understand that we would like to admit you to the hospital for possible drain placement in lung and also further evaluation of abdominal fluid.  You want to go home and agree to leave \"against medical advice\".  You will return if any concerns.  Follow up with MD Wednesday as planned.        Future Appointments        Provider Department Dept Phone Center    2/14/2018 10:15 AM GURVINDER CHAVARRIA; Lab [facility] Lab [phone] [facility]    2/14/2018 10:55 AM Tala Meyer MD; LANGUAGE DEON [facility] Hepatology [phone] [facility]    2/14/2018 12:00 PM Specialty Infusion Paracentesis Provider; GURVINDER CHAVARRIA; [facility] [place] [facility] [phone] [facility]    2/4/2019 12:40 PM [facility] CT ROOM 1 [facility] [phone] [facility]    2/4/2019 1:00 PM Darlene Renteria MD [facility] [phone] [facility]      24 Hour Appointment Hotline       To make an appointment at any [facility], call [phone] ([phone]). If you don't have a family doctor or clinic, we will help you find one. [place] clinics are conveniently located to serve the needs of you and your family.             Review of your medicines      Our records show that you are taking the medicines listed below. If these are incorrect, please call your family doctor or " clinic.        Dose / Directions Last dose taken    * albuterol 108 (90 BASE) MCG/ACT Inhaler   Commonly known as:  PROAIR HFA/PROVENTIL HFA/VENTOLIN HFA   Dose:  2 puff   Quantity:  1 Inhaler        Inhale 2 puffs into the lungs every 6 hours as needed for shortness of breath / dyspnea or wheezing   Refills:  11        * albuterol (2.5 MG/3ML) 0.083% neb solution   Dose:  1 vial   Quantity:  25 vial        Take 1 vial (2.5 mg) by nebulization every 6 hours as needed for shortness of breath / dyspnea or wheezing   Refills:  3        BOOST CALORIE SMART Liqd   Dose:  3 Can   Quantity:  90 Bottle        Take 3 Cans by mouth daily   Refills:  11        cetirizine 10 MG tablet   Commonly known as:  zyrTEC   Dose:  10 mg   Quantity:  30 tablet        Take 1 tablet (10 mg) by mouth every evening   Refills:  6        DAILY MULTIPLE VITAMIN/IRON Tabs   Dose:  1 tablet   Quantity:  30 tablet        Take 1 tablet by mouth daily   Refills:  11        furosemide 20 MG tablet   Commonly known as:  LASIX   Dose:  20 mg   Quantity:  180 tablet        Take 1 tablet (20 mg) by mouth daily   Refills:  1        mometasone-formoterol 200-5 MCG/ACT oral inhaler   Commonly known as:  DULERA   Dose:  2 puff   Quantity:  1 Inhaler        Inhale 2 puffs into the lungs 2 times daily   Refills:  12        omeprazole 40 MG capsule   Commonly known as:  priLOSEC   Dose:  40 mg   Quantity:  90 capsule        Take 1 capsule (40 mg) by mouth daily Take 30-60 minutes before a meal.   Refills:  3        polyethylene glycol powder   Commonly known as:  MIRALAX   Dose:  1 capful   Quantity:  510 g        Take 17 g (1 capful) by mouth 3 times daily as needed for constipation   Refills:  3        sodium chloride 0.65 % nasal spray   Commonly known as:  SALINE MIST   Dose:  1 spray   Quantity:  1 Bottle        Spray 1 spray into both nostrils 2 times daily   Refills:  11        triamcinolone 55 MCG/ACT Inhaler   Commonly known as:  NASACORT AQ   Dose:  2  spray   Quantity:  1 Bottle        Spray 2 sprays into both nostrils daily   Refills:  11        * Notice:  This list has 2 medication(s) that are the same as other medications prescribed for you. Read the directions carefully, and ask your doctor or other care provider to review them with you.            Procedures and tests performed during your visit     Amylase  fluid    Anaerobic bacterial culture    CBC with platelets differential    Cardiac Continuous Monitoring    Cell count with differential fluid    Cholesterol fluid    Comprehensive metabolic panel    Cytology non gyn    ED Bed Request    EKG 12-lead, tracing only    Fluid Culture Aerobic Bacterial    Fungus Culture, non-blood    Glucose fluid    Gram stain    INR    IR Thoracentesis    Interventional Radiology Adult/Peds IP Consult: Patient to be seen: Routine within 24 hours; Call back #: [phone]; Thoracentesis    Interventional Radiology Adult/Peds IP Consult: Patient to be seen: URGENT within 4 hours; Call back #: [phone]; recurrent right pleural effusion needs thoracentesis    Lactate dehydrogenase fluid    Lactic acid whole blood    Lipase    Nt probnp inpatient (BNP)    Partial thromboplastin time    Peripheral IV catheter    Protein fluid    Pulse oximetry nursing    Triglyceride Fluid    Troponin I    XR Chest Port 1 View    pH fluid      Orders Needing Specimen Collection     Ordered          02/12/18 1519  UA with Microscopic reflex to Culture - STAT, Prio: STAT, Needs to be Collected     Scheduled Task Status   02/12/18 1520 Collect UA with Microscopic reflex to Culture Open   Order Class:  PCU Collect                  Pending Results     Date and Time Order Name Status Description    2/12/2018 1911 IR Thoracentesis In process     2/12/2018 1723 Cytology non gyn In process     2/12/2018 1723 Fungus Culture, non-blood In process     2/12/2018 1723 Cholesterol fluid In process     2/12/2018 1723 Triglyceride Fluid In process     2/12/2018  1723 Anaerobic bacterial culture In process     2/12/2018 1723 Amylase  fluid In process     2/12/2018 1723 Protein fluid In process     2/12/2018 1723 Lactate dehydrogenase fluid In process     2/12/2018 1723 Gram stain In process     2/12/2018 1723 Glucose fluid In process     2/12/2018 1723 Fluid Culture Aerobic Bacterial In process     2/12/2018 1723 Cell count with differential fluid In process     2/12/2018 1519 EKG 12-lead, tracing only Preliminary             Pending Culture Results     Date and Time Order Name Status Description    2/12/2018 1723 Cytology non gyn In process     2/12/2018 1723 Fungus Culture, non-blood In process     2/12/2018 1723 Cholesterol fluid In process     2/12/2018 1723 Triglyceride Fluid In process     2/12/2018 1723 Anaerobic bacterial culture In process     2/12/2018 1723 Amylase  fluid In process     2/12/2018 1723 Protein fluid In process     2/12/2018 1723 Lactate dehydrogenase fluid In process     2/12/2018 1723 Gram stain In process     2/12/2018 1723 Glucose fluid In process     2/12/2018 1723 Fluid Culture Aerobic Bacterial In process     2/12/2018 1723 Cell count with differential fluid In process             Pending Results Instructions     If you had any lab results that were not finalized at the time of your Discharge, you can call the ED Lab Result RN at [phone]. You will be contacted by this team for any positive Lab results or changes in treatment. The nurses are available 7 days a week from 10A to 6:30P.  You can leave a message 24 hours per day and they will return your call.        Thank you for choosing [facility]       Thank you for choosing [facility] for your care. Our goal is always to provide you with excellent care. Hearing back from our patients is one way we can continue to improve our services. Please take a few minutes to complete the written survey that you may receive in the mail after you visit with us. Thank you!        MyChart Information      "clipkit lets you send messages to your doctor, view your test results, renew your prescriptions, schedule appointments and more. To sign up, go to [email or web address] . Click on \"Log in\" on the left side of the screen, which will take you to the Welcome page. Then click on \"Sign up Now\" on the right side of the page.     You will be asked to enter the access code listed below, as well as some personal information. Please follow the directions to create your username and password.     Your access code is: [email or web address]  Expires: 2018  8:49 PM     Your access code will  in 90 days. If you need help or a new code, please call your [place] clinic or [phone].        Care EveryWhere ID     This is your Care EveryWhere ID. This could be used by other organizations to access your [place] medical records  [id number]        Equal Access to Services     LUKE DUVAL : Mukul Jose, lyudmila rivera, raffy sheridan, rosana ricks . So [facility] [phone].    ATENCIÓN: Si habla español, tiene a martinez disposición servicios gratuitos de asistencia lingüística. Llame al [phone].    We comply with applicable federal civil rights laws and [place] laws. We do not discriminate on the basis of race, color, national origin, age, disability, sex, sexual orientation, or gender identity.            After Visit Summary       This is your record. Keep this with you and show to your community pharmacist(s) and doctor(s) at your next visit.                  "

## 2018-02-12 NOTE — ED PROVIDER NOTES
History     Chief Complaint   Patient presents with     Shortness of Breath     The history is provided by a relative.     Yue Portillo is a 78 year old female with a history of asthma, cirrhosis of the liver, and hepatocellular carcinoma who presents to the ED due to shortness of breath. Per chart, she has had pleural effusions and ascites with frequent paracentesis. She recently had thoracentesis (1/22) where 2000 mL of clear serous pleural fluid was removed. Patient's son states that she has increasing shortness of breath, cough, bilateral upper abdominal pain, and growing distended abdomen. Patient denies fever.  Patient now here feeling she needs to get the fluid drained from her lungs and also abdomen.  Denies fever minimal abdominal pain.  No reports of diarrhea blood in the stool.  No reports of hematemesis.  No chest pain associated with this no fevers otherwise.    No current facility-administered medications for this encounter.      Current Outpatient Prescriptions   Medication     sodium chloride (SALINE MIST) 0.65 % nasal spray     polyethylene glycol (MIRALAX) powder     furosemide (LASIX) 20 MG tablet     albuterol (2.5 MG/3ML) 0.083% neb solution     Multiple Vitamins-Iron (DAILY MULTIPLE VITAMIN/IRON) TABS     omeprazole (PRILOSEC) 40 MG capsule     cetirizine (ZYRTEC) 10 MG tablet     Nutritional Supplements (BOOST CALORIE SMART) LIQD     mometasone-formoterol (DULERA) 200-5 MCG/ACT oral inhaler     albuterol (PROAIR HFA/PROVENTIL HFA/VENTOLIN HFA) 108 (90 BASE) MCG/ACT Inhaler     triamcinolone (NASACORT AQ) 55 MCG/ACT Inhaler     Past Medical History:   Diagnosis Date     Hepatitis C      Hepatocellular carcinoma (H)      Liver disease      Mild intermittent asthma      Pleural effusion associated with hepatic disorder        Past Surgical History:   Procedure Laterality Date     CATARACT IOL, RT/LT Right 08/22/2017    s/p CE/IOL right eye     EYE SURGERY       H PYLORI SHYLA SCREEN      was  treated for h pylori     NO HISTORY OF SURGERY         Family History   Problem Relation Age of Onset     Respiratory Father      asthma     DIABETES Son      Glaucoma No family hx of      Macular Degeneration No family hx of      Retinal detachment No family hx of      Amblyopia No family hx of        Social History   Substance Use Topics     Smoking status: Never Smoker     Smokeless tobacco: Never Used     Alcohol use No     Allergies   Allergen Reactions     Dust Mites Cough     Sneezing      Seasonal Allergies        I have reviewed the Medications, Allergies, Past Medical and Surgical History, and Social History in the Epic system.    Review of Systems   Constitutional: Positive for activity change, appetite change and fatigue. Negative for diaphoresis and fever.        Patient concerned that she short of breath cannot lay flat is not eating feel generally weak.   HENT: Negative for congestion and facial swelling.    Eyes: Negative for redness and visual disturbance.   Respiratory: Positive for cough and shortness of breath. Negative for choking and chest tightness.    Cardiovascular: Negative for chest pain and leg swelling.   Gastrointestinal: Positive for abdominal distention and abdominal pain (bilateral upper abdominal pain mild).   Genitourinary: Negative for decreased urine volume and difficulty urinating.   Musculoskeletal: Negative for arthralgias, gait problem, neck pain and neck stiffness.   Skin: Negative for color change and rash.   Allergic/Immunologic: Negative for immunocompromised state.   Neurological: Positive for weakness. Negative for headaches.   Psychiatric/Behavioral: Positive for decreased concentration and dysphoric mood. Negative for confusion.   All other systems reviewed and are negative.      Physical Exam   BP: 128/77  Pulse: 80  Heart Rate: 101  Temp: 97.9  F (36.6  C)  Resp: 18  SpO2: 97 %      Physical Exam   Constitutional: She is oriented to person, place, and time. She  appears well-developed and well-nourished. She appears distressed.   General patient sitting upright vital signs are stable.  Patient tight cough mild respiratory distress son along with  here throughout the ER course to interpret.   HENT:   Head: Normocephalic and atraumatic.   No facial swelling   Eyes: EOM are normal. Pupils are equal, round, and reactive to light. No scleral icterus.   Neck: Normal range of motion. Neck supple. No JVD present.   Cardiovascular: Regular rhythm.    Pulmonary/Chest: No stridor. She is in respiratory distress.   Decreased breath sounds on the right.  Tight cough noted no audible wheezing.   Abdominal: She exhibits distension. She exhibits no mass. There is no tenderness. There is no rebound and no guarding.   Patient's abdomen is mildly distended but very soft at this point I do not elicit any rebound guarding rigidity.   Musculoskeletal: She exhibits no edema, tenderness or deformity.   Neurological: She is alert and oriented to person, place, and time. She has normal reflexes. No cranial nerve deficit. Coordination normal.   Skin: Skin is warm and dry. No rash noted. She is not diaphoretic. No erythema. No pallor.   Psychiatric:   Mildly anxious   Nursing note and vitals reviewed.      ED Course   3:13 PM  The patient was seen and examined by Dr. Sood in Room 15.     ED Course   Patient evaluated the ER.  Records reviewed in epic with previous paracentesis and thoracentesis done.  Discussed case with oncology also as feel patient needs to be admitted.  In the ER a chest x-ray was done revealing large right pleural effusion.  Other laboratory testing troponin was negative BNP is 158.  Lactic acid 2.8 initially.  Glucose 135 liver function tests slightly elevated as noted  and ALT 51 alk phos 258 and total bilirubin of 3.4.  INR is 1.56.  White count 3.2 hemoglobin 10.7 platelets 73.    Review records there is some recommendations of placing a pigtail catheter in  to help minimize recurrent thoracentesis.  Discussed that with family and patient.  Discussed with interventional radiology also they would agree to do a thoracentesis this evening.  Patient went to interventional radiology and have this procedure done.  Tolerated the procedure well.  Upon return patient wants to go home.  Discussed with them regarding concerns of ongoing abdominal distention is needing possible paracentesis.  Also concerns for needing to place a potential catheter to help drainage of the recurrent pleural effusions on the right and also with the nausea vomiting not eating well fatigue felt she would be better served overnight.  The family medicine service is down here also seen the patient the patient declines.  Patient is going to follow-up with her physician Wednesday.  Patient return if any worsening symptoms.  Patient understands want to admit her but declines at this point with the family  etc. patient will be discharged AGAINST MEDICAL ADVICE.    Procedures             EKG Interpretation:      Interpreted by Frank Sood  Time reviewed: 1521  Symptoms at time of EKG: sob   Rhythm: Sinus tachycardia  Rate: 105  Axis: normal  Ectopy: none  Conduction: Low-voltage  ST Segments/ T Waves: No ST-T wave changes  Q Waves: none  Comparison to prior: Patient with lower voltage than normal    Clinical Impression: Sinus tachycardia with low voltage findings          Critical Care time:  none             Labs Ordered and Resulted from Time of ED Arrival Up to the Time of Departure from the ED   CBC WITH PLATELETS DIFFERENTIAL - Abnormal; Notable for the following:        Result Value    WBC 3.2 (*)     RBC Count 3.20 (*)     Hemoglobin 10.7 (*)     Hematocrit 32.4 (*)      (*)     MCH 33.4 (*)     RDW 15.9 (*)     Platelet Count 73 (*)     Absolute Lymphocytes 0.6 (*)     All other components within normal limits   PARTIAL THROMBOPLASTIN TIME - Abnormal; Notable for the following:      PTT 42 (*)     All other components within normal limits   INR - Abnormal; Notable for the following:     INR 1.56 (*)     All other components within normal limits   COMPREHENSIVE METABOLIC PANEL - Abnormal; Notable for the following:     Glucose 135 (*)     Calcium 7.9 (*)     Bilirubin Total 3.4 (*)     Albumin 1.9 (*)     Alkaline Phosphatase 258 (*)     ALT 51 (*)      (*)     All other components within normal limits   LACTIC ACID WHOLE BLOOD - Abnormal; Notable for the following:     Lactic Acid 2.8 (*)     All other components within normal limits   LIPASE   NT PROBNP INPATIENT   TROPONIN I   CYTOLOGY NON GYN   PULSE OXIMETRY NURSING   CARDIAC CONTINUOUS MONITORING   PERIPHERAL IV CATHETER   PH FLUID     Results for orders placed or performed during the hospital encounter of 02/12/18   XR Chest Port 1 View    Narrative    EXAM: XR CHEST PORT 1 VW  2/12/2018 3:41 PM      HISTORY: Shortness breath    COMPARISON: Radiograph 10/30/2017. CT 1/22/2018.    FINDINGS: AP radiograph of the chest. Increased size of the large  right pleural effusion, with near complete collapse of the right lung.  Patchy opacities in the left lung. Cardiac silhouette is obscured.      Impression    IMPRESSION:   1. Increased size of the large right pleural effusion, with near  complete collapse of the right lung.  2. Patchy opacities in the left lung, which may indicate pulmonary  edema or infection.    I have personally reviewed the examination and initial interpretation  and I agree with the findings.    DA SINGER MD   IR Thoracentesis    Narrative    Procedure date: 2/12/2018.    History: Patient with history of cirrhosis and hepatic right  hydrothorax, here for ultrasound guided thoracentesis.    Procedure: Diagnostic and therapeutic thoracentesis.    Preop diagnosis: Right pleural effusion.    Postop diagnosis: Right  pleural effusion, improved.    Staff: Molina Worthington MD    Fellow: Salvatore Manning  MAVERICK.D.    Medications: 5 ml 1% lidocaine used for local anesthesia.    Nursing: Throughout the procedure the patient's vital signs and oxygen  saturations were continuously monitored by nursing staff under my  supervision, and remained stable.    Fluoroscopy time: None.    IV contrast: None.    Consent: Informed written and verbal consent was obtained.    Procedure/Findings:  The patient was placed in the upright position on  the gurney, and limited ultrasound evaluation of the right posterior  hemithorax revealed a large pleural effusion. The area overlying  the  right posterior hemithorax was prepped and draped in usual sterile  fashion. Under ultrasound guidance, the area overlying the effusion  was anesthetized to the pleura with 1% lidocaine. Under ultrasound  guidance a 5 [place], 10 cm straight Effective Measure needle/catheter was advanced  into the fluid collection, with initial return of serous yellow-tinged  fluid. The catheter was advanced off the needle into the pleural space  and the needle was removed. 120 cc were aspirated and sent to lab for  analysis. Extension tubing was then connected to the catheter and  vacuum drainage. 1620 cc' s of fluid was aspirated.  Repeat ultrasound  revealed small to moderate fluid remaining.  The catheter was removed  with the patient performing a Valsalva maneuver.  The site was  cleansed and dressed.  Images were saved throughout the procedure. The  procedure was well tolerated, with no immediate complications.  Lab  results pending.    Estimate blood loss: Less than 1 cc.    Specimens: 120 mL fluid sent to lab.      Impression    Impression: Ultrasound guided right thoracentesis. Total of 1620 cc of  right pleural fluid drained.    Plan: Patient transferred back to ER stable condition.  Interventional  radiology post procedure standing orders for thoracentesis.      Procedure performed by Dr. Manning under my supervision.  I, Dr. Molina Worthington, was present for the critical  portion of the  procedure and was immediately available..    I have personally reviewed the examination and initial interpretation  and I agree with the findings.    TEODORA GARRIDO MD   CBC with platelets differential   Result Value Ref Range    WBC 3.2 (L) 4.0 - 11.0 10e9/L    RBC Count 3.20 (L) 3.8 - 5.2 10e12/L    Hemoglobin 10.7 (L) 11.7 - 15.7 g/dL    Hematocrit 32.4 (L) 35.0 - 47.0 %     (H) 78 - 100 fl    MCH 33.4 (H) 26.5 - 33.0 pg    MCHC 33.0 31.5 - 36.5 g/dL    RDW 15.9 (H) 10.0 - 15.0 %    Platelet Count 73 (L) 150 - 450 10e9/L    Diff Method Automated Method     % Neutrophils 64.1 %    % Lymphocytes 17.5 %    % Monocytes 12.5 %    % Eosinophils 5.6 %    % Basophils 0.3 %    % Immature Granulocytes 0.0 %    Nucleated RBCs 0 0 /100    Absolute Neutrophil 2.1 1.6 - 8.3 10e9/L    Absolute Lymphocytes 0.6 (L) 0.8 - 5.3 10e9/L    Absolute Monocytes 0.4 0.0 - 1.3 10e9/L    Absolute Eosinophils 0.2 0.0 - 0.7 10e9/L    Absolute Basophils 0.0 0.0 - 0.2 10e9/L    Abs Immature Granulocytes 0.0 0 - 0.4 10e9/L    Absolute Nucleated RBC 0.0    Partial thromboplastin time   Result Value Ref Range    PTT 42 (H) 22 - 37 sec   INR   Result Value Ref Range    INR 1.56 (H) 0.86 - 1.14   Comprehensive metabolic panel   Result Value Ref Range    Sodium 136 133 - 144 mmol/L    Potassium 4.2 3.4 - 5.3 mmol/L    Chloride 104 94 - 109 mmol/L    Carbon Dioxide 25 20 - 32 mmol/L    Anion Gap 7 3 - 14 mmol/L    Glucose 135 (H) 70 - 99 mg/dL    Urea Nitrogen 17 7 - 30 mg/dL    Creatinine 0.85 0.52 - 1.04 mg/dL    GFR Estimate 64 >60 mL/min/1.7m2    GFR Estimate If Black 77 >60 mL/min/1.7m2    Calcium 7.9 (L) 8.5 - 10.1 mg/dL    Bilirubin Total 3.4 (H) 0.2 - 1.3 mg/dL    Albumin 1.9 (L) 3.4 - 5.0 g/dL    Protein Total 6.9 6.8 - 8.8 g/dL    Alkaline Phosphatase 258 (H) 40 - 150 U/L    ALT 51 (H) 0 - 50 U/L     (H) 0 - 45 U/L   Lipase   Result Value Ref Range    Lipase 254 73 - 393 U/L   Lactic acid whole blood    Result Value Ref Range    Lactic Acid 2.8 (H) 0.7 - 2.0 mmol/L   Nt probnp inpatient (BNP)   Result Value Ref Range    N-Terminal Pro BNP Inpatient 158 0 - 1800 pg/mL   Troponin I   Result Value Ref Range    Troponin I ES <0.015 0.000 - 0.045 ug/L   EKG 12-lead, tracing only   Result Value Ref Range    Interpretation ECG Click View Image link to view waveform and result    Interventional Radiology Adult/Peds IP Consult: Patient to be seen: URGENT within 4 hours; Call back #: [phone]; recurrent right pleural effusion needs thoracentesis    Salvatore Epps MD     2/12/2018  7:16 PM    A collaboration between [facility] and   [facility]  Experts in minimally invasive, targeted treatments performed   using imaging guidance    Interventional Radiology Consult Service Note    Patient Name:  Yue Portillo   YOB: 1940  Medical Record Number (MRN):  [id number]  Age:  78 year old female  Patient location / Unit:  ER    Requested IR consult:  R thoracentesis    Indication / Associated Diagnosis: R pleural effusion, recurrent    Complete Blood Count:  Lab Results   Component Value Date    PLT 73 02/12/2018       Coagulation:  Lab Results   Component Value Date    INR 1.56 02/12/2018       -----    Associated Imaging:  CXR2/12/2018        Pertinent imaging reviewed with IR Dr. Worthington    -----    Patient will be placed on the IR schedule 2/12/2018 for a R   thoracentesis    Pre-procedure hematology and coagulation labs per IR protocols   are WNL for procedure.      Pre-procedure for NPO status, skin site cleansing scrubs No NPO   required.    Medications to be held include: None    Consent will be done prior to procedure.     You may contact the IR charge RN at [phone] for an estimated time   of procedure for this inpatient.     Case discussed with Dr. Frank Sood.      [phone] (IR RN control desk)  [phone] ([place] IR call  pager)        Patient Data:    Past Medical History:   Diagnosis Date     Hepatitis C      Hepatocellular carcinoma (H)      Liver disease      Mild intermittent asthma      Pleural effusion associated with hepatic disorder          Patient Active Problem List    Diagnosis Date Noted     Mild persistent asthma without complication 05/09/2016     Priority: Medium     HCC (hepatocellular carcinoma) (H) 12/22/2014     Priority: Medium     Diagnosed at [place]. In records from September 2014. Son does not   believe pt has cancer. See note 7/13/15.       Cirrhosis of liver (H) 01/23/2014     Priority: Medium     Postmenopausal bleeding 04/01/2013     Priority: Medium     4/2013-HPV negative, EMB done, pelvic U/S done; referred to   OB/GYN for possible D&C.       Vitamin D deficiency 01/04/2013     Priority: Medium     Ear pain 11/26/2012     Priority: Medium     Unknown etiology, controlled with dermotic       Health Care Home 09/11/2012     Priority: Medium     Tier 1  DX V65.8 REPLACED WITH [id number] HEALTH CARE HOME (04/08/2013)       Allergic rhinitis 09/11/2012     Priority: Medium     Problem list name updated by automated process. 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) 09/11/2012     Priority: Medium     Chronic allergic conjunctivitis 09/11/2012     Priority: Medium     Chronic tension type headache 09/11/2012     Priority: Medium     Constipation 09/11/2012     Priority: Medium     Depression 09/11/2012     Priority: Medium     Esophageal reflux 09/11/2012     Priority: Medium     Generalized osteoarthritis 09/11/2012     Priority: Medium     Generalized pain 09/11/2012     Priority: Medium     Heartburn 09/11/2012     Priority: Medium     Insomnia 09/11/2012     Priority: Medium     Lightheadedness 09/11/2012     Priority: Medium     Lower back pain 09/11/2012     Priority: Medium         Prescription Medications as of 2/12/2018             furosemide (LASIX) 20 MG tablet Take 1 tablet (20 mg) by  mouth   daily    spironolactone (ALDACTONE) 50 MG tablet Take 1 tablet (50 mg) by   mouth daily    lactulose 20 GM/30ML SOLN Take 30 mLs by mouth daily    triamcinolone (NASACORT AQ) 55 MCG/ACT Inhaler Spray 2 sprays   into both nostrils daily    sodium chloride (SALINE MIST) 0.65 % nasal spray Spray 1 spray   into both nostrils 2 times daily    polyethylene glycol (MIRALAX) powder Take 17 g (1 capful) by   mouth 3 times daily as needed for constipation    albuterol (2.5 MG/3ML) 0.083% neb solution Take 1 vial (2.5 mg)   by nebulization every 6 hours as needed for shortness of breath /   dyspnea or wheezing    Multiple Vitamins-Iron (DAILY MULTIPLE VITAMIN/IRON) TABS Take 1   tablet by mouth daily    omeprazole (PRILOSEC) 40 MG capsule Take 1 capsule (40 mg) by   mouth daily Take 30-60 minutes before a meal.    cetirizine (ZYRTEC) 10 MG tablet Take 1 tablet (10 mg) by mouth   every evening    Nutritional Supplements (BOOST CALORIE SMART) LIQD Take 3 Cans   by mouth daily    mometasone-formoterol (DULERA) 200-5 MCG/ACT oral inhaler Inhale   2 puffs into the lungs 2 times daily    albuterol (PROAIR HFA/PROVENTIL HFA/VENTOLIN HFA) 108 (90 BASE)   MCG/ACT Inhaler Inhale 2 puffs into the lungs every 6 hours as   needed for shortness of breath / dyspnea or wheezing      Facility Administered Medications as of 2/12/2018             lidocaine 1 % 1 mL 1 mL by Other route every hour as needed   (mild pain with VAD insertion or accessing implanted port)    lidocaine (LMX4) kit Apply topically every hour as needed for   pain (with VAD insertion or accessing implanted port.)    sodium chloride (PF) 0.9% PF flush 3 mL 3 mLs by Intracatheter   route every hour as needed for line flush (for peripheral IV   flush post IV meds)    sodium chloride (PF) 0.9% PF flush 3 mL 3 mLs by Intracatheter   route every 8 hours    ipratropium - albuterol 0.5 mg/2.5 mg/3 mL (DUONEB) neb solution   3 mL Take 3 mLs by nebulization once             Allergies   Allergen Reactions     Dust Mites Cough     Sneezing      Seasonal Allergies          Complete Blood Count:  Lab Results   Component Value Date    PLT 73 02/12/2018       Coagulation:  Lab Results   Component Value Date    INR 1.56 02/12/2018       Vital Signs:  /73  Temp 97.9  F (36.6  C) (Oral)  Resp 26  SpO2 98%   Interventional Radiology Adult/Peds IP Consult: Patient to be seen: Routine within 24 hours; Call back #: [phone]; Thoracentesis    Narrative    Salvatore Manning MD     2/12/2018  7:16 PM    A collaboration between [facility] and   [facility]  Experts in minimally invasive, targeted treatments performed   using imaging guidance    Interventional Radiology Consult Service Note    Patient Name:  Yue Portillo   YOB: 1940  Medical Record Number (MRN):  [id number]  Age:  78 year old female  Patient location / Unit:  ER    Requested IR consult:  R thoracentesis    Indication / Associated Diagnosis: R pleural effusion, recurrent    Complete Blood Count:  Lab Results   Component Value Date    PLT 73 02/12/2018       Coagulation:  Lab Results   Component Value Date    INR 1.56 02/12/2018       -----    Associated Imaging:  CXR2/12/2018        Pertinent imaging reviewed with IR Dr. Worthington    -----    Patient will be placed on the IR schedule 2/12/2018 for a R   thoracentesis    Pre-procedure hematology and coagulation labs per IR protocols   are WNL for procedure.      Pre-procedure for NPO status, skin site cleansing scrubs No NPO   required.    Medications to be held include: None    Consent will be done prior to procedure.     You may contact the IR charge RN at [phone] for an estimated time   of procedure for this inpatient.     Case discussed with Dr. Frank Sood.      [phone] (IR RN control desk)  [phone] ([place] IR call pager)        Patient Data:    Past Medical History:   Diagnosis Date      Hepatitis C      Hepatocellular carcinoma (H)      Liver disease      Mild intermittent asthma      Pleural effusion associated with hepatic disorder          Patient Active Problem List    Diagnosis Date Noted     Mild persistent asthma without complication 05/09/2016     Priority: Medium     HCC (hepatocellular carcinoma) (H) 12/22/2014     Priority: Medium     Diagnosed at [place]. In records from September 2014. Son does not   believe pt has cancer. See note 7/13/15.       Cirrhosis of liver (H) 01/23/2014     Priority: Medium     Postmenopausal bleeding 04/01/2013     Priority: Medium     4/2013-HPV negative, EMB done, pelvic U/S done; referred to   OB/GYN for possible D&C.       Vitamin D deficiency 01/04/2013     Priority: Medium     Ear pain 11/26/2012     Priority: Medium     Unknown etiology, controlled with dermotic       Health Care Home 09/11/2012     Priority: Medium     Tier 1  DX V65.8 REPLACED WITH [id number] HEALTH CARE HOME (04/08/2013)       Allergic rhinitis 09/11/2012     Priority: Medium     Problem list name updated by automated process. 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) 09/11/2012     Priority: Medium     Chronic allergic conjunctivitis 09/11/2012     Priority: Medium     Chronic tension type headache 09/11/2012     Priority: Medium     Constipation 09/11/2012     Priority: Medium     Depression 09/11/2012     Priority: Medium     Esophageal reflux 09/11/2012     Priority: Medium     Generalized osteoarthritis 09/11/2012     Priority: Medium     Generalized pain 09/11/2012     Priority: Medium     Heartburn 09/11/2012     Priority: Medium     Insomnia 09/11/2012     Priority: Medium     Lightheadedness 09/11/2012     Priority: Medium     Lower back pain 09/11/2012     Priority: Medium         Prescription Medications as of 2/12/2018             furosemide (LASIX) 20 MG tablet Take 1 tablet (20 mg) by mouth   daily    spironolactone (ALDACTONE) 50 MG tablet Take 1 tablet (50  mg) by   mouth daily    lactulose 20 GM/30ML SOLN Take 30 mLs by mouth daily    triamcinolone (NASACORT AQ) 55 MCG/ACT Inhaler Spray 2 sprays   into both nostrils daily    sodium chloride (SALINE MIST) 0.65 % nasal spray Spray 1 spray   into both nostrils 2 times daily    polyethylene glycol (MIRALAX) powder Take 17 g (1 capful) by   mouth 3 times daily as needed for constipation    albuterol (2.5 MG/3ML) 0.083% neb solution Take 1 vial (2.5 mg)   by nebulization every 6 hours as needed for shortness of breath /   dyspnea or wheezing    Multiple Vitamins-Iron (DAILY MULTIPLE VITAMIN/IRON) TABS Take 1   tablet by mouth daily    omeprazole (PRILOSEC) 40 MG capsule Take 1 capsule (40 mg) by   mouth daily Take 30-60 minutes before a meal.    cetirizine (ZYRTEC) 10 MG tablet Take 1 tablet (10 mg) by mouth   every evening    Nutritional Supplements (BOOST CALORIE SMART) LIQD Take 3 Cans   by mouth daily    mometasone-formoterol (DULERA) 200-5 MCG/ACT oral inhaler Inhale   2 puffs into the lungs 2 times daily    albuterol (PROAIR HFA/PROVENTIL HFA/VENTOLIN HFA) 108 (90 BASE)   MCG/ACT Inhaler Inhale 2 puffs into the lungs every 6 hours as   needed for shortness of breath / dyspnea or wheezing      Facility Administered Medications as of 2/12/2018             lidocaine 1 % 1 mL 1 mL by Other route every hour as needed   (mild pain with VAD insertion or accessing implanted port)    lidocaine (LMX4) kit Apply topically every hour as needed for   pain (with VAD insertion or accessing implanted port.)    sodium chloride (PF) 0.9% PF flush 3 mL 3 mLs by Intracatheter   route every hour as needed for line flush (for peripheral IV   flush post IV meds)    sodium chloride (PF) 0.9% PF flush 3 mL 3 mLs by Intracatheter   route every 8 hours    ipratropium - albuterol 0.5 mg/2.5 mg/3 mL (DUONEB) neb solution   3 mL Take 3 mLs by nebulization once            Allergies   Allergen Reactions     Dust Mites Cough     Sneezing       Seasonal Allergies          Complete Blood Count:  Lab Results   Component Value Date    PLT 73 02/12/2018       Coagulation:  Lab Results   Component Value Date    INR 1.56 02/12/2018       Vital Signs:  /73  Temp 97.9  F (36.6  C) (Oral)  Resp 26  SpO2 98%   Cell count with differential fluid   Result Value Ref Range    Body Fluid Analysis Source Pleural fluid     Color Fluid Yellow     Appearance Fluid Clear     WBC Fluid 133 /uL    % Neutrophils Fluid 29 %    % Lymphocytes Fluid 44 %    % Other Cells Fluid 27 %   Fluid Culture Aerobic Bacterial   Result Value Ref Range    Specimen Description Pleural fluid     Culture Micro PENDING    Glucose fluid   Result Value Ref Range    Glucose Fluid Source Pleural fluid     Glucose Fluid 156 mg/dL   Gram stain   Result Value Ref Range    Specimen Description Pleural fluid     Gram Stain No organisms seen     Gram Stain       Moderate  WBC'S seen  predominantly mononuclear cells      Gram Stain       Gram stain and culture performed on concentrated specimen   Lactate dehydrogenase fluid   Result Value Ref Range    LD Fluid Source Pleural fluid     Lactate Dehydrogenase Fluid 37 U/L   Protein fluid   Result Value Ref Range    Protein Total Fluid Source Pleural fluid     Protein Total Fluid 0.7 g/dL   Amylase  fluid   Result Value Ref Range    Amylase Fluid Source Pleural fluid     Amylase Fluid 31 U/L   Anaerobic bacterial culture   Result Value Ref Range    Specimen Description Pleural fluid     Special Requests Received in anaerobic tubes.     Culture Micro PENDING    Triglyceride Fluid   Result Value Ref Range    Triglyceride Fluid Source Pleural fluid     Triglyceride Fluid 16 mg/dL   pH fluid   Result Value Ref Range    pH Fluid Source Pleural fluid     Ph Fluid 7.7 pH   Cholesterol fluid   Result Value Ref Range    Cholesterol Fluid Source Pleural fluid     Cholesterol Fluid <50 mg/dL   Fungus Culture, non-blood   Result Value Ref Range    Specimen  Description Pleural fluid     Culture Micro PENDING          Consults  Oncology: Responded (02/12/18 1640)    Assessments & Plan (with Medical Decision Making)  78-year-old female with 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  Vital signs are stable.  Chest x-ray shows a large right pleural effusion.  Patient abdomen is not markedly distended and is soft.  Patient was to be admitted to family medicine service.  She did have interventional radiology do a right thoracentesis.  After that patient feels better wants to go home.  Discussed with patient regarding concerns etc.  Patient declines admission signed out AGAINST MEDICAL ADVICE and will follow-up with MD return if any concerns.  This was discussed with family and patient's son who was present also and they agree with plan.         I have reviewed the nursing notes.    I have reviewed the findings, diagnosis, plan and need for follow up with the patient.    Discharge Medication List as of 2/12/2018  8:49 PM          Final diagnoses:   Recurrent right pleural effusion   SOB (shortness of breath)   HCC (hepatocellular carcinoma) (H)   I, Li Davis, am serving as a trained medical scribe to document services personally performed by Frank Sood MD, based on the provider's statements to me.   IFrank MD, was physically present and have reviewed and verified the accuracy of this note documented by Li Davis.      2/12/2018   [facility], EMERGENCY DEPARTMENT    This note was created at least in part by the use of dragon voice dictation system. Inadvertent typographical errors may still exist.  Frank Sood MD.         Frank Sood MD  02/13/18 0000

## 2018-02-12 NOTE — ED NOTES
Pt comes in with c/o increased ascites. Appt for paracentesis on Wednesday, but says she cannot wait until then. Sob and cough when she lays flat. Alert and oriented. Vss.

## 2018-02-12 NOTE — ED NOTES
Bed: ED15  Expected date:   Expected time:   Means of arrival: Ambulance  Comments:  78 F, fluid on lungs, SOB, triaged yellow

## 2018-02-13 NOTE — PROCEDURES
Interventional Radiology Brief Post Procedure Note    Procedure: R thoracentesis    Proceduralist: Molina Worthington MD    Assistant: Salvatore Manning MD    Time Out: Prior to the start of the procedure and with procedural staff participation, I verbally confirmed the patient s identity using two indicators, relevant allergies, that the procedure was appropriate and matched the consent or emergent situation, and that the correct equipment/implants were available. Immediately prior to starting the procedure I conducted the Time Out with the procedural staff and re-confirmed the patient s name, procedure, and site/side. (The Joint Commission universal protocol was followed.)  Yes        Sedation: None. Local Anesthestic used    Findings: R thoracentesis.  1500 ml serous yellow tinged fluid drained.     Estimated Blood Loss: None    Fluoroscopy Time:  none    SPECIMENS: Fluid and/or tissue for Gram stain and culture    Complications: 1. None     Condition: Stable    Plan: Follow up with IR as needed.     Comments: See dictated procedure note for full details.    Salvatore Manning MD

## 2018-02-13 NOTE — PHARMACY-ADMISSION MEDICATION HISTORY
Admission medication history interview status for the 2/12/2018 admission is complete. See Epic admission navigator for allergy information, pharmacy, prior to admission medications and immunization status.     Medication history interview sources:    -Patient was a fair historian and familiar with most medications but not doses.   - was used during the interview.     Changes made to PTA medication list (reason)  Added:   -N/A  Deleted:   Per patient, she is no longer taking the following medications due to unknown reasons.  -Bisacodyl 5 mg tablet - Take 1 tablet by mouth daily as needed for constipation.   -Lactulose 20 mg/30 ml solution - Take 30 ml by mouth daily.  -Spironolactone 50 mg tablet - Take 1 tablet by mouth daily.   Changed:   -N/A    Additional medication history information (including reliability of information, actions taken by pharmacist):  -Per patient, omeprazole does not alleviate her symptoms.   -Per patient, she has not started the Nasacort inhaler and is only using the saline mist nasal spray.   -Patient verified no known drug allergies.  -Patient has not received an influenza vaccination for the season.       Prior to Admission medications    Medication Sig Last Dose Taking? Auth Provider   sodium chloride (SALINE MIST) 0.65 % nasal spray Spray 1 spray into both nostrils 2 times daily 2/11/2018 at Unknown time Yes Darlene Renteria MD   polyethylene glycol (MIRALAX) powder Take 17 g (1 capful) by mouth 3 times daily as needed for constipation 2/10/2018 at Unknown time Yes Dick Tolentino MD   furosemide (LASIX) 20 MG tablet Take 1 tablet (20 mg) by mouth daily 2/11/2018 at AM Yes Dick Tolentino MD   albuterol (2.5 MG/3ML) 0.083% neb solution Take 1 vial (2.5 mg) by nebulization every 6 hours as needed for shortness of breath / dyspnea or wheezing 2/11/2018 at PM Yes Felicitas Horton MD   Multiple Vitamins-Iron (DAILY MULTIPLE VITAMIN/IRON) TABS Take 1  tablet by mouth daily 2/11/2018 at PM Yes Felicitas Horton MD   omeprazole (PRILOSEC) 40 MG capsule Take 1 capsule (40 mg) by mouth daily Take 30-60 minutes before a meal. 2/11/2018 at AM Yes Felicitas Horton MD   cetirizine (ZYRTEC) 10 MG tablet Take 1 tablet (10 mg) by mouth every evening 2/11/2018 at PM Yes Yelena Astorga APRN CNP   Nutritional Supplements (BOOST CALORIE SMART) LIQD Take 3 Cans by mouth daily Past Week at Unknown time Yes Felicitas Horton MD   mometasone-formoterol (DULERA) 200-5 MCG/ACT oral inhaler Inhale 2 puffs into the lungs 2 times daily 2/11/2018 at PM Yes Felicitas Horton MD   albuterol (PROAIR HFA/PROVENTIL HFA/VENTOLIN HFA) 108 (90 BASE) MCG/ACT Inhaler Inhale 2 puffs into the lungs every 6 hours as needed for shortness of breath / dyspnea or wheezing 2/11/2018 at PM Yes Felicitas Horton MD   triamcinolone (NASACORT AQ) 55 MCG/ACT Inhaler Spray 2 sprays into both nostrils daily NOT STARTED  Darlene Renteria MD         Medication history completed by: Cristi Tran, Pharmacy Intern

## 2018-02-13 NOTE — DISCHARGE INSTRUCTIONS
"Home.  You understand that we would like to admit you to the hospital for possible drain placement in lung and also further evaluation of abdominal fluid.  You want to go home and agree to leave \"against medical advice\".  You will return if any concerns.  Follow up with MD Wednesday as planned.      "

## 2018-02-13 NOTE — PROGRESS NOTES
Interventional Radiology Intra-procedural Nursing Note    Patient Name: Yue Portillo  Medical Record Number: [id number]  Today's Date: February 12, 2018    Procedure: right thoracentesis    Attending MD in room during timeout: Dr Worthington  Proceduralist: Dr Manning    Procedure Start Time: 1944  Procedure Puncture time: 2000  Procedure End Time: 2020    Sedation start time:  Sedation end time:  Sedation medications given: none given    Report given to: Molina HOLLOWAY, U ED  [facility] : Anusha Crespo    D: Patient brought to IR room 7. Verified Patient's ID using two identifiers. Informed consent obtained by Dr Manning. Patient's questions were answered.  A: Patient was positioned sitting at the side of the litter and was monitored per IR protocol. Patient tolerated the procedure without apparent incident. 1620 mL clear yellow pleural fluid was removed during the procedure. Of which; 120 mL was sent to the lab for diagnostic testing.  P: Patient expressing the desire to be discharged home. Report called to the ED. Patient was returned to the ED for further cares and discharge home.    Maria Victoria Camargo RN  [phone]

## 2018-02-13 NOTE — ED NOTES
[facility], [place]   ED Nurse to Floor Handoff     Yue Portillo is a 78 year old female who speaks Montenegrin and lives alone,  in a home  They arrived in the ED by ambulance from home    ED Chief Complaint: Shortness of Breath    ED Dx;   Final diagnoses:   Recurrent right pleural effusion         Needed?: Yes    Allergies:   Allergies   Allergen Reactions     Dust Mites Cough     Sneezing      Seasonal Allergies    .  Past Medical Hx:   Past Medical History:   Diagnosis Date     Hepatitis C      Hepatocellular carcinoma (H)      Liver disease      Mild intermittent asthma      Pleural effusion associated with hepatic disorder       Baseline Mental status: WDL  Current Mental Status changes: at basesline    Infection: No  Sepsis suspected: No  Isolation type: No active isolations     Activity level - Baseline/Home:  Stand with Assist  Activity Level - Current:   Stand with Assist    Bariatric equipment needed?: No    In the ED these meds were given:   Medications   lidocaine 1 % 1 mL (not administered)   lidocaine (LMX4) kit (not administered)   sodium chloride (PF) 0.9% PF flush 3 mL (not administered)   sodium chloride (PF) 0.9% PF flush 3 mL (not administered)   ipratropium - albuterol 0.5 mg/2.5 mg/3 mL (DUONEB) neb solution 3 mL (3 mLs Nebulization Given 2/12/18 1559)       Drips running?  No    Home pump or pre-existing LDA's present? No    Labs results:   Labs Ordered and Resulted from Time of ED Arrival Up to the Time of Departure from the ED   CBC WITH PLATELETS DIFFERENTIAL - Abnormal; Notable for the following:        Result Value    WBC 3.2 (*)     RBC Count 3.20 (*)     Hemoglobin 10.7 (*)     Hematocrit 32.4 (*)      (*)     MCH 33.4 (*)     RDW 15.9 (*)     Platelet Count 73 (*)     Absolute Lymphocytes 0.6 (*)     All other components within normal limits   PARTIAL THROMBOPLASTIN TIME - Abnormal; Notable for the following:     PTT 42 (*)     All other  components within normal limits   INR - Abnormal; Notable for the following:     INR 1.56 (*)     All other components within normal limits   COMPREHENSIVE METABOLIC PANEL - Abnormal; Notable for the following:     Glucose 135 (*)     Calcium 7.9 (*)     Bilirubin Total 3.4 (*)     Albumin 1.9 (*)     Alkaline Phosphatase 258 (*)     ALT 51 (*)      (*)     All other components within normal limits   LACTIC ACID WHOLE BLOOD - Abnormal; Notable for the following:     Lactic Acid 2.8 (*)     All other components within normal limits   LIPASE   NT PROBNP INPATIENT   TROPONIN I   ROUTINE UA WITH MICROSCOPIC REFLEX TO CULTURE   CYTOLOGY NON GYN   PULSE OXIMETRY NURSING   CARDIAC CONTINUOUS MONITORING   PERIPHERAL IV CATHETER   CELL COUNT WITH DIFFERENTIAL FLUID   FLUID CULTURE AEROBIC BACTERIAL   GLUCOSE FLUID   GRAM STAIN   LACTATE DEHYDROGENASE FLUID   PROTEIN FLUID   AMYLASE FLUID   ANAEROBIC BACTERIAL CULTURE   TRIGLYCERIDE FLUID   PH FLUID   CHOLESTEROL FLUID   FUNGUS CULTURE       Imaging Studies:   Recent Results (from the past 24 hour(s))   XR Chest Port 1 View    Narrative    EXAM: XR CHEST PORT 1 VW  2/12/2018 3:41 PM      HISTORY: Shortness breath    COMPARISON: Radiograph 10/30/2017. CT 1/22/2018.    FINDINGS: AP radiograph of the chest. Increased size of the large  right pleural effusion, with near complete collapse of the right lung.  Patchy opacities in the left lung. Cardiac silhouette is obscured.      Impression    IMPRESSION:   1. Increased size of the large right pleural effusion, with near  complete collapse of the right lung.  2. Patchy opacities in the left lung, which may indicate pulmonary  edema or infection.    I have personally reviewed the examination and initial interpretation  and I agree with the findings.    DA SINGER MD       Recent vital signs:   /77  Temp 97.9  F (36.6  C) (Oral)  Resp 23  SpO2 100%    Cardiac Rhythm: Normal Sinus  Pt needs tele? No  Skin/wound  Issues: None    Code Status: Full Code    Pain control: fair    Nausea control: pt had none    Abnormal labs/tests/findings requiring intervention:     Family present during ED course? Yes   Family Comments/Social Situation comments: Family has gone home for the evening    Tasks needing completion: Paracentesis     LILLIANA CROOK, RN  [phone] [facility]

## 2018-02-14 PROBLEM — D64.9 ANEMIA: Status: ACTIVE | Noted: 2018-01-01

## 2018-02-14 NOTE — MR AVS SNAPSHOT
After Visit Summary   2/14/2018    Yue Portillo    MRN: [id number]           Patient Information     Date Of Birth          1940        Visit Information        Provider Department      2/14/2018 12:00 PM Provider, [facility] Spec Inf Para; LANGUAGE BANC; KIEL 40 ATC [facility] Specialty [facility] Procedure        Today's Diagnoses     HCC (hepatocellular carcinoma) (H)    -  1    Cirrhosis of liver with ascites, unspecified hepatic cirrhosis type (H)           Follow-ups after your visit        Your next 10 appointments already scheduled     Feb 04, 2019 12:40 PM CST   (Arrive by 12:25 PM)   CT CHEST W/O CONTRAST with UCCT1   [facility] [place] [facility] ([facility])    [address]  1st Floor  [facility] [place] [facility] [address] [phone]           Please bring any scans or X-rays taken at other hospitals, if similar tests were done. Also bring a list of your medicines, including vitamins, minerals and over-the-counter drugs. It is safest to leave personal items at home.  Be sure to tell your doctor:   If you have any allergies.   If there s any chance you are pregnant.   If you are breastfeeding.  You do not need to do anything special to prepare for this exam.  Please wear loose clothing, such as a sweat suit or jogging clothes. Avoid snaps, zippers and other metal. We may ask you to undress and put on a hospital gown.            Feb 04, 2019  1:00 PM CST   (Arrive by 12:45 PM)   Return Visit with Darlene Renteria MD   [facility] ([facility] [place])    [address]  [address] [place] [address] [phone]              Future tests that were ordered for you today     Open Future Orders        Priority Expected Expires Ordered    MRI Abdomen w & w/o contrast* Routine 3/14/2018 4/16/2018 2/14/2018            Who to contact     If you have questions or need follow up information  about today's clinic visit or your schedule please contact [facility] SPECIAL[facility] PROCED[facility] directly at [phone].  Normal or non-critical lab and imaging results will be communicated to you by MyChart, letter or phone within 4 business days after the clinic has received the results. If you do not hear from us within 7 days, please contact the clinic through MyChart or phone. If you have a critical or abnormal lab result, we will notify you by phone as soon as possible.  Submit refill requests through [employer] or call your pharmacy and they will forward the refill request to us. Please allow 3 business days for your refill to be completed.          Additional Information About Your Visit        Care EveryWhere ID     This is your Care EveryWhere ID. This could be used by other organizations to access your [facility] medical records  [id number]        Your Vitals Were     Pulse Pulse Oximetry                147 99%           Blood Pressure from Last 3 Encounters:   02/14/18 126/76   02/14/18 124/75   02/12/18 115/66    Weight from Last 3 Encounters:   02/14/18 51.4 kg (113 lb 6.4 oz)   02/05/18 50.8 kg (112 lb)   12/13/17 49.6 kg (109 lb 6.4 oz)              We Performed the Following     US Paracentesis          Today's Medication Changes          These changes are accurate as of 2/14/18  2:52 PM.  If you have any questions, ask your nurse or doctor.               Stop taking these medicines if you haven't already. Please contact your care team if you have questions.     triamcinolone 55 MCG/ACT Inhaler   Commonly known as:  NASACORT AQ   Stopped by:  Tala Meyer MD                    Primary Care Provider Office Phone # Fax #    Felicitas Horton [phone]       [address] [address]        Equal Access to Services     LUKE DUVAL : Mukul Jose, lyudmila rivera, rosana lane  jeffdesiraemanjeet cornellkeli faustinoelisa laaishamanjeet casi. So [facility] [phone].    ATENCIÓN: Si jola rebeca, tiene a martinez disposición servicios gratuitos de asistencia lingüística. Yonathan rodriguez [phone].    We comply with applicable federal civil rights laws and [place] laws. We do not discriminate on the basis of race, color, national origin, age, disability, sex, sexual orientation, or gender identity.            Thank you!     Thank you for choosing [facility] SPECIALTY AND PROCEDURE  for your care. Our goal is always to provide you with excellent care. Hearing back from our patients is one way we can continue to improve our services. Please take a few minutes to complete the written survey that you may receive in the mail after your visit with us. Thank you!             Your Updated Medication List - Protect others around you: Learn how to safely use, store and throw away your medicines at www.disposemymeds.org.          This list is accurate as of 2/14/18  2:52 PM.  Always use your most recent med list.                   Brand Name Dispense Instructions for use Diagnosis    * albuterol 108 (90 BASE) MCG/ACT Inhaler    PROAIR HFA/PROVENTIL HFA/VENTOLIN HFA    1 Inhaler    Inhale 2 puffs into the lungs every 6 hours as needed for shortness of breath / dyspnea or wheezing    Moderate persistent asthma without complication       * albuterol (2.5 MG/3ML) 0.083% neb solution     25 vial    Take 1 vial (2.5 mg) by nebulization every 6 hours as needed for shortness of breath / dyspnea or wheezing    Mild intermittent asthma with acute exacerbation       BOOST CALORIE SMART Liqd     90 Bottle    Take 3 Cans by mouth daily    HCC (hepatocellular carcinoma) (H)       cetirizine 10 MG tablet    zyrTEC    30 tablet    Take 1 tablet (10 mg) by mouth every evening    Chronic allergic conjunctivitis       DAILY MULTIPLE VITAMIN/IRON Tabs     30 tablet    Take 1 tablet by mouth daily    Chronic hepatitis C without hepatic coma  (H)       furosemide 20 MG tablet    LASIX    180 tablet    Take 1 tablet (20 mg) by mouth daily    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)       mometasone-formoterol 200-5 MCG/ACT oral inhaler    DULERA    1 Inhaler    Inhale 2 puffs into the lungs 2 times daily    Moderate persistent asthma without complication       omeprazole 40 MG capsule    priLOSEC    90 capsule    Take 1 capsule (40 mg) by mouth daily Take 30-60 minutes before a meal.    Gastroesophageal reflux disease without esophagitis       polyethylene glycol powder    MIRALAX    510 g    Take 17 g (1 capful) by mouth 3 times daily as needed for constipation    Constipation, unspecified constipation type       sodium chloride 0.65 % nasal spray    SALINE MIST    1 Bottle    Spray 1 spray into both nostrils 2 times daily    Chronic nonseasonal allergic rhinitis due to pollen       * Notice:  This list has 2 medication(s) that are the same as other medications prescribed for you. Read the directions carefully, and ask your doctor or other care provider to review them with you.

## 2018-02-14 NOTE — ED NOTES
"ED TRIAGE    Medical / Trauma C/o:  78-Female - Presenting C/o:  Tachycardia, upper abdominal pain, s/p paracentesis, today at [facility].  Irish-speaking only    Duration of C/o:  Today    Contributing Factors / Concerning HX:  See HX    Significant Med's / Tx's:  See med's    Febrile / Afebrile:  Afebrile    Patient Vitals for the past 24 hrs:   BP Temp Temp src Pulse Heart Rate Resp SpO2 Height Weight   02/14/18 1530 116/75 97.1  F (36.2  C) Oral 114 114 18 99 % 1.575 m (5' 2\") 51 kg (112 lb 7 oz)       Dipesh Shipley  February 14, 2018  3:33 PM    "

## 2018-02-14 NOTE — IP AVS SNAPSHOT
[address] [address]    Phone:  [phone]                                       After Visit Summary   2/14/2018    Yue Portillo    MRN: [id number]           After Visit Summary Signature Page     I have received my discharge instructions, and my questions have been answered. I have discussed any challenges I see with this plan with the nurse or doctor.    ..........................................................................................................................................  Patient/Patient Representative Signature      ..........................................................................................................................................  Patient Representative Print Name and Relationship to Patient    ..................................................               ................................................  Date                                            Time    ..........................................................................................................................................  Reviewed by Signature/Title    ...................................................              ..............................................  Date                                                            Time

## 2018-02-14 NOTE — NURSING NOTE
"Chief Complaint   Patient presents with     RECHECK     Cirrhosis, HCC       Initial /75 (BP Location: Left arm, Patient Position: Sitting, Cuff Size: Adult Small)  Pulse 122  Temp 98.2  F (36.8  C) (Oral)  Resp 18  Ht 1.575 m (5' 2.01\")  Wt 51.4 kg (113 lb 6.4 oz)  SpO2 97%  BMI 20.74 kg/m2 Estimated body mass index is 20.74 kg/(m^2) as calculated from the following:    Height as of this encounter: 1.575 m (5' 2.01\").    Weight as of this encounter: 51.4 kg (113 lb 6.4 oz).  Medication Reconciliation: complete     Sally Estrada [facility]  2/14/2018 11:38 AM        "

## 2018-02-14 NOTE — ED NOTES
Bed: IN04  Expected date: 2/14/18  Expected time:   Means of arrival:   Comments:  [facility]illo [facility] (2 L removed) for evaluation of tachycardia.

## 2018-02-14 NOTE — ED PROVIDER NOTES
History     Chief Complaint   Patient presents with     Tachycardia     [facility]     The history is provided by a relative and the patient. The history is limited by a language barrier.     Yue Portillo is a 78 year old female with a history of hepatitis C, hepatocellular carcinoma, and asthma who presents from the [facility] clinic for evaluation of tachycardia following a paracentesis procedure. History is provided by the patient and a family member. History is limited by language barrier.     Patient's family member reports the patient was in the [facility] clinic this afternoon for a routine paracentesis. During this procedure, after approximately 2 L were drained from the patient, the patient had the onset of tachycardia with rates in the 140s-150s. She was sent here to the ED for further evaluation and management. Here in the ED, the patient complains of chronic abdominal pain secondary to her ascites which is unchanged from baseline. She denies palpitations, chest pain, or difficulty breathing, and states she is otherwise feeling at her baseline.     I have reviewed the Medications, Allergies, Past Medical and Surgical History, and Social History in the [facility] system.  Past Medical History:   Diagnosis Date     Hepatitis C      Hepatocellular carcinoma (H)      Liver disease      Mild intermittent asthma      Pleural effusion associated with hepatic disorder        Past Surgical History:   Procedure Laterality Date     CATARACT IOL, RT/LT Right 08/22/2017    s/p CE/IOL right eye     EYE SURGERY       H PYLORI SHYLA SCREEN      was treated for h pylori     NO HISTORY OF SURGERY         Family History   Problem Relation Age of Onset     Respiratory Father      asthma     DIABETES Son      Glaucoma No family hx of      Macular Degeneration No family hx of      Retinal detachment No family hx of      Amblyopia No family hx of        Social History   Substance Use Topics     Smoking status: Never Smoker     Smokeless  "tobacco: Never Used     Alcohol use No       No current facility-administered medications for this encounter.      Current Outpatient Prescriptions   Medication     sodium chloride (SALINE MIST) 0.65 % nasal spray     polyethylene glycol (MIRALAX) powder     furosemide (LASIX) 20 MG tablet     albuterol (2.5 MG/3ML) 0.083% neb solution     Multiple Vitamins-Iron (DAILY MULTIPLE VITAMIN/IRON) TABS     omeprazole (PRILOSEC) 40 MG capsule     cetirizine (ZYRTEC) 10 MG tablet     Nutritional Supplements (BOOST CALORIE SMART) LIQD     mometasone-formoterol (DULERA) 200-5 MCG/ACT oral inhaler     albuterol (PROAIR HFA/PROVENTIL HFA/VENTOLIN HFA) 108 (90 BASE) MCG/ACT Inhaler        Allergies   Allergen Reactions     Dust Mites Cough     Sneezing      Seasonal Allergies        Review of Systems   Respiratory: Negative for shortness of breath.    Cardiovascular: Negative for chest pain.   Gastrointestinal: Positive for abdominal pain (chronic 2/2 ascites, unchanged).   All other systems reviewed and are negative.      Physical Exam   BP: 116/75  Pulse: 114  Heart Rate: 114  Temp: 97.1  F (36.2  C)  Resp: 18  Height: 157.5 cm (5' 2\")  Weight: 51 kg (112 lb 7 oz)  SpO2: 99 %      Physical Exam   Constitutional: She is oriented to person, place, and time. She appears well-developed and well-nourished.   HENT:   Head: Normocephalic and atraumatic.   Neck: Normal range of motion.   Cardiovascular: Regular rhythm and normal heart sounds.  Tachycardia present.    Pulmonary/Chest: Effort normal and breath sounds normal. No respiratory distress.   Abdominal: Soft. She exhibits no distension. There is tenderness (mild diffuse tenderness; + ascities; + fluid shifts). There is no rebound.   Musculoskeletal: She exhibits no tenderness.   Neurological: She is alert and oriented to person, place, and time.   Skin: Skin is warm and dry.   Psychiatric: She has a normal mood and affect. Her behavior is normal. Thought content normal. "       ED Course     ED Course     Procedures       4:38 PM  The patient was seen and examined by Dr. Marquez in Room 12.          Critical Care time:  45 minutes         Results for orders placed or performed during the hospital encounter of 02/14/18   CT Abdomen Pelvis w Contrast    Narrative    EXAMINATION: CT ABDOMEN AND PELVIS WITH CONTRAST, 2/14/2018 6:13 PM    TECHNIQUE:  Helical CT images from the lung bases through the  symphysis pubis were obtained with IV contrast. Contrast dose: 70 mL  Isovue-370    COMPARISON: CT chest 1/22/2018, MRI 11/18/2017    HISTORY: Cirrhosis, hepatocellular carcinoma, abdominal pain, status  post paracentesis today, evaluate for perforation    FINDINGS:    Abdomen and pelvis: No intra-abdominal free air. Mild residual ascites  predominantly in the right side of the abdomen, right paracolic  gutter, and hepatorenal fossa.    Cirrhotic configuration. Unchanged cholelithiasis. No biliary ductal  dilatation. Redemonstration of nodular contour of the liver and  enhancing lesions, which are not characterized on this examination,  suggestive of hepatocellular carcinoma based on prior MRI.    Borderline enlargement of the spleen measuring 12 cm, not  significantly changed. No hydronephrosis. No renal calculus. Bladder  is moderately distended and appears unremarkable.    Small right renal cyst, unchanged. Adrenal glands and pancreas are  unremarkable. There is infiltration and congestion of the mesentery.  There is diffuse bowel wall thickening. No bowel dilation. Moderate  amount of stool in the colon. Sigmoid diverticulosis without acute  diverticulitis.    No abdominal aortic aneurysm. Mild scattered atherosclerotic  calcifications of the abdominal aorta and iliac arteries. Portal vein  and intrahepatic portal venous branches are opacified. Uterus and  adnexa are unremarkable.    The remainder of the abdomen and pelvis are unremarkable.    Lung bases: Large right pleural effusion,  associated with complete  compressive atelectasis of the visualized right lower lobe. There is  deviation of the visualized mediastinal structures and heart to the  left by the large pleural effusion, similar to prior exam. No definite  pleural effusion. Left lung bases clear.    Bones and soft tissues: Grade 1/2 anterolisthesis L5 relative to S1,  with associated pars defect of L5 and disc vacuum phenomena. Multiple  soft tissue granuloma over the gluteal region, likely injection  granuloma. Soft tissue anasarca. No aggressive osseous lesion.  Anterior abdominal wall varices are noted.      Impression    IMPRESSION:   1. Recurrence of large right pleural effusion, with complete collapse  of the visualized right lower lung. Mediastinal shift to the left.   2. Moderate residual ascites. No intra-abdominal free air to suggest  perforation. No evidence for hemorrhage.  3. Cirrhosis and sequela of portal venous hypertension including  borderline splenomegaly, bowel wall thickening and ascites. Soft  tissue anasarca.    I have personally reviewed the examination and initial interpretation  and I agree with the findings.    KIKI FAN MD   CBC with platelets differential   Result Value Ref Range    WBC 2.7 (L) 4.0 - 11.0 10e9/L    RBC Count 2.71 (L) 3.8 - 5.2 10e12/L    Hemoglobin 9.0 (L) 11.7 - 15.7 g/dL    Hematocrit 27.4 (L) 35.0 - 47.0 %     (H) 78 - 100 fl    MCH 33.2 (H) 26.5 - 33.0 pg    MCHC 32.8 31.5 - 36.5 g/dL    RDW 15.7 (H) 10.0 - 15.0 %    Platelet Count 59 (L) 150 - 450 10e9/L    Diff Method Automated Method     % Neutrophils 75.1 %    % Lymphocytes 12.8 %    % Monocytes 10.2 %    % Eosinophils 1.5 %    % Basophils 0.0 %    % Immature Granulocytes 0.4 %    Nucleated RBCs 0 0 /100    Absolute Neutrophil 2.1 1.6 - 8.3 10e9/L    Absolute Lymphocytes 0.4 (L) 0.8 - 5.3 10e9/L    Absolute Monocytes 0.3 0.0 - 1.3 10e9/L    Absolute Eosinophils 0.0 0.0 - 0.7 10e9/L    Absolute Basophils 0.0 0.0 -  0.2 10e9/L    Abs Immature Granulocytes 0.0 0 - 0.4 10e9/L    Absolute Nucleated RBC 0.0    Comprehensive metabolic panel   Result Value Ref Range    Sodium 135 133 - 144 mmol/L    Potassium 3.9 3.4 - 5.3 mmol/L    Chloride 105 94 - 109 mmol/L    Carbon Dioxide 22 20 - 32 mmol/L    Anion Gap 8 3 - 14 mmol/L    Glucose 231 (H) 70 - 99 mg/dL    Urea Nitrogen 17 7 - 30 mg/dL    Creatinine 0.77 0.52 - 1.04 mg/dL    GFR Estimate 72 >60 mL/min/1.7m2    GFR Estimate If Black 87 >60 mL/min/1.7m2    Calcium 7.6 (L) 8.5 - 10.1 mg/dL    Bilirubin Total 3.1 (H) 0.2 - 1.3 mg/dL    Albumin 2.3 (L) 3.4 - 5.0 g/dL    Protein Total 6.2 (L) 6.8 - 8.8 g/dL    Alkaline Phosphatase 214 (H) 40 - 150 U/L    ALT 40 0 - 50 U/L    AST 84 (H) 0 - 45 U/L   Hemoglobin   Result Value Ref Range    Hemoglobin 8.9 (L) 11.7 - 15.7 g/dL   EKG 12-lead, tracing only   Result Value Ref Range    Interpretation ECG Click View Image link to view waveform and result      Medications   0.9% sodium chloride BOLUS (0 mLs Intravenous Hold 2/14/18 1829)   albuterol neb solution 2.5 mg (not administered)   cetirizine (zyrTEC) tablet 10 mg (not administered)   fluticasone-vilanterol (BREO ELLIPTA) 200-25 MCG/INH oral inhaler 1 puff (not administered)   omeprazole (priLOSEC) CR capsule 40 mg (not administered)   sodium chloride (OCEAN) 0.65 % nasal spray 1 spray (not administered)   naloxone (NARCAN) injection 0.1-0.4 mg (not administered)   melatonin tablet 1 mg (not administered)   iopamidol (ISOVUE-370) solution 69 mL (69 mLs Intravenous Given 2/14/18 1746)   sodium chloride 0.9 % bag 500mL for CT scan flush use (67 mLs Intravenous Given 2/14/18 1746)            Labs Ordered and Resulted from Time of ED Arrival Up to the Time of Departure from the ED - No data to display         Assessments & Plan (with Medical Decision Making)   78-year-old female with a history of liver disease who gets every 3 weeks paracentesis and thoracentesis is done presents to the ER  due to tachycardia that occurred during her paracentesis procedure.  Patient came here and had some mild diffuse abdominal pain that she said felt similar to her prior pain.  Patient no signs of acute abdomen.  Patient was tachycardic to the 114s when she arrived to the ER.  We obtained labs show that her hemoglobin had dropped from 11.8 to 9.  We rechecked this her hemoglobin was 8.9.  I had surgery come and see the patient who recommended the patient be admitted to medicine for serial hemoglobins.  They do not suspect a large bowel perforation and wanted to have no acute intervention at this time.  We did do a CT chest that shows patient is a large right-sided pleural effusion.  No signs of bowel perforation.  Upon review of medical records patient last had a thoracentesis done 2 days ago.  It appears she has had a rapid accumulation of fluid in her right lung.  This possibly could have caused her tachycardia during the procedure.  Patient should be admitted to internal medicine for serial hemoglobins as well as plan for treatment of her large right-sided pleural effusion.  I discussed the plan of care with internal medicine who agreed.  Family agrees with plan of care.    I have reviewed the nursing notes.    I have reviewed the findings, diagnosis, plan and need for follow up with the patient.    New Prescriptions    No medications on file       Final diagnoses:   Pleural effusion associated with hepatic disorder   HCC (hepatocellular carcinoma) (H)   Anemia, unspecified type   I, Selam Terry, am serving as a trained medical scribe to document services personally performed by Chel Marquez MD, based on the provider's statements to me.   I, Chel Marquez MD, was physically present and have reviewed and verified the accuracy of this note documented by Selam Terry.      2/14/2018   [facility], [place], EMERGENCY DEPARTMENT     Chel Marquez MD  02/15/18 0014       Chel Marquez,  MD  02/15/18 0014

## 2018-02-14 NOTE — PROGRESS NOTES
[facility]    Hepatology follow-up  JAMES COURTNEY COMPLAINT/REASON FOR THE VISIT:  Hepatocellular cancer in the context of hepatitis C related cirrhosis.     Subjective:  Ms. Portillo is an 78-year-old [place] immigrant who claims to be younger than above age.  She has hepatitis C genotype 5, was not treated, but she did develop a liver lesion in segment 6, which was initially 2.5 x 2.4.  She did have TACE procedure in 02/2016.  She then had a one instance of recurrence in 03/2016.  She did have microwave ablation of the recurrent tumor.  Then in 05/2016 there were no signs of recurrence and on 01/17/2017 we ordered another MRI and besides the cirrhosis it showed no recurrence.  Then in May another MRI showed an enlarging area of arterial enhancement with a component of washout and they thought that this is 2.8 and they described it as OPTN5.  There was also a newer 1.4 lesion in segment 3 which was read as OPTN5a.  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      According to her son, she still has some edema also and she had some shortness of breath, especially when she had fluid in her abdomen.  She denies any confusion or memory issues.  She has no GI bleed, although she has occasional epistaxis.  No significant jaundice or pruritus.  Her last imaging, which was an MRI done in November did show besides the cirrhosis, suspicious enhancement in the treated segment 6 lesion and this was OPTN5t.  She also has another lesion which was read as OPTN5a in segment 2 and then some LI-RADS 4 and 3 lesions.       Medical hx Surgical hx   Past Medical History:   Diagnosis Date     Hepatitis C      Hepatocellular carcinoma (H)      Liver disease      Mild intermittent asthma      Pleural effusion associated with hepatic disorder       Past Surgical History:    Procedure Laterality Date     CATARACT IOL, RT/LT Right 08/22/2017    s/p CE/IOL right eye     EYE SURGERY       H PYLORI SHYLA SCREEN      was treated for h pylori     NO HISTORY OF SURGERY            Medications  Prior to Admission medications    Medication Sig Start Date End Date Taking? Authorizing Provider   sodium chloride (SALINE MIST) 0.65 % nasal spray Spray 1 spray into both nostrils 2 times daily 2/5/18  Yes Darlene Renteria MD   polyethylene glycol (MIRALAX) powder Take 17 g (1 capful) by mouth 3 times daily as needed for constipation 11/8/17  Yes Dick Tolentino MD   furosemide (LASIX) 20 MG tablet Take 1 tablet (20 mg) by mouth daily 11/8/17  Yes Dick Tolentino MD   albuterol (2.5 MG/3ML) 0.083% neb solution Take 1 vial (2.5 mg) by nebulization every 6 hours as needed for shortness of breath / dyspnea or wheezing 11/2/17  Yes Felicitas Horton MD   Multiple Vitamins-Iron (DAILY MULTIPLE VITAMIN/IRON) TABS Take 1 tablet by mouth daily 10/25/17  Yes Felicitas Horton MD   omeprazole (PRILOSEC) 40 MG capsule Take 1 capsule (40 mg) by mouth daily Take 30-60 minutes before a meal. 8/7/17  Yes Felicitas Horton MD   cetirizine (ZYRTEC) 10 MG tablet Take 1 tablet (10 mg) by mouth every evening 5/10/17  Yes Yelena Astorga APRN CNP   Nutritional Supplements (BOOST CALORIE SMART) LIQD Take 3 Cans by mouth daily 3/27/17  Yes Felicitas Horton MD   mometasone-formoterol (DULERA) 200-5 MCG/ACT oral inhaler Inhale 2 puffs into the lungs 2 times daily 3/10/17  Yes Felicitas Horton MD   albuterol (PROAIR HFA/PROVENTIL HFA/VENTOLIN HFA) 108 (90 BASE) MCG/ACT Inhaler Inhale 2 puffs into the lungs every 6 hours as needed for shortness of breath / dyspnea or wheezing 3/10/17  Yes Felicitas Horton MD       Allergies  Allergies   Allergen Reactions     Dust Mites Cough     Sneezing      Seasonal Allergies        Review of systems  A 10-point review of  "systems was negative    Examination  /75 (BP Location: Left arm, Patient Position: Sitting, Cuff Size: Adult Small)  Pulse 122  Temp 98.2  F (36.8  C) (Oral)  Resp 18  Ht 1.575 m (5' 2.01\")  Wt 51.4 kg (113 lb 6.4 oz)  SpO2 97%  BMI 20.74 kg/m2  Body mass index is 20.74 kg/(m^2).    Gen- well, NAD, A+Ox3, normal color  Lym- no palpable LAD  CVS- RRR  RS- Right lower lung dullness.  Abd- Distended and positive shifting dullness noted.  Extr- hands normal, no RODRIGUE  Skin- no rash or jaundice  Neuro- no asterixis  Psych- normal mood    Laboratory  Lab Results   Component Value Date     02/14/2018    POTASSIUM 4.2 02/14/2018    CHLORIDE 103 02/14/2018    CO2 24 02/14/2018    BUN 17 02/14/2018    CR 0.91 02/14/2018       Lab Results   Component Value Date    BILITOTAL 2.8 02/14/2018    ALT 54 02/14/2018     02/14/2018    ALKPHOS 309 02/14/2018       Lab Results   Component Value Date    ALBUMIN 1.9 02/14/2018    PROTTOTAL 7.5 02/14/2018        Lab Results   Component Value Date    WBC 4.0 02/14/2018    HGB 11.8 02/14/2018     02/14/2018    PLT 79 02/14/2018       Lab Results   Component Value Date    INR 1.53 02/14/2018       Radiology    Assessment and plan:  Ms. Portillo HCC complicating hepatitis C related cirrhosis.  She had initially several treatment episodes with TACE and microwave ablation but now she has still residual tumor and she has decompensated further with hepatic hydrothorax and significant ascites.  We are not quite sure if she could tolerate further treatment, but she follows with Oncology, and she will meet with them.  She will do a paracentesis as needed and thoracentesis as needed also and her hepatitis C we opted not to treat as it might not impact on her prognosis.  She will be seen here in 3 months and she will follow also with the oncologist and her primary care physician.      This was a 25-minute visit of which more than 50% was spent in explaining to the patient " what our plan of care was.  We answered all of her questions and those of her son.      cc:  Primary care physician       Tala Meyer MD  Hepatology  [facility]

## 2018-02-14 NOTE — MR AVS SNAPSHOT
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      Your next 10 appointments already scheduled     Feb 04, 2019 12:40 PM CST   (Arrive by 12:25 PM)   CT CHEST W/O CONTRAST with UCCT1   [facility] ([facility])    [address]  1st Floor  [facility] [address] [phone]           Please bring any scans or X-rays taken at other hospitals, if similar tests were done. Also bring a list of your medicines, including vitamins, minerals and over-the-counter drugs. It is safest to leave personal items at home.  Be sure to tell your doctor:   If you have any allergies.   If there s any chance you are pregnant.   If you are breastfeeding.  You do not need to do anything special to prepare for this exam.  Please wear loose clothing, such as a sweat suit or jogging clothes. Avoid snaps, zippers and other metal. We may ask you to undress and put on a hospital gown.            Feb 04, 2019  1:00 PM CST   (Arrive by 12:45 PM)   Return Visit with Darlene Renteria MD   [facility] ([facility])    [address]  [address] [place] [address] [phone]              Who to contact     If you have questions or need follow up information about today's clinic visit or your schedule please contact [facility] directly at  "[phone].  Normal or non-critical lab and imaging results will be communicated to you by MyChart, letter or phone within 4 business days after the clinic has received the results. If you do not hear from us within 7 days, please contact the clinic through MyChart or phone. If you have a critical or abnormal lab result, we will notify you by phone as soon as possible.  Submit refill requests through [employer] or call your pharmacy and they will forward the refill request to us. Please allow 3 business days for your refill to be completed.          Additional Information About Your Visit        Care EveryWhere ID     This is your Care EveryWhere ID. This could be used by other organizations to access your [place] medical records  [id number]        Your Vitals Were     Pulse Temperature Respirations Height Pulse Oximetry BMI (Body Mass Index)    122 98.2  F (36.8  C) (Oral) 18 1.575 m (5' 2.01\") 97% 20.74 kg/m2       Blood Pressure from Last 3 Encounters:   02/15/18 100/57   02/14/18 126/76   02/14/18 124/75    Weight from Last 3 Encounters:   02/14/18 48.9 kg (107 lb 14.4 oz)   02/14/18 51.4 kg (113 lb 6.4 oz)   02/05/18 50.8 kg (112 lb)                 Today's Medication Changes          These changes are accurate as of 2/14/18  3:38 PM.  If you have any questions, ask your nurse or doctor.               Stop taking these medicines if you haven't already. Please contact your care team if you have questions.     triamcinolone 55 MCG/ACT Inhaler   Commonly known as:  NASACORT AQ   Stopped by:  Tala Meyer MD                    Primary Care Provider Office Phone # Fax #    Felicitas Horton [phone]       [address] [address]        Equal Access to Services     [facility]: lyudmila Aguila, [address] . [facility] [phone].    ATENCIÓN: Si fiordaliza jose, " tiene a martinez disposición servicios gratuitos de asistencia lingüística. Yonathan rodriguez [phone].    We comply with applicable federal civil rights laws and [place] laws. We do not discriminate on the basis of race, color, national origin, age, disability, sex, sexual orientation, or gender identity.            Thank you!     Thank you for choosing [facility]  for your care. Our goal is always to provide you with excellent care. Hearing back from our patients is one way we can continue to improve our services. Please take a few minutes to complete the written survey that you may receive in the mail after your visit with us. Thank you!             Your Updated Medication List - Protect others around you: Learn how to safely use, store and throw away your medicines at www.disposemymeds.org.          This list is accurate as of 2/14/18  3:38 PM.  Always use your most recent med list.                   Brand Name Dispense Instructions for use Diagnosis    * albuterol 108 (90 BASE) MCG/ACT Inhaler    PROAIR HFA/PROVENTIL HFA/VENTOLIN HFA    1 Inhaler    Inhale 2 puffs into the lungs every 6 hours as needed for shortness of breath / dyspnea or wheezing    Moderate persistent asthma without complication       * albuterol (2.5 MG/3ML) 0.083% neb solution     25 vial    Take 1 vial (2.5 mg) by nebulization every 6 hours as needed for shortness of breath / dyspnea or wheezing    Mild intermittent asthma with acute exacerbation       BOOST CALORIE SMART Liqd     90 Bottle    Take 3 Cans by mouth daily    HCC (hepatocellular carcinoma) (H)       cetirizine 10 MG tablet    zyrTEC    30 tablet    Take 1 tablet (10 mg) by mouth every evening    Chronic allergic conjunctivitis       DAILY MULTIPLE VITAMIN/IRON Tabs     30 tablet    Take 1 tablet by mouth daily    Chronic hepatitis C without hepatic coma (H)       FLONASE 50 MCG/ACT spray   Generic drug:  fluticasone      Spray 2 sprays into both nostrils daily         furosemide 20 MG tablet    LASIX    180 tablet    Take 1 tablet (20 mg) by mouth daily    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)       mometasone-formoterol 200-5 MCG/ACT oral inhaler    DULERA    1 Inhaler    Inhale 2 puffs into the lungs 2 times daily    Moderate persistent asthma without complication       omeprazole 40 MG capsule    priLOSEC    90 capsule    Take 1 capsule (40 mg) by mouth daily Take 30-60 minutes before a meal.    Gastroesophageal reflux disease without esophagitis       polyethylene glycol powder    MIRALAX    510 g    Take 17 g (1 capful) by mouth 3 times daily as needed for constipation    Constipation, unspecified constipation type       sodium chloride 0.65 % nasal spray    SALINE MIST    1 Bottle    Spray 1 spray into both nostrils 2 times daily    Chronic nonseasonal allergic rhinitis due to pollen       * Notice:  This list has 2 medication(s) that are the same as other medications prescribed for you. Read the directions carefully, and ask your doctor or other care provider to review them with you.

## 2018-02-14 NOTE — IP AVS SNAPSHOT
MRN:[id number]                      After Visit Summary   2/14/2018    Yue Portillo    MRN: [id number]           Thank you!     Thank you for choosing [facility] for your care. Our goal is always to provide you with excellent care. Hearing back from our patients is one way we can continue to improve our services. Please take a few minutes to complete the written survey that you may receive in the mail after you visit with us. Thank you!        Patient Information     Date Of Birth          1940        Designated Caregiver       Most Recent Value    Caregiver    Will someone help with your care after discharge? yes    Name of designated caregiver He    Phone number of caregiver [phone]    Caregiver address same as pt      About your hospital stay     You were admitted on:  February 14, 2018 You last received care in the:  [address] [facility]    You were discharged on:  February 15, 2018        Reason for your hospital stay       You were admitted for concern of bleeding after your paracentesis. We checked your hemoglobin and it was stable. You underwent a repeat paracentesis and ~1.7 L of fluid were removed.                  Who to Call     For medical emergencies, please call 911.  For non-urgent questions about your medical care, please call your primary care provider or clinic, [phone]          Attending Provider     Provider Specialty    Chel Marquez MD Emergency Medicine    Tate Pichardo MD Internal Medicine    Alba Emanuel MD Internal Medicine       Primary Care Provider Office Phone # Fax #    Felicitas Truman Horton [phone]      After Care Instructions     Activity       Your activity upon discharge: activity as tolerated            Diet       Follow this diet upon discharge: Orders Placed This Encounter      Snacks/Supplements Adult: Other - Please comment; Boost Plus; With Meals      Regular Diet Adult            Discharge Instructions        Please follow up with your primary care physician in 1-2 weeks with a repeat hemoglobin check                  Follow-up Appointments     Follow Up and recommended labs and tests       Follow up with primary care provider, Felicitas Horton, within 7 days for repeat CBC and follow up.  The following labs/tests are recommended: CBC.                  Your next 10 appointments already scheduled     Feb 04, 2019 12:40 PM CST   (Arrive by 12:25 PM)   CT CHEST W/O CONTRAST with UCCT1   [facility] ([facility] [place])    [address]  1st Floor  [facility] [id number] [phone]           Please bring any scans or X-rays taken at other hospitals, if similar tests were done. Also bring a list of your medicines, including vitamins, minerals and over-the-counter drugs. It is safest to leave personal items at home.  Be sure to tell your doctor:   If you have any allergies.   If there s any chance you are pregnant.   If you are breastfeeding.  You do not need to do anything special to prepare for this exam.  Please wear loose clothing, such as a sweat suit or jogging clothes. Avoid snaps, zippers and other metal. We may ask you to undress and put on a hospital gown.            Feb 04, 2019  1:00 PM CST   (Arrive by 12:45 PM)   Return Visit with Darlene Renteria MD   [facility] ([facility] [place])    [address]  [address] [place] [id number] [phone]              Pending Results     Date and Time Order Name Status Description    2/15/2018 1110 POC US Guide for Paracentesis In process     2/14/2018 1538 EKG 12-lead, tracing only Preliminary     2/12/2018 1723 Fungus Culture, non-blood Preliminary     2/12/2018 1723 Anaerobic bacterial culture Preliminary     2/12/2018 1723 Fluid Culture Aerobic Bacterial Preliminary             Statement of Approval     Ordered          02/15/18 1730  I  "have reviewed and agree with all the recommendations and orders detailed in this document.  EFFECTIVE NOW     Approved and electronically signed by:  Nancy Singh MD             Admission Information     Date & Time Provider Department Dept. Phone    2/14/2018 Alba Emanuel MD [address] [facility] [phone]      Your Vitals Were     Blood Pressure Pulse Temperature Respirations Height Weight    100/57 (BP Location: Left arm) 114 97.4  F (36.3  C) (Oral) 20 1.575 m (5' 2\") 48.9 kg (107 lb 14.4 oz)    Pulse Oximetry BMI (Body Mass Index)                97% 19.74 kg/m2          Care EveryWhere ID     This is your Care EveryWhere ID. This could be used by other organizations to access your [place] medical records  [id number]        Equal Access to Services     [employer] : Mukul Jose, lyudmila rivera, qawillie kaalorlando sheridan, rosana ricks . So [facility] [phone].    ATENCIÓN: Si habla español, tiene a martinez disposición servicios gratuitos de asistencia lingüística. Yonathan al [phone].    We comply with applicable federal civil rights laws and [place] laws. We do not discriminate on the basis of race, color, national origin, age, disability, sex, sexual orientation, or gender identity.               Review of your medicines      CONTINUE these medicines which have NOT CHANGED        Dose / Directions    * albuterol 108 (90 BASE) MCG/ACT Inhaler   Commonly known as:  PROAIR HFA/PROVENTIL HFA/VENTOLIN HFA   Used for:  Moderate persistent asthma without complication        Dose:  2 puff   Inhale 2 puffs into the lungs every 6 hours as needed for shortness of breath / dyspnea or wheezing   Quantity:  1 Inhaler   Refills:  11       * albuterol (2.5 MG/3ML) 0.083% neb solution   Used for:  Mild intermittent asthma with acute exacerbation        Dose:  1 vial   Take 1 vial (2.5 mg) by nebulization every 6 hours as needed for shortness of breath / dyspnea or " wheezing   Quantity:  25 vial   Refills:  3       BOOST CALORIE SMART Liqd   Used for:  HCC (hepatocellular carcinoma) (H)        Dose:  3 Can   Take 3 Cans by mouth daily   Quantity:  90 Bottle   Refills:  11       cetirizine 10 MG tablet   Commonly known as:  zyrTEC   Used for:  Chronic allergic conjunctivitis        Dose:  10 mg   Take 1 tablet (10 mg) by mouth every evening   Quantity:  30 tablet   Refills:  6       DAILY MULTIPLE VITAMIN/IRON Tabs   Used for:  Chronic hepatitis C without hepatic coma (H)        Dose:  1 tablet   Take 1 tablet by mouth daily   Quantity:  30 tablet   Refills:  11       FLONASE 50 MCG/ACT spray   Generic drug:  fluticasone        Dose:  2 spray   Spray 2 sprays into both nostrils daily   Refills:  0       furosemide 20 MG tablet   Commonly known as:  LASIX   Used for:  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)        Dose:  20 mg   Take 1 tablet (20 mg) by mouth daily   Quantity:  180 tablet   Refills:  1       mometasone-formoterol 200-5 MCG/ACT oral inhaler   Commonly known as:  DULERA   Used for:  Moderate persistent asthma without complication        Dose:  2 puff   Inhale 2 puffs into the lungs 2 times daily   Quantity:  1 Inhaler   Refills:  12       omeprazole 40 MG capsule   Commonly known as:  priLOSEC   Used for:  Gastroesophageal reflux disease without esophagitis        Dose:  40 mg   Take 1 capsule (40 mg) by mouth daily Take 30-60 minutes before a meal.   Quantity:  90 capsule   Refills:  3       polyethylene glycol powder   Commonly known as:  MIRALAX   Used for:  Constipation, unspecified constipation type        Dose:  1 capful   Take 17 g (1 capful) by mouth 3 times daily as needed for constipation   Quantity:  510 g   Refills:  3       sodium chloride 0.65 % nasal spray   Commonly known as:  SALINE MIST   Used for:  Chronic nonseasonal allergic rhinitis due to pollen        Dose:  1 spray   Spray 1 spray into both nostrils 2 times daily   Quantity:  1 Bottle    Refills:  11       * Notice:  This list has 2 medication(s) that are the same as other medications prescribed for you. Read the directions carefully, and ask your doctor or other care provider to review them with you.             Protect others around you: Learn how to safely use, store and throw away your medicines at www.disposemymeds.org.             Medication List: This is a list of all your medications and when to take them. Check marks below indicate your daily home schedule. Keep this list as a reference.      Medications           Morning Afternoon Evening Bedtime As Needed    * albuterol 108 (90 BASE) MCG/ACT Inhaler   Commonly known as:  PROAIR HFA/PROVENTIL HFA/VENTOLIN HFA   Inhale 2 puffs into the lungs every 6 hours as needed for shortness of breath / dyspnea or wheezing                                * albuterol (2.5 MG/3ML) 0.083% neb solution   Take 1 vial (2.5 mg) by nebulization every 6 hours as needed for shortness of breath / dyspnea or wheezing                                BOOST CALORIE SMART Liqd   Take 3 Cans by mouth daily                                cetirizine 10 MG tablet   Commonly known as:  zyrTEC   Take 1 tablet (10 mg) by mouth every evening                                DAILY MULTIPLE VITAMIN/IRON Tabs   Take 1 tablet by mouth daily                                FLONASE 50 MCG/ACT spray   Spray 2 sprays into both nostrils daily   Generic drug:  fluticasone                                furosemide 20 MG tablet   Commonly known as:  LASIX   Take 1 tablet (20 mg) by mouth daily                                mometasone-formoterol 200-5 MCG/ACT oral inhaler   Commonly known as:  DULERA   Inhale 2 puffs into the lungs 2 times daily                                omeprazole 40 MG capsule   Commonly known as:  priLOSEC   Take 1 capsule (40 mg) by mouth daily Take 30-60 minutes before a meal.   Last time this was given:  40 mg on 2/15/2018  9:01 AM                                 polyethylene glycol powder   Commonly known as:  MIRALAX   Take 17 g (1 capful) by mouth 3 times daily as needed for constipation                                sodium chloride 0.65 % nasal spray   Commonly known as:  SALINE MIST   Spray 1 spray into both nostrils 2 times daily   Last time this was given:  1 spray on 2/15/2018  9:01 AM                                * Notice:  This list has 2 medication(s) that are the same as other medications prescribed for you. Read the directions carefully, and ask your doctor or other care provider to review them with you.

## 2018-02-14 NOTE — LETTER
2/14/2018      RE: Yue Portillo  [address]   [facility] [address]       [facility]    Hepatology follow-up  JAMES COURTNEY COMPLAINT/REASON FOR THE VISIT:  Hepatocellular cancer in the context of hepatitis C related cirrhosis.     Subjective:  Ms. Portillo is an 78-year-old [place] immigrant who claims to be younger than above age.  She has hepatitis C genotype 5, was not treated, but she did develop a liver lesion in segment 6, which was initially 2.5 x 2.4.  She did have TACE procedure in 02/2016.  She then had a one instance of recurrence in 03/2016.  She did have microwave ablation of the recurrent tumor.  Then in 05/2016 there were no signs of recurrence and on 01/17/2017 we ordered another MRI and besides the cirrhosis it showed no recurrence.  Then in May another MRI showed an enlarging area of arterial enhancement with a component of washout and they thought that this is 2.8 and they described it as OPTN5.  There was also a newer 1.4 lesion in segment 3 which was read as OPTN5a.  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      According to her son, she still has some edema also and she had some shortness of breath, especially when she had fluid in her abdomen.  She denies any confusion or memory issues.  She has no GI bleed, although she has occasional epistaxis.  No significant jaundice or pruritus.  Her last imaging, which was an MRI done in November did show besides the cirrhosis, suspicious enhancement in the treated segment 6 lesion and this was OPTN5t.  She also has another lesion which was read as OPTN5a in segment 2 and then some LI-RADS 4 and 3 lesions.       Medical hx Surgical hx   Past Medical History:   Diagnosis Date     Hepatitis C      Hepatocellular carcinoma (H)      Liver disease      Mild intermittent  asthma      Pleural effusion associated with hepatic disorder       Past Surgical History:   Procedure Laterality Date     CATARACT IOL, RT/LT Right 08/22/2017    s/p CE/IOL right eye     EYE SURGERY       H PYLORI SHYLA SCREEN      was treated for h pylori     NO HISTORY OF SURGERY            Medications  Prior to Admission medications    Medication Sig Start Date End Date Taking? Authorizing Provider   sodium chloride (SALINE MIST) 0.65 % nasal spray Spray 1 spray into both nostrils 2 times daily 2/5/18  Yes Darlene Renteria MD   polyethylene glycol (MIRALAX) powder Take 17 g (1 capful) by mouth 3 times daily as needed for constipation 11/8/17  Yes Dick Tolentino MD   furosemide (LASIX) 20 MG tablet Take 1 tablet (20 mg) by mouth daily 11/8/17  Yes Dick Tolentino MD   albuterol (2.5 MG/3ML) 0.083% neb solution Take 1 vial (2.5 mg) by nebulization every 6 hours as needed for shortness of breath / dyspnea or wheezing 11/2/17  Yes Felicitas Horton MD   Multiple Vitamins-Iron (DAILY MULTIPLE VITAMIN/IRON) TABS Take 1 tablet by mouth daily 10/25/17  Yes Felicitas Horton MD   omeprazole (PRILOSEC) 40 MG capsule Take 1 capsule (40 mg) by mouth daily Take 30-60 minutes before a meal. 8/7/17  Yes Felicitas Horton MD   cetirizine (ZYRTEC) 10 MG tablet Take 1 tablet (10 mg) by mouth every evening 5/10/17  Yes Yelena Astorga APRN CNP   Nutritional Supplements (BOOST CALORIE SMART) LIQD Take 3 Cans by mouth daily 3/27/17  Yes Felicitas Horton MD   mometasone-formoterol (DULERA) 200-5 MCG/ACT oral inhaler Inhale 2 puffs into the lungs 2 times daily 3/10/17  Yes Felicitas Horton MD   albuterol (PROAIR HFA/PROVENTIL HFA/VENTOLIN HFA) 108 (90 BASE) MCG/ACT Inhaler Inhale 2 puffs into the lungs every 6 hours as needed for shortness of breath / dyspnea or wheezing 3/10/17  Yes Felicitas Horton MD       Allergies  Allergies   Allergen Reactions     Dust Mites  "Cough     Sneezing      Seasonal Allergies        Review of systems  A 10-point review of systems was negative    Examination  /75 (BP Location: Left arm, Patient Position: Sitting, Cuff Size: Adult Small)  Pulse 122  Temp 98.2  F (36.8  C) (Oral)  Resp 18  Ht 1.575 m (5' 2.01\")  Wt 51.4 kg (113 lb 6.4 oz)  SpO2 97%  BMI 20.74 kg/m2  Body mass index is 20.74 kg/(m^2).    Gen- well, NAD, A+Ox3, normal color  Lym- no palpable LAD  CVS- RRR  RS- Right lower lung dullness.  Abd- Distended and positive shifting dullness noted.  Extr- hands normal, no RODRIGUE  Skin- no rash or jaundice  Neuro- no asterixis  Psych- normal mood    Laboratory  Lab Results   Component Value Date     02/14/2018    POTASSIUM 4.2 02/14/2018    CHLORIDE 103 02/14/2018    CO2 24 02/14/2018    BUN 17 02/14/2018    CR 0.91 02/14/2018       Lab Results   Component Value Date    BILITOTAL 2.8 02/14/2018    ALT 54 02/14/2018     02/14/2018    ALKPHOS 309 02/14/2018       Lab Results   Component Value Date    ALBUMIN 1.9 02/14/2018    PROTTOTAL 7.5 02/14/2018        Lab Results   Component Value Date    WBC 4.0 02/14/2018    HGB 11.8 02/14/2018     02/14/2018    PLT 79 02/14/2018       Lab Results   Component Value Date    INR 1.53 02/14/2018       Radiology    Assessment and plan:  Ms. Portillo HCC complicating hepatitis C related cirrhosis.  She had initially several treatment episodes with TACE and microwave ablation but now she has still residual tumor and she has decompensated further with hepatic hydrothorax and significant ascites.  We are not quite sure if she could tolerate further treatment, but she follows with Oncology, and she will meet with them.  She will do a paracentesis as needed and thoracentesis as needed also and her hepatitis C we opted not to treat as it might not impact on her prognosis.  She will be seen here in 3 months and she will follow also with the oncologist and her primary care physician.    "   This was a 25-minute visit of which more than 50% was spent in explaining to the patient what our plan of care was.  We answered all of her questions and those of her son.      cc:  Primary care physician       Tala Meyer MD  Hepatology  [facility]    Tala Meyer MD

## 2018-02-15 PROBLEM — R00.0 TACHYCARDIA: Status: ACTIVE | Noted: 2018-01-01

## 2018-02-15 NOTE — PROGRESS NOTES
"CLINICAL NUTRITION SERVICES - ASSESSMENT NOTE     Nutrition Prescription    RECOMMENDATIONS FOR MDs/PROVIDERS TO ORDER:   1. If pt discharges in next 24 to 48 hrs, recommend ordering outpatient RD consult to see pt regarding reduced oral intakes and h/o wt loss.  2. If pt remains hospitalized > 72 hrs, recommend obtaining calorie counts to better assess po adequacy.   3. Recommend checking baseline Mg and PO4 for evaluation d/t reported poor oral intakes over past month.    Malnutrition Status:    Unable to determine at this time    Recommendations already ordered by Registered Dietitian (RD):  Order trial of Boost Plus with meals to help optimize nutritional intakes    Future/Additional Recommendations:  None at this time     REASON FOR ASSESSMENT  Yue Portillo is a/an 78 year old female assessed by the dietitian for Admission Nutrition Risk Screen for unintentional loss of 10# or more in the past two months and reduced oral intake over the last month    NUTRITION HISTORY  Unable to obtain from pt secondary to procedure at bedside.   - Per H&P, reported that pt has chronic abd pain secondary to ascites    CURRENT NUTRITION ORDERS  Diet: NPO on admit, but adv to Regular at 10:30 am today.   Intake/Tolerance: Unknown, no meals ordered yet this admission.    LABS  No BMP obtained today and no Mg or PO4 obtained yet this admission.     MEDICATIONS  Medications reviewed    ANTHROPOMETRICS  Height: 157.5 cm (5' 2\")  Most Recent Weight: 48.9 kg (107 lb 14.4 oz) - obtained at 2229 on 2/14 and 51 kg (112 lbs) - obtained at 1530 on 2/14.   - Suspect lower wt may represent shifts in fluid status post [facility] and reported that pt had approximately 2 L drained from pt.    IBW: 50 kg  BMI: Normal BMI, but at low end of normal  Weight History: Unable to obtain from pt. Per review of EMR, wt has fluctuated up and down over past several mos. Difficult to evaluate for true wt loss d/t pt with " ascites and noted to have routine paracentesis performed (every 1-2 weeks)  Wt Readings from Last 10 Encounters:   02/14/18 48.9 kg (107 lb 14.4 oz)   02/14/18 51.4 kg (113 lb 6.4 oz)   02/05/18 50.8 kg (112 lb)   12/13/17 49.6 kg (109 lb 6.4 oz)   12/11/17 48.1 kg (106 lb)   11/30/17 48.2 kg (106 lb 4.8 oz)   11/09/17 52.8 kg (116 lb 4.8 oz)   11/08/17 54.9 kg (121 lb)   11/02/17 53.7 kg (118 lb 6.4 oz)   10/30/17 54 kg (119 lb 0.8 oz)     Dosing Weight: 49 kg (lowest wt this admission)    ASSESSED NUTRITION NEEDS  Estimated Energy Needs: 1641-5494 kcals/day (25 - 30 kcals/kg)  Justification: Maintenance  Estimated Protein Needs: 60-75 grams protein/day (1.2 - 1.5 grams of pro/kg)  Justification: Repletion  Estimated Fluid Needs: (1 mL/kcal)   Justification: Maintenance    PHYSICAL FINDINGS  See malnutrition section below.  Unable to complete   Distended abd per chart review,      MALNUTRITION  % Intake: Unable to assess  % Weight Loss: Unable to assess  Subcutaneous Fat Loss: Unable to assess  Muscle Loss: Unable to assess  Fluid Accumulation/Edema: None noted per chart review  Malnutrition Diagnosis: Unable to determine due to unable to obtain diet and wt hx and unable to complete nutritional physical assessment.    NUTRITION DIAGNOSIS  Inadequate oral intake related to suspect fluctuations in appetite d/t early satiety secondary to ascites and chronic abd pain as evidenced by RN report of reduced oral intakes over the past month and wt loss of 10 lbs or more in the past two months and no po intakes consumed yet this admit per chart review.    INTERVENTIONS  Implementation  Nutrition Education: Unable to complete due to unable to see pt at time of visit.  Medical food supplement therapy - as outlined above    Goals  Patient to consume % of nutritionally adequate meal trays TID, or the equivalent with supplements/snacks.     Monitoring/Evaluation  Progress toward goals will be monitored and evaluated per  protocol.  Layla Finch RD,LD  Pager [phone]

## 2018-02-15 NOTE — PLAN OF CARE
Problem: Patient Care Overview  Goal: Plan of Care/Patient Progress Review  Outcome: No Change    VSS, no longer tachycardic. Last hemoglobin check was 8.9; stat level just ordered and will be drawn soon. Annamaria SHERIFF would like to be updated when result populates. No signs of bleeding. Denies pain or nausea. Dyspnea with activity. Pt is Marshallese speaking; son (Zak) at bedside. SBA to bathroom. Unable to complete medication review; pt/son unaware of what she is taking. Needs full skin assessment. Continue with plan of care.

## 2018-02-15 NOTE — PLAN OF CARE
Problem: Patient Care Overview  Goal: Plan of Care/Patient Progress Review  Outcome: No Change  AVSS on RA. Complaints of leg pain overnight; pt unable to describe, but stated that it is not new for her and just wanted us to be aware. Declined intervention. Denies nausea. Hgb checked q4h overnight, 9.1 and 8.3. MD notified of both values (tranfuse <7.0). Hgb will continue to be checked q4h until stabilized. Pt declined full skin assessment, needs to be completed. Indonesian speaking; asked son Zak if he would like us to schedule an  and he said that as long as family is there, which will be most of the time, they would prefer to translate. NPO. Up with SBA. Continue with POC.

## 2018-02-15 NOTE — PLAN OF CARE
Problem: Patient Care Overview  Goal: Plan of Care/Patient Progress Review  Pt had a paracentesis performed at the bedside and 1.7L was drain. Telemetry monitoring during paracentesis procedure. Pt tolerated paracentesis procedure well. HR in range of 80-90's. Paracentesis site RLQ. C/D/I.  Hgb level 9.4 stable this am. Lactic acid 1. Pt denies pain. No s/s of respiratory distress. Pt eager to be discharge to home this evening.

## 2018-02-15 NOTE — H&P
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  She typically receives paracentesis every one to two weeks.  During her paracentesis she became acutely tachycardic with heart rates up to the 150s.  She had about 2 L of fluid taken off during her paracentesis today which was stopped short due to the tachycardia.  She remained asymptomatic through this episode.  She denies any sensation of heart fluttering or chest pain.  She denies any lightheaded dizziness or loss of consciousness.  Per the paracentesis report 2 L of yellow fluid was drained off without any complications outside of the tachycardia.  She did receive 25 g of albumin after the paracentesis.  Currently she feels like she is at her baseline health.  She has no complaints outside of her typical abdominal pain from her ascites fluid.  This pain is at its baseline and has changed in the way.  She does not endorse any changes in her stools.  Her most recent bowel movement was this morning and she denies any bloody or black stools.  She has no history of GI bleed.  Her last EGD was 5 years ago with an outside provider.     In the ED she was noted to have a acute hemoglobin drop from 11.8 earlier today to 9.0.  No obvious signs of bleeding on exam.  Initial concern for traumatic paracentesis.  CT scan was performed which showed chronic right pleural effusion without any evidence of intra-abdominal bleeding.  Surgery was consulted while in the ED.  They think this is likely a delutional drop in hemoglobin with no indications for acute intervention.  Plan to admit to medicine to monitor hemoglobin and  hemodynamics.    Review of Systems   The 10 point Review of Systems is negative other than noted in the HPI or here.     Past Medical History    I have reviewed this patient's medical history and updated it with pertinent information if needed.   Past Medical History:   Diagnosis Date     Hepatitis C      Hepatocellular carcinoma (H)      Liver disease      Mild intermittent asthma      Pleural effusion associated with hepatic disorder         Past Surgical History   I have reviewed this patient's surgical history and updated it with pertinent information if needed.  Past Surgical History:   Procedure Laterality Date     CATARACT IOL, RT/LT Right 08/22/2017    s/p CE/IOL right eye     EYE SURGERY       H PYLORI SHYLA SCREEN      was treated for h pylori     NO HISTORY OF SURGERY          Social History   Social History   Substance Use Topics     Smoking status: Never Smoker     Smokeless tobacco: Never Used     Alcohol use No       Family History   I have reviewed this patient's family history and updated it with pertinent information if needed.   Family History   Problem Relation Age of Onset     Respiratory Father      asthma     DIABETES Son      Glaucoma No family hx of      Macular Degeneration No family hx of      Retinal detachment No family hx of      Amblyopia No family hx of        Prior to Admission Medications   Prior to Admission Medications   Prescriptions Last Dose Informant Patient Reported? Taking?   Multiple Vitamins-Iron (DAILY MULTIPLE VITAMIN/IRON) TABS  Self No No   Sig: Take 1 tablet by mouth daily   Nutritional Supplements (BOOST CALORIE SMART) LIQD  Self No No   Sig: Take 3 Cans by mouth daily   albuterol (2.5 MG/3ML) 0.083% neb solution   No No   Sig: Take 1 vial (2.5 mg) by nebulization every 6 hours as needed for shortness of breath / dyspnea or wheezing   albuterol (PROAIR HFA/PROVENTIL HFA/VENTOLIN HFA) 108 (90 BASE) MCG/ACT Inhaler  Self No No   Sig: Inhale 2 puffs into the lungs every 6  hours as needed for shortness of breath / dyspnea or wheezing   cetirizine (ZYRTEC) 10 MG tablet  Self No No   Sig: Take 1 tablet (10 mg) by mouth every evening   furosemide (LASIX) 20 MG tablet   No No   Sig: Take 1 tablet (20 mg) by mouth daily   mometasone-formoterol (DULERA) 200-5 MCG/ACT oral inhaler  Self No No   Sig: Inhale 2 puffs into the lungs 2 times daily   omeprazole (PRILOSEC) 40 MG capsule  Self No No   Sig: Take 1 capsule (40 mg) by mouth daily Take 30-60 minutes before a meal.   polyethylene glycol (MIRALAX) powder   No No   Sig: Take 17 g (1 capful) by mouth 3 times daily as needed for constipation   sodium chloride (SALINE MIST) 0.65 % nasal spray   No No   Sig: Spray 1 spray into both nostrils 2 times daily      Facility-Administered Medications: None     Allergies   Allergies   Allergen Reactions     Dust Mites Cough     Sneezing      Seasonal Allergies        Physical Exam   Vital Signs: Temp: 97.9  F (36.6  C) Temp src: Oral BP: 104/54 Pulse: 114 Heart Rate: 97 Resp: 20 SpO2: 98 % O2 Device: None (Room air)    Weight: 107 lbs 14.4 oz    General Appearance: NAD  Eyes: Scleral Icterus, EOMi. PERRL   HEENT: NC/AT.  Respiratory: Decreased breath sounds on the right. No wheezes, crackles or rhonchi.  Cardiovascular: RRR, no m/r/g  GI: Distended with a positive fluid wave. Mild epigastric tenderness to deep palpation. No rebound or guarding. BS +  Skin: Paracentesis site c/d/i. No ecchymosis on the back or other exposed skin  Musculoskeletal: 1+ peripheral edema bilaterally to the knee   Neurologic: AO ×3.  Cranial nerves II through XII intact.  No gross focal deficits.  Psychiatric: Normal affect    Assessment & Plan   Yue Portillo is a 78 year old female admitted on 2/14/2018. She has a history of decompensated HepC cirrhosis complicated by HCC and is admitted for acute anemia concerning for hemoperitoneum.    #Anemia:  Patient presents from paracentesis today with tachycardia and an acute hgb  drop (11.8 -> 9.0). EKG showed sinus rhythm. Initial concern for a traumatic paracentesis with hemoperitoneum, though no evidence of blood within ascites fluid. CT scan show no evidence of perforation or intra-abdominal hemorrhage. All cell lines are down, possibly dilutional in the setting of Albumin administration after paracentesis  -Surgery consult, appreciate the recs  -Hemoglobin check every 4 hours  -Type and screen  -Transfuse for hemoglobin less than 7  -Monitor hemodynamics  - Serial abdominal exams    #Hepatitis C with HCC:  Follows with Dr. Meyer in hepatology. She is status post TACE 2015 and microwave ablation in 2016. Recurrence of tumor on imaging in April of 2017, Concern about worsening decompensation given need for frequent paracentesis and thoracentesis for hepatic hydrothorax.  - Holding Lasix     #Reported history of asthma   -Continue prior to admission Dulera and albuterol    # Pain Assessment:   Current Pain Score 10/30/2017 8/17/2017 8/17/2017   Patient currently in pain? - - -   Pain location Chest Abdomen Rib cage   Pain descriptors - - -   - Yue is experiencing pain due to abdominal distention. Pain management was discussed and the plan was created in a collaborative fashion.  Yue's response to the current recommendations: compliant  - No indication for pain management at this time.         Diet: NPO for Medical/Clinical Reasons Except for: Ice Chips, Meds  Fluids: Boluses prn  DVT Prophylaxis: Pneumatic Compression Devices  Code Status: Full Code    Disposition Plan   Expected discharge: 2 - 3 days; recommended to prior living arrangement once hemoglobin stable.     Entered: Alban Quarles 02/14/2018, 11:22 PM   Information in the above section will display in the discharge planner report.    The patient was discussed with Dr. Gogo Quarles  [facility]   Pager: [phone]  Please see sticky note for cross cover  information      Data   Data     Recent Labs  Lab 02/14/18  1714 02/14/18  1649 02/14/18  1047 02/12/18  1533   WBC  --  2.7* 4.0 3.2*   HGB 8.9* 9.0* 11.8 10.7*   MCV  --  101* 102* 101*   PLT  --  59* 79* 73*   INR  --   --  1.53* 1.56*   NA  --  135 136 136   POTASSIUM  --  3.9 4.2 4.2   CHLORIDE  --  105 103 104   CO2  --  22 24 25   BUN  --  17 17 17   CR  --  0.77 0.91 0.85   ANIONGAP  --  8 8 7   MACY  --  7.6* 8.1* 7.9*   GLC  --  231* 91 135*   ALBUMIN  --  2.3* 1.9* 1.9*   PROTTOTAL  --  6.2* 7.5 6.9   BILITOTAL  --  3.1* 2.8* 3.4*   ALKPHOS  --  214* 309* 258*   ALT  --  40 54* 51*   AST  --  84* 110* 103*   LIPASE  --   --   --  254   TROPI  --   --   --  <0.015

## 2018-02-15 NOTE — PROGRESS NOTES
Nursing Focus: Admission    D: Arrived at 2245 from ED via stretcher. Patient accompanied by son. Admitted for SOB and hemoglobin drop.      I: Admission process began.  Patient oriented to room, enviroment, call light.  Annamaria SHERIFF notified of patient's arrival on unit.     A: Vital signs stable, afebrile.  Patient stable at this time.  Complaining of dyspnea with activity.     P: Implement plan of care when available. Continue to monitor patient. Nursing interventions as appropriate. Notify MD with changes in pt status.

## 2018-02-15 NOTE — PHARMACY-ADMISSION MEDICATION HISTORY
Admission medication history interview status for the 2/14/2018 admission is complete. See Epic admission navigator for allergy information, pharmacy, prior to admission medications and immunization status.     Medication history interview sources:  Patient, Fred (son), Care Everywhere, [facility] ([phone])    Changes made to PTA medication list (reason)  Added:   - Fluticasone; per pharmacy and care everywhere  Deleted: None  Changed: None    Additional medication history information (including reliability of information, actions taken by pharmacist):  - Unable to fully assess pt due to language barrier, son was present for med hx, but not fully cooperative   - Verified med list with pharmacy and care everywhere, no big changes of note  - Pt did not get the flu shot this season and usually doesn't       Prior to Admission medications    Medication Sig Last Dose Taking? Auth Provider   fluticasone (FLONASE) 50 MCG/ACT spray Spray 2 sprays into both nostrils daily Past Week at Unknown time Yes Unknown, Entered By History   sodium chloride (SALINE MIST) 0.65 % nasal spray Spray 1 spray into both nostrils 2 times daily Past Week at Unknown time Yes Darlene Renteria MD   polyethylene glycol (MIRALAX) powder Take 17 g (1 capful) by mouth 3 times daily as needed for constipation 2/12/2018 Yes Dick Tolentino MD   furosemide (LASIX) 20 MG tablet Take 1 tablet (20 mg) by mouth daily 2/11/2018 at Unknown time Yes Dick Tolentino MD   albuterol (2.5 MG/3ML) 0.083% neb solution Take 1 vial (2.5 mg) by nebulization every 6 hours as needed for shortness of breath / dyspnea or wheezing Past Week at Unknown time Yes Felicitas Horton MD   Multiple Vitamins-Iron (DAILY MULTIPLE VITAMIN/IRON) TABS Take 1 tablet by mouth daily Past Week at Unknown time Yes Felicitas Horton MD   omeprazole (PRILOSEC) 40 MG capsule Take 1 capsule (40 mg) by mouth daily Take 30-60 minutes before a meal.  2/12/2018 Yes Felicitas Horton MD   cetirizine (ZYRTEC) 10 MG tablet Take 1 tablet (10 mg) by mouth every evening 2/11/2018 Yes Yelena Astorga APRN CNP   Nutritional Supplements (BOOST CALORIE SMART) LIQD Take 3 Cans by mouth daily Past Week at Unknown time Yes Felicitas Horton MD   mometasone-formoterol (DULERA) 200-5 MCG/ACT oral inhaler Inhale 2 puffs into the lungs 2 times daily 2/13/2018 Yes Felicitas Horton MD   albuterol (PROAIR HFA/PROVENTIL HFA/VENTOLIN HFA) 108 (90 BASE) MCG/ACT Inhaler Inhale 2 puffs into the lungs every 6 hours as needed for shortness of breath / dyspnea or wheezing Past Week at Unknown time Yes Felicitas Horton MD         Medication history completed by: Franklin Tanner, Pharmacy Student

## 2018-02-15 NOTE — ED NOTES
[facility], [place]   ED Nurse to Floor Handoff     Yue Portillo is a 78 year old female who speaks Romanian and lives with family members,  in a home  They arrived in the ED by car from clinic    ED Chief Complaint: Tachycardia ([facility])    ED Dx;   Final diagnoses:   Pleural effusion associated with hepatic disorder   HCC (hepatocellular carcinoma) (H)   Anemia, unspecified type         Needed?: Yes    Allergies:   Allergies   Allergen Reactions     Dust Mites Cough     Sneezing      Seasonal Allergies    .  Past Medical Hx:   Past Medical History:   Diagnosis Date     Hepatitis C      Hepatocellular carcinoma (H)      Liver disease      Mild intermittent asthma      Pleural effusion associated with hepatic disorder       Baseline Mental status: WDL  Current Mental Status changes: at basesline    Infection: No  Sepsis suspected: No  Isolation type: No active isolations     Activity level - Baseline/Home:  Independent  Activity Level - Current:   Stand with Assist    Bariatric equipment needed?: No    In the ED these meds were given:   Medications   0.9% sodium chloride BOLUS (0 mLs Intravenous Hold 2/14/18 1829)   iopamidol (ISOVUE-370) solution 69 mL (69 mLs Intravenous Given 2/14/18 1746)   sodium chloride 0.9 % bag 500mL for CT scan flush use (67 mLs Intravenous Given 2/14/18 1746)       Drips running?  No    Home pump or pre-existing LDA's present? No    Labs results:   Labs Ordered and Resulted from Time of ED Arrival Up to the Time of Departure from the ED   CBC WITH PLATELETS DIFFERENTIAL - Abnormal; Notable for the following:        Result Value    WBC 2.7 (*)     RBC Count 2.71 (*)     Hemoglobin 9.0 (*)     Hematocrit 27.4 (*)      (*)     MCH 33.2 (*)     RDW 15.7 (*)     Platelet Count 59 (*)     Absolute Lymphocytes 0.4 (*)     All other components within normal limits   COMPREHENSIVE METABOLIC PANEL - Abnormal; Notable for the following:     Glucose  231 (*)     Calcium 7.6 (*)     Bilirubin Total 3.1 (*)     Albumin 2.3 (*)     Protein Total 6.2 (*)     Alkaline Phosphatase 214 (*)     AST 84 (*)     All other components within normal limits   HEMOGLOBIN - Abnormal; Notable for the following:     Hemoglobin 8.9 (*)     All other components within normal limits   HEMOGLOBIN   FREE WATER       Imaging Studies:   Recent Results (from the past 24 hour(s))   US Paracentesis    Narrative    PRE-PROCEDURE DIAGNOSIS:  Ascites    POST-PROCEDURE DIAGNOSIS:  Same    PROCEDURE:  Ultrasound-guided therapeutic paracentesis    Impression    IMPRESSION:  Completed ultrasound-guided therapeutic paracentesis. A  total of 2100 mL clear yellow fluid aspirated from the abdomen. No  immediate complication.      ----------    CLINICAL HISTORY:  Ascites; ultrasound-guided paracentesis requested.    PERFORMED BY:  Cornelia Fleming PA-C    CONSENT:  The patient understood the limitations, alternatives, and  risks of the procedure and agreed to the procedure.  Written informed  consent was obtained and is documented in the patient record.    MEDICATIONS:  No intravenous sedation was administered.  A 10:1 volume  mixture of 1% lidocaine without epinephrine buffered with 8.4%  bicarbonate solution was used for local anesthesia.  Intravenous  albumin was available (see nursing documentation for administration  record).      DESCRIPTION:  Ascites fluid was identified on limited abdominal  ultrasound exam and picture is documented in the patient's record.   The abdomen was prepped and draped in the usual sterile fashion.   Color ultrasound was used to assess the subcutaneous tissues. No  vessels were identified in the region of planned puncture. Local  anesthesia was achieved.  Under ultrasound guidance, a 5-Yi  pigtail centesis needle/catheter was advanced into the peritoneal  space with ascites fluid return.  Needle was removed.  The catheter  was connected to drainage container.  Ascites  was aspirated.  Catheter  was removed on completion of drainage and sterile dressing was  applied.     COMPLICATIONS:  No immediate concerns; the patient remained stable  throughout the procedure and tolerated it well.    ESTIMATED BLOOD LOSS:  Minimal    SPECIMENS: None    LILLIANA GAMBOA PA-C   CT Abdomen Pelvis w Contrast    Narrative    EXAMINATION: CT ABDOMEN AND PELVIS WITH CONTRAST, 2/14/2018 6:13 PM    TECHNIQUE:  Helical CT images from the lung bases through the  symphysis pubis were obtained with IV contrast. Contrast dose: 70 mL  Isovue-370    COMPARISON: CT chest 1/22/2018, MRI 11/18/2017    HISTORY: Cirrhosis, hepatocellular carcinoma, abdominal pain, status  post paracentesis today, evaluate for perforation    FINDINGS:    Abdomen and pelvis: No intra-abdominal free air. Mild residual ascites  predominantly in the right side of the abdomen, right paracolic  gutter, and hepatorenal fossa.    Cirrhotic configuration. Unchanged cholelithiasis. No biliary ductal  dilatation. Redemonstration of nodular contour of the liver and  enhancing lesions, which are not characterized on this examination,  suggestive of hepatocellular carcinoma based on prior MRI.    Borderline enlargement of the spleen measuring 12 cm, not  significantly changed. No hydronephrosis. No renal calculus. Bladder  is moderately distended and appears unremarkable.    Small right renal cyst, unchanged. Adrenal glands and pancreas are  unremarkable. There is infiltration and congestion of the mesentery.  There is diffuse bowel wall thickening. No bowel dilation. Moderate  amount of stool in the colon. Sigmoid diverticulosis without acute  diverticulitis.    No abdominal aortic aneurysm. Mild scattered atherosclerotic  calcifications of the abdominal aorta and iliac arteries. Portal vein  and intrahepatic portal venous branches are opacified. Uterus and  adnexa are unremarkable.    The remainder of the abdomen and pelvis are  "unremarkable.    Lung bases: Large right pleural effusion, associated with complete  compressive atelectasis of the visualized right lower lobe. There is  deviation of the visualized mediastinal structures and heart to the  left by the large pleural effusion, similar to prior exam. No definite  pleural effusion. Left lung bases clear.    Bones and soft tissues: Grade 1/2 anterolisthesis L5 relative to S1,  with associated pars defect of L5 and disc vacuum phenomena. Multiple  soft tissue granuloma over the gluteal region, likely injection  granuloma. Soft tissue anasarca. No aggressive osseous lesion.  Anterior abdominal wall varices are noted.      Impression    IMPRESSION:   1. Recurrence of large right pleural effusion, with complete collapse  of the visualized right lower lung. Mediastinal shift to the left.   2. Moderate residual ascites. No intra-abdominal free air to suggest  perforation. No evidence for hemorrhage.  3. Cirrhosis and sequela of portal venous hypertension including  borderline splenomegaly, bowel wall thickening and ascites. Soft  tissue anasarca.    I have personally reviewed the examination and initial interpretation  and I agree with the findings.    KIKI FAN MD       Recent vital signs:   /74  Pulse 114  Temp 97.1  F (36.2  C) (Oral)  Resp 24  Ht 1.575 m (5' 2\")  Wt 51 kg (112 lb 7 oz)  SpO2 100%  BMI 20.56 kg/m2    Cardiac Rhythm: Normal Sinus  Pt needs tele? No  Skin/wound Issues: None    Code Status: Full Code    Pain control: pt had none    Nausea control: pt had none    Abnormal labs/tests/findings requiring intervention: Hgb recheck    Family present during ED course? Yes   Family Comments/Social Situation comments:     Tasks needing completion: None    Ana Hwang RN  [facility]--   [phone] [facility]  [phone] [facility]      "

## 2018-02-15 NOTE — PROCEDURES
[facility] Procedure Note         Medicine Bedside Procedure:     PROCEDURE PERFORMED:  Paracentesis    PAUSE FOR THE CAUSE: Right patient: Yes      Right body part: Yes      Right procedure Yes    ULTRASOUND GUIDANCE:  Yes, please see PACS system for saved images    PRIMARY INDICATION:  therapeutic    DIAGNOSTIC DATA REVIEW:  CBC:   Lab Results   Component Value Date    WBC 2.7 02/14/2018    RBC 2.71 02/14/2018    HGB 9.4 02/15/2018    HCT 27.4 02/14/2018     02/14/2018    RDW 15.7 02/14/2018    PLT 59 02/14/2018       H&P STATUS: H&P was reviewed, the patient was examined and no change has occurred in patient's condition since H&P was completed.    BARRIER PRECAUTIONS & STERILE TECHNIQUE WERE FOLLOWED IN THE STANDARD FASION    LOCAL ANESTHESIA:  Lidocaine 1% 5ml without epinephrine    NUMBER OF KITS USED:  1    STERILE DRESSINGS:  Applied    ESTIMATED BLOOD LOSS:  None    SPECIMENS COLLECTED:  Fluid pocket in the right lower quadrant identified. Peritoneum entered easily under direct visualization. 1.7 L removed. Patient tolerated procedure well.     SPECIMENS SENT:  None    COMPLICATIONS:  none    PRIMARY PROCEDURALIST:   Craig Krishnamurthy

## 2018-02-15 NOTE — CONSULTS
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 In ED found to have a Hgb drop 11.8 to 8.9 upon arrival to the ED. Tachycardia has since resolved, clinically appears non-toxic with a benign exam, and reports feeling in her normal state of health.      Given paracentesis with 2L fluid removal and albumin infusion today, and recent thoracentesis on 2/12 with 1.6L fluid removal, vitals WNL and benign exam, her drop in Hgb is likely dilutional 2/2 IVF and fluid shifts, however cannot rule out trauma from paracentesis as a cause.          Plan:    - Admit to Medicine for observation overnight. General Surgery will follow.   - Recheck Hgb q4-6 hours   - Serial abdominal exams     Discussed with staff, Dr. Parrish and Chief resident, Dr. Max Su MD  PGY-2 General Surgery  [facility]  General Surgery Service            Chief Complaint:   Tachycardia and anemia         History of Present Illness:   Yue Portillo is a 78 year old woman with PMH of cirrhosis 2/2 HCV and HCC who was sent from paracentesis for evaluation of new onset tachycardia following paracentesis for removal of 2L ascites. She was seen in the ED on 2/12 for thoracentesis with 1620cc serous fluid removed after which it was recommended she be admitted, but she left AMA. Today was tachycardic to the 140's during paracentesis. Per documentation she had had 2L of yellow ascitic fluid removed at that point, no blood noted in report. Hgb  drawn in the ED was 9.0 and 8.9 on recheck from 11.7 before para, however she had received 25g albumin during the procedure. Her tachycardia had resolved while still in the ED. No recent h/o fever, chills, N/V, chest pain, SOB, change in stools, or abdominal pain.            Past Medical History:   HCC  HCV  Cirrhosis with ascites  Chronic pleural effusion  Mild intermittent asthma          Past Surgical History:   Right IOL - cataract          Social History:     Social History   Substance Use Topics     Smoking status: Never Smoker     Smokeless tobacco: Never Used     Alcohol use No             Family History:     Family History   Problem Relation Age of Onset     Respiratory Father      asthma     DIABETES Son      Glaucoma No family hx of      Macular Degeneration No family hx of      Retinal detachment No family hx of      Amblyopia No family hx of                 Allergies:     Allergies   Allergen Reactions     Dust Mites Cough     Sneezing      Seasonal Allergies              Medications:     Current Facility-Administered Medications   Medication     0.9% sodium chloride BOLUS     Current Outpatient Prescriptions   Medication Sig     sodium chloride (SALINE MIST) 0.65 % nasal spray Spray 1 spray into both nostrils 2 times daily     polyethylene glycol (MIRALAX) powder Take 17 g (1 capful) by mouth 3 times daily as needed for constipation     furosemide (LASIX) 20 MG tablet Take 1 tablet (20 mg) by mouth daily     albuterol (2.5 MG/3ML) 0.083% neb solution Take 1 vial (2.5 mg) by nebulization every 6 hours as needed for shortness of breath / dyspnea or wheezing     Multiple Vitamins-Iron (DAILY MULTIPLE VITAMIN/IRON) TABS Take 1 tablet by mouth daily     omeprazole (PRILOSEC) 40 MG capsule Take 1 capsule (40 mg) by mouth daily Take 30-60 minutes before a meal.     cetirizine (ZYRTEC) 10 MG tablet Take 1 tablet (10 mg) by mouth every evening     Nutritional Supplements (BOOST CALORIE SMART) LIQD Take 3 Cans  by mouth daily     mometasone-formoterol (DULERA) 200-5 MCG/ACT oral inhaler Inhale 2 puffs into the lungs 2 times daily     albuterol (PROAIR HFA/PROVENTIL HFA/VENTOLIN HFA) 108 (90 BASE) MCG/ACT Inhaler Inhale 2 puffs into the lungs every 6 hours as needed for shortness of breath / dyspnea or wheezing               Review of Systems:   10-point ROS otherwise negative except as noted above.          Physical Exam:   All vitals have been reviewed    Temp:  [97.1  F (36.2  C)-98.2  F (36.8  C)] 97.1  F (36.2  C)  Pulse:  [] 114  Heart Rate:  [] 87  Resp:  [14-21] 16  BP: (109-131)/(65-83) 109/65  SpO2:  [91 %-100 %] 100 %      No intake or output data in the 24 hours ending 02/14/18 2033      Physical Exam:  Gen: alert, responsive, NAD, comfortable in bed  CV: RRR  Pulm: NLB on RA  Abd: Soft, protuberent, distended, mild tenderness to palpation epigastrum with palpable liver edge in area of tenderness, mildly tympanic. No rebound or guarding. No skin changes. LLQ paracentesis site with dressing intact.  Ext: Pedal pulses intact and strong bilaterally. 1+ bilateral pitting pretibial edema          Data:   All laboratory data reviewed      Results:  BMP  Recent Labs  Lab 02/14/18  1649 02/14/18  1047 02/12/18  1533    136 136   POTASSIUM 3.9 4.2 4.2   CHLORIDE 105 103 104   CO2 22 24 25   BUN 17 17 17   CR 0.77 0.91 0.85   * 91 135*     CBC  Recent Labs  Lab 02/14/18  1714 02/14/18  1649 02/14/18  1047 02/12/18  1533   WBC  --  2.7* 4.0 3.2*   HGB 8.9* 9.0* 11.8 10.7*   PLT  --  59* 79* 73*     LFT  Recent Labs  Lab 02/14/18  1649 02/14/18  1047 02/12/18  1533   AST 84* 110* 103*   ALT 40 54* 51*   ALKPHOS 214* 309* 258*   BILITOTAL 3.1* 2.8* 3.4*   ALBUMIN 2.3* 1.9* 1.9*   INR  --  1.53* 1.56*       Recent Labs  Lab 02/14/18  1649 02/14/18  1047 02/12/18  1533   * 91 135*         Imaging:  CT Abdomen Pelvis w Contrast 2/14/18  IMPRESSION:   1. Recurrence of large right pleural effusion,  with complete collapse  of the visualized right lower lung. Mediastinal shift to the left.   2. Moderate residual ascites. No intra-abdominal free air to suggest  perforation. No evidence for hemorrhage.  3. Cirrhosis and sequela of portal venous hypertension including  borderline splenomegaly, bowel wall thickening and ascites. Soft  tissue anasarca.       Jean Pierre Su MD  PGY-2 General Surgery

## 2018-02-15 NOTE — PROGRESS NOTES
"02/15/2018  General Surgery Progress Note    Did well overnight. Benign exams. Denies pain this AM.     Exam:   /54 (BP Location: Left arm)  Pulse 114  Temp 98.3  F (36.8  C) (Oral)  Resp 18  Ht 1.575 m (5' 2\")  Wt 48.9 kg (107 lb 14.4 oz)  SpO2 100%  BMI 19.74 kg/m2  General: Alert, interactive, & in NAD  Resp: Nonlabored breathing on RA  Cardiac: Regular rate; extremities warm;   Abdomen: Soft, nontender, less distended, no rebound, guarding or rigidity noted. Mild tenderness to palpation over liver in RUQ.  Extremities: No LE edema or obvious joint abnormalities  Skin: Warm and dry, no jaundice or rash         Assessment:  Yue Portillo is a 78 year old female with PMH of cirrhosis s/p HCV/HCC dependent on monthly paracentesis and thoracentesis who was sent to the ED after tachycardia during paracentesis on Wednesday.Currently in the 80s-90s.       Serial exams overnight benign. Hgb has been stable since yesterday afternoon. No evidence of hematoma on exam or signs or symptoms of bleeding. Drop in Hgb likely due to hemodilution.    -OK to d/c from surgery perspective.  -General Surgery will sign off at this time.      Santy Alvarenga MD  PGY-1 Surgery  pager [phone]  (6AM-5PM weekdays please page primary team, nights/wknds/holidays, page job code [phone])    "

## 2018-02-15 NOTE — PROGRESS NOTES
Focus: Observation Status  Pt status was changed to observation status this afternoon. Observation paperwork in [place] given to pt and her son @ 13:00. Pt son stated that he will read it. If he has any questions he will let us know. Plan to be discharge this evening

## 2018-02-16 NOTE — PLAN OF CARE
Nursing Focus: Discharge    D: Patient discharged to home at 1745. Patient stable and accompanied by family.    I: Discharge prescriptions sent to discharge pharmacy to be filled. All discharge medications and instructions reviewed with Yue and son. Patient instructed to call clinic triage nurse if she experiences a fever >100.4, uncontrolled nausea, vomiting, diarrhea, or pain; or experiences any signs or symptoms of bleeding. Other phone numbers to call with questions or concerns after discharge reviewed. PIVs removed. Education completed.    A: Family verbalized understanding of discharge medications and instructions. No medications at discharge pharmacy.     P: Patient to follow-up in clinic with PCP in 1-2 weeks for Hgb check.

## 2018-02-23 NOTE — TELEPHONE ENCOUNTER
Request for medication refill:    Date of last visit at clinic: 11/02/2017    Please complete refill if appropriate and CLOSE ENCOUNTER.    Closing the encounter signifies the refill is complete.    If refill has been denied, please complete the smart phrase .smirefuse and route it to the [facility] RN TRIAGE pool to inform the patient and the pharmacy.    Pankaj Sanchez, CMA

## 2018-02-26 NOTE — PROGRESS NOTES
SUBJECTIVE:  The patient is here for followup of her recent overnight hospitalization.  She has been getting paracenteses and thoracentesis fairly regularly.  This is related to her hepatocellular carcinoma.  The most recent time she was in though she became acutely tachycardic following the paracentesis so she was observed overnight.  Since then she has been relatively stable.  She said she just call Dr. Meyer's office when she feels like she needs another paracentesis.  She wanted refills of different ones of her medications.  She wanted to be checked for H. pylori but she is on omeprazole and I explained, she needed to be off of that for 2 weeks and off of antibiotics for a month, so she was okay with the plan not to do that.  She wanted her hemoglobin rechecked.  It was 9.4 when she was having her symptoms at the hospital.  I brought up that I thought she was being recommended for palliative care, but she declined that.  She said that she was told that things are actually pretty good for her and that she was actually stable and I did not push this.      OBJECTIVE:  On exam, the patient is in no acute distress.  Vital signs are stable.  Her heart rate was not quite 100.  Her abdomen was not tense or tender to palpation and she was not in any respiratory distress.      IMPRESSION:  Patient with hepatocellular carcinoma requiring frequent paracenteses and thoracentesis.      PLAN:  I refilled the meds that she requested.  Check a hemoglobin today.      Visit length 25 minutes, more than 50% spent in counseling about symptoms and plan.

## 2018-02-26 NOTE — MR AVS SNAPSHOT
After Visit Summary   2/26/2018    Yue Portillo    MRN: [id number]           Patient Information     Date Of Birth          1940        Visit Information        Provider Department      2/26/2018 11:00 AM Felicitas Horton MD [facility]        Today's Diagnoses     Dry eyes    -  1    Chronic hepatitis C without hepatic coma (H)        Constipation, unspecified constipation type        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)        Chronic allergic conjunctivitis           Follow-ups after your visit        Your next 10 appointments already scheduled     Feb 04, 2019 12:40 PM CST   (Arrive by 12:25 PM)   CT CHEST W/O CONTRAST with UCCT1   [facility] ([facility])    [address]  1st Floor  [facility] [address] [phone]           Please bring any scans or X-rays taken at other hospitals, if similar tests were done. Also bring a list of your medicines, including vitamins, minerals and over-the-counter drugs. It is safest to leave personal items at home.  Be sure to tell your doctor:   If you have any allergies.   If there s any chance you are pregnant.   If you are breastfeeding.  You do not need to do anything special to prepare for this exam.  Please wear loose clothing, such as a sweat suit or jogging clothes. Avoid snaps, zippers and other metal. We may ask you to undress and put on a hospital gown.            Feb 04, 2019  1:00 PM CST   (Arrive by 12:45 PM)   Return Visit with Darlene Renteria MD   [facility] ([facility])    [address] [place] [address] [phone]              Who to contact     Please call your clinic at [phone] to:    Ask questions about your health    Make or cancel appointments    Discuss your medicines    Learn about your test results    Speak to your doctor             "Additional Information About Your Visit        Care EveryWhere ID     This is your Care EveryWhere ID. This could be used by other organizations to access your [place] medical records  [id number]        Your Vitals Were     Pulse Temperature Respirations Head Circumference Pulse Oximetry BMI (Body Mass Index)    93 98.5  F (36.9  C) 16 157.5 cm (62.01\") 100% 21.4 kg/m2       Blood Pressure from Last 3 Encounters:   02/26/18 119/75   02/26/18 119/75   02/15/18 100/57    Weight from Last 3 Encounters:   02/26/18 117 lb (53.1 kg)   02/26/18 117 lb 12.8 oz (53.4 kg)   02/14/18 107 lb 14.4 oz (48.9 kg)              We Performed the Following     Hemoglobin (HGB) (LabDAQ)          Today's Medication Changes          These changes are accurate as of 2/26/18 11:30 AM.  If you have any questions, ask your nurse or doctor.               Start taking these medicines.        Dose/Directions    glycerin-hypromellose- 0.2-0.2-1 % Soln ophthalmic solution   Commonly known as:  ARTIFICIAL TEARS   Used for:  Dry eyes   Started by:  Felicitas Horton MD        Dose:  1 drop   Place 1 drop into both eyes 2 times daily as needed for dry eyes   Quantity:  1 Bottle   Refills:  0       spironolactone 50 MG tablet   Commonly known as:  ALDACTONE   Used for:  Chronic hepatitis C without hepatic coma (H)   Started by:  Felicitas Horton MD        Dose:  50 mg   Take 1 tablet (50 mg) by mouth daily   Quantity:  30 tablet   Refills:  1            Where to get your medicines      These medications were sent to [facility], [facility] - [address] [address]     Phone:  [phone]     cetirizine 10 MG tablet    DAILY MULTIPLE VITAMIN/IRON Tabs    furosemide 20 MG tablet    glycerin-hypromellose- 0.2-0.2-1 % Soln ophthalmic solution    polyethylene glycol powder    spironolactone 50 MG tablet                Primary Care Provider Office Phone # Fax # "    Felicitas Horton [phone]       [address] [address]        Equal Access to Services     LUKE DUVAL : Mukul nico muñoz pineda Jose, lyudmila melodieandrews, raffy sheridan, rosana ruth anetakeli davidson laaishamanjeet lance. So [facility] [phone].    ATENCIÓN: Si habla español, tiene a martinez disposición servicios gratuitos de asistencia lingüística. Llame al [phone].    We comply with applicable federal civil rights laws and [place] laws. We do not discriminate on the basis of race, color, national origin, age, disability, sex, sexual orientation, or gender identity.            Thank you!     Thank you for choosing [facility]  for your care. Our goal is always to provide you with excellent care. Hearing back from our patients is one way we can continue to improve our services. Please take a few minutes to complete the written survey that you may receive in the mail after your visit with us. Thank you!             Your Updated Medication List - Protect others around you: Learn how to safely use, store and throw away your medicines at www.disposemymeds.org.          This list is accurate as of 2/26/18 11:30 AM.  Always use your most recent med list.                   Brand Name Dispense Instructions for use Diagnosis    * albuterol 108 (90 BASE) MCG/ACT Inhaler    PROAIR HFA/PROVENTIL HFA/VENTOLIN HFA    1 Inhaler    Inhale 2 puffs into the lungs every 6 hours as needed for shortness of breath / dyspnea or wheezing    Moderate persistent asthma without complication       * albuterol (2.5 MG/3ML) 0.083% neb solution     25 vial    Take 1 vial (2.5 mg) by nebulization every 6 hours as needed for shortness of breath / dyspnea or wheezing    Mild intermittent asthma with acute exacerbation       BOOST CALORIE SMART Liqd     90 Bottle    Take 3 Cans by mouth daily    HCC (hepatocellular carcinoma) (H)       cetirizine 10 MG tablet    zyrTEC     30 tablet    Take 1 tablet (10 mg) by mouth every evening    Chronic allergic conjunctivitis       DAILY MULTIPLE VITAMIN/IRON Tabs     30 tablet    Take 1 tablet by mouth daily    Chronic hepatitis C without hepatic coma (H)       FLONASE 50 MCG/ACT spray   Generic drug:  fluticasone      Spray 2 sprays into both nostrils daily        furosemide 20 MG tablet    LASIX    180 tablet    Take 1 tablet (20 mg) by mouth daily    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)       glycerin-hypromellose- 0.2-0.2-1 % Soln ophthalmic solution    ARTIFICIAL TEARS    1 Bottle    Place 1 drop into both eyes 2 times daily as needed for dry eyes    Dry eyes       mometasone-formoterol 200-5 MCG/ACT oral inhaler    DULERA    1 Inhaler    Inhale 2 puffs into the lungs 2 times daily    Moderate persistent asthma without complication       omeprazole 40 MG capsule    priLOSEC    90 capsule    Take 1 capsule (40 mg) by mouth daily Take 30-60 minutes before a meal.    Gastroesophageal reflux disease without esophagitis       polyethylene glycol powder    MIRALAX    850 g    Take 17 g (1 capful) by mouth 3 times daily as needed for constipation    Constipation, unspecified constipation type       sodium chloride 0.65 % nasal spray    SALINE MIST    1 Bottle    Spray 1 spray into both nostrils 2 times daily    Chronic nonseasonal allergic rhinitis due to pollen       spironolactone 50 MG tablet    ALDACTONE    30 tablet    Take 1 tablet (50 mg) by mouth daily    Chronic hepatitis C without hepatic coma (H)       * Notice:  This list has 2 medication(s) that are the same as other medications prescribed for you. Read the directions carefully, and ask your doctor or other care provider to review them with you.

## 2018-02-26 NOTE — LETTER
February 26, 2018      Yue Portillo  [address]   [facility] [address]        Dear Yue,    Thank you for getting your care at [facility]. Please see below for your test results.    Resulted Orders   Hemoglobin (HGB) (LabDAQ)   Result Value Ref Range    Hemoglobin 11.0 (L) 11.7 - 15.7 g/dL       Your hemoglobin was much better today, at 11.0.    Sincerely,    Felicitas Horton MD

## 2018-03-05 NOTE — MR AVS SNAPSHOT
After Visit Summary   3/5/2018    Yue Portillo    MRN: [id number]           Patient Information     Date Of Birth          1940        Visit Information        Provider Department      3/5/2018 1:45 PM Provider, [facility]; LANGUAGE BANC;  [facility] Special[facility] Procedure        Today's Diagnoses     HCC (hepatocellular carcinoma) (H)    -  1    Cirrhosis of liver with ascites, unspecified hepatic cirrhosis type (H)          Care Instructions    Dear Yue Perrinkeith    Thank you for choosing [facility] ([facility]) for your procedure.  The following information is a summary of our appointment as well as important reminders.      Additional information: Your paracentesis procedure today, beginning weight 117.8 lb (53.4 kg), removed 3.8 liters ascites fluid, gave 50.0 grams albumin fluid, ending weight 109.5 lb     We look forward in seeing you on your next appointment here at [facility].  Please don t hesitate to call us at [phone] to reschedule any of your appointments or to speak with one of the [facility] registered nurses.  It was a pleasure taking care of you today.    Sincerely,    [facility]  [address] [place] [address]  Phone:  [phone]  DISCHARGE INSTRUCTIONS FOLLOWING ABDOMINAL PARACENTESIS    After you go home:    No strenuous activity for 24 hours    Resume your regular diet    Limit fluid intake for the first 48 hours to no more than 2 quarts per day.  There should be minimal drainage from the needle site.  If drainage does occur and soaks through the bandage, apply gentle pressure with your hand for 5 minutes.    Notify MD for the following:    Excessive drainage    Excessive swelling, redness or tenderness at the needle site    Fever greater than 101 degrees F    Dizziness or light-headedness when getting up  or walking      IF THIS IS A MEDICAL EMERGENCY, CALL [phone]    If you have any questions or concerns    Contact the Hepatology Clinic:   [phone]    If you are a post-transplant patient, contact the Transplant Office:  [phone]    If this is after hours, contact the hospital :  [phone] and as to have the GI resident on call paged                   I have received and understand my discharge instructions and I have all of my personal belongings.          ------------------------------------------------------        ---------------------------------------------------    Patient / Significant Other's Signature     Relationship              Follow-ups after your visit        Your next 10 appointments already scheduled     Apr 24, 2018  9:30 AM CDT   Lab with  LAB   [facility] ([facility])    [address] [place] [id number] [phone]            Apr 24, 2018 10:30 AM CDT   (Arrive by 10:15 AM)   New General Liver with Tala Meyer MD   [facility] Hepatology ([facility])    [address] [place] [address]  [address]  [facility] [id number] [phone]            Feb 04, 2019 12:40 PM CST   (Arrive by 12:25 PM)   CT CHEST W/O CONTRAST with UCCT1   [facility] ([facility])    [address] [id number] [phone]           Please bring any scans or X-rays taken at other hospitals, if similar tests were done. Also bring a list of your medicines, including vitamins, minerals and over-the-counter drugs. It is safest to leave personal items at home.  Be sure to tell your doctor:   If you have any allergies.   If there s any chance you are pregnant.   If you are breastfeeding.  You do not need to do anything special to prepare for this exam.  Please wear loose clothing, such as a sweat suit or jogging clothes. Avoid snaps, zippers and other  metal. We may ask you to undress and put on a hospital gown.            Feb 04, 2019  1:00 PM CST   (Arrive by 12:45 PM)   Return Visit with Darlene Renteria MD   [facility] ([facility])    [address]  [address]  [facility] [address] [phone]              Who to contact     If you have questions or need follow up information about today's clinic visit or your schedule please contact [facility] [place] SPECIALTY AND PROCEDURE directly at [phone].  Normal or non-critical lab and imaging results will be communicated to you by MyChart, letter or phone within 4 business days after the clinic has received the results. If you do not hear from us within 7 days, please contact the clinic through MyChart or phone. If you have a critical or abnormal lab result, we will notify you by phone as soon as possible.  Submit refill requests through eventuosity or call your pharmacy and they will forward the refill request to us. Please allow 3 business days for your refill to be completed.          Additional Information About Your Visit        Care EveryWhere ID     This is your Care EveryWhere ID. This could be used by other organizations to access your [place] medical records  [id number]        Your Vitals Were     Pulse Temperature Respirations Pulse Oximetry BMI (Body Mass Index)       100 97.6  F (36.4  C) (Oral) 16 100% 21.55 kg/m2        Blood Pressure from Last 3 Encounters:   03/05/18 102/53   02/26/18 119/75   02/26/18 119/75    Weight from Last 3 Encounters:   03/05/18 53.4 kg (117 lb 12.8 oz)   02/26/18 53.1 kg (117 lb)   02/26/18 53.4 kg (117 lb 12.8 oz)              We Performed the Following     US Paracentesis        Primary Care Provider Office Phone # Fax #    Felicitas Horton [phone]       [address] [address]        Equal Access to Services     LUKE  GAAR : Hadii aad ku hadhemalatha Gilletteali, waaxda luqadaha, qaybta kamonetda fatimah, rosana kj marcomanjeet elliott sharondaally ricks . So [facility] [phone].    ATENCIÓN: Si habla español, tiene a martinez disposición servicios gratuitos de asistencia lingüística. Llame al [phone].    We comply with applicable federal civil rights laws and [place] laws. We do not discriminate on the basis of race, color, national origin, age, disability, sex, sexual orientation, or gender identity.            Thank you!     Thank you for choosing [facility] SPECIALTY AND PROCEDURE  for your care. Our goal is always to provide you with excellent care. Hearing back from our patients is one way we can continue to improve our services. Please take a few minutes to complete the written survey that you may receive in the mail after your visit with us. Thank you!             Your Updated Medication List - Protect others around you: Learn how to safely use, store and throw away your medicines at www.disposemymeds.org.          This list is accurate as of 3/5/18  3:34 PM.  Always use your most recent med list.                   Brand Name Dispense Instructions for use Diagnosis    * albuterol 108 (90 BASE) MCG/ACT Inhaler    PROAIR HFA/PROVENTIL HFA/VENTOLIN HFA    1 Inhaler    Inhale 2 puffs into the lungs every 6 hours as needed for shortness of breath / dyspnea or wheezing    Moderate persistent asthma without complication       * albuterol (2.5 MG/3ML) 0.083% neb solution     25 vial    Take 1 vial (2.5 mg) by nebulization every 6 hours as needed for shortness of breath / dyspnea or wheezing    Mild intermittent asthma with acute exacerbation       BOOST CALORIE SMART Liqd     90 Bottle    Take 3 Cans by mouth daily    HCC (hepatocellular carcinoma) (H)       cetirizine 10 MG tablet    zyrTEC    30 tablet    Take 1 tablet (10 mg) by mouth every evening    Chronic allergic conjunctivitis       DAILY MULTIPLE VITAMIN/IRON  Tabs     30 tablet    Take 1 tablet by mouth daily    Chronic hepatitis C without hepatic coma (H)       FLONASE 50 MCG/ACT spray   Generic drug:  fluticasone      Spray 2 sprays into both nostrils daily        furosemide 20 MG tablet    LASIX    180 tablet    Take 1 tablet (20 mg) by mouth daily    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)       glycerin-hypromellose- 0.2-0.2-1 % Soln ophthalmic solution    ARTIFICIAL TEARS    1 Bottle    Place 1 drop into both eyes 2 times daily as needed for dry eyes    Dry eyes       mometasone-formoterol 200-5 MCG/ACT oral inhaler    DULERA    1 Inhaler    Inhale 2 puffs into the lungs 2 times daily    Moderate persistent asthma without complication       omeprazole 40 MG capsule    priLOSEC    90 capsule    Take 1 capsule (40 mg) by mouth daily Take 30-60 minutes before a meal.    Gastroesophageal reflux disease without esophagitis       polyethylene glycol powder    MIRALAX    850 g    Take 17 g (1 capful) by mouth 3 times daily as needed for constipation    Constipation, unspecified constipation type       sodium chloride 0.65 % nasal spray    SALINE MIST    1 Bottle    Spray 1 spray into both nostrils 2 times daily    Chronic nonseasonal allergic rhinitis due to pollen       spironolactone 50 MG tablet    ALDACTONE    30 tablet    Take 1 tablet (50 mg) by mouth daily    Chronic hepatitis C without hepatic coma (H)       * Notice:  This list has 2 medication(s) that are the same as other medications prescribed for you. Read the directions carefully, and ask your doctor or other care provider to review them with you.

## 2018-03-05 NOTE — PATIENT INSTRUCTIONS
Dear Yue Portillo    Thank you for choosing [facility] ([facility]) for your procedure.  The following information is a summary of our appointment as well as important reminders.      Additional information: Your paracentesis procedure today, beginning weight 117.8 lb (53.4 kg), removed 3.8 liters ascites fluid, gave 50.0 grams albumin fluid, ending weight 109.8 lb (    We look forward in seeing you on your next appointment here at [facility].  Please don t hesitate to call us at [phone] to reschedule any of your appointments or to speak with one of the [facility] registered nurses.  It was a pleasure taking care of you today.    Sincerely,    [facility]  Specialty Infusion [facility] Procedure Center  [address] [place]  [address]  Phone:  [phone]  DISCHARGE INSTRUCTIONS FOLLOWING ABDOMINAL PARACENTESIS    After you go home:    No strenuous activity for 24 hours    Resume your regular diet    Limit fluid intake for the first 48 hours to no more than 2 quarts per day.  There should be minimal drainage from the needle site.  If drainage does occur and soaks through the bandage, apply gentle pressure with your hand for 5 minutes.    Notify MD for the following:    Excessive drainage    Excessive swelling, redness or tenderness at the needle site    Fever greater than 101 degrees F    Dizziness or light-headedness when getting up or walking      IF THIS IS A MEDICAL EMERGENCY, CALL [phone]    If you have any questions or concerns    Contact the Hepatology Clinic:   [phone]    If you are a post-transplant patient, contact the Transplant Office:  [phone]    If this is after hours, contact the hospital :  [phone] and as to have the GI resident on call paged                   I have received and understand my discharge instructions and I have all of my personal  belongings.          ------------------------------------------------------        ---------------------------------------------------    Patient / Significant Other's Signature     Relationship

## 2018-03-05 NOTE — PROGRESS NOTES
Paracentesis Nursing Note  Yue Portillo [facility] for [facility].    During today's appointment orders from Tala Meyer MD were completed.    Progress Note:  Patient identification verified by name and date of birth.  Assessment completed.  Vitals monitored throughout appointment and recorded in Doc Flowsheets.  See proceduralist note in ultrasound.    Date of consent or authorization: 12/13/2017.  Invasive Procedure Safety Checklist was completed and sent for scanning.     Paracentesis performed by Alban Barahoan PA-C Radiology.    The following labs were communicated to provider performing paracentesis:  Lab Results   Component Value Date    PLT 59 02/14/2018       Total amount of ascites fluid drained: 3.8 liters.  Color of ascites fluid: yellow.  Total amount of albumin given: 50  grams.    Patient tolerated procedure well.    Patient did feel as though she was having an increase in coughing and thought that maybe she needed a thoracentesis.  I sent a message to Dr. Ayala in oncology and gave the patient the phone number to talk to someone from the oncology team.    Post procedure,denies pain or discomfort post paracentesis.      Discharge Plan:  Discharge instructions were reviewed with patient.  Patient/Representative verbalized understanding and all questions were answered.   Discharged from [facility] in stable condition.    Kaycee Pino RN    Administrations This Visit     albumin human 25 % injection 12.5 g     Admin Date Action Dose Route Administered By             03/05/2018 New Bag 50 g Intravenous Kaycee Pino RN                    lidocaine 1 % 20 mL     Admin Date Action Dose Route Administered By             03/05/2018 Given by Other 20 mL Other Kaycee Pino RN                          /63  Pulse 104  Temp 97.6  F (36.4  C) (Oral)  Resp 16  Wt 53.4 kg (117 lb 12.8  oz)  SpO2 100%  BMI 21.55 kg/m2     Please call this son to schedule     [phone]   [place] KENT     There is another son also listed but this one was with her during this appointment and said it would be better to get ahold of him.     Thank you,   Lorna Pino RN            Previous Messages       ----- Message -----      From: Kaycee Pino RN      Sent: 3/5/2018   2:55 PM        To: Viktoria Ayala MD   Subject: thoracentesis                                     Good afternoon,     I am sending this note for the above patient, she was in our clinic receiving a paracentesis.  She has been having increased sob and feels like she is coughing more.       How does she go ahead and schedule the thoracentesis?  It seems there is current orders in for this procedure.  Her O2 sats were % today.     Thanks,   Lorna Pino RN

## 2018-03-07 NOTE — TELEPHONE ENCOUNTER
Per [facility] RN, pt presented for paracentesis on 3/5 with son, Sammy for recurrent ascites. Patient reported worsening SOB; she has recurrent right sided pleural effusions which require periodic therapeutic thoracentesis.     I called pt's son, Sammy, as requested and left a detailed message: per Dr Ayala, we will schedule the thoracentesis and TITUS follow up at [facility], then pt to follow with Palliative Care going forward as well. Pt is not a candidate for active treatment for her HCC. Left [facility] Nursing # for call back as needed; request sent to [facility] Schedulers to book TITUS, thora & Palli consult, and to contact Sammy with appts. [facility] team updated.

## 2018-03-12 NOTE — PROGRESS NOTES
1505  Discharge instructions reviewed with pt, son, and .  Verbally demonstrates understanding of instructions.  Up and around in room.  Helped pt get dressed.  1520  DC to care of son per WC accompanied by staff.  CTRN

## 2018-03-12 NOTE — PROCEDURES
Interventional Radiology Brief Post Procedure Note    Procedure: IR THORACENTESIS    Proceduralist: Primo Patel PA-C    Assistant: MAGDA Rivera    Time Out: Prior to the start of the procedure and with procedural staff participation, I verbally confirmed the patient s identity using two indicators, relevant allergies, that the procedure was appropriate and matched the consent or emergent situation, and that the correct equipment/implants were available. Immediately prior to starting the procedure I conducted the Time Out with the procedural staff and re-confirmed the patient s name, procedure, and site/side. (The Joint Commission universal protocol was followed.)  Yes    Medications   Medication Event Details Admin User Admin Time       Sedation: None. Local Anesthestic used    Findings: U/S guided right thoracentesis, with return of clear serous fluid. Plan for staged thoracentesis.    Estimated Blood Loss: Minimal    Fluoroscopy Time:None.    SPECIMENS: None    Complications: 1. None     Condition: Stable    Plan: Follow up with repeat aspiration after 60 min of stable monitoring.    Comments: See dictated procedure note for full details.    Alban Barahona PA-C

## 2018-03-12 NOTE — IP AVS SNAPSHOT
MRN:[id number]                      After Visit Summary   3/12/2018    Yue Portillo    MRN: [id number]           Visit Information        Department      3/12/2018 10:07 AM [address] [facility]          Review of your medicines      UNREVIEWED medicines. Ask your doctor about these medicines        Dose / Directions    * albuterol 108 (90 BASE) MCG/ACT Inhaler   Commonly known as:  PROAIR HFA/PROVENTIL HFA/VENTOLIN HFA   Used for:  Moderate persistent asthma without complication        Dose:  2 puff   Inhale 2 puffs into the lungs every 6 hours as needed for shortness of breath / dyspnea or wheezing   Quantity:  1 Inhaler   Refills:  11       * albuterol (2.5 MG/3ML) 0.083% neb solution   Used for:  Mild intermittent asthma with acute exacerbation        Dose:  1 vial   Take 1 vial (2.5 mg) by nebulization every 6 hours as needed for shortness of breath / dyspnea or wheezing   Quantity:  25 vial   Refills:  3       BOOST CALORIE SMART Liqd   Used for:  HCC (hepatocellular carcinoma) (H)        Dose:  3 Can   Take 3 Cans by mouth daily   Quantity:  90 Bottle   Refills:  11       cetirizine 10 MG tablet   Commonly known as:  zyrTEC   Used for:  Chronic allergic conjunctivitis        Dose:  10 mg   Take 1 tablet (10 mg) by mouth every evening   Quantity:  30 tablet   Refills:  6       DAILY MULTIPLE VITAMIN/IRON Tabs   Used for:  Chronic hepatitis C without hepatic coma (H)        Dose:  1 tablet   Take 1 tablet by mouth daily   Quantity:  30 tablet   Refills:  11       FLONASE 50 MCG/ACT spray   Generic drug:  fluticasone        Dose:  2 spray   Spray 2 sprays into both nostrils daily   Refills:  0       furosemide 20 MG tablet   Commonly known as:  LASIX   Used for:  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)        Dose:  20 mg   Take 1 tablet (20 mg) by mouth daily   Quantity:  180 tablet   Refills:  1       glycerin-hypromellose- 0.2-0.2-1 % Soln ophthalmic solution   Commonly known  as:  ARTIFICIAL TEARS   Used for:  Dry eyes        Dose:  1 drop   Place 1 drop into both eyes 2 times daily as needed for dry eyes   Quantity:  1 Bottle   Refills:  0       mometasone-formoterol 200-5 MCG/ACT oral inhaler   Commonly known as:  DULERA   Used for:  Moderate persistent asthma without complication        Dose:  2 puff   Inhale 2 puffs into the lungs 2 times daily   Quantity:  1 Inhaler   Refills:  12       omeprazole 40 MG capsule   Commonly known as:  priLOSEC   Used for:  Gastroesophageal reflux disease without esophagitis        Dose:  40 mg   Take 1 capsule (40 mg) by mouth daily Take 30-60 minutes before a meal.   Quantity:  90 capsule   Refills:  3       polyethylene glycol powder   Commonly known as:  MIRALAX   Used for:  Constipation, unspecified constipation type        Dose:  1 capful   Take 17 g (1 capful) by mouth 3 times daily as needed for constipation   Quantity:  850 g   Refills:  3       sodium chloride 0.65 % nasal spray   Commonly known as:  SALINE MIST   Used for:  Chronic nonseasonal allergic rhinitis due to pollen        Dose:  1 spray   Spray 1 spray into both nostrils 2 times daily   Quantity:  1 Bottle   Refills:  11       spironolactone 50 MG tablet   Commonly known as:  ALDACTONE   Used for:  Chronic hepatitis C without hepatic coma (H)        Dose:  50 mg   Take 1 tablet (50 mg) by mouth daily   Quantity:  30 tablet   Refills:  1       * Notice:  This list has 2 medication(s) that are the same as other medications prescribed for you. Read the directions carefully, and ask your doctor or other care provider to review them with you.             Protect others around you: Learn how to safely use, store and throw away your medicines at www.disposemymeds.org.         Follow-ups after your visit        Your next 10 appointments already scheduled     Apr 11, 2018 12:45 PM CDT   (Arrive by 12:30 PM)   New Patient Visit with MD MAVERICK Oliveira [facility] (  [facility])    [address]  [address]  [place] [address]   [phone]            Apr 24, 2018  9:30 AM CDT   Lab with [facility] LAB   [facility] ([facility])    [address]  1st [address]  [place] [address]   [phone]            Apr 24, 2018 10:30 AM CDT   (Arrive by 10:15 AM)   New General Liver with Tala Meyer MD   [facility] Hepatology ([facility])    [address] [place] [address]  [address]  [facility] [address]   [phone]            Feb 04, 2019 12:40 PM CST   (Arrive by 12:25 PM)   CT CHEST W/O CONTRAST with UCCT1   [facility] ([facility])    [address] [place] [address]  [address] [facility] [id number] [phone]           Please bring any scans or X-rays taken at other hospitals, if similar tests were done. Also bring a list of your medicines, including vitamins, minerals and over-the-counter drugs. It is safest to leave personal items at home.  Be sure to tell your doctor:   If you have any allergies.   If there s any chance you are pregnant.   If you are breastfeeding.  You do not need to do anything special to prepare for this exam.  Please wear loose clothing, such as a sweat suit or jogging clothes. Avoid snaps, zippers and other metal. We may ask you to undress and put on a hospital gown.            Feb 04, 2019  1:00 PM CST   (Arrive by 12:45 PM)   Return Visit with Darlene Renteria MD   [facility] ([facility])    [address] [place] [address]  [address]  [facility] [id number] [phone]               Care Instructions        Further instructions from your care team         [facility],   Interventional Radiology Discharge Instructions Following Thoracentesis        AFTER YOU GO HOME:  ? Resume previous diet and medications  ? Limit strenuous physical  "activity such as lifting, straining, or exercising for 48 hours.  You may resume normal activity in 24 hours  ? Change gauze at the puncture site as needed to keep site dry.  Leaking of additional fluid is not uncommon during the first 24-48 hours    CALL THE PHYSICIAN:  ? If you develop a fever, shortness of breath, chest pain, cough up blood, excessive bleeding from the thoracentesis site, severe lightheadedness, or fainting call you doctor immediately or come to the closest Emergency Department.  Do not drive yourself.   ? If you have questions or concerns regarding your procedure call the radiologis      [facility] INTERVENTIONAL RADIOLOGY DEPARTMENT  Procedure Physician: Temo Barahona                               Date of procedure: March 12, 2018 Monday  Telephone Numbers: [phone]       Monday-Friday 8:00 am to 4:30 pm                                                   [phone]   After 4:30 pm Monday - Friday, Ask for the Interventional Radiologist on call.  Someone is on call 24 hrs/day  [facility] toll free number: [phone]    Monday-Friday 8:00 am to 4:30 pm  [facility] Emergency Dept: [phone]  _             Additional Information About Your Visit        Care EveryWhere ID     This is your Care EveryWhere ID. This could be used by other organizations to access your [facility] medical records  [id number]        Your Vitals Were     Blood Pressure Pulse Temperature Respirations Height Weight    92/51 (BP Location: Right arm) 94 98.3  F (36.8  C) (Oral) 16 1.575 m (5' 2\") 49.8 kg (109 lb 12.6 oz)    Pulse Oximetry BMI (Body Mass Index)                99% 20.08 kg/m2           Primary Care Provider Office Phone # Fax #    Felicitas Horton [phone]      Equal Access to Services     DIALY DUVAL : Mukul Jose, wahollyda nicole, qaybta kaalmakiara sheridan, rosana lance. So [facility] [phone].    ATENCIÓN: Si habla español, tiene a martinez disposición " servicios gratuitos de asistencia lingüística. Yonathan rodriguez [phone].    We comply with applicable federal civil rights laws and [place] laws. We do not discriminate on the basis of race, color, national origin, age, disability, sex, sexual orientation, or gender identity.            Thank you!     Thank you for choosing [facility] for your care. Our goal is always to provide you with excellent care. Hearing back from our patients is one way we can continue to improve our services. Please take a few minutes to complete the written survey that you may receive in the mail after you visit with us. Thank you!             Medication List: This is a list of all your medications and when to take them. Check marks below indicate your daily home schedule. Keep this list as a reference.      Medications           Morning Afternoon Evening Bedtime As Needed    * albuterol 108 (90 BASE) MCG/ACT Inhaler   Commonly known as:  PROAIR HFA/PROVENTIL HFA/VENTOLIN HFA   Inhale 2 puffs into the lungs every 6 hours as needed for shortness of breath / dyspnea or wheezing                                * albuterol (2.5 MG/3ML) 0.083% neb solution   Take 1 vial (2.5 mg) by nebulization every 6 hours as needed for shortness of breath / dyspnea or wheezing                                BOOST CALORIE SMART Liqd   Take 3 Cans by mouth daily                                cetirizine 10 MG tablet   Commonly known as:  zyrTEC   Take 1 tablet (10 mg) by mouth every evening                                DAILY MULTIPLE VITAMIN/IRON Tabs   Take 1 tablet by mouth daily                                FLONASE 50 MCG/ACT spray   Spray 2 sprays into both nostrils daily   Generic drug:  fluticasone                                furosemide 20 MG tablet   Commonly known as:  LASIX   Take 1 tablet (20 mg) by mouth daily                                glycerin-hypromellose- 0.2-0.2-1 % Soln ophthalmic solution   Commonly known as:  ARTIFICIAL TEARS    Place 1 drop into both eyes 2 times daily as needed for dry eyes                                mometasone-formoterol 200-5 MCG/ACT oral inhaler   Commonly known as:  DULERA   Inhale 2 puffs into the lungs 2 times daily                                omeprazole 40 MG capsule   Commonly known as:  priLOSEC   Take 1 capsule (40 mg) by mouth daily Take 30-60 minutes before a meal.                                polyethylene glycol powder   Commonly known as:  MIRALAX   Take 17 g (1 capful) by mouth 3 times daily as needed for constipation                                sodium chloride 0.65 % nasal spray   Commonly known as:  SALINE MIST   Spray 1 spray into both nostrils 2 times daily                                spironolactone 50 MG tablet   Commonly known as:  ALDACTONE   Take 1 tablet (50 mg) by mouth daily                                * Notice:  This list has 2 medication(s) that are the same as other medications prescribed for you. Read the directions carefully, and ask your doctor or other care provider to review them with you.

## 2018-03-12 NOTE — PROGRESS NOTES
Patient returns for completion of staged thoracentesis. An additional 1.6 L was removed without difficulty for a total of 3.3 L from the right chest. There is a mild-to-moderate effusion remaining.    Seven Colon PA-C  Interventional Radiology  [phone]

## 2018-03-12 NOTE — PROGRESS NOTES
IR nursing note: Pt. Identified, consent reviewed, time out with ANAND Barahona PA-C for right thoracentesis, staged.  Pleural fluid drained 1.75ml, 1st stage. 2nd stage drained 1600ml, tolerated well. Grand total of 3300ml.

## 2018-03-12 NOTE — DISCHARGE INSTRUCTIONS
[facility],   Interventional Radiology Discharge Instructions Following Thoracentesis        AFTER YOU GO HOME:  ? Resume previous diet and medications  ? Limit strenuous physical activity such as lifting, straining, or exercising for 48 hours.  You may resume normal activity in 24 hours  ? Change gauze at the puncture site as needed to keep site dry.  Leaking of additional fluid is not uncommon during the first 24-48 hours    CALL THE PHYSICIAN:  ? If you develop a fever, shortness of breath, chest pain, cough up blood, excessive bleeding from the thoracentesis site, severe lightheadedness, or fainting call you doctor immediately or come to the closest Emergency Department.  Do not drive yourself.   ? If you have questions or concerns regarding your procedure call the radiologis      [facility] INTERVENTIONAL RADIOLOGY DEPARTMENT  Procedure Physician: Temo Barahona                               Date of procedure: March 12, 2018 Monday  Telephone Numbers: [phone]       Monday-Friday 8:00 am to 4:30 pm                                                   [phone]   After 4:30 pm Monday - Friday, Ask for the Interventional Radiologist on call.  Someone is on call 24 hrs/day  [facility] toll free number: [phone]    Monday-Friday 8:00 am to 4:30 pm  [facility] Emergency Dept: [phone]  _

## 2018-03-12 NOTE — IP AVS SNAPSHOT
[address] [address]                                       After Visit Summary   3/12/2018    Yue Portillo    MRN: [id number]           After Visit Summary Signature Page     I have received my discharge instructions, and my questions have been answered. I have discussed any challenges I see with this plan with the nurse or doctor.    ..........................................................................................................................................  Patient/Patient Representative Signature      ..........................................................................................................................................  Patient Representative Print Name and Relationship to Patient    ..................................................               ................................................  Date                                            Time    ..........................................................................................................................................  Reviewed by Signature/Title    ...................................................              ..............................................  Date                                                            Time

## 2018-03-12 NOTE — PROGRESS NOTES
1240  Returned to 2A from IR per cart accompanied by RN.  S/P Right Mid lung thora.  Pig Tail cath remains in place.   and son remain at bedside.  Denies any pain or SOB.  CTRN

## 2018-03-12 NOTE — PROGRESS NOTES
1045  Prep is complete for procedure.  Labs have been sent.  H&P is current.  Consent is current.  Son and  are here at bedside.  Son will drive her home.  States she feels SOB and tightness in abd which is uncomfortable.  Saline Lock is in place.  CTRN

## 2018-03-12 NOTE — PLAN OF CARE
Pt arrived via cart from IR with RN s/p thoracentesis. VSS. Mid right thorasic dressing site CDI. Pt denies pain. Son at bedside.

## 2018-03-17 NOTE — PROCEDURES
PROCEDURE:   Diagnostic & Therapeutic Paracentesis, U/S guided.    INDICATION:   Symptom management, labs    DIAGNOSIS:  Worsening ascites.    ATTENDING: Dr. Elisha Marquez    CONSENT:   Informed consent was obtained after risks and benefits were explained at length.     PROCEDURE SUMMARY:   A time-out was performed. The area of the right abdomen was prepped and draped in a sterile fashion using chlorhexidine scrub. 1% lidocaine was used to numb the region. Ultrasound was used to locate a safe and appropriate location for paracentesis catheter insertion. An 8Fr paracentesis catheter was inserted and advanced with negative pressure. No blood was aspirated. Clear yellow fluid was retrieved and collected. Approximately 100 mL of ascitic fluid was collected and sent for laboratory analysis. The catheter was then connected to the vaccutainer and 2.2 liters of additional ascitic fluid were drained. The catheter was removed and no leaking was noted. The patient tolerated the procedure well without any immediate complications. Dr. Marquez was present during the procedure.    ESTIMATED BLOOD LOSS: minimal

## 2018-03-17 NOTE — PROGRESS NOTES
"CLINICAL NUTRITION SERVICES - ASSESSMENT NOTE     Nutrition Prescription    RECOMMENDATIONS FOR MDs/PROVIDERS TO ORDER:  None at this time     Malnutrition Status:    Unable to determine due to pt sleeping and family requested for RD to come back another time.    Recommendations already ordered by Registered Dietitian (RD):  None at this time    Future/Additional Recommendations:  1. If appropriate order kcals  2. Offer nutritional supplements to patient  3. If appropriate and fits with POC may suggest FT. Would recommend:   -- Isosource 1.5 @ goal 40 ml/hr (960 ml/day) to provide 1440 kcals (29 kcal/kg/day), 65 g PRO (1.3 g/kg/day), 730 ml free H2O, 169 g CHO and 14 g Fiber daily.  -- Flush 30 ml of free H2O Q4hr for tube patency   -- Add Certavite to ensure micronutrient requirements are being met with the anticipation of TF interruptions.      REASON FOR ASSESSMENT  Yue Portillo is a/an 78 year old female assessed by the dietitian for Provider Order - Progressive weight loss h/o HCC and cirrhosis.     NUTRITION HISTORY  Per admission: has since been unable to eat or drink with frequent BMs and episodes of vomiting initially, but as the week progressed she has become constipated and developed associated bilateral upper quadrant abdominal pain.    Attempted to see pt, she was sleeping at this time and family requested RD to come back at another time.     CURRENT NUTRITION ORDERS  Diet: 2 g Sodium  Intake/Tolerance: no intake recorded as pt was just admitted     LABS  Labs reviewed  Na: 129 (L)  MEDICATIONS  Medications reviewed    ANTHROPOMETRICS  Height: 157.5 cm (5' 2\")  Most Recent Weight: 49.3 kg (108 lb 11.2 oz)    IBW:50 kg  BMI: Normal BMI ( lower end)  Weight History:   Wt Readings from Last 20 Encounters:   03/17/18 49.3 kg (108 lb 11.2 oz)   03/12/18 49.8 kg (109 lb 12.6 oz)   03/05/18 49.8 kg (109 lb 12.8 oz)   02/26/18 53.1 kg (117 lb)   02/26/18 53.4 kg (117 lb 12.8 oz)   02/14/18 48.9 kg (107 lb 14.4 " oz)   02/14/18 51.4 kg (113 lb 6.4 oz)   02/05/18 50.8 kg (112 lb)   12/13/17 49.6 kg (109 lb 6.4 oz)   12/11/17 48.1 kg (106 lb)   11/30/17 48.2 kg (106 lb 4.8 oz)   11/09/17 52.8 kg (116 lb 4.8 oz)   11/08/17 54.9 kg (121 lb)   11/02/17 53.7 kg (118 lb 6.4 oz)   10/30/17 54 kg (119 lb 0.8 oz)   10/23/17 54.1 kg (119 lb 4.8 oz)   09/08/17 52.7 kg (116 lb 3.2 oz)   08/21/17 50.8 kg (112 lb)   08/10/17 57.7 kg (127 lb 3.3 oz)   07/19/17 57.7 kg (127 lb 4.8 oz)   Noted: a 5% weight loss over the past 6 months     Dosing Weight: 49 kg ( actual) - based on lowest weight since admission (49.3 kg on 3/17)    ASSESSED NUTRITION NEEDS  Estimated Energy Needs: 7080-5054 kcals/day (25 - 30 kcals/kg)  Justification: Repletion  Estimated Protein Needs: 59-74 grams protein/day (1.2 - 1.5 grams of pro/kg)  Justification: Repletion  Estimated Fluid Needs: (1 mL/kcal) or Per provider pending fluid status  Justification: Maintenance     PHYSICAL FINDINGS  See malnutrition section below.    MALNUTRITION  % Intake: Unable to assess  % Weight Loss: Up to 10% in 6 months (non-severe)  Subcutaneous Fat Loss: Unable to assess  Muscle Loss: Unable to assess  Fluid Accumulation/Edema: Unable to assess  Malnutrition Diagnosis: Unable to determine due to pt sleeping and family requested for RD to come back another time.     NUTRITION DIAGNOSIS  Inadequate protein-energy intake related to decreased appetite as evidenced by 5% weight loss over the past 6 months.      INTERVENTIONS  Implementation  Recommendation for nutritional interventions made: kcal counts, TF and ONS    Goals  Patient to consume % of nutritionally adequate meal trays TID, or the equivalent with supplements/snacks.     Monitoring/Evaluation  Progress toward goals will be monitored and evaluated per protocol.    Briana Blackwood RD, LD  Weekend pgr:  [phone]

## 2018-03-17 NOTE — H&P
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    #Upper abdominal pain  Has been going on for 1 week after thoracentesis. Per family, patient seemed to have abdominal pain which resolved after paracentesis. Her last paracentesis was on 3/5/2018. No n/v or diarrhea. No blood/black stools. Exam showed soft abdomen with +ascites, no rebound. LFTs and lipase were stable. No leukocytosis. Etiology could be 2/2 ascites reaccumulation, SBP?, progressing of HCC?  - Paracentesis to r/o SPB and therapeutic  - Will start empiric treatment for SBP due to +asterixis (no h/o HE) with Ceftriaxone 1 mg Q24h (consider dc if no SBP)  - MRI A/P as below  - Pain control with Oxycodone 5mg q6h prn    #JOSE  Cr elevated from baseline of 0.7-0.9 to 1.1 on admission. Likely 2/2 pre-renal from poor PO intake. Got 1 L NS bolus from ED.  -  ml/hr for today  - Avoid nephrotoxic agents  - Holding PTA lasix and spironolactone in setting of hypovolemia  - Cr in AM    #Hyponatremia  Na 129 on admission. Likely 2/2 hypovolemic hyponatremia 2/2 poor PO intake.  -  ml/hr for today  - Na in AM    #Hep C cirrhosis with HCC, ascites and hepatic hydrothorax  Required regular paracentesis and thoracentesis. Possible that abdominal pain is 2/2 reaccumulation of ascites vs SBP? , pt's primary hepatologist recommended evaluation for progression of HCC by MRI, para and thora and pain control. On exam + mild asterixis, marked ascites and CXR showed marked R sided pleural effusion  - Consult CAPS team for  thoracentesis, goal 2 L removal and paracentesis goal 4L removal and send for analysis w/u for infection, cell diff/count, culture, cytology, protein, glucose, albumin, LDH  - MELD daily  - Start lactulose TID titrate for 3 BM  - Holding PTA lasix and spironolactone due to JOSE  - Plan for MRI A/P w contrast once JOSE resolved  -  plan to discuss for potential transplant eval in transplant conference on 3/20/2018 (patient's age is not accurate per family, she is younger than 77 yo).  - Empiric treatment for SBP with ceftriaxone as above    MELD-Na score: 24 at 3/17/2018  9:26 AM  MELD score: 18 at 3/17/2018  9:26 AM  Calculated from:  Serum Creatinine: 1.10 mg/dL at 3/17/2018  9:26 AM  Serum Sodium: 129 mmol/L at 3/17/2018  9:26 AM  Total Bilirubin: 3.6 mg/dL at 3/17/2018  9:26 AM  INR(ratio): 1.68 at 3/17/2018  9:26 AM  Age: 78 years    #Cachecxia  #Malnutrition  Could be related to HCC and cirrhosis.   - Nutrition consult.    #H/o gastric ulcer in 2014  - Continue PTA omeprazole CR 40 mg daily    ##Chronic  #Upper airway cough syndrome  Chronic. F/u with madeline Guerrero in clinic. Could be related to chronic rhinitis with nasap dripping.   - Continue PTA flonase 2 spray daily, NS nasal spray BID    #Reported h/o asthma  Stable. No acute exacerbation.  - Continue PTA Mometasone/formeterol inh bid, albuterol inh prn    ##Other  - PPx: SCI for DVT/omeprazole for GI/lactulose+miralax for bowel  - FEN: 2G Na diet  - IVF:   ml/hr  - Code status: FULL    Disposition:  Pending for clinical improvement and PT/OT and family discussion for discharge options. Likely needs inpatient for 2-5 nights.    Markell Barnes MD  PGY-3 Internal Medicine  [phone]    This patient was staffed with the attending, Dr. Emanuel, who agrees with the above assessment and plan.    -------------------------------------------------------------------------------------------------------------------------------------------------------------    Chief Complaint: Abdominal pain, weakness    This history was obtained from the patient, who is a good historian, and a review of the medical record. Used her son as .      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 Family reported that her abdominal pain started 1 week ago after the thoracentesis on 3/12/2018. She normally has abdominal pain which relieved after paracentesis, her last paracentesis was on 3/5/2018 that released 3.8 L of the fluids. Her abdominal pain is located in the epigastric, LUQ and RUQ. She has been unable to eat and drink anything because of the pain. No n/v. She tried making her self vomit since she thought it would help, but unable to vomit. She is more constipated, no BM for 2 days. No diarrhea, black stools or blood in stools. She does not take any medications for pain. She reported subjective fever every night for 1 week, did not check temperature. No chills or night sweat. Continues to have stable chronic cough with white/clear sputum. No worsening breathing. No dysuria. No legs swelling. She came to ED due to worsening abdominal pain and weakness. At ED, she was hemodynamically stable. Labs showed hyponatremia and JOSE. LFTs, lactate and lipase were stable. CXR was done which showed marked R sided pleural effusion. She was given 1 L of NS bolus and running 125 ml/hr from ED. She was admitted to medicine for worsening abdominal pain and JOSE.     PAST MEDICAL HISTORY:    Past Medical History:   Diagnosis Date     Hepatitis C      Hepatocellular carcinoma (H)      Liver disease      Mild intermittent asthma       Pleural effusion associated with hepatic disorder        Past Surgical History:   Procedure Laterality Date     CATARACT IOL, RT/LT Right 08/22/2017    s/p CE/IOL right eye     EYE SURGERY       H PYLORI SHYLA SCREEN      was treated for h pylori     NO HISTORY OF SURGERY          MEDICATIONS:  PTA Meds  Prior to Admission medications    Medication Sig Last Dose Taking? Auth Provider   Multiple Vitamins-Iron (DAILY MULTIPLE VITAMIN/IRON) TABS Take 1 tablet by mouth daily   Felicitas Horton MD   polyethylene glycol (MIRALAX) powder Take 17 g (1 capful) by mouth 3 times daily as needed for constipation   Felicitas Horton MD   furosemide (LASIX) 20 MG tablet Take 1 tablet (20 mg) by mouth daily   Felicitas Horton MD   spironolactone (ALDACTONE) 50 MG tablet Take 1 tablet (50 mg) by mouth daily   Felicitas Horton MD   glycerin-hypromellose- (ARTIFICIAL TEARS) 0.2-0.2-1 % SOLN ophthalmic solution Place 1 drop into both eyes 2 times daily as needed for dry eyes   Felicitas Horton MD   cetirizine (ZYRTEC) 10 MG tablet Take 1 tablet (10 mg) by mouth every evening   Felicitas Horton MD   Nutritional Supplements (BOOST CALORIE SMART) LIQD Take 3 Cans by mouth daily   Felicitas Horton MD   fluticasone (FLONASE) 50 MCG/ACT spray Spray 2 sprays into both nostrils daily   Unknown, Entered By History   sodium chloride (SALINE MIST) 0.65 % nasal spray Spray 1 spray into both nostrils 2 times daily   Darlene Renteria MD   albuterol (2.5 MG/3ML) 0.083% neb solution Take 1 vial (2.5 mg) by nebulization every 6 hours as needed for shortness of breath / dyspnea or wheezing   Felicitas Horton MD   omeprazole (PRILOSEC) 40 MG capsule Take 1 capsule (40 mg) by mouth daily Take 30-60 minutes before a meal.   Felicitas Horton MD   mometasone-formoterol (DULERA) 200-5 MCG/ACT oral inhaler Inhale 2 puffs into the lungs 2 times daily   Felicitas Horton MD  "  albuterol (PROAIR HFA/PROVENTIL HFA/VENTOLIN HFA) 108 (90 BASE) MCG/ACT Inhaler Inhale 2 puffs into the lungs every 6 hours as needed for shortness of breath / dyspnea or wheezing   Felicitas Horton MD      Current Meds    Infusion Meds    sodium chloride 125 mL/hr at 18 1156       ALLERGIES:    Allergies   Allergen Reactions     Dust Mites Cough     Sneezing      Seasonal Allergies        REVIEW OF SYSTEMS:  A 10 point review of systems was negative except as noted above.    SOCIAL HISTORY:   Social History     Social History     Marital status: Single     Spouse name: N/A     Number of children: N/A     Years of education: N/A     Occupational History     Not on file.     Social History Main Topics     Smoking status: Never Smoker     Smokeless tobacco: Never Used     Alcohol use No     Drug use: No     Sexual activity: No     Other Topics Concern     Not on file     Social History Narrative       FAMILY MEDICAL HISTORY:   Family History   Problem Relation Age of Onset     Respiratory Father      asthma     DIABETES Son      Glaucoma No family hx of      Macular Degeneration No family hx of      Retinal detachment No family hx of      Amblyopia No family hx of        PHYSICAL EXAM:   Temp  Av.1  F (36.7  C)  Min: 98.1  F (36.7  C)  Max: 98.1  F (36.7  C)      Pulse  Av  Min: 92  Max: 92 Resp  Av  Min: 18  Max: 18  SpO2  Av.1 %  Min: 97 %  Max: 99 %       /73  Pulse 92  Temp 98.1  F (36.7  C) (Oral)  Resp 18  Ht 1.575 m (5' 2\")  Wt 49.8 kg (109 lb 12.6 oz)  SpO2 99%  BMI 20.08 kg/m2       Admit Weight: 49.8 kg (109 lb 12.6 oz)     GENERAL APPEARANCE: alert and no distress  Head NC/AT  EYES: scleral icterus, pupils equal  HENT: mouth  without ulcers or lesions  NECK: supple, no goiter  Lymphatics: no cervical or supraclavicular LAD  Pulmonary: decreased breath sound RL, no crackles or wheezing  CV: regular rhythm, normal rate, no rub, trace pitting edema BLE  GI:  " normal bowel sounds, soft, nontender, no HSM   MS: no evidence of inflammation in joints, no muscle tenderness  SKIN: no rash, warm, dry  NEURO: mentation intact and speech normal, mildly + asterixis    LABS:   CMP  Recent Labs  Lab 03/17/18  0926   *   POTASSIUM 4.1   CHLORIDE 94   CO2 26   ANIONGAP 8   *   BUN 23   CR 1.10*   GFRESTIMATED 48*   GFRESTBLACK 58*   MACY 8.1*   PROTTOTAL 6.5*   ALBUMIN 2.0*   BILITOTAL 3.6*   ALKPHOS 263*   AST 96*   ALT 45     CBC  Recent Labs  Lab 03/17/18  0926 03/12/18  1030   HGB 10.9* 10.4*   WBC 5.2 3.9*   RBC 3.24* 3.07*   HCT 32.1* 31.3*   MCV 99 102*   MCH 33.6* 33.9*   MCHC 34.0 33.2   RDW 15.4* 16.0*   PLT 87* 79*     INR  Recent Labs  Lab 03/17/18  0926 03/12/18  1030   INR 1.68* 1.74*       URINE STUDIES  Recent Labs   Lab Test  03/17/18   1306   COLOR  Taylor   APPEARANCE  Clear   URINEGLC  Negative   URINEBILI  Small*   URINEKETONE  5*   SG  1.025   UBLD  Negative   URINEPH  6.0   PROTEIN  10*   NITRITE  Negative   LEUKEST  Trace*   RBCU  2   WBCU  9*       IMAGING:  CXR 3/17/2018  IMPRESSION:  Large right pleural effusion associated with atelectasis, smaller than  2/12/2018 with no evidence for pneumothorax.    Markell Barnes MD      Physician Attestation  I, Alba Emanuel MD, saw this patient with the resident and agree with the resident s findings and plan of care as documented in the resident s note with my edits.     I personally reviewed vital signs, medications, labs and imaging.    Alba Emanuel MD  Date of Service (when I saw the patient): 03/17/18

## 2018-03-17 NOTE — ED NOTES
Bed: ED06  Expected date: 3/17/18  Expected time: 8:49 AM  Means of arrival: Ambulance  Comments:  H434, yellow  77yo F, abdominal swelling, nausea

## 2018-03-17 NOTE — PROCEDURES
Thoracentesis Procedure Note  Indication: laboratory studies, symptom management  Diagnosis: pleural effusion    The risks and benefits of the procedure were explained to the patient, who expressed understanding and opted to proceed.  Consent was obtained and placed in the chart. An ultrasound probe was used to identify an area of pleural fluid in the right hemithorax. A time out was performed.      The area was prepped and draped in the usual sterile fashion. 3 ml of 1% lidocaine was instilled and pleural fluid was aspirated.  A small incision was made with an 11 blade. An 8 Fr thoracentesis catheter and needle were inserted above the rib until fluid was obtained, and then the needle removed. A fluid sample was collected for laboratory analysis. The apparatus was connected to the one-way valve and hand pump device, and 1500 ml were removed.  The catheter was removed with the patient humming. An occlusive dressing was placed.    The patient tolerated the procedure well. A chest x-ray is pending.    Total fluid removed: 1500mL    Appearance: clear yellow    Labs sent: yes    Elisha Marquez M.D.  Attending Physician  [facility]  Bedside Procedures Service  [phone]  Date of Service: 3/17/2018

## 2018-03-17 NOTE — IP AVS SNAPSHOT
[address] [address]    Phone:  [phone]                                       After Visit Summary   3/17/2018    Yue Portillo    MRN: [id number]           After Visit Summary Signature Page     I have received my discharge instructions, and my questions have been answered. I have discussed any challenges I see with this plan with the nurse or doctor.    ..........................................................................................................................................  Patient/Patient Representative Signature      ..........................................................................................................................................  Patient Representative Print Name and Relationship to Patient    ..................................................               ................................................  Date                                            Time    ..........................................................................................................................................  Reviewed by Signature/Title    ...................................................              ..............................................  Date                                                            Time

## 2018-03-17 NOTE — ED NOTES
[facility], [place]   ED Nurse to Floor Handoff     Yue Portillo is a 78 year old female who speaks Stateless and lives with family members,  in a home  They arrived in the ED by car from home    ED Chief Complaint: Abdominal Pain    ED Dx;   Final diagnoses:   Pleural effusion   Ascites of liver   Dyspnea, unspecified type   HCC (hepatocellular carcinoma) (H)   Adult failure to thrive   Hyponatremia         Needed?: Yes    Allergies:   Allergies   Allergen Reactions     Dust Mites Cough     Sneezing      Seasonal Allergies    .  Past Medical Hx:   Past Medical History:   Diagnosis Date     Hepatitis C      Hepatocellular carcinoma (H)      Liver disease      Mild intermittent asthma      Pleural effusion associated with hepatic disorder       Baseline Mental status: WDL  Current Mental Status changes: at basesline    Infection: No  Sepsis suspected: No  Isolation type: No active isolations     Activity level - Baseline/Home:  Stand with Assist  Activity Level - Current:   Stand with Assist    Bariatric equipment needed?: No    In the ED these meds were given:   Medications   sodium chloride 0.9% infusion ( Intravenous New Bag 3/17/18 1156)   ondansetron (ZOFRAN) injection 4 mg (4 mg Intravenous Given 3/17/18 0939)   0.9% sodium chloride BOLUS (0 mLs Intravenous Stopped 3/17/18 1156)       Drips running?  No    Home pump or pre-existing LDA's present? No    Labs results:   Labs Ordered and Resulted from Time of ED Arrival Up to the Time of Departure from the ED   CBC WITH PLATELETS DIFFERENTIAL - Abnormal; Notable for the following:        Result Value    RBC Count 3.24 (*)     Hemoglobin 10.9 (*)     Hematocrit 32.1 (*)     MCH 33.6 (*)     RDW 15.4 (*)     Platelet Count 87 (*)     All other components within normal limits   COMPREHENSIVE METABOLIC PANEL - Abnormal; Notable for the following:     Sodium 129 (*)     Glucose 105 (*)     Creatinine 1.10 (*)     GFR Estimate 48  "(*)     GFR Estimate If Black 58 (*)     Calcium 8.1 (*)     Bilirubin Total 3.6 (*)     Albumin 2.0 (*)     Protein Total 6.5 (*)     Alkaline Phosphatase 263 (*)     AST 96 (*)     All other components within normal limits   INR - Abnormal; Notable for the following:     INR 1.68 (*)     All other components within normal limits   LIPASE   LACTIC ACID WHOLE BLOOD   UA MACROSCOPIC WITH REFLEX TO MICRO AND CULTURE   BLOOD CULTURE       Imaging Studies:   Recent Results (from the past 24 hour(s))   POC US CHEST B-SCAN    Impression      Limited Bedside Lung Ultrasound, performed and interpreted by me. Indication:    shortness of breath    Lung ultrasound was performed for the assessment of pleural effusion      IMPRESSION:   Large right sided pleural effusion. (images did not load)     XR Chest 2 Views    Narrative    XR CHEST 2 VW  3/17/2018 10:58 AM    History:  sob, s/p thoracentesis; .     Comparison: Chest radiograph dated 2/12/2018    Findings:   AP and the lateral view of the chest. Trachea is in midline. The right  heart border is obscured. Large right pleural effusion overlying  passive atelectasis is smaller than 2/12/2018. The left lung are  relatively clear. No pneumothorax or left pleural effusion.      Impression    IMPRESSION:  Large right pleural effusion associated with atelectasis, smaller than  2/12/2018 with no evidence for pneumothorax.    I have personally reviewed the examination and initial interpretation  and I agree with the findings.    KIKI FAN MD       Recent vital signs:   /73  Pulse 92  Temp 98.1  F (36.7  C) (Oral)  Resp 18  Ht 1.575 m (5' 2\")  Wt 49.8 kg (109 lb 12.6 oz)  SpO2 99%  BMI 20.08 kg/m2    Cardiac Rhythm: Normal Sinus  Pt needs tele? No  Skin/wound Issues: None    Code Status: Full Code    Pain control: good    Nausea control: good    Abnormal labs/tests/findings requiring intervention:     Family present during ED course? Yes   Family " Comments/Social Situation comments: N/A    Tasks needing completion: None    Jumana Frye, RN  [phone] [facility]

## 2018-03-17 NOTE — ED PROVIDER NOTES
History     Chief Complaint   Patient presents with     Abdominal Pain     The history is provided by the patient and a relative. The history is limited by a language barrier. A  was used.     Yue Portillo is a 78 year old [place] female with a past medical history notable for HCC in the context of hepatitis C related cirrhosis who presents to the ED for the evaluation of abdominal pain. The patient has frequent para- and thoracenteses with her last being 3/5 and 3/12, respectively. During her last thoracentesis on 3/12, 5 days ago, the patient had a grand total of 3.3 L drained and she was discharged home. She has since been unable to eat or drink with frequent BMs and episodes of vomiting initially, but as the week progressed she has become constipated and developed associated bilateral upper quadrant abdominal pain. The patient denies any recorded fevers, but she does feel subjectively feverish intermittently.  She states she can't breath and has nausea, and has been unable to eat or drink. She requests a medication to make herself vomit, because she thinks she will feel better.      Social: Here with her [place]ian-speaking daughter and multilingual son        PAST MEDICAL HISTORY:   Past Medical History:   Diagnosis Date     Hepatitis C      Hepatocellular carcinoma (H)      Liver disease      Mild intermittent asthma      Pleural effusion associated with hepatic disorder        PAST SURGICAL HISTORY:   Past Surgical History:   Procedure Laterality Date     CATARACT IOL, RT/LT Right 08/22/2017    s/p CE/IOL right eye     EYE SURGERY       H PYLORI SHYLA SCREEN      was treated for h pylori     NO HISTORY OF SURGERY         FAMILY HISTORY:   Family History   Problem Relation Age of Onset     Respiratory Father      asthma     DIABETES Son      Glaucoma No family hx of      Macular Degeneration No family hx of      Retinal detachment No family hx of      Amblyopia No family hx of        SOCIAL  HISTORY:   Social History   Substance Use Topics     Smoking status: Never Smoker     Smokeless tobacco: Never Used     Alcohol use No       Patient's Medications   New Prescriptions    No medications on file   Previous Medications    ALBUTEROL (2.5 MG/3ML) 0.083% NEB SOLUTION    Take 1 vial (2.5 mg) by nebulization every 6 hours as needed for shortness of breath / dyspnea or wheezing    ALBUTEROL (PROAIR HFA/PROVENTIL HFA/VENTOLIN HFA) 108 (90 BASE) MCG/ACT INHALER    Inhale 2 puffs into the lungs every 6 hours as needed for shortness of breath / dyspnea or wheezing    CETIRIZINE (ZYRTEC) 10 MG TABLET    Take 1 tablet (10 mg) by mouth every evening    FLUTICASONE (FLONASE) 50 MCG/ACT SPRAY    Spray 2 sprays into both nostrils daily    FUROSEMIDE (LASIX) 20 MG TABLET    Take 1 tablet (20 mg) by mouth daily    GLYCERIN-HYPROMELLOSE- (ARTIFICIAL TEARS) 0.2-0.2-1 % SOLN OPHTHALMIC SOLUTION    Place 1 drop into both eyes 2 times daily as needed for dry eyes    MOMETASONE-FORMOTEROL (DULERA) 200-5 MCG/ACT ORAL INHALER    Inhale 2 puffs into the lungs 2 times daily    MULTIPLE VITAMINS-IRON (DAILY MULTIPLE VITAMIN/IRON) TABS    Take 1 tablet by mouth daily    NUTRITIONAL SUPPLEMENTS (BOOST CALORIE SMART) LIQD    Take 3 Cans by mouth daily    OMEPRAZOLE (PRILOSEC) 40 MG CAPSULE    Take 1 capsule (40 mg) by mouth daily Take 30-60 minutes before a meal.    POLYETHYLENE GLYCOL (MIRALAX) POWDER    Take 17 g (1 capful) by mouth 3 times daily as needed for constipation    SODIUM CHLORIDE (SALINE MIST) 0.65 % NASAL SPRAY    Spray 1 spray into both nostrils 2 times daily    SPIRONOLACTONE (ALDACTONE) 50 MG TABLET    Take 1 tablet (50 mg) by mouth daily   Modified Medications    No medications on file   Discontinued Medications    No medications on file          Allergies   Allergen Reactions     Dust Mites Cough     Sneezing      Seasonal Allergies          I have reviewed the Medications, Allergies, Past Medical and  "Surgical History, and Social History in the Epic system.    Review of Systems   Constitutional: Negative for chills and fever.   HENT: Negative for congestion.    Eyes: Negative for redness.   Respiratory: Negative for shortness of breath.    Cardiovascular: Negative for chest pain.   Gastrointestinal: Positive for abdominal pain, constipation, nausea and vomiting.   Genitourinary: Negative for difficulty urinating.   Musculoskeletal: Negative for arthralgias and neck stiffness.   Skin: Negative for color change.   Neurological: Negative for headaches.   Psychiatric/Behavioral: Negative for confusion.   All other systems reviewed and are negative.      Physical Exam   BP: 113/67  Pulse: 92  Temp: 98.1  F (36.7  C)  Resp: 18  Height: 157.5 cm (5' 2\")  Weight: 49.8 kg (109 lb 12.6 oz)  SpO2: 99 %      Physical Exam  Physical Exam   Constitutional:   Thin, chronically ill-appearing, appears uncomfortable  HENT:   Head: Normocephalic and atraumatic.   Eyes: Conjunctivae are normal. Pupils are equal, round, and reactive to light.   Scleral icterus  pharynx has no erythema or exudate, mucous membranes are dry  Neck:   no adenopathy, no bony tenderness  Cardiovascular: regular rate and rhythm without murmurs or gallops  Pulmonary/Chest: Clear to auscultation bilaterally, with no wheezes or retractions. No respiratory distress.  GI: Soft with good bowel sounds.  RUQ and midepigastric tenderness, distended with fluid wave,  with no guarding, no rebound, no peritoneal signs.   Back:  No bony or CVA tenderness   Musculoskeletal:  no edema or clubbing   Skin: Skin is warm and dry. No rash noted.   Neurological: alert and oriented to person, place, and time. Nonfocal exam  Psychiatric:  normal mood and affect.   ED Course     ED Course     Procedures  Results for orders placed during the hospital encounter of 03/17/18   POC US CHEST B-SCAN    Impression   Limited Bedside Lung Ultrasound, performed and interpreted by me. " Indication:    shortness of breath    Lung ultrasound was performed for the assessment of pleural effusion      IMPRESSION:   Large right sided pleural effusion. (images did not load)               EKG Interpretation:      Interpreted by Alea Chance  Time reviewed: 0920 am  Symptoms at time of EKG: see hpi   Rhythm: normal sinus   Rate: Normal  Axis: Normal  Ectopy: none  Conduction: normal  ST Segments/ T Waves: No acute ischemic changes  Q Waves: none  Comparison to prior: Unchanged from 2/14/2018    Clinical Impression: NSR rate of 91 bpm with low voltage QRS                Critical Care time:  none            Results for orders placed or performed during the hospital encounter of 03/17/18 (from the past 24 hour(s))   EKG 12-lead, tracing only   Result Value Ref Range    Interpretation ECG Click View Image link to view waveform and result    CBC with platelets differential   Result Value Ref Range    WBC 5.2 4.0 - 11.0 10e9/L    RBC Count 3.24 (L) 3.8 - 5.2 10e12/L    Hemoglobin 10.9 (L) 11.7 - 15.7 g/dL    Hematocrit 32.1 (L) 35.0 - 47.0 %    MCV 99 78 - 100 fl    MCH 33.6 (H) 26.5 - 33.0 pg    MCHC 34.0 31.5 - 36.5 g/dL    RDW 15.4 (H) 10.0 - 15.0 %    Platelet Count 87 (L) 150 - 450 10e9/L    Diff Method Automated Method     % Neutrophils 67.7 %    % Lymphocytes 17.0 %    % Monocytes 8.9 %    % Eosinophils 6.0 %    % Basophils 0.2 %    % Immature Granulocytes 0.2 %    Nucleated RBCs 0 0 /100    Absolute Neutrophil 3.5 1.6 - 8.3 10e9/L    Absolute Lymphocytes 0.9 0.8 - 5.3 10e9/L    Absolute Monocytes 0.5 0.0 - 1.3 10e9/L    Absolute Eosinophils 0.3 0.0 - 0.7 10e9/L    Absolute Basophils 0.0 0.0 - 0.2 10e9/L    Abs Immature Granulocytes 0.0 0 - 0.4 10e9/L    Absolute Nucleated RBC 0.0    Comprehensive metabolic panel   Result Value Ref Range    Sodium 129 (L) 133 - 144 mmol/L    Potassium 4.1 3.4 - 5.3 mmol/L    Chloride 94 94 - 109 mmol/L    Carbon Dioxide 26 20 - 32 mmol/L    Anion Gap 8 3 - 14 mmol/L     Glucose 105 (H) 70 - 99 mg/dL    Urea Nitrogen 23 7 - 30 mg/dL    Creatinine 1.10 (H) 0.52 - 1.04 mg/dL    GFR Estimate 48 (L) >60 mL/min/1.7m2    GFR Estimate If Black 58 (L) >60 mL/min/1.7m2    Calcium 8.1 (L) 8.5 - 10.1 mg/dL    Bilirubin Total 3.6 (H) 0.2 - 1.3 mg/dL    Albumin 2.0 (L) 3.4 - 5.0 g/dL    Protein Total 6.5 (L) 6.8 - 8.8 g/dL    Alkaline Phosphatase 263 (H) 40 - 150 U/L    ALT 45 0 - 50 U/L    AST 96 (H) 0 - 45 U/L   Lipase   Result Value Ref Range    Lipase 306 73 - 393 U/L   Lactic acid whole blood   Result Value Ref Range    Lactic Acid 1.8 0.7 - 2.0 mmol/L   INR   Result Value Ref Range    INR 1.68 (H) 0.86 - 1.14   POC US CHEST B-SCAN    Impression      Limited Bedside Lung Ultrasound, performed and interpreted by me. Indication:    shortness of breath    Lung ultrasound was performed for the assessment of pleural effusion      IMPRESSION:   Large right sided pleural effusion. (images did not load)     XR Chest 2 Views    Narrative    XR CHEST 2 VW  3/17/2018 10:58 AM    History:  sob, s/p thoracentesis; .     Comparison: Chest radiograph dated 2/12/2018    Findings:   AP and the lateral view of the chest. Trachea is in midline. The right  heart border is obscured. Large right pleural effusion overlying  passive atelectasis is smaller than 2/12/2018. The left lung are  relatively clear. No pneumothorax or left pleural effusion.      Impression    IMPRESSION:  Large right pleural effusion associated with atelectasis, smaller than  2/12/2018 with no evidence for pneumothorax.    I have personally reviewed the examination and initial interpretation  and I agree with the findings.    KIKI FAN MD        Labs Ordered and Resulted from Time of ED Arrival Up to the Time of Departure from the ED   CBC WITH PLATELETS DIFFERENTIAL - Abnormal; Notable for the following:        Result Value    RBC Count 3.24 (*)     Hemoglobin 10.9 (*)     Hematocrit 32.1 (*)     MCH 33.6 (*)     RDW 15.4 (*)      Platelet Count 87 (*)     All other components within normal limits   COMPREHENSIVE METABOLIC PANEL - Abnormal; Notable for the following:     Sodium 129 (*)     Glucose 105 (*)     Creatinine 1.10 (*)     GFR Estimate 48 (*)     GFR Estimate If Black 58 (*)     Calcium 8.1 (*)     Bilirubin Total 3.6 (*)     Albumin 2.0 (*)     Protein Total 6.5 (*)     Alkaline Phosphatase 263 (*)     AST 96 (*)     All other components within normal limits   INR - Abnormal; Notable for the following:     INR 1.68 (*)     All other components within normal limits   LIPASE   LACTIC ACID WHOLE BLOOD   UA MACROSCOPIC WITH REFLEX TO MICRO AND CULTURE   BLOOD CULTURE            Assessments & Plan (with Medical Decision Making)       I have reviewed the nursing notes.  Emergency Department course:  The patient was seen and examined at 0902 am. EKG shows NSR, rate of 91 bpm with low voltage QRS.  I treated her with a normal saline bolus IV and Zofran IV then later normal saline at 125 cc /hr.   Laboratory studies are notable for hyponatremia, with a sodium of 129.  Patient has worsening renal insufficiency, with a creatinine of 1.10 and a GFR of 58.  Bilirubin is elevated at 3.6, and AST of 96.  In addition, the patient is anemic, with a hemoglobin of 10.9 and thrombocytopenic, with a platelet count of 87.  Lactate is within normal limits at 1.8.  INR is elevated at 1.68.  Chest x-ray shows a large right-sided pleural effusion with atelectasis and no evidence of pneumothorax.. POC ultrasound shows significant pleural effusion and ascites.   The patient is an 78-year-old female with hepatocellular carcinoma and cirrhosis, here with dyspnea, likely secondary to a large reaccumulated right-sided pleural effusion, significant ascites, and adult failure to thrive.  She is not been able to eat or drink for at least 5 days.  She will be admitted to the medicine service for further evaluation and treatment.  I spoke with Dr. Magdaleno of Medicine  regarding admission.  The patient may benefit from a palliative care consult while inpatient.   I have reviewed the findings, diagnosis, plan and need for follow up with the patient.    New Prescriptions    No medications on file       Final diagnoses:   Pleural effusion   Ascites of liver   Dyspnea, unspecified type   HCC (hepatocellular carcinoma) (H)   Adult failure to thrive   Hyponatremia   I, Deepak Ray , am serving as a trained medical scribe to document services personally performed by Alea Chance MD, based on the provider's statements to me.      IAlea MD, was physically present and have reviewed and verified the accuracy of this note documented by Deepak Ray.   This note was created in part by the use of Dragon voice recognition dictation system. Inadvertent grammatical errors and typographical errors may still exist.  Alea Chance MD          3/17/2018   [facility], [place], EMERGENCY DEPARTMENT     Alea Chance MD  03/17/18 [id number]     Bilobed Transposition Flap Text: The defect edges were debeveled with a #15 scalpel blade.  Given the location of the defect and the proximity to free margins a bilobed transposition flap was deemed most appropriate.  Using a sterile surgical marker, an appropriate bilobe flap drawn around the defect.    The area thus outlined was incised deep to adipose tissue with a #15 scalpel blade.  The skin margins were undermined to an appropriate distance in all directions utilizing iris scissors.

## 2018-03-17 NOTE — SUMMARY OF CARE
Pt was admitted to  from [facility] ED on 3/17/18 at 1400. Belongings include clothing and shoes. Pt requested to keep clothing on and store the rest in closet.

## 2018-03-17 NOTE — ED NOTES
"Triage Assessment & Note:    /67  Pulse 92  Temp 98.1  F (36.7  C) (Oral)  Resp 18  Ht 1.575 m (5' 2\")  Wt 49.8 kg (109 lb 12.6 oz)  SpO2 99%  BMI 20.08 kg/m2    Patient presents with: c/o abdominal pain , poor PO intake, recent DC from hospital, hx liver ca    Home Treatments/Remedies: none    Febrile / Afebrile? afebrile    Duration of C/o: 1 week    Doc Pak  March 17, 2018      "

## 2018-03-17 NOTE — IP AVS SNAPSHOT
MRN:[id number]                      After Visit Summary   3/17/2018    Yue Portillo    MRN: [id number]           Thank you!     Thank you for choosing [facility] for your care. Our goal is always to provide you with excellent care. Hearing back from our patients is one way we can continue to improve our services. Please take a few minutes to complete the written survey that you may receive in the mail after you visit with us. Thank you!        Patient Information     Date Of Birth          1940        Designated Caregiver       Most Recent Value    Caregiver    Will someone help with your care after discharge? yes    Name of designated caregiver Sammy    Phone number of caregiver [phone]    Caregiver address [place]      About your hospital stay     You were admitted on:  March 17, 2018 You last received care in the:  [address] [facility]    You were discharged on:  March 25, 2018        Reason for your hospital stay       You were admitted to the hospital for abdominal pain and weakness which is from the ascites fluids in the abdomen and pleural effusion in the chest that is the result from liver cirrhosis and liver cancer.                  Who to Call     For medical emergencies, please call 911.  For non-urgent questions about your medical care, please call your primary care provider or clinic, [phone]          Attending Provider     Provider Specialty    Alea Chance MD Emergency Medicine    [facility], [facility] [place] [facility]    Kj Montano MD Internal Medicine       Primary Care Provider Office Phone # Fax #    Felicitas Truman Horton [phone]       When to contact your care team       Call your primary doctor if you have any of the following:  increased shortness of breath, increased swelling or increased pain.    Paracentesis and thoracentesis will be scheduled for 4/2/2018--please call if needs earlier than this appointment.                  After  Care Instructions     Activity       Your activity upon discharge: activity as tolerated            Diet       Follow this diet upon discharge: Orders Placed This Encounter      Calorie Counts      Snacks/Supplements Adult: Ensure Plus (Adult); Between Meals      Snacks/Supplements Adult: Other - Please comment; See the following; Between Meals      Combination Diet Regular Diet Adult; 2 gm NA Diet                  Follow-up Appointments     Adult [facility] Follow-up and recommended labs and tests       Follow up with primary care provider, Felicitas Horton, within 7 days for hospital follow- up.  The following labs/tests are recommended: CMP, CBC.      Follow up with Dr. Meyer , at [facility] GI Hepatology clinic as scheduled on April, 24, 2018 to evaluate treatment change. The following labs/tests are recommended: CBC, CMP, INR.    Follow up with  , at [facility] palliative care clinic as scheduled on April, 11, 2018 to evaluate treatment change.     Follow up with , at [facility] Oncology clinic, you will be called for schedule appointment to evaluate treatment change.     Follow up with nutrition at [facility] in nutrition clinic, you will be called for schedule appointment.     Appointments on [place] and/or [facility] (with [facility] or [facility] provider or service). Call [phone] if you haven't heard regarding these appointments within 7 days of discharge.                  Your next 10 appointments already scheduled     Apr 11, 2018 12:45 PM CDT   (Arrive by 12:30 PM)   New Patient Visit with Chay Yeager MD   [facility] ([facility] [place])    [address]  [address]  [place] [address] [phone]            Apr 24, 2018  9:30 AM CDT   Lab with  LAB   [facility] ([facility])    [address]  1st Floor  [place] [address]    [phone]            Apr 24, 2018 10:30 AM CDT   (Arrive by 10:15 AM)   New General Liver with Tala Meyer MD   [facility]at[facility] ([facility])    [address]  [address]  [place] [id number] [phone]            Feb 04, 2019 12:40 PM CST   (Arrive by 12:25 PM)   CT CHEST W/O CONTRAST with UCCT1   [facility] [place] [facility] ([facility])    [address]  1st [address]  [place] [id number] [phone]           Please bring any scans or X-rays taken at other hospitals, if similar tests were done. Also bring a list of your medicines, including vitamins, minerals and over-the-counter drugs. It is safest to leave personal items at home.  Be sure to tell your doctor:   If you have any allergies.   If there s any chance you are pregnant.   If you are breastfeeding.  You do not need to do anything special to prepare for this exam.  Please wear loose clothing, such as a sweat suit or jogging clothes. Avoid snaps, zippers and other metal. We may ask you to undress and put on a hospital gown.            Feb 04, 2019  1:00 PM CST   (Arrive by 12:45 PM)   Return Visit with Darlene Renteria MD   [facility] ([facility])    [address]  [address]  [place] [id number] [phone]              Additional Services     Nutrition Referral                 Future tests that were ordered for you     CBC with platelets       Last Lab Result: Hemoglobin (g/dL)       Date                     Value                 03/25/2018               8.7 (L)          ----------            Comprehensive metabolic panel           INR           IR Thoracentesis                 Pending Results     Date and Time Order Name Status Description    3/25/2018 0005 XR Chest Port 1 View In process     3/24/2018 1422 IR Thoracentesis Preliminary     3/23/2018 0920 POC US Guide  "for Paracentesis In process     3/17/2018 1434 POC US Guide for Thorcentesis In process     3/17/2018 1434 POC US Guide for Paracentesis In process             Statement of Approval     Ordered          03/25/18 1129  I have reviewed and agree with all the recommendations and orders detailed in this document.  EFFECTIVE NOW     Approved and electronically signed by:  Kj Montano MD             Admission Information     Date & Time Provider Department Dept. Phone    3/17/2018 Kj Montano MD [address] [facility] [phone]      Your Vitals Were     Blood Pressure Pulse Temperature Respirations Height Weight    115/61 (BP Location: Right arm) 84 98  F (36.7  C) (Oral) 16 1.575 m (5' 2\") 45.6 kg (100 lb 10.1 oz)    Pulse Oximetry BMI (Body Mass Index)                98% 18.41 kg/m2          Care EveryWhere ID     This is your Care EveryWhere ID. This could be used by other organizations to access your [place] medical records  [id number]        Equal Access to Services     LUKE DUVAL AH: Hadii aad ku hadasho Soomaali, waaxda luqadaha, qaybta kaalmada adeegyada, rosana lance. So [facility] [phone].    ATENCIÓN: Si habla español, tiene a martinez disposición servicios gratuitos de asistencia lingüística. Llame al [phone].    We comply with applicable federal civil rights laws and [place] laws. We do not discriminate on the basis of race, color, national origin, age, disability, sex, sexual orientation, or gender identity.               Review of your medicines      START taking        Dose / Directions    artificial saliva Soln solution   Used for:  Dry mouth        Dose:  2 spray   Take 2 mLs (2 sprays) by mouth 4 times daily as needed for dry mouth   Quantity:  1 Bottle   Refills:  0       Benzocaine 20 % Aero spray   Commonly known as:  HURRICAINE/TOPEX   Used for:  Throat pain        Dose:  1-2 spray   Take 0.5-1 mLs by mouth every 3 hours as needed for moderate pain "   Quantity:  1 Bottle   Refills:  0       benzocaine-menthol 15-3.6 MG lozenge   Commonly known as:  CEPACOL   Used for:  Throat pain        Dose:  1 lozenge   Place 1 lozenge inside cheek every hour as needed for sore throat   Quantity:  84 lozenge   Refills:  0       calcium carbonate 500 MG chewable tablet   Commonly known as:  TUMS   Used for:  Gastroesophageal reflux disease without esophagitis        Dose:  1 chew tab   Take 1 tablet (500 mg) by mouth daily as needed for heartburn   Quantity:  150 tablet   Refills:  0       lactulose 10 GM/15ML solution   Commonly known as:  CHRONULAC   Used for:  Cirrhosis of liver with ascites, unspecified hepatic cirrhosis type (H)        Dose:  10 g   Take 15 mLs (10 g) by mouth daily   Quantity:  1892 mL   Refills:  0       ondansetron 4 MG ODT tab   Commonly known as:  ZOFRAN-ODT   Used for:  HCC (hepatocellular carcinoma) (H)        Dose:  4 mg   Take 1 tablet (4 mg) by mouth every 6 hours as needed for nausea or vomiting   Quantity:  120 tablet   Refills:  0       traMADol 50 MG tablet   Commonly known as:  ULTRAM   Used for:  HCC (hepatocellular carcinoma) (H)        Dose:  25-50 mg   Take 0.5-1 tablets (25-50 mg) by mouth every 8 hours as needed for moderate pain   Quantity:  40 tablet   Refills:  0         CONTINUE these medicines which have NOT CHANGED        Dose / Directions    * albuterol 108 (90 BASE) MCG/ACT Inhaler   Commonly known as:  PROAIR HFA/PROVENTIL HFA/VENTOLIN HFA   Used for:  Moderate persistent asthma without complication        Dose:  2 puff   Inhale 2 puffs into the lungs every 6 hours as needed for shortness of breath / dyspnea or wheezing   Quantity:  1 Inhaler   Refills:  11       * albuterol (2.5 MG/3ML) 0.083% neb solution   Used for:  Mild intermittent asthma with acute exacerbation        Dose:  1 vial   Take 1 vial (2.5 mg) by nebulization every 6 hours as needed for shortness of breath / dyspnea or wheezing   Quantity:  25 vial    Refills:  3       BOOST CALORIE SMART Liqd   Used for:  HCC (hepatocellular carcinoma) (H)        Dose:  3 Can   Take 3 Cans by mouth daily   Quantity:  90 Bottle   Refills:  11       cetirizine 10 MG tablet   Commonly known as:  zyrTEC   Used for:  Chronic allergic conjunctivitis        Dose:  10 mg   Take 1 tablet (10 mg) by mouth every evening   Quantity:  30 tablet   Refills:  6       DAILY MULTIPLE VITAMIN/IRON Tabs   Used for:  Chronic hepatitis C without hepatic coma (H)        Dose:  1 tablet   Take 1 tablet by mouth daily   Quantity:  30 tablet   Refills:  11       furosemide 20 MG tablet   Commonly known as:  LASIX   Used for:  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)        Dose:  20 mg   Take 1 tablet (20 mg) by mouth daily   Quantity:  180 tablet   Refills:  1       glycerin-hypromellose- 0.2-0.2-1 % Soln ophthalmic solution   Commonly known as:  ARTIFICIAL TEARS   Used for:  Dry eyes        Dose:  1 drop   Place 1 drop into both eyes 2 times daily as needed for dry eyes   Quantity:  1 Bottle   Refills:  0       mometasone-formoterol 200-5 MCG/ACT oral inhaler   Commonly known as:  DULERA   Used for:  Moderate persistent asthma without complication        Dose:  2 puff   Inhale 2 puffs into the lungs 2 times daily   Quantity:  1 Inhaler   Refills:  12       omeprazole 40 MG capsule   Commonly known as:  priLOSEC   Used for:  Gastroesophageal reflux disease without esophagitis        Dose:  40 mg   Take 1 capsule (40 mg) by mouth daily Take 30-60 minutes before a meal.   Quantity:  90 capsule   Refills:  3       polyethylene glycol powder   Commonly known as:  MIRALAX   Used for:  Constipation, unspecified constipation type        Dose:  1 capful   Take 17 g (1 capful) by mouth 3 times daily as needed for constipation   Quantity:  850 g   Refills:  3       sodium chloride 0.65 % nasal spray   Commonly known as:  SALINE MIST   Used for:  Chronic nonseasonal allergic rhinitis due to pollen         Dose:  1 spray   Spray 1 spray into both nostrils 2 times daily   Quantity:  1 Bottle   Refills:  11       spironolactone 50 MG tablet   Commonly known as:  ALDACTONE   Used for:  Chronic hepatitis C without hepatic coma (H)        Dose:  50 mg   Take 1 tablet (50 mg) by mouth daily   Quantity:  30 tablet   Refills:  1       * Notice:  This list has 2 medication(s) that are the same as other medications prescribed for you. Read the directions carefully, and ask your doctor or other care provider to review them with you.         Where to get your medicines      These medications were sent to [facility] [address]  [address] [address]     Phone:  [phone]     artificial saliva Soln solution    Benzocaine 20 % Aero spray    benzocaine-menthol 15-3.6 MG lozenge    calcium carbonate 500 MG chewable tablet    lactulose 10 GM/15ML solution    ondansetron 4 MG ODT tab         Some of these will need a paper prescription and others can be bought over the counter. Ask your nurse if you have questions.     Bring a paper prescription for each of these medications     traMADol 50 MG tablet                Protect others around you: Learn how to safely use, store and throw away your medicines at www.disposemymeds.org.        Information about OPIOIDS     PRESCRIPTION OPIOIDS: WHAT YOU NEED TO KNOW    Prescription opioids can be used to help relieve moderate to severe pain and are often prescribed following a surgery or injury, or for certain health conditions. These medications can be an important part of treatment but also come with serious risks. It is important to work with your health care provider to make sure you are getting the safest, most effective care.    WHAT ARE THE RISKS AND SIDE EFFECTS OF OPIOID USE?  Prescription opioids carry serious risks of addiction and overdose, especially with prolonged use. An opioid overdose, often marked by  slowed breathing can cause sudden death. The use of prescription opioids can have a number of side effects as well, even when taken as directed:      Tolerance - meaning you might need to take more of a medication for the same pain relief    Physical dependence - meaning you have symptoms of withdrawal when a medication is stopped    Increased sensitivity to pain    Constipation    Nausea, vomiting, and dry mouth    Sleepiness and dizziness    Confusion    Depression    Low levels of testosterone that can result in lower sex drive, energy, and strength    Itching and sweating    RISKS ARE GREATER WITH:    History of drug misuse, substance use disorder, or overdose    Mental health conditions (such as depression or anxiety)    Sleep apnea    Older age (65 years or older)    Pregnancy    Avoid alcohol while taking prescription opioids.   Also, unless specifically advised by your health care provider, medications to avoid include:    Benzodiazepines (such as Xanax or Valium)    Muscle relaxants (such as Soma or Flexeril)    Hypnotics (such as Ambien or Lunesta)    Other prescription opioids    KNOW YOUR OPTIONS:  Talk to your health care provider about ways to manage your pain that do not involve prescription opioids. Some of these options may actually work better and have fewer risks and side effects:    Pain relievers such as acetaminophen, ibuprofen, and naproxen    Some medications that are also used for depression or seizures    Physical therapy and exercise    Cognitive behavioral therapy, a psychological, goal-directed approach, in which patients learn how to modify physical, behavioral, and emotional triggers of pain and stress    IF YOU ARE PRESCRIBED OPIOIDS FOR PAIN:    Never take opioids in greater amounts or more often than prescribed    Follow up with your primary health care provider and work together to create a plan on how to manage your pain.    Talk about ways to help manage your pain that do not  involve prescription opioids    Talk about all concerns and side effects    Help prevent misuse and abuse    Never sell or share prescription opioids    Never use another person's prescription opioids    Store prescription opioids in a secure place and out of reach of others (this may include visitors, children, friends, and family)    Visit www.cdc.gov/drugoverdose to learn about risks of opioid abuse and overdose    If you believe you may be struggling with addiction, tell your health care provider and ask for guidance or call [facility]'s National Helpline at 7-581-357-HELP    LEARN MORE / www.cdc.gov/drugoverdose/prescribing/guideline.html    Safely dispose of unused prescription opioids: Find your local drug take-back programs and more information about the importance of safe disposal at www.doseofreality.mn.gov             Medication List: This is a list of all your medications and when to take them. Check marks below indicate your daily home schedule. Keep this list as a reference.      Medications           Morning Afternoon Evening Bedtime As Needed    * albuterol 108 (90 BASE) MCG/ACT Inhaler   Commonly known as:  PROAIR HFA/PROVENTIL HFA/VENTOLIN HFA   Inhale 2 puffs into the lungs every 6 hours as needed for shortness of breath / dyspnea or wheezing                                * albuterol (2.5 MG/3ML) 0.083% neb solution   Take 1 vial (2.5 mg) by nebulization every 6 hours as needed for shortness of breath / dyspnea or wheezing                                artificial saliva Soln solution   Take 2 mLs (2 sprays) by mouth 4 times daily as needed for dry mouth   Last time this was given:  2 sprays on 3/22/2018 11:42 PM                                Benzocaine 20 % Aero spray   Commonly known as:  HURRICAINE/TOPEX   Take 0.5-1 mLs by mouth every 3 hours as needed for moderate pain   Last time this was given:  0.5 mLs on 3/21/2018  7:29 PM                                benzocaine-menthol 15-3.6 MG lozenge    Commonly known as:  CEPACOL   Place 1 lozenge inside cheek every hour as needed for sore throat   Last time this was given:  1 lozenge on 3/21/2018  8:09 AM                                BOOST CALORIE SMART Liqd   Take 3 Cans by mouth daily                                calcium carbonate 500 MG chewable tablet   Commonly known as:  TUMS   Take 1 tablet (500 mg) by mouth daily as needed for heartburn   Last time this was given:  500 mg on 3/20/2018  7:37 AM                                cetirizine 10 MG tablet   Commonly known as:  zyrTEC   Take 1 tablet (10 mg) by mouth every evening                                DAILY MULTIPLE VITAMIN/IRON Tabs   Take 1 tablet by mouth daily                                furosemide 20 MG tablet   Commonly known as:  LASIX   Take 1 tablet (20 mg) by mouth daily   Last time this was given:  20 mg on 3/25/2018  9:29 AM                                glycerin-hypromellose- 0.2-0.2-1 % Soln ophthalmic solution   Commonly known as:  ARTIFICIAL TEARS   Place 1 drop into both eyes 2 times daily as needed for dry eyes                                lactulose 10 GM/15ML solution   Commonly known as:  CHRONULAC   Take 15 mLs (10 g) by mouth daily   Last time this was given:  10 g on 3/25/2018  9:28 AM                                mometasone-formoterol 200-5 MCG/ACT oral inhaler   Commonly known as:  DULERA   Inhale 2 puffs into the lungs 2 times daily                                omeprazole 40 MG capsule   Commonly known as:  priLOSEC   Take 1 capsule (40 mg) by mouth daily Take 30-60 minutes before a meal.   Last time this was given:  40 mg on 3/25/2018  9:27 AM                                ondansetron 4 MG ODT tab   Commonly known as:  ZOFRAN-ODT   Take 1 tablet (4 mg) by mouth every 6 hours as needed for nausea or vomiting   Last time this was given:  4 mg on 3/25/2018 11:08 AM                                polyethylene glycol powder   Commonly known as:  MIRALAX    Take 17 g (1 capful) by mouth 3 times daily as needed for constipation                                sodium chloride 0.65 % nasal spray   Commonly known as:  SALINE MIST   Spray 1 spray into both nostrils 2 times daily   Last time this was given:  1 spray on 3/25/2018  9:27 AM                                spironolactone 50 MG tablet   Commonly known as:  ALDACTONE   Take 1 tablet (50 mg) by mouth daily   Last time this was given:  50 mg on 3/25/2018  9:28 AM                                traMADol 50 MG tablet   Commonly known as:  ULTRAM   Take 0.5-1 tablets (25-50 mg) by mouth every 8 hours as needed for moderate pain   Last time this was given:  50 mg on 3/24/2018  7:50 AM                                * Notice:  This list has 2 medication(s) that are the same as other medications prescribed for you. Read the directions carefully, and ask your doctor or other care provider to review them with you.

## 2018-03-18 NOTE — PLAN OF CARE
Problem: Patient Care Overview  Goal: Plan of Care/Patient Progress Review  Outcome: Improving  A&Ox4, VSS.  Pt arrived to unit at 1410 from ED, was transferred via bed and accompanied by sons.  Pt is Tongan speaking, family at bedside.  Paracentesis and thoracentesis preformed this afternoon. Pt reports decreased pain since procedures, declines pain medication. Tender abdomen.  Pt ate some fruit and milk for dinner. Pt reported dizziness with ambulation to bathroom with A-1.  Continent of urine, no BM on shift, pt reports no BM since 3/12.  L PIV has NS running at 125cc/hr.  Continue to monitor and follow POC.

## 2018-03-18 NOTE — PROGRESS NOTES
03/18/18 1200   Quick Adds   Type of Visit Initial PT Evaluation   Living Environment   Lives With alone  (at least family member with pt 24/7 to provide assistance. )   Living Arrangements apartment   Home Accessibility no concerns   Number of Stairs to Enter Home 0   Number of Stairs Within Home 0   Stair Railings at Home none   Transportation Available family or friend will provide   Living Environment Comment Pt lives in IND apt alone but family members take turns staying with pt.  has 24hr supervision A.  Son states pt most recently requiring HHA with gait and Ax1 for transfers and all ADL's. Prior to decline, pt IND with much less assist.    Self-Care   Usual Activity Tolerance moderate   Current Activity Tolerance moderate   Regular Exercise no   Equipment Currently Used at Home cane, quad;walker, rolling  (has FWW and SEC but does not use regularly)   Functional Level Prior   Ambulation 1-->assistive equipment   Transferring 1-->assistive equipment   Toileting 0-->independent   Bathing 2-->assistive person   Dressing 2-->assistive person   Eating 0-->independent   Communication 0-->understands/communicates without difficulty   Swallowing 0-->swallows foods/liquids without difficulty   Cognition 0 - no cognition issues reported   Fall history within last six months no   Which of the above functional risks had a recent onset or change? 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  "  Precautions/Limitations fall precautions   Heart Disease Risk Factors Lack of physical activity;Medical history;Age   General Observations pt is very pleasant and thankful toward PT.    Cognitive Status Examination   Orientation orientation to person, place and time   Level of Consciousness alert   Follows Commands and Answers Questions 100% of the time   Personal Safety and Judgment intact   Memory intact   Posture    Posture Kyphosis   Range of Motion (ROM)   ROM Comment WFL   Strength   Strength Comments WFL   Bed Mobility   Bed Mobility Comments IND supine to sit with dependent Ax2 to scoot in bed.    Transfer Skills   Transfer Comments STS with CGAx1   Gait   Gait Comments Ambulates 200ft with IV pole and SBAx1.  1 LOB but IND corrects.    General Therapy Interventions   Planned Therapy Interventions transfer training;gait training   Clinical Impression   Criteria for Skilled Therapeutic Intervention yes, treatment indicated   PT Diagnosis deconditioning affecting saftey with mobility   Influenced by the following impairments weakness,    Functional limitations due to impairments IND mobility   Clinical Presentation Stable/Uncomplicated   Clinical Presentation Rationale VSS on RA.  pt denies any current symptoms   Clinical Decision Making (Complexity) Low complexity   Therapy Frequency` 2 times/week   Predicted Duration of Therapy Intervention (days/wks) 3/23/18   Anticipated Discharge Disposition Home with Assist   Risk & Benefits of therapy have been explained Yes   Patient, Family & other staff in agreement with plan of care Yes   [facility] [employer] TM \"6 Clicks\"   2016, Trustees of [facility], under license to [employer].  All rights reserved.   6 Clicks Short Forms Basic Mobility Inpatient Short Form   [facility] AM-PAC  \"6 Clicks\" V.2 Basic Mobility Inpatient Short Form   1. Turning from your back to your side while in a flat bed without using bedrails? 3 - A Little   2. Moving " from lying on your back to sitting on the side of a flat bed without using bedrails? 3 - A Little   3. Moving to and from a bed to a chair (including a wheelchair)? 3 - A Little   4. Standing up from a chair using your arms (e.g., wheelchair, or bedside chair)? 3 - A Little   5. To walk in hospital room? 3 - A Little   6. Climbing 3-5 steps with a railing? 2 - A Lot   Basic Mobility Raw Score (Score out of 24.Lower scores equate to lower levels of function) 17   Total Evaluation Time   Total Evaluation Time (Minutes) 10

## 2018-03-18 NOTE — PROGRESS NOTES
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    Changes today:  - DC Ceftriaxone due to no SBP  - Decreased IVF to NS 50 ml/hr  - Decreased lactulose to 1/day  - PO Vitamin K 10 mg daily x3 days -- elevated INR  - Daily MELDS     #Upper abdominal pain  Has been going on for 1 week after thoracentesis. Per family, patient seemed to have abdominal pain which resolved after paracentesis. Her last paracentesis was on 3/5/2018. No n/v or diarrhea. No blood/black stools. Exam showed soft abdomen with +ascites, no rebound. LFTs and lipase were stable. No leukocytosis. Etiology could be 2/2 ascites reaccumulation, progressing of HCC?. No evidence of SBP on 3/17. Para on 3/17 removed 2.2 L.Albumin 50 g was given after.  - MRI A/P as below  - Pain control with Oxycodone 5mg q6h prn     #JOSE  Cr elevated from baseline of 0.7-0.9 to 1.1 on admission. Likely 2/2 pre-renal from poor PO intake. Got 1 L NS bolus from ED and 125 ml/hr overngiht. Cr trended down to 0.93.  - NS 50 ml/hr  (total IVF 1.2 L) while poor PO intake  - Avoid nephrotoxic agents  - Holding PTA lasix and spironolactone in setting of hypovolemia  - Cr in AM     #Hyponatremia--resolved  Na 129 on admission. Likely 2/2 hypovolemic hyponatremia 2/2 poor PO intake. Na normalized after IVF.      #Hep C cirrhosis with HCC, ascites and hepatic hydrothorax  MELD on admission 24. Required regular paracentesis and thoracentesis. Possible that abdominal pain is 2/2 reaccumulation of ascites vs SBP? , pt's primary hepatologist recommended evaluation for progression of HCC by MRI, para and thora and pain control. On exam + mild  asterixis, marked ascites and CXR showed marked R sided pleural effusion. Para and thora on 3/17. No SBP. Pleural fluid is transudate compatible with hepatic hydrothorax.  - Pleural fluids and ascites cytology--pending  - MELD daily  - Decreased lactulose to daily (BM>3 after 2 doses)  - Holding PTA lasix and spironolactone due to JOSE  - MRI A/P w contrast   -  plan to discuss for potential transplant eval in transplant conference on 3/20/2018 (patient's age is not accurate per family, she is younger than 79 yo).    MELD-Na score: 20 at 3/18/2018  6:08 AM  MELD score: 19 at 3/18/2018  6:08 AM  Calculated from:  Serum Creatinine: 0.93 mg/dL (Rounded to 1) at 3/18/2018  6:08 AM  Serum Sodium: 135 mmol/L at 3/18/2018  6:08 AM  Total Bilirubin: 2.6 mg/dL at 3/18/2018  6:08 AM  INR(ratio): 2.29 at 3/18/2018  6:08 AM  Age: 78 years      #Cachecxia  #Malnutrition  Could be related to HCC and cirrhosis.   - Nutrition consult.     #H/o gastric ulcer in 2014  - Continue PTA omeprazole CR 40 mg daily     ##Chronic  #Upper airway cough syndrome  Chronic. F/u with madeline Guerrero in clinic. Could be related to chronic rhinitis with nasap dripping.   - Continue PTA flonase 2 spray daily, NS nasal spray BID     #Reported h/o asthma  Stable. No acute exacerbation.  - Continue PTA Mometasone/formeterol inh bid, albuterol inh prn     ##Other  - PPx: SCI for DVT/omeprazole for GI/lactulose+miralax for bowel  - FEN: 2G Na diet  - IVF:  NS 50 ml/hr  - Code status: FULL     Disposition:  Pending for clinical improvement and PT/OT and family discussion for discharge options. Likely needs inpatient for 2-5 nights.     Markell Barnes MD  PGY-3 Internal Medicine  [phone]     This patient was staffed with the attending, Dr. Emanuel, who agrees with the above assessment and plan.  "  -----------------------------------------------------------------------------------------------------------------------------------------------      Interval History    No acute events overnight. Had multiple loose BM overnight.     Review of Systems:   A 4 point review of systems was negative except as noted above.    OBJECTIVE:  BP 94/51 (BP Location: Right arm)  Pulse 84  Temp 98.6  F (37  C) (Oral)  Resp 18  Ht 1.575 m (5' 2\")  Wt 49.3 kg (108 lb 11.2 oz)  SpO2 96%  BMI 19.88 kg/m2       Intake/Output Summary (Last 24 hours) at 03/18/18 0714  Last data filed at 03/18/18 0641   Gross per 24 hour   Intake             1620 ml   Output                0 ml   Net             1620 ml       GENERAL APPEARANCE: alert and no distress  Pulmonary: decrease breath sound RL, no crackles  CV: regular rhythm, normal rate, no murmur, no rub   - JVP not elevated    - Edema--trace BLE  GI: soft, nontender, no HSM, + ascites--improving from yesterday  NEURO: mentation intact and speech normal, equally move, +mild asterixis--unclear if this related to her muscles wasting/weakness    Labs:   All labs reviewed by me  Electrolytes/Renal -   Recent Labs   Lab Test  03/18/18   0608  03/17/18   0926  02/14/18   1649   04/01/13   1028   NA  135  129*  135   < >  143   POTASSIUM  3.7  4.1  3.9   < >  3.6   CHLORIDE  102  94  105   < >  108   CO2  24  26  22   < >  26   BUN  23  23  17   < >  10   CR  0.93  1.10*  0.77   < >  0.57   GLC  100*  105*  231*   < >  127*   MACY  7.6*  8.1*  7.6*   < >  8.3*   PHOS   --    --    --    --   2.8    < > = values in this interval not displayed.     CBC -   Recent Labs   Lab Test  03/18/18   0608  03/17/18   0926  03/12/18   1030   WBC  2.6*  5.2  3.9*   HGB  8.0*  10.9*  10.4*   PLT  49*  87*  79*     LFTs   Recent Labs   Lab Test  03/18/18   0608  03/17/18   0926  02/14/18   1649   ALKPHOS  152*  263*  214*   BILITOTAL  2.6*  3.6*  3.1*   ALT  31  45  40   AST  65*  96*  84*   PROTTOTAL  4.8* "  6.5*  6.2*   ALBUMIN  2.1*  2.0*  2.3*       Imaging:  CXR 3/18/2018  Impression:   1. Large right-sided pleural effusion which has enlarged from  3/17/2017.  2. Cardiomegaly with mild interstitial pulmonary edema.    Current Medications:    phytonadione  10 mg Oral Daily     lactulose  10 g Oral Daily     fluticasone  2 spray Both Nostrils Daily     fluticasone-vilanterol  1 puff Inhalation Daily     multivitamin, therapeutic with minerals  1 tablet Oral Daily     omeprazole  40 mg Oral Daily     sodium chloride  1 spray Both Nostrils BID       sodium chloride 125 mL/hr at 03/17/18 8446     Markell Barnes MD

## 2018-03-18 NOTE — PROVIDER NOTIFICATION
Writer received call from lab.     Pt has critical platelet lvl of 49.    Paged provider to notify.

## 2018-03-18 NOTE — SUMMARY OF CARE
Pt belongings kept in hospital with her:    -cellphone  -red head scarf      Pt had some clothing, but had BM and soiled clothing--family threw this clothing away.

## 2018-03-18 NOTE — PLAN OF CARE
Problem: Pain, Acute (Adult)  Goal: Identify Related Risk Factors and Signs and Symptoms  Related risk factors and signs and symptoms are identified upon initiation of Human Response Clinical Practice Guideline (CPG).   Outcome: No Change  A&Ox4, VSS, soft BPs.  Pt is [place] speaking, family at bedside.  Pt denies pain. Pt reported dizziness with ambulation to bathroom with A-1.  Loose BM x3 on shift, incont of stool.  Lactulose held. Continent of urine. MRI obtained, found large amt fluid in R lung.  Later in shift pt c/o abd pain, given oxycodone and zofran which were effective. Unable to collect stool sample as urine and watery stool are mixed. Continue to monitor and follow POC.

## 2018-03-18 NOTE — PLAN OF CARE
Problem: Patient Care Overview  Goal: Plan of Care/Patient Progress Review  Discharge Planner PT   Patient plan for discharge: home with 24hr from family  Current status:   Daughter assists with supine <>sit with min Ax1.  VSS in sitting on RA.  STS with CGAx1, able to stand IND without LOB.  Amb in order to ensure safety with functional mobility x200ft and SBA-CGAx1 and IV pole.  Report that currently, gait is better now than last few weeks due to medical care provided.  Requires dependent A x2 to scoot up in bed.   Barriers to return to prior living situation: medical status  Recommendations for discharge: Appears recent decline mobility over past few weeks has improved since admit due to medical care.  Anticipates pt will be able to d/c home with continued 24 hr A from family.    Rationale for recommendations: Pt only needs to see if functional change occurs from now until d/c.         Entered by: Danielito Chambers 03/18/2018 12:56 PM

## 2018-03-19 NOTE — PHARMACY-ADMISSION MEDICATION HISTORY
Admission medication history interview status for the 3/17/2018 admission is complete. See Epic admission navigator for allergy information, pharmacy, prior to admission medications and immunization status.     Medication history interview sources:  Patient and the pharmacy ([facility]: [phone])  Patient was not a reliable historian and stated that her medication is managed by her daughter. Med list is completed based on pharmacy fill history.     Changes made to PTA medication list (reason)  Added: none  Deleted: flonase 50mcg/act two spary into both nostrils daily (Patient fills saline nasal spray based on pharmacy fill history)  Changed: none    Additional medication history information (including reliability of information, actions taken by pharmacist):  - Based on pharmacy fill history, patient is not compliant to Dulera oral inhaler. Unclear if patient still taking this med at home.  - Per [facility], patient did not received flu shot this year.    - Per pharmacy, patient filled following medications recently.  Multiple Vitamins-Iron (DAILY MULTIPLE VITAMIN/IRON) TABS Last filled on 2/26/18 for #30   polyethylene glycol (MIRALAX) powder Last filled on 2/26/18 for 527g   furosemide (LASIX) 20 MG tablet Last filled on 2/26/18 for #60   spironolactone (ALDACTONE) 50 MG tablet Last filled on 2/26/18 for #30   glycerin-hypromellose- (ARTIFICIAL TEARS) 0.2-0.2-1 % SOLN ophthalmic solution Last filled on 2/26/18 for #1 bottle   cetirizine (ZYRTEC) 10 MG tablet Last filled on 2/26/18 for #30   Nutritional Supplements (BOOST CALORIE SMART) LIQD Last filled on 2/3/18 for #60 cans   sodium chloride (SALINE MIST) 0.65 % nasal spray Last filled on 2/5/18 for #1 bottle   albuterol (2.5 MG/3ML) 0.083% neb solution Last filled on 2/23/18 for 1 box   omeprazole (PRILOSEC) 40 MG capsule Last filled on 1/22/18 for #30   mometasone-formoterol (DULERA) 200-5 MCG/ACT oral inhaler Last filled on 5/19/17 for 30  days supply   albuterol (PROAIR HFA/PROVENTIL HFA/VENTOLIN HFA) 108 (90 BASE) MCG/ACT Inhaler Last filled on 2/28/18 for 18g       Prior to Admission medications    Medication Sig Last Dose Taking? Auth Provider   Multiple Vitamins-Iron (DAILY MULTIPLE VITAMIN/IRON) TABS Take 1 tablet by mouth daily Unknown Yes Felicitas Horton MD   polyethylene glycol (MIRALAX) powder Take 17 g (1 capful) by mouth 3 times daily as needed for constipation Unknown Yes Felicitas Horton MD   furosemide (LASIX) 20 MG tablet Take 1 tablet (20 mg) by mouth daily Unknown Yes Felicitas Horton MD   spironolactone (ALDACTONE) 50 MG tablet Take 1 tablet (50 mg) by mouth daily Unknown Yes Felicitas Horton MD   glycerin-hypromellose- (ARTIFICIAL TEARS) 0.2-0.2-1 % SOLN ophthalmic solution Place 1 drop into both eyes 2 times daily as needed for dry eyes Unknown Yes Felicitas Horton MD   cetirizine (ZYRTEC) 10 MG tablet Take 1 tablet (10 mg) by mouth every evening Unknown Yes Felicitas Horton MD   Nutritional Supplements (BOOST CALORIE SMART) LIQD Take 3 Cans by mouth daily Unknown Yes Felicitas Horton MD   sodium chloride (SALINE MIST) 0.65 % nasal spray Spray 1 spray into both nostrils 2 times daily Unknown Yes Darlene Renteria MD   albuterol (2.5 MG/3ML) 0.083% neb solution Take 1 vial (2.5 mg) by nebulization every 6 hours as needed for shortness of breath / dyspnea or wheezing Unknown Yes Felicitas Horton MD   omeprazole (PRILOSEC) 40 MG capsule Take 1 capsule (40 mg) by mouth daily Take 30-60 minutes before a meal. Unknown Yes Felicitas Horton MD   albuterol (PROAIR HFA/PROVENTIL HFA/VENTOLIN HFA) 108 (90 BASE) MCG/ACT Inhaler Inhale 2 puffs into the lungs every 6 hours as needed for shortness of breath / dyspnea or wheezing Unknown Yes Felicitas Horton MD   mometasone-formoterol (DULERA) 200-5 MCG/ACT oral inhaler Inhale 2 puffs into the lungs 2 times  daily Unknown at Unknown time  Felicitas Horton MD         Medication history completed by: Diogo Kitchen, PD4 Pharm Student

## 2018-03-19 NOTE — PLAN OF CARE
Problem: Patient Care Overview  Goal: Plan of Care/Patient Progress Review  Discharge Planner OT   Patient plan for discharge: Home with 24 hour care from family and home PT/OT  Current status: SBA with ADL and mobility. Used IV pole for mobility to/from bathroom; has a walker and cane at home. Already has a shower chair, commode at home already as well. Family reports mentation intact/baseline. Patient was out of bed to bathroom and then up in chair at end of session.  Barriers to return to prior living situation: none  Recommendations for discharge: Home with assist of family and home PT/OT  Rationale for recommendations: Home therapy for falls prevention (history of recent fall at home) and general conditioning.       Entered by: Zehra Burt 03/19/2018 11:27 AM         Comments: Occupational Therapy Discharge Summary    Reason for therapy discharge:    All goals and outcomes met, no further needs identified.    Progress towards therapy goal(s). See goals on Care Plan in [facility] electronic health record for goal details.  Goals met    Therapy recommendation(s):    Continued therapy is recommended.  Rationale/Recommendations:  home PT/OT.

## 2018-03-19 NOTE — PLAN OF CARE
Problem: Pain, Acute (Adult)  Goal: Identify Related Risk Factors and Signs and Symptoms  Related risk factors and signs and symptoms are identified upon initiation of Human Response Clinical Practice Guideline (CPG).   Outcome: No Change  A&Ox4, soft BPs and mild temp.  Pt is [place] speaking, family at bedside and  present for assessment.  Pt reports mild abd pain, declined medication.  Pt denies dizziness with ambulation.  SBA with walker.  Loose BM x1 on shift. Continent of urine. Neg for c.diff, enteric precautions d/c'd. Pt visiting niece on 7th floor with her daughter. Continue to monitor and follow POC.

## 2018-03-19 NOTE — PLAN OF CARE
"Problem: Patient Care Overview  Goal: Plan of Care/Patient Progress Review  Outcome: Improving  Assumed cares at 1900 on 3/18 until 0700 on 3/19.    /55 (BP Location: Left arm)  Pulse 103  Temp 100.2  F (37.9  C) (Oral)  Resp 18  Ht 1.575 m (5' 2\")  Wt 51.4 kg (113 lb 4.8 oz)  SpO2 96%  BMI 20.72 kg/m2    Pt A and O x 4. Lung sounds clear and equal on RA. HR and pulses WDL. Bowel sounds active. No BM this shift. Abd distended. Minimal to no PO intake. Adequate UOP. PIVs in LUE, one infusing fluids at 50/hr, other saline locked, both sites WDL. Pt denies pain, but does report some abd fullness. Pt up with 1 assist d/t unsteadiness. BA activated. Son at bedside, appropriate and attentive.     Near end of shift pt's HR and temp slightly elevated--triggered sepsis. Ordered lactic per protocol and will report to oncoming RN to get VS q30 x 2.     Continue to monitor.       "

## 2018-03-19 NOTE — PROGRESS NOTES
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    Changes today:  - Blood culture x2  - No antibiotics for now-- monitor fever curve  - Continue IVF to NS 50 ml/hr  - Daily MELDS  - F/u official MRI A/P report    #Low grade fever   On 3/19 in AM, developed low grade temp at 100.2, tachycardia with  and BP 88/50s which resolved spontaneously. Lactate was normal. Procal negative. No leukocytosis. C.diff was negative. Epigastric pain improving. Para and thora on admission showed no SBP and transudate. Unclear source of infection. Ddx fever from HCC?  - Blood cultue x2  - Continue monitoring fever curve     #Upper abdominal pain  Has been going on for 1 week after thoracentesis. Per family, patient seemed to have abdominal pain which resolved after paracentesis. Her last paracentesis was on 3/5/2018. No n/v or diarrhea. No blood/black stools. Exam showed soft abdomen with +ascites, no rebound. LFTs and lipase were stable. No leukocytosis. Etiology could be 2/2 ascites reaccumulation, progressing of HCC?. No evidence of SBP on 3/17. Para on 3/17 removed 2.2 L.Albumin 50 g was given after.  - MRI A/P as below  - Pain control with Oxycodone 5mg q6h prn     #JOSE  Cr elevated from baseline of 0.7-0.9 to 1.1 on admission. Likely 2/2 pre-renal from poor PO intake. Got 1 L NS bolus from ED and 125 ml/hr overngiht. Cr trended down to 0.93.  - NS 50 ml/hr  (total IVF 1.2 L) while poor PO intake  - Avoid nephrotoxic agents  - Holding PTA lasix and spironolactone in setting of hypovolemia  - Cr in AM     #Hyponatremia--resolved  Na 129 on admission. Likely 2/2 hypovolemic hyponatremia 2/2 poor PO  intake. Na normalized after IVF.      #Hep C cirrhosis with HCC, ascites and hepatic hydrothorax  MELD on admission 24. Required regular paracentesis and thoracentesis. Possible that abdominal pain is 2/2 reaccumulation of ascites vs SBP? , pt's primary hepatologist recommended evaluation for progression of HCC by MRI, para and thora and pain control. On exam + mild asterixis, marked ascites and CXR showed marked R sided pleural effusion. Para and thora on 3/17. No SBP. Pleural fluid is transudate compatible with hepatic hydrothorax.  - Pleural fluids and ascites cytology--pending  - MELD daily  - Decreased lactulose to daily (BM>3 after 2 doses)  - Holding PTA lasix and spironolactone due to JOSE  - MRI A/P w contrast   -  plan to discuss for potential transplant eval in transplant conference on 3/20/2018 (patient's age is not accurate per family, she is younger than 77 yo).    MELD-Na score: 21 at 3/19/2018  6:57 AM  MELD score: 18 at 3/19/2018  6:57 AM  Calculated from:  Serum Creatinine: 0.88 mg/dL (Rounded to 1) at 3/19/2018  6:57 AM  Serum Sodium: 133 mmol/L at 3/19/2018  6:57 AM  Total Bilirubin: 2.1 mg/dL at 3/19/2018  6:57 AM  INR(ratio): 2.08 at 3/19/2018  6:57 AM  Age: 78 years      #Cachecxia  #Malnutrition  Could be related to HCC and cirrhosis.   - Nutrition consult.     #H/o gastric ulcer in 2014  - Continue PTA omeprazole CR 40 mg daily     ##Chronic  #Upper airway cough syndrome  Chronic. F/u with , pulm in clinic. Could be related to chronic rhinitis with nasap dripping.   - Continue PTA flonase 2 spray daily, NS nasal spray BID     #Reported h/o asthma  Stable. No acute exacerbation.  - Continue PTA Mometasone/formeterol inh bid, albuterol inh prn     ##Other  - PPx: SCI for DVT/omeprazole for GI/lactulose+miralax for bowel  - FEN: 2G Na diet  - IVF:  NS 50 ml/hr  - Code status: FULL     Disposition:  Pending for clinical improvement and PT/OT and family discussion  "for discharge options. Likely needs inpatient for 2-5 nights.     Markell Barnes MD  PGY-3 Internal Medicine  [phone]     This patient was staffed with the attending, Dr. Emanuel, who agrees with the above assessment and plan.   -----------------------------------------------------------------------------------------------------------------------------------------------      Interval History  Activate sepsis protocol due to , T 100.2 and BP 88/56 this AM--BP recovered to 107/55 later without intervention. She feels overall well. Epigastric pain is minimal and improved from admission. Loose stools from lactulose. No SOB. Denies pain besides epigastric apin.    Review of Systems:   A 4 point review of systems was negative except as noted above.    OBJECTIVE:  /55 (BP Location: Left arm)  Pulse 103  Temp 100.2  F (37.9  C) (Oral)  Resp 18  Ht 1.575 m (5' 2\")  Wt 51.4 kg (113 lb 4.8 oz)  SpO2 96%  BMI 20.72 kg/m2       Intake/Output Summary (Last 24 hours) at 03/18/18 0714  Last data filed at 03/18/18 0641   Gross per 24 hour   Intake             1620 ml   Output                0 ml   Net             1620 ml       GENERAL APPEARANCE: alert and no distress  Pulmonary: decrease breath sound RL, no crackles  CV: regular rhythm, normal rate, no murmur, no rub   - JVP not elevated    - Edema--trace BLE  GI: soft, mildly tender at epigastrium, no rebound, no HSM, + ascites--stable  NEURO: mentation intact and speech normal, equally move, +mild asterixis--unclear if this related to her muscles wasting/weakness    Labs:   All labs reviewed by me  Electrolytes/Renal -   Recent Labs   Lab Test  03/18/18   0608  03/17/18   0926  02/14/18   1649   04/01/13   1028   NA  135  129*  135   < >  143   POTASSIUM  3.7  4.1  3.9   < >  3.6   CHLORIDE  102  94  105   < >  108   CO2  24  26  22   < >  26   BUN  23  23  17   < >  10   CR  0.93  1.10*  0.77   < >  0.57   GLC  100*  105*  231*   < >  127*   MACY  7.6*  " 8.1*  7.6*   < >  8.3*   PHOS   --    --    --    --   2.8    < > = values in this interval not displayed.     CBC -   Recent Labs   Lab Test  03/18/18   0608  03/17/18   0926  03/12/18   1030   WBC  2.6*  5.2  3.9*   HGB  8.0*  10.9*  10.4*   PLT  49*  87*  79*     LFTs   Recent Labs   Lab Test  03/18/18   0608  03/17/18   0926  02/14/18   1649   ALKPHOS  152*  263*  214*   BILITOTAL  2.6*  3.6*  3.1*   ALT  31  45  40   AST  65*  96*  84*   PROTTOTAL  4.8*  6.5*  6.2*   ALBUMIN  2.1*  2.0*  2.3*       Imaging:  CXR 3/18/2018  Impression:   1. Large right-sided pleural effusion which has enlarged from  3/17/2017.  2. Cardiomegaly with mild interstitial pulmonary edema.    Current Medications:    phytonadione  10 mg Oral Daily     lactulose  10 g Oral Daily     fluticasone  2 spray Both Nostrils Daily     fluticasone-vilanterol  1 puff Inhalation Daily     multivitamin, therapeutic with minerals  1 tablet Oral Daily     omeprazole  40 mg Oral Daily     sodium chloride  1 spray Both Nostrils BID       sodium chloride 50 mL/hr at 03/18/18 1814     Markell Barnes MD

## 2018-03-19 NOTE — PROGRESS NOTES
03/19/18 1115   Quick Adds   Type of Visit Initial Occupational Therapy Evaluation   Living Environment   Lives With child(shamika), adult  (24 hour caregivers)   Living Arrangements apartment   Home Accessibility no concerns   Number of Stairs to Enter Home 0   Number of Stairs Within Home 0   Living Environment Comment Has a tub shower with grab bars   Self-Care   Usual Activity Tolerance fair   Current Activity Tolerance fair   Regular Exercise no   Equipment Currently Used at Home walker, rolling;shower chair;commode   Functional Level Prior   Ambulation 1-->assistive equipment   Transferring 1-->assistive equipment   Toileting 1-->assistive equipment   Bathing 3-->assistive equipment and person   Dressing 2-->assistive person   Eating 0-->independent   Communication 0-->understands/communicates without difficulty   Swallowing 0-->swallows foods/liquids without difficulty   Cognition 0 - no cognition issues reported   Fall history within last six months yes   Number of times patient has fallen within last six months 1   Prior Functional Level Comment Family reports 1 recent fall   General Information   Onset of Illness/Injury or Date of Surgery - Date 03/17/18   Referring Physician Alea Chance MD   Patient/Family Goals Statement Return home with family assist and home care   Additional Occupational Profile Info/Pertinent History of Current Problem 78 year old year old female with 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   Precautions/Limitations fall precautions  (up ad carole)   Weight-Bearing Status - LLE full weight-bearing   Weight-Bearing Status - RLE full weight-bearing   General Observations resting in bed with family present at beginning of session   Cognitive Status Examination   Orientation orientation to person, place  and time   Level of Consciousness alert   Able to Follow Commands WNL/WFL   Personal Safety (Cognitive) WNL/WFL   Sensory Examination   Sensory Quick Adds No deficits were identified   Pain Assessment   Patient Currently in Pain No   Range of Motion (ROM)   ROM Comment BUE AROM intact   Strength   Manual Muscle Testing Quick Adds No deficits were identified   Strength Comments BUE intact   Muscle Tone Assessment   Muscle Tone Quick Adds No deficits were identified   Coordination   Upper Extremity Coordination No deficits were identified   Mobility   Bed Mobility Comments SBA supine<>EOB   Transfer Skill: Bed to Chair/Chair to Bed   Level of [place]: Bed to Chair stand-by assist   Physical Assist/Nonphysical Assist: Bed to Chair set-up required   Weight-Bearing Restrictions full weight-bearing   Transfer Skill: Sit to Stand   Level of [place]: Sit/Stand stand-by assist   Transfer Skill: Toilet Transfer   Level of [place]: Toilet stand-by assist   Physical Assist/Nonphysical Assist: Toilet set-up required   Weight-Bearing Restrictions: Toilet full weight-bearing   Assistive Device grab bars   Balance   Balance Comments uses AD for ambulation; SBA today with IV pole and no LOB.   Upper Body Dressing   Level of [place]: Dress Upper Body independent   Lower Body Dressing   Level of [place]: Dress Lower Body minimum assist (75% patients effort)   Toileting   Level of [place]: Toilet minimum assist (75% patients effort)   Grooming   Level of [place]: Grooming stand-by assist   Eating/Self Feeding   Level of [place]: Eating independent   Instrumental Activities of Daily Living (IADL)   IADL Comments has assist from family for all IADL   Activities of Daily Living Analysis   Impairments Contributing to Impaired Activities of Daily Living balance impaired   ADL Comments general deconditioning   General Therapy Interventions   Planned Therapy Interventions ADL retraining   Clinical  Impression   Criteria for Skilled Therapeutic Interventions Met yes, treatment indicated   OT Diagnosis impaired ADL and IADL    Influenced by the following impairments medical status, feeling ill   Assessment of Occupational Performance 1-3 Performance Deficits   Identified Performance Deficits IADL, ADL, mobility   Clinical Decision Making (Complexity) Low complexity   Therapy Frequency other (see comments)   Predicted Duration of Therapy Intervention (days/wks) 1x eval/treatment session   Anticipated Equipment Needs at Discharge shower chair;toileting equipment   Anticipated Discharge Disposition Home with Home Therapy   Risks and Benefits of Treatment have been explained. Yes   Patient, Family & other staff in agreement with plan of care Yes   Clinical Impression Comments will have 24 hour care from family   Total Evaluation Time   Total Evaluation Time (Minutes) 10

## 2018-03-19 NOTE — PROGRESS NOTES
Care Coordinator Progress Note     Admission Date/Time:  3/17/2018  Attending MD:  Alba Emanuel MD     Data  Chart reviewed, discussed with interdisciplinary team.   Patient was admitted for:    Pleural effusion  Ascites of liver  Dyspnea, unspecified type  HCC (hepatocellular carcinoma) (H)  Adult failure to thrive  Hyponatremia.    Concerns with insurance coverage for discharge needs: None.  Current Living Situation: Patient lives alone. However family is staying with pt 24 hours  Support System: Supportive and Involved  Services Involved: DME, Home Care and PCA  Transportation: [place] transportation,  [facility] [phone] and Family or Friend will provide  Barriers to Discharge: pending medical work up and medical plan, complex health with failure to thrive    Coordination of Care and Referrals: RNCC following for need for C services, per PT OT pt would benefit from these in the home.  Has had [facility] in the past.      Assessment  Per discussion in interdisciplinary rounds, the primary team states that the patient continues to require hospitalization for the following reasons: continued medical work up- pt with hx of Hep C cirrhosis and hepatocellular carcinoma, work up due to failure to thrive, possible palliative care consult, GI team and Transplant team following pt.  Team states that the patient will require hospitalization for several days.  Pt is followed out pt by  SW  with [facility], Mary [place]berg, [phone] she states that she would be available to re-assess pt prior to dc for increase or change in services.  She states that pt has PCA, , and family provides additional support totaling 24 hours per day care.      PCA (5 1/2 hours a day), Homemaking (5 hrs/week), and [employer] for nutrition.    DME from [facility] bed/commode/bath chair/walker.     RNCC will continue to follow pt while admitted for any additional need at time of dc.      Carolyn Sosa, RNCC, BSN/  covering for Radha BRIDGES    [facility]    [facility]icine [facility]  [address] [place] [address]    [email or web address]  [email or web address]    Office: [phone] Pager: [phone]

## 2018-03-20 NOTE — PROGRESS NOTES
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    Changes today:  - DC IVF  - F/u with hepatology for transplant candidate after the transplant conferece today--likely she is not a candidate  - Plan restart diuretics tomorrow if Cr stable off IVF    #Low grade fever   On 3/19 in AM, developed low grade temp at 100.2, tachycardia with  and BP 88/50s which resolved spontaneously. Lactate was normal. Procal negative. No leukocytosis. C.diff was negative. Epigastric pain improving. Para and thora on admission showed no SBP and transudate. Unclear source of infection. Ddx fever from HCC?  - Blood cultue x2  - Continue monitoring fever curve     #Upper abdominal pain  Has been going on for 1 week after thoracentesis. Per family, patient seemed to have abdominal pain which resolved after paracentesis. Her last paracentesis was on 3/5/2018. No n/v or diarrhea. No blood/black stools. Exam showed soft abdomen with +ascites, no rebound. LFTs and lipase were stable. No leukocytosis. Etiology could be 2/2 ascites reaccumulation, progressing of HCC?. No evidence of SBP on 3/17. Para on 3/17 removed 2.2 L.Albumin 50 g was given after.  - Pain control with Oxycodone 5mg q6h prn     #JOSE--resolved  Cr elevated from baseline of 0.7-0.9 to 1.1 on admission. Likely 2/2 pre-renal from poor PO intake. Got 1 L NS bolus from ED and 125 ml/hr overngiht. Cr normalized after maintenance IVF and improve PO intake.   - Avoid nephrotoxic agents  - Holding PTA lasix and spironolactone in setting of hypovolemia  - Cr in AM     #Hyponatremia--resolved  Na 129 on admission. Likely 2/2 hypovolemic hyponatremia 2/2  poor PO intake. Na normalized after IVF.      #Hep C cirrhosis with HCC, ascites and hepatic hydrothorax  MELD on admission 24. Required regular paracentesis and thoracentesis. Possible that abdominal pain is 2/2 reaccumulation of ascites vs SBP? , pt's primary hepatologist recommended evaluation for progression of HCC by MRI, para and thora and pain control. On exam + mild asterixis, marked ascites and CXR showed marked R sided pleural effusion. Para and thora on 3/17. No SBP. Pleural fluid is transudate compatible with hepatic hydrothorax. MRI A/P on 3/18 showed suspicious lesion on 4A 1.5cm.   - Pleural fluids and ascites cytology--pending  - MELD daily  - Lactulose to daily (BM>3 after 2 doses)  - Holding PTA lasix and spironolactone due to JOSE  -  plan to discuss for potential transplant eval in transplant conference on 3/20/2018 (patient's age is not accurate per family, she is younger than 79 yo).  - Planning for palliative care consult if not a transplant candidate to discuss goals of care and pain management    MELD-Na score: 22 at 3/20/2018  6:43 AM  MELD score: 18 at 3/20/2018  6:43 AM  Calculated from:  Serum Creatinine: 0.86 mg/dL (Rounded to 1) at 3/20/2018  6:43 AM  Serum Sodium: 131 mmol/L at 3/20/2018  6:43 AM  Total Bilirubin: 2.7 mg/dL at 3/20/2018  6:43 AM  INR(ratio): 2.05 at 3/20/2018  6:43 AM  Age: 78 years      #Cachecxia  #Malnutrition  Could be related to HCC and cirrhosis.   - Nutrition consult.     #H/o gastric ulcer in 2014  - Continue PTA omeprazole CR 40 mg daily     ##Chronic  #Upper airway cough syndrome  Chronic. F/u with , pulm in clinic. Could be related to chronic rhinitis with nasap dripping.   - Continue PTA flonase 2 spray daily, NS nasal spray BID     #Reported h/o asthma  Stable. No acute exacerbation.  - Continue PTA Mometasone/formeterol inh bid, albuterol inh prn     ##Other  - PPx: SCI for DVT/omeprazole for GI/lactulose+miralax for  "bowel  - FEN: 2G Na diet  - IVF:  none  - Code status: FULL     Disposition:  Pending for clinical improvement and PT/OT and family discussion for discharge options. Likely needs inpatient for 2-5 nights.     Markell Barnes MD  PGY-3 Internal Medicine  [phone]     This patient was staffed with the attending, Dr. Emanuel, who agrees with the above assessment and plan.   -----------------------------------------------------------------------------------------------------------------------------------------------      Interval History  No acute event overnight. Afebrile. Continues to have epigastric pain. She denies other pain. Eating better per patient report.     Review of Systems:   A 4 point review of systems was negative except as noted above.    OBJECTIVE:  /59 (BP Location: Right arm)  Pulse 88  Temp 97.9  F (36.6  C) (Oral)  Resp 16  Ht 1.575 m (5' 2\")  Wt 49.9 kg (110 lb)  SpO2 98%  BMI 20.12 kg/m2       Intake/Output Summary (Last 24 hours) at 03/18/18 0714  Last data filed at 03/18/18 0641   Gross per 24 hour   Intake             1620 ml   Output                0 ml   Net             1620 ml       GENERAL APPEARANCE: alert and no distress  Pulmonary: decrease breath sound RL, no crackles  CV: regular rhythm, normal rate, no murmur, no rub   - JVP not elevated    - Edema--none  GI: soft, mildly tender at epigastrium, no rebound, no HSM, + ascites--stable  NEURO: mentation intact and speech normal, equally move, no asterixis    Labs:   All labs reviewed by me  Electrolytes/Renal -   Recent Labs   Lab Test  03/20/18   0643  03/19/18   0657  03/18/18   0608   04/01/13   1028   NA  131*  133  135   < >  143   POTASSIUM  4.3  4.2  3.7   < >  3.6   CHLORIDE  101  102  102   < >  108   CO2  24  24  24   < >  26   BUN  23  21  23   < >  10   CR  0.86  0.88  0.93   < >  0.57   GLC  103*  113*  100*   < >  127*   MACY  7.5*  7.6*  7.6*   < >  8.3*   PHOS   --    --    --    --   2.8    < > = values in " this interval not displayed.     CBC -   Recent Labs   Lab Test  03/20/18   0643  03/19/18   0657  03/18/18   0608   WBC  3.9*  3.7*  2.6*   HGB  9.0*  8.6*  8.0*   PLT  65*  66*  49*     LFTs   Recent Labs   Lab Test  03/20/18   0643  03/19/18   0657  03/18/18   0608   ALKPHOS  232*  206*  152*   BILITOTAL  2.7*  2.1*  2.6*   ALT  41  38  31   AST  84*  86*  65*   PROTTOTAL  5.5*  5.0*  4.8*   ALBUMIN  2.1*  2.2*  2.1*       Imaging:  MRI 3/20/2018  IMPRESSION:    1. Nondiagnostic images due to patient motion artifact, respiratory  motion artifact, and dielectric effect relating to intraperitoneal  ascites. Additionally, arterial phase images appear to have been  acquired during the portal venous phase, significantly limiting  evaluation for HCC. Consider repeating the examination; it would be  beneficial to a perform paracentesis prior to imaging.  2. Cirrhosis and evidence of portal hypertension with large volume  ascites.  3. There is a suspicious lesion in segment 4A measuring 1.5 cm.  Acknowledging above exam constraints, the lesion is incompletely  evaluated, but is suspicious for HCC. Previously, a LIRADS 3 lesion  was seen in this region.  4.  Remainder of the liver is nondiagnostic, although there does  appear to be heterogeneous enhancement in the left lobe.  5. Large right pleural effusion.    Current Medications:    lactulose  10 g Oral Daily     fluticasone  2 spray Both Nostrils Daily     fluticasone-vilanterol  1 puff Inhalation Daily     multivitamin, therapeutic with minerals  1 tablet Oral Daily     omeprazole  40 mg Oral Daily     sodium chloride  1 spray Both Nostrils BID       sodium chloride 50 mL/hr at 03/19/18 0700     Markell Barnes MD

## 2018-03-20 NOTE — PROGRESS NOTES
CLINICAL NUTRITION SERVICES - BRIEF NOTE     New findings  - Provider consult: Jass less than 3 consult -> See RD note 3/17 for full assessment details.  - PO intake: Ate ~480 kcal, 25 gm protein yesterday. Meeting ~40% low-end energy and protein needs.      Implementation  Ordered Ensure Plus Shake TID btw meals to potential improve PO adequacy.   Continue calorie counts 3/20-3/21.     Yohana Leach RD, LD  5A/5B: [phone]

## 2018-03-20 NOTE — PLAN OF CARE
Problem: Patient Care Overview  Goal: Plan of Care/Patient Progress Review  Patient A/O, VSS. No acute incidences during shift, pt slept in between cares and safety checks. Son at bedside, supportive of cares, interprets pt needs. Up with 1A to bedside commode. Pt endorsed generalized discomfort, denied wanting any heat, medicines for relief. NS infusing at 50ml/hr. Plan to continue workup for possible transplant eval. Trend creat and sodium. Will continue to monitor and alert MDs to any changes.

## 2018-03-20 NOTE — PROGRESS NOTES
Calorie Count  Intake recorded for: 3/19 Kcals: 483  Protein: 25g  # Meals Recorded: 100% oatmeal, applesauce, mixed berries, fish, ginger ale, apple juice   # Supplements Recorded: 0

## 2018-03-20 NOTE — PLAN OF CARE
Problem: Patient Care Overview  Goal: Plan of Care/Patient Progress Review  Outcome: Declining  A&Ox4, VSS.  Pt is [place] speaking, family at bedside and  present for assessment.  Pt reports increased abd discomfort (gas pains, more distended), given PRN tums and declined pain medication.  Appetite very poor d/t abd discomfort, calorie counts completed.  Productive cough, sputum is clear/tan.  Pt denies dizziness with ambulation.  SBA with walker.  Loose BM x2 on shift. Continent of urine.  Continue to monitor and follow POC.

## 2018-03-20 NOTE — PLAN OF CARE
"Problem: Patient Care Overview  Goal: Plan of Care/Patient Progress Review  Outcome: No Change  Pt A&O x4, VS stable on RA. No c/o nausea or SOB. Up with stand by assist. Voiding spontaneously, no BM this shift per family. IVMF infusing @ 50mL/hr through PIV. Second PIV saline locked. C/o abdomen feeling \"full\", family translated to me as \"gas pains\", patient took tums with relief. Fair appetite, on calorie counts. Able to make needs known. Continue to monitor and update MD with changes.      "

## 2018-03-20 NOTE — PROGRESS NOTES
[facility]    Hepatology New Patient Visit    Referring provider:  Dr. Montano    Chief complaint:  Weakness, abdominal pain    HPI:  Ms. 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  Also with constipation and abdominal distension.  The patient was found to have acute kidney injury which quickly improved with IVF.  Infectious workup including blood, urine cx, para, thora negative for infection.  Abdominal pain improved post-paracentesis but has returned somewhat now described as mild.  She denies melena, hematochezia or bright red blood per rectum.      Medical hx Surgical hx   Past Medical History:   Diagnosis Date     Hepatitis C      Hepatocellular carcinoma (H)      Liver disease      Mild intermittent asthma      Pleural effusion associated with hepatic disorder       Past Surgical History:   Procedure Laterality Date     CATARACT IOL, RT/LT Right 08/22/2017    s/p CE/IOL right eye     EYE SURGERY       H PYLORI SHYLA SCREEN      was treated for h pylori     NO HISTORY OF SURGERY            Medications  Prior to Admission medications    Medication Sig Start Date End Date Taking? Authorizing Provider   Multiple Vitamins-Iron (DAILY MULTIPLE VITAMIN/IRON) TABS Take 1 tablet by mouth daily 2/26/18  Yes Felicitas Horton MD   polyethylene glycol (MIRALAX) powder Take 17 g (1 capful) by mouth 3 times daily as needed for constipation 2/26/18  Yes Felicitas Horton MD   furosemide (LASIX) 20 MG tablet Take 1 tablet (20 mg) by mouth daily 2/26/18  Yes Felicitas Horton MD   spironolactone (ALDACTONE) 50 MG tablet Take 1 tablet (50 mg) by mouth daily 2/26/18  Yes Felicitas Horton MD   glycerin-hypromellose- (ARTIFICIAL TEARS) 0.2-0.2-1 % SOLN ophthalmic solution Place 1 drop into both eyes  2 times daily as needed for dry eyes 2/26/18  Yes Felicitas Horton MD   cetirizine (ZYRTEC) 10 MG tablet Take 1 tablet (10 mg) by mouth every evening 2/26/18  Yes Felicitas Horton MD   Nutritional Supplements (BOOST CALORIE SMART) LIQD Take 3 Cans by mouth daily 2/23/18  Yes Felicitas Horton MD   sodium chloride (SALINE MIST) 0.65 % nasal spray Spray 1 spray into both nostrils 2 times daily 2/5/18  Yes Darlene Renteria MD   albuterol (2.5 MG/3ML) 0.083% neb solution Take 1 vial (2.5 mg) by nebulization every 6 hours as needed for shortness of breath / dyspnea or wheezing 11/2/17  Yes Felicitas Horton MD   omeprazole (PRILOSEC) 40 MG capsule Take 1 capsule (40 mg) by mouth daily Take 30-60 minutes before a meal. 8/7/17  Yes Felicitas Horton MD   albuterol (PROAIR HFA/PROVENTIL HFA/VENTOLIN HFA) 108 (90 BASE) MCG/ACT Inhaler Inhale 2 puffs into the lungs every 6 hours as needed for shortness of breath / dyspnea or wheezing 3/10/17  Yes Felicitas Horton MD   mometasone-formoterol (DULERA) 200-5 MCG/ACT oral inhaler Inhale 2 puffs into the lungs 2 times daily 3/10/17   Felicitas Horton MD       Allergies  Allergies   Allergen Reactions     Dust Mites Cough     Sneezing      Seasonal Allergies        Family hx Social hx   Family History   Problem Relation Age of Onset     Respiratory Father      asthma     DIABETES Son      Glaucoma No family hx of      Macular Degeneration No family hx of      Retinal detachment No family hx of      Amblyopia No family hx of       Social History   Substance Use Topics     Smoking status: Never Smoker     Smokeless tobacco: Never Used     Alcohol use No     Walks with a walker  Needs assistance with ADLs  Has great family support with daughter at the bedside     Review of systems  A 10-point review of systems was negative.    Examination  /59 (BP Location: Right arm)  Pulse 88  Temp 97.9  F (36.6  C) (Oral)  Resp 16   "Ht 1.575 m (5' 2\")  Wt 49.9 kg (110 lb)  SpO2 98%  BMI 20.12 kg/m2  Body mass index is 20.12 kg/(m^2).    Gen- well, NAD, minimal jaundice, sleeping  Eye- EOMI  ENT- dry MM  CVS- RRR  RS- EWOB  Abd- soft, mild distension, non-tender, +BS  Extr- warm  MS- hands normal- no clubbing  Neuro- grossly intact  Skin- no rash   Psych- not assessed    Laboratory  Lab Results   Component Value Date     03/20/2018    POTASSIUM 4.3 03/20/2018    CHLORIDE 101 03/20/2018    CO2 24 03/20/2018    BUN 23 03/20/2018    CR 0.86 03/20/2018       Lab Results   Component Value Date    BILITOTAL 2.7 03/20/2018    ALT 41 03/20/2018    AST 84 03/20/2018    ALKPHOS 232 03/20/2018       Lab Results   Component Value Date    ALBUMIN 2.1 03/20/2018    PROTTOTAL 5.5 03/20/2018        Lab Results   Component Value Date    WBC 3.9 03/20/2018    HGB 9.0 03/20/2018     03/20/2018    PLT 65 03/20/2018       Lab Results   Component Value Date    INR 2.05 03/20/2018     MELD-Na score: 22 at 3/20/2018  6:43 AM  MELD score: 18 at 3/20/2018  6:43 AM  Calculated from:  Serum Creatinine: 0.86 mg/dL (Rounded to 1) at 3/20/2018  6:43 AM  Serum Sodium: 131 mmol/L at 3/20/2018  6:43 AM  Total Bilirubin: 2.7 mg/dL at 3/20/2018  6:43 AM  INR(ratio): 2.05 at 3/20/2018  6:43 AM  Age: 78 years    AFP 10    Radiology    MRI:  1. Nondiagnostic images due to patient motion artifact, respiratory motion artifact, and dielectric effect relating to intraperitoneal ascites. Additionally, arterial phase images appear to have been acquired during the portal venous phase, significantly limiting evaluation for HCC. Consider repeating the examination; it would be beneficial to a perform paracentesis prior to imaging.  2. Cirrhosis and evidence of portal hypertension with large volume ascites.  3. There is a suspicious lesion in segment 4A measuring 12.5 cm.  Acknowledging above exam constraints, the lesion is incompletely evaluated, but is suspicious for HCC. " Previously, a LIRADS 3 lesion was seen in this region.  4.  Remainder of the liver is nondiagnostic, although there does  appear to be heterogeneous enhancement in the left lobe.  5. Large right pleural effusion.    MRI 11/18:  1. Cirrhosis and evidence of portal hypertension including moderate to large volume of ascites, large right pleural effusion, trace left pleural effusion, portosystemic collaterals, mesenteric edema and anasarca.  2. Previously treated segment 6 lesion is grossly unchanged in size when compared to MR 7/17/2017. Suspicious enhancement is again seen, likely representing recurrent or residual tumor. OPTN 5T.  3. A segment 2 lesion now shows washout and pseudocapsule formation, new since prior MR, OPTN 5A.  4. Multiple additional indeterminate arterially enhancing lesions  (LIRADS 3 and 4) throughout the liver parenchyma, several which have slightly increased in size.  5. Based on this examination alone, the patient is within [place]  Criteria.    Lesion 1: Previously treated segment 6 lesion measures 3.8 x 3.0 cm  and shows arterial phase hyperenhancement (series 11, image 39). On  prior MR 7/17/2017, this lesion measured 4.0 x 2.8 cm.  It currently  does show convincing washout on portal venous or delayed phase  imaging. Pseudocapsule is is not identified. There is mild associated  increased T2 signal.  There is mildly increased signal on diffusion  weighted images. OPTN class 5T.     Lesion 2: There is a 1.6 x 2.0 cm, previously measuring 1.5 x 1.4 cm  arterially enhancing lesion in hepatic segment  2 (series 11 image  31). It does washout on portal venous or delayed phase imaging.  Pseudocapsule is identified. There is questionable associated  increased T2 signal.  There is increased signal on diffusion weighted  images. OPTN/LIRADS class 5A.       Assessment and plan:  Ms. 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      GI consulted to discuss transplant candidacy.  Patient was discussed at multi-disciplinary transplant selection conference this afternoon.  Committee decision was that patient is NOT a transplant candidate for multiple reason including 1. Advanced age; 2. Frailty/Debility; 3.  Tumor which appears to be outside of [place] Criteria 3.8 x 3.0 in segment 6; OPTN class 5T and 1.6 x 2.0 cm OPTN 5A on MRI from 11/2017.  Decision from today's conference was discussed with the patient and her family.     Based on most recent oncology note, poor candidate for local therapy and sorafenib.      Recommend oncology/palliative care consultation.      Case discussed with Dr. Meyer who is in agreement with the plan of care.      Kierra Morrison MD  Hepatology Fellow  [facility]    Attestation:  This patient has been seen and evaluated by me, Tala Meyer.  Discussed with the house staff team or resident(s) and agree with the findings and plan in this note.

## 2018-03-21 NOTE — PLAN OF CARE
Problem: Patient Care Overview  Goal: Plan of Care/Patient Progress Review  Outcome: No Change  Pt VSS on RA. A&Ox4. Up SBA to bathroom. 2 small BMs this shift. Daughter at bedside much of shift and son later in evening. Pt c/o back pain, heating pad ordered and provided to pt. Pt also having abdominal fullness/discomfort from worsening ascites. Pt declined intervention. Pt requested cepacol lozenge for sore throat, order obtained and provided to pt. Pt continues on calorie counts, eating dinner brought in by family.     Plan: continue GI workup, currently per notes pt is not transplant candidate and GI recommendation is oncology/palliative consult.

## 2018-03-21 NOTE — CONSULTS
[facility] Hematology-Oncology New Consult          Yue Portillo MRN# [id number]   Age: 78 year old YOB: 1940   Date of Admission: 3/17/2018     Reason for consult: HCC       Assessment and Recommendations:     Assessment:  #1 Hepatocellular carcinoma s/p multiple liver-directed therapies, non-transplant candidate due to age, frailty, and HCC size + number exceeding [place] criteria  #2 HCV genotype 5  #3 Cirrhosis, Child-Casarez class C  #4 Thrombocytopenia in the setting of advanced liver disease  #5 Recurrent pleural effusion and ascites in the setting of #1    Mrs. Yue Portillo is a  78-year old woman with a history of advanced HCC s/p multiple prior liver-directed therapies including TACE and microwave ablation.  Systemic options for HCC include sorafenib, regorafenib, and immunotherapy approaches, e.g. Nivolumab.  Nivolumab is approved for patients with Child-Casarez class A or B7 disease, rather than C disease.  Based on her age, frailty, poor performance status, and advanced cirrhosis, she is therefore not currently a candidate for systemic of liver-directed therapies, and is not expected to experience sufficient recovery in the future to become a candidate.  As per Dr. Ayala's last note in November, she is an appropriate candidate for a comfort-oriented approach and hospice care.  This was discussed, via an in-person , with her, her daughter, and other family members present today.  They express full understanding and agree that maximizing her comfort is their highest priority.  They would like to speak with the palliative care team regarding strategies for keeping Ms. Portillo comfortable, out of the hospital, and at home.        Pt seen and discussed with Dr. Ken who agrees with the plan.    Please don't hesitate to call us with any questions    Uriel Schwartz MD/PhD  Fellow, Division of Hematology/Oncology and Bone Marrow Transplantation  [facility]  [place]  [phone]        HPI:   Ms. Yue Portillo is a  78-year old Palestinian speaking woman with a history of hepatitis C induced cirrhosis, admitted now with worsening ascites and abdominal pain.  She has a history of hepatocellular carcinoma and has been followed by Dr. Ayala for this.  She is not a transplant candidate, as confirmed in yesterday's conference.  Please see oncology history below for details of prior management.  On interviewing today (via in-person ), Ms. Portillo states that her abdominal pain is marginally improved but she continues to have some right-sided abdominal pain with inspiration.  Also struggling with mucous and cough.  She states that she would like to continue treatment if at all possible. However she understands that her metastatic carcinoma is incurable.        From Dr. Ayala's last note, 11/30/2017:  ONCOLOGY HISTORY:  History of untreated genotype 5 hepatitis C induced cirrhosis who in 2014 was noted to have a 2.5 x 2.4 cm lesion in segment 6 of the liver c/w HCC. Initially she did not get treatment for that but then in Jan 2015 she had a repeat MRI done showing a 2.7 cm OPTN5B lesion in segment 6.  In 05/2015 she underwent a TACE procedure for her original hepatocellular carcinoma.  After that, in 02/2016 she was found to have a 1-cm recurrence.  At that time, her AFP was 5.6.  On 03/07/2016 she underwent microwave ablation of the recurrent tumor.  In 05/2016 her AFP was 5.5 and repeat scans did not show any evidence of recurrence and only showed post-treatment changes.  Then on 09/27/2016 there was suspicion for recurrence at the site of ablation.  She was offered another treatment at that point.  Her AFP was 6.6, but she did not have another treatment. Because of her advanced age, liver transplantation was not offered.     In Jan 2017, her MRI only showed post treatment changes with no evidence of residual/recurrent cancer.     In April 2017, she again had evidence of  "tumor recurrence with a 1.4 cm OPTN 5a lesion in segment 3. She also has OPTN5T lesion in the hepatic segment 6 where she had previous treatment.   Another OPTN 4 lesion is seen in hepatic segment 8   A LIRADS 3 lesion is seen in segment 8     Most recent MRI 7/17/17 showed increase in size of Right lobe lesion and there is evidence of left lobe mass as well. She eventually met with IR and plan to proceed with TACE, but did not receive it     She has been needing to undergo paracentesis every 1-2 weeks and thoracentesis every 3 months.      MRI in November 2017 showed HCC involving the hepatic segment 6 measuring 2.7 cm in greatest dimension. . She additionally had several LIRADS 3 and 4 lesions.  She was Child-Casarez class C.  \"Because of this, I do not think that she is a good candidate for liver-directed therapies.  Due to the same condition, I do not believe that she is going to have clinical benefit from sorafenib.  Her ECOG is also very poor at 3.  Patients who generally benefit from sorafenib are the ones with good performance status with a Child Casarez Class A but it could be considered in Child Casarez Class B patients as well. For Child Casarez Class C especially with a poor performance status, sorafenib is not a good choice. We discussed this whole situation in detail and discussed the incurable nature of the disease.  I discussed with her that in my opinion, treating her cancer with either liver-directed therapies or systemically with sorafenib would most likely cause more harm than good and I do not recommend to treat her cancer aggressively.  We discussed that rather than that, I would recommend that we focus all our effort on making her comfortable so that she does not suffer and manage her symptoms.  I recommend Palliative Care/Hospice approach.  She is willing to meet with Palliative Care but has not decided about hospice as of yet.  I will arrange a meeting with Palliative Care for her.\"     2.  For the " recurrent right-sided pleural effusion causing shortness of breath, I recommend that she undergo IR thoracentesis.  I also recommend that potentially in the future we can keep the pigtail catheter in place so that she can have it drained on an as-needed basis.  The same could be done for therapeutic paracentesis.   3.  For decompensated liver disease, she follows with Dr. Meyer.  She gets repeated therapeutic paracentesis which she will continue to get.  She is on diuretics and she will continue the care of decompensated cirrhosis through Dr. Meyer.    4.  Thrombocytopenia in the setting of advanced cirrhosis and portal hypertension.  At this time, she is asymptomatic from it, and at this time no active intervention is needed for that.      Recent Labs   Lab Test  03/20/18   0643  03/19/18   0657  03/18/18   0608  03/17/18   0926  03/12/18   1030   02/14/18   1649   02/12/18   1533   11/09/17   1423   07/19/17   0853   WBC  3.9*  3.7*  2.6*  5.2  3.9*   --   2.7*   < >  3.2*   < >  4.1   < >  3.1*   NEUTROPHIL   --    --    --   67.7   --    --   75.1   --   64.1   --   64.3   --   56.2   HGB  9.0*  8.6*  8.0*  10.9*  10.4*   < >  9.0*   < >  10.7*   < >  11.3*   < >  11.4*   PLT  65*  66*  49*  87*  79*   --   59*   < >  73*   < >  88*   < >  56*    < > = values in this interval not displayed.     Recent Labs   Lab Test  03/20/18   0643  03/19/18   0657  03/18/18   0608  03/17/18   0926  02/14/18   1649   NA  131*  133  135  129*  135   POTASSIUM  4.3  4.2  3.7  4.1  3.9   CHLORIDE  101  102  102  94  105   CO2  24  24  24  26  22   ANIONGAP  6  7  8  8  8   BUN  23  21  23  23  17   CR  0.86  0.88  0.93  1.10*  0.77   MACY  7.5*  7.6*  7.6*  8.1*  7.6*     Recent Labs   Lab Test  03/18/18   0608  04/01/13   1028   PHOS   --   2.8   LDH  182   --      Recent Labs   Lab Test  03/20/18   0643  03/19/18   0657  03/18/18   0608   BILITOTAL  2.7*  2.1*  2.6*   ALKPHOS  232*  206*  152*   ALT  41  38  31   AST  84*  86*   65*   ALBUMIN  2.1*  2.2*  2.1*   LDH   --    --   182       Review of system: Complete ROS otherwise negative         Past Medical History:     Past Medical History:   Diagnosis Date     Hepatitis C      Hepatocellular carcinoma (H)      Liver disease      Mild intermittent asthma      Pleural effusion associated with hepatic disorder              Past Surgical History:     Past Surgical History:   Procedure Laterality Date     CATARACT IOL, RT/LT Right 08/22/2017    s/p CE/IOL right eye     EYE SURGERY       H PYLORI SHYLA SCREEN      was treated for h pylori     NO HISTORY OF SURGERY               Social History:     Social History     Social History     Marital status: Single     Spouse name: N/A     Number of children: N/A     Years of education: N/A     Occupational History     Not on file.     Social History Main Topics     Smoking status: Never Smoker     Smokeless tobacco: Never Used     Alcohol use No     Drug use: No     Sexual activity: No     Other Topics Concern     Not on file     Social History Narrative             Family History:     Family History   Problem Relation Age of Onset     Respiratory Father      asthma     DIABETES Son      Glaucoma No family hx of      Macular Degeneration No family hx of      Retinal detachment No family hx of      Amblyopia No family hx of                 Allergies:   .   Allergies   Allergen Reactions     Dust Mites Cough     Sneezing      Seasonal Allergies              Medications:     Current Facility-Administered Medications   Medication     oxyCODONE IR (ROXICODONE) tablet 5 mg     benzocaine-menthol (CEPACOL) 15-3.6 MG lozenge 1 lozenge     calcium carbonate (TUMS) chewable tablet 500 mg     lactulose (CHRONULAC) solution 10 g     albuterol neb solution 2.5 mg     albuterol (PROAIR HFA/PROVENTIL HFA/VENTOLIN HFA) Inhaler 2 puff     fluticasone (FLONASE) 50 MCG/ACT spray 2 spray     Carboxymethylcellulose Sod PF (REFRESH PLUS) 0.5 % ophthalmic solution 1 drop  "    fluticasone-vilanterol (BREO ELLIPTA) 200-25 MCG/INH oral inhaler 1 puff     multivitamin, therapeutic with minerals (THERA-VIT-M) tablet 1 tablet     omeprazole (priLOSEC) CR capsule 40 mg     polyethylene glycol (MIRALAX/GLYCOLAX) Packet 17 g     sodium chloride (OCEAN) 0.65 % nasal spray 1 spray     naloxone (NARCAN) injection 0.1-0.4 mg     ondansetron (ZOFRAN-ODT) ODT tab 4 mg    Or     ondansetron (ZOFRAN) injection 4 mg           Vital signs:  Temp: 99.3  F (37.4  C) Temp src: Oral BP: 97/51 Pulse: 94   Resp: 18 SpO2: 95 % O2 Device: None (Room air)   Height: 157.5 cm (5' 2\") Weight: 50.8 kg (112 lb 1.6 oz)  Estimated body mass index is 20.5 kg/(m^2) as calculated from the following:    Height as of this encounter: 1.575 m (5' 2\").    Weight as of this encounter: 50.8 kg (112 lb 1.6 oz).    Physical exam:  Gen: Gaunt; no acute distress  HEENT: Mucous Membrane Moist, no oral lesions, no pallor, icterus  Neck: supple,   Lymph Nodes: no cervical, axillary LAD   CVS: Regular Rate Rhythm, no murmurs  Resp: CTA, no added sounds  GI: Soft, non-tender, no Hepatospleenomegaly  Extremities: no edema   Skin: no bruises  Neuro: AAOx4. Cranial nerves grossly intact. Strength 5/5 throughout. Sensations grossly intact.          Data:   The labs and imaging were reviewed.      .No results found for this or any previous visit (from the past 24 hour(s)).        Uriel Schwartz MD         "

## 2018-03-21 NOTE — PROGRESS NOTES
Calorie Count  Intake recorded for: 3/20  Kcals: 1057  Protein: 58g  # Meals Recorded: 100% oatmeal w/ brown sugar, milk, 2 apple juices, 75% applesauce, fried tilapia, skim milk, salad   # Supplements Recorded: 0

## 2018-03-21 NOTE — CONSULTS
[facility]    Palliative Care Consultation Note    Patient: uYe Portillo  Date of Admission:  3/17/2018    Requesting Clinician / Team:   Reason for consult: Goals of care    Recommendations:    Pain and nausea currently adequately controlled with the medications on board. No further recommendations for symptom management at the moment.     Goals of care conference tomorrow with patient's family. Would recommend having someone from Dr. Meyer's team to be present at the conference to help clarify disease prognosis and probability of having a transplant in the future, if at all.     These recommendations have been discussed with primary team.    Pain management was discussed with patient, primary team and the plan was created in a collaborative fashion.  The patient's response to the current recommendations: Acceptable.     Thank you for the opportunity to participate in the care of this patient and family. Our team will continue to follow. Please feel free to contact the on-call Palliative provider with any urgent needs.     Thank you for involving us in the patient's care.       Rowan Rivera MD MPH  PGY3- [facility], Family Medicine  Pager- [phone]      Patient seen and evaluated with Dr. Rivera.   Agree with assessment and recommendations.    Total time spent was 45 minutes,  >50% of time was spent counseling and/or coordination of care regarding goals and sypmptoms and support.    Mahnaz Nettles  Palliative Care   Pager [phone]        Assessments:  Ms. Portillo is a 78 year old (maybe younger as her date of birth is not accurate) lady with prior medical history of HCV cirrhosis, HCC s/p TACE, hepatic hydrothorax, ascites, and prior Gastric ulcer presenting with abdominal pain and distension, secondary to decompensated liver cirrhosis due to HCC.  Brief visit with patient and daughter He today. We introduced our team and role of palliative team. Reviewed that our role is to  "help clarify goals of care and to help with symptom management. Patient and daughter appreciative of this. However, it does appear that the patient does not believe that she has cancer and that the disease is terminal. She understands that she is not currently a transplant candidate and that chemotherapy is not an option at the moment.      Symptoms:        1. Abdominal pain: Currently well controlled with oxycodone 5 mg Q4H PRN. Last paracentesis was on 0317/18.       2. Nausea: Well controlled with Zofran 4 mg Q6H PRN.        Prognosis, Goals, & Planning:        Discussion about disease understanding, prognosis, care goals: Limited understanding into disease, prognosis. Will readdress this tomorrow at care conference. Appreciate if GI team able to attend the conference as this might help patient better understand the prognosis.        Decision making capacity: Good       Health care directive: Not on file       Code Status:  Full Code      Coping, Meaning, & Spirituality: Family is supportive, Protestant is a source of strength          Social:        Living situation: Home       Support system/Family: Sons and daughter       Functional status: Dependent on caregivers for ADLs, IADLs       Financial concerns:Not addressed today       Substance use disorder (past / present): No known history            History of Present Illness:   Sources of History:patient and patient's daughter    Patient reports that she understands her liver is sick. She however, does not believe that she has cancer. Her daughter said that \"I don't believe my mom has cancer\". Patient endorsing abdominal pain due to the distension and feels nauseous. Is able to tolerate PO intake but per daughter her appetite has decreased a lot. She reports that her pain and nausea are well controlled with the medications. She would like to know whether she can use oral medications to prevent ascites formation rather than paracentesis. She knows that she is sick " and says that she has left her overall prognosis to [facility].     [facility]  was used for the visit.       ROS:  Comprehensive ROS is reviewed and is negative except as here & per HPI:   Palliative Symptom Review (0=no symptom/no concern, 1=mild, 2=moderate, 3=severe):  Pain: 2  Fatigue: 2  Nausea: 2  Constipation: 0  Depressive Symptoms: 0  Anxiety: 0  Drowsiness: 0  Poor Appetite: 3  Shortness of Breath: 0  Insomnia: 0  Delirium: 0  Other: Abdominal distension  Overall (0 good/no concerns, 3 very poor): 2     Past Medical History:   Past Medical History:   Diagnosis Date     Hepatitis C      Hepatocellular carcinoma (H)      Liver disease      Mild intermittent asthma      Pleural effusion associated with hepatic disorder           Past Surgical History:   Past Surgical History:   Procedure Laterality Date     CATARACT IOL, RT/LT Right 08/22/2017    s/p CE/IOL right eye     EYE SURGERY       H PYLORI SHYLA SCREEN      was treated for h pylori     NO HISTORY OF SURGERY             Family History:   Family History   Problem Relation Age of Onset     Respiratory Father      asthma     DIABETES Son      Glaucoma No family hx of      Macular Degeneration No family hx of      Retinal detachment No family hx of      Amblyopia No family hx of           Allergies:   Allergies   Allergen Reactions     Dust Mites Cough     Sneezing      Seasonal Allergies           Medications:   I have reviewed this patient's medication profile and medications given in the past 24 hours.  Noted are:  Current Facility-Administered Medications   Medication     benzonatate (TESSALON) capsule 100 mg     Benzocaine (HURRICAINE/TOPEX) 20 % spray 0.5-1 mL     oxyCODONE IR (ROXICODONE) tablet 5 mg     benzocaine-menthol (CEPACOL) 15-3.6 MG lozenge 1 lozenge     calcium carbonate (TUMS) chewable tablet 500 mg     lactulose (CHRONULAC) solution 10 g     albuterol neb solution 2.5 mg     albuterol (PROAIR HFA/PROVENTIL HFA/VENTOLIN HFA) Inhaler 2  puff     fluticasone (FLONASE) 50 MCG/ACT spray 2 spray     Carboxymethylcellulose Sod PF (REFRESH PLUS) 0.5 % ophthalmic solution 1 drop     fluticasone-vilanterol (BREO ELLIPTA) 200-25 MCG/INH oral inhaler 1 puff     multivitamin, therapeutic with minerals (THERA-VIT-M) tablet 1 tablet     omeprazole (priLOSEC) CR capsule 40 mg     polyethylene glycol (MIRALAX/GLYCOLAX) Packet 17 g     sodium chloride (OCEAN) 0.65 % nasal spray 1 spray     naloxone (NARCAN) injection 0.1-0.4 mg     ondansetron (ZOFRAN-ODT) ODT tab 4 mg    Or     ondansetron (ZOFRAN) injection 4 mg          Physical Exam:   Vital Signs: Temp: 97.9  F (36.6  C) Temp src: Oral BP: 99/50 Pulse: 88   Resp: 18 SpO2: 98 % O2 Device: None (Room air)    Weight: 112 lbs 1.6 oz    Exam:  Constitutional: alert, active and cachectic appearance  Head: Normocephalic. Temporal wasting apparent  Neck: Neck supple  Respiratory: Decreased basilar lung sounds, patient not in respiratory distress  Gastrointestinal: positive findings: distended, tenderness diffuse  : Deferred  Psychiatric: mentation appears normal and affect normal/bright    Rowan Rivera MD MPH  PGY3- [facility], Family Medicine  Pager- [phone]

## 2018-03-21 NOTE — PLAN OF CARE
Problem: Patient Care Overview  Goal: Plan of Care/Patient Progress Review  Pt A/O, VSS. No acute incidences during shift, pt slept in between cares and safety checks. Family members at bedside for assistance interpreting in Belarusian, but will call nursing for SBA to bathroom. No c/o abdominal pain, but pt asked for PRN lozenges for sore throat. Last day of omar counts, family often will bring food from home for patient. Plan for onc/palliative consult. Will continue to monitor and alert MDs to any changes.

## 2018-03-21 NOTE — PROVIDER NOTIFICATION
Web page sent to GI/liver MD ([id number]) - SHASTA in 40 on 5B. Pt's family is here and reports that they have 2pm meeting with Dr. Meyer. Can someone come speak with them? Thanks Mary ESCOBAR [id number].  Awaiting response.

## 2018-03-21 NOTE — PLAN OF CARE
Problem: Patient Care Overview  Goal: Plan of Care/Patient Progress Review  Outcome: No Change  A&Ox4, VSS.  Pt is Salvadorean speaking, family at bedside and  present for assessment.  Pt c/o sore throat, received PRN lozenge.  Pt reports abd discomfort and fullness, declined medication.  Appetite fair, calorie counts completed.  Pt denies dizziness with ambulation.  SBA with walker. BM x2 on shift. Continent of urine. Continue to monitor and follow POC.

## 2018-03-21 NOTE — PROGRESS NOTES
[facility] Internal Medicine Progress  Note  Date of Service: March 21, 2018            Assessment and Plan:   Patient is a 78 year old year old female with 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    #Hep C cirrhosis with HCC, ascites and hepatic hydrothorax  MELD on admission 24. Required regular paracentesis and thoracentesis. Possible that abdominal pain is 2/2 reaccumulation of ascites vs SBP? , pt's primary hepatologist recommended evaluation for progression of HCC by MRI, para and thora and pain control. On exam + mild asterixis, marked ascites and CXR showed marked R sided pleural effusion. Para and thora on 3/17. No SBP. Pleural fluid is transudate compatible with hepatic hydrothorax. MRI A/P on 3/18 showed suspicious lesion on 4A 1.5cm.   - Multi-disciplinary transplant selection conference this afternoon.  Committee decision was that patient is NOT a transplant candidate for multiple reason including 1. Advanced age; 2. Frailty/Debility; 3.  Tumor which appears to be outside of [place] Criteria 3.8 x 3.0 in segment 6; OPTN class 5T and 1.6 x 2.0 cm OPTN 5A on MRI from 11/2017.  - Pleural fluids and ascites cytology--pending  - MELD daily  - Lactulose to daily (BM>3 after 2 doses)  - Holding PTA lasix and spironolactone due to recent JOSE  - Palliative care consult--for goals of care discussion, plan for 3/22  - Heme/onc consult for possible other treatment options and prognosis--pending for official recs    MELD-Na score: 22 at 3/21/2018  8:30 AM  MELD score: 18 at 3/21/2018  8:30 AM  Calculated from:  Serum Creatinine: 0.94 mg/dL (Rounded to 1) at 3/21/2018  8:30 AM  Serum Sodium: 132 mmol/L at 3/21/2018  8:30 AM  Total Bilirubin: 3.5 mg/dL at 3/21/2018  8:30 AM  INR(ratio): 1.78 at 3/21/2018  8:30 AM  Age: 78  years    #Upper abdominal pain  Has been going on for 1 week after thoracentesis. Per family, patient seemed to have abdominal pain which resolved after paracentesis. Her last paracentesis was on 3/5/2018. No n/v or diarrhea. No blood/black stools. Exam showed soft abdomen with +ascites, no rebound. LFTs and lipase were stable. No leukocytosis. Etiology could be 2/2 ascites reaccumulation, progressing of HCC?. No evidence of SBP on 3/17. Para on 3/17 removed 2.2 L.Albumin 50 g was given after.  - Pain control with Oxycodone 5mg q6h prn    #Cachecxia  #Malnutrition  Could be related to HCC and cirrhosis.   - Nutrition consult.     #H/o gastric ulcer in 2014  - Continue PTA omeprazole CR 40 mg daily    #Sore throat  Complaint about left ear pain on 3/21. Ear exam--normal TM. Seemed to be more sore throat rather than ear pain. She pointed to the neck that hurts. No exudate or injection on exam.  - Hurricane spray prn  - If worsening--plan throat swab for strep    #Low grade fever -- resolved  On 3/19 in AM, developed low grade temp at 100.2, tachycardia with  and BP 88/50s which resolved spontaneously. Lactate was normal. Procal negative. No leukocytosis. C.diff was negative. Epigastric pain improving. Para and thora on admission showed no SBP and transudate. Unclear source of infection. Ddx fever from HCC?  - Blood cultue x2  - Continue monitoring fever curve     #JOSE--resolved  Cr elevated from baseline of 0.7-0.9 to 1.1 on admission. Likely 2/2 pre-renal from poor PO intake. Got 1 L NS bolus from ED and 125 ml/hr overngiht. Cr normalized after maintenance IVF and improve PO intake.   - Avoid nephrotoxic agents  - Holding PTA lasix and spironolactone in setting of hypovolemia  - Cr in AM     #Hyponatremia--resolved  Na 129 on admission. Likely 2/2 hypovolemic hyponatremia 2/2 poor PO intake. Na normalized after IVF.        ##Chronic  #Upper airway cough syndrome  Chronic. F/u with , pulm in clinic.  "Could be related to chronic rhinitis with nasap dripping.   - Continue PTA flonase 2 spray daily, NS nasal spray BID     #Reported h/o asthma  Stable. No acute exacerbation.  - Continue PTA Mometasone/formeterol inh bid, albuterol inh prn     ##Other  - PPx: SCI for DVT/omeprazole for GI/lactulose+miralax for bowel  - FEN: 2G Na diet  - IVF:  none  - Code status: FULL     Disposition:  Pending for clinical improvement and PT/OT and family discussion for discharge options. Likely needs inpatient for 1-2 nights.     Markell Barnes MD  PGY-3 Internal Medicine  [phone]     This patient was staffed with the attending, Dr. Emanuel, who agrees with the above assessment and plan.   -----------------------------------------------------------------------------------------------------------------------------------------------      Interval History  No acute event overnight. Afebrile. Continues to have epigastric pain. She feels comfortable overall. Family expecting the meeting with  this afternoon.    Review of Systems:   A 4 point review of systems was negative except as noted above.    OBJECTIVE:  /62 (BP Location: Right arm)  Pulse 92  Temp 97.9  F (36.6  C) (Oral)  Resp 18  Ht 1.575 m (5' 2\")  Wt 51 kg (112 lb 8 oz)  SpO2 98%  BMI 20.58 kg/m2       Intake/Output Summary (Last 24 hours) at 03/18/18 0714  Last data filed at 03/18/18 0641   Gross per 24 hour   Intake             1620 ml   Output                0 ml   Net             1620 ml       GENERAL APPEARANCE: alert and no distress  Pulmonary: decrease breath sound RL, no crackles  CV: regular rhythm, normal rate, no murmur, no rub   - JVP not elevated    - Edema--none  GI: soft, mildly tender at epigastrium, no rebound, no HSM, + ascites--stable  NEURO: mentation intact and speech normal, equally move, no asterixis    Labs:   All labs reviewed by me  Electrolytes/Renal -   Recent Labs   Lab Test  03/21/18   0830  03/20/18   0643  03/19/18   " 0657   04/01/13   1028   NA  132*  131*  133   < >  143   POTASSIUM  5.0  4.3  4.2   < >  3.6   CHLORIDE  100  101  102   < >  108   CO2  25  24  24   < >  26   BUN  25  23  21   < >  10   CR  0.94  0.86  0.88   < >  0.57   GLC  108*  103*  113*   < >  127*   MACY  8.1*  7.5*  7.6*   < >  8.3*   PHOS   --    --    --    --   2.8    < > = values in this interval not displayed.     CBC -   Recent Labs   Lab Test  03/21/18   0830  03/20/18   0643  03/19/18   0657   WBC  4.3  3.9*  3.7*   HGB  9.7*  9.0*  8.6*   PLT  71*  65*  66*     LFTs   Recent Labs   Lab Test  03/21/18   0830  03/20/18   0643  03/19/18   0657   ALKPHOS  266*  232*  206*   BILITOTAL  3.5*  2.7*  2.1*   ALT  44  41  38   AST  94*  84*  86*   PROTTOTAL  5.9*  5.5*  5.0*   ALBUMIN  2.4*  2.1*  2.2*       Imaging:  none    Current Medications:    lactulose  10 g Oral Daily     fluticasone  2 spray Both Nostrils Daily     fluticasone-vilanterol  1 puff Inhalation Daily     multivitamin, therapeutic with minerals  1 tablet Oral Daily     omeprazole  40 mg Oral Daily     sodium chloride  1 spray Both Nostrils BID       Markell Barnes MD

## 2018-03-21 NOTE — PROVIDER NOTIFICATION
DAVID ERIC [ Msg Id [id number] ]  Paged above provider with notification:  Pt c/o bilateral inner ear ache. Reportedly not new pain but has been intermittently uncomfortable. Notified M1 MD as family requests that MD assess her ears.

## 2018-03-22 NOTE — PLAN OF CARE
Problem: Pain, Acute (Adult)  Goal: Identify Related Risk Factors and Signs and Symptoms  Related risk factors and signs and symptoms are identified upon initiation of Human Response Clinical Practice Guideline (CPG).   Outcome: No Change  D/I/A:Patient, A&Ox4, VSS.  Pt is Kosovan speaking, family at bedside . Patient C/o abdominal pain and Nausea , Zofran and pain meds given. no emesis reported. Patient with poor appetite. Given scheduled lactulose and Miralax  x1 per Pt request.  Patient void x 2 and had small bowel movement x 1 formed color brown.  service request for 15:00 per MD request. Call light in reach bed alarm on for safety. Continue with POC. And update MD with change.

## 2018-03-22 NOTE — PLAN OF CARE
Problem: Patient Care Overview  Goal: Plan of Care/Patient Progress Review  PT 5B: Per chart review, pt has been completing mobility with SBA, at baseline. PT to complete orders and sign off, anticipating safe return to prior living arrangement once medically appropriate.

## 2018-03-22 NOTE — PROGRESS NOTES
[facility], [place]    Internal Medicine Progress Note - [facility] Service    Main Plans for Today     Goals of care discussion with family  Plan for paracentesis   Supportive cares for throat and ear pain     Assessment & Plan     Ms. Portillo is a 78 year old (maybe younger as her date of birth is not accurate) lady with prior medical history of HCV cirrhosis, HCC s/p TACE, hepatic hydrothorax, ascites, and prior Gastric ulcer presenting with abdominal pain.  # HCV cirrhosis complicated by hepatic hydrothorax and ascites   # HCC s/p prior TACE   # Malnutrition due to advance liver disease/ poor appetite/ cachexia with BMI of 20  # Acute kidney injury     - patient underwent paracentesis and thoracentesis on 3/17   - unfortunately the patient is not a candidate for transplant   - Appreciate all consulting services   - Plans for goals of care conversation with palliative care and primary team and patients family today   - continue oxycodone as needed for pain   - continue to hold diuretics in the setting of JOSE and poor oral intake   - continue lactulose and Omeprazole     # Acute sore throat   # Acute ear pain     - both are stable today   - there is no evidence of candida on exam   - continue hurricane spray as needed   Addendum:   We were informed by Ms. Portillo's family that her son and primary decision maker would not be available today.  His wife is currently in the ER and he is sick as well.  Plan is for care conference either in person or by phone tomorrow at 2 pm.     # Pain Assessment:     Current Pain Score 3/22/2018 3/21/2018 3/21/2018   Patient currently in pain? denies yes yes   Pain location - Ear Throat   Pain descriptors - Aching Sore   - Yue is experiencing pain due to abdominal pain. Pain management was discussed with Yue and her nephew and the plan was created in a collaborative fashion.  Yue's response to the current recommendations: engaged  - Please see the plan for  pain management as documented above      Diet: Combination Diet Regular Diet Adult; 2 gm NA Diet  Snacks/Supplements Adult: Ensure Plus (Adult); Between Meals  Fluids: none   DVT Prophylaxis: Pneumatic Compression Devices  Code Status: Full Code    Disposition Plan   Expected discharge: 2 - 3 days, recommended to prior living arrangement once after goals of care discussions .     Entered: Kj Montano 03/22/2018, 12:05 PM   Information in the above section will display in the discharge planner report.      The patient's care was discussed with the Care Coordinator/, Patient and Patient's Family.    Kj Montano  [facility]  Maroon: 1  Pager: [phone]  Please see sticky note for cross cover information    Interval History     Ms. Portillo is doing well today.  She denies any concerns apart from the sore throat and ear pain that is improving.  No fevers, no chills, no dyspnea, no chest pain, abdominal pain is stable.    Physical Exam   Vital Signs: Temp: 99.3  F (37.4  C) Temp src: Oral BP: 100/57 Pulse: 90   Resp: 16 SpO2: 96 % O2 Device: None (Room air)    Weight: 112 lbs 8 oz    General Appearance: Patient is in no acute distress  HEENT: mucus membranes are moist, no oral lesions  Respiratory: Lungs are clear to auscultation, no wheezing, rales, or rhonchi auscultated  Cardiovascular: Regular rate and rhythm, no murmurs, no rubs, and no gallops   GI:  soft, not tender, distended, bowel sounds present and normal, fluid wave and shifting dullness is present, no masses appreciated   Skin: no rashes and no discolorations   Extremities: no cyanosis, no edema, and no clubbing

## 2018-03-22 NOTE — PLAN OF CARE
"Problem: Patient Care Overview  Goal: Plan of Care/Patient Progress Review  Outcome: No Change  /61 (BP Location: Right arm)  Pulse 92  Temp 97.2  F (36.2  C) (Oral)  Resp 18  Ht 1.575 m (5' 2\")  Wt 51 kg (112 lb 8 oz)  SpO2 99%  BMI 20.58 kg/m2  [id number]:  A&Ox4. Afebrile. VSS on RA. BP soft. Bilateral ear ache and sore throat. MD assessed ears and ordered hurricane spray for throat PRN-given x1. Provided with sodium rinse as well. 2 gm Na diet; calorie counts. Removed meal tray from room at 1600 with less than 25% eaten. Pt says she will drink a vanilla Ensure later tonight. Denied nausea. Abdomen distended and round; mild tenderness. Sodium level still low among other labs such as CBC. L PIV SL'd. Up with assist of 1 and walker in room. Family assists pt to BR. Not saving output. Heme/Onc consulted and family had a chance to talk to Dr. Meyer (gastroenterology) this afternoon.  present for consult and head-to-toe assessment.  Continue to monitor and manage symptoms. Update MD as necessary.       "

## 2018-03-22 NOTE — PROGRESS NOTES
Calorie Counts  Intake recorded for: 3/21  Kcals: 772  Protein: 34g  # Meals Recorded: 3 meals (First - 50% coffee, 25% oatmeal with brown sugar, applesauce)      (Second - 100% 2 apple juices, fried fish & veggies from home)      (Third - 50% black tea, less than 25% goat meat)  # Supplements Recorded: 50% 1 Boost Plus

## 2018-03-22 NOTE — PLAN OF CARE
Problem: Patient Care Overview  Goal: Plan of Care/Patient Progress Review  Pt A/O, VSS. No acute incidences during shift, pt slept in between cares and safety checks. Family members at bedside for assistance interpreting in Kenyan, but will call nursing for SBA to bathroom. No c/o abdominal pain, sore throat. Lozenges and Tessalon pearls available PRN. Poor appetite, family often will bring food from home for patient. Plan to work with family for comfort measures. Will continue to monitor and alert MDs to any changes.

## 2018-03-23 NOTE — PROGRESS NOTES
[facility], [place]    Palliative Care Progress Note    Patient: Yue Portillo  Date of Admission:  3/17/2018    Recommendations:  - try to meet with family again tomorrow before discharge. Would focus discussion on clarifying status of her condition (cirrhosis which is not fixable and not candidate for transplant; as well as HCC now no longer amenable to TACE or other cancer directed therapies). In that context would offer discussion of prognosis in terms of expectation that she will have functional decline over time with risk for acute illnesses/setbacks and expectation that she will die likely from progression of this condition. Would offer a discussion about code status and recommend DNR/DNI based on concern that if she got that sick given her underlying liver disease and cancer, she would not have meaningful benefit or recovery from CPR or intubation. She would be a candidate for hospice if she and family felt that they did not want re-hospitalization and only wanted to focus on comfort measures and if that is their wish, then I would also offer pleurX catheters for both abdomenal ascites and pleural effusions. I anticipate that patient and family are not ready for hospice at this point and if that is the case it is very reasonable to make plan for discharge back home with outpatient follow-up to palliative care clinic (scheduled for 4/11) as well as GI clinic.       - abdominal pain: agree with paracentesis today as well as ongoing outpatient paracenteses. Pt does not like oxycodone due to sedation at 5mg dose. I d/c the oxycodone order and placed tramadol 25-50mg q 12 hours prn for pain management option. If that is not helpful could try 2.5mg dose of oxycodone. However overall I think the best treatment for the abdominal pain is regular paracentesis.      Assessment & Plan   Ms. Portillo is a 78 year old (maybe younger as her date of birth is not accurate) lady with prior medical  history of HCV cirrhosis, HCC s/p TACE, hepatic hydrothorax, ascites, and prior Gastric ulcer presenting with abdominal pain and distension, secondary to decompensated liver cirrhosis due to HCC. Not a liver transplant candidate and not a candidate for further cancer directed therapies. Has been living at home with help of her children.   Symptoms: abdominal pain related to ascites    Goals: not addressed--son not present. Had planned for care conference today to discuss overall prognosis and goals however son (who pt and family indicates needs to be present for discussions) was not present so discussion was deferred.     These recommendations have been discussed with medicine team.    Marly Nettles  Pager: [phone]  [facility] Inpatient Team Consult pager [phone] (M-F 8-4:30)  After-hours Answering Service [phone]   Main Palliative Clinic - [facility] 1C: [phone]     Total time spent was 40 minutes,  >50% of time was spent counseling and/or coordination of care regarding goals, symptoms, support.        Interval hx  No acute events  Ongoing abdominal discomfort from ascites, plan for paracentesis today  Plan for care conference today but son didn't show up      Medications   I have reviewed this patient's medication profile and medications given in the past 24 hours.     Review of Systems   Palliative Symptom Review (0=no symptom/no concern, 1=mild, 2=moderate, 3=severe):  Pain: 1  Fatigue: 1  Nausea: 0  Constipation: 0  Diarrhea: 0  Depressive Symptoms: 0  Anxiety: 0  Drowsiness: 1  Poor Appetite: 1  Shortness of Breath: 0  Insomnia: 0  Delirium: 0  Other: 0  Overall (0 good/no concerns, 3 very poor): 1    Physical Exam   Vital Signs: Temp: 98.2  F (36.8  C) Temp src: Oral BP: 93/44 Pulse: 80   Resp: 16 SpO2: 96 % O2 Device: None (Room air)    Weight: 113 lbs 8 oz    Physical Exam:  elderly female,   Alert, no distress  abd distended, no grimacing with palpation  CV RRR  resp unlabored work of  breathing, dull to ausculation RLL    Data reviewed today:   ROUTINE ICU LABS (Last four results)  CMP  Recent Labs  Lab 03/23/18  0556 03/22/18  0644 03/21/18  0830 03/20/18  0643 03/19/18  0657   * 132* 132* 131* 133   POTASSIUM 4.7 4.8 5.0 4.3 4.2   CHLORIDE 100 102 100 101 102   CO2 26 24 25 24 24   ANIONGAP 6 7 6 6 7   * 103* 108* 103* 113*   BUN 30 27 25 23 21   CR 0.95 1.01 0.94 0.86 0.88   GFRESTIMATED 56* 53* 57* 63 62   GFRESTBLACK 68 64 69 76 75   MACY 7.7* 7.7* 8.1* 7.5* 7.6*   PROTTOTAL  --  5.0* 5.9* 5.5* 5.0*   ALBUMIN  --  2.0* 2.4* 2.1* 2.2*   BILITOTAL  --  2.9* 3.5* 2.7* 2.1*   ALKPHOS  --  243* 266* 232* 206*   AST  --  80* 94* 84* 86*   ALT  --  42 44 41 38     CBC  Recent Labs  Lab 03/23/18  0556 03/22/18  0644 03/21/18  0830 03/20/18  0643   WBC 3.7* 4.0 4.3 3.9*   RBC 2.39* 2.44* 2.87* 2.66*   HGB 8.0* 8.3* 9.7* 9.0*   HCT 23.9* 24.8* 29.2* 26.8*    102* 102* 101*   MCH 33.5* 34.0* 33.8* 33.8*   MCHC 33.5 33.5 33.2 33.6   RDW 16.2* 16.0* 16.1* 16.1*   PLT 56* 63* 71* 65*     INR  Recent Labs  Lab 03/22/18  0644 03/21/18  0830 03/20/18  0643 03/19/18  0657   INR 1.98* 1.78* 2.05* 2.08*     Arterial Blood GasNo lab results found in last 7 days.      Marly Nettles  Pager: [phone]  [facility] Inpatient Team Consult pager [phone] (M-F 8-4:30)  After-hours Answering Service [phone]   Palliative Clinic:  [phone]

## 2018-03-23 NOTE — PROCEDURES
Procedure Note    Attending: Juan Ríos  Resident:  Raman Mcclellan  Procedure: Diagnostic and therapeutic paracentesis  Indication: distension      The risks and benefits of the procedure were explained to the patient and her daughter who expressed understanding and opted to proceed.  Consent was obtained and placed in the chart.  A time out was performed.  An area of ascites was located and marked using ultrasound guidance in the left lower quadrant; the area was prepped and draped in the usual sterile fashion.  4 ml of 1% lidocaine was instilled and ascites located.   The LCO Creation paracentesis catheter and needle were inserted until ascites obtained then the needle removed.  There was no flow and the cathter was replaced under realtime guidance. The apparatus was connected to vacuum bottles and a total of 1300 ml removed.   A specimen was  sent for analysis. The catheter was withdrawn and the area dressed.    Patient tolerated the procedure well with no immediate complications.  The primary team was informed of the procedure.     Juan Ríos M.D.  Hospitalist  Internal Medicine and Pediatrics  [phone]  DOS:  3/23/18

## 2018-03-23 NOTE — PLAN OF CARE
Problem: Pain, Acute (Adult)  Goal: Identify Related Risk Factors and Signs and Symptoms  Related risk factors and signs and symptoms are identified upon initiation of Human Response Clinical Practice Guideline (CPG).   Outcome: No Change  Pt is alert, on daily lactulose and no increased sedation noted. Pt is [place]ine[place] speaking and has family at bedside.  at bedside at change of shift. Pt up SBA with walker to BR, small BM and requested another dose of Miralax. Denies any nausea/vomiting, abdominal pain. Will continue to monitor POC.

## 2018-03-23 NOTE — PLAN OF CARE
Problem: Patient Care Overview  Goal: Plan of Care/Patient Progress Review  Outcome: No Change  Patient denied pain and said she was okay at the beginning of the shift. This morning she said that she had bilateral leg pain, declined oxycodone because it makes her so sleepy, and opted instead try PCD's. The son stated that the patient likes the way the PCD feels on her legs. Will re-evaluate to make sure that the patient is not to uncomfortable, and offer medication as alternative. Up to the bathroom with assist of 1 and walker. Bowel movement and urinated. Continue with current plan of nursing care, and update MD with concerns as needed.

## 2018-03-23 NOTE — PROGRESS NOTES
"[facility] Internal [facility] Progress  Note  Date of Service: March 23, 2018            Assessment and Plan:   Patient is a 78 year old year old female with 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     #Hep C cirrhosis with HCC, ascites and hepatic hydrothorax  MELD on admission 24. Required regular paracentesis and thoracentesis. Possible that abdominal pain is 2/2 reaccumulation of ascites vs SBP? , pt's primary hepatologist recommended evaluation for progression of HCC by MRI, para and thora and pain control. On exam + mild asterixis, marked ascites and CXR showed marked R sided pleural effusion. Para and thora on 3/17. No SBP. Pleural fluid is transudate compatible with hepatic hydrothorax. MRI A/P on 3/18 showed suspicious lesion on 4A 1.5cm.   - Multi-disciplinary transplant selection conference this afternoon.  Committee decision was that patient is NOT a transplant candidate for multiple reason including 1. Advanced age; 2. Frailty/Debility; 3.  Tumor which appears to be outside of Arpit Criteria 3.8 x 3.0 in segment 6; OPTN class 5T and 1.6 x 2.0 cm OPTN 5A on MRI from 11/2017.  - Pleural fluids and ascites cytology on 3/17--negative for malignancy  - MELD daily  - Lactulose to daily (BM>3 after 2 doses)  - Holding PTA lasix and spironolactone due to recent JOSE  - Palliative care consulted , appreciate recs.   - Heme/onc consult for possible other treatment options and prognosis--  \" Based on her age, frailty, poor performance status, and advanced cirrhosis, she is therefore not currently a candidate for systemic of liver-directed therapies, and is not expected to experience sufficient recovery in the future to become a candidate\" \"As per Dr. Ayala's last note in November, she is an appropriate candidate for a " "comfort-oriented approach and hospice care.\"    - Will discuss with palliative care plan with oldest son, tentative on 3/24. Not planning to discuss about hospice or goals of care. Plan to f/u with , GI and Heme/onc in clinic  - Paracentasis on 3/23--removed 1.3 L--sent for ascites analysis     MELD-Na score: 22 at 3/23/2018  5:56 AM  MELD score: 18 at 3/23/2018  5:56 AM  Calculated from:  Serum Creatinine: 0.95 mg/dL (Rounded to 1) at 3/23/2018  5:56 AM  Serum Sodium: 132 mmol/L at 3/23/2018  5:56 AM  Total Bilirubin: 2.9 mg/dL at 3/22/2018  6:44 AM  INR(ratio): 1.98 at 3/22/2018  6:44 AM  Age: 78 years       #Upper abdominal pain  Has been going on for 1 week after thoracentesis. Per family, patient seemed to have abdominal pain which resolved after paracentesis. Her last paracentesis was on 3/5/2018. No n/v or diarrhea. No blood/black stools. Exam showed soft abdomen with +ascites, no rebound. LFTs and lipase were stable. No leukocytosis. Etiology could be 2/2 ascites reaccumulation, progressing of HCC?. No evidence of SBP on 3/17. Para on 3/17 removed 2.2 L.Albumin 50 g was given after.  - Pain control with tramadol 25-50 mg q12h prn--switched from PO oxycodone per palliative care recs     #Cachecxia  #Malnutrition  Could be related to HCC and cirrhosis.   - Nutrition consult.      #H/o gastric ulcer in 2014  - Continue PTA omeprazole CR 40 mg daily     #Sore throat  Complaint about left ear pain on 3/21. Ear exam--normal TM. Seemed to be more sore throat rather than ear pain. She pointed to the neck that hurts. No exudate or injection on exam.  - Hurricane spray prn  - If worsening--plan throat swab for strep     #Low grade fever -- resolved  On 3/19 in AM, developed low grade temp at 100.2, tachycardia with  and BP 88/50s which resolved spontaneously. Lactate was normal. Procal negative. No leukocytosis. C.diff was negative. Epigastric pain improving. Para and thora on admission showed no SBP " and transudate. Unclear source of infection. Ddx fever from HCC?  - Blood cultue x2  - Continue monitoring fever curve      #JOSE--resolved  Cr elevated from baseline of 0.7-0.9 to 1.1 on admission. Likely 2/2 pre-renal from poor PO intake. Got 1 L NS bolus from ED and 125 ml/hr overngiht. Cr normalized after maintenance IVF and improve PO intake.   - Avoid nephrotoxic agents  - Holding PTA lasix and spironolactone in setting of hypovolemia  - Cr in AM      #Hyponatremia--resolved  Na 129 on admission. Likely 2/2 hypovolemic hyponatremia 2/2 poor PO intake. Na normalized after IVF.       ##Chronic  #Upper airway cough syndrome  Chronic. F/u with madeline Guerrero in clinic. Could be related to chronic rhinitis with nasap dripping.   - Continue PTA flonase 2 spray daily, NS nasal spray BID      #Reported h/o asthma  Stable. No acute exacerbation.  - Continue PTA Mometasone/formeterol inh bid, albuterol inh prn      ## Pain Assessment:  Current Pain Score 3/23/2018     Patient currently in pain? yes     Pain location Abdomen     Pain descriptors Sore     - Yue is experiencing pain due to abdominal pain. Pain management was discussed with Yue and her family and the plan was created in a collaborative fashion.  Yue's response to the current recommendations: compliant  - Please see the plan for pain management as documented above      ##Other  - PPx: SCI for DVT/omeprazole for GI/lactulose+miralax for bowel  - FEN: 2G Na diet  - IVF:  none  - Code status: FULL      Disposition:  Pending for family discussion for discharge options. Likely needs inpatient for 1 more [facility]. [place] home with home OTCandie Barnes MD  PGY-3  Internal Medicine  [phone]  -----------------------------------------------------------------------------------------------------------------------------------------------      Interval History    No acute events overnight. She said overall feels well. Abdominal pain from the ascites is  "bothering her. Throat pain improving. The oldest brother was not able to come for the care conference today due to his medical problem, but reachable by phone.     Review of Systems:   A 4 point review of systems was negative except as noted above.    OBJECTIVE:  BP 93/44 (BP Location: Left arm)  Pulse 80  Temp 98.2  F (36.8  C) (Oral)  Resp 16  Ht 1.575 m (5' 2\")  Wt 51.5 kg (113 lb 8 oz)  SpO2 96%  BMI 20.76 kg/m2    Intake/Output Summary (Last 24 hours) at 03/23/18 1642  Last data filed at 03/23/18 0900   Gross per 24 hour   Intake              100 ml   Output                0 ml   Net              100 ml         GENERAL APPEARANCE: alert and no distress  Pulmonary: CTAB  CV: regular rhythm, normal rate, no murmur, no rub   - JVP not elevated    - Edema: none  GI: : soft, mildly tender at epigastrium, no rebound, no HSM, + ascites--stable  NEURO: mentation intact and speech normal, equally move, no asterixis    Labs:   All labs reviewed by me  Electrolytes/Renal -   Recent Labs   Lab Test  03/23/18   0556  03/22/18   0644  03/21/18   0830   04/01/13   1028   NA  132*  132*  132*   < >  143   POTASSIUM  4.7  4.8  5.0   < >  3.6   CHLORIDE  100  102  100   < >  108   CO2  26  24  25   < >  26   BUN  30  27  25   < >  10   CR  0.95  1.01  0.94   < >  0.57   GLC  104*  103*  108*   < >  127*   MACY  7.7*  7.7*  8.1*   < >  8.3*   PHOS   --    --    --    --   2.8    < > = values in this interval not displayed.     CBC -   Recent Labs   Lab Test  03/23/18   0556  03/22/18   0644  03/21/18   0830   WBC  3.7*  4.0  4.3   HGB  8.0*  8.3*  9.7*   PLT  56*  63*  71*     LFTs   Recent Labs   Lab Test  03/22/18   0644  03/21/18   0830  03/20/18   0643   ALKPHOS  243*  266*  232*   BILITOTAL  2.9*  3.5*  2.7*   ALT  42  44  41   AST  80*  94*  84*   PROTTOTAL  5.0*  5.9*  5.5*   ALBUMIN  2.0*  2.4*  2.1*       Imaging:  None    Current Medications:    lactulose  10 g Oral Daily     fluticasone  2 spray Both Nostrils " Daily     fluticasone-vilanterol  1 puff Inhalation Daily     multivitamin, therapeutic with minerals  1 tablet Oral Daily     omeprazole  40 mg Oral Daily     sodium chloride  1 spray Both Nostrils BID       Markell Barnes MD

## 2018-03-23 NOTE — PLAN OF CARE
Problem: Patient Care Overview  Goal: Plan of Care/Patient Progress Review  Outcome: No Change  D//I/A; Patient A & O X 3, VSS no respiratory distress noted. C/o abdominal pain offered pain meds patient declined at this time. Patient with poor appetite , up to bathroom  With assist of one and walker. Void ok and had medium bowel movement. Plan has order for US paracentesis some times this evening. Patient family at bedside. Call light in reach bed alarm on. Continue monitor and update MD with concerns.

## 2018-03-24 NOTE — PROGRESS NOTES
Patient Name: Yue Portillo  Medical Record Number: [id number]  Today's Date: 3/24/2018    Procedure: thoracentesis  Proceduralist: Dr. Kyle Perla     No sedation     Procedure start time: 1450  Puncture time: 1455  Procedure end time: 1615    : Tyler with Maximo /Lorraine      Other Notes: Pt arrived to IR room from  by bed with RN and NST and interpretur. Consent obtained.Pt denies any questions or concerns regarding procedure. Pt positioned upright and monitored per protocol. Pt tolerated procedure without any noted complications. Pt transferred back to .    3.3L removed

## 2018-03-24 NOTE — PLAN OF CARE
Problem: Patient Care Overview  Goal: Plan of Care/Patient Progress Review  Outcome: No Change  Pt alert and oriented. Vitally stable on ra. Assist of one using walker. Continent of bowel and bladder; Experiences occasional dribbling of bladder and wears briefs. C/o of abdominal pain, however refuses pain medications. Slept for a few hours in the night; got a few times to go to bathroom as well as d/t coughing spells. Pt states that the coughing is getting worse for her; denies SOB/dyspnea. Son by bedside aiding with ADLs.   R: Follow up with team regarding if pt is eligible for thoracentesis per pt request. Continue to monitor GI and respiratory status.

## 2018-03-24 NOTE — PROGRESS NOTES
CLINICAL NUTRITION SERVICES - REASSESSMENT NOTE     Nutrition Prescription    RECOMMENDATIONS FOR MDs/PROVIDERS TO ORDER:  May upgrade diet to regular to improve meal options.     Malnutrition Status:    Non-severe malnutrition in the context of chronic illness    Recommendations already ordered by Registered Dietitian (RD):  Ordered in between snacks to improve kcal /protein intake:   10:00 am: 2 hard boiled eggs + boost plus   2:00 pm: Canned peaches + boost plus   HS: String cheese x 2 + boost plus     Future/Additional Recommendations:  None        EVALUATION OF THE PROGRESS TOWARD GOALS   Diet: 2 gm Na+ diet + Ensure plus between meals     Intake: Patient not available to see. Off floor in IR. Per chart note, Son reported, patient was unable to eat and has more abdominal pain today. Per RN/flow sheet, patient ate 100% of fish meal brought from home today.   - Per RN/flow sheet, intake varies day to day, 51-00-% of meals since admit.     Calorie count:  3/21: 772 kcal and 34 gm protein from 3 meals and 50% of boost  3/20: 1057 kcal and 58 gm protein from 2 meals recorded.  3/19: 483 kcal and 25 gm protein from 2 meals   Average 3 day PO intake: 770 kcal and 39 gm protein   Met 62% estimated kcal and 66% estimated protein needs for maintenance.     ASSESSED NUTRITION NEEDS per 49 kg admission wt:   Estimated Energy Needs: 2495-9522 kcals/day (25 - 30 kcals/kg)  Justification: Repletion  Estimated Protein Needs: 59-74 grams protein/day (1.2 - 1.5 grams of pro/kg)  Justification: Repletion       NEW FINDINGS   IR schedule for a right thoracentesis 3/24 -  Paracentesis on 3/23--removed 1.3 L,  Para on 3/17 removed 2.2 L    Chart reviewed:PMH: hep C cirrhosis with HCC s/p TACE in 2/2016, ascites and hepatic hydrothorax required regular paracentesis and thoracentesis.    - Admitted on 3/17/2018 for upper abdominal pain since a week ago after thracentesis, unable to eat and generalized weakness for 1 week.    -  Per chart note, Not a liver transplant candidate and not a candidate for further cancer directed therapies. Has been living at home with help of her children.   - Edema: none    Wt trend:   Admit wt 49.3 kg (108 lb 11.2 oz) on 3/17 --> 50.6 kg (111 lb 8 oz) on 3/23.    MALNUTRITION  % Intake: < 75% for > 7 days (non-severe)  % Weight Loss: previously  Up to 10% in 6 months (non-severe)  Subcutaneous Fat Loss: Unable to assess  Muscle Loss: Unable to assess  Fluid Accumulation/Edema: None noted  Malnutrition Diagnosis: Non-severe malnutrition in the context of chronic illness    Previous Goals   Patient to consume % of nutritionally adequate meal trays TID, or the equivalent with supplements/snacks.  Evaluation: Not met    Previous Nutrition Diagnosis  Inadequate protein-energy intake related to decreased appetite as evidenced by 5% weight loss over the past 6 months.    Evaluation: No change    CURRENT NUTRITION DIAGNOSIS  Inadequate oral intake related to abdominal pain and distension, associated with decompensated liver cirrhosis as evidenced by Average 3 day PO intake met 62% estimated kcal and 66% estimated protein needs for maintenance.     INTERVENTIONS  Implementation  Ordered high protein snacks in between meals to potentially improve kcal / protein intake    Goals  Patient to consume % of nutritionally adequate meal trays TID, or the equivalent with supplements/snacks.    Monitoring/Evaluation  Progress toward goals will be monitored and evaluated per protocol.    Bear Marsh RD/SIDNEY  Pager [phone]

## 2018-03-24 NOTE — PROGRESS NOTES
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     #Hep C cirrhosis with HCC, ascites and hepatic hydrothorax  MELD on admission 24. Required regular paracentesis and thoracentesis. Possible that abdominal pain is 2/2 reaccumulation of ascites vs SBP? , pt's primary hepatologist recommended evaluation for progression of HCC by MRI, para and thora and pain control. On exam + mild asterixis, marked ascites and CXR showed marked R sided pleural effusion. Para and thora on 3/17. No SBP. Pleural fluid is transudate compatible with hepatic hydrothorax. MRI A/P on 3/18 showed suspicious lesion on 4A 1.5cm.   - Multi-disciplinary transplant selection conference this afternoon.  Committee decision was that patient is NOT a transplant candidate for multiple reason including 1. Advanced age; 2. Frailty/Debility; 3.  Tumor which appears to be outside of Arpit Criteria 3.8 x 3.0 in segment 6; OPTN class 5T and 1.6 x 2.0 cm OPTN 5A on MRI from 11/2017.  - Pleural fluids and ascites cytology on 3/17--negative for malignancy  - MELD daily  - Lactulose to daily (BM>3 after 2 doses)  - Palliative care consulted , appreciate recs.   - Heme/onc consult for possible other treatment options and prognosis--  \" Based on her age, frailty, poor performance status, and advanced cirrhosis, she is therefore not currently a candidate for systemic of liver-directed therapies, and is not expected to experience sufficient recovery in the future to become a candidate\" \"As per Dr. Ayala's last note in November, she is an appropriate candidate for a comfort-oriented approach and hospice care.\"  - Restart PTA lasix " and spironolactone on 3/24 (was held due to JOSE)  - Will discuss with palliative care plan with oldest son, tentative on 3/24. Not planning to discuss about hospice or goals of care. Plan to f/u with , GI and Heme/onc in clinic  - Paracentasis on 3/23--removed 1.3 L--sent for ascites analysis  - CXR on 3/24 showed marked right sided pleural effusion -- plan therapeutic thoracentesis -- consult IR today, if unable will plan to do tomorrow      MELD-Na score: 19 at 3/24/2018 10:09 AM  MELD score: 16 at 3/24/2018 10:09 AM  Calculated from:  Serum Creatinine: 0.87 mg/dL (Rounded to 1) at 3/24/2018 10:09 AM  Serum Sodium: 133 mmol/L at 3/24/2018 10:09 AM  Total Bilirubin: 2.8 mg/dL at 3/24/2018 10:09 AM  INR(ratio): 1.72 at 3/24/2018 10:09 AM  Age: 78 years       #Upper abdominal pain  Has been going on for 1 week after thoracentesis. Per family, patient seemed to have abdominal pain which resolved after paracentesis. Her last paracentesis was on 3/5/2018. No n/v or diarrhea. No blood/black stools. Exam showed soft abdomen with +ascites, no rebound. LFTs and lipase were stable. No leukocytosis. Etiology could be 2/2 ascites reaccumulation, progressing of HCC?. No evidence of SBP on 3/17. Para on 3/17 removed 2.2 L.Albumin 50 g was given after.  - Pain control with tramadol 25-50 mg q12h prn--switched from PO oxycodone per palliative care recs     #Cachecxia  #Malnutrition  Could be related to HCC and cirrhosis.   - Nutrition consult.      #H/o gastric ulcer in 2014  - Continue PTA omeprazole CR 40 mg daily     #Sore throat  Complaint about left ear pain on 3/21. Ear exam--normal TM. Seemed to be more sore throat rather than ear pain. She pointed to the neck that hurts. No exudate or injection on exam.  - Hurricane spray prn  - If worsening--plan throat swab for strep     #Low grade fever -- resolved  On 3/19 in AM, developed low grade temp at 100.2, tachycardia with  and BP 88/50s which resolved  spontaneously. Lactate was normal. Procal negative. No leukocytosis. C.diff was negative. Epigastric pain improving. Para and thora on admission showed no SBP and transudate. Unclear source of infection. Ddx fever from HCC?  - Blood cultue x2  - Continue monitoring fever curve      #JOSE--resolved  Cr elevated from baseline of 0.7-0.9 to 1.1 on admission. Likely 2/2 pre-renal from poor PO intake. Got 1 L NS bolus from ED and 125 ml/hr overngiht. Cr normalized after maintenance IVF and improve PO intake. Lasix and Spironolactone was held initially.   - Avoid nephrotoxic agents  - Cr in AM      #Hyponatremia--resolved  Na 129 on admission. Likely 2/2 hypovolemic hyponatremia 2/2 poor PO intake. Na normalized after IVF.       ##Chronic  #Upper airway cough syndrome  Chronic. F/u with madeline Guerrero in clinic. Could be related to chronic rhinitis with nasap dripping.   - Continue PTA flonase 2 spray daily, NS nasal spray BID      #Reported h/o asthma  Stable. No acute exacerbation.  - Continue PTA Mometasone/formeterol inh bid, albuterol inh prn      ## Pain Assessment:  Current Pain Score 3/24/2018     Patient currently in pain? yes     Pain location Abdomen     Pain descriptors Sore     - Yue is experiencing pain due to abdominal pain. Pain management was discussed with Yue and her family and the plan was created in a collaborative fashion.  Yue's response to the current recommendations: compliant  - Please see the plan for pain management as documented above      ##Other  - PPx: SCI for DVT/omeprazole for GI/lactulose+miralax for bowel  - FEN: 2G Na diet  - IVF:  none  - Code status: FULL      Disposition:  Pending for family discussion for discharge options. Likely needs inpatient for 1 more night for thoracentesis. Dc home with home OT.     Markell Barnes MD  PGY-3  Internal  "[facility]  [phone]  -----------------------------------------------------------------------------------------------------------------------------------------------      Interval History    No acute events overnight. Son reported she was unable to eat and has more abdominal pain today. He said it was the same symptoms that brought her in initially.     Review of Systems:   A 4 point review of systems was negative except as noted above.    OBJECTIVE:  BP 94/54 (BP Location: Right arm)  Pulse 98  Temp 95.8  F (35.4  C) (Axillary)  Resp 16  Ht 1.575 m (5' 2\")  Wt 50.6 kg (111 lb 8 oz)  SpO2 97%  BMI 20.39 kg/m2    Intake/Output Summary (Last 24 hours) at 03/23/18 1642  Last data filed at 03/23/18 0900   Gross per 24 hour   Intake              100 ml   Output                0 ml   Net              100 ml       GENERAL APPEARANCE: alert and no distress  Pulmonary: decreased breath sound RL  CV: regular rhythm, normal rate, no murmur, no rub   - JVP not elevated    - Edema: none  GI: : soft, mildly tender at epigastrium, no rebound, no HSM, + ascites--stable  NEURO: mentation intact and speech normal, equally move, no asterixis    Labs:   All labs reviewed by me  Electrolytes/Renal -   Recent Labs   Lab Test  03/24/18   1009  03/23/18   0556  03/22/18   0644   04/01/13   1028   NA  133  132*  132*   < >  143   POTASSIUM  4.5  4.7  4.8   < >  3.6   CHLORIDE  100  100  102   < >  108   CO2  23  26  24   < >  26   BUN  30  30  27   < >  10   CR  0.87  0.95  1.01   < >  0.57   GLC  99  104*  103*   < >  127*   MACY  8.0*  7.7*  7.7*   < >  8.3*   PHOS   --    --    --    --   2.8    < > = values in this interval not displayed.     CBC -   Recent Labs   Lab Test  03/24/18   1009  03/23/18   0556  03/22/18   0644   WBC  5.2  3.7*  4.0   HGB  9.3*  8.0*  8.3*   PLT  72*  56*  63*     LFTs   Recent Labs   Lab Test  03/24/18   1009  03/22/18   0644  03/21/18   0830   ALKPHOS  298*  243*  266*   BILITOTAL  2.8*  2.9*  3.5* "   ALT  50  42  44   AST  110*  80*  94*   PROTTOTAL  5.8*  5.0*  5.9*   ALBUMIN  2.2*  2.0*  2.4*       Imaging:  CXR 3/24/2018  Impression:    1. Near total opacification of the right lung with enlarging right  pleural effusion causing slight deviation of the trachea.  2. Interstitial pulmonary edema.    Current Medications:    furosemide  20 mg Oral Daily     spironolactone  50 mg Oral Daily     lactulose  10 g Oral Daily     fluticasone  2 spray Both Nostrils Daily     fluticasone-vilanterol  1 puff Inhalation Daily     multivitamin, therapeutic with minerals  1 tablet Oral Daily     omeprazole  40 mg Oral Daily     sodium chloride  1 spray Both Nostrils BID       Markell Barnes MD

## 2018-03-24 NOTE — CONSULTS
INTERVENTIONAL RADIOLOGY CONSULT    Patient is on IR schedule for a right thoracentesis.  Patient has SOB and a near complete opacitication of her right hemothorax on chest radiograph. Labs ok for procedure.  Consent will be done prior to procedure.    Lab Results   Component Value Date    INR 1.72 03/24/2018    INR 1.98 03/22/2018     Lab Results   Component Value Date    PLT 72 03/24/2018    PLT 56 03/23/2018       Please contact the IR charge RN at [phone] for estimated time of procedure.     Discussed with Dr. Kyle SHERIFF.    Alejandro King MD  Radiology Resident  [phone] after hours

## 2018-03-24 NOTE — PLAN OF CARE
VSS, on RA, up SBA with walker, had one loose BM. Family at bedside throughout shift. Pt reporting abdominal pain, abdomen distended, ultram given x 1 with little relief. Zofran given x 1 with relief for nausea. Pt down for thoracentesis this afternoon.

## 2018-03-24 NOTE — PLAN OF CARE
Problem: Patient Care Overview  Goal: Plan of Care/Patient Progress Review  Outcome: Improving  D/I:  Uneventful shift,  VSS no respiratory distress noted.   C/o abdominal pain offered pain meds but patient continues to decline need for them.   Continues to have poor appetite , up to bathroom  with assist of one and walker X three, Voiding ok and one small loose BM.    US paracentesis completed this afternoon, 1300 cc's removed. Dressing D & I.   Patient family at bedside most of the night.   Call light in reach, bed alarm on. Continue monitor and update MD with any changes.

## 2018-03-24 NOTE — PROGRESS NOTES
"SPIRITUAL HEALTH SERVICES  [facility] ([place]) 5B  ON-CALL VISIT     REFERRAL SOURCE: I visited pt Yue this morning per [facility] hospital  and [employer]  requested. However, after I introduced myself as the on-call  the pt son said, \"we are fine now and you don't have to worry about us. My mom most probably will be discharge tomorrow. Thank you for stopping by, to visit my mom and I will translate for her what we talked about. If she stay longer than we thought, we may request the [employer]  [facility] Don[facility] but not at this moment because my mom is much better than before.\"     The pt can't speak English at all but the pt son translated for her. As I observed my visit during our conversation, no urgent needs to get a [employer]  at this moment but if the pt stay longer and have some spiritual support needs, the unit  will provide the Pentec[employer]al  Imam Donis to come and visit the pt.     PLAN: The unit  will follow up the pt as needed.     Kane Lacy M.Div. ([place]), M.Th., D.Min., [facility]  Staff   Pager [phone]    "

## 2018-03-25 NOTE — PLAN OF CARE
Problem: Patient Care Overview  Goal: Plan of Care/Patient Progress Review  Outcome: No Change  No significant changes overnight. Alert and oriented. Vitally stable on ra. Up with assist of one with walker. Denies pain, n/v, SOB/dyspnea. Slept through the night. Son by bedside. Dressings from thoracentesis and paracentesis sites CDI.  R: Continue to monitor and implement poc

## 2018-03-25 NOTE — PROCEDURES
Interventional Radiology Brief Post Procedure Note    Procedure: Thoracentesis    Proceduralist: Marcio Wright MD    Assistant: Radiology Resident Physician, Alejandro King MD    Time Out: Prior to the start of the procedure and with procedural staff participation, I verbally confirmed the patient s identity using two indicators, relevant allergies, that the procedure was appropriate and matched the consent or emergent situation, and that the correct equipment/implants were available. Immediately prior to starting the procedure I conducted the Time Out with the procedural staff and re-confirmed the patient s name, procedure, and site/side. (The Joint Commission universal protocol was followed.)  Yes        Sedation: None. Local Anesthestic used    Findings: Large left pleural effusion. 3300ccs removed in a staged fashion.  Small effusion remaing.  Procedure was stopped secondary to patient discomfort. .     Estimated Blood Loss: Minimal    Fluoroscopy Time:  minute(s)    SPECIMENS: None    Complications: 1. None     Condition: Stable    Plan: post op cares as ordered.    Comments: See dictated procedure note for full details.    Alejandro King MD

## 2018-03-25 NOTE — PLAN OF CARE
Problem: Patient Care Overview  Goal: Plan of Care/Patient Progress Review  Outcome: No Change  D/I:  Uneventful shift after IR Procedure for Staged Thoracentesis, 3.3 liters removed.  Right Posterior site D & I. Covered with clear opsite dressing.   Denies pain  VSS no respiratory distress noted. Left lower abdominal drsg fell off and was replaced (Paracentesis 3/23). Small ooze still remains.    Continues to have poor appetite , up to bathroom  with assist of one and walker X two, Voiding ok.  Patient family at bedside most of the night.   Call light in reach, bed alarm on.   Continue to monitor and update MD with any changes.

## 2018-03-25 NOTE — PLAN OF CARE
Problem: Pain, Acute (Adult)  Goal: Identify Related Risk Factors and Signs and Symptoms  Related risk factors and signs and symptoms are identified upon initiation of Human Response Clinical Practice Guideline (CPG).   Outcome: Improving  D: Patient tolerates small amount of meals. Up with stand by assist to bathroom. IV out, review of discharge done with family. Review of opoid instructions done with family. She left per wheelchair with all belongings. To get some prescriptions in pharmacy here and some at home pharmacy.

## 2018-03-25 NOTE — DISCHARGE SUMMARY
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          Discharge Diagnosis:   Abdominal pain 2/2 ascites fluid  Hepatic hydrothorax  Decompensated hepatitis C cirrhosis with HCC  Cachexia  Malnutrition  Sore throat  JOSE  Hyponatremia  H/o asthma         Procedures & Significant Findings:   Therapeutic paracentesis 3/17 removed 2.2 L and 3/23--removed 1.3 L    Therapeutic thoracentesis 3/17 remoced 1.5 L and 3/24--removed 3.3 L    MRI abdomen with and w/o contrast 3/18/2018  IMPRESSION:    1. Nondiagnostic images due to patient motion artifact, respiratory  motion artifact, and dielectric effect relating to intraperitoneal  ascites. Additionally, arterial phase images appear to have been  acquired during the portal venous phase, significantly limiting  evaluation for HCC. Consider repeating the examination; it would be  beneficial to a perform paracentesis prior to imaging.  2. Cirrhosis and evidence of portal hypertension with large volume  ascites.  3. There is a suspicious lesion in segment 4A measuring 12.5 cm.  Acknowledging above exam constraints, the lesion is incompletely  evaluated, but is suspicious for HCC. Previously, a LIRADS 3 lesion  was seen in this region.  4.  Remainder of the liver is  "nondiagnostic, although there does  appear to be heterogeneous enhancement in the left lobe.  5. Large right pleural effusion.         Consultations:   GI Hepatology  Heme/onc  Palliative care  IR for thoracentesis  OT/PT         Hospital Course by Problem:    #Hep C cirrhosis with HCC, ascites and hepatic hydrothorax  MELD on admission 24. Required regular paracentesis and thoracentesis. , pt's primary hepatologist recommended evaluation for progression of HCC by MRI, para and thora and pain control on admission. Exam on admission showed + mild asterixis, marked ascites and CXR showed marked R sided pleural effusion. Para and thora on 3/17. No SBP. Pleural fluid is transudate compatible with hepatic hydrothorax. Cytology showed negative for malignancy. MRI A/P on 3/18 showed suspicious lesion on 4A 1.5cm.   - Multi-disciplinary transplant selection conference on 3/20. Committee decision was that patient is NOT a transplant candidate for multiple reason including 1. Advanced age; 2. Frailty/Debility; 3.  Tumor which appears to be outside of Arpit Criteria 3.8 x 3.0 in segment 6; OPTN class 5T and 1.6 x 2.0 cm OPTN 5A on MRI from 11/2017.  - Palliative care consulted , appreciate recs for pain management. Did not discuss about goals of care this admission. Follow up with  in clinic.  - Heme/onc consult for possible other treatment options and prognosis--  \" Based on her age, frailty, poor performance status, and advanced cirrhosis, she is therefore not currently a candidate for systemic of liver-directed therapies, and is not expected to experience sufficient recovery in the future to become a candidate\" \"As per Dr. Ayala's last note in November, she is an appropriate candidate for a comfort-oriented approach and hospice care.\" Plan to follow up with  outpatient.   - Follow up with  in hepatology clinic.  - Restart PTA lasix and spironolactone on 3/24 (was held due to JOSE initially) " tolerating well.  - Started lactulose to daily (BM>3 after 2 doses) due to +asterixis  - Therapy plan for weekly paracentesis ordered  - IR thoracentesis consult for weekly thoracentesis ordered  - PCP follow up in 1 week with CMP, INR, CBC    Therapeutic paracentesis 3/17 removed 2.2 L and 3/23--removed 1.3 L  Therapeutic thoracentesis 3/17 remoced 1.5 L and 3/24--removed 3.3 L         #Upper abdominal pain  Has been going on for 1 week after thoracentesis. Per family, patient seemed to have abdominal pain which resolved after paracentesis. Her last paracentesis was on 3/5/2018. No n/v or diarrhea. No blood/black stools. Exam showed soft abdomen with +ascites, no rebound. LFTs and lipase were stable. No leukocytosis. Etiology could be 2/2 ascites reaccumulation, progressing of HCC?. No evidence of SBP on 3/17  - Pain control with tramadol 25-50 mg q6-8h prn--switched from PO oxycodone that started initially per palliative care recs      #Cachecxia  #Malnutrition  Could be related to HCC and cirrhosis.   - Nutrition consult. -- outpatient follow up      #H/o gastric ulcer in 2014  - Continue PTA omeprazole CR 40 mg daily      #Sore throat  Complaint about left ear pain on 3/21. Ear exam--normal TM. Seemed to be more sore throat rather than ear pain. She pointed to the neck that hurts. No exudate or injection on exam.  - Hurricane spray prn, lozenges, Cepacol     #Low grade fever -- resolved  On 3/19 in AM, developed low grade temp at 100.2, tachycardia with  and BP 88/50s which resolved spontaneously. Lactate was normal. Procal negative. No leukocytosis. C.diff was negative. Epigastric pain improving. Para and thora on admission showed no SBP and transudate. Unclear source of infection. Ddx fever from HCC? Transient bacterial translocation?  - Blood cultue x2--NGTD      #JOSE--resolved  Cr elevated from baseline of 0.7-0.9 to 1.1 on admission. Likely 2/2 pre-renal from poor PO intake. Got 1 L NS bolus from ED  "and 125 ml/hr overngiht. Cr normalized after maintenance IVF and improve PO intake. Lasix and Spironolactone was held initially. Kidney function remains stable after restart lasix and spironolactone.       #Hyponatremia--resolved  Na 129 on admission. Likely 2/2 hypovolemic hyponatremia 2/2 poor PO intake. Na normalized after IVF.       ##Chronic  #Upper airway cough syndrome  Chronic. F/u with Dr.Stephenson pulalbert in clinic. Could be related to chronic rhinitis with nasap dripping.   - Continue PTA flonase 2 spray daily, NS nasal spray BID      #Reported h/o asthma  Stable. No acute exacerbation.  - Continue PTA Mometasone/formeterol inh bid, albuterol inh prn    Physical Exam on day of Discharge:  Blood pressure 115/61, pulse 84, temperature 98  F (36.7  C), temperature source Oral, resp. rate 16, height 1.575 m (5' 2\"), weight 45.6 kg (100 lb 10.1 oz), SpO2 98 %, not currently breastfeeding.  GENERAL APPEARANCE: alert and no distress, chronically ill appearance  Head NC/AT  EYES: mild scleral icterus, pupils equal  HENT: mouth  without ulcers or lesions  NECK: supple, no goiter  Lymphatics: no cervical or supraclavicular LAD  Pulmonary: decreased breath sound right lung, clear LL  CV: regular rhythm, normal rate, no rub  GI:  normal bowel sounds, soft, nontender, no HSM   MS: no evidence of inflammation in joints, no muscle tenderness  : no CVA tenderness  SKIN: no rash, warm, dry  NEURO: mentation intact and speech normal, no asterixis           Pending Results:   None         Discharge Medications:     Discharge Medication List as of 3/25/2018 11:41 AM      START taking these medications    Details   calcium carbonate (TUMS) 500 MG chewable tablet Take 1 tablet (500 mg) by mouth daily as needed for heartburn, Disp-150 tablet, R-0, E-Prescribe      ondansetron (ZOFRAN-ODT) 4 MG ODT tab Take 1 tablet (4 mg) by mouth every 6 hours as needed for nausea or vomiting, Disp-120 tablet, R-0, E-Prescribe      lactulose " (CHRONULAC) 10 GM/15ML solution Take 15 mLs (10 g) by mouth daily, Disp-1892 mL, R-0, E-Prescribe      artificial saliva (BIOTENE MT) SOLN solution Take 2 mLs (2 sprays) by mouth 4 times daily as needed for dry mouth, Disp-1 Bottle, R-0, E-Prescribe      Benzocaine (HURRICAINE/TOPEX) 20 % AERO spray Take 0.5-1 mLs by mouth every 3 hours as needed for moderate pain, Disp-1 Bottle, R-0, E-Prescribe      benzocaine-menthol (CEPACOL) 15-3.6 MG lozenge Place 1 lozenge inside cheek every hour as needed for sore throat, Disp-84 lozenge, R-0, E-Prescribe         CONTINUE these medications which have CHANGED    Details   traMADol (ULTRAM) 50 MG tablet Take 0.5-1 tablets (25-50 mg) by mouth every 8 hours as needed for moderate pain, Disp-40 tablet, R-0, Local Print         CONTINUE these medications which have NOT CHANGED    Details   Multiple Vitamins-Iron (DAILY MULTIPLE VITAMIN/IRON) TABS Take 1 tablet by mouth daily, Disp-30 tablet, R-11, E-Prescribe      polyethylene glycol (MIRALAX) powder Take 17 g (1 capful) by mouth 3 times daily as needed for constipation, Disp-850 g, R-3, E-Prescribe      furosemide (LASIX) 20 MG tablet Take 1 tablet (20 mg) by mouth daily, Disp-180 tablet, R-1, E-Prescribe      spironolactone (ALDACTONE) 50 MG tablet Take 1 tablet (50 mg) by mouth daily, Disp-30 tablet, R-1, E-Prescribe      glycerin-hypromellose- (ARTIFICIAL TEARS) 0.2-0.2-1 % SOLN ophthalmic solution Place 1 drop into both eyes 2 times daily as needed for dry eyes, Disp-1 Bottle, R-0, E-Prescribe      cetirizine (ZYRTEC) 10 MG tablet Take 1 tablet (10 mg) by mouth every evening, Disp-30 tablet, R-6, E-Prescribe      Nutritional Supplements (BOOST CALORIE SMART) LIQD Take 3 Cans by mouth daily, Disp-90 Bottle, R-11, E-Prescribe      sodium chloride (SALINE MIST) 0.65 % nasal spray Spray 1 spray into both nostrils 2 times daily, Disp-1 Bottle, R-11, E-Prescribe      albuterol (2.5 MG/3ML) 0.083% neb solution Take 1 vial  (2.5 mg) by nebulization every 6 hours as needed for shortness of breath / dyspnea or wheezing, Disp-25 vial, R-3, E-Prescribe      omeprazole (PRILOSEC) 40 MG capsule Take 1 capsule (40 mg) by mouth daily Take 30-60 minutes before a meal., Disp-90 capsule, R-3, E-Prescribe      albuterol (PROAIR HFA/PROVENTIL HFA/VENTOLIN HFA) 108 (90 BASE) MCG/ACT Inhaler Inhale 2 puffs into the lungs every 6 hours as needed for shortness of breath / dyspnea or wheezing, Disp-1 Inhaler, R-11, E-Prescribe      mometasone-formoterol (DULERA) 200-5 MCG/ACT oral inhaler Inhale 2 puffs into the lungs 2 times daily, Disp-1 Inhaler, R-12, E-Prescribe         STOP taking these medications       fluticasone (FLONASE) 50 MCG/ACT spray Comments:   Reason for Stopping:                    Discharge Instructions and Follow-Up:     Discharge Procedure Orders  IR Thoracentesis   Standing Status: Future  Standing Exp. Date: 03/25/19   Order Specific Question Answer Comments   Clinical Info for the Interpreting Provider right sided hepatic hydrothorax required thoracentesis, h/o HCV cirrhosis with HCC. Please schedule the appointment weekly for thoracentesis    What is the patient's weight in pounds? 100    Is the patient on aspirin, Plavix or blood thinners? NO - order as requested      Comprehensive metabolic panel   Standing Status: Future  Standing Exp. Date: 05/24/18     CBC with platelets   Standing Status: Future  Standing Exp. Date: 05/24/18   Order Comments: Last Lab Result: Hemoglobin (g/dL)      Date                     Value                03/25/2018               8.7 (L)          ----------     INR   Standing Status: Future  Standing Exp. Date: 05/24/18     Nutrition Referral     Reason for your hospital stay   Order Comments: You were admitted to the hospital for abdominal pain and weakness which is from the ascites fluids in the abdomen and pleural effusion in the chest that is the result from liver cirrhosis and liver cancer.      Adult [facility] Follow-up and recommended labs and tests   Order Comments: Follow up with primary care provider, Felicitas Horton, within 7 days for hospital follow- up.  The following labs/tests are recommended: CMP, CBC.      Follow up with Dr. Meyer , at [facility] GI Hepatology clinic as scheduled on April, 24, 2018 to evaluate treatment change. The following labs/tests are recommended: CBC, CMP, INR.    Follow up with  , at [facility] palliative care clinic as scheduled on April, 11, 2018 to evaluate treatment change.     Follow up with , at [facility] Oncology clinic, you will be called for schedule appointment to evaluate treatment change.     Follow up with nutrition at [facility] in nutrition clinic, you will be called for schedule appointment.     Appointments on [place] and/or [facility] (with [facility] or [facility] provider or service). Call [phone] if you haven't heard regarding these appointments within 7 days of discharge.     Activity   Order Comments: Your activity upon discharge: activity as tolerated   Order Specific Question Answer Comments   Is discharge order? Yes      When to contact your care team   Order Comments: Call your primary doctor if you have any of the following:  increased shortness of breath, increased swelling or increased pain.    Paracentesis and thoracentesis will be scheduled for 4/2/2018--please call if needs earlier than this appointment.     Full Code     Diet   Order Comments: Follow this diet upon discharge: Orders Placed This Encounter     Calorie Counts     Snacks/Supplements Adult: Ensure Plus (Adult); Between Meals     Snacks/Supplements Adult: Other - Please comment; See the following; Between Meals     Combination Diet Regular Diet Adult; 2 gm NA Diet   Order Specific Question Answer Comments   Is discharge order? Yes                Discharge Disposition:   Home         Condition on  Discharge:   Discharge condition: Stable   Code status on discharge: Full Code        Date of service: 3/25/2018    The patient was discussed with Dr. Montano.    Markell Barnes MD  PGY 3 Internal Medicine  pager # [phone]

## 2018-03-26 NOTE — PROGRESS NOTES
Visit to the client's hospital for annual health risk assessment and PCA assessment.  An  was present (Via language line)    Current situation/living environment  Yue was at [facility] from 3-17-18 to 3-25-18 due to failure to thrive, SOB, weakness,vomitting, not eating and abdominal swelling. It was determined that she is not a transplant candidate due to progression of her liver disease. She reports that her age is not correct and is actually closer to 65 or 66, and not 78. She also doesn't seem to be a candidate for any other therapies for the cancer and palliative care was dicused.  I completed her PCA assessment today. Her family is providing 24hrs of care each day and was at the hospital for much of her stay. Very active and supportive family.     Activities of daily living (ADL)/instrumental activities of daily living (IADL) and functional issues  Client needs help with the following ADL's: dressing, grooming, bathing, eating, bed mobility, transfers, walking, wheeling wheelchair and toileting  Client needs help with the following IADL's: shopping, cooking, housekeeping, laundry, managing finances/bills and transportation  Client states she is unable to perform the above due to Weakness, progression of her liver disease/cancer, asthma, and abdominal swelling/fluid rentention.     Health concerns for today  See above notes--discharged on 3/25/18 to home.    Has patient fallen 2 or more times in the last year? Yes  Has patient fallen with injury in the last year? No    Cognition/mental health  Denies any mental health issues. Has minimal short term memory impairment.     STARS/Med Adherence  Client is non-compliant with the following quality measures: NA   Comments: Is up to date    Client's Plan of Care consists of:  Homemaker services (5 hours per week), Personal care assistance (PCA) (8.75 hours per day) and Specialized supplies and equipment (PRN)    Mary Rosenberg, COLE  [phone]

## 2018-03-26 NOTE — TELEPHONE ENCOUNTER
Routing to care coordination.  I see approximately five ED or hospital admission  In 2018 so far.    Seven HOLLOWAY

## 2018-03-28 NOTE — TELEPHONE ENCOUNTER
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    ASSESSMENT                PLAN                                                      Outpatient Plan:  Schedule hospital follow up visit with PCP for CMP, CBC, Follow up with Dr. Meyer GI/Hepatology 4/24/18, Dr. Astorga Palliative Care, Dr. Ayala (Oncology), and Nutrition Clinic    Future Appointments  Date Time Provider Department Center   4/11/2018 12:45 PM Chay Yeager MD [facility]   4/24/2018 9:30 AM  LAB [facility]   4/24/2018 10:30 AM Tala Meyer MD [facility]   2/4/2019 12:40 PM [id number]   2/4/2019 1:00 PM Darlene Renteria MD [facility]           Teresa Mcdonough RN

## 2018-03-28 NOTE — PROGRESS NOTES
RN attempted to reach patient via language line to follow up after recent discharge from [facility] on 3/25/18. Unable to reach but left VM with name and call back number.    If patient returns call, please schedule hospital follow up visit and transfer to RN to complete assessment.    Teresa Mcdonough RN

## 2018-03-31 NOTE — IP AVS SNAPSHOT
[address] [address]    Phone:  [phone]    Fax:  [phone]                                       After Visit Summary   3/31/2018    Yue Portillo    MRN: [id number]           After Visit Summary Signature Page     I have received my discharge instructions, and my questions have been answered. I have discussed any challenges I see with this plan with the nurse or doctor.    ..........................................................................................................................................  Patient/Patient Representative Signature      ..........................................................................................................................................  Patient Representative Print Name and Relationship to Patient    ..................................................               ................................................  Date                                            Time    ..........................................................................................................................................  Reviewed by Signature/Title    ...................................................              ..............................................  Date                                                            Time

## 2018-03-31 NOTE — IP AVS SNAPSHOT
MRN:[id number]                      After Visit Summary   3/31/2018    Yue Portillo    MRN: [id number]           Thank you!     Thank you for choosing [facility] for your care. Our goal is always to provide you with excellent care. Hearing back from our patients is one way we can continue to improve our services. Please take a few minutes to complete the written survey that you may receive in the mail after you visit with us. Thank you!        Patient Information     Date Of Birth          1940        Designated Caregiver       Most Recent Value    Caregiver    Will someone help with your care after discharge? yes    Name of designated caregiver PCA and family    Phone number of caregiver [phone] (PCA-daughter)    Caregiver address [address] [place] [address]      About your hospital stay     You were admitted on:  April 1, 2018 You last received care in the:  [address] BMT [facility]    You were discharged on:  April 6, 2018        Reason for your hospital stay       Spontaneous bacterial peritonitis ( infection of abdominal fluid)                  Who to Call     For medical emergencies, please call 911.  For non-urgent questions about your medical care, please call your primary care provider or clinic, [phone]          Attending Provider     Provider Specialty    Tho Shaw MD Emergency Medicine    Osmany Serra MD Family Practice    Vitaly, Darrick Seaman MD Family Practice       Primary Care Provider Office Phone # Fax #    Felicitas Truman Horton [phone]      After Care Instructions     Activity       Your activity upon discharge: activity as tolerated            Diet       Follow this diet upon discharge: Orders Placed This Encounter      Snacks/Supplements Adult: Ensure Plus Adult Shake; Between Meals      Snacks/Supplements Adult: Other - Please comment; 10:00 am: 2 hard boiled eggs + boost plus, 2:00 pm: Canned peaches + boost plus,, HS:  yogurt  + boost plus; Between Meals      Regular Diet Adult            Discharge Instructions       PARACENTESIS SCHEDULED for 4/11/18 @ 930 AM, at  SPECIALTY INFUSION - Located in the [facility] at [address] [place] [address].                   Follow-up Appointments     Adult [facility] Follow-up and recommended labs and tests       Follow up with primary care provider, Felicitas Horton, within 7 days for hospital follow- up.  No follow up labs or test are needed.      Appointments on [place] and/or [facility] (with [facility] or [facility] provider or service). Call [phone] if you haven't heard regarding these appointments within 7 days of discharge.            Adult [facility] Follow-up and recommended labs and tests       4/11/18 for paracentesis @930 AM     SPECIALTY INFUSION - Located in the [facility] at [address] [place] [address].     Appointments on [place] and/or [facility] (with [facility] or [facility] provider or service). Call [phone] if you haven't heard regarding these appointments within 7 days of discharge.                  Your next 10 appointments already scheduled     Apr 11, 2018  9:30 AM CDT   Paracentesis Visit with  Spec Inf Para Provider, UC 39 ATC   [facility] Specialty and Procedure ([facility])    [address] [place] [address]  [address]  [facility] [id number] [phone]            Apr 11, 2018 12:45 PM CDT   (Arrive by 12:30 PM)   New Patient Visit with Chay Yeager MD   [facility] ([facility])    [address] [place] [address]  [address]  [facility] [id number] [phone]            Apr 18, 2018  2:00 PM CDT   Paracentesis Visit with  Spec Inf Para Provider, UC 40 ATC   [facility] ([facility])    [address] [place] [address]  [address]  [facility]  [address]   [phone]            Apr 24, 2018  9:30 AM CDT   Lab with UC LAB   [facility] ([facility])    [address]  1st Floor  [facility] [address]   [phone]            Apr 24, 2018 10:30 AM CDT   (Arrive by 10:15 AM)   New General Liver with Tala Meyer MD   [facility] ([facility])    [address]  [address]  [facility] [address]   [phone]            Apr 25, 2018  9:30 AM CDT   Paracentesis Visit with Uc Spec Inf Para Provider, UC 40 ATC   [facility] Specialty and Procedure ([facility])    [address]  [address]  [facility] [address]   [phone]            May 02, 2018 12:00 PM CDT   Paracentesis Visit with Uc Spec Inf Para Provider   [facility] Specialty and Procedure ([facility])    [address]  [address]  [facility] [address]   [phone]              Pending Results     Date and Time Order Name Status Description    4/6/2018 1559 POC US Guide for Paracentesis In process     4/3/2018 1644 Fluid Culture Aerobic Bacterial Preliminary     4/3/2018 1644 Anaerobic bacterial culture Preliminary     4/3/2018 1624 POC US Guide for Thorcentesis In process     4/1/2018 1841 Blood culture Preliminary     4/1/2018 1841 Blood culture Preliminary     3/31/2018 2336 Anaerobic bacterial culture Preliminary             Statement of Approval     Ordered          04/06/18 1723  I have reviewed and agree with all the recommendations and orders detailed in this document.  EFFECTIVE NOW     Approved and electronically signed by:  Jefe Capellan DO             Admission Information     Date & Time Provider Department Dept. Phone    3/31/2018 Darrick Haider MD [address] BMT [facility] [phone]      Your Vitals Were     Blood Pressure Pulse Temperature Respirations Height  "Weight    101/61 (BP Location: Left arm) 98 96.7  F (35.9  C) (Axillary) 14 1.575 m (5' 2\") 52.1 kg (114 lb 14.4 oz)    Pulse Oximetry BMI (Body Mass Index)                98% 21.02 kg/m2          Care EveryWhere ID     This is your Care EveryWhere ID. This could be used by other organizations to access your [facility] medical records  [id number]        Equal Access to Services     LUKE DUVAL : Hadstefano muñoz hadasho Soomaali, waaxda luqadaha, qaybta kaalmada adeegyada, rosana ricks . So [facility] [phone].    ATENCIÓN: Si habla español, tiene a martinez disposición servicios gratuitos de asistencia lingüística. [facility] [phone].    We comply with applicable federal civil rights laws and [place] laws. We do not discriminate on the basis of race, color, national origin, age, disability, sex, sexual orientation, or gender identity.               Review of your medicines      START taking        Dose / Directions    artificial saliva Liqd liquid   Used for:  Dry mouth        Dose:  10 mL   Swish and spit 10 mLs in mouth 4 times daily   Quantity:  473 mL   Refills:  0       ciprofloxacin 500 MG tablet   Commonly known as:  CIPRO   Indication:  SBP prophylaxis        Dose:  500 mg   Start taking on:  4/7/2018   Take 1 tablet (500 mg) by mouth every 24 hours   Quantity:  30 tablet   Refills:  3       order for DME   Used for:  Mild persistent asthma without complication        Equipment being ordered: Nebulizer   Quantity:  1 Units   Refills:  0         CONTINUE these medicines which may have CHANGED, or have new prescriptions. If we are uncertain of the size of tablets/capsules you have at home, strength may be listed as something that might have changed.        Dose / Directions    albuterol (2.5 MG/3ML) 0.083% neb solution   This may have changed:  Another medication with the same name was removed. Continue taking this medication, and follow the directions you see here.   Used for:  Mild " intermittent asthma with acute exacerbation        Dose:  1 vial   Take 1 vial (2.5 mg) by nebulization every 6 hours as needed for shortness of breath / dyspnea or wheezing   Quantity:  25 vial   Refills:  3         CONTINUE these medicines which have NOT CHANGED        Dose / Directions    artificial saliva Soln solution   Used for:  Dry mouth        Dose:  2 spray   Take 2 mLs (2 sprays) by mouth 4 times daily as needed for dry mouth   Quantity:  1 Bottle   Refills:  0       Benzocaine 20 % Aero spray   Commonly known as:  HURRICAINE/TOPEX   Used for:  Throat pain        Dose:  1-2 spray   Take 0.5-1 mLs by mouth every 3 hours as needed for moderate pain   Quantity:  1 Bottle   Refills:  0       benzocaine-menthol 15-3.6 MG lozenge   Commonly known as:  CEPACOL   Used for:  Throat pain        Dose:  1 lozenge   Place 1 lozenge inside cheek every hour as needed for sore throat   Quantity:  84 lozenge   Refills:  0       BOOST CALORIE SMART Liqd   Used for:  HCC (hepatocellular carcinoma) (H)        Dose:  3 Can   Take 3 Cans by mouth daily   Quantity:  90 Bottle   Refills:  11       calcium carbonate 500 MG chewable tablet   Commonly known as:  TUMS   Used for:  Gastroesophageal reflux disease without esophagitis        Dose:  1 chew tab   Take 1 tablet (500 mg) by mouth daily as needed for heartburn   Quantity:  150 tablet   Refills:  0       cetirizine 10 MG tablet   Commonly known as:  zyrTEC   Used for:  Chronic allergic conjunctivitis        Dose:  10 mg   Take 1 tablet (10 mg) by mouth every evening   Quantity:  30 tablet   Refills:  6       DAILY MULTIPLE VITAMIN/IRON Tabs   Used for:  Chronic hepatitis C without hepatic coma (H)        Dose:  1 tablet   Take 1 tablet by mouth daily   Quantity:  30 tablet   Refills:  11       furosemide 20 MG tablet   Commonly known as:  LASIX   Used for:  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)        Dose:  20 mg   Take 1 tablet (20 mg) by mouth daily   Quantity:   180 tablet   Refills:  1       glycerin-hypromellose- 0.2-0.2-1 % Soln ophthalmic solution   Commonly known as:  ARTIFICIAL TEARS   Used for:  Dry eyes        Dose:  1 drop   Place 1 drop into both eyes 2 times daily as needed for dry eyes   Quantity:  1 Bottle   Refills:  0       lactulose 10 GM/15ML solution   Commonly known as:  CHRONULAC   Used for:  Cirrhosis of liver with ascites, unspecified hepatic cirrhosis type (H)        Dose:  10 g   Take 15 mLs (10 g) by mouth daily   Quantity:  1892 mL   Refills:  0       mometasone-formoterol 200-5 MCG/ACT oral inhaler   Commonly known as:  DULERA   Used for:  Moderate persistent asthma without complication        Dose:  2 puff   Inhale 2 puffs into the lungs 2 times daily   Quantity:  1 Inhaler   Refills:  12       omeprazole 40 MG capsule   Commonly known as:  priLOSEC   Used for:  Gastroesophageal reflux disease without esophagitis        Dose:  40 mg   Take 1 capsule (40 mg) by mouth daily Take 30-60 minutes before a meal.   Quantity:  90 capsule   Refills:  3       ondansetron 4 MG ODT tab   Commonly known as:  ZOFRAN-ODT   Used for:  HCC (hepatocellular carcinoma) (H)        Dose:  4 mg   Take 1 tablet (4 mg) by mouth every 6 hours as needed for nausea or vomiting   Quantity:  120 tablet   Refills:  0       polyethylene glycol powder   Commonly known as:  MIRALAX   Used for:  Constipation, unspecified constipation type        Dose:  1 capful   Take 17 g (1 capful) by mouth 3 times daily as needed for constipation   Quantity:  850 g   Refills:  3       spironolactone 50 MG tablet   Commonly known as:  ALDACTONE   Used for:  Chronic hepatitis C without hepatic coma (H)        Dose:  50 mg   Take 1 tablet (50 mg) by mouth daily   Quantity:  30 tablet   Refills:  1       traMADol 50 MG tablet   Commonly known as:  ULTRAM   Used for:  HCC (hepatocellular carcinoma) (H)        Dose:  25-50 mg   Take 0.5-1 tablets (25-50 mg) by mouth every 8 hours as needed for  moderate pain   Quantity:  40 tablet   Refills:  0         STOP taking     sodium chloride 0.65 % nasal spray   Commonly known as:  SALINE MIST                Where to get your medicines      These medications were sent to [facility] - [place], [place] - [address]  [address] [address]     Phone:  [phone]     artificial saliva Liqd liquid    ciprofloxacin 500 MG tablet         Some of these will need a paper prescription and others can be bought over the counter. Ask your nurse if you have questions.     Bring a paper prescription for each of these medications     order for DME                Protect others around you: Learn how to safely use, store and throw away your medicines at www.disposemymeds.org.        ANTIBIOTIC INSTRUCTION     You've Been Prescribed an Antibiotic - Now What?  Your healthcare team thinks that you or your loved one might have an infection. Some infections can be treated with antibiotics, which are powerful, life-saving drugs. Like all medications, antibiotics have side effects and should only be used when necessary. There are some important things you should know about your antibiotic treatment.      Your healthcare team may run tests before you start taking an antibiotic.    Your team may take samples (e.g., from your blood, urine or other areas) to run tests to look for bacteria. These test can be important to determine if you need an antibiotic at all and, if you do, which antibiotic will work best.      Within a few days, your healthcare team might change or even stop your antibiotic.    Your team may start you on an antibiotic while they are working to find out what is making you sick.    Your team might change your antibiotic because test results show that a different antibiotic would be better to treat your infection.    In some cases, once your team has more information, they learn that you do not need an antibiotic at all. They  may find out that you don't have an infection, or that the antibiotic you're taking won't work against your infection. For example, an infection caused by a virus can't be treated with antibiotics. Staying on an antibiotic when you don't need it is more likely to be harmful than helpful.      You may experience side effects from your antibiotic.    Like all medications, antibiotics have side effects. Some of these can be serious.    Let you healthcare team know if you have any known allergies when you are admitted to the hospital.    One significant side effect of nearly all antibiotics is the risk of severe and sometimes deadly diarrhea caused by Clostridium difficile (C. Difficile). This occurs when a person takes antibiotics because some good germs are destroyed. Antibiotic use allows C. diificile to take over, putting patients at high risk for this serious infection.    As a patient or caregiver, it is important to understand your or your loved one's antibiotic treatment. It is especially important for caregivers to speak up when patients can't speak for themselves. Here are some important questions to ask your healthcare team.    What infection is this antibiotic treating and how do you know I have that infection?    What side effects might occur from this antibiotic?    How long will I need to take this antibiotic?    Is it safe to take this antibiotic with other medications or supplements (e.g., vitamins) that I am taking?     Are there any special directions I need to know about taking this antibiotic? For example, should I take it with food?    How will I be monitored to know whether my infection is responding to the antibiotic?    What tests may help to make sure the right antibiotic is prescribed for me?      Information provided by:  www.cdc.gov/getsmart  U.S. Department of Health and Human Services  Centers for disease Control and Prevention  National Center for Emerging and Zoonotic Infectious  Diseases  Division of Healthcare Quality Promotion             Medication List: This is a list of all your medications and when to take them. Check marks below indicate your daily home schedule. Keep this list as a reference.      Medications           Morning Afternoon Evening Bedtime As Needed    albuterol (2.5 MG/3ML) 0.083% neb solution   Take 1 vial (2.5 mg) by nebulization every 6 hours as needed for shortness of breath / dyspnea or wheezing   Last time this was given:  2.5 mg on 4/6/2018  4:34 AM                                artificial saliva Liqd liquid   Swish and spit 10 mLs in mouth 4 times daily   Last time this was given:  5 mLs on 4/6/2018 11:35 AM                                artificial saliva Soln solution   Take 2 mLs (2 sprays) by mouth 4 times daily as needed for dry mouth                                Benzocaine 20 % Aero spray   Commonly known as:  HURRICAINE/TOPEX   Take 0.5-1 mLs by mouth every 3 hours as needed for moderate pain                                benzocaine-menthol 15-3.6 MG lozenge   Commonly known as:  CEPACOL   Place 1 lozenge inside cheek every hour as needed for sore throat                                BOOST CALORIE SMART Liqd   Take 3 Cans by mouth daily                                calcium carbonate 500 MG chewable tablet   Commonly known as:  TUMS   Take 1 tablet (500 mg) by mouth daily as needed for heartburn                                cetirizine 10 MG tablet   Commonly known as:  zyrTEC   Take 1 tablet (10 mg) by mouth every evening   Last time this was given:  10 mg on 4/5/2018  7:01 PM                                ciprofloxacin 500 MG tablet   Commonly known as:  CIPRO   Take 1 tablet (500 mg) by mouth every 24 hours   Start taking on:  4/7/2018   Last time this was given:  250 mg on 4/6/2018  8:08 AM                                DAILY MULTIPLE VITAMIN/IRON Tabs   Take 1 tablet by mouth daily                                furosemide 20 MG tablet    Commonly known as:  LASIX   Take 1 tablet (20 mg) by mouth daily   Last time this was given:  20 mg on 4/6/2018  8:08 AM                                glycerin-hypromellose- 0.2-0.2-1 % Soln ophthalmic solution   Commonly known as:  ARTIFICIAL TEARS   Place 1 drop into both eyes 2 times daily as needed for dry eyes                                lactulose 10 GM/15ML solution   Commonly known as:  CHRONULAC   Take 15 mLs (10 g) by mouth daily   Last time this was given:  10 g on 4/6/2018  8:08 AM                                mometasone-formoterol 200-5 MCG/ACT oral inhaler   Commonly known as:  DULERA   Inhale 2 puffs into the lungs 2 times daily                                omeprazole 40 MG capsule   Commonly known as:  priLOSEC   Take 1 capsule (40 mg) by mouth daily Take 30-60 minutes before a meal.   Last time this was given:  40 mg on 4/6/2018  8:08 AM                                ondansetron 4 MG ODT tab   Commonly known as:  ZOFRAN-ODT   Take 1 tablet (4 mg) by mouth every 6 hours as needed for nausea or vomiting                                order for DME   Equipment being ordered: Nebulizer                                polyethylene glycol powder   Commonly known as:  MIRALAX   Take 17 g (1 capful) by mouth 3 times daily as needed for constipation                                spironolactone 50 MG tablet   Commonly known as:  ALDACTONE   Take 1 tablet (50 mg) by mouth daily   Last time this was given:  50 mg on 4/6/2018  8:08 AM                                traMADol 50 MG tablet   Commonly known as:  ULTRAM   Take 0.5-1 tablets (25-50 mg) by mouth every 8 hours as needed for moderate pain   Last time this was given:  50 mg on 4/6/2018  9:30 AM

## 2018-04-01 PROBLEM — K65.2 SBP (SPONTANEOUS BACTERIAL PERITONITIS) (H): Status: ACTIVE | Noted: 2018-01-01

## 2018-04-01 NOTE — PROGRESS NOTES
[facility], [facility] Medicine Progress Note    Main Plans for Today   - IV ceftriaxone (3/31--) for SPB, culture pending  - Discuss possible thoracentesis with IR on 4/2, will page in AM  - IVF boluses for soft blood pressures, tachycardia, BUN/Cr ratio >20  - Murmur on exam, normal echo in past, possible flow murmur d/t infectious state, will do BNP today, echo if needed  - Triggered lactic protocol in afternoon: lactic acid, bmp, cbc, fluid bolus, will reevaluate    Assessment & Plan   Yue Portillo is a 78 year old female admitted on 3/31/2018. 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    # Likely Spontaneous Bacterial Peritonitis (8686 PMNs): Culture pending  # Hepatocellular Carcinoma  # Recurrent Ascites  # Recurrent Hepatic Hydrothorax  Paracentesis (3/31) removing 1.5L of cloudy ascitic fluid - 23676 WBCs, 86% PMNs: Culture pending    There are no signs or symptoms to suggest secondary bacterial peritonitis at this time, glucose >100, no elevations in amylase or LDH. Patient does not have a fever, no signs of hepatic encephalopathy.  CXR with complete opacification of the right hemithorax 2/2 increased hydrothorax  MELD-Na score: 26 at 4/1/2018  6:26 AM  MELD score: 23 at 4/1/2018  6:26 AM  Calculated from:  Serum Creatinine: 1.29 mg/dL at 4/1/2018  6:26 AM  Serum Sodium: 132 mmol/L at 4/1/2018  6:26 AM  Total Bilirubin: 5.4 mg/dL at 4/1/2018  6:26 AM  INR(ratio): 1.95 at 4/1/2018  6:26 AM  Age: 78 years  - Ceftriaxone IV 2g Q24H until ascitic culture returns (shortage of cefotaxime)  - Daily weights  - Strict I/O's  - Continue home medications: lactulose 10g daily, lasix 20mg daily, spironolactone 50mg daily  - Consider discussing with family SW consult to evaluate for home health  care  - CHELSEY page hepatology/oncology in AM  - Will discuss thoracentesis with IR in AM  - 500 cc NS boluses prn, will monitor BMP, fluid balance  - Palliative care consult to continue goals of care discussion they had during previous admission      # JOSE - Cr 1.29 with baseline of 0.9  # Hyponatremia  Likely secondary to infection and poor po intake. As patient has chronic fluid overload in the setting of hepatic hydrothorax and ascites, will do ryan boluses.  BUN/Cr ratio >20 and patient reports feeling dehydrated.  - IVF  cc boluses prn, monitor BMP and respiratory status     # Cachexia  - Ensure TID meals and ensure as tolerated  - PT/OT consulted     # Stable Chronic Medical Conditions - unchanged management  - Hx of Gastric Ulcer 2014: continue home protonix 40mg daily, TUMS PRN  - Asthma: albuterol PRN, continue home Dulera  - Seasonal Allergies: continue home zyrtec 10mg daily    # Pain Assessment:  Current Pain Score 4/1/2018   Patient currently in pain? yes   Pain location Abdomen   Pain descriptors Aching;Discomfort   - Yue is experiencing pain due to abdominal infection. Pain management was discussed and the plan was created in a collaborative fashion.  Yue's response to the current recommendations: engaged  - Tramadol 50 mg q8h prn    Diet: Snacks/Supplements Adult: Ensure Plus Adult Shake; Between Meals  Regular Diet Adult  Snacks/Supplements Adult: Other - Please comment; 10:00 am: 2 hard boiled eggs + boost plus, 2:00 pm: Canned peaches + boost plus,, HS: yogurt  + boost plus; Between Meals  Fluids: po, 500 cc NS boluses prn  DVT Prophylaxis: Enoxaparin (Lovenox) SQ  Code Status: Full Code, undergoing goals of care discussion    Disposition Plan   Expected discharge:Expected Discharge Date: 04/04/18, recommended to prior living arrangement once adequate pain management/ tolerating PO medications, antibiotic plan established and able to receive enough additoinal services at home.Dispo:  Expected Discharge Date: 04/04/18        Entered: Dez Reid 04/01/2018, 5:17 PM   Information in the above section will display in the discharge planner report.      The patient's care was discussed with the Attending Physician, Dr. Serra.    Dez Reid  [facility]  Pager: [phone]  Please see sticky note for cross cover information    Interval History    She reports increased abdominal pain.  No increase in shortness of breath, no chest pain, no headache or nausea/vomiting.  Discussed results from ascitic fluid analysis and high likelihood of SBP.  Patient and son open to discussion with palliative care tomorrow.    Review of Systems   Constitutional: Negative for diaphoresis and fever.   HENT: Negative for trouble swallowing.    Eyes: Negative for visual disturbance.   Respiratory: Negative for cough, shortness of breath and wheezing.    Cardiovascular: Negative for chest pain and palpitations.   Gastrointestinal: Positive for abdominal pain. Negative for nausea and vomiting.   Genitourinary: Negative for dysuria.   Skin: Negative for rash.   Neurological: Negative for headaches.   Psychiatric/Behavioral: Negative for agitation and confusion.     Physical Exam   Vital Signs: Temp: 98.1  F (36.7  C) Temp src: Axillary BP: (!) 77/53 Pulse: 118 Heart Rate: 106 Resp: 18 SpO2: 98 % O2 Device: None (Room air)    Weight: 105 lbs 13.13 oz  Physical Exam  Constitutional: No distress. Cachectic female, appears stated age   Cardiovascular: Tachycardia, regular rhythm, + for systolic murmur   Pulmonary/Chest: Effort normal. No respiratory distress. She has no wheezes. She has no rales.   Decreased breath sounds on the right   Abdominal: Bowel sounds are normal. She exhibits distension. There is tenderness (all throughout). + for rebound. No rigidity   Musculoskeletal: She exhibits no edema.   Neurological: She is alert, no signs of confusion as per family      Data    Medications       cetirizine  10 mg Oral QPM     furosemide  20 mg Oral Daily     lactulose  10 g Oral Daily     fluticasone-vilanterol  1 puff Inhalation Daily     omeprazole  40 mg Oral Daily     spironolactone  50 mg Oral Daily     cefTRIAXone  2 g Intravenous Q24H     sodium chloride 0.9%  500 mL Intravenous Once     Data     Recent Labs  Lab 04/01/18  0626 03/31/18  2226   WBC 7.8 4.1   HGB 9.3* 9.6*   * 103*   PLT 75* 99*   INR 1.95* 1.92*   * 131*   POTASSIUM 3.9 3.4   CHLORIDE 98 98   CO2 24 27   BUN 29 26   CR 1.29* 1.05*   ANIONGAP 10 6   MACY 8.0* 7.9*   GLC 96 119*   ALBUMIN 1.9* 1.9*   PROTTOTAL 5.6*  --    BILITOTAL 5.4*  --    ALKPHOS 198*  --    ALT 54*  --    *  --      Recent Results (from the past 24 hour(s))   Chest  XR, 1 view portable    Narrative    XR CHEST PORT 1 VW  4/1/2018 1:30 AM      HISTORY: abd pain;     COMPARISON: 3/25/2018    FINDINGS: Significant increase in large right pleural effusion with  complete opacification of the right hemithorax. Increase in left-sided  pulmonary vascular congestion. Increased streaky retrocardiac  opacities. No pneumothorax.      Impression    IMPRESSION:   1. Significant increase in large right pleural effusion with complete  opacification of the right hemithorax.  2. Increased pulmonary vascular congestion and the left lung.    I have personally reviewed the examination and initial interpretation  and I agree with the findings.    AHSAN COFFMAN MD

## 2018-04-01 NOTE — ED PROVIDER NOTES
History   No chief complaint on file.    A  was used (Citizen [place] (patient's family member provides interpretation at the patient's request)).     Yue Portillo is a 78 year old female with a history of hepatitis C, hepatocellular carcinoma, status-post TACE (2/2016), complicated by ascites and hepatic hydrothorax requiring regular paracentesis and thoracentesis who presents with abdominal pain. Of note, the patient was recently admitted (3/17/18-3/25/18) with abdominal pain secondary to ascites and hepatic hydrothorax for which she underwent therapeutic paracentesis and thoracentesis x2 (3/17/18, 3/24/18). The patient complains of significant abdominal pain since 1:00 PM today. She complains of associated nausea. She has been eating normally over the past few days, though reports she has not had many bowel movements. She had been taking her daily lactulose, but missed her dose today. She denies fever.    Results of the patient's recent MRI of the abdomen on 3/18/18 are as follows:   1. Nondiagnostic images due to patient motion artifact, respiratory  motion artifact, and dielectric effect relating to intraperitoneal  ascites. Additionally, arterial phase images appear to have been  acquired during the portal venous phase, significantly limiting  evaluation for HCC. Consider repeating the examination; it would be  beneficial to a perform paracentesis prior to imaging.  2. Cirrhosis and evidence of portal hypertension with large volume  ascites.  3. There is a suspicious lesion in segment 4A measuring 12.5 cm.  Acknowledging above exam constraints, the lesion is incompletely  evaluated, but is suspicious for HCC. Previously, a LIRADS 3 lesion  was seen in this region.  4.  Remainder of the liver is nondiagnostic, although there does  appear to be heterogeneous enhancement in the left lobe.  5. Large right pleural effusion.    PAST MEDICAL HISTORY:   Past Medical History:   Diagnosis Date      Hepatitis C      Hepatocellular carcinoma (H)      Liver disease      Mild intermittent asthma      Pleural effusion associated with hepatic disorder        PAST SURGICAL HISTORY:   Past Surgical History:   Procedure Laterality Date     CATARACT IOL, RT/LT Right 08/22/2017    s/p CE/IOL right eye     EYE SURGERY       H PYLORI SHYLA SCREEN      was treated for h pylori     NO HISTORY OF SURGERY         FAMILY HISTORY:   Family History   Problem Relation Age of Onset     Respiratory Father      asthma     DIABETES Son      Glaucoma No family hx of      Macular Degeneration No family hx of      Retinal detachment No family hx of      Amblyopia No family hx of        SOCIAL HISTORY:   Social History   Substance Use Topics     Smoking status: Never Smoker     Smokeless tobacco: Never Used     Alcohol use No     Current Facility-Administered Medications   Medication     cefoTAXime (CLAFORAN) 2 g in sodium chloride 0.9 % 100 mL intermittent infusion     lidocaine 1% with EPINEPHrine 1:100,000 1 %-1:474379 injection     Current Outpatient Prescriptions   Medication     calcium carbonate (TUMS) 500 MG chewable tablet     ondansetron (ZOFRAN-ODT) 4 MG ODT tab     lactulose (CHRONULAC) 10 GM/15ML solution     artificial saliva (BIOTENE MT) SOLN solution     Benzocaine (HURRICAINE/TOPEX) 20 % AERO spray     benzocaine-menthol (CEPACOL) 15-3.6 MG lozenge     traMADol (ULTRAM) 50 MG tablet     Multiple Vitamins-Iron (DAILY MULTIPLE VITAMIN/IRON) TABS     polyethylene glycol (MIRALAX) powder     furosemide (LASIX) 20 MG tablet     spironolactone (ALDACTONE) 50 MG tablet     glycerin-hypromellose- (ARTIFICIAL TEARS) 0.2-0.2-1 % SOLN ophthalmic solution     cetirizine (ZYRTEC) 10 MG tablet     Nutritional Supplements (BOOST CALORIE SMART) LIQD     sodium chloride (SALINE MIST) 0.65 % nasal spray     albuterol (2.5 MG/3ML) 0.083% neb solution     omeprazole (PRILOSEC) 40 MG capsule     mometasone-formoterol (DULERA) 200-5  "MCG/ACT oral inhaler     albuterol (PROAIR HFA/PROVENTIL HFA/VENTOLIN HFA) 108 (90 BASE) MCG/ACT Inhaler        Allergies   Allergen Reactions     Dust Mites Cough     Sneezing      Seasonal Allergies       I have reviewed the Medications, Allergies, Past Medical and Surgical History, and Social History in the Epic system.    Review of Systems   Constitutional: Negative for fever.   Gastrointestinal: Positive for abdominal pain and nausea.   All other systems reviewed and are negative.      Physical Exam   BP: 106/65  Pulse: 118  Heart Rate: 114  Temp: 98.2  F (36.8  C)  Resp: 18  SpO2: 94 %      Physical Exam   Constitutional: She is oriented to person, place, and time. She appears well-developed and well-nourished. No distress.   HENT:   Head: Normocephalic and atraumatic.   Eyes: No scleral icterus.   Neck: Normal range of motion. Neck supple.   Cardiovascular: Tachycardia present.    Abdominal: She exhibits distension and ascites. There is generalized tenderness.   Neurological: She is alert and oriented to person, place, and time.   Skin: Skin is warm and dry. No rash noted. She is not diaphoretic. No erythema. No pallor.       ED Course     ED Course     Paracentesis  Date/Time: 4/1/2018 1:39 AM  Performed by: NELLA BUCK  Authorized by: NELLA BUCK   Consent: Verbal consent obtained. Written consent obtained.  Risks and benefits: risks, benefits and alternatives were discussed  Consent given by: guardian  Patient identity confirmed: verbally with patient and provided demographic data  Time out: Immediately prior to procedure a \"time out\" was called to verify the correct patient, procedure, equipment, support staff and site/side marked as required.  Initial or subsequent exam: initial  Procedure purpose: diagnostic and therapeutic  Indications: abdominal discomfort secondary to ascites    Anesthesia:  Local Anesthetic: lidocaine 1% with epinephrine  Anesthetic total: 5 " mL    Sedation:  Patient sedated: no  Preparation: Patient was prepped and draped in the usual sterile fashion.  Needle gauge: 18  Ultrasound guidance: yes  Puncture site: right lower quadrant  Fluid removed: 1500(ml)  Fluid appearance: cloudy  Dressing: 4x4 sterile gauze  Patient tolerance: Patient tolerated the procedure well with no immediate complications           10:20 PM  The patient was seen and examined by Dr. Shaw in Room 11.               Critical Care time:  none             Labs Ordered and Resulted from Time of ED Arrival Up to the Time of Departure from the ED   BASIC METABOLIC PANEL - Abnormal; Notable for the following:        Result Value    Sodium 131 (*)     Glucose 119 (*)     Creatinine 1.05 (*)     GFR Estimate 50 (*)     Calcium 7.9 (*)     All other components within normal limits   CBC WITH PLATELETS DIFFERENTIAL - Abnormal; Notable for the following:     RBC Count 2.81 (*)     Hemoglobin 9.6 (*)     Hematocrit 29.0 (*)      (*)     MCH 34.2 (*)     RDW 16.7 (*)     Platelet Count 99 (*)     Absolute Lymphocytes 0.2 (*)     All other components within normal limits   INR - Abnormal; Notable for the following:     INR 1.92 (*)     All other components within normal limits   ROUTINE UA WITH MICROSCOPIC REFLEX TO CULTURE   GRAM STAIN   GLUCOSE FLUID   CELL COUNT WITH DIFFERENTIAL FLUID   ALBUMIN FLUID   FLUID CULTURE AEROBIC BACTERIAL   ANAEROBIC BACTERIAL CULTURE   LACTATE DEHYDROGENASE FLUID   AMYLASE FLUID     Results for orders placed or performed during the hospital encounter of 03/31/18   Chest  XR, 1 view portable    Impression    IMPRESSION:   1. Significant increase in large right pleural effusion with complete  opacification of the right hemithorax.  2. Increased pulmonary vascular congestion and the left lung.   Basic metabolic panel   Result Value Ref Range    Sodium 131 (L) 133 - 144 mmol/L    Potassium 3.4 3.4 - 5.3 mmol/L    Chloride 98 94 - 109 mmol/L    Carbon Dioxide  27 20 - 32 mmol/L    Anion Gap 6 3 - 14 mmol/L    Glucose 119 (H) 70 - 99 mg/dL    Urea Nitrogen 26 7 - 30 mg/dL    Creatinine 1.05 (H) 0.52 - 1.04 mg/dL    GFR Estimate 50 (L) >60 mL/min/1.7m2    GFR Estimate If Black 61 >60 mL/min/1.7m2    Calcium 7.9 (L) 8.5 - 10.1 mg/dL   CBC with platelets differential   Result Value Ref Range    WBC 4.1 4.0 - 11.0 10e9/L    RBC Count 2.81 (L) 3.8 - 5.2 10e12/L    Hemoglobin 9.6 (L) 11.7 - 15.7 g/dL    Hematocrit 29.0 (L) 35.0 - 47.0 %     (H) 78 - 100 fl    MCH 34.2 (H) 26.5 - 33.0 pg    MCHC 33.1 31.5 - 36.5 g/dL    RDW 16.7 (H) 10.0 - 15.0 %    Platelet Count 99 (L) 150 - 450 10e9/L    Diff Method Automated Method     % Neutrophils 87.5 %    % Lymphocytes 4.9 %    % Monocytes 7.6 %    % Eosinophils 0.0 %    % Basophils 0.0 %    % Immature Granulocytes 0.0 %    Nucleated RBCs 0 0 /100    Absolute Neutrophil 3.6 1.6 - 8.3 10e9/L    Absolute Lymphocytes 0.2 (L) 0.8 - 5.3 10e9/L    Absolute Monocytes 0.3 0.0 - 1.3 10e9/L    Absolute Eosinophils 0.0 0.0 - 0.7 10e9/L    Absolute Basophils 0.0 0.0 - 0.2 10e9/L    Abs Immature Granulocytes 0.0 0 - 0.4 10e9/L    Absolute Nucleated RBC 0.0    INR   Result Value Ref Range    INR 1.92 (H) 0.86 - 1.14   Glucose fluid   Result Value Ref Range    Glucose Fluid Source Abdominal Fluid     Glucose Fluid 104 mg/dL   Cell count with differential fluid   Result Value Ref Range    Body Fluid Analysis Source Abdominal Fluid     Color Fluid Yellow     Appearance Fluid Cloudy     RBC Fluid DNR /uL    WBC Fluid 89057 /uL   Albumin fluid   Result Value Ref Range    Albumin Fluid Source Abdominal Fluid     Albumin Fluid 0.3 g/dL   Lactate dehydrogenase fluid   Result Value Ref Range    LD Fluid Source Abdominal Fluid     Lactate Dehydrogenase Fluid 64 U/L   Amylase  fluid   Result Value Ref Range    Amylase Fluid Source Abdominal Fluid     Amylase Fluid 25 U/L          Medications   lidocaine 1% with EPINEPHrine 1:100,000 1 %-1:594098 injection  (not administered)   cefoTAXime (CLAFORAN) 2 g in sodium chloride 0.9 % 100 mL intermittent infusion (not administered)   oxyCODONE IR (ROXICODONE) tablet 5 mg (5 mg Oral Given 3/31/18 8403)   simethicone (MYLICON) chewable tablet 80 mg (80 mg Oral Given 4/1/18 0024)       Assessments & Plan (with Medical Decision Making)   This is a 78-year-old female patient with a history of liver disease who is presenting to the emergency room with abdominal discomfort that started around 1 or 2:00 today.  Patient was recently DC'd for increasing ascites.  Here in the emergency room she is otherwise hemodynamically stable and resting comfortably.  Abdomen is generally distended with ascites.  She is diffusely tender.  A paracentesis was performed with removal of 1500 cc of cloudy ascitic fluid.  He was sent off and it appears that there is significant elevation and WBCs.  The rest of her labs were for the most part unremarkable.  She will be started on antibiotics here in the emergency room and admitted to the medicine service for continued care management.    I have reviewed the nursing notes.    I have reviewed the findings, diagnosis, plan and need for follow up with the patient.    New Prescriptions    No medications on file       Final diagnoses:   SBP (spontaneous bacterial peritonitis) (H)     I, Alfredo Chery, am serving as a trained medical scribe to document services personally performed by Tho Shaw MD, based on the provider's statements to me.   Tho BLANCHARD MD, was physically present and have reviewed and verified the accuracy of this note documented by Alfredo Chery.     3/31/2018   [facility], EMERGENCY DEPARTMENT     Tho Shaw MD  04/01/18 9899

## 2018-04-01 NOTE — PROGRESS NOTES
"CLINICAL NUTRITION SERVICES - ASSESSMENT NOTE     Nutrition Prescription    RECOMMENDATIONS FOR MDs/PROVIDERS TO ORDER:  None     Malnutrition Status:    Severe malnutrition in the context of chronic illness    Recommendations already ordered by Registered Dietitian (RD):  Ordered in between snacks and supplements to improve kcal /protein intake:   10:00 am: 2 hard boiled eggs + boost plus   2:00 pm: Canned peaches + boost plus   HS: yogurt  + boost plus     Future/Additional Recommendations:  None        REASON FOR ASSESSMENT  Yue Portillo is a/an 78 year old female assessed by the dietitian for Admission Nutrition Risk Screen for reduced oral intake over the last month and Nutrition Jass < 3    Chart reviewed:  PMH: hep C cirrhosis with HCC s/p TACE in 2/2016 with significant ascites and hepatic hydrothorax requiring regular paracentesis and thoracentesis.    - Admitted for abdominal pain, Per MD note,  likely secondary to spontaneous bacterial peritonitis.     - Per chart note, on 3/20 conference, patient was deemed to not be a liver transplant candidate. Per Oncology patient not a candidate for further cancer directed therapies and suggested a comfort oriented approach. Patient has been living at home with help of her children.   - Paracentesis done in ED, removing 1.5L of cloudy ascitic fluid     NUTRITION HISTORY  Patient sound asleep. Son in the room at bedside, translating for his mom. Son reported, patient was unable to eat and generalized weakness for ~ 1 week. Per Son, she has abdominal pain today and did not order any bkf. Per son, once patient gets paracentesis, her appetite gets better.     CURRENT NUTRITION ORDERS  Diet: Regular + Boost plus shake between meals   Intake/Tolerance: Poor po since admit due to paracentesis     LABS  Labs reviewed    MEDICATIONS  Medications reviewed    ANTHROPOMETRICS  Height: 157.5 cm (5' 2\")  Most Recent Weight: 47.9 kg (105 lb 9.6 oz) admit wt on 4/1    IBW: 50 kg " ( 96% IBW)   BMI: 19.35 kg /m2 - Normal BMI  Weight History: wt fluctuation likely related to volume status changes   Wt Readings from Last 10 Encounters:   04/01/18 48 kg (105 lb 13.1 oz)   03/24/18 45.6 kg (100 lb 10.1 oz)   03/12/18 49.8 kg (109 lb 12.6 oz)   03/05/18 49.8 kg (109 lb 12.8 oz)   02/26/18 53.1 kg (117 lb)   02/26/18 53.4 kg (117 lb 12.8 oz)   02/14/18 48.9 kg (107 lb 14.4 oz)   02/14/18 51.4 kg (113 lb 6.4 oz)   02/05/18 50.8 kg (112 lb)   12/13/17 49.6 kg (109 lb 6.4 oz)       Dosing Weight: 48 kg admission wt     ASSESSED NUTRITION NEEDS  Estimated Energy Needs: 1200 - 1440  kcals/day (25 - 30 ++ kcals/kg)  Justification: Maintenance and ++ for Repletion  Estimated Protein Needs: 58 - 72  grams protein/day (1.2 - 1.5 grams of pro/kg)  Justification: Repletion  Estimated Fluid Needs: ESLD   Justification: Per provider pending fluid status    PHYSICAL FINDINGS  See malnutrition section below.    MALNUTRITION  % Intake: </= 50% for >/= 5 days (severe)  % Weight Loss: Unable to assess  Subcutaneous Fat Loss: Mild/moderate   Muscle Loss: Moderate sarcopenia general   Fluid Accumulation/Edema: Ascites   Malnutrition Diagnosis: Severe malnutrition in the context of chronic illness    NUTRITION DIAGNOSIS  Inadequate oral intake related to abdominal pain and distension, associated with decompensated liver cirrhosis, Early satiety as evidenced by family report of patient with decrease intake for the past week since admit, minimal intake since admit x 1-2 days     INTERVENTIONS  Implementation  Nutrition Education: Encouraged small frequent meals to help improve po intake  Medical food supplement therapy continue with boost plus supplements   Ordered high protein snacks in between meals to potentially improve kcal / protein intake    Goals  Patient to consume % of nutritionally adequate meal trays TID, or the equivalent with supplements/snacks.     Monitoring/Evaluation  Progress toward goals will be  monitored and evaluated per protocol.      Bear Marsh RD/SIDNEY  Weekend Pager [phone]

## 2018-04-01 NOTE — PLAN OF CARE
"Problem: Patient Care Overview  Goal: Plan of Care/Patient Progress Review  Outcome: No Change   04/01/18 0615   OTHER   Plan Of Care Reviewed With patient;son   Plan of Care Review   Progress no change       Problem: Infection, Risk/Actual (Adult)  Goal: Identify Related Risk Factors and Signs and Symptoms  Related risk factors and signs and symptoms are identified upon initiation of Human Response Clinical Practice Guideline (CPG).    Patient's abdomen pain and infection will be improved or resolved at discharge   Outcome: No Change   04/01/18 0615   Infection, Risk/Actual   Related Risk Factors (Infection, Risk/Actual) age extremes;chronic illness/condition;surgery/procedure   Signs and Symptoms (Infection, Risk/Actual) feeding/eating intolerance;heart rate increase;weakness;cultures positive   /67 (BP Location: Left arm)  Pulse 118  Temp 98.9  F (37.2  C) (Oral)  Resp 18  Ht 1.575 m (5' 2\")  Wt 47.9 kg (105 lb 9.6 oz)  SpO2 96%  BMI 19.31 kg/m2    Pt admitted from ER at 0330, report received from AMIE Vega. Pt admitted with abdominal pain and spontaneous bacterial peritonitis. History of Hep C, hepatocellular Ca, ascites, gets thoracentesis and about weekly paracentesis. Patient afebrile, HR 100s-110s. Abdomen discomfort, pt declined additional pain medicine- took oxy in ER, falling asleep. Son at bedside, Pt only [place] speaking, son refused - said family will interpret- they are her home caregivers. Pt very weak- son reports she gets weak and needs help when her abdomen is uncomfortable and full. Commode and walker placed at bedside. Bed alarm placed and informed son about calling for additional help before pt getting up, son agreed to this. Rocephin antibiotic infusing in piv. Needs UA- hasn't voided yet. Continue to monitor and plan of care.       "

## 2018-04-01 NOTE — PROGRESS NOTES
[facility], [place]    Sepsis Evaluation Progress Note    Date of Service: 04/01/2018    I was called to see Yue Portillo due to abnormal vital signs triggering the Sepsis SIRS screening alert. She is known to have an infection.     Physical Exam    Vital Signs:  Temp: 98.1  F (36.7  C) Temp src: Axillary BP: 96/61 Pulse: 118 Heart Rate: 107 Resp: 18 SpO2: 97 % O2 Device: None (Room air)      Lab:  Lactic Acid   Date Value Ref Range Status   03/17/2018 1.8 0.7 - 2.0 mmol/L Final       The patient is at baseline mental status.    The rest of their physical exam is significant for abdominal distension and tenderness all throughout that is unchanged from admission, but now patient has rebound tenderness.    Assessment and Plan    The SIRS and exam findings are likely due to   sepsis.     ID: The patient is currently on the following antibiotics:  Anti-infectives (Future)    Start     Dose/Rate Route Frequency Ordered Stop    04/01/18 1915  piperacillin-tazobactam (ZOSYN) 2.25 g vial to attach to  ml bag      2.25 g  over 30 Minutes Intravenous EVERY 6 HOURS 04/01/18 1841      04/01/18 0400  cefTRIAXone (ROCEPHIN) 2 g vial to attach to  ml bag for ADULTS or NS 50 ml bag for PEDS      2 g  over 30 Minutes Intravenous EVERY 24 HOURS 04/01/18 0359          Current antibiotic coverage ceftriaxone for SBP, added zosyn on.    Fluid: Fluid bolus ordered.    Lab: Repeat lactic acid ordered for 2 hours from now.  Patient had elevated lactic acid to 6.5 at 6:30PM, at that time IVF were increased from 100mL/h to 175mL/h and a CT of the abdomen was ordered, which showed no free air and no signs of abdominal perforation.  Lactic acid recheck was 5.6 after 2 hours of increased IVF. Now continuing IVF, rechecking LA in 2 hours, serial abdominal exams. Will discuss with day team about IR thoracentesis given her increased work of breathing.     Disposition: The patient will remain on the current  unit. We will continue to monitor this patient closely.    Jessica Griggs, DO

## 2018-04-01 NOTE — H&P
[facility], [facility] Family Medicine History and Physical        Date of Admission:  3/31/2018  Date of Service: 4/1/2018    Chief Complaint   Abdominal pain    History from the patient is limited due to tiredness and language barrier. Most history obtained from son at bedside and from EMR.     History of Present Illness   Yue Portillo is a 78 year old female who has a history of liver disease s/p TACE (2/2016) in the setting of hepatitis C and hepatocellular carcinoma, causing significant ascites and hepatic hydrothorax requiring regular paracentesis and thoracentesis, asthma, and is admitted for abdominal pain likely secondary to spontaneous bacterial peritonitis.    Patient was recently admitted to [place] [address] Internal Medicine Team 3/17 to 3/25 for epigastric pain, inability to eat, generalized weakness. Patient's primary hepatologist recommended work-up for possible progression of patient's HCC w/ MRI, paracentesis, and thoracentesis. Cytology of fluid was negative for malignancy. On 3/20 conference was held and patient was deemed NOT a transplant candidate. Palliative care saw patient to discuss pain management, but was going to see Dr. Astorga in clinic for goals of care discussion. Oncology saw patient as well, stating she was not a candidate for systemic liver-directed therapies, ans suggested a comfort-oriented approach and hospice care. Patient was to follow-up with Dr. MARVIN dahl, of oncology, as an outpatient. Patient was going to be on a therapy plan for weekly paracentesis, and IR weekly thoracentesis (see discharge summary for more details).    Patient's son states she has been doing okay at home. She had some abdominal pain since she left the hospital, but this morning the pain was significantly worsened. She also had some nausea. Her last stool was today and was a small amount. She has been taking her medications daily. She lives in an apartment, without any home  health care - family is home with her 24/7 - while they don't live there, they alternate and take turns caring for her.    Review of Systems   The 10 point Review of Systems is negative other than noted in the HPI or here.   Review of Systems   Constitutional: Negative for activity change, appetite change, chills, diaphoresis and fever.   HENT: Negative.    Eyes: Negative.    Respiratory: Negative.  Negative for cough, shortness of breath and wheezing.    Cardiovascular: Negative for chest pain and leg swelling.   Gastrointestinal: Positive for abdominal distention, abdominal pain and nausea. Negative for blood in stool, constipation, diarrhea and vomiting.   Genitourinary: Negative for difficulty urinating, dysuria, flank pain and hematuria.   Musculoskeletal: Positive for gait problem (ambulates with a walker at home).   Skin: Negative for rash.   Allergic/Immunologic: Positive for environmental allergies.   Neurological: Negative for dizziness, syncope, weakness and headaches.   Psychiatric/Behavioral: Negative for confusion.     Past Medical History    I have reviewed this patient's medical history and updated it with pertinent information if needed.   Past Medical History:   Diagnosis Date     Cachexia (H)      Hepatitis C      Hepatocellular carcinoma (H)      Hx of gastric ulcer 2014     Liver disease      Mild intermittent asthma      Pleural effusion associated with hepatic disorder       Past Surgical History   I have reviewed this patient's surgical history and updated it with pertinent information if needed.  Past Surgical History:   Procedure Laterality Date     CATARACT IOL, RT/LT Right 08/22/2017    s/p CE/IOL right eye     EYE SURGERY       H PYLORI SHYLA SCREEN      was treated for h pylori     NO HISTORY OF SURGERY        Social History   Social History   Substance Use Topics     Smoking status: Never Smoker     Smokeless tobacco: Never Used     Alcohol use No     Family History   I have reviewed  this patient's family history and updated it with pertinent information if needed.   Family History   Problem Relation Age of Onset     Respiratory Father      asthma     DIABETES Son      Glaucoma No family hx of      Macular Degeneration No family hx of      Retinal detachment No family hx of      Amblyopia No family hx of      Prior to Admission Medications   Prior to Admission Medications   Prescriptions Last Dose Informant Patient Reported? Taking?   Benzocaine (HURRICAINE/TOPEX) 20 % AERO spray   No No   Sig: Take 0.5-1 mLs by mouth every 3 hours as needed for moderate pain   Multiple Vitamins-Iron (DAILY MULTIPLE VITAMIN/IRON) TABS  Pharmacy No No   Sig: Take 1 tablet by mouth daily   Nutritional Supplements (BOOST CALORIE SMART) LIQD  Pharmacy No No   Sig: Take 3 Cans by mouth daily   albuterol (2.5 MG/3ML) 0.083% neb solution  Pharmacy No No   Sig: Take 1 vial (2.5 mg) by nebulization every 6 hours as needed for shortness of breath / dyspnea or wheezing   albuterol (PROAIR HFA/PROVENTIL HFA/VENTOLIN HFA) 108 (90 BASE) MCG/ACT Inhaler  Pharmacy No No   Sig: Inhale 2 puffs into the lungs every 6 hours as needed for shortness of breath / dyspnea or wheezing   artificial saliva (BIOTENE MT) SOLN solution   No No   Sig: Take 2 mLs (2 sprays) by mouth 4 times daily as needed for dry mouth   benzocaine-menthol (CEPACOL) 15-3.6 MG lozenge   No No   Sig: Place 1 lozenge inside cheek every hour as needed for sore throat   calcium carbonate (TUMS) 500 MG chewable tablet   No No   Sig: Take 1 tablet (500 mg) by mouth daily as needed for heartburn   cetirizine (ZYRTEC) 10 MG tablet  Pharmacy No No   Sig: Take 1 tablet (10 mg) by mouth every evening   furosemide (LASIX) 20 MG tablet  Pharmacy No No   Sig: Take 1 tablet (20 mg) by mouth daily   glycerin-hypromellose- (ARTIFICIAL TEARS) 0.2-0.2-1 % SOLN ophthalmic solution  Pharmacy No No   Sig: Place 1 drop into both eyes 2 times daily as needed for dry eyes    lactulose (CHRONULAC) 10 GM/15ML solution   No No   Sig: Take 15 mLs (10 g) by mouth daily   mometasone-formoterol (DULERA) 200-5 MCG/ACT oral inhaler  Pharmacy No No   Sig: Inhale 2 puffs into the lungs 2 times daily   omeprazole (PRILOSEC) 40 MG capsule  Pharmacy No No   Sig: Take 1 capsule (40 mg) by mouth daily Take 30-60 minutes before a meal.   ondansetron (ZOFRAN-ODT) 4 MG ODT tab   No No   Sig: Take 1 tablet (4 mg) by mouth every 6 hours as needed for nausea or vomiting   polyethylene glycol (MIRALAX) powder  Pharmacy No No   Sig: Take 17 g (1 capful) by mouth 3 times daily as needed for constipation   sodium chloride (SALINE MIST) 0.65 % nasal spray  Pharmacy No No   Sig: [place] 1 spray into both nostrils 2 times daily   spironolactone (ALDACTONE) 50 MG tablet  Pharmacy No No   Sig: Take 1 tablet (50 mg) by mouth daily   traMADol (ULTRAM) 50 MG tablet   No No   Sig: Take 0.5-1 tablets (25-50 mg) by mouth every 8 hours as needed for moderate pain      Facility-Administered Medications: None     Allergies   Allergies   Allergen Reactions     Dust Mites Cough     Sneezing      Seasonal Allergies        Physical Exam   Vital Signs: Temp: 98.9  F (37.2  C) Temp src: Oral BP: 92/64 Pulse: 118 Heart Rate: 111 Resp: 18 SpO2: 94 % O2 Device: None (Room air)    Weight: 105 lbs 9.61 oz    Physical Exam   Constitutional: No distress.   Cachectic female, appears stated age   HENT:   Mouth/Throat: Oropharynx is clear and moist. No oropharyngeal exudate.   Eyes: Conjunctivae are normal. Right eye exhibits no discharge. Left eye exhibits no discharge.   Cardiovascular: Regular rhythm and intact distal pulses.    Tachycardic   Pulmonary/Chest: Effort normal. No respiratory distress. She has no wheezes. She has no rales.   Decreased breath sounds on the right   Abdominal: Bowel sounds are normal. She exhibits distension. There is tenderness (all throughout). There is no rebound and no guarding.   Musculoskeletal: She  exhibits no edema.   Lymphadenopathy:     She has no cervical adenopathy.   Neurological: She is alert.   Unable to assess orientation secondary to language barrier and family not wanting to translate/awaken patient. But per family patient is not confused and knows where she is.   Skin: Skin is warm and dry. No rash noted. She is not diaphoretic.   Psychiatric:   Sleepy.   Nursing note and vitals reviewed.    Assessment & Plan   Yue Portillo is a 78 year old female admitted on 3/31/2018. She has a history of liver disease s/p TACE (2/2016) in the setting of hepatitis C and hepatocellular carcinoma, causing significant ascites and hepatic hydrothorax requiring regular paracentesis and thoracentesis, asthma, and is admitted for abdominal pain likely secondary to spontaneous bacterial peritonitis.    # Abdominal Pain, likely secondary to Spontaneous Bacterial Peritonitis  # Hepatocellular Carcinoma  # Recurrent Ascites  # Recurrent Hepatic Hydrothorax  Paracentesis done in ED, removing 1.5L of cloudy ascitic fluid - initial results indicate likely ascitic infection as her white count was [id number]. Differential and culture are pending at this time, but if >250 PMN's and positive culture, patient would meet diagnostic criteria for SBP. There are no signs or symptoms to suggest secondary bacterial peritonitis to be the cause. Patient does not have a fever, no signs of hepatic encephalopathy, and stable blood pressure. Unable to calculate MELD score at this time, as patient did not have CMP done in ED. But, MELD on 3/25/2018 was 26. CXR on admission did show significant increase in large right pleural effusion with complete opacification of the right hemithorax. Patient will likely need a thoracentesis in the next few days.   - Ceftriaxone IV 2g Q24H until ascitic culture returns (not enough hospital supply of cefotaxime to continue)  - Culture of ascites pending  - Daily weights  - Strict I/O's  - Continue home medications:  "lactulose 10g daily, lasix 20mg daily, spironolactone 50mg daily  - Consider discussing with family SW consult to evaluate for home health care  - Will touch base in AM with hepatology and oncology if they would like to be formally consulted to evaluate patient    # Elevated Cr - baseline of 0.9  # Hyponatremia  Likely secondary to poor po intake. As patient has chronic fluid overload in the setting of hepatic hydrothorax and ascites, will encourage po intake and may need IVF if no improvement on repeat labs.  - Will continue to monitor    # Cachexia  - Ensure TID  - PT/OT consulted  - Palliative care consult to continue discussion they had during previous admission - to assist in goals of care discussion    # Stable Chronic Medical Conditions - unchanged management  - Hx of Gastric Ulcer 2014: continue home protonix 40mg daily, TUMS PRN  - Asthma: albuterol PRN, continue home Dulera  - Seasonal Allergies: continue home zyrtec 10mg daily    # Pain Assessment:  Current Pain Score 4/1/2018   Patient currently in pain? yes   Pain location Abdomen   Pain descriptors Aching;Discomfort   - Yue is experiencing abdominal pain. Pain management was discussed and the plan was created in a collaborative fashion.  Yue's response to the current recommendations: disinterested as she is sleepy.  - Continuing home regimen (as recommended by palliative care during last hospitalization): tramadol 25-50mg Q8H PRN    Diet: Snacks/Supplements Adult: Ensure Plus Adult Shake; Between Meals  2 Gram Sodium Diet  Fluids: PO  DVT Prophylaxis: Enoxaparin (Lovenox) SQ  Code Status: Full Code - patient's son requesting this discussion to happen at a later time. I informed him that I have to place an order now, and I explained what \"Full Code\" meant, and he stated that was fine until they can discuss as a family during the day.    Disposition Plan   Expected discharge: 2 - 3 days; recommended to prior living arrangement once adequate pain " management/ tolerating PO medications, antibiotic plan established and safe disposition plan/ TCU bed available. Dispo:        Entered: Jessica Griggs 04/01/2018, 3:39 AM   Information in the above section will display in the discharge planner report.    The patient was discussed with Dr. Osmany Serra.    [facility]  [facility]   Pager: [phone]  Please see sticky note for cross cover information    Data   Data     Recent Labs  Lab 03/31/18  2226 03/25/18  0632   WBC 4.1 4.3   HGB 9.6* 8.7*   * 102*   PLT 99* 63*   INR 1.92* 1.92*   * 133   POTASSIUM 3.4 4.7   CHLORIDE 98 101   CO2 27 23   BUN 26 31*   CR 1.05* 0.93   ANIONGAP 6 8   MACY 7.9* 8.0*   * 84   ALBUMIN 1.9* 1.8*   PROTTOTAL  --  5.0*   BILITOTAL  --  2.7*   ALKPHOS  --  249*   ALT  --  45   AST  --  100*     Recent Results (from the past 24 hour(s))   Chest  XR, 1 view portable    Impression    IMPRESSION:   1. Significant increase in large right pleural effusion with complete  opacification of the right hemithorax.  2. Increased pulmonary vascular congestion and the left lung.

## 2018-04-01 NOTE — ED NOTES
Pt here with diffuse abdominal pain/nausea that started this afternoon. She was recently d/c'd after a week here for ascites.

## 2018-04-01 NOTE — PROGRESS NOTES
Shift: 0700 - 1930  VS: Temp: 98.1  F (36.7  C) Temp src: Axillary BP: 96/61 Pulse: 118 Heart Rate: 107 Resp: 18 SpO2: 97 % O2 Device: None (Room air)    Neuro: A&Ox4.   Cardiac: Tachy, BP's soft. NS bolus 500ml x2, 2nd bolus currently running at 125ml/hr.   Respiratory: Right lung sounds diminished, Left clear. RA. Tachypnea. MARTINEZ. Shallow Resps.  GI/: Diarrhea. Poor urine output, urine is orange/mariluz in color. Urine sent, negative results.   Diet/appetite: Regular diet, poor appetite. C/O Nausea, gave zofran.   Activity: Ax1 with Walker. Up to the BR.   Pain: Abdomen, PRN Tramadol x2. Patient reported relief.   Skin: Paracentesis to RLQ, draining serosanguineous drainage. Changed x2 with pressure dressing.   LDA's: PIV to RUE, infusing NS 500ml bolus at 125 ml/hr.   Pertinent Labs/Lab Collection: Lactic Acid 6.5, MD notified. ABD CT, BCx2, zosyn ordered.      Plan: Continue POC

## 2018-04-01 NOTE — ED NOTES
[facility], [place]   ED Nurse to Floor Handoff     Yue Portillo is a 78 year old female who speaks [place]uran and lives with family members,  in a home  They arrived in the ED by car from home    ED Chief Complaint: No chief complaint on file.    ED Dx;   Final diagnoses:   SBP (spontaneous bacterial peritonitis) (H)         Needed?: Yes, but the pt is refusing for  and only for family to interpret    Allergies:   Allergies   Allergen Reactions     Dust Mites Cough     Sneezing      Seasonal Allergies    .  Past Medical Hx:   Past Medical History:   Diagnosis Date     Hepatitis C      Hepatocellular carcinoma (H)      Liver disease      Mild intermittent asthma      Pleural effusion associated with hepatic disorder       Baseline Mental status: WDL  Current Mental Status changes: Baseline    Infection present or suspected this encounter: yes other paratenitis   Sepsis suspected: No  Isolation type: No active isolations     Activity level - Baseline/Home:  Independent  Activity Level - Current:   Independent    Bariatric equipment needed?: No    In the ED these meds were given:   Medications   lidocaine 1% with EPINEPHrine 1:100,000 1 %-1:[id number] injection (not administered)   cefoTAXime (CLAFORAN) 2 g vial to attach to  ml bag for ADULTS or NS 50 ml bag for PEDS (not administered)   oxyCODONE IR (ROXICODONE) tablet 5 mg (5 mg Oral Given 3/31/18 8039)   simethicone (MYLICON) chewable tablet 80 mg (80 mg Oral Given 4/1/18 0024)       Drips running?  Yes    Home pump  No    Current LDAs  Peripheral IV 03/31/18 Right Upper forearm (Active)   Number of days:1       Labs results:   Labs Ordered and Resulted from Time of ED Arrival Up to the Time of Departure from the ED   BASIC METABOLIC PANEL - Abnormal; Notable for the following:        Result Value    Sodium 131 (*)     Glucose 119 (*)     Creatinine 1.05 (*)     GFR Estimate 50 (*)     Calcium 7.9 (*)      All other components within normal limits   CBC WITH PLATELETS DIFFERENTIAL - Abnormal; Notable for the following:     RBC Count 2.81 (*)     Hemoglobin 9.6 (*)     Hematocrit 29.0 (*)      (*)     MCH 34.2 (*)     RDW 16.7 (*)     Platelet Count 99 (*)     Absolute Lymphocytes 0.2 (*)     All other components within normal limits   INR - Abnormal; Notable for the following:     INR 1.92 (*)     All other components within normal limits   ROUTINE UA WITH MICROSCOPIC REFLEX TO CULTURE   GRAM STAIN   GLUCOSE FLUID   CELL COUNT WITH DIFFERENTIAL FLUID   ALBUMIN FLUID   FLUID CULTURE AEROBIC BACTERIAL   ANAEROBIC BACTERIAL CULTURE   LACTATE DEHYDROGENASE FLUID   AMYLASE FLUID       Imaging Studies: No results found for this or any previous visit (from the past 24 hour(s)).    Recent vital signs:   /65  Pulse 118  Temp 98.2  F (36.8  C) (Oral)  Resp 18  SpO2 94%    Cardiac Rhythm: Normal Sinus  Pt needs tele? No  Skin/wound Issues: None    Code Status: Full Code    Pain control: fair    Nausea control: good    Abnormal labs/tests/findings requiring intervention: See results    Family present during ED course? Yes   Family Comments/Social Situation comments: Multiple family members    Tasks needing completion: None    Gary Perea, RN  [facility]-- [id number] 1-2022 [place] [facility]  [phone] [facility]

## 2018-04-02 NOTE — PROGRESS NOTES
[facility], [facility] Progress Note    Main Plans for Today   - IV ceftriaxone (3/31--) for SPB, culture with gram negative rods, pending  - Thoracentesis once INR <2, will do with medicine procedure team  - Vit K and FFP tonight, recheck INR  - Trend lactic acid, bolus as needed for sepsis    Assessment & Plan   Yue Portillo is a 78 year old female admitted on 3/31/2018. 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    # Sepsis 2/2 Spontaneous Bacterial Peritonitis (8686 PMNs): Culture pending  # Hepatocellular Carcinoma  # Recurrent Ascites  # Recurrent Hepatic Hydrothorax  Paracentesis (3/31) removing 1.5L of cloudy ascitic fluid - 07705 WBCs, 86% PMNs: Culture with gram negative rods, pending  On 4/1 evening developed worsening tachycardia and leukocytosis, soft blood pressures with lactic of 6.5.  Responded to fluid boluses.  There are no signs or symptoms to suggest secondary bacterial peritonitis at this time, CT abdomen with no signs of perforation or other acute process. Patient does not have a fever, no signs of hepatic encephalopathy.  CXR with increased hydrothorax  MELD-Na score: 27 at 4/2/2018  1:44 PM  MELD score: 26 at 4/2/2018  1:44 PM  Calculated from:  Serum Creatinine: 1.34 mg/dL at 4/2/2018  9:03 AM  Serum Sodium: 135 mmol/L at 4/2/2018  9:03 AM  Total Bilirubin: 5.4 mg/dL at 4/1/2018  6:26 AM  INR(ratio): 2.57 at 4/2/2018  1:44 PM  Age: 78 years  - Ceftriaxone IV 2g Q24H (4/1--)  - Daily weights  - Strict I/O's  - Continue home medications: lactulose 10g daily  - Holding home lasix 20 mg and spironolactone 50 mg due to JOSE and sepsis  - Thoracentesis once INR <2, will do with medicine procedure team  - Vit K and FFP, recheck INR  - Palliative care consult to  continue goals of care discussion they had during previous admission      # JOSE - improving, baseline of 0.9  # Hyponatremia  Likely secondary to infection and poor po intake. As patient has chronic fluid overload in the setting of hepatic hydrothorax and ascites, will do ryan boluses.  BUN/Cr ratio >20 and patient reports feeling dehydrated.  - IVF  cc boluses prn, monitor BMP and respiratory status     # Cachexia  - Ensure TID meals and ensure as tolerated  - PT/OT consulted     # Stable Chronic Medical Conditions - unchanged management  - Hx of Gastric Ulcer 2014: continue home protonix 40mg daily, TUMS PRN  - Asthma: albuterol PRN, continue home Dulera  - Seasonal Allergies: continue home zyrtec 10mg daily    # Pain Assessment:  Current Pain Score 4/2/2018   Patient currently in pain? denies   Pain location -   Pain descriptors -   - Yue is experiencing pain due to abdominal infection. Pain management was discussed and the plan was created in a collaborative fashion.  Yue's response to the current recommendations: engaged  - Tramadol 50 mg q8h prn    Diet: Snacks/Supplements Adult: Ensure Plus Adult Shake; Between Meals  Regular Diet Adult  Snacks/Supplements Adult: Other - Please comment; 10:00 am: 2 hard boiled eggs + boost plus, 2:00 pm: Canned peaches + boost plus,, HS: yogurt  + boost plus; Between Meals  Fluids: po, boluses prn, has received 2.5L of NS for sepsis  DVT Prophylaxis: Enoxaparin (Lovenox) SQ  Code Status: Full Code, undergoing goals of care discussion    Disposition Plan   Expected discharge:Expected Discharge Date: 04/04/18 (Home), recommended to prior living arrangement once adequate pain management/ tolerating PO medications, antibiotic plan established and able to receive enough additoinal services at home.Dispo: Expected Discharge Date: 04/04/18 (Home)        Entered: Dez Reid 04/02/2018, 4:20 PM   Information in the above section will display in the discharge planner  report.      The patient's care was discussed with the Attending Physician, Dr. Serra.    Dez Reid  [facility]  Pager: [phone]  Please see sticky note for cross cover information    Interval History    She reports increased abdominal pain.  No increase in shortness of breath, no chest pain, no headache or nausea/vomiting.  Discussed results from ascitic fluid analysis and high likelihood of SBP.  Discussed thoracentesis and consented for plasma transfusions.  Began discussion regarding goals of care and code status, palliative care met with patient as well today.    Review of Systems   Constitutional: Negative for diaphoresis and fever.   HENT: Negative for trouble swallowing.    Eyes: Negative for visual disturbance.   Respiratory: Negative for cough, shortness of breath and wheezing.    Cardiovascular: Negative for chest pain and palpitations.   Gastrointestinal: Positive for abdominal pain and nausea. Negative for vomiting.   Genitourinary: Negative for dysuria.   Skin: Negative for rash.   Neurological: Negative for headaches.   Psychiatric/Behavioral: Negative for agitation and confusion.     Physical Exam   Vital Signs: Temp: 97  F (36.1  C) Temp src: Oral BP: 106/65 Pulse: 109 Heart Rate: 105 Resp: 20 SpO2: 97 % O2 Device: Nasal cannula Oxygen Delivery: 2 LPM  Weight: 105 lbs 9.61 oz  Physical Exam  Constitutional: No distress. Cachectic female, appears stated age   Cardiovascular: Tachycardia, regular rhythm, + for systolic murmur   Pulmonary/Chest: Effort normal. No respiratory distress. She has no wheezes. She has no rales.   Decreased breath sounds on the right   Abdominal: Bowel sounds are normal. She exhibits distension. There is tenderness (all throughout). + for rebound. No rigidity   Musculoskeletal: She exhibits no edema.   Neurological: She is alert, no signs of confusion as per family    Data   Medications       sodium chloride 0.9%  500 mL  Intravenous Once     artificial saliva  5 mL Swish & Spit 4x Daily     cetirizine  10 mg Oral QPM     lactulose  10 g Oral Daily     fluticasone-vilanterol  1 puff Inhalation Daily     omeprazole  40 mg Oral Daily     cefTRIAXone  2 g Intravenous Q24H     Data     Recent Labs  Lab 04/02/18  1344 04/02/18  0903 04/01/18  1801 04/01/18  0626 03/31/18  2226   WBC  --   --  12.9* 7.8 4.1   HGB  --   --  9.0* 9.3* 9.6*   MCV  --   --  106* 101* 103*   PLT  --   --  85* 75* 99*   INR 2.57* 2.89*  --  1.95* 1.92*   NA  --  135 132* 132* 131*   POTASSIUM  --  3.9 4.3 3.9 3.4   CHLORIDE  --  102 101 98 98   CO2  --  23 16* 24 27   BUN  --  36* 30 29 26   CR  --  1.34* 1.48* 1.29* 1.05*   ANIONGAP  --  10 15* 10 6   MACY  --  7.6* 7.6* 8.0* 7.9*   GLC  --  165* 190* 96 119*   ALBUMIN  --   --   --  1.9* 1.9*   PROTTOTAL  --   --   --  5.6*  --    BILITOTAL  --   --   --  5.4*  --    ALKPHOS  --   --   --  198*  --    ALT  --   --   --  54*  --    AST  --   --   --  100*  --      Recent Results (from the past 24 hour(s))   CT Abdomen Pelvis w/o Contrast    Narrative    EXAMINATION: CT ABDOMEN PELVIS W/O CONTRAST, 4/1/2018 9:27 PM    TECHNIQUE:  Helical CT images from the lung bases through the  symphysis pubis were obtained without IV contrast.    COMPARISON: MR abdomen 4/26/2017, CT abdomen 2/14/2018    HISTORY: worsening abdominal pain with suspected SBP, new rebound  tenderness;     FINDINGS:    Redemonstration of large right pleural effusion with atelectatic right  lung base.    The liver has a cirrhotic appearance. Evaluation for focal lesions is  limited given lack of contrast. There is evidence of portal  hypertension including splenomegaly and moderate ascites. The  pancreas, adrenal glands, kidneys, and urinary bladder are  unremarkable on this noncontrast examination. No evidence for bowel  obstruction. Calcifications within the uterus. No significant  lymphadenopathy is appreciated. No free air. Diffuse  anasarca.    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 Chronic appearing grade 1  anterolisthesis of L5 on S1.      Impression    IMPRESSION:   1. Cirrhosis with evidence of portal hypertension including moderate  ascites and splenomegaly. No free air.  2. Redemonstration of large right pleural effusion with near complete  atelectasis of the right lung base and shifting of the mediastinum.    I have personally reviewed the examination and initial interpretation  and I agree with the findings.    AHSAN COFFMAN MD

## 2018-04-02 NOTE — PLAN OF CARE
Problem: Patient Care Overview  Goal: Plan of Care/Patient Progress Review  PT 5C: PT order for evaluation and treatment acknowledged. Discussed pt's status with OT following OT evaluation. Pt's functional mobility is limited due to medical condition and she is close to functional baseline for activity. OT will follow. Due to anticipated short length of stay and pt close to functional baseline, PT will defer and complete the order.

## 2018-04-02 NOTE — DISCHARGE SUMMARY
Attestation:  This patient has been seen and evaluated by me, Darrick Haider on 4/6/2018.  I saw and discussed the case with the primary resident and the care team. I agree with the findings and plan in this note. I have reviewed today's vital signs, medications, laboratory results and imaging results.    [facility] Dis[facility]- [facility]  Service    Date of Admission:  3/31/2018  Date of Service: 4/6/2018  Date of Discharge:  4/6/2018  Discharging Attending Provider: Darrick Haider MD  Discharge Team: Teodora    Discharge Diagnoses   Spontaneous Bacterial Peritonitis    Follow-ups Needed After Discharge   Paracentesis - Scheduled for 4/11/18 at     [facility] - Located in the [facility] at [address] [place] [address].     Follow-up at [facility] next week    Hospital Course     Yue Portillo is a 78 year old female admitted on 3/31/2018. 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 Now completed 5 care course of CFTX.     # Sepsis 2/2 Spontaneous Bacterial Peritonitis - Sepsis present on admission  # Hepatocellular Carcinoma  # Recurrent Ascites  # Recurrent Hepatic Hydrothorax  Paracentesis (3/31) removing 1.5L of cloudy ascitic fluid - 53529 WBCs, 86% PMNs: Culture positive for E. Coli, pan sensitive  On 4/1 evening developed worsening tachycardia and leukocytosis, soft blood pressures with lactic of 6.5.  Responded to fluid boluses. Lactate now normal  There are no signs or symptoms to suggest secondary bacterial peritonitis at this time, CT abdomen with no signs of perforation or other acute process. Patient does not have a fever, no signs of hepatic encephalopathy.  CXR with increased  hydrothorax.  MELD-Na score: 24 at 4/5/2018  7:38 AM  MELD score: 21 at 4/5/2018  7:38 AM  Calculated from:  Serum Creatinine: 1.13 mg/dL at 4/5/2018  7:38 AM  Serum Sodium: 132 mmol/L at 4/5/2018  7:38 AM  Total Bilirubin: 3.1 mg/dL at 4/5/2018  7:38 AM  INR(ratio): 2.41 at 4/4/2018  4:14 AM  Age: 78 years  - Finish 5 days of CFTX 2g 4/4/18  -SBP ppx with Cipro 250mg daily, will plan to adjust to 500mg when GFR improves  - Daily weights  - Strict I/O's  - Continue home medications: lactulose 10g daily  -  lasix 20mg IV once and continue spirinolactone 50mg daily  - Thoracentesis completed 4/3, 2L removed  - Palliative care plans to see patient as outpatient  -Has outpatient paracentesis set up for 4/11/18 @ 930am  -Procedure team consult for para prior to discharge 4/6, completed with 2.2L removed      # JOSE - improving, baseline of 0.9  # Hyponatremia, improving  Likely secondary to infection and poor po intake. As patient has chronic fluid overload in the setting of hepatic hydrothorax and ascites, will do ryan boluses.  BUN/Cr ratio >20 and patient reports feeling dehydrated. Likely prerenal from intravascular depletion  - lasix per above     ##Anemia- likely dilutional following albumin administration following thoracentesis, as all cell lines dropped proportionately. Hemothorax post procedure also possible, will continue to monitor.     --now stable, likely dilutional      # Cachexia  #Malnutrition - % weight loss unable to determine  - Ensure TID meals and ensure as tolerated  - PT/OT consulted      # Stable Chronic Medical Conditions - unchanged management  - Hx of Gastric Ulcer 2014: continue home protonix 40mg daily, TUMS PRN  - Asthma: albuterol PRN, continue home Dulera  - Seasonal Allergies: continue home zyrtec 10mg daily    Jefe Capellan D.O.  [facility]  [facility]    Pager:[phone]_  ___________________________________________________________________  Review of Systems:  Review of Systems   Constitutional: Negative for chills, diaphoresis, fever and malaise/fatigue.   Respiratory: Negative for cough.    Gastrointestinal: Positive for abdominal pain and constipation. Negative for blood in stool and melena.   Genitourinary: Negative for dysuria.   Neurological: Positive for weakness.     Physical Exam   Vital Signs: Temp: 97  F (36.1  C) Temp src: Oral BP: 106/65 Pulse: 109 Heart Rate: 105 Resp: 20 SpO2: 97 % O2 Device: Nasal cannula Oxygen Delivery: 2 LPM  Weight: 105 lbs 9.61 oz    Constitutional: No distress. Cachectic female, appears stated age   Cardiovascular: Tachycardia, regular rhythm, + for systolic murmur   Pulmonary/Chest: Effort normal. No respiratory distress. She has no wheezes. She has no rales.   Coarse breath sounds at right base  Abdominal: Bowel sounds are normal. She exhibits distension. There is tenderness (all throughout).No rigidity   Musculoskeletal: She exhibits no edema.   Neurological: She is alert, no signs of confusion as per family    Significant Results and Procedures       Pending Results   These results will be followed up by PCP  Unresulted Labs Ordered in the Past 30 Days of this Admission     Date and Time Order Name Status Description    4/1/2018 1841 Blood culture Preliminary     4/1/2018 1841 Blood culture Preliminary     4/1/2018 0820 Urine Culture Aerobic Bacterial In process     3/31/2018 2336 Anaerobic bacterial culture Preliminary     3/31/2018 2336 Fluid Culture Aerobic Bacterial Preliminary              Primary Care Physician   Felicitas Horton    Discharge Disposition   Discharged to home  Condition at discharge: Stable    Discharge Orders   No discharge procedures on file.  Discharge Medications   Current Discharge Medication List      CONTINUE these medications which have NOT CHANGED    Details   calcium carbonate (TUMS) 500 MG  chewable tablet Take 1 tablet (500 mg) by mouth daily as needed for heartburn  Qty: 150 tablet, Refills: 0    Associated Diagnoses: Gastroesophageal reflux disease without esophagitis      ondansetron (ZOFRAN-ODT) 4 MG ODT tab Take 1 tablet (4 mg) by mouth every 6 hours as needed for nausea or vomiting  Qty: 120 tablet, Refills: 0    Associated Diagnoses: HCC (hepatocellular carcinoma) (H)      lactulose (CHRONULAC) 10 GM/15ML solution Take 15 mLs (10 g) by mouth daily  Qty: 1892 mL, Refills: 0    Associated Diagnoses: Cirrhosis of liver with ascites, unspecified hepatic cirrhosis type (H)      artificial saliva (BIOTENE MT) SOLN solution Take 2 mLs (2 sprays) by mouth 4 times daily as needed for dry mouth  Qty: 1 Bottle, Refills: 0    Associated Diagnoses: Dry mouth      Benzocaine (HURRICAINE/TOPEX) 20 % AERO spray Take 0.5-1 mLs by mouth every 3 hours as needed for moderate pain  Qty: 1 Bottle, Refills: 0    Associated Diagnoses: Throat pain      benzocaine-menthol (CEPACOL) 15-3.6 MG lozenge Place 1 lozenge inside cheek every hour as needed for sore throat  Qty: 84 lozenge, Refills: 0    Associated Diagnoses: Throat pain      traMADol (ULTRAM) 50 MG tablet Take 0.5-1 tablets (25-50 mg) by mouth every 8 hours as needed for moderate pain  Qty: 40 tablet, Refills: 0    Associated Diagnoses: HCC (hepatocellular carcinoma) (H)      Multiple Vitamins-Iron (DAILY MULTIPLE VITAMIN/IRON) TABS Take 1 tablet by mouth daily  Qty: 30 tablet, Refills: 11    Associated Diagnoses: Chronic hepatitis C without hepatic coma (H)      polyethylene glycol (MIRALAX) powder Take 17 g (1 capful) by mouth 3 times daily as needed for constipation  Qty: 850 g, Refills: 3    Associated Diagnoses: Constipation, unspecified constipation type      furosemide (LASIX) 20 MG tablet Take 1 tablet (20 mg) by mouth daily  Qty: 180 tablet, Refills: 1    Associated Diagnoses: 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)      spironolactone  (ALDACTONE) 50 MG tablet Take 1 tablet (50 mg) by mouth daily  Qty: 30 tablet, Refills: 1    Associated Diagnoses: Chronic hepatitis C without hepatic coma (H)      glycerin-hypromellose- (ARTIFICIAL TEARS) 0.2-0.2-1 % SOLN ophthalmic solution Place 1 drop into both eyes 2 times daily as needed for dry eyes  Qty: 1 Bottle, Refills: 0    Associated Diagnoses: Dry eyes      cetirizine (ZYRTEC) 10 MG tablet Take 1 tablet (10 mg) by mouth every evening  Qty: 30 tablet, Refills: 6    Associated Diagnoses: Chronic allergic conjunctivitis      Nutritional Supplements (BOOST CALORIE SMART) LIQD Take 3 Cans by mouth daily  Qty: 90 Bottle, Refills: 11    Associated Diagnoses: HCC (hepatocellular carcinoma) (H)      sodium chloride (SALINE MIST) 0.65 % nasal spray Spray 1 spray into both nostrils 2 times daily  Qty: 1 Bottle, Refills: 11    Associated Diagnoses: Chronic nonseasonal allergic rhinitis due to pollen      albuterol (2.5 MG/3ML) 0.083% neb solution Take 1 vial (2.5 mg) by nebulization every 6 hours as needed for shortness of breath / dyspnea or wheezing  Qty: 25 vial, Refills: 3    Associated Diagnoses: Mild intermittent asthma with acute exacerbation      omeprazole (PRILOSEC) 40 MG capsule Take 1 capsule (40 mg) by mouth daily Take 30-60 minutes before a meal.  Qty: 90 capsule, Refills: 3    Associated Diagnoses: Gastroesophageal reflux disease without esophagitis      mometasone-formoterol (DULERA) 200-5 MCG/ACT oral inhaler Inhale 2 puffs into the lungs 2 times daily  Qty: 1 Inhaler, Refills: 12    Associated Diagnoses: Moderate persistent asthma without complication      albuterol (PROAIR HFA/PROVENTIL HFA/VENTOLIN HFA) 108 (90 BASE) MCG/ACT Inhaler Inhale 2 puffs into the lungs every 6 hours as needed for shortness of breath / dyspnea or wheezing  Qty: 1 Inhaler, Refills: 11    Associated Diagnoses: Moderate persistent asthma without complication           Allergies   Allergies   Allergen Reactions      Dust Mites Cough     Sneezing      Seasonal Allergies

## 2018-04-02 NOTE — PROVIDER NOTIFICATION
04/01/18 1800   Call Information   Date of Call 04/01/18   Time of Call 1836   Name of person requesting the team Bianka Palomino   Title of person requesting team RN   RRT Arrival time 1837   Time RRT ended 1908   Reason for call   Type of RRT Adult   Primary reason for call Sepsis suspected   Sepsis Suspected Elevated Lactate level;Heart Rate > 100;RR > 20, SaO2 <90% OR increasing O2 need   Was patient transferred from the ED, ICU, or PACU within last 24 hours prior to RRT call? Yes   SBAR   Situation Lactic Acid 6.5, , RR 24 on RA,    Background Admit for bacterial peritonitis, Hepatocellular carcinoma, Hep. C, Ascites with regular paracentesis and thoracentesis, asthma.   Notable History/Conditions Cancer;End-Stage disease;Organ failure   Assessment Pt. A+O x 4, conversing with son.  Afebrile.  Currently receiving bolus of 500cc over 4 hours with current /70 with MAP 79, RR 24 with 02 sat mid-90's.  Denies SOB.  Abdomen distended, firm with rebound tenderness.   Interventions Fluid bolus;Labs;Meds;Portable monitor;Other (describe)   Adjustments to Recommend CT scan.   Patient Outcome   Patient Outcome Stabilized on unit   RRT Team   Attending/Primary/Covering Physician Jessica Griggs DO   Date Attending Physician notified 04/01/18   Time Attending Physician notified 1837   Lead RN Linda Dangelo   RN Bianka Palomino   RT Buddy Burgess   Post RRT Intervention Assessment   Post RRT Assessment Other (see comment)   Date Follow Up Done 04/01/18   Time Follow Up Done 2134   Comments RRT called. Lactic Acid repeat = 5.6

## 2018-04-02 NOTE — PROVIDER NOTIFICATION
04/01/18 2200   Call Information   Date of Call 04/01/18   Time of Call 2134   Name of person requesting the team Sofia   Title of person requesting team RN   RRT Arrival time 2136   Time RRT ended 2200   Reason for call   Type of RRT Adult   Primary reason for call Sepsis suspected   Sepsis Suspected Elevated Lactate level;Heart Rate > 100;WBC <4 or >12   Was patient transferred from the ED, ICU, or PACU within last 24 hours prior to RRT call? Yes   SBAR   Situation Lactic Acid = 5.6   Background admitted for Spontaneous Bacteremia Peritonitis   Notable History/Conditions Cancer;End-Stage disease  (asthma)   Assessment A&O; using access. muscles to breathe-sometimes c/o of SOB. abd soft & tender;   Interventions Fluid bolus;Labs;Meds;O2 per N/C or mask   Patient Outcome   Patient Outcome Stabilized on unit   RRT Team   Attending/Primary/Covering Physician Jessica Benavidez RN Layla Pierce RN   RN Sofia Thomas RN   RT Reinier Valenzuela RT   Post RRT Intervention Assessment   Post RRT Assessment Stable/Improved   Date Follow Up Done 04/02/18   Time Follow Up Done 0220   Comments repeat Lactic Acid=3.4  (monitor respiratory effort. Use O2 to ease breathing.)

## 2018-04-02 NOTE — PROGRESS NOTES
04/02/18 1500   Quick Adds   Type of Visit Initial Occupational Therapy Evaluation   Living Environment   Lives With alone  (children are there 24/7, take turns caregiving )   Living Arrangements apartment   Home Accessibility tub/shower is not walk in   Number of Stairs to Enter Home 0   Number of Stairs Within Home 0   Stair Railings at Home none   Transportation Available family or friend will provide   Living Environment Comment Pt lives in an apartment, reports there are no stairs to enter and she can access the apartment from the elevator. Pt and family report her children take turns caregiving, pt has 24 hour assist at home.    Self-Care   Usual Activity Tolerance moderate   Current Activity Tolerance fair   Regular Exercise no   Equipment Currently Used at Home walker, rolling;shower chair   Activity/Exercise/Self-Care Comment Pt does not report getting regular exercise, has had PT/OT in the past.    Functional Level Prior   Ambulation 1-->assistive equipment   Transferring 1-->assistive equipment   Toileting 3-->assistive equipment and person   Bathing 3-->assistive equipment and person   Dressing 2-->assistive person   Eating 0-->independent   Communication 0-->understands/communicates without difficulty   Swallowing 0-->swallows foods/liquids without difficulty   Cognition 0 - no cognition issues reported   Fall history within last six months no   Which of the above functional risks had a recent onset or change? ambulation;transferring;toileting;bathing;dressing   Prior Functional Level Comment Pt and daughter report that pt uses a walker for ambulation at baseline. Pt has also been receiving assist from family for toileting, bathing, and dressing. Pt has had multiple hospitalizations recently, has progressively become weaker.        Present yes   Language Luxembour[place]   General Information   Onset of Illness/Injury or Date of Surgery - Date 03/31/18   Referring Physician Jessica Griggs DO    Patient/Family Goals Statement Return home with family.    Additional Occupational Profile Info/Pertinent History of Current Problem Yue Portillo is a 78 year old female admitted on 3/31/2018. 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   Precautions/Limitations fall precautions   Weight-Bearing Status - LUE full weight-bearing   Weight-Bearing Status - RUE full weight-bearing   Weight-Bearing Status - LLE full weight-bearing   Weight-Bearing Status - RLE full weight-bearing   General Info Comments Activity: up with assist    Cognitive Status Examination   Orientation orientation to person, place and time   Level of Consciousness lethargic/somnolent   Able to Follow Commands WNL/WFL   Personal Safety (Cognitive) mild impairment   Memory intact   Attention No deficits were identified   Cognitive Comment Pt was mildly lethargic during session 2/2 fatigue and not sleeping well, pt also demos some limited safety awareness to lines with OOB activity.   Visual Perception   Visual Perception No deficits were identified   Sensory Examination   Sensory Quick Adds No deficits were identified   Integumentary/Edema   Integumentary/Edema no deficits were identifed   Posture   Posture kyphosis;protracted shoulders;forward head position   Range of Motion (ROM)   ROM Comment BUEs WFL    Strength   Strength Comments BUEs WFL, 3/5   Hand Strength   Hand Strength Comments Bilateral  strength diminished but functional.    Coordination   Upper Extremity Coordination No deficits were identified   Gross Motor Coordination No deficits identified    Fine Motor Coordination Not tested, pt was able to manipulate toothbrush without difficulty.    Mobility   Bed Mobility Comments Kyler   Transfer Skill: Bed to Chair/Chair to Bed   Level of [place]: Bed to  "Chair minimum assist (75% patients effort)   Transfer Skill: Sit to Stand   Level of [place]: Sit/Stand minimum assist (75% patients effort)   Transfer Skill: Toilet Transfer   Level of [place]: Toilet minimum assist (75% patients effort)   Balance   Balance Comments Pt is mildly unsteady on feet, no overt LOB noted with OOB activity.    Instrumental Activities of Daily Living (IADL)   IADL Comments Pts children provide all IADLs for her at home including med management, cooking, cleaning, shopping, laundry, driving, etc.    Activities of Daily Living Analysis   Impairments Contributing to Impaired Activities of Daily Living balance impaired;strength decreased  (fatigue)   General Therapy Interventions   Planned Therapy Interventions ADL retraining;IADL retraining   Clinical Impression   Criteria for Skilled Therapeutic Interventions Met yes, treatment indicated   OT Diagnosis Decreased ADL-I    Influenced by the following impairments general deconditioning, fatigue, mild balance impairment    Assessment of Occupational Performance 3-5 Performance Deficits   Identified Performance Deficits ambulation, transferring, dressing, toileting, bathing    Clinical Decision Making (Complexity) Low complexity   Therapy Frequency 5 times/wk   Predicted Duration of Therapy Intervention (days/wks) 4/9/2018   Anticipated Discharge Disposition Home with Home Therapy;Home with Assist   Risks and Benefits of Treatment have been explained. Yes   Patient, Family & other staff in agreement with plan of care Yes   Clinical Impression Comments Pt presents to OT today with general deconditioning, fatigue, and mild balance impairments, all leading to decreased ADL-I. Pt to benefit from skilled OT services to address the following problem list.    [facility] AM-PAC  \"6 Clicks\" Daily Activity Inpatient Short Form   1. Putting on and taking off regular lower body clothing? 3 - A Little   2. Bathing (including washing, rinsing, " drying)? 2 - A Lot   3. Toileting, which includes using toilet, bedpan or urinal? 3 - A Little   4. Putting on and taking off regular upper body clothing? 3 - A Little   5. Taking care of personal grooming such as brushing teeth? 3 - A Little   6. Eating meals? 4 - None   Daily Activity Raw Score (Score out of 24.Lower scores equate to lower levels of function) 18   Total Evaluation Time   Total Evaluation Time (Minutes) 5

## 2018-04-02 NOTE — PLAN OF CARE
Problem: Patient Care Overview  Goal: Plan of Care/Patient Progress Review   04/02/18 0556   OTHER   Plan Of Care Reviewed With patient   Plan of Care Review   Progress improving       Problem: Infection, Risk/Actual (Adult)  Goal: Identify Related Risk Factors and Signs and Symptoms  Related risk factors and signs and symptoms are identified upon initiation of Human Response Clinical Practice Guideline (CPG).    Patient's abdomen pain and infection will be improved or resolved at discharge    04/02/18 0556   Infection, Risk/Actual   Related Risk Factors (Infection, Risk/Actual) age extremes;chronic illness/condition;malnutrition   Signs and Symptoms (Infection, Risk/Actual) heart rate increase;pain;weakness       Problem: Pain, Chronic (Adult)  Goal: Identify Related Risk Factors and Signs and Symptoms  Related risk factors and signs and symptoms are identified upon initiation of Human Response Clinical Practice Guideline (CPG).   04/02/18 0556   Pain, Chronic   Related Risk Factors (Chronic Pain) disease process;knowledge deficit   Signs and Symptoms (Chronic Pain) fatigue/weakness;BADLs/IADLs, reluctance/inability to perform;verbalization of pain descriptors     Pt continues to have abdominal, exacerbated with movement. Reviewed pain medication, pt declines dose at this time, aware to let nurse know if needed. Reports some SOB at times, oxygen set up and used on and off overnight, sats 90's on RA. BP remain soft, improving some this am. Continues to have lactic acids checks, see results review. Fluid given per MD orders and antibiotics started and continued overnight. Daughter at bedside. Up with assist of one and walker. Steady but needs assistance due to ascites. CT scan done in evening. Hourly rounding done. Pt declines , family at bedside. Will continue to monitior.

## 2018-04-02 NOTE — CONSULTS
[facility]    Palliative Care Consultation Note    Patient: Yue Portillo  Date of Admission:  3/31/2018    Requesting Clinician / Team: family medicine team  Reason for consult: Goals of care  Patient and family support    Recommendations:    Plan for care conference meeting with family/son Fred should be present--made plans to meet with him and other family on Wednesday 3pm, primary team should participate if able    I reached out to her outpatient hepatologist Dr. Meyer regarding family's questions about potential transplant opportunities in future (if she gained weight/functional status); will f/u with that discussion with family on Wednesday      Discussion:  Unfortunately our visit was limited in time because at time of our scheduled visit  services were needed for consent for blood products. I was able to talk a little with family regarding overall concerns and questions about her care. Primarily family expressed concerns about needing more PCA hours and additional nursing care at home--pt needing 24 hour care and currently family is taking shifts overnight to care for her. Also asking about nutrition consult--I see there was a nutrition consult ordered at discharge last admission but there has not been time to plan visit    In terms of decision making patient says she wants to be involved in decision making when asked but in the end seems to defer decisions to son Fred. Fred expresses frustration that Yue never got treatment for her hepatitis C and now is being told she cant get a liver transplant and hopes she can be reconsidered for a transplant if she can focus on nutrition and improving strength. He says that is the main goal right now. He also says that he and family would consent to transplant even thought there are more risks due to her age and frailty. I tried to explain that decision for transplant candidacy is decided by a transplant team and does not  take into consideration family's comfort level with risk. I further explained that it was my understanding that she will not be re-considered for transplant because in part due to the size of the cancer. But I agreed to reach out to Dr. Meyer to confirm this. I further suggested that it could be helpful to talk about what it expect if in fact transplant is not an option. Fred was open to that and agreed to meet 3pm Wednesday. We did not have the opportunity today to discuss any prognosis, code status or goals of care in that context.     These recommendations have been communicated through this note.      Thank you for the opportunity to participate in the care of this patient and family. Our team will follow . Please feel free to contact the on-call Palliative provider with any urgent needs. I will not be back until Wednesday this week and do not anticipate palliative provider visiting tomorrow however please page team pager if acute needs arise.     Thank you for involving us in the patient's care.     Total time spent was 80 minutes,  >50% of time was spent counseling and/or coordination of care regarding goals of care, patient and family support.    Mahnaz Nettles MD / Palliative Medicine / Pager [phone] / [facility] Inpatient Team Consult Pager [phone] (answered 8am-430pm M-F) - ok to text page via [employer] / After-Hours Answering Service [phone] / Palliative Clinic in the [facility] at the [facility] - [phone] (scheduling); [phone] (triage).        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       Symptoms:        1. Abdominal discomfort 2/2 ascites and SBP      Prognosis, Goals, & Planning:        Discussion about disease understanding,  prognosis, care goals: limited discussion, family still hoping to get her stronger so she can be reconsidered for liver transplant       Decision making capacity: intact       Preferred way of decision making: per pt today she wants to be involved in discussions but makes decisions with help of her son Fred.       Health care directive: no       Health care agent:        Code Status:  Full Code       POLST Physician orders for life-sustaining treatment (POLST) form is not completed      Coping, Meaning, & Spirituality: not addressed          Social:        Living situation: lives at home, with help of PCA and family support       Support system/Family: family, multiple adult children       Functional status: needs assistance with ADLs including bathing, toileting, dressing       Financial concerns:        Substance use disorder (past / present):        Occupation/Past-times:       History of Present Illness:   Sources of History:patient and electronic health record    Yue Portillo is an 78 year old femlae with 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     At baseline she requires weekly paracentesis as well as regular thoracentesis. Recently admittd 3/17-3/25 with abdominal pain related to ascites during that admission she was determined not to be transplant candidate as of conference 3/20 based on age, frailty, HCC tumor size outside of Arpit criteria. Heme onc saw her that admission as well and determined based on age and advanced cirrhosis and poor performance status that she is not a candidate for systemic cancer treatments and recommended comfort focused approach and hospice. We had planned a family conference but they were not able to come in before time of discharge home and instead she had been set up for an outpatient clinic palliative care visit scheduled for 4/11.      Recently has been getting weaker over time. Needing frequent paracentesis and thoracentesis. Currently reporting abdominal discomfort       ROS:  Comprehensive ROS is reviewed and is negative except as here & per HPI:   Palliative Symptom Review (0=no symptom/no concern, 1=mild, 2=moderate, 3=severe):  Pain: 1  Fatigue: 1  Nausea: 0  Constipation: 0  Diarrhea: 1  Depressive Symptoms: 0  Anxiety: 0  Drowsiness: 0  Poor Appetite: 1  Shortness of Breath: 1  Insomnia: 0  Delirium: 0  Other: 0  Overall (0 good/no concerns, 3 very poor): 1     Past Medical History:   Past Medical History:   Diagnosis Date     Cachexia (H)      Hepatitis C      Hepatocellular carcinoma (H)      Hx of gastric ulcer 2014     Liver disease      Mild intermittent asthma      Pleural effusion associated with hepatic disorder           Past Surgical History:   Past Surgical History:   Procedure Laterality Date     CATARACT IOL, RT/LT Right 08/22/2017    s/p CE/IOL right eye     EYE SURGERY       H PYLORI SHYLA SCREEN      was treated for h pylori     NO HISTORY OF SURGERY             Family History:   Family History   Problem Relation Age of Onset     Respiratory Father      asthma     DIABETES Son      Glaucoma No family hx of      Macular Degeneration No family hx of      Retinal detachment No family hx of      Amblyopia No family hx of           Allergies:   Allergies   Allergen Reactions     Dust Mites Cough     Sneezing      Seasonal Allergies           Medications:   I have reviewed this patient's medication profile and medications given in the past 24 hours.  Noted are:  Lactulose  Omeprazole  Prn melatonin  Prn zofran  Prn miralax  Prn tramadol 50mg q 8 hours prn-x 2 yesterday, x 1 today           Physical Exam:   Vital Signs: Temp: 98.1  F (36.7  C) Temp src: Oral BP: 94/56 Pulse: 109 Heart Rate: 109 Resp: 18 SpO2: 97 % O2 Device: None (Room air)    Weight: 105 lbs 9.61 oz     Gen: sitting/lying in bed. Appears comfortable   HEENT:  NCAT. Conjunctiva clear. Sclera anicteric .  CV: RRR , Peripheral perfusion intact.   Resp: unlabored2 work of breathing, on supplemental oxygen, breath sounds dull over entire right lung feilds  Abd: distended, firm, mildly tender to palpation, BS present  Msk: no gross deformity, + sarcopenia  Skin:  + jaundice  Ext: warm, well perfused.   Neuro: face symmetric. EOM, vision, hearing grossly intact. Speech fluent. Moves all extremities   Mental status/Psych: alert. Oriented. Asks/answers questions appropriately. Affect is engaged but deferred discussion to children--does not participate in much discussion         Data reviewed:   Recent imaging reviewed, pertinent comments:     Recent lab data reviewed, pertinent comments:     Increased creatinine, T bili and INR above baseline    MELD-Na score: 27 at 4/2/2018  6:21 PM  MELD score: 26 at 4/2/2018  6:21 PM  Calculated from:  Serum Creatinine: 1.30 mg/dL at 4/2/2018  6:21 PM  Serum Sodium: 135 mmol/L at 4/2/2018  6:21 PM  Total Bilirubin: 5.4 mg/dL at 4/1/2018  6:26 AM  INR(ratio): 2.57 at 4/2/2018  1:44 PM  Age: 78 years    ROUTINE ICU LABS (Last four results)  CMP  Recent Labs  Lab 04/02/18  1821 04/02/18  0903 04/01/18  1801 04/01/18  0626 03/31/18  2226    135 132* 132* 131*   POTASSIUM 3.8 3.9 4.3 3.9 3.4   CHLORIDE 103 102 101 98 98   CO2 24 23 16* 24 27   ANIONGAP 8 10 15* 10 6   * 165* 190* 96 119*   BUN 37* 36* 30 29 26   CR 1.30* 1.34* 1.48* 1.29* 1.05*   GFRESTIMATED 39* 38* 34* 40* 50*   GFRESTBLACK 48* 46* 41* 48* 61   MACY 7.9* 7.6* 7.6* 8.0* 7.9*   PROTTOTAL  --   --   --  5.6*  --    ALBUMIN  --   --   --  1.9* 1.9*   BILITOTAL  --   --   --  5.4*  --    ALKPHOS  --   --   --  198*  --    AST  --   --   --  100*  --    ALT  --   --   --  54*  --      CBC  Recent Labs  Lab 04/01/18  1801 04/01/18  0626 03/31/18  2226   WBC 12.9* 7.8 4.1   RBC 2.68* 2.69* 2.81*   HGB 9.0* 9.3* 9.6*   HCT 28.4* 27.2* 29.0*   * 101* 103*   MCH 33.6*  34.6* 34.2*   MCHC 31.7 34.2 33.1   RDW 17.2* 17.0* 16.7*   PLT 85* 75* 99*     INR  Recent Labs  Lab 04/02/18  1344 04/02/18  0903 04/01/18  0626 03/31/18  2226   INR 2.57* 2.89* 1.95* 1.92*     Arterial Blood GasNo lab results found in last 7 days.

## 2018-04-02 NOTE — PLAN OF CARE
Problem: Patient Care Overview  Goal: Plan of Care/Patient Progress Review  Discharge Planner OT   Patient plan for discharge: Pt and family are firm with decision to d/c home.  Current status:  Eval completed and tx initiated. Pt performed bed mobility with Kyler, Kyler for sit<>stand transfer from EOB. Pt ambulated into bathroom with close CGA and use of fww. Pt Kyelr for toilet transfer with reliance on grab bars, dependent in pericares while standing. Pt ambulated over to sink to wash hands then back to EOB, also brushed teeth while standing at sink with close SBA and setup for task. Pt is mildly unsteady on feet but did not have any overt LOB with OOB activity.   Barriers to return to prior living situation: general deconditioning, fatigue, medical status  Recommendations for discharge: Pt could benefit from TCU stay, however family is declining this. Pt would likely be safe to d/c home with 24 hour assist and HH therapy, pts family confirms they are able to provide 24 hour assist for pt, would be open to home therapy.   Rationale for recommendations: Pt would benefit from additional therapy to increase safety and independence with ADLs/IADLs and functional mobility.        Entered by: Taya Nieto 04/02/2018 3:56 PM

## 2018-04-03 NOTE — PLAN OF CARE
Problem: Patient Care Overview  Goal: Plan of Care/Patient Progress Review  Outcome: No Change  Afebrile, pressures soft, all other vital signs stable thus far this shift. Continues on 2L O2 via NC per patient comfort. R side lungs diminished. Plasma and 500 mL bolus finished on shift- additional 250 mL bolus given d/t elevated lactic- MD aware. Old paracentesis site to R side of abdomen draining serous fluid. No complaints of dizziness, N/V, or pain this shift. SOB noted w/exertion. Continues to use Biotene for dry mouth. Up w/1 and a walker in room- voiding adequately, 2 small loose stools noted throughout shift. Family at bedside throughout shift. Slept on and off throughout the night. Will continue to monitor.     Problem: Infection, Risk/Actual (Adult)  Goal: Identify Related Risk Factors and Signs and Symptoms  Related risk factors and signs and symptoms are identified upon initiation of Human Response Clinical Practice Guideline (CPG).    Patient's abdomen pain and infection will be improved or resolved at discharge   Outcome: No Change   04/03/18 0553   Infection, Risk/Actual   Related Risk Factors (Infection, Risk/Actual) age extremes;chronic illness/condition;malnutrition   Signs and Symptoms (Infection, Risk/Actual) heart rate increase;pain;weakness

## 2018-04-03 NOTE — PLAN OF CARE
Problem: Patient Care Overview  Goal: Individualization & Mutuality  Outcome: No Change  Pt tachycardic, BPs soft end of normal range at times, OVSS. Using 2L O2 for comfort. Up to bathroom with walker and assist x1. Phytonadione IV given, along w/ 2 units plasma (2nd unit currently infusing).  Repeat lactic acids trending down- next at 2300 (lab collect). Using biotene for dry mouth, maalox prn for gas, zofran this am prn for nausea, and tramadol x1 this morning for pain.  Please continue to offer prn pain meds. Family at bedside and supportive.  Continue POC.     Problem: Pain, Chronic (Adult)  Goal: Acceptable Pain Control/Comfort Level  Patient will demonstrate the desired outcomes by discharge/transition of care.   Outcome: No Change   04/02/18 1912   Pain, Chronic (Adult)   Acceptable Pain Control/Comfort Level making progress toward outcome

## 2018-04-03 NOTE — PROCEDURES
[facility] Procedure Note         Medicine Bedside Procedure:     PROCEDURE PERFORMED:  Thoracentesis    PAUSE FOR THE CAUSE: Right patient: Yes      Right body part: Yes      Right procedure Yes    ULTRASOUND GUIDANCE:  Yes, please see saved images in the PACS system    PRIMARY INDICATION:  therapeutic    DIAGNOSTIC DATA REVIEW:      INR: 2.09     CBC:   Lab Results   Component Value Date    WBC 12.9 04/01/2018    RBC 2.68 04/01/2018    HGB 9.0 04/01/2018    HCT 28.4 04/01/2018     04/01/2018    RDW 17.2 04/01/2018    PLT 85 04/01/2018       BARRIER PRECAUTIONS & STERILE TECHNIQUE  Patient was prepped and draped in the normal sterile fashion    LOCAL ANESTHESIA:  Lidocaine 1% 4ml without epinephrine    NUMBER OF KITS USED:  1    STERILE DRESSINGS:  Applied    ESTIMATED BLOOD LOSS:  None    PROCEDURE DESCRIPTION:  Reviewed coagulopathy with primary team. Received vitamin K and FFP without much change in INR. 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 Reviewed with IR, unlikely to provide additional benefit with more FFP and procedure reasonably low risk. Discussed slightly increased risk with patient, okay with proceding. Large fluid pocket in the Left pleura space identified and entered easily. 2L of straw colored fluid removed. Samples sent to lab for testing    SPECIMENS SENT:  Chemistry  Microbiology    FOLLOW- UP IMAGING:  Indicated:  Pending    COMPLICATIONS:  none    PRIMARY PROCEDURALIST:   Craig Krishnamurthy

## 2018-04-03 NOTE — PROGRESS NOTES
Care Coordinator Progress Note     Admission Date/Time:  3/31/2018  Attending MD:  Darrick Haider MD     Data  Chart reviewed, discussed with interdisciplinary team.   Patient was admitted for:    SBP (spontaneous bacterial peritonitis) (H)  HCC (hepatocellular carcinoma) (H).    Concerns with insurance coverage for discharge needs: None.  Current Living Situation: Patient lives alone; has PCA, Home making, and family provides additional support totaling 24 hours per day care.    Support System: Supportive and Involved  Services Involved: DME, Home Care and PCA  Transportation: MA transportation,  [facility] [phone] or Family/Friend will provide  Barriers to Discharge: pending medical work up and medical plan       Coordination of Care  RAS  with  Mary gomez, [facility] [phone]: confirmed home support hours with RAS  on 4/3/18  [facility]: PCA (10 hours a day), and Homemaking (5 hrs/week), and Boost for nutrition.    DME from [facility] bed/commode/bath chair/walker.    Referrals:   None needed at this time     Assessment  D: Chart reviewed and plan of care discussed with MD team. Patient admitted for spontaneous bacterial peritonitis being treated with IV ABX and requiring regular paracentesis and thoracentesis to maintain outpatient schedule.  Plan for patient to discharge tomorrow.  I/A: No discharge needs identified as RAS  Mary completes orders for PCA and Homemaking. P: Care Coordinator will remain available for discharge needs that may arise.        Plan  Anticipated Discharge Date:  4/4/18  Anticipated Discharge Plan:  Home      Windy LEHMAN RN PHN  Patient Care Management Coordinator  [facility]  Phone: [phone] / Pager: [phone]

## 2018-04-03 NOTE — PROGRESS NOTES
[facility] Progress Note    Main Plans for Today   - IV ceftriaxone (3/31--) for SPB, culture with gram negative rods, pending speciation and sensitivities  - Plan for Thoracentesis today  - Albumin 1.5g\kg following thoracentesis for SBP  -Resume lasix following procedure    Assessment & Plan   Yue Portillo is a 78 year old female admitted on 3/31/2018. 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    # Sepsis 2/2 Spontaneous Bacterial Peritonitis (8686 PMNs): Culture pending- Sepsis present on admission  # Hepatocellular Carcinoma  # Recurrent Ascites  # Recurrent Hepatic Hydrothorax  Paracentesis (3/31) removing 1.5L of cloudy ascitic fluid - 41920 WBCs, 86% PMNs: Culture with gram negative rods, pending speciation  On 4/1 evening developed worsening tachycardia and leukocytosis, soft blood pressures with lactic of 6.5.  Responded to fluid boluses. Lactate now 1.1  There are no signs or symptoms to suggest secondary bacterial peritonitis at this time, CT abdomen with no signs of perforation or other acute process. Patient does not have a fever, no signs of hepatic encephalopathy.  CXR with increased hydrothorax  MELD-Na score: 24 at 4/3/2018  4:57 AM  MELD score: 23 at 4/3/2018  4:57 AM  Calculated from:  Serum Creatinine: 1.30 mg/dL at 4/2/2018  6:21 PM  Serum Sodium: 135 mmol/L at 4/2/2018  6:21 PM  Total Bilirubin: 5.4 mg/dL at 4/1/2018  6:26 AM  INR(ratio): 2.04 at 4/3/2018  4:57 AM  Age: 78 years  - Ceftriaxone IV 2g Q24H (4/1--)  - Daily weights  - Strict I/O's  - Continue home medications: lactulose 10g daily  - Resume home lasix 20 mg today after thoracentesis and spironolactone 50 mg tomorrow  - Thoracentesis today, patient given 2g of Vit K, risks discussed with  patient and family  - Palliative care consult to continue goals of care discussion they had during previous admission      # JOSE - improving, baseline of 0.9  # Hyponatremia  Likely secondary to infection and poor po intake. As patient has chronic fluid overload in the setting of hepatic hydrothorax and ascites, will do ryan boluses.  BUN/Cr ratio >20 and patient reports feeling dehydrated.  - IVF  cc boluses prn, monitor BMP and respiratory status     # Cachexia  - Ensure TID meals and ensure as tolerated  - PT/OT consulted     # Stable Chronic Medical Conditions - unchanged management  - Hx of Gastric Ulcer 2014: continue home protonix 40mg daily, TUMS PRN  - Asthma: albuterol PRN, continue home Dulera  - Seasonal Allergies: continue home zyrtec 10mg daily    # Pain Assessment:  Current Pain Score 4/3/2018   Patient currently in pain? denies   Pain location -   Pain descriptors -   - Yue is experiencing pain due to abdominal infection. Pain management was discussed and the plan was created in a collaborative fashion.  Yue's response to the current recommendations: engaged  - Tramadol 50 mg q8h prn    Diet: Snacks/Supplements Adult: Ensure Plus Adult Shake; Between Meals  Regular Diet Adult  Snacks/Supplements Adult: Other - Please comment; 10:00 am: 2 hard boiled eggs + boost plus, 2:00 pm: Canned peaches + boost plus,, HS: yogurt  + boost plus; Between Meals  Fluids: po, boluses prn, has received 2.5L of NS for sepsis  DVT Prophylaxis: Enoxaparin (Lovenox) SQ  Code Status: Full Code, undergoing goals of care discussion    Disposition Plan   Expected discharge:Expected Discharge Date: 04/04/18 (Home), recommended to prior living arrangement once adequate pain management/ tolerating PO medications, antibiotic plan established and able to receive enough additoinal services at home.Dispo: Expected Discharge Date: 04/04/18 (Home)        Entered: Jefe Capellan 04/03/2018, 12:07 PM   Information in the  "above section will display in the discharge planner report.      The patient's care was discussed with the Dr. Vitaly Capellan  [facility]  Pager: [phone]  Please see sticky note for cross cover information    Interval History    She reports decreased abdominal pain, feeling better today, but still some shortness of breath. Reports \"fullness\". She has been sleeping well. Given Vit K and FFP yesterday to help correct INR. Plan for thoracentesis today    Review of Systems   Constitutional: Negative for diaphoresis and fever.   HENT: Negative for trouble swallowing.    Eyes: Negative for visual disturbance.   Respiratory: Negative for cough, shortness of breath and wheezing.    Cardiovascular: Negative for chest pain and palpitations.   Gastrointestinal: Positive for abdominal pain and nausea. Negative for vomiting.   Genitourinary: Negative for dysuria.   Skin: Negative for rash.   Neurological: Negative for headaches.   Psychiatric/Behavioral: Negative for agitation and confusion.     Physical Exam   Vital Signs: Temp: 97.5  F (36.4  C) Temp src: Axillary BP: 104/56   Heart Rate: 108 Resp: 18 SpO2: 100 % O2 Device: Nasal cannula Oxygen Delivery: 2 LPM  Weight: 116 lbs 0 oz  Physical Exam  Constitutional: No distress. Cachectic female, appears stated age   Cardiovascular: Tachycardia, regular rhythm, + for systolic murmur   Pulmonary/Chest: Effort normal. No respiratory distress. She has no wheezes. She has no rales.   Decreased breath sounds on the right   Abdominal: Bowel sounds are normal. She exhibits distension. There is tenderness (all throughout). + for rebound. No rigidity   Musculoskeletal: She exhibits no edema.   Neurological: She is alert, no signs of confusion as per family    Data   Medications       artificial saliva  5 mL Swish & Spit 4x Daily     cetirizine  10 mg Oral QPM     lactulose  10 g Oral Daily     fluticasone-vilanterol  1 puff Inhalation " Daily     omeprazole  40 mg Oral Daily     cefTRIAXone  2 g Intravenous Q24H     Data     Recent Labs  Lab 04/03/18  0457 04/02/18  2342 04/02/18  1821 04/02/18  1344 04/02/18  0903 04/01/18  1801 04/01/18  0626 03/31/18  2226   WBC  --   --   --   --   --  12.9* 7.8 4.1   HGB  --   --   --   --   --  9.0* 9.3* 9.6*   MCV  --   --   --   --   --  106* 101* 103*   PLT  --   --   --   --   --  85* 75* 99*   INR 2.04* 1.78*  --  2.57* 2.89*  --  1.95* 1.92*   NA  --   --  135  --  135 132* 132* 131*   POTASSIUM  --   --  3.8  --  3.9 4.3 3.9 3.4   CHLORIDE  --   --  103  --  102 101 98 98   CO2  --   --  24  --  23 16* 24 27   BUN  --   --  37*  --  36* 30 29 26   CR  --   --  1.30*  --  1.34* 1.48* 1.29* 1.05*   ANIONGAP  --   --  8  --  10 15* 10 6   MACY  --   --  7.9*  --  7.6* 7.6* 8.0* 7.9*   GLC  --   --  148*  --  165* 190* 96 119*   ALBUMIN  --   --   --   --   --   --  1.9* 1.9*   PROTTOTAL 4.8*  --   --   --   --   --  5.6*  --    BILITOTAL  --   --   --   --   --   --  5.4*  --    ALKPHOS  --   --   --   --   --   --  198*  --    ALT  --   --   --   --   --   --  54*  --    AST  --   --   --   --   --   --  100*  --      No results found for this or any previous visit (from the past 24 hour(s)).

## 2018-04-03 NOTE — PLAN OF CARE
Problem: Patient Care Overview  Goal: Plan of Care/Patient Progress Review  OT 5C: cancel, pt declining OOB activity 2/2 fatigue and increased SOB with activity despite encouragement, will reschedule.

## 2018-04-03 NOTE — PLAN OF CARE
Problem: Patient Care Overview  Goal: Individualization & Mutuality  Outcome: No Change  Pt tachycardic (low 100s), BP on soft side of normal. On 2L O2 for comfort/dyspnea w/ exertion.  Using tramadol prn for pain; maalox for gas discomfort.  Bedside thoracentesis at 1730.  To get albumin after that, followed by lasix 1hr after albumin. Family at bedside and supportive.  Continue POC.     Problem: Infection, Risk/Actual (Adult)  Goal: Infection Prevention/Resolution  Patient will demonstrate the desired outcomes by discharge/transition of care.   Outcome: No Change   04/03/18 1740   Infection, Risk/Actual (Adult)   Infection Prevention/Resolution making progress toward outcome       Problem: Pain, Chronic (Adult)  Goal: Acceptable Pain Control/Comfort Level  Patient will demonstrate the desired outcomes by discharge/transition of care.   Outcome: Improving   04/03/18 1740   Pain, Chronic (Adult)   Acceptable Pain Control/Comfort Level making progress toward outcome

## 2018-04-04 NOTE — PROGRESS NOTES
Palliative care sign off note:  Had planned to have a family care conference today --had arranged with family to meet at 3 pm today but now son Fred who they say needs to be present is not available. This happened last admission as well. I have reached out to patient's outpatient hepatologist Dr. Meyer to relay family's concerns and hopes for liver transplant despite being told she is not a candidate and he plans to talk with them more at their outpatient f/u later this month. Pt also has outpatient palliative care follow-up in clinic on 4/11 and I have already instructed all family involved in decisions to come to that appointment as well. Given repeated cancelled family meetings and fact that family medicine service is working on getting additional services at home with home care, I think palliative inpatient team can sign off for now. Please re-consult if you are able to set up a family conference and you wish us to participate.       Mahnaz Nettles MD / Palliative Medicine / Pager [phone] / [facility] Inpatient Team Consult Pager [phone] (answered 8am-430pm M-F) - ok to text page via [employer] / After-Hours Answering Service [phone] / Palliative Clinic in the [facility] at the [facility] - [phone] (scheduling); [phone] (triage).

## 2018-04-04 NOTE — PLAN OF CARE
Problem: Patient Care Overview  Goal: Plan of Care/Patient Progress Review  Outcome: No Change  Afebrile, soft pressures- MAP's stable, asymptomatic, all other vital signs stable thus far this shift. Continues on 2L O2 via NC per patient comfort. No complaints of dizziness or N/V. Some SOB noted upon exertion. Some mild discomfort noted to thoracentesis/paracentesis sites- declining any intervention at this time. Albumin currently infusing- patient to get scheduled Lasix when finished, see MAR. Up w/1 and a walker in the room. Family at the bedside. No further complaints thus far. Will continue to monitor.     Problem: Infection, Risk/Actual (Adult)  Goal: Identify Related Risk Factors and Signs and Symptoms  Related risk factors and signs and symptoms are identified upon initiation of Human Response Clinical Practice Guideline (CPG).    Patient's abdomen pain and infection will be improved or resolved at discharge   Outcome: No Change   04/03/18 7942   Infection, Risk/Actual   Related Risk Factors (Infection, Risk/Actual) age extremes;chronic illness/condition;malnutrition;surgery/procedure   Signs and Symptoms (Infection, Risk/Actual) heart rate increase;pain;weakness

## 2018-04-04 NOTE — PROGRESS NOTES
Care Coordinator Discharge Note     Admission Date/Time:  3/31/2018  Attending MD:  Darrick Haider MD     Data  Chart reviewed, discussed with interdisciplinary team.   Patient was admitted for:    SBP (spontaneous bacterial peritonitis) (H)  HCC (hepatocellular carcinoma) (H).    Concerns with insurance coverage for discharge needs: None.  Current Living Situation: Patient lives alone; has PCA, Home making, and family provides additional support totaling 24 hours per day care.    Support System: Supportive and Involved  Services Involved: DME, Home Care and PCA  Transportation: [place] transportation,  [facility] [phone] or Family/Friend will provide  Barriers to Discharge: none     Coordination of Care  RAS  with  jason, Mary Rosenberg, [facility] [phone]: confirmed home support hours with RAS  on 4/3/18  [facility]: PCA (10 hours a day), and Homemaking (5 hrs/week), and Boost for nutrition.    DME from [facility] hospital bed/commode/bath chair/walker.    Referrals:   None needed at this time     Assessment  4/4: Information requested from Senior Resident regarding obtaining DME (hospital bed, commode, etc). Historical charting (2015) indicates that patient was working with her primary clinic and PCP to obtain DME equipment. Patient should return/continue working with her PCP to obtain remaining DME equipment. Expect patient will discharge home tomorrow.    4/3: D: Chart reviewed and plan of care discussed with MD team. Patient admitted for spontaneous bacterial peritonitis being treated with IV ABX and requiring regular paracentesis and thoracentesis to maintain outpatient schedule.  Plan for patient to discharge tomorrow.  I/A: No discharge needs identified as RAS  Mary completes orders for PCA and Homemaking. P: Care Coordinator will remain available for discharge needs that may arise.        Plan  Anticipated Discharge Date:  4/5/2018  Anticipated Discharge Plan:   Home    CTS Handoff Completed: when orders are signed    Windy LERMAN RN PHN  Patient Care Management Coordinator  [facility]  Phone: [phone] / Pager: [phone]

## 2018-04-04 NOTE — PROGRESS NOTES
[place] Progress Note    Main Plans for Today   - Complete 5 day course of Ceftriaxone today  - Tranfusing 1u PRBC  - Resume lasix and spirinolactone  -Chest xray for post procedure drop in H/H  -Discuss DME ( commode and hospital bed) with care coordination     Assessment & Plan   Yue Portillo is a 78 year old female admitted on 3/31/2018. 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    # Sepsis 2/2 Spontaneous Bacterial Peritonitis (8686 PMNs): Culture pending- Sepsis present on admission  # Hepatocellular Carcinoma  # Recurrent Ascites  # Recurrent Hepatic Hydrothorax  Paracentesis (3/31) removing 1.5L of cloudy ascitic fluid - 66980 WBCs, 86% PMNs: Culture positive for E. Coli, pan sensitive  On 4/1 evening developed worsening tachycardia and leukocytosis, soft blood pressures with lactic of 6.5.  Responded to fluid boluses. Lactate now 1.1  There are no signs or symptoms to suggest secondary bacterial peritonitis at this time, CT abdomen with no signs of perforation or other acute process. Patient does not have a fever, no signs of hepatic encephalopathy.  CXR with increased hydrothorax  MELD-Na score: 24 at 4/2/2018  6:21 PM  MELD score: 23 at 4/3/2018  4:57 AM  Calculated from:  Serum Creatinine: 1.30 mg/dL at 4/2/2018  6:21 PM  Serum Sodium: 135 mmol/L at 4/2/2018  6:21 PM  Total Bilirubin: 5.4 mg/dL at 4/1/2018  6:26 AM  INR(ratio): 1.95 at 4/1/2018  6:26 AM  Age: 78 years  - Finish 5 days of CFTX 2g today  -SBP ppx with Cipro 250mg daily following SBP treatment ( finish 4/4/18, start Cipro 4/5)  - Daily weights  - Strict I/O's  - Continue home medications: lactulose 10g daily  - Resume lasix 20mg IV once and spirinolactone 50mg daily  - Thoracentesis completed  4/3, 2L removed  - Palliative care consult to continue goals of care discussion they had during previous admission      # JOSE - improving, baseline of 0.9  # Hyponatremia  Likely secondary to infection and poor po intake. As patient has chronic fluid overload in the setting of hepatic hydrothorax and ascites, will do ryan boluses.  BUN/Cr ratio >20 and patient reports feeling dehydrated. Likely prerenal from intravascular depletion  - lasix and PRBC per above    ##Anemia- likely dilutional following albumin administration following thora, as all cell lines dropped proportionately. Hemothorax post procedure also possible, will continue to monitor.    -Transfuse 1u PRBC  -check post transfusion Hgb     # Cachexia  - Ensure TID meals and ensure as tolerated  - PT/OT consulted     # Stable Chronic Medical Conditions - unchanged management  - Hx of Gastric Ulcer 2014: continue home protonix 40mg daily, TUMS PRN  - Asthma: albuterol PRN, continue home Dulera  - Seasonal Allergies: continue home zyrtec 10mg daily    # Pain Assessment:  Current Pain Score 4/4/2018   Patient currently in pain? denies   Pain location -   Pain descriptors -   - Yue is experiencing pain due to abdominal infection. Pain management was discussed and the plan was created in a collaborative fashion.  Yue's response to the current recommendations: engaged  - Tramadol 50 mg q8h prn    Diet: Snacks/Supplements Adult: Ensure Plus Adult Shake; Between Meals  Regular Diet Adult  Snacks/Supplements Adult: Other - Please comment; 10:00 am: 2 hard boiled eggs + boost plus, 2:00 pm: Canned peaches + boost plus,, HS: yogurt  + boost plus; Between Meals  Diet  Fluids: po, boluses prn, has received 2.5L of NS for sepsis  DVT Prophylaxis: Enoxaparin (Lovenox) SQ  Code Status: Full Code, undergoing goals of care discussion    Disposition Plan   Expected discharge: 4/5, recommended to prior living arrangement once adequate pain management/ tolerating PO  medications, antibiotic plan established and able to receive enough additoinal services at home.Dispo: expected dc 4/5        Entered: Jefe Capellan 04/04/2018, 8:33 AM   Information in the above section will display in the discharge planner report.      The patient's care was discussed with the Dr. Vitaly Capellan  [facility]  Pager: [phone]  Please see sticky note for cross cover information    Interval History    Had thora yesterday , with removal of 2L. Patient tolerated well. Patient reports breathing better this AM, denies significant pain. Slept well overnight.    Review of Systems   Constitutional: Negative for diaphoresis and fever.   HENT: Negative for trouble swallowing.    Eyes: Negative for visual disturbance.   Respiratory: Negative for cough, shortness of breath and wheezing.    Cardiovascular: Negative for chest pain and palpitations.   Gastrointestinal: Negative for abdominal pain, nausea and vomiting.   Genitourinary: Negative for dysuria.   Musculoskeletal: Positive for back pain.   Skin: Negative for rash.   Neurological: Negative for headaches.   Psychiatric/Behavioral: Negative for agitation and confusion.     Physical Exam   Vital Signs: Temp: 98.5  F (36.9  C) Temp src: Axillary BP: (!) 82/51 Pulse: 90 Heart Rate: 99 Resp: 20 SpO2: 99 % O2 Device: Nasal cannula Oxygen Delivery: 2 LPM  Weight: 116 lbs 0 oz  Physical Exam  Constitutional: No distress. Cachectic female, appears stated age   Cardiovascular: Tachycardia, regular rhythm, + for systolic murmur   Pulmonary/Chest: Effort normal. No respiratory distress. She has no wheezes. She has no rales.   Decreased breath sounds on the right , improved from yesterday  Abdominal: Bowel sounds are normal. She exhibits distension. There is tenderness (all throughout). + for rebound. No rigidity   Musculoskeletal: She exhibits no edema.   Neurological: She is alert, no signs of confusion as per  family    Data   Medications       cefTRIAXone  2 g Intravenous Once     furosemide  20 mg Intravenous Once     artificial saliva  5 mL Swish & Spit 4x Daily     cetirizine  10 mg Oral QPM     lactulose  10 g Oral Daily     fluticasone-vilanterol  1 puff Inhalation Daily     omeprazole  40 mg Oral Daily     Data     Recent Labs  Lab 04/04/18  0414 04/03/18  1829 04/03/18  1214 04/03/18  0457  04/02/18  1821  04/02/18  0903 04/01/18  1801 04/01/18  0626   WBC 3.6*  --   --   --   --   --   --   --  12.9* 7.8   HGB 6.9*  --   --   --   --   --   --   --  9.0* 9.3*   *  --   --   --   --   --   --   --  106* 101*   PLT 35*  --   --   --   --   --   --   --  85* 75*   INR 2.41*  --  2.04* 2.04*  < >  --   < > 2.89*  --  1.95*     --   --   --   --  135  --  135 132* 132*   POTASSIUM 4.0  --   --   --   --  3.8  --  3.9 4.3 3.9   CHLORIDE 103  --   --   --   --  103  --  102 101 98   CO2 26  --   --   --   --  24  --  23 16* 24   BUN 44*  --   --   --   --  37*  --  36* 30 29   CR 1.27*  --   --   --   --  1.30*  --  1.34* 1.48* 1.29*   ANIONGAP 6  --   --   --   --  8  --  10 15* 10   MACY 8.2*  --   --   --   --  7.9*  --  7.6* 7.6* 8.0*   *  --   --   --   --  148*  --  165* 190* 96   ALBUMIN  --  1.7*  --   --   --   --   --   --   --  1.9*   PROTTOTAL  --  5.5*  --  4.8*  --   --   --   --   --  5.6*   BILITOTAL  --   --   --   --   --   --   --   --   --  5.4*   ALKPHOS  --   --   --   --   --   --   --   --   --  198*   ALT  --   --   --   --   --   --   --   --   --  54*   AST  --   --   --   --   --   --   --   --   --  100*   < > = values in this interval not displayed.  Recent Results (from the past 24 hour(s))   XR Chest Port 1 View    Narrative    XR CHEST PORT 1 VW  4/3/2018 6:25 PM      HISTORY: follow-up right sided thoracentesis;     COMPARISON: 4/1/2018    FINDINGS: Single portable AP chest semiupright. Interval decrease in  the previously seen right pleural effusion, which remains  sizable,  with concomitant reexpansion of predominantly the right upper lobe;  improved aeration. Patchy opacities throughout the left lung are  unchanged. No pneumothorax.      Impression    IMPRESSION:   1. Decreased right pleural effusion, following thoracentesis, still  sizable. No pneumothorax.  2. Left lung opacities are unchanged.    I have personally reviewed the examination and initial interpretation  and I agree with the findings.    KAIDEN MARIA MD

## 2018-04-04 NOTE — PLAN OF CARE
Problem: Patient Care Overview  Goal: Plan of Care/Patient Progress Review  Outcome: No Change  Afebrile, soft blood pressures w/ stable MAP; intermittently tachycardiac, on 2 LPM for comfort, O2 sats upper 90's. Patient c/o of SOB/wheezing, albuterol nebulizer given x2 w/good relief. Lactic acids 2.1 and 1.6, MD aware. Hgb recheck 9.3.Tramadol given x1 for abdominal pain w/relief. Chest x-ray done to monitor for bleeding. 40 mg IV lasix given w/ minimal UO. Will give another dose per MD. Soft/loose stools, patient on lactulose. Possible discharge tomorrow, family supportive at bedside, continue to monitor closely.      04/04/18 1624   OTHER   Plan Of Care Reviewed With patient;family   Plan of Care Review   Progress no change       Problem: Infection, Risk/Actual (Adult)  Goal: Identify Related Risk Factors and Signs and Symptoms  Related risk factors and signs and symptoms are identified upon initiation of Human Response Clinical Practice Guideline (CPG).    Patient's abdomen pain and infection will be improved or resolved at discharge   Outcome: No Change   04/04/18 1624   Infection, Risk/Actual   Related Risk Factors (Infection, Risk/Actual) chronic illness/condition;malnutrition;skin integrity impairment;surgery/procedure   Signs and Symptoms (Infection, Risk/Actual) drainage;feeding/eating intolerance;lab value changes;malaise;pain;weakness

## 2018-04-04 NOTE — PLAN OF CARE
Problem: Patient Care Overview  Goal: Plan of Care/Patient Progress Review   04/04/18 0539   OTHER   Plan Of Care Reviewed With patient   Plan of Care Review   Progress no change       Problem: Infection, Risk/Actual (Adult)  Goal: Identify Related Risk Factors and Signs and Symptoms  Related risk factors and signs and symptoms are identified upon initiation of Human Response Clinical Practice Guideline (CPG).    Patient's abdomen pain and infection will be improved or resolved at discharge    04/04/18 0539   Infection, Risk/Actual   Related Risk Factors (Infection, Risk/Actual) age extremes;chronic illness/condition;malnutrition   Signs and Symptoms (Infection, Risk/Actual) heart rate increase;pain;weakness       Problem: Pain, Chronic (Adult)  Goal: Identify Related Risk Factors and Signs and Symptoms  Related risk factors and signs and symptoms are identified upon initiation of Human Response Clinical Practice Guideline (CPG).    04/04/18 0539   Pain, Chronic   Related Risk Factors (Chronic Pain) cultural/spiritual factor;knowledge deficit;procedures/treatments   Signs and Symptoms (Chronic Pain) abnormal posturing/positioning;alteration in muscle tone;fatigue/weakness;BADLs/IADLs, reluctance/inability to perform;guarding behavior/splinting/limp;verbalization of pain descriptors     Pt continues to have abdominal pain. Reports breathing is improved after thoracentesis. Afebrile. Low BP overnight, MD paged and ok to hold lasix due to BP. Pt also refused dose as she did not want to be getting up and down overnight to bathroom. Denies SOB, educate to let staff know about changes. Continue antibiotics. Son at bedside. Hourly rounding done. Will monitor.

## 2018-04-04 NOTE — PLAN OF CARE
Problem: Patient Care Overview  Goal: Plan of Care/Patient Progress Review  OT 5C: cancel, pt declining therapy at original  time, pts family did not sign  waiver yet to do therapy session w/o  present, will reschedule.

## 2018-04-04 NOTE — PROGRESS NOTES
[facility] [place]    Sepsis Evaluation Progress Note    Date of Service: 04/04/2018    I was called to see Yue Portillo due to elevated lactate She is known to have an infection.     Physical Exam    Vital Signs:  Temp: 97  F (36.1  C) Temp src: Axillary BP: 104/63 Pulse: 90 Heart Rate: 93 Resp: 20 SpO2: 100 % O2 Device: Nasal cannula Oxygen Delivery: 2 LPM      General- NAD, non labored breathing, AOX3  Cardio-RRR, systolic murmur, skin warm and well perfused  Lab:        Lactic Acid   Date Value Ref Range Status   04/03/2018 1.1 0.7 - 2.0 mmol/L Final       The patient is at baseline mental status.    The rest of their physical exam is significant for ascites    Assessment and Plan    The SIRS and exam findings are likely due to   sepsis.     ID: The patient is currently on the following antibiotics:  Anti-infectives (Future)    Start     Dose/Rate Route Frequency Ordered Stop    04/04/18 0845  cefTRIAXone (ROCEPHIN) 2 g vial to attach to  ml bag for ADULTS or NS 50 ml bag for PEDS      2 g  over 30 Minutes Intravenous ONCE 04/04/18 0831          Current antibiotic coverage is appropriate for source of infection.    Fluid: Fluid bolus not indicated due to volume status, currently finishing blood transfusion.    Lab: Repeat lactic acid is not indicated.    Disposition: The patient will remain on the current unit. We will continue to monitor this patient closely.    Jefe Capellan DO

## 2018-04-05 NOTE — PLAN OF CARE
Problem: Patient Care Overview  Goal: Plan of Care/Patient Progress Review  Outcome: No Change   04/05/18 1405   OTHER   Plan Of Care Reviewed With patient   Plan of Care Review   Progress no change     AVSS.  Pt states pain but denies medication.  Denies nausea.  Appetite poor.  Up to bathroom with ax1 and walker.  Voiding adequately.  Wheezing noted.  RT administered PRN nebulizer x1.  Discharged planned for tomorrow.  Continue plan of care.     Problem: Infection, Risk/Actual (Adult)  Goal: Identify Related Risk Factors and Signs and Symptoms  Related risk factors and signs and symptoms are identified upon initiation of Human Response Clinical Practice Guideline (CPG).    Patient's abdomen pain and infection will be improved or resolved at discharge   Outcome: No Change   04/05/18 1405   Infection, Risk/Actual   Related Risk Factors (Infection, Risk/Actual) chronic illness/condition;prolonged hospitalization   Signs and Symptoms (Infection, Risk/Actual) pain;weakness         Problem: Pain, Chronic (Adult)  Goal: Identify Related Risk Factors and Signs and Symptoms  Related risk factors and signs and symptoms are identified upon initiation of Human Response Clinical Practice Guideline (CPG).   Outcome: No Change   04/05/18 1405   Pain, Chronic   Related Risk Factors (Chronic Pain) disease process   Signs and Symptoms (Chronic Pain) fatigue/weakness;guarding behavior/splinting/limp

## 2018-04-05 NOTE — PLAN OF CARE
Problem: Patient Care Overview  Goal: Plan of Care/Patient Progress Review  Outcome: No Change  Afebrile, all other vital signs stable thus far this shift. Intermittently on 2L O2 via NC throughout the night for comfort. No complaints of dizziness, N/V or pain this shift. Some SOB noted during exertion. Continues on IV antibiotics. Received 1 PRN neb treatment for wheezing, see MAR. Up w/1 and a walker in room. Low urine output and 1 loose BM noted this shift. Paracentesis site continues to leak serous fluid- dressing changed x1. No further complaints. Slept well throughout the night. Son at bedside. Possible D/C today. Will continue to monitor.     Problem: Infection, Risk/Actual (Adult)  Goal: Identify Related Risk Factors and Signs and Symptoms  Related risk factors and signs and symptoms are identified upon initiation of Human Response Clinical Practice Guideline (CPG).    Patient's abdomen pain and infection will be improved or resolved at discharge   Outcome: No Change   04/05/18 0557   Infection, Risk/Actual   Related Risk Factors (Infection, Risk/Actual) chronic illness/condition;malnutrition;skin integrity impairment   Signs and Symptoms (Infection, Risk/Actual) drainage;feeding/eating intolerance;lab value changes;malaise;pain;weakness

## 2018-04-05 NOTE — PROGRESS NOTES
[facility], [facility] Progress Note    Main Plans for Today   - Start Cipro 250mg for SBP ppx  - 1 dose 20mg IV lasix  - Continue spirinolactone  -Contact procedure team about leaking para site  -Discuss DME ( commode and hospital bed) with care coordination     Assessment & Plan   Yue Portillo is a 78 year old female admitted on 3/31/2018. 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 Now completed 5 care course of CFTX.    # Sepsis 2/2 Spontaneous Bacterial Peritonitis (8686 PMNs): Culture pending- Sepsis present on admission  # Hepatocellular Carcinoma  # Recurrent Ascites  # Recurrent Hepatic Hydrothorax  Paracentesis (3/31) removing 1.5L of cloudy ascitic fluid - 57314 WBCs, 86% PMNs: Culture positive for E. Coli, pan sensitive  On 4/1 evening developed worsening tachycardia and leukocytosis, soft blood pressures with lactic of 6.5.  Responded to fluid boluses. Lactate now normal  There are no signs or symptoms to suggest secondary bacterial peritonitis at this time, CT abdomen with no signs of perforation or other acute process. Patient does not have a fever, no signs of hepatic encephalopathy.  CXR with increased hydrothorax.  MELD-Na score: 24 at 4/5/2018  7:38 AM  MELD score: 21 at 4/5/2018  7:38 AM  Calculated from:  Serum Creatinine: 1.13 mg/dL at 4/5/2018  7:38 AM  Serum Sodium: 132 mmol/L at 4/5/2018  7:38 AM  Total Bilirubin: 3.1 mg/dL at 4/5/2018  7:38 AM  INR(ratio): 2.41 at 4/4/2018  4:14 AM  Age: 78 years  - Finish 5 days of CFTX 2g 4/4/18  -SBP ppx with Cipro 250mg daily, will plan to adjust to 500mg when GFR improves  - Daily weights  - Strict I/O's  - Continue home medications: lactulose 10g daily  -  lasix 20mg IV once and continue spirinolactone 50mg daily  -  Thoracentesis completed 4/3, 2L removed  - Palliative care plans to see patient as outpatient  -Has outpatient paracentesis set up for 4/11/18 @ 930am  -Procedure team consult for para prior to discharge 4/6     # JOSE - improving, baseline of 0.9  # Hyponatremia, improving  Likely secondary to infection and poor po intake. As patient has chronic fluid overload in the setting of hepatic hydrothorax and ascites, will do ryan boluses.  BUN/Cr ratio >20 and patient reports feeling dehydrated. Likely prerenal from intravascular depletion  - lasix per above    ##Anemia- likely dilutional following albumin administration following thora, as all cell lines dropped proportionately. Hemothorax post procedure also possible, will continue to monitor.    --now stable, likely dilutional     # Cachexia  - Ensure TID meals and ensure as tolerated  - PT/OT consulted     # Stable Chronic Medical Conditions - unchanged management  - Hx of Gastric Ulcer 2014: continue home protonix 40mg daily, TUMS PRN  - Asthma: albuterol PRN, continue home Dulera  - Seasonal Allergies: continue home zyrtec 10mg daily    # Pain Assessment:  Current Pain Score 4/5/2018   Patient currently in pain? yes   Pain location -   Pain descriptors -   - Yue is experiencing pain due to abdominal infection. Pain management was discussed and the plan was created in a collaborative fashion.  Yue's response to the current recommendations: engaged  - Tramadol 50 mg q8h prn    Diet: Snacks/Supplements Adult: Ensure Plus Adult Shake; Between Meals  Regular Diet Adult  Snacks/Supplements Adult: Other - Please comment; 10:00 am: 2 hard boiled eggs + boost plus, 2:00 pm: Canned peaches + boost plus,, HS: yogurt  + boost plus; Between Meals  Diet  Fluids: po, boluses prn, has received 2.5L of NS for sepsis  DVT Prophylaxis: Enoxaparin (Lovenox) SQ  Code Status: Full Code, undergoing goals of care discussion    Disposition Plan   Expected discharge: 4/6, recommended to  prior living arrangement once adequate pain management/ tolerating PO medications, antibiotic plan established and able to receive enough additoinal services at home.Dispo: expected dc 4/6        Entered: Jefe Capellan 04/05/2018, 11:43 AM   Information in the above section will display in the discharge planner report.      The patient's care was discussed with the Dr. Vitaly Capellan  [facility] Medicine  Pager: [phone]  Please see sticky note for cross cover information    Interval History    Had thora yesterday , with removal of 2L. Patient tolerated well. Patient reports breathing better this AM, denies significant pain. Slept well overnight.    Review of Systems   Constitutional: Negative for diaphoresis and fever.   HENT: Negative for trouble swallowing.    Eyes: Negative for visual disturbance.   Respiratory: Negative for cough, shortness of breath and wheezing.    Cardiovascular: Negative for chest pain and palpitations.   Gastrointestinal: Negative for abdominal pain, nausea and vomiting.   Genitourinary: Negative for dysuria.   Musculoskeletal: Positive for back pain.   Skin: Negative for rash.   Neurological: Negative for headaches.   Psychiatric/Behavioral: Negative for agitation and confusion.     Physical Exam   Vital Signs: Temp: 99.5  F (37.5  C) Temp src: Axillary BP: 106/68   Heart Rate: 99 Resp: 18 SpO2: 95 % O2 Device: None (Room air) Oxygen Delivery: 2 LPM  Weight: 111 lbs 11.2 oz  Physical Exam  Constitutional: No distress. Cachectic female, appears stated age   Cardiovascular: Tachycardia, regular rhythm, + for systolic murmur   Pulmonary/Chest: Effort normal. No respiratory distress. She has no wheezes. She has no rales.   Coarse breath sounds at right base  Abdominal: Bowel sounds are normal. She exhibits distension. There is tenderness (all throughout).No rigidity   Musculoskeletal: She exhibits no edema.   Neurological: She is alert, no  signs of confusion as per family    Data   Medications       ciprofloxacin  250 mg Oral Q24H MARIA GUADALUPE     spironolactone  50 mg Oral Daily     artificial saliva  5 mL Swish & Spit 4x Daily     cetirizine  10 mg Oral QPM     lactulose  10 g Oral Daily     fluticasone-vilanterol  1 puff Inhalation Daily     omeprazole  40 mg Oral Daily     Data     Recent Labs  Lab 04/05/18  0738 04/04/18  1626 04/04/18  0414 04/03/18  1829 04/03/18  1214 04/03/18  0457  04/02/18  1821  04/01/18  1801 04/01/18  0626   WBC 5.0  --  3.6*  --   --   --   --   --   --  12.9* 7.8   HGB 8.8* 9.3* 6.9*  --   --   --   --   --   --  9.0* 9.3*   *  --  105*  --   --   --   --   --   --  106* 101*   PLT 49*  --  35*  --   --   --   --   --   --  85* 75*   INR  --   --  2.41*  --  2.04* 2.04*  < >  --   < >  --  1.95*   *  --  135  --   --   --   --  135  < > 132* 132*   POTASSIUM 4.4  --  4.0  --   --   --   --  3.8  < > 4.3 3.9   CHLORIDE 101  --  103  --   --   --   --  103  < > 101 98   CO2 26  --  26  --   --   --   --  24  < > 16* 24   BUN 49*  --  44*  --   --   --   --  37*  < > 30 29   CR 1.13*  --  1.27*  --   --   --   --  1.30*  < > 1.48* 1.29*   ANIONGAP 5  --  6  --   --   --   --  8  < > 15* 10   MACY 7.9*  --  8.2*  --   --   --   --  7.9*  < > 7.6* 8.0*   *  --  119*  --   --   --   --  148*  < > 190* 96   ALBUMIN 2.3*  --   --  1.7*  --   --   --   --   --   --  1.9*   PROTTOTAL 5.0*  --   --  5.5*  --  4.8*  --   --   --   --  5.6*   BILITOTAL 3.1*  --   --   --   --   --   --   --   --   --  5.4*   ALKPHOS 170*  --   --   --   --   --   --   --   --   --  198*   ALT 43  --   --   --   --   --   --   --   --   --  54*   AST 77*  --   --   --   --   --   --   --   --   --  100*   < > = values in this interval not displayed.  No results found for this or any previous visit (from the past 24 hour(s)).

## 2018-04-05 NOTE — PLAN OF CARE
Problem: Patient Care Overview  Goal: Plan of Care/Patient Progress Review  OT 5C: Cx, did not want therapy at time scheduled with , unable to return back.

## 2018-04-06 NOTE — PLAN OF CARE
Problem: Patient Care Overview  Goal: Plan of Care/Patient Progress Review  Outcome: No Change   04/05/18 2115   OTHER   Plan Of Care Reviewed With patient;son   Plan of Care Review   Progress no change     Afebrile, VSS. Pt complains of abdominal pain but declines intervention. Son at bedside. Paracentesis site to right abdomen is covered with gauze, C/D/I. Plan is for D/C tomorrow. No acute concerns. Will continue to monitor, following plan of care.    Problem: Infection, Risk/Actual (Adult)  Goal: Identify Related Risk Factors and Signs and Symptoms  Related risk factors and signs and symptoms are identified upon initiation of Human Response Clinical Practice Guideline (CPG).    Patient's abdomen pain and infection will be improved or resolved at discharge   Outcome: No Change   04/05/18 2115   Infection, Risk/Actual   Related Risk Factors (Infection, Risk/Actual) chronic illness/condition   Signs and Symptoms (Infection, Risk/Actual) pain;weakness

## 2018-04-06 NOTE — PLAN OF CARE
Problem: Patient Care Overview  Goal: Plan of Care/Patient Progress Review  OT 5C: cancel, pt firmly declining therapy upon attempt, experiencing abdominal pain and states mobility makes it much worse, will reschedule.

## 2018-04-06 NOTE — PLAN OF CARE
Problem: Infection, Risk/Actual (Adult)  Goal: Identify Related Risk Factors and Signs and Symptoms  Related risk factors and signs and symptoms are identified upon initiation of Human Response Clinical Practice Guideline (CPG).    Patient's abdomen pain and infection will be improved or resolved at discharge   Outcome: Adequate for Discharge Date Met: 04/06/18 04/06/18 1523   Infection, Risk/Actual   Related Risk Factors (Infection, Risk/Actual) age extremes;chronic illness/condition   Signs and Symptoms (Infection, Risk/Actual) pain;weakness   bp 86/51 and recheck 109/73. Right sided abdominal pain and received tramadol. Ate a yogurt. Declined therapy. INR 2.06. Two smears of stool. Lasix and voided 230 ml. Ok to give lasix per MD. Paracentesis in the room at 15:30. Family at bedside.

## 2018-04-06 NOTE — PROCEDURES
[facility]  [facility]    [id number]  April 6, 2018    Indication: ascites, pain     Consent: Affirmation of informed consent was signed and scanned into the medical record. Risks, benefits and alternatives were discussed. Patient's questions were elicited and answered.   Procedure safety checklist was completed:  Yes  Time Out (Pause for the Cause) completed: Yes    Labs: Multiple labs sent off for fluid analysis, see epic orders for details    Preoperative Diagnosis: Ascites, suspect SBP  Postoperative Diagnosis: same    Technique: US performed at bedside to find largest fluid pocket on the right side of the abdomen. Pt prepped and draped in usual fashion.     Using sterile technique, the site was anesthetized with lidocaine for a local skin weal and then deep to the peritoneum. A skin nick was made at the site with an 11blade, and the paracentesis needle was introduced, and catheter advanced until straw colored ascitic fluid returned. The needle was removed from apparatus, samples drawn for lab, then catheter was attached to pressure bottles for drainage of the remaining fluid. Bedside US was performed again to confirm ascitic fluid was completely drained. Site was cleaned and covered with a sterile bandage.   Pt tolerated procedure well and was in stable condition.     Skin prep [employer]  Anesthesia 1% lidocaine without epi 4 cc  EBL:   <5cc  Complications:  none    Cells sent for cytology:  No  Fluid volume removed:  2200ml     Skin glue applied to site given history of leakage      Resident: Jefe Capellan  Faculty: GABRIELE MtzO.  [place] [id number]  [phone]

## 2018-04-06 NOTE — PLAN OF CARE
Problem: Patient Care Overview  Goal: Plan of Care/Patient Progress Review  Outcome: No Change  Soft BP's 90's/50's. OVSS. Complains of abdominal and incisional pain. Given 50mg of tramadol with relief. MARTINEZ. Infrequent, uncomfortable cough noted. 1 albuterol neb administered. Pt has urinary urgency and frequency. Up with assist of 1 to bathroom. Family supportive and at bedside. Possible D/C today. No other complaints. Continue to monitor.      04/06/18 0553   OTHER   Plan Of Care Reviewed With patient   Plan of Care Review   Progress no change       Problem: Infection, Risk/Actual (Adult)  Goal: Identify Related Risk Factors and Signs and Symptoms  Related risk factors and signs and symptoms are identified upon initiation of Human Response Clinical Practice Guideline (CPG).    Patient's abdomen pain and infection will be improved or resolved at discharge   Outcome: No Change       04/05/18 2115 04/06/18 0553   Infection, Risk/Actual   Related Risk Factors (Infection, Risk/Actual) --  age extremes;chronic illness/condition   Signs and Symptoms (Infection, Risk/Actual) pain;weakness --        Problem: Pain, Chronic (Adult)  Goal: Identify Related Risk Factors and Signs and Symptoms  Related risk factors and signs and symptoms are identified upon initiation of Human Response Clinical Practice Guideline (CPG).   Outcome: No Change       04/05/18 1405   Pain, Chronic   Related Risk Factors (Chronic Pain) disease process   Signs and Symptoms (Chronic Pain) fatigue/weakness;guarding behavior/splinting/limp

## 2018-04-06 NOTE — PLAN OF CARE
"Problem: Patient Care Overview  Goal: Plan of Care/Patient Progress Review  Outcome: Adequate for Discharge Date Met: 04/06/18 04/06/18 1851   OTHER   Plan Of Care Reviewed With patient;son   Plan of Care Review   Progress improving     Goal: Individualization & Mutuality  Outcome: Adequate for Discharge Date Met: 04/06/18  BP 94/56  Pulse 94  Temp 97  F (36.1  C) (Axillary)  Resp 16  Ht 1.575 m (5' 2\")  Wt 52.1 kg (114 lb 14.4 oz)  SpO2 98%  BMI 21.02 kg/m2  Pt. Had pericentesis completed in room this evening, per MD without incident.  Site is C/D/I, AVSS.  Pt up in chair and to BR this evening.  Drank Boost x1.  Discharge instructions and medications reviewed with son.  Son stated understanding of instructions, asked appropriate questions.  Pt to be followed up in clinic on April 11.      Problem: Infection, Risk/Actual (Adult)  Goal: Infection Prevention/Resolution  Patient will demonstrate the desired outcomes by discharge/transition of care.   Outcome: Adequate for Discharge Date Met: 04/06/18 04/06/18 1851   Infection, Risk/Actual (Adult)   Infection Prevention/Resolution making progress toward outcome       Problem: Pain, Chronic (Adult)  Goal: Identify Related Risk Factors and Signs and Symptoms  Related risk factors and signs and symptoms are identified upon initiation of Human Response Clinical Practice Guideline (CPG).   Outcome: Adequate for Discharge Date Met: 04/06/18 04/05/18 1405   Pain, Chronic   Related Risk Factors (Chronic Pain) disease process   Signs and Symptoms (Chronic Pain) fatigue/weakness;guarding behavior/splinting/limp     Goal: Acceptable Pain Control/Comfort Level  Patient will demonstrate the desired outcomes by discharge/transition of care.   Outcome: Adequate for Discharge Date Met: 04/06/18 04/06/18 1851   Pain, Chronic (Adult)   Acceptable Pain Control/Comfort Level making progress toward outcome         "

## 2018-04-06 NOTE — PROGRESS NOTES
Care Coordinator Discharge Note     Admission Date/Time:  3/31/2018  Attending MD:  Darrick Haider MD     Data  Chart reviewed, discussed with interdisciplinary team.   Patient was admitted for:    SBP (spontaneous bacterial peritonitis) (H)  HCC (hepatocellular carcinoma) (H).    Concerns with insurance coverage for discharge needs: None.  Current Living Situation: Patient lives alone; has PCA, Home making, and family provides additional support totaling 24 hours per day care.    Support System: Supportive and Involved  Services Involved: DME, Home Care and PCA  Transportation: [facility],  [facility] [phone] or Family/Friend will provide  Barriers to Discharge: none     Coordination of Care  RAS  with  jason, Mary Shakira, [facility] [phone]: confirmed home support hours with RAS  on 4/3/18  [facility]: PCA (10 hours a day), and Homemaking (5 hrs/week), and Boost for nutrition.    DME from [facility] bed/commode/bath chair/walker.    Referrals:   None needed at this time     Assessment  4/6: Patient expected to discharge on oral diuretics after paracentesis this afternoon. Next outpatient paracentesis scheduled for 4/11 at 930am. Family is present and attentive in the room, if they are unable to provide transport patient (or nursing staff) can arrange a ride [facility] [phone]. No further RNCC needs.    4/4: Information requested from Senior Resident regarding obtaining DME (hospital bed, commode, etc). Historical charting (2015) indicates that patient was working with her primary clinic and PCP to obtain DME equipment. Patient should return/continue working with her PCP to obtain remaining DME equipment. Expect patient will discharge home tomorrow.    4/3: D: Chart reviewed and plan of care discussed with MD team. Patient admitted for spontaneous bacterial peritonitis being treated with IV ABX and requiring regular paracentesis  and thoracentesis to maintain outpatient schedule.  Plan for patient to discharge tomorrow.  I/A: No discharge needs identified as SW  Mary completes orders for PCA and Homemaking. P: Care Coordinator will remain available for discharge needs that may arise.        Plan  Anticipated Discharge Date:  4/6/2018  Anticipated Discharge Plan:  Home    CTS Handoff Completed: YES    Windy LERAMN RN PHN  Patient Care Management Coordinator  Annamaria Araiza [address], and [facility] 5  Phone: [phone] / Pager: [phone]

## 2018-04-07 NOTE — PLAN OF CARE
Problem: Patient Care Overview  Goal: Plan of Care/Patient Progress Review  Occupational Therapy Discharge Summary    Reason for therapy discharge:    Discharged to home with home therapy.    Progress towards therapy goal(s). See goals on Care Plan in [facility] electronic health record for goal details.  Goals partially met.  Barriers to achieving goals:   discharge from facility.    Therapy recommendation(s):    Continued therapy is recommended.  Rationale/Recommendations:  Increase strength and endurance for ADLs and IADLs.

## 2018-04-11 NOTE — PROGRESS NOTES
Paracentesis Nursing Note  Yue Portillo presents today to [facility] for [facility]acentesis.    During today's appointment orders from Tala Meyer MD were completed.    Progress Note:  Patient identification verified by name and date of birth.  Assessment completed.  Vitals monitored throughout appointment and recorded in Doc Flowsheets.  See proceduralist note in ultrasound.    Date of consent or authorization: 4/11/18.  Invasive Procedure Safety Checklist was completed and sent for scanning.     Paracentesis performed by Juan Colon PA-C Radiology.    The following labs were communicated to provider performing paracentesis:  Lab Results   Component Value Date    PLT 44 04/06/2018       Total amount of ascites fluid drained: 1.7 liters.  Color of ascites fluid: yellow.  Total amount of albumin given: 25  grams.    Patient tolerated procedure well.    Post procedure,denies pain or discomfort post paracentesis.      Discharge Plan:  Discharge instructions were reviewed with patient.  Patient/Representative verbalized understanding and all questions were answered.   Discharged from [facility] in stable condition.    Ericka Matias RN       Administrations This Visit     albumin human 25 % injection 12.5 g     Admin Date Action Dose Route Administered By             04/11/2018 New Bag 25 g Intravenous Ericka Matias, AMIE                    lidocaine 1 % 20 mL     Admin Date Action Dose Route Administered By             04/11/2018 Given by Other 10 mL Other Ericka Matias, AMIE                            BP 99/57  Temp 97.3  F (36.3  C) (Oral)  Resp 16  Wt 53.3 kg (117 lb 8 oz)  BMI 21.49 kg/m2

## 2018-04-11 NOTE — MR AVS SNAPSHOT
After Visit Summary   4/11/2018    Yue Portillo    MRN: [id number]           Patient Information     Date Of Birth          1940        Visit Information        Provider Department      4/11/2018 9:15 AM Provider, [facility] Spec [facility]; [employer]; [facility] [address] [facility]        Today's Diagnoses     HCC (hepatocellular carcinoma) (H)    -  1    Cirrhosis of liver with ascites, unspecified hepatic cirrhosis type (H)          Care Instructions    Call [phone] to schedule thoracentesis    Discharge Instructions for Paracentesis  Paracentesis is a procedure to remove extra fluid from your belly (abdomen). This fluid buildup in the abdomen is called ascites. The procedure may have been done to take a sample of the fluid. Or, it may have been done to drain the extra fluid from your abdomen and help make you more comfortable.     Ascites is buildup of excess fluid in the abdomen.   Home care    If you have pain after the procedure, your healthcare provider can prescribe or recommend pain medicines. Take these exactly as directed. If you stopped taking other medicines before the procedure, ask your provider when you can start them again.    Take it easy for 24 hours after the procedure. Avoid physical activity until your provider says it s OK.    You will have a small bandage over the puncture site. Stitches (sutures), surgical staples, adhesive tapes, adhesive strips, or surgical glue may be used to close the incision. They also help stop bleeding and speed healing. You may take the bandage off in 24 hours.    Check the puncture site for the signs of infection listed below.  Follow-up care  Make a follow-up appointment with your healthcare provider as directed. During your follow-up visit, your provider will check your healing. Let your provider know how you are feeling. You can also discuss the cause of your ascites and if you need any further  treatment.  When to seek medical advice  Call your healthcare provider if you have any of the following after the procedure:    A fever of 100.4 F (38.0 C) or higher    Trouble breathing    Pain that doesn't go away even after taking pain medicine    Belly pain not caused by having the skin punctured    Bleeding from the puncture site    More than a small amount of fluid leaking from the puncture site    Swollen belly    Signs of infection at the puncture site. These include increased pain, redness, or swelling, warmth, or bad-smelling drainage.    Blood in your urine    Feeling dizzy or lightheaded, or fainting   Date Last Reviewed: 7/1/2016 2000-2017 [employer]. [address] [place] [address]. All rights reserved. This information is not intended as a substitute for professional medical care. Always follow your healthcare professional's instructions.                Follow-ups after your visit        Your next 10 appointments already scheduled     Apr 11, 2018 12:30 PM CDT   New Patient Visit with Chay Yeager MD   [facility] ([facility])    [address]  [address]  [facility] [id number] [phone]            Apr 18, 2018  2:00 PM CDT   Paracentesis Visit with  Spec Inf Para Provider,  40 ATC   [facility] Specialty [facility] Procedure ([facility])    [address]  [address]  [facility] [id number] [phone]            Apr 24, 2018  9:30 AM CDT   Lab with  LAB   [facility] ([facility])    [address]  1st Floor  [facility] [id number] [phone]            Apr 24, 2018 10:30 AM CDT   (Arrive by 10:15 AM)   New General Liver with Tala Meyer MD   [facility] ([facility])    [address]  [address]  [facility] [id number] [phone]            Apr 25,  2018  9:30 AM CDT   Paracentesis Visit with Uc Spec Inf Para Provider, UC 40 ATC   [facility] Procedure ([facility])    [address]  [address]  [facility] [id number] [phone]            May 02, 2018 12:00 PM CDT   Paracentesis Visit with Uc Spec Inf Para Provider, UC 40 ATC   [facility] Procedure ([facility])    [address]  [address]  [facility] [address] [phone]            May 09, 2018  9:30 AM CDT   Paracentesis Visit with Uc Spec Inf Para Provider   [facility] Procedure ([facility])    [address]  [address]  [facility] [address] [phone]              Who to contact     If you have questions or need follow up information about today's clinic visit or your schedule please contact [facility] SPECIALTY AND PROCEDURE directly at [phone].  Normal or non-critical lab and imaging results will be communicated to you by MyChart, letter or phone within 4 business days after the clinic has received the results. If you do not hear from us within 7 days, please contact the clinic through MyChart or phone. If you have a critical or abnormal lab result, we will notify you by phone as soon as possible.  Submit refill requests through [employer] or call your pharmacy and they will forward the refill request to us. Please allow 3 business days for your refill to be completed.          Additional Information About Your Visit        Care EveryWhere ID     This is your Care EveryWhere ID. This could be used by other organizations to access your [place] medical records  [id number]        Your Vitals Were     Temperature Respirations BMI (Body Mass Index)             97.3  F (36.3  C) (Oral) 16 21.49 kg/m2          Blood Pressure from Last 3 Encounters:    04/11/18 99/57   04/06/18 94/56   03/25/18 115/61    Weight from Last 3 Encounters:   04/11/18 53.3 kg (117 lb 8 oz)   04/06/18 52.1 kg (114 lb 14.4 oz)   03/24/18 45.6 kg (100 lb 10.1 oz)              We Performed the Following     US Paracentesis        Primary Care Provider Office Phone # Fax #    Felicitas Horton [phone]       [address] [address]        Equal Access to Services     [facility]: Hadii nico muñoz hadasho Solou, waaxda lurolandoadaha, qawaldemarta ezekielmakiara sheridan, rosana ricks . So [facility] [phone].    ATENCIÓN: Si habla español, tiene a martinez disposición servicios gratuitos de asistencia lingüística. [facility] [phone].    We comply with applicable federal civil rights laws and [place] laws. We do not discriminate on the basis of race, color, national origin, age, disability, sex, sexual orientation, or gender identity.            Thank you!     Thank you for choosing [facility] SPECIALTY [facility] PROCEDURE  for your care. Our goal is always to provide you with excellent care. Hearing back from our patients is one way we can continue to improve our services. Please take a few minutes to complete the written survey that you may receive in the mail after your visit with us. Thank you!             Your Updated Medication List - Protect others around you: Learn how to safely use, store and throw away your medicines at www.disposemymeds.org.          This list is accurate as of 4/11/18 10:40 AM.  Always use your most recent med list.                   Brand Name Dispense Instructions for use Diagnosis    albuterol (2.5 MG/3ML) 0.083% neb solution     25 vial    Take 1 vial (2.5 mg) by nebulization every 6 hours as needed for shortness of breath / dyspnea or wheezing    Mild intermittent asthma with acute exacerbation       artificial saliva Liqd liquid     473 mL    Swish and spit 10 mLs in mouth 4  times daily    Dry mouth       artificial saliva Soln solution     1 Bottle    Take 2 mLs (2 sprays) by mouth 4 times daily as needed for dry mouth    Dry mouth       Benzocaine 20 % Aero spray    HURRICAINE/TOPEX    1 Bottle    Take 0.5-1 mLs by mouth every 3 hours as needed for moderate pain    Throat pain       benzocaine-menthol 15-3.6 MG lozenge    CEPACOL    84 lozenge    Place 1 lozenge inside cheek every hour as needed for sore throat    Throat pain       BOOST CALORIE SMART Liqd     90 Bottle    Take 3 Cans by mouth daily    HCC (hepatocellular carcinoma) (H)       calcium carbonate 500 MG chewable tablet    TUMS    150 tablet    Take 1 tablet (500 mg) by mouth daily as needed for heartburn    Gastroesophageal reflux disease without esophagitis       cetirizine 10 MG tablet    zyrTEC    30 tablet    Take 1 tablet (10 mg) by mouth every evening    Chronic allergic conjunctivitis       ciprofloxacin 500 MG tablet    CIPRO    30 tablet    Take 1 tablet (500 mg) by mouth every 24 hours    SBP (spontaneous bacterial peritonitis) (H)       DAILY MULTIPLE VITAMIN/IRON Tabs     30 tablet    Take 1 tablet by mouth daily    Chronic hepatitis C without hepatic coma (H)       furosemide 20 MG tablet    LASIX    180 tablet    Take 1 tablet (20 mg) by mouth daily    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)       glycerin-hypromellose- 0.2-0.2-1 % Soln ophthalmic solution    ARTIFICIAL TEARS    1 Bottle    Place 1 drop into both eyes 2 times daily as needed for dry eyes    Dry eyes       ipratropium - albuterol 0.5 mg/2.5 mg/3 mL 0.5-2.5 (3) MG/3ML neb solution    DUONEB    360 mL    Take 1 vial (3 mLs) by nebulization every 6 hours as needed for shortness of breath / dyspnea or wheezing    Mild persistent asthma without complication       lactulose 10 GM/15ML solution    CHRONULAC    1892 mL    Take 15 mLs (10 g) by mouth daily    Cirrhosis of liver with ascites, unspecified hepatic cirrhosis type (H)        mometasone-formoterol 200-5 MCG/ACT oral inhaler    DULERA    1 Inhaler    Inhale 2 puffs into the lungs 2 times daily    Moderate persistent asthma without complication       omeprazole 40 MG capsule    priLOSEC    90 capsule    Take 1 capsule (40 mg) by mouth daily Take 30-60 minutes before a meal.    Gastroesophageal reflux disease without esophagitis       ondansetron 4 MG ODT tab    ZOFRAN-ODT    120 tablet    Take 1 tablet (4 mg) by mouth every 6 hours as needed for nausea or vomiting    HCC (hepatocellular carcinoma) (H)       order for DME     1 Units    Equipment being ordered: Nebulizer    Mild persistent asthma without complication       polyethylene glycol powder    MIRALAX    850 g    Take 17 g (1 capful) by mouth 3 times daily as needed for constipation    Constipation, unspecified constipation type       spironolactone 50 MG tablet    ALDACTONE    30 tablet    Take 1 tablet (50 mg) by mouth daily    Chronic hepatitis C without hepatic coma (H)       traMADol 50 MG tablet    ULTRAM    40 tablet    Take 0.5-1 tablets (25-50 mg) by mouth every 8 hours as needed for moderate pain    HCC (hepatocellular carcinoma) (H)

## 2018-04-11 NOTE — PATIENT INSTRUCTIONS
Call [phone] to schedule thoracentesis    Discharge Instructions for Paracentesis  Paracentesis is a procedure to remove extra fluid from your belly (abdomen). This fluid buildup in the abdomen is called ascites. The procedure may have been done to take a sample of the fluid. Or, it may have been done to drain the extra fluid from your abdomen and help make you more comfortable.     Ascites is buildup of excess fluid in the abdomen.   Home care    If you have pain after the procedure, your healthcare provider can prescribe or recommend pain medicines. Take these exactly as directed. If you stopped taking other medicines before the procedure, ask your provider when you can start them again.    Take it easy for 24 hours after the procedure. Avoid physical activity until your provider says it s OK.    You will have a small bandage over the puncture site. Stitches (sutures), surgical staples, adhesive tapes, adhesive strips, or surgical glue may be used to close the incision. They also help stop bleeding and speed healing. You may take the bandage off in 24 hours.    Check the puncture site for the signs of infection listed below.  Follow-up care  Make a follow-up appointment with your healthcare provider as directed. During your follow-up visit, your provider will check your healing. Let your provider know how you are feeling. You can also discuss the cause of your ascites and if you need any further treatment.  When to seek medical advice  Call your healthcare provider if you have any of the following after the procedure:    A fever of 100.4 F (38.0 C) or higher    Trouble breathing    Pain that doesn't go away even after taking pain medicine    Belly pain not caused by having the skin punctured    Bleeding from the puncture site    More than a small amount of fluid leaking from the puncture site    Swollen belly    Signs of infection at the puncture site. These include increased pain, redness, or swelling,  warmth, or bad-smelling drainage.    Blood in your urine    Feeling dizzy or lightheaded, or fainting   Date Last Reviewed: 7/1/2016 2000-2017 [employer]. [address] [place] [address], [place] [address]. All rights reserved. This information is not intended as a substitute for professional medical care. Always follow your healthcare professional's instructions.

## 2018-04-13 NOTE — ED PROVIDER NOTES
History     Chief Complaint   Patient presents with     Shortness of Breath   HPI interpreted by son, per patient's request.     Yue Portillo is a 78 year old female with a history of hepatocellular carcinoma (requiring regular paracentesis and thoracentesis), hepatitis C, and cachexia who presents to the ED with left sided chest wall pain, shortness of breath, and cough.  Per review of her chart, patient was recently hospitalized on 3/31-4/6/18 for sepsis secondary to SBP.  They treated her with 20 mg of ciprofloxacin daily and had paracentesis on 3/31  (1.5 L of cloudy ascitic fluid removed) and thoracentesis on 4/3  (2 L removed).  Patient also had another paracentesis done on 4/11/18, 2 days ago with 1.7 L removed.  Patient states she started having left-sided chest wall pain that started last Friday, 1 week ago.  She also complains of nonproductive cough, shortness of breath regardless of activity, and abdominal distention.  Patient's son states that after her paracentesis a couple of days ago, she did not have her typical relief of symptoms afterward and continued to have pain.  Her son did talk to IR who advised to come to the ED for a thoracentesis.  Patient states she is still currently taking her antibiotics and states that her current symptoms do not feel similar to when she had the infection at her previous hospitalization.  She otherwise currently denies any fevers.    PAST MEDICAL HISTORY  Past Medical History:   Diagnosis Date     Cachexia (H)      Hepatitis C      Hepatocellular carcinoma (H)      Hx of gastric ulcer 2014     Liver disease      Mild intermittent asthma      Pleural effusion associated with hepatic disorder      PAST SURGICAL HISTORY  Past Surgical History:   Procedure Laterality Date     CATARACT IOL, RT/LT Right 08/22/2017    s/p CE/IOL right eye     EYE SURGERY       H PYLORI SHYLA SCREEN      was treated for h pylori     NO HISTORY OF SURGERY       FAMILY HISTORY  Family  History   Problem Relation Age of Onset     Respiratory Father      asthma     DIABETES Son      Glaucoma No family hx of      Macular Degeneration No family hx of      Retinal detachment No family hx of      Amblyopia No family hx of      SOCIAL HISTORY  Social History   Substance Use Topics     Smoking status: Never Smoker     Smokeless tobacco: Never Used     Alcohol use No     MEDICATIONS  Current Facility-Administered Medications   Medication     lidocaine 1 % 1 mL     lidocaine (LMX4) kit     sodium chloride (PF) 0.9% PF flush 3 mL     sodium chloride (PF) 0.9% PF flush 3 mL     Current Outpatient Prescriptions   Medication     ciprofloxacin (CIPRO) 500 MG tablet     artificial saliva (BIOTENE DRY MOUTHWASH) LIQD liquid     ipratropium - albuterol 0.5 mg/2.5 mg/3 mL (DUONEB) 0.5-2.5 (3) MG/3ML neb solution     calcium carbonate (TUMS) 500 MG chewable tablet     ondansetron (ZOFRAN-ODT) 4 MG ODT tab     lactulose (CHRONULAC) 10 GM/15ML solution     artificial saliva (BIOTENE MT) SOLN solution     Benzocaine (HURRICAINE/TOPEX) 20 % AERO spray     benzocaine-menthol (CEPACOL) 15-3.6 MG lozenge     traMADol (ULTRAM) 50 MG tablet     Multiple Vitamins-Iron (DAILY MULTIPLE VITAMIN/IRON) TABS     polyethylene glycol (MIRALAX) powder     furosemide (LASIX) 20 MG tablet     spironolactone (ALDACTONE) 50 MG tablet     glycerin-hypromellose- (ARTIFICIAL TEARS) 0.2-0.2-1 % SOLN ophthalmic solution     cetirizine (ZYRTEC) 10 MG tablet     Nutritional Supplements (BOOST CALORIE SMART) LIQD     albuterol (2.5 MG/3ML) 0.083% neb solution     mometasone-formoterol (DULERA) 200-5 MCG/ACT oral inhaler     order for DME     omeprazole (PRILOSEC) 40 MG capsule     ALLERGIES  Allergies   Allergen Reactions     Dust Mites Cough     Sneezing      Seasonal Allergies          I have reviewed the Medications, Allergies, Past Medical and Surgical History, and Social History in the Epic system.    Review of Systems  "  Constitutional: Negative for fever.   Respiratory: Positive for cough (nonproductive) and shortness of breath.    Gastrointestinal: Positive for abdominal distention.   Musculoskeletal:        Positive for left sided chest wall pain.    All other systems reviewed and are negative.      Physical Exam   BP: 113/74  Heart Rate: 94  Temp: 98.2  F (36.8  C)  Resp: 18  Height: 157.5 cm (5' 2\")  Weight: 51.5 kg (113 lb 8 oz)  SpO2: 99 %      Physical Exam  General: awake, alert, frail, cachectic appearing  Head: normal cephalic  HEENT: pupils equal, conjugate gaze in tact  Neck: Supple  CV: regular rate and rhythm without murmur  Lungs: No lung sounds no lung sounds appreciated on the right, normal-appearing on the left.  Patient is tachypneic but no use of accessory muscles.  Abd: soft, patient is distended with a fluid wave.  She has mild tenderness but no guarding, no peritoneal signs.  This was improved on repeat exam.  EXT: lower extremities without swelling or edema  Neuro: awake, answers questions appropriately. No focal deficits noted       ED Course     ED Course     Procedures   3:52 PM  The patient was seen and examined by Dr. Downs in Room 8.           EKG was performed: Interpreted by me.  Shows no signs of acute ischemia.  Low voltage.  Appears unchanged from previous.             Labs Ordered and Resulted from Time of ED Arrival Up to the Time of Departure from the ED   COMPREHENSIVE METABOLIC PANEL - Abnormal; Notable for the following:        Result Value    Sodium 130 (*)     Glucose 144 (*)     Urea Nitrogen 32 (*)     GFR Estimate 58 (*)     Calcium 7.7 (*)     Bilirubin Total 6.2 (*)     Albumin 2.3 (*)     Protein Total 5.8 (*)     Alkaline Phosphatase 224 (*)     ALT 52 (*)      (*)     All other components within normal limits   CBC WITH PLATELETS DIFFERENTIAL - Abnormal; Notable for the following:     RBC Count 2.83 (*)     Hemoglobin 9.5 (*)     Hematocrit 28.0 (*)     MCH 33.6 (*)     " RDW 17.4 (*)     Platelet Count 82 (*)     Absolute Lymphocytes 0.5 (*)     All other components within normal limits   INR - Abnormal; Notable for the following:     INR 2.00 (*)     All other components within normal limits   GLUCOSE BY METER - Abnormal; Notable for the following:     Glucose 109 (*)     All other components within normal limits   AMMONIA   TROPONIN I   OBTAIN AFFIRMATION OF INFORMED CONSENT   VITAL SIGNS   VOID   PERIPHERAL IV CATHETER   ACTIVITY   NO   VITAL SIGNS            Assessments & Plan (with Medical Decision Making)   78-year-old female who presents with shortness of breath. The patient has a history of hepatitis with hepatocellular carcinoma. She is scheduled to undergo therapeutic paracentesis and thoracentesis. The patient states that she was having worsening shortness of breath was referred here.     The patient is also complaining of some abdominal pain. Unclear if this is secondary to abdominal distension vs SBP. Reassuring is the patient states that this is not similar to when she had SBP and she is compliant on her antibiotics, but we ll do an evaluation and may end up having to do a diagnostic paracentesis if concerned for SBP remains after evaluation.  Currently awaiting basic labs and an IR consult.    Results notable for a normal ammonia and elevated INR of 2, though this appears to be baseline. The remainder of her labs are also at baseline with an acceptable platelet count. EKG did not show signs of acute ischemia. Negative troponin. Electrolytes show some mild hyponatremia, again, the remainder of her labs appear to be near baseline. Specifically she has no white count, no left shift.   IR was able to perform her thoracentesis, initial plan was to admit given that the thoracentesis was going to happen over several hours, but ultimately was completed while the patient was in the emergency department. On repeat physical exam, patient stated she was feeling much better.  Abdomen remained soft, nontender. Given the fact that she reports this is different than her SBP, she has a normal white count, no left shift, is afebrile and her abdomen is soft, nontender and patient is no longer complaining of any abdominal pain, I feel that she is safe to discharge home with her family. I did discuss if she were to get any new or worsening symptoms she would need to return to the ER.  Bedside ultrasound was performed, confirmed a large pleural effusion. Additionally did not demonstrate any pericardial effusion.     I have reviewed the nursing notes.    I have reviewed the findings, diagnosis, plan and need for follow up with the patient.    Discharge Medication List as of 4/13/2018 10:18 PM          Final diagnoses:   Pleural effusion     IAftab, am serving as a trained medical scribe to document services personally performed by Dipesh Downs MD, based on the provider's statements to me.      IDipesh MD, was physically present and have reviewed and verified the accuracy of this note documented by Aftab Larios.    4/13/2018   [facility], [place], EMERGENCY DEPARTMENT     Dipesh Downs MD  04/14/18 0000

## 2018-04-13 NOTE — ED AVS SNAPSHOT
[facility], [place], Emergency Department    [address] [place] [address]    Phone:  [phone]                                       Yue Portillo   MRN: [id number]    Department:  [facility], Emergency Department   Date of Visit:  4/13/2018           After Visit Summary Signature Page     I have received my discharge instructions, and my questions have been answered. I have discussed any challenges I see with this plan with the nurse or doctor.    ..........................................................................................................................................  Patient/Patient Representative Signature      ..........................................................................................................................................  Patient Representative Print Name and Relationship to Patient    ..................................................               ................................................  Date                                            Time    ..........................................................................................................................................  Reviewed by Signature/Title    ...................................................              ..............................................  Date                                                            Time

## 2018-04-13 NOTE — ED NOTES
Bed: ED08  Expected date:   Expected time:   Means of arrival:   Comments:  H419  78F  Sob, abdominal pain

## 2018-04-13 NOTE — ED AVS SNAPSHOT
[facility], Emergency Department    [address] [address]    Phone:  [phone]                                       Yue Portillo   MRN: [id number]    Department:  [facility], Emergency Department   Date of Visit:  4/13/2018           Patient Information     Date Of Birth          1940        Your diagnoses for this visit were:     HCC (hepatocellular carcinoma) (H)     Pleural effusion, right     Pleural effusion        You were seen by Dipesh Downs MD.      Your next 10 appointments already scheduled     Apr 18, 2018  2:00 PM CDT   Paracentesis Visit with Uc Spec Inf Para Provider, UC 40 ATC   [facility] ([facility])    [address]  [address]  [facility] [id number] [phone]            Apr 24, 2018  9:30 AM CDT   Lab with UC LAB   [facility] ([facility])    [address]  1st Floor  [facility] [id number] [phone]            Apr 24, 2018 10:30 AM CDT   (Arrive by 10:15 AM)   New General Liver with Tala Meyer MD   [facility] ([facility])    [address]  [address]  [facility] [id number] [phone]            Apr 25, 2018  9:30 AM CDT   Paracentesis Visit with Uc Spec Inf Para Provider, UC 40 ATC   [facility] ([facility])    [address]  [address]  [facility] [id number] [phone]            May 02, 2018 12:00 PM CDT   Paracentesis Visit with Uc Spec Inf Para Provider, UC 40 ATC   [facility] ([facility])    [address]  [address]  [facility] [id number] [phone]            May 09, 2018  9:30 AM CDT   Paracentesis Visit with Uc Spec Inf Para Provider, UC 39 ATC   [facility] ([facility])    [address]  [address]  [facility] [address] [phone]              24 Hour Appointment Hotline       To make an appointment at any [facility], call [phone] ([phone]). If you don't have a family doctor or clinic, we will help you find one. [place] clinics are conveniently located to serve the needs of you and your family.          ED Discharge Orders     IR Thoracentesis                    Review of your medicines      Our records show that you are taking the medicines listed below. If these are incorrect, please call your family doctor or clinic.        Dose / Directions Last dose taken    albuterol (2.5 MG/3ML) 0.083% neb solution   Dose:  1 vial   Quantity:  25 vial        Take 1 vial (2.5 mg) by nebulization every 6 hours as needed for shortness of breath / dyspnea or wheezing   Refills:  3        artificial saliva Liqd liquid   Dose:  10 mL   Quantity:  473 mL        Swish and spit 10 mLs in mouth 4 times daily   Refills:  0        artificial saliva Soln solution   Dose:  2 spray   Quantity:  1 Bottle        Take 2 mLs (2 sprays) by mouth 4 times daily as needed for dry mouth   Refills:  0        Benzocaine 20 % Aero spray   Commonly known as:  HURRICAINE/TOPEX   Dose:  1-2 spray   Quantity:  1 Bottle        Take 0.5-1 mLs by mouth every 3 hours as needed for moderate pain   Refills:  0        benzocaine-menthol 15-3.6 MG lozenge   Commonly known as:  CEPACOL   Dose:  1 lozenge   Quantity:  84 lozenge        Place 1 lozenge inside cheek every hour as needed for sore throat   Refills:  0        BOOST CALORIE SMART Liqd   Dose:  3 Can   Quantity:  90 Bottle        Take 3 Cans by mouth daily   Refills:  11        calcium carbonate 500 MG chewable tablet   Commonly known as:  TUMS   Dose:  1 chew tab   Quantity:  150 tablet        Take 1 tablet (500 mg) by mouth daily as needed for heartburn   Refills:  0         cetirizine 10 MG tablet   Commonly known as:  zyrTEC   Dose:  10 mg   Quantity:  30 tablet        Take 1 tablet (10 mg) by mouth every evening   Refills:  6        ciprofloxacin 500 MG tablet   Commonly known as:  CIPRO   Dose:  500 mg   Indication:  SBP prophylaxis   Quantity:  30 tablet        Take 1 tablet (500 mg) by mouth every 24 hours   Refills:  3        DAILY MULTIPLE VITAMIN/IRON Tabs   Dose:  1 tablet   Quantity:  30 tablet        Take 1 tablet by mouth daily   Refills:  11        furosemide 20 MG tablet   Commonly known as:  LASIX   Dose:  20 mg   Quantity:  180 tablet        Take 1 tablet (20 mg) by mouth daily   Refills:  1        glycerin-hypromellose- 0.2-0.2-1 % Soln ophthalmic solution   Commonly known as:  ARTIFICIAL TEARS   Dose:  1 drop   Quantity:  1 Bottle        Place 1 drop into both eyes 2 times daily as needed for dry eyes   Refills:  0        ipratropium - albuterol 0.5 mg/2.5 mg/3 mL 0.5-2.5 (3) MG/3ML neb solution   Commonly known as:  DUONEB   Dose:  1 vial   Quantity:  360 mL        Take 1 vial (3 mLs) by nebulization every 6 hours as needed for shortness of breath / dyspnea or wheezing   Refills:  0        lactulose 10 GM/15ML solution   Commonly known as:  CHRONULAC   Dose:  10 g   Quantity:  1892 mL        Take 15 mLs (10 g) by mouth daily   Refills:  0        mometasone-formoterol 200-5 MCG/ACT oral inhaler   Commonly known as:  DULERA   Dose:  2 puff   Quantity:  1 Inhaler        Inhale 2 puffs into the lungs 2 times daily   Refills:  12        omeprazole 40 MG capsule   Commonly known as:  priLOSEC   Dose:  40 mg   Quantity:  90 capsule        Take 1 capsule (40 mg) by mouth daily Take 30-60 minutes before a meal.   Refills:  3        ondansetron 4 MG ODT tab   Commonly known as:  ZOFRAN-ODT   Dose:  4 mg   Quantity:  120 tablet        Take 1 tablet (4 mg) by mouth every 6 hours as needed for nausea or vomiting   Refills:  0        order for DME   Quantity:  1 Units         Equipment being ordered: Nebulizer   Refills:  0        polyethylene glycol powder   Commonly known as:  MIRALAX   Dose:  1 capful   Quantity:  850 g        Take 17 g (1 capful) by mouth 3 times daily as needed for constipation   Refills:  3        spironolactone 50 MG tablet   Commonly known as:  ALDACTONE   Dose:  50 mg   Quantity:  30 tablet        Take 1 tablet (50 mg) by mouth daily   Refills:  1        traMADol 50 MG tablet   Commonly known as:  ULTRAM   Dose:  25-50 mg   Quantity:  40 tablet        Take 0.5-1 tablets (25-50 mg) by mouth every 8 hours as needed for moderate pain   Refills:  0                Procedures and tests performed during your visit     Procedure/Test Number of Times Performed    Ammonia (on ice) 1    CBC with platelets differential 1    Comprehensive metabolic panel 1    ED Bed Request 2    EKG 12-lead, tracing only 1    Glucose by meter 1    INR 1    IR Thoracentesis 1    Medication History IP Pharmacy Consult 1    Troponin I 1    XR Chest 2 Views 1      Orders Needing Specimen Collection     None      Pending Results     Date and Time Order Name Status Description    4/13/2018 1759 IR Thoracentesis In process     4/13/2018 1607 EKG 12-lead, tracing only Preliminary             Pending Culture Results     No orders found from 4/11/2018 to 4/14/2018.            Pending Results Instructions     If you had any lab results that were not finalized at the time of your Discharge, you can call the ED Lab Result RN at [phone]. You will be contacted by this team for any positive Lab results or changes in treatment. The nurses are available 7 days a week from 10A to 6:30P.  You can leave a message 24 hours per day and they will return your call.        Thank you for choosing [facility]       Thank you for choosing [facility] for your care. Our goal is always to provide you with excellent care. Hearing back from our patients is one way we can continue to improve our services. Please take a few  minutes to complete the written survey that you may receive in the mail after you visit with us. Thank you!        Care EveryWhere ID     This is your Care EveryWhere ID. This could be used by other organizations to access your [facility] medical records  [id number]        Equal Access to Services     LUKE DUVAL : Mukul Jose, lyumdila rivera, raffy sheridan, rosana lance. So [facility] [phone].    ATENCIÓN: Si habla español, tiene a martinez disposición servicios gratuitos de asistencia lingüística. Llame al [phone].    We comply with applicable federal civil rights laws and [place] laws. We do not discriminate on the basis of race, color, national origin, age, disability, sex, sexual orientation, or gender identity.            After Visit Summary       This is your record. Keep this with you and show to your community pharmacist(s) and doctor(s) at your next visit.

## 2018-04-13 NOTE — ED NOTES
Yue was BIBA today for evaluation of SOB and cough.  She accompanied by her son who believes she may need a thoracentesis.  Paracentesis performed 2 days ago.  Denies chest pain or fever.

## 2018-04-14 NOTE — ED NOTES
"Pt refuses hospital admission. Family and pt report \"she just needed the fluid off\" MD notified.   "

## 2018-04-14 NOTE — DISCHARGE INSTRUCTIONS
TODAY'S VISIT:  You were seen today for shortness of breath. You needed fluid drained from your lung.   -     FOLLOW-UP:  Please make an appointment to follow up with:  - Your Primary Care Provider as needed.     PRESCRIPTIONS / MEDICATIONS:  -    OTHER INSTRUCTIONS:  -     RETURN TO THE EMERGENCY DEPARTMENT  Return to the Emergency Department at any time for new/worsening symptoms.  Specifically for fever, abdominal pain.  New worsening shortness of breath.

## 2018-04-14 NOTE — PROCEDURES
Interventional Radiology Brief Post Procedure Note    Procedure: XR CHEST 2 VW    Proceduralist: Cari Obando MD    Assistant: Radiology Resident Physician, Salvatore Bruce MD    Time Out: Prior to the start of the procedure and with procedural staff participation, I verbally confirmed the patient s identity using two indicators, relevant allergies, that the procedure was appropriate and matched the consent or emergent situation, and that the correct equipment/implants were available. Immediately prior to starting the procedure I conducted the Time Out with the procedural staff and re-confirmed the patient s name, procedure, and site/side. (The Joint Commission universal protocol was followed.)  Yes    Medications   Medication Event Details Admin User Admin Time       Sedation: None. Local Anesthestic used    Findings: Large right pleural effusion status post thoracentesis.    Estimated Blood Loss: None    Fluoroscopy Time:  None, ultrasound guided    SPECIMENS: None    Complications: 1. None     Condition: Stable    Plan:   - temporary right chest tube for staged thoracentesis, which may be removed after two stages (1 hour rest between stages), this will be managed by IR  - from IR perspective, may discharge home after procedure if vitals are stable    Comments: See dictated procedure note for full details.    Salvatroe Bruce MD  PGY3 radiology

## 2018-04-14 NOTE — ED NOTES
[facility], [place]   ED Nurse to Floor Handoff     Yue Portillo is a 78 year old female who speaks Hungarian and lives with family members,  in a home  They arrived in the ED by car from home    ED Chief Complaint: Shortness of Breath    ED Dx;   Final diagnoses:   Pleural effusion         Needed?: Yes    Allergies:   Allergies   Allergen Reactions     Dust Mites Cough     Sneezing      Seasonal Allergies    .  Past Medical Hx:   Past Medical History:   Diagnosis Date     Cachexia (H)      Hepatitis C      Hepatocellular carcinoma (H)      Hx of gastric ulcer 2014     Liver disease      Mild intermittent asthma      Pleural effusion associated with hepatic disorder       Baseline Mental status: WDL  Current Mental Status changes: at basesline    Infection present or suspected this encounter: no  Sepsis suspected: No  Isolation type: No active isolations     Activity level - Baseline/Home:  Stand with Assist  Activity Level - Current:   Stand with Assist    Bariatric equipment needed?: No    In the ED these meds were given:   Medications   lidocaine 1 % 1 mL (not administered)   lidocaine (LMX4) kit (not administered)   sodium chloride (PF) 0.9% PF flush 3 mL (not administered)   sodium chloride (PF) 0.9% PF flush 3 mL (not administered)   lidocaine 1 % 1-30 mL (2 mLs Subcutaneous Given by Other 4/1940)       Drips running?  No    Home pump  No    Current LDAs  Peripheral IV 04/13/18 Right Lower forearm (Active)   Site Assessment WDL 4/13/2018  3:24 PM   Line Status Saline locked 4/13/2018  3:24 PM   Phlebitis Scale 0-->no symptoms 4/13/2018  3:24 PM   Infiltration Scale 0 4/13/2018  3:24 PM   Number of days:0       Incision/Surgical Site 04/04/18 Anterior Abdomen (Active)   Number of days:9       Labs results:   Labs Ordered and Resulted from Time of ED Arrival Up to the Time of Departure from the ED   COMPREHENSIVE METABOLIC PANEL - Abnormal; Notable for the following:         Result Value    Sodium 130 (*)     Glucose 144 (*)     Urea Nitrogen 32 (*)     GFR Estimate 58 (*)     Calcium 7.7 (*)     Bilirubin Total 6.2 (*)     Albumin 2.3 (*)     Protein Total 5.8 (*)     Alkaline Phosphatase 224 (*)     ALT 52 (*)      (*)     All other components within normal limits   CBC WITH PLATELETS DIFFERENTIAL - Abnormal; Notable for the following:     RBC Count 2.83 (*)     Hemoglobin 9.5 (*)     Hematocrit 28.0 (*)     MCH 33.6 (*)     RDW 17.4 (*)     Platelet Count 82 (*)     Absolute Lymphocytes 0.5 (*)     All other components within normal limits   INR - Abnormal; Notable for the following:     INR 2.00 (*)     All other components within normal limits   AMMONIA   TROPONIN I   OBTAIN AFFIRMATION OF INFORMED CONSENT   VITAL SIGNS   VOID   PERIPHERAL IV CATHETER   ACTIVITY   NO   VITAL SIGNS       Imaging Studies:   Recent Results (from the past 24 hour(s))   XR Chest 2 Views    Narrative    XR CHEST 2 VW  4/13/2018 5:29 PM      HISTORY: SOB, hx of pleural effusions associated with hepatic  disorder;     COMPARISON: Chest x-ray 4/4/2018    TECHNIQUE: Semiupright AP and lateral AP radiographs of the chest    FINDINGS: Near complete opacification of the right hemithorax with  likely passive atelectasis of the right lung. Slight improvement in  the diffuse patchy airspace and interstitial opacities compared to  prior examination. Cardiomediastinal silhouette is within normal  limits. Trachea is midline. Upper abdomen is unremarkable. No definite  pneumothorax.      Impression    IMPRESSION:   1. Increase in right-sided large pleural effusion to now near complete  opacification of right hemithorax.  2. Interval improvement in diffuse left lung interstitial and airspace  opacities.    I have personally reviewed the examination and initial interpretation  and I agree with the findings.    FANG NETTLES MD       Recent vital signs:   /51  Temp 98.2  F (36.8  C) (Oral)  Resp 20  " Ht 1.575 m (5' 2\")  Wt 51.5 kg (113 lb 8 oz)  SpO2 100%  Breastfeeding? No  BMI 20.76 kg/m2    Cardiac Rhythm: Normal Sinus  Pt needs tele? Yes  Skin/wound Issues: None    Code Status: Full Code    Pain control: good    Nausea control: good    Abnormal labs/tests/findings requiring intervention: Thoracentesis performed in IR. Chest tube placed, will be pulled at 2030 per RN report from IR    Family present during ED course? Yes   Family Comments/Social Situation comments: Son interpreting for pt     Tasks needing completion: None    Katey Holman, RN  [facility]-- ED  [id number]  [address]      "

## 2018-04-14 NOTE — PROGRESS NOTES
IR Brief Note    Second and final stage of staged right thoracentesis completed. A total of 4000 mL of clear yellow fluid removed. Temporary right chest tube removed. Please instruct patient to schedule next thoracentesis as needed with IR.    Salvatore rBuce MD  PGY3 radiology  
Patient Name: Yue Portillo  Medical Record Number: [id number]  Today's Date: 4/13/2018    Procedure: Right Thoracentesis  Proceduralist: STACEY Obando MD with LUIS ARMANDO Bruce MD    Sedation start time: no sedation    Procedure start time: 1908  Puncture time: 1912  Procedure end time: 2124    Report given to: ED AMIE Del Toro  : pt refused, understands some and son in room for repeated procedure    Other Notes: Pt arrived to IR room 6 from ED. Pt denies any questions or concerns regarding procedure. Pt positioned sitting upright leaning slightly forward onto pillows on procedure table and monitored per protocol.  Pt tolerated procedure without any noted complications. 2L right pleural fluid drained then pt transferred back to ED for 1 hour with chest tube capped securely. IR RN and RTR returned to ED, 2 more L right pleural fluid drained for total of 4 L drained; pt had small amount of coughing which subsided.  VSS. Tube removed and Tegaderm placed over site is clean, dry and intact.  Second report given to ED AMIE Del Toro.  ED will discharge pt in 1 hour per MD instructions.  
no

## 2018-04-14 NOTE — PHARMACY-ADMISSION MEDICATION HISTORY
Admission medication history interview status for the 4/13/2018 admission is complete. See Epic admission navigator for allergy information, pharmacy, prior to admission medications and immunization status.     Medication history interview sources:    -Patient was a poor historian and unable to recall medication list. (Son and family members served as interpreters).   -Son and family members were fair historians and familiar with most medications and last doses.   -Chart Review: office visit note dated 4/11/18    Changes made to PTA medication list (reason)  Added:   -N/A  Deleted:   -N/A  Changed:   -N/A    Additional medication history information (including reliability of information, actions taken by pharmacist):  -Patient fills prescriptions at [facility]. Unable to verify fill history, consider calling pharmacy during business hours.   -PTA medication list verified with medication list from office visit note dated 4/11/18.   -Per patient and caregivers, the only medication patient took today was ciprofloxacin (prophylaxis).  -Patient verified no known drug allergies.       Prior to Admission medications    Medication Sig Last Dose Taking? Auth Provider   ciprofloxacin (CIPRO) 500 MG tablet Take 1 tablet (500 mg) by mouth every 24 hours 4/13/2018 at AM Yes Jefe Capellan DO   artificial saliva (BIOTENE DRY MOUTHWASH) LIQD liquid Swish and spit 10 mLs in mouth 4 times daily 4/12/2018 at Unknown time Yes Darrick Haider MD   ipratropium - albuterol 0.5 mg/2.5 mg/3 mL (DUONEB) 0.5-2.5 (3) MG/3ML neb solution Take 1 vial (3 mLs) by nebulization every 6 hours as needed for shortness of breath / dyspnea or wheezing Past Week at Unknown time Yes Darrick Haider MD   calcium carbonate (TUMS) 500 MG chewable tablet Take 1 tablet (500 mg) by mouth daily as needed for heartburn 4/12/2018 at PM Yes Kj Montano MD   ondansetron (ZOFRAN-ODT) 4 MG ODT tab Take 1 tablet (4 mg) by mouth every 6 hours as  needed for nausea or vomiting Past Week at Unknown time Yes Kj Montano MD   lactulose (CHRONULAC) 10 GM/15ML solution Take 15 mLs (10 g) by mouth daily 4/12/2018 at Unknown time Yes Kj Montano MD   artificial saliva (BIOTENE MT) SOLN solution Take 2 mLs (2 sprays) by mouth 4 times daily as needed for dry mouth 4/12/2018 at Unknown time Yes Kj Montano MD   Benzocaine (HURRICAINE/TOPEX) 20 % AERO spray Take 0.5-1 mLs by mouth every 3 hours as needed for moderate pain Past Week at Unknown time Yes Kj Montano MD   benzocaine-menthol (CEPACOL) 15-3.6 MG lozenge Place 1 lozenge inside cheek every hour as needed for sore throat PRN Yes Kj Montano MD   traMADol (ULTRAM) 50 MG tablet Take 0.5-1 tablets (25-50 mg) by mouth every 8 hours as needed for moderate pain PRN Yes Kj Montano MD   Multiple Vitamins-Iron (DAILY MULTIPLE VITAMIN/IRON) TABS Take 1 tablet by mouth daily 4/12/2018 at Unknown time Yes Felicitas Horton MD   polyethylene glycol (MIRALAX) powder Take 17 g (1 capful) by mouth 3 times daily as needed for constipation PRN Yes Felicitas Horton MD   furosemide (LASIX) 20 MG tablet Take 1 tablet (20 mg) by mouth daily 4/12/2018 at Unknown time Yes Felicitas Horton MD   spironolactone (ALDACTONE) 50 MG tablet Take 1 tablet (50 mg) by mouth daily Past Week at Unknown time Yes Felicitas Horton MD   glycerin-hypromellose- (ARTIFICIAL TEARS) 0.2-0.2-1 % SOLN ophthalmic solution Place 1 drop into both eyes 2 times daily as needed for dry eyes 4/12/2018 at Unknown time Yes Felicitas Horton MD   cetirizine (ZYRTEC) 10 MG tablet Take 1 tablet (10 mg) by mouth every evening 4/12/2018 at Unknown Time Yes Felicitas Horton MD   Nutritional Supplements (BOOST CALORIE SMART) LIQD Take 3 Cans by mouth daily 4/12/2018 at Unknown time Yes Felicitas Horton MD   albuterol (2.5 MG/3ML) 0.083% neb solution Take 1 vial (2.5 mg) by nebulization  every 6 hours as needed for shortness of breath / dyspnea or wheezing Past Week at Unknown time Yes Felicitas Horton MD   mometasone-formoterol (DULERA) 200-5 MCG/ACT oral inhaler Inhale 2 puffs into the lungs 2 times daily Past Week at Unknown time Yes Felicitas Horton MD   order for DME Equipment being ordered: Nebulizer Unknown at Unknown time  Darrick Haider MD   omeprazole (PRILOSEC) 40 MG capsule Take 1 capsule (40 mg) by mouth daily Take 30-60 minutes before a meal. Unknown at Unknown time  Felicitas Horton MD         Medication history completed by: Cristi Tran, Pharmacy Intern

## 2018-04-18 NOTE — MR AVS SNAPSHOT
After Visit Summary   4/18/2018    Yue Portillo    MRN: [id number]           Patient Information     Date Of Birth          1940        Visit Information        Provider Department      4/18/2018 2:00 PM Chrissy Mcclure; Juan Colon PA-C; UC 40 ATC [facility]        Today's Diagnoses     HCC (hepatocellular carcinoma) (H)    -  1    Cirrhosis of liver with ascites, unspecified hepatic cirrhosis type (H)           Follow-ups after your visit        Your next 10 appointments already scheduled     Apr 24, 2018  9:30 AM CDT   Lab with UC LAB   [facility] ([facility])    [address]  1st Floor  [facility] [id number] [phone]            Apr 24, 2018 10:30 AM CDT   (Arrive by 10:15 AM)   New General Liver with Tala Meyer MD   [facility] ([facility])    [address]  [address]  [place] [id number] [phone]            Apr 25, 2018  9:30 AM CDT   Paracentesis Visit with Uc Spec Inf Para Provider, UC 40 ATC   [facility] ([facility])    [address]  [address]  [place] [id number] [phone]            May 02, 2018 12:00 PM CDT   Paracentesis Visit with Uc Spec Inf Para Provider, UC 40 ATC   [facility] ([facility])    [address]  [address]  [place] [id number] [phone]            May 09, 2018  9:30 AM CDT   Paracentesis Visit with Uc Spec Inf Para Provider, UC 39 ATC   [facility] ([facility])    [address]  [address]  [place] [id number] [phone]            May 16, 2018  9:30 AM CDT   Paracentesis Visit with Uc Spec Inf Para Provider, UC 40 ATC   [facility]  [facility] Procedure ([facility])    [address]  [address]  [place] [facility] [place] [address] [phone]              Who to contact     If you have questions or need follow up information about today's clinic visit or your schedule please contact [facility]ED[facility] directly at [phone].  Normal or non-critical lab and imaging results will be communicated to you by MyChart, letter or phone within 4 business days after the clinic has received the results. If you do not hear from us within 7 days, please contact the clinic through MyChart or phone. If you have a critical or abnormal lab result, we will notify you by phone as soon as possible.  Submit refill requests through [employer] or call your pharmacy and they will forward the refill request to us. Please allow 3 business days for your refill to be completed.          Additional Information About Your Visit        Care EveryWhere ID     This is your Care EveryWhere ID. This could be used by other organizations to access your [facility] medical records  [id number]        Your Vitals Were     Pulse Temperature Respirations Pulse Oximetry          85 96.4  F (35.8  C) (Oral) 18 99%         Blood Pressure from Last 3 Encounters:   04/18/18 104/58   04/13/18 100/63   04/11/18 91/50    Weight from Last 3 Encounters:   04/13/18 51.5 kg (113 lb 8 oz)   04/11/18 51.5 kg (113 lb 8 oz)   04/06/18 52.1 kg (114 lb 14.4 oz)              Today, you had the following     No orders found for display       Primary Care Provider Office Phone # Fax #    Felicitas Horton [phone]       [address] [address]        Equal Access to Services     LUKE DUVAL : lyudmila Aguila, [address]. So [facility] [phone].    ATENCIÓN: monica Palmer  a martinez disposición servicios gratuitos de asistencia lingüística. Yonathan rodriguez [phone].    We comply with applicable federal civil rights laws and [place] laws. We do not discriminate on the basis of race, color, national origin, age, disability, sex, sexual orientation, or gender identity.            Thank you!     Thank you for choosing [facility] PROCEDURE  for your care. Our goal is always to provide you with excellent care. Hearing back from our patients is one way we can continue to improve our services. Please take a few minutes to complete the written survey that you may receive in the mail after your visit with us. Thank you!             Your Updated Medication List - Protect others around you: Learn how to safely use, store and throw away your medicines at www.disposemymeds.org.          This list is accurate as of 4/18/18  3:42 PM.  Always use your most recent med list.                   Brand Name Dispense Instructions for use Diagnosis    albuterol (2.5 MG/3ML) 0.083% neb solution     25 vial    Take 1 vial (2.5 mg) by nebulization every 6 hours as needed for shortness of breath / dyspnea or wheezing    Mild intermittent asthma with acute exacerbation       artificial saliva Liqd liquid     473 mL    Swish and spit 10 mLs in mouth 4 times daily    Dry mouth       artificial saliva Soln solution     1 Bottle    Take 2 mLs (2 sprays) by mouth 4 times daily as needed for dry mouth    Dry mouth       Benzocaine 20 % Aero spray    HURRICAINE/TOPEX    1 Bottle    Take 0.5-1 mLs by mouth every 3 hours as needed for moderate pain    Throat pain       benzocaine-menthol 15-3.6 MG lozenge    CEPACOL    84 lozenge    Place 1 lozenge inside cheek every hour as needed for sore throat    Throat pain       BOOST CALORIE SMART Liqd     90 Bottle    Take 3 Cans by mouth daily    HCC (hepatocellular carcinoma) (H)       calcium carbonate 500 MG chewable tablet    TUMS    150 tablet     Take 1 tablet (500 mg) by mouth daily as needed for heartburn    Gastroesophageal reflux disease without esophagitis       cetirizine 10 MG tablet    zyrTEC    30 tablet    Take 1 tablet (10 mg) by mouth every evening    Chronic allergic conjunctivitis       ciprofloxacin 500 MG tablet    CIPRO    30 tablet    Take 1 tablet (500 mg) by mouth every 24 hours    SBP (spontaneous bacterial peritonitis) (H)       DAILY MULTIPLE VITAMIN/IRON Tabs     30 tablet    Take 1 tablet by mouth daily    Chronic hepatitis C without hepatic coma (H)       furosemide 20 MG tablet    LASIX    180 tablet    Take 1 tablet (20 mg) by mouth daily    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)       glycerin-hypromellose- 0.2-0.2-1 % Soln ophthalmic solution    ARTIFICIAL TEARS    1 Bottle    Place 1 drop into both eyes 2 times daily as needed for dry eyes    Dry eyes       ipratropium - albuterol 0.5 mg/2.5 mg/3 mL 0.5-2.5 (3) MG/3ML neb solution    DUONEB    360 mL    Take 1 vial (3 mLs) by nebulization every 6 hours as needed for shortness of breath / dyspnea or wheezing    Mild persistent asthma without complication       lactulose 10 GM/15ML solution    CHRONULAC    1892 mL    Take 15 mLs (10 g) by mouth daily    Cirrhosis of liver with ascites, unspecified hepatic cirrhosis type (H)       mometasone-formoterol 200-5 MCG/ACT oral inhaler    DULERA    1 Inhaler    Inhale 2 puffs into the lungs 2 times daily    Moderate persistent asthma without complication       omeprazole 40 MG capsule    priLOSEC    90 capsule    Take 1 capsule (40 mg) by mouth daily Take 30-60 minutes before a meal.    Gastroesophageal reflux disease without esophagitis       ondansetron 4 MG ODT tab    ZOFRAN-ODT    120 tablet    Take 1 tablet (4 mg) by mouth every 6 hours as needed for nausea or vomiting    HCC (hepatocellular carcinoma) (H)       order for DME     1 Units    Equipment being ordered: Nebulizer    Mild persistent asthma without  complication       polyethylene glycol powder    MIRALAX    850 g    Take 17 g (1 capful) by mouth 3 times daily as needed for constipation    Constipation, unspecified constipation type       spironolactone 50 MG tablet    ALDACTONE    30 tablet    Take 1 tablet (50 mg) by mouth daily    Chronic hepatitis C without hepatic coma (H)       traMADol 50 MG tablet    ULTRAM    40 tablet    Take 0.5-1 tablets (25-50 mg) by mouth every 8 hours as needed for moderate pain    HCC (hepatocellular carcinoma) (H)

## 2018-04-18 NOTE — PROGRESS NOTES
Paracentesis Nursing Note  [facility]ue Portillo [facility].    During today's appointment orders from Tala Meyer MD were completed.    Progress Note:  Patient identification verified by name and date of birth.  Assessment completed.  Vitals monitored throughout appointment and recorded in Doc Flowsheets.  See proceduralist note in ultrasound.    Date of consent or authorization: 4/11/18.  Invasive Procedure Safety Checklist was completed and sent for scanning.     Paracentesis performed by Juan Colon PA-C Radiology  The following labs were communicated to provider performing paracentesis:  Lab Results   Component Value Date    PLT 82 04/06/2018       Total amount of ascites fluid drained: 3 liters.  Color of ascites fluid: clear yellow.  Total amount of albumin given: 37.5  grams.    Patient tolerated procedure well.    Post procedure,denies pain or discomfort post paracentesis.      Discharge Plan:  Discharge instructions were reviewed with patient.  Patient/Representative verbalized understanding and all questions were answered.   [facility] in stable condition.    Cindy Baez RN    Administrations This Visit     albumin human 25 % injection 12.5 g     Admin Date Action Dose Route Administered By             04/18/2018 New Bag 37.5 g Intravenous Cindy Baez RN                    lidocaine 1 % 20 mL     Admin Date Action Dose Route Administered By             04/18/2018 Given 10 mL Other Cindy Baez RN                               /70  Pulse 90  Temp 96.4  F (35.8  C) (Oral)  Resp 18  SpO2 99%

## 2018-04-24 NOTE — PROGRESS NOTES
Patient Name: Yue Portillo  Medical Record Number: [id number]  Today's Date: 4/24/2018    Procedure: Thoracentesis Stage 2    Procedure start time: 1622  Procedure end time: 1650    Report given to: Lilia MONTES RN  : Magdaleno Peters     D: Pt arrived via bed, accompanied by RN.  Son also accompanied pt to IR, though he was not allowed in the procedure room, he waited in the hallway outside of the room.  No c/o pain.  Positioned pt in a seated position to drain rest of fluid.  I: Monitored pt during procedure.  A: After draining another 1.4L pt had a lot of coughing.  We paused for 5 minutes before starting again.  2,000 mL drained in Stage II, 4L removed total . Right lower back insertion site is clean, dry and covered.  P:Pt care to continue on 2A until discharge.

## 2018-04-24 NOTE — PROGRESS NOTES
1700 pt returned from IR post staged thoracentesis, pt awake and alert,  here for all cares. Pt reports no pain. Site on right mid back is flat and dry with clear dressing. Pt taking black coffee, declined food.   Discharge instructions reviewed with pt and son and . Copy to pt. Pt up in room, steady on her feet. IV dc'd. Declined restroom. Site remains flat and dry.   1730--Pt dc to home per wheelchair with family.

## 2018-04-24 NOTE — DISCHARGE INSTRUCTIONS
[facility],   Interventional Radiology Discharge Instructions Following Thoracentesis        AFTER YOU GO HOME:  ? Resume previous diet and medications  ? Limit strenuous physical activity such as lifting, straining, or exercising for 48 hours.  You may resume normal activity in 24 hours  ? Change gauze at the puncture site as needed to keep site dry.  Leaking of additional fluid is not uncommon during the first 24-48 hours    CALL THE PHYSICIAN:  ? If you develop a fever, shortness of breath, chest pain, cough up blood, excessive bleeding from the thoracentesis site, severe lightheadedness, or fainting call you doctor immediately or come to the closest Emergency Department.  Do not drive yourself.   ? If you have questions or concerns regarding your procedure call the radiologist.    Additional Instructions:  Call IR  for further [place] appointments at [phone].      [facility] INTERVENTIONAL RADIOLOGY DEPARTMENT  Procedure Physician:   VIGNESH Blanco                              Date of procedure: April 24, 2018  Telephone Numbers: [phone]       Monday-Friday 8:00 am to 4:30 pm                                                   [phone]   After 4:30 pm Monday - Friday, Ask for the Interventional Radiologist on call.  Someone is on call 24 hrs/day  [facility] toll free number: [phone]    Monday-Friday 8:00 am to 4:30 pm  [facility] Emergency Dept: [phone]  _

## 2018-04-24 NOTE — IP AVS SNAPSHOT
[address] [address]                                       After Visit Summary   4/24/2018    Yue Portillo    MRN: [id number]           After Visit Summary Signature Page     I have received my discharge instructions, and my questions have been answered. I have discussed any challenges I see with this plan with the nurse or doctor.    ..........................................................................................................................................  Patient/Patient Representative Signature      ..........................................................................................................................................  Patient Representative Print Name and Relationship to Patient    ..................................................               ................................................  Date                                            Time    ..........................................................................................................................................  Reviewed by Signature/Title    ...................................................              ..............................................  Date                                                            Time

## 2018-04-24 NOTE — PROGRESS NOTES
Patient Name: Yue Portillo  Medical Record Number: [id number]  Today's Date: 4/24/2018    Procedure: R thoracentesis (stage 1 of 2)  Proceduralist: Kyle Salmeron    No sedation    Procedure start time: 1455  Puncture time: 1455  Procedure end time: 1510    Report given to:   : present    Other Notes: Pt arrived to IR room 6 from . Pt denies any questions or concerns regarding procedure. Pt positioned ,sitting/leaned against another bed and monitored per protocol. Pt tolerated procedure without any noted complications. Pt transferred back to .  2 Liters removed for stage one, Yueh in place, secured with one way valve and tegaderm.

## 2018-04-24 NOTE — IP AVS SNAPSHOT
MRN:[id number]                      After Visit Summary   4/24/2018    Yue Portillo    MRN: [id number]           Visit Information        Department      4/24/2018 12:42 PM [address] [facility]          Review of your medicines      UNREVIEWED medicines. Ask your doctor about these medicines        Dose / Directions    albuterol (2.5 MG/3ML) 0.083% neb solution   Used for:  Mild intermittent asthma with acute exacerbation        Dose:  1 vial   Take 1 vial (2.5 mg) by nebulization every 6 hours as needed for shortness of breath / dyspnea or wheezing   Quantity:  25 vial   Refills:  3       artificial saliva Liqd liquid   Used for:  Dry mouth        Dose:  10 mL   Swish and spit 10 mLs in mouth 4 times daily   Quantity:  473 mL   Refills:  0       artificial saliva Soln solution   Used for:  Dry mouth        Dose:  2 spray   Take 2 mLs (2 sprays) by mouth 4 times daily as needed for dry mouth   Quantity:  1 Bottle   Refills:  0       Benzocaine 20 % Aero spray   Commonly known as:  HURRICAINE/TOPEX   Used for:  Throat pain        Dose:  1-2 spray   Take 0.5-1 mLs by mouth every 3 hours as needed for moderate pain   Quantity:  1 Bottle   Refills:  0       benzocaine-menthol 15-3.6 MG lozenge   Commonly known as:  CEPACOL   Used for:  Throat pain        Dose:  1 lozenge   Place 1 lozenge inside cheek every hour as needed for sore throat   Quantity:  84 lozenge   Refills:  0       BOOST CALORIE SMART Liqd   Used for:  HCC (hepatocellular carcinoma) (H)        Dose:  3 Can   Take 3 Cans by mouth daily   Quantity:  90 Bottle   Refills:  11       calcium carbonate 500 MG chewable tablet   Commonly known as:  TUMS   Used for:  Gastroesophageal reflux disease without esophagitis        Dose:  1 chew tab   Take 1 tablet (500 mg) by mouth daily as needed for heartburn   Quantity:  150 tablet   Refills:  0       cetirizine 10 MG tablet   Commonly known as:  zyrTEC   Used for:  Chronic allergic  conjunctivitis        Dose:  10 mg   Take 1 tablet (10 mg) by mouth every evening   Quantity:  30 tablet   Refills:  6       ciprofloxacin 500 MG tablet   Commonly known as:  CIPRO   Indication:  SBP prophylaxis   Used for:  SBP (spontaneous bacterial peritonitis) (H)        Dose:  500 mg   Take 1 tablet (500 mg) by mouth every 24 hours   Quantity:  30 tablet   Refills:  3       DAILY MULTIPLE VITAMIN/IRON Tabs   Used for:  Chronic hepatitis C without hepatic coma (H)        Dose:  1 tablet   Take 1 tablet by mouth daily   Quantity:  30 tablet   Refills:  11       furosemide 20 MG tablet   Commonly known as:  LASIX   Used for:  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)        Dose:  20 mg   Take 1 tablet (20 mg) by mouth daily   Quantity:  180 tablet   Refills:  1       glycerin-hypromellose- 0.2-0.2-1 % Soln ophthalmic solution   Commonly known as:  ARTIFICIAL TEARS   Used for:  Dry eyes        Dose:  1 drop   Place 1 drop into both eyes 2 times daily as needed for dry eyes   Quantity:  1 Bottle   Refills:  0       ipratropium - albuterol 0.5 mg/2.5 mg/3 mL 0.5-2.5 (3) MG/3ML neb solution   Commonly known as:  DUONEB   Used for:  Mild persistent asthma without complication        Dose:  1 vial   Take 1 vial (3 mLs) by nebulization every 6 hours as needed for shortness of breath / dyspnea or wheezing   Quantity:  360 mL   Refills:  0       lactulose 10 GM/15ML solution   Commonly known as:  CHRONULAC   Used for:  Cirrhosis of liver with ascites, unspecified hepatic cirrhosis type (H)        Dose:  10 g   Take 15 mLs (10 g) by mouth daily   Quantity:  1892 mL   Refills:  0       mometasone-formoterol 200-5 MCG/ACT oral inhaler   Commonly known as:  DULERA   Used for:  Moderate persistent asthma without complication        Dose:  2 puff   Inhale 2 puffs into the lungs 2 times daily   Quantity:  1 Inhaler   Refills:  12       omeprazole 40 MG capsule   Commonly known as:  priLOSEC   Used for:   Gastroesophageal reflux disease without esophagitis        Dose:  40 mg   Take 1 capsule (40 mg) by mouth daily Take 30-60 minutes before a meal.   Quantity:  90 capsule   Refills:  3       ondansetron 4 MG ODT tab   Commonly known as:  ZOFRAN-ODT   Used for:  HCC (hepatocellular carcinoma) (H)        Dose:  4 mg   Take 1 tablet (4 mg) by mouth every 6 hours as needed for nausea or vomiting   Quantity:  120 tablet   Refills:  0       polyethylene glycol powder   Commonly known as:  MIRALAX   Used for:  Constipation, unspecified constipation type        Dose:  1 capful   Take 17 g (1 capful) by mouth 3 times daily as needed for constipation   Quantity:  850 g   Refills:  3       spironolactone 50 MG tablet   Commonly known as:  ALDACTONE   Used for:  Chronic hepatitis C without hepatic coma (H)        Dose:  50 mg   Take 1 tablet (50 mg) by mouth daily   Quantity:  30 tablet   Refills:  1       traMADol 50 MG tablet   Commonly known as:  ULTRAM   Used for:  HCC (hepatocellular carcinoma) (H)        Dose:  25-50 mg   Take 0.5-1 tablets (25-50 mg) by mouth every 8 hours as needed for moderate pain   Quantity:  40 tablet   Refills:  0         CONTINUE these medicines which have NOT CHANGED        Dose / Directions    order for DME   Used for:  Mild persistent asthma without complication        Equipment being ordered: Nebulizer   Quantity:  1 Units   Refills:  0                Protect others around you: Learn how to safely use, store and throw away your medicines at www.disposemymeds.org.         Follow-ups after your visit        Your next 10 appointments already scheduled     Apr 25, 2018  9:15 AM CDT   Paracentesis Visit with Uc Spec Inf Para Provider,  40 ATC   [facility] ([facility])    [address] [place] [address]  [address] [place] [address]   [phone]            May 02, 2018 12:00 PM CDT   Paracentesis Visit with Uc Spec Inf Para  Provider, UC 40 ATC   [facility] ([facility])    [address]  [address]  [facility] [place] [address]   [phone]            May 09, 2018  9:30 AM CDT   Paracentesis Visit with Uc Spec Inf Para Provider, UC 39 ATC   [facility] ([facility])    [address]  [address]  [facility] [place] [address]   [phone]            May 16, 2018  9:30 AM CDT   Paracentesis Visit with Uc Spec Inf Para Provider, UC 40 ATC   [facility] ([facility])    [address]  [address]  [facility] [place] [address] [phone]            May 23, 2018  9:30 AM CDT   Paracentesis Visit with Uc Spec Inf Para Provider, UC 39 ATC   [facility] ([facility])    [address]  [address]  [facility] [place] [address] [phone]               Care Instructions        Further instructions from your care team         [facility],   Interventional Radiology Discharge Instructions Following Thoracentesis        AFTER YOU GO HOME:  ? Resume previous diet and medications  ? Limit strenuous physical activity such as lifting, straining, or exercising for 48 hours.  You may resume normal activity in 24 hours  ? Change gauze at the puncture site as needed to keep site dry.  Leaking of additional fluid is not uncommon during the first 24-48 hours    CALL THE PHYSICIAN:  ? If you develop a fever, shortness of breath, chest pain, cough up blood, excessive bleeding from the thoracentesis site, severe lightheadedness, or fainting call you doctor immediately or come to the closest Emergency Department.  Do not drive yourself.   ? If you have questions or concerns regarding your procedure call the radiologist.    Additional  Instructions:  Call IR  for further Thora appointments at [phone].      [facility] INTERVENTIONAL RADIOLOGY DEPARTMENT  Procedure Physician:   VIGNESH Blanco                              Date of procedure: April 24, 2018  Telephone Numbers: [phone]       Monday-Friday 8:00 am to 4:30 pm                                                   [phone]   After 4:30 pm Monday - Friday, Ask for the Interventional Radiologist on call.  Someone is on call 24 hrs/day  [facility] toll free number: [phone]    Monday-Friday 8:00 am to 4:30 pm  [facility] Emergency Dept: [phone]  _             Additional Information About Your Visit        Care EveryWhere ID     This is your Care EveryWhere ID. This could be used by other organizations to access your [facility] medical records  [id number]        Your Vitals Were     Blood Pressure Pulse Temperature Respirations Pulse Oximetry       90/49 (BP Location: Right arm) 89 98.2  F (36.8  C) (Oral) 14 100%        Primary Care Provider Office Phone # Fax #    Felicitas Truman Horton [phone]      Equal Access to Services     [facility]: Hadii aad ku hadasho Soomaali, waaxda luqadaha, qaybta kaalmada adeegyakiara, rosana ricks . So [facility] [phone].    ATENCIÓN: Si habla español, tiene a martinez disposición servicios gratuitos de asistencia lingüística. Llame al [phone].    We comply with applicable federal civil rights laws and [place] laws. We do not discriminate on the basis of race, color, national origin, age, disability, sex, sexual orientation, or gender identity.            Thank you!     Thank you for choosing [facility] for your care. Our goal is always to provide you with excellent care. Hearing back from our patients is one way we can continue to improve our services. Please take a few minutes to complete the written survey that you may receive in the mail after you visit with us. Thank you!             Medication List:  This is a list of all your medications and when to take them. Check marks below indicate your daily home schedule. Keep this list as a reference.      Medications           Morning Afternoon Evening Bedtime As Needed    albuterol (2.5 MG/3ML) 0.083% neb solution   Take 1 vial (2.5 mg) by nebulization every 6 hours as needed for shortness of breath / dyspnea or wheezing                                artificial saliva Liqd liquid   Swish and spit 10 mLs in mouth 4 times daily                                artificial saliva Soln solution   Take 2 mLs (2 sprays) by mouth 4 times daily as needed for dry mouth                                Benzocaine 20 % Aero spray   Commonly known as:  HURRICAINE/TOPEX   Take 0.5-1 mLs by mouth every 3 hours as needed for moderate pain                                benzocaine-menthol 15-3.6 MG lozenge   Commonly known as:  CEPACOL   Place 1 lozenge inside cheek every hour as needed for sore throat                                BOOST CALORIE SMART Liqd   Take 3 Cans by mouth daily                                calcium carbonate 500 MG chewable tablet   Commonly known as:  TUMS   Take 1 tablet (500 mg) by mouth daily as needed for heartburn                                cetirizine 10 MG tablet   Commonly known as:  zyrTEC   Take 1 tablet (10 mg) by mouth every evening                                ciprofloxacin 500 MG tablet   Commonly known as:  CIPRO   Take 1 tablet (500 mg) by mouth every 24 hours                                DAILY MULTIPLE VITAMIN/IRON Tabs   Take 1 tablet by mouth daily                                furosemide 20 MG tablet   Commonly known as:  LASIX   Take 1 tablet (20 mg) by mouth daily                                glycerin-hypromellose- 0.2-0.2-1 % Soln ophthalmic solution   Commonly known as:  ARTIFICIAL TEARS   Place 1 drop into both eyes 2 times daily as needed for dry eyes                                ipratropium - albuterol 0.5 mg/2.5  mg/3 mL 0.5-2.5 (3) MG/3ML neb solution   Commonly known as:  DUONEB   Take 1 vial (3 mLs) by nebulization every 6 hours as needed for shortness of breath / dyspnea or wheezing                                lactulose 10 GM/15ML solution   Commonly known as:  CHRONULAC   Take 15 mLs (10 g) by mouth daily                                mometasone-formoterol 200-5 MCG/ACT oral inhaler   Commonly known as:  DULERA   Inhale 2 puffs into the lungs 2 times daily                                omeprazole 40 MG capsule   Commonly known as:  priLOSEC   Take 1 capsule (40 mg) by mouth daily Take 30-60 minutes before a meal.                                ondansetron 4 MG ODT tab   Commonly known as:  ZOFRAN-ODT   Take 1 tablet (4 mg) by mouth every 6 hours as needed for nausea or vomiting                                order for DME   Equipment being ordered: Nebulizer                                polyethylene glycol powder   Commonly known as:  MIRALAX   Take 17 g (1 capful) by mouth 3 times daily as needed for constipation                                spironolactone 50 MG tablet   Commonly known as:  ALDACTONE   Take 1 tablet (50 mg) by mouth daily                                traMADol 50 MG tablet   Commonly known as:  ULTRAM   Take 0.5-1 tablets (25-50 mg) by mouth every 8 hours as needed for moderate pain

## 2018-04-24 NOTE — PROGRESS NOTES
Pt arrived to 2A with family member and [facility] . VSS, pt denies pain. IV inserted, labs collected. Awaiting consent. Continue to monitor

## 2018-04-24 NOTE — PROCEDURES
Interventional Radiology Brief Post Procedure Note    Procedure: IR THORACENTESIS    Proceduralist: Jaron Steiner PA-C    Assistant: None    Time Out: Prior to the start of the procedure and with procedural staff participation, I verbally confirmed the patient s identity using two indicators, relevant allergies, that the procedure was appropriate and matched the consent or emergent situation, and that the correct equipment/implants were available. Immediately prior to starting the procedure I conducted the Time Out with the procedural staff and re-confirmed the patient s name, procedure, and site/side. (The Joint Commission universal protocol was followed.)  Yes    Medications   Medication Event Details Admin User Admin Time       Sedation: None. Local Anesthestic used    Findings: Completed ultrasound guided therapeutic staged thoracentesis. A total of 2000 ml of clear yellow fluid was removed in stage 1 and a total of 2000 mL of clear yellow fluid was removed in stage 2 for a total of 4000 mL of clear yellow fluid was removed.     Estimated Blood Loss: Minimal    SPECIMENS: None    Complications: 1. None     Condition: Stable    Plan: Follow-up per primary team.     Comments: See dictated procedure note for full details.    Jaron Steiner PA-C

## 2018-04-24 NOTE — PROGRESS NOTES
Pt returned to 2A s/p stage 1 thoracentesis. VSS, pt denies pain or SOB. R back site CDI. Continue to monitor

## 2018-04-25 NOTE — MR AVS SNAPSHOT
After Visit Summary   4/25/2018    Yue Portillo    MRN: [id number]           Patient Information     Date Of Birth          1940        Visit Information        Provider Department      4/25/2018 9:15 AM Provider, Oscar Brownlee Inf Para; [place] [employer];  [address] [facility]        Today's Diagnoses     HCC (hepatocellular carcinoma) (H)    -  1    Cirrhosis of liver with ascites, unspecified hepatic cirrhosis type (H)          Care Instructions    Dear Yue Perrinkeith    Thank you for choosing [facility] ([facility]) for your procedure.  The following information is a summary of our appointment as well as important reminders.      Additional information: Your paracentesis procedure today, beginning weight 99 lb (44.9 kg), removed 0.8 liters ascites fluid, gave 12.5 grams albumin, ending weight approximately 97.5 lb    We look forward in seeing you on your next appointment here at [facility].  Please don t hesitate to call us at [phone] to reschedule any of your appointments or to speak with one of the [facility] registered nurses.  It was a pleasure taking care of you today.    Sincerely,    [facility]  [facility]  [address] [place] [address]  Phone:  [phone]  DISCHARGE INSTRUCTIONS FOLLOWING ABDOMINAL PARACENTESIS    After you go home:    No strenuous activity for 24 hours    Resume your regular diet    Limit fluid intake for the first 48 hours to no more than 2 quarts per day.  There should be minimal drainage from the needle site.  If drainage does occur and soaks through the bandage, apply gentle pressure with your hand for 5 minutes.    Notify MD for the following:    Excessive drainage    Excessive swelling, redness or tenderness at the needle site    Fever greater than 101 degrees F    Dizziness or  light-headedness when getting up or walking      IF THIS IS A MEDICAL EMERGENCY, CALL [phone]    If you have any questions or concerns    Contact the Hepatology Clinic:   [phone]    If you are a post-transplant patient, contact the Transplant Office:  [phone]    If this is after hours, contact the hospital :  [phone] and as to have the GI resident on call paged                   I have received and understand my discharge instructions and I have all of my personal belongings.          ------------------------------------------------------        ---------------------------------------------------    Patient / Significant Other's Signature     Relationship              Follow-ups after your visit        Your next 10 appointments already scheduled     May 02, 2018 12:00 PM CDT   Paracentesis Visit with Uc Spec Inf Para Provider, UC 40 ATC   [facility] Specialty and Procedure ([facility])    [address]  [address]  [facility] [id number] [phone]            May 09, 2018  9:30 AM CDT   Paracentesis Visit with Uc Spec Inf Para Provider, UC 39 ATC   [facility] Special[facility] Procedure ([facility])    [address]  [address]  [facility] [id number] [phone]            May 16, 2018  9:30 AM CDT   Paracentesis Visit with Uc Spec Inf Para Provider, UC 40 ATC   [facility] ([facility])    [address]  [address]  [facility] [id number] [phone]            May 23, 2018  9:30 AM CDT   Paracentesis Visit with Uc Spec Inf Para Provider, UC 39 ATC   [facility] ([facility])    [address]  [address]  [facility] [id number] [phone]            May 30, 2018  9:30 AM CDT   Paracentesis Visit with Uc Spec Inf  Para Provider, UC 40 ATC   [facility] Specialty and Procedure ([facility])    [address]  [address]  [facility] [address] [phone]              Who to contact     If you have questions or need follow up information about today's clinic visit or your schedule please contact [facility] PROCED[facility] directly at [phone].  Normal or non-critical lab and imaging results will be communicated to you by MyChart, letter or phone within 4 business days after the clinic has received the results. If you do not hear from us within 7 days, please contact the clinic through MyChart or phone. If you have a critical or abnormal lab result, we will notify you by phone as soon as possible.  Submit refill requests through [employer] or call your pharmacy and they will forward the refill request to us. Please allow 3 business days for your refill to be completed.          Additional Information About Your Visit        Care EveryWhere ID     This is your Care EveryWhere ID. This could be used by other organizations to access your [place] medical records  [id number]        Your Vitals Were     Temperature Pulse Oximetry BMI (Body Mass Index)             97.8  F (36.6  C) 100% 18.11 kg/m2          Blood Pressure from Last 3 Encounters:   04/25/18 115/62   04/24/18 93/51   04/18/18 104/58    Weight from Last 3 Encounters:   04/25/18 44.9 kg (99 lb)   04/13/18 51.5 kg (113 lb 8 oz)   04/11/18 51.5 kg (113 lb 8 oz)              We Performed the Following     US Paracentesis        Primary Care Provider Office Phone # Fax #    Felicitas Horton [phone]       [address] [facility] [address]  [facility] [address]        Equal Access to Services     LUKE DUVAL : Mukul Jose, lydumila rivera, rosana lane. So [facility] [phone].    ATENCIÓN: Si  fiordaliza jose, tiene a martinez disposición servicios gratuitos de asistencia lingüística. Yonathan rodriguez [phone].    We comply with applicable federal civil rights laws and [place] laws. We do not discriminate on the basis of race, color, national origin, age, disability, sex, sexual orientation, or gender identity.            Thank you!     Thank you for choosing [facility] PROCED[facility]  for your care. Our goal is always to provide you with excellent care. Hearing back from our patients is one way we can continue to improve our services. Please take a few minutes to complete the written survey that you may receive in the mail after your visit with us. Thank you!             Your Updated Medication List - Protect others around you: Learn how to safely use, store and throw away your medicines at www.disposemymeds.org.          This list is accurate as of 4/25/18 10:55 AM.  Always use your most recent med list.                   Brand Name Dispense Instructions for use Diagnosis    albuterol (2.5 MG/3ML) 0.083% neb solution     25 vial    Take 1 vial (2.5 mg) by nebulization every 6 hours as needed for shortness of breath / dyspnea or wheezing    Mild intermittent asthma with acute exacerbation       artificial saliva Liqd liquid     473 mL    Swish and spit 10 mLs in mouth 4 times daily    Dry mouth       artificial saliva Soln solution     1 Bottle    Take 2 mLs (2 sprays) by mouth 4 times daily as needed for dry mouth    Dry mouth       Benzocaine 20 % Aero spray    HURRICAINE/TOPEX    1 Bottle    Take 0.5-1 mLs by mouth every 3 hours as needed for moderate pain    Throat pain       benzocaine-menthol 15-3.6 MG lozenge    CEPACOL    84 lozenge    Place 1 lozenge inside cheek every hour as needed for sore throat    Throat pain       BOOST CALORIE SMART Liqd     90 Bottle    Take 3 Cans by mouth daily    HCC (hepatocellular carcinoma) (H)       calcium carbonate 500 MG chewable tablet     TUMS    150 tablet    Take 1 tablet (500 mg) by mouth daily as needed for heartburn    Gastroesophageal reflux disease without esophagitis       cetirizine 10 MG tablet    zyrTEC    30 tablet    Take 1 tablet (10 mg) by mouth every evening    Chronic allergic conjunctivitis       ciprofloxacin 500 MG tablet    CIPRO    30 tablet    Take 1 tablet (500 mg) by mouth every 24 hours    SBP (spontaneous bacterial peritonitis) (H)       DAILY MULTIPLE VITAMIN/IRON Tabs     30 tablet    Take 1 tablet by mouth daily    Chronic hepatitis C without hepatic coma (H)       furosemide 20 MG tablet    LASIX    180 tablet    Take 1 tablet (20 mg) by mouth daily    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)       glycerin-hypromellose- 0.2-0.2-1 % Soln ophthalmic solution    ARTIFICIAL TEARS    1 Bottle    Place 1 drop into both eyes 2 times daily as needed for dry eyes    Dry eyes       ipratropium - albuterol 0.5 mg/2.5 mg/3 mL 0.5-2.5 (3) MG/3ML neb solution    DUONEB    360 mL    Take 1 vial (3 mLs) by nebulization every 6 hours as needed for shortness of breath / dyspnea or wheezing    Mild persistent asthma without complication       lactulose 10 GM/15ML solution    CHRONULAC    1892 mL    Take 15 mLs (10 g) by mouth daily    Cirrhosis of liver with ascites, unspecified hepatic cirrhosis type (H)       mometasone-formoterol 200-5 MCG/ACT oral inhaler    DULERA    1 Inhaler    Inhale 2 puffs into the lungs 2 times daily    Moderate persistent asthma without complication       omeprazole 40 MG capsule    priLOSEC    90 capsule    Take 1 capsule (40 mg) by mouth daily Take 30-60 minutes before a meal.    Gastroesophageal reflux disease without esophagitis       ondansetron 4 MG ODT tab    ZOFRAN-ODT    120 tablet    Take 1 tablet (4 mg) by mouth every 6 hours as needed for nausea or vomiting    HCC (hepatocellular carcinoma) (H)       order for DME     1 Units    Equipment being ordered: Nebulizer    Mild persistent  asthma without complication       polyethylene glycol powder    MIRALAX    850 g    Take 17 g (1 capful) by mouth 3 times daily as needed for constipation    Constipation, unspecified constipation type       spironolactone 50 MG tablet    ALDACTONE    30 tablet    Take 1 tablet (50 mg) by mouth daily    Chronic hepatitis C without hepatic coma (H)       traMADol 50 MG tablet    ULTRAM    40 tablet    Take 0.5-1 tablets (25-50 mg) by mouth every 8 hours as needed for moderate pain    HCC (hepatocellular carcinoma) (H)

## 2018-04-25 NOTE — PROGRESS NOTES
Paracentesis Nursing Note  Yue Portillo [facility] for [facility]esis.    During today's appointment orders from Tala Meyer MD were completed.    Progress Note:  Patient identification verified by name and date of birth.  Assessment completed.  Vitals monitored throughout appointment and recorded in Doc Flowsheets.  See proceduralist note in ultrasound.    Date of consent or authorization: 4/11/201/ - 7/11/2018.  Invasive Procedure Safety Checklist was completed and sent for scanning.     Paracentesis performed by Vladimir Oh PA-C Radiology.    The following labs were communicated to provider performing paracentesis:  Lab Results   Component Value Date    PLT 75 04/24/2018       Total amount of ascites fluid drained: 0.8 liters.  Color of ascites fluid: yellow.  Total amount of albumin given: 12.5  grams.    Patient tolerated procedure well.    Post procedure,denies pain or discomfort post paracentesis.      Discharge Plan:  Discharge instructions were reviewed with patient.  Patient/Representative verbalized understanding and all questions were answered.   Discharged from [facility] in stable condition.    Kaycee Pino RN    Administrations This Visit     albumin human 25 % injection 12.5 g     Admin Date Action Dose Route Administered By             04/25/2018 New Bag 12.5 g Intravenous Kaycee Pino RN                    lidocaine 1 % 20 mL     Admin Date Action Dose Route Administered By             04/25/2018 Given by Other 20 mL Other Kaycee Pino RN                          /62  Temp 97.8  F (36.6  C)  Wt 44.9 kg (99 lb)  SpO2 100%  BMI 18.11 kg/m2

## 2018-04-25 NOTE — PATIENT INSTRUCTIONS
Dear Yue Portillo    Thank you for choosing [facility] ([facility]) for your procedure.  The following information is a summary of our appointment as well as important reminders.      Additional information: Your paracentesis procedure today, beginning weight 99 lb (44.9 kg), removed 0.8 liters ascites fluid, gave 12.5 grams albumin, ending weight approximately 97.5 lb (44.2 kg).    We look forward in seeing you on your next appointment here at [facility].  Please don t hesitate to call us at [phone] to reschedule any of your appointments or to speak with one of the [facility] registered nurses.  It was a pleasure taking care of you today.    Sincerely,    [facility]  Specialty Infusion [facility] Procedure Center  [address] [place] [address]  [address]  Phone:  [phone]  DISCHARGE INSTRUCTIONS FOLLOWING ABDOMINAL PARACENTESIS    After you go home:    No strenuous activity for 24 hours    Resume your regular diet    Limit fluid intake for the first 48 hours to no more than 2 quarts per day.  There should be minimal drainage from the needle site.  If drainage does occur and soaks through the bandage, apply gentle pressure with your hand for 5 minutes.    Notify MD for the following:    Excessive drainage    Excessive swelling, redness or tenderness at the needle site    Fever greater than 101 degrees F    Dizziness or light-headedness when getting up or walking      IF THIS IS A MEDICAL EMERGENCY, CALL [phone]    If you have any questions or concerns    Contact the Hepatology Clinic:   [phone]    If you are a post-transplant patient, contact the Transplant Office:  [phone]    If this is after hours, contact the hospital :  [phone] and as to have the GI resident on call paged                   I have received and understand my discharge instructions and I have all of my personal  belongings.          ------------------------------------------------------        ---------------------------------------------------    Patient / Significant Other's Signature     Relationship

## 2018-05-02 NOTE — PROGRESS NOTES
Paracentesis Nursing Note  Yue Portillo presents [facility] for a [facility]acentesis.    During today's appointment orders from MAVERICK Calvert were completed.    Progress Note:  Patient identification verified by name and date of birth.  Assessment completed.  Vitals monitored throughout appointment and recorded in Doc Flowsheets.  See proceduralist note in ultrasound.    Date of consent or authorization: 4/11.  Invasive Procedure Safety Checklist was completed and sent for scanning.     Paracentesis performed by Cornelia Fleming PA-C.    The following labs were communicated to provider performing paracentesis:  Lab Results   Component Value Date    PLT 91 05/02/2018       Total amount of ascites fluid drained: 2.6 liters.  Color of ascites fluid: yellow.  Total amount of albumin given: 37.5  grams.    Patient tolerated procedure well.    Post procedure,denies pain or discomfort post paracentesis.      Discharge Plan:  Discharge instructions were reviewed with patient.  Patient/Representative verbalized understanding and all questions were answered.   Discharged from [facility] in stable condition.    Mell Espinosa RN    Administrations This Visit     albumin human 25 % injection 12.5 g     Admin Date Action Dose Route Administered By             05/02/2018 New Bag 37.5 g Intravenous Mell Espinosa RN                    lidocaine 1 % 20 mL     Admin Date Action Dose Route Administered By             05/02/2018 Given by Other Clinician 15 mL Other Mell Espinosa RN                          /57  Pulse 93  Temp 98.1  F (36.7  C) (Oral)  Wt 45.1 kg (99 lb 6.4 oz)  SpO2 100%  BMI 18.18 kg/m2

## 2018-05-02 NOTE — PATIENT INSTRUCTIONS
Paracentesis  Your healthcare provider recommends that you have paracentesis. This is a procedure to remove extra fluid from your belly (abdomen). A needle is used to drain the fluid. A small sample of fluid may be taken and tested for problems. If the fluid buildup is causing discomfort or pain, all of the fluid may be drained. To do this, a tube is attached to the needle. The fluid is drained into a container that sits outside of the body. If symptoms are severe, paracentesis may be done as an emergency procedure. Otherwise, it will be scheduled ahead of time. Read on to learn more about paracentesis and how it works.     Understanding ascites  Many of the body s organs, including the liver and intestines, are inside the belly (abdomen). The organs are covered in a thin membrane called the peritoneum. The peritoneum has 2 layers. It makes a fluid that allows the layers to glide smoothly past each other. If this fluid builds up in the belly, the condition is called ascites. Ascites causes pain and discomfort. It can also make it hard to breathe. Fluid can build up for a number of reasons. These include chronic liver disease (cirrhosis), heart or kidney failure, and cancer. Your provider can tell you more about the cause of your ascites.  How paracentesis works  The goal of paracentesis may be to help diagnose the cause of the excess fluid. Or, the goal may be to drain excess fluid from the abdomen. In some cases, fluid returns and the procedure needs to be repeated.  Before the procedure    Tell your provider about any medicines you are taking. This includes all prescription medicines, over-the-counter medicines, street drugs, herbs, vitamins, and other supplements.    Tell your provider about any allergies you have.    Before the procedure begins, you ll be asked to empty your bladder. This helps prevent injury to the bladder during the procedure. If needed, a thin tube (Bourne catheter) may be placed into your  bladder to drain urine during the procedure. This tube is removed after the procedure.    An IV (intravenous) line may be put into a vein in your arm or hand. This line supplies fluids and medicines.  During the procedure    You are awake during the procedure.    An imaging method called ultrasound may be used to guide the procedure. It shows live images of the inside of your belly on a video screen. This helps the provider find the site of the excess fluid inside your belly and decide where to insert the needle.    A numbing medicine (local anesthesia) is injected into your belly where the needle will be inserted.    Once the skin is numb, the provider carefully inserts the needle into the belly. This causes the needle to fill with fluid.    The needle may be removed with only a small sample of fluid. This sample is sent to a lab for testing. Getting a sample takes about 10 to 15 minutes.    Or, a tube may be attached to the needle so that more of the excess fluid can be drained. The tube may be taped or stitched into place. This keeps it from pulling the needle out of your belly.    How long it takes to drain all of the fluid varies for each person. In most cases, it takes about 30 minutes. Your provider will let you know if the procedure is expected to take longer than usual.    Once all of the fluid is drained, the needle and tube are removed.    Pressure is put on the puncture site to stop any fluid leakage or bleeding.    A small bandage is placed over the puncture site. Albumin may be given during or after the procedure to prevent low blood pressure or kidney problems.  After the procedure  You may be taken to a recovery room to rest after the procedure. If you are in pain, you will be given medicine as needed. You will likely be sent home 1 to 2 hours after the procedure is done. When you leave the hospital, have an adult family member or friend drive you home. If you are staying in the hospital, you will  return to your hospital room.  Risks and possible complications of paracentesis  This procedure is considered safe. But like all procedures, it carries some risks. These include the following:    Bleeding    Infection    Injury to structures in the belly    Fast drop in blood pressure   Recovering at home    If needed, your provider can prescribe or recommend pain medicines for you to take at home. Take these exactly as directed. If you stopped taking other medicines before the procedure, ask your provider when you can start them again.    You may remove the bandage 24 hours after the procedure.    Take it easy for 24 hours after the procedure. Avoid physical activity until your provider says it s OK.  Follow-up care  Make a follow-up appointment with your provider as directed. During your follow-up visit, your provider will check your healing. Let your provider know how you are feeling. You can also discuss the cause of your ascites and if any more treatment is needed.   When to seek medical care  Call your healthcare provider if you notice any of the following after the procedure:    A fever of 100.4 F (38.0 C) or higher    Trouble breathing    Pain that does not go away even after taking pain medicine    Belly pain not caused by having the skin punctured    Bleeding from the puncture site    More than a small amount of fluid leakage from the puncture site    Swollen belly    Signs of infection at the puncture site. These include increased pain, redness, or swelling, as well as warmth or bad-smelling drainage.    Blood in your urine    Dizziness, lightheadedness, or fainting   Date Last Reviewed: 7/1/2016 2000-2017 [employer]. [address] [place] [address], [place] [address]. All rights reserved. This information is not intended as a substitute for professional medical care. Always follow your healthcare professional's instructions.

## 2018-05-02 NOTE — MR AVS SNAPSHOT
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 This is a procedure to remove extra fluid from your belly (abdomen). A needle is used to drain the fluid. A small sample of fluid may be taken and tested for problems. If the fluid buildup is causing discomfort or pain, all of the fluid may be drained. To do this, a tube is attached to the needle. The fluid is drained into a container that sits outside of the body. If symptoms are severe, paracentesis may be done as an emergency procedure. Otherwise, it will be scheduled ahead of time. Read on to learn more about paracentesis and how it works.     Understanding ascites  Many of the body s organs, including the liver and intestines, are inside the belly (abdomen). The organs are covered in a thin membrane called the peritoneum. The peritoneum has 2 layers. It makes a fluid that allows the layers to glide smoothly past each other. If this fluid builds up in the belly, the condition is called ascites. Ascites causes pain and discomfort. It can also make it hard to breathe. Fluid can build up for a number of reasons. These include chronic liver disease (cirrhosis), heart or kidney failure, and cancer. Your provider can tell you more about the cause of your ascites.  How paracentesis works  The goal of paracentesis may be to help diagnose the cause of the excess fluid. Or, the goal may be to drain  excess fluid from the abdomen. In some cases, fluid returns and the procedure needs to be repeated.  Before the procedure    Tell your provider about any medicines you are taking. This includes all prescription medicines, over-the-counter medicines, street drugs, herbs, vitamins, and other supplements.    Tell your provider about any allergies you have.    Before the procedure begins, you ll be asked to empty your bladder. This helps prevent injury to the bladder during the procedure. If needed, a thin tube (Bourne catheter) may be placed into your bladder to drain urine during the procedure. This tube is removed after the procedure.    An IV (intravenous) line may be put into a vein in your arm or hand. This line supplies fluids and medicines.  During the procedure    You are awake during the procedure.    An imaging method called ultrasound may be used to guide the procedure. It shows live images of the inside of your belly on a video screen. This helps the provider find the site of the excess fluid inside your belly and decide where to insert the needle.    A numbing medicine (local anesthesia) is injected into your belly where the needle will be inserted.    Once the skin is numb, the provider carefully inserts the needle into the belly. This causes the needle to fill with fluid.    The needle may be removed with only a small sample of fluid. This sample is sent to a lab for testing. Getting a sample takes about 10 to 15 minutes.    Or, a tube may be attached to the needle so that more of the excess fluid can be drained. The tube may be taped or stitched into place. This keeps it from pulling the needle out of your belly.    How long it takes to drain all of the fluid varies for each person. In most cases, it takes about 30 minutes. Your provider will let you know if the procedure is expected to take longer than usual.    Once all of the fluid is drained, the needle and tube are removed.    Pressure is put on the  puncture site to stop any fluid leakage or bleeding.    A small bandage is placed over the puncture site. Albumin may be given during or after the procedure to prevent low blood pressure or kidney problems.  After the procedure  You may be taken to a recovery room to rest after the procedure. If you are in pain, you will be given medicine as needed. You will likely be sent home 1 to 2 hours after the procedure is done. When you leave the hospital, have an adult family member or friend drive you home. If you are staying in the hospital, you will return to your hospital room.  Risks and possible complications of paracentesis  This procedure is considered safe. But like all procedures, it carries some risks. These include the following:    Bleeding    Infection    Injury to structures in the belly    Fast drop in blood pressure   Recovering at home    If needed, your provider can prescribe or recommend pain medicines for you to take at home. Take these exactly as directed. If you stopped taking other medicines before the procedure, ask your provider when you can start them again.    You may remove the bandage 24 hours after the procedure.    Take it easy for 24 hours after the procedure. Avoid physical activity until your provider says it s OK.  Follow-up care  Make a follow-up appointment with your provider as directed. During your follow-up visit, your provider will check your healing. Let your provider know how you are feeling. You can also discuss the cause of your ascites and if any more treatment is needed.   When to seek medical care  Call your healthcare provider if you notice any of the following after the procedure:    A fever of 100.4 F (38.0 C) or higher    Trouble breathing    Pain that does not go away even after taking pain medicine    Belly pain not caused by having the skin punctured    Bleeding from the puncture site    More than a small amount of fluid leakage from the puncture site    Swollen  belly    Signs of infection at the puncture site. These include increased pain, redness, or swelling, as well as warmth or bad-smelling drainage.    Blood in your urine    Dizziness, lightheadedness, or fainting   Date Last Reviewed: 7/1/2016 2000-2017 [employer]. [address] [place] [address]. All rights reserved. This information is not intended as a substitute for professional medical care. Always follow your healthcare professional's instructions.                Follow-ups after your visit        Your next 10 appointments already scheduled     May 09, 2018  9:30 AM CDT   Paracentesis Visit with Uc Spec Inf Para Provider, UC 39 ATC   [facility] Specialty and Procedure ([facility])    [address]  [address]  [facility] [address] [phone]            May 16, 2018  9:30 AM CDT   Paracentesis Visit with Uc Spec Inf Para Provider, UC 40 ATC   [facility] Special[facility] and Procedure ([facility])    [address]  [address]  [facility] [address] [phone]            May 23, 2018  9:30 AM CDT   Paracentesis Visit with Uc Spec Inf Para Provider, UC 39 ATC   [facility] and Procedure ([facility])    [address]  [address]  [facility] [address] [phone]            May 30, 2018  9:30 AM CDT   Paracentesis Visit with Uc Spec Inf Para Provider, UC 40 ATC   [facility] and Procedure ([facility])    [address]  [address]  [facility] [address] [phone]            Aug 08, 2018 10:00 AM CDT   Lab with UC LAB   [facility] ([facility])    [address]  1st [address]  [facility] [address] [phone]            Aug 08, 2018 11:00 AM CDT   (Arrive by 10:45 AM)   New General Liver with  Tala Meyer MD   [facility] ([facility])    [address]  [address]  [facility] [address] [phone]              Who to contact     If you have questions or need follow up information about today's clinic visit or your schedule please contact [facility] PROCED[facility] directly at [phone].  Normal or non-critical lab and imaging results will be communicated to you by MyChart, letter or phone within 4 business days after the clinic has received the results. If you do not hear from us within 7 days, please contact the clinic through MyChart or phone. If you have a critical or abnormal lab result, we will notify you by phone as soon as possible.  Submit refill requests through [employer] or call your pharmacy and they will forward the refill request to us. Please allow 3 business days for your refill to be completed.          Additional Information About Your Visit        Care EveryWhere ID     This is your Care EveryWhere ID. This could be used by other organizations to access your [facility] medical records  [id number]        Your Vitals Were     Pulse Temperature Pulse Oximetry BMI (Body Mass Index)          93 98.1  F (36.7  C) (Oral) 100% 18.18 kg/m2         Blood Pressure from Last 3 Encounters:   05/02/18 108/57   04/25/18 100/59   04/24/18 93/51    Weight from Last 3 Encounters:   05/02/18 45.1 kg (99 lb 6.4 oz)   04/25/18 44.2 kg (97 lb 8 oz)   04/13/18 51.5 kg (113 lb 8 oz)              We Performed the Following     Basic metabolic panel     CBC with platelets differential     Hepatic panel     Hepatitis C RNA quantitative     INR     US Paracentesis        Primary Care Provider Office Phone # Fax #    Felicitas Horton [phone]       [address] [address]        Equal Access to Services     LUKE DUVAL AH: lyudmila Aguila,  raffy veronicazandra anetarosana lawson jungin hayaan adeeg kharash la'aan ah. So [facility] [phone].    ATENCIÓN: Si fiordaliza jose, tiene a martinez disposición servicios gratuitos de asistencia lingüística. Yonathan al [phone].    We comply with applicable federal civil rights laws and [place] laws. We do not discriminate on the basis of race, color, national origin, age, disability, sex, sexual orientation, or gender identity.            Thank you!     Thank you for choosing [facility] SPECIALTY AND PROCEDURE  for your care. Our goal is always to provide you with excellent care. Hearing back from our patients is one way we can continue to improve our services. Please take a few minutes to complete the written survey that you may receive in the mail after your visit with us. Thank you!             Your Updated Medication List - Protect others around you: Learn how to safely use, store and throw away your medicines at www.disposemymeds.org.          This list is accurate as of 5/2/18  1:39 PM.  Always use your most recent med list.                   Brand Name Dispense Instructions for use Diagnosis    albuterol (2.5 MG/3ML) 0.083% neb solution     25 vial    Take 1 vial (2.5 mg) by nebulization every 6 hours as needed for shortness of breath / dyspnea or wheezing    Mild intermittent asthma with acute exacerbation       artificial saliva Liqd liquid     473 mL    Swish and spit 10 mLs in mouth 4 times daily    Dry mouth       artificial saliva Soln solution     1 Bottle    Take 2 mLs (2 sprays) by mouth 4 times daily as needed for dry mouth    Dry mouth       Benzocaine 20 % Aero spray    HURRICAINE/TOPEX    1 Bottle    Take 0.5-1 mLs by mouth every 3 hours as needed for moderate pain    Throat pain       benzocaine-menthol 15-3.6 MG lozenge    CEPACOL    84 lozenge    Place 1 lozenge inside cheek every hour as needed for sore throat    Throat pain       BOOST CALORIE SMART Liqd     90 Bottle    Take 3  Cans by mouth daily    HCC (hepatocellular carcinoma) (H)       calcium carbonate 500 MG chewable tablet    TUMS    150 tablet    Take 1 tablet (500 mg) by mouth daily as needed for heartburn    Gastroesophageal reflux disease without esophagitis       cetirizine 10 MG tablet    zyrTEC    30 tablet    Take 1 tablet (10 mg) by mouth every evening    Chronic allergic conjunctivitis       ciprofloxacin 500 MG tablet    CIPRO    30 tablet    Take 1 tablet (500 mg) by mouth every 24 hours    SBP (spontaneous bacterial peritonitis) (H)       DAILY MULTIPLE VITAMIN/IRON Tabs     30 tablet    Take 1 tablet by mouth daily    Chronic hepatitis C without hepatic coma (H)       furosemide 20 MG tablet    LASIX    180 tablet    Take 1 tablet (20 mg) by mouth daily    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)       glycerin-hypromellose- 0.2-0.2-1 % Soln ophthalmic solution    ARTIFICIAL TEARS    1 Bottle    Place 1 drop into both eyes 2 times daily as needed for dry eyes    Dry eyes       ipratropium - albuterol 0.5 mg/2.5 mg/3 mL 0.5-2.5 (3) MG/3ML neb solution    DUONEB    360 mL    Take 1 vial (3 mLs) by nebulization every 6 hours as needed for shortness of breath / dyspnea or wheezing    Mild persistent asthma without complication       lactulose 10 GM/15ML solution    CHRONULAC    1892 mL    Take 15 mLs (10 g) by mouth daily    Cirrhosis of liver with ascites, unspecified hepatic cirrhosis type (H)       mometasone-formoterol 200-5 MCG/ACT oral inhaler    DULERA    1 Inhaler    Inhale 2 puffs into the lungs 2 times daily    Moderate persistent asthma without complication       omeprazole 40 MG capsule    priLOSEC    90 capsule    Take 1 capsule (40 mg) by mouth daily Take 30-60 minutes before a meal.    Gastroesophageal reflux disease without esophagitis       ondansetron 4 MG ODT tab    ZOFRAN-ODT    120 tablet    Take 1 tablet (4 mg) by mouth every 6 hours as needed for nausea or vomiting    HCC (hepatocellular  carcinoma) (H)       order for DME     1 Units    Equipment being ordered: Nebulizer    Mild persistent asthma without complication       polyethylene glycol powder    MIRALAX    850 g    Take 17 g (1 capful) by mouth 3 times daily as needed for constipation    Constipation, unspecified constipation type       spironolactone 50 MG tablet    ALDACTONE    30 tablet    Take 1 tablet (50 mg) by mouth daily    Chronic hepatitis C without hepatic coma (H)       traMADol 50 MG tablet    ULTRAM    40 tablet    Take 0.5-1 tablets (25-50 mg) by mouth every 8 hours as needed for moderate pain    HCC (hepatocellular carcinoma) (H)

## 2018-05-04 NOTE — PROCEDURES
Interventional Radiology Brief Post Procedure Note    Procedure: IR THORACENTESIS    Proceduralist: FELIX Sykes PA-C    Assistant: None    Time Out: Prior to the start of the procedure and with procedural staff participation, I verbally confirmed the patient s identity using two indicators, relevant allergies, that the procedure was appropriate and matched the consent or emergent situation, and that the correct equipment/implants were available. Immediately prior to starting the procedure I conducted the Time Out with the procedural staff and re-confirmed the patient s name, procedure, and site/side. (The Joint Commission universal protocol was followed.)  Yes    Medications   Medication Event Details Admin User Admin Time       Sedation: None. Local Anesthestic used    Findings: Completed ultrasound guided placement of RIGHT-sided chest tube.  A 5 Kitt[place] Yeuh centesis catheter was placed and 1600 mL of fluid removed. Patient experienced discomforting cough and aspiration was halted. Chest tube secured. A large effusion remains. Patient transported to ED for further monitoring and waiting to resume aspiration. Aspiration may resume no sooner than 1545. A maximum of another 1500 mL may then be aspirated if the patient tolerates, at which time the Yeuh should be discontinued. No immediate complication.  Dx:  Pleural effusion.  Althea.    Estimated Blood Loss: Minimal    Fluoroscopy Time:  minute(s)    SPECIMENS: None    Complications: 1. None     Condition: Stable    Plan:     Comments: See dictated procedure note for full details.    Vladimir Oh PA-C

## 2018-05-04 NOTE — DISCHARGE INSTRUCTIONS
Home.  Rest  Follow up with MD   Also follow up with scheduled paracentesis and repeat thoracentesis.  Return if any concerns.

## 2018-05-04 NOTE — PROGRESS NOTES
Pt returned for second half of her staged thoracentesis.  Additional  800cc removed from R pleural space for a total of 2.4 liters.  Some coughing after procedure.  Returned to ED.

## 2018-05-04 NOTE — CONSULTS
Patient is on IR schedule 5/4/2018 for a right sided thoracentesis.   Labs pending.  No NPO required.  Consent will be done prior to procedure.     Please [place] the IR [place] RN at [phone] for estimated time of procedure.     Case discussed with Dr. Rabago and Dr. Ulloa from IR.    Candace Lee DNP, [facility]  Interventional Radiology  Phone: [phone]  Pager: [phone]

## 2018-05-04 NOTE — ED NOTES
Bed: ED19  Expected date:   Expected time:   Means of arrival:   Comments:  Hold for IR patient AG

## 2018-05-04 NOTE — ED AVS SNAPSHOT
[facility], Emergency Department    [address] [address]    Phone:  [phone]                                       Yue Portillo   MRN: [id number]    Department:  [facility], Emergency Department   Date of Visit:  5/4/2018           Patient Information     Date Of Birth          1940        Your diagnoses for this visit were:     Recurrent right pleural effusion     S/P thoracentesis        You were seen by Frank Sood MD.      Follow-up Information     Follow up with Felicitas Horton MD.    Specialty:  Family Practice    Contact information:    [address]  [facility] [address] [phone]          Discharge Instructions       Home.  Rest  Follow up with MD   Also follow up with scheduled paracentesis and repeat thoracentesis.  Return if any concerns.        Your next 10 appointments already scheduled     May 08, 2018   Procedure with Alban Barahona PA-C   [facility] ([facility])    [address] [place] [address] [phone]           Located in the Clinics and Surgery Center at [address] [address].   parking is very convenient and highly recommended.  is a $6 flat rate fee.  Both  and self parkers should enter the main arrival plaza from [facility]; parking attendants will direct you based on your parking preference.            May 08, 2018 12:45 PM CDT   (Arrive by 11:45 AM)   IR THORACENTESIS with [email or web address]   [facility] ([facility])    [address] [place] [id number] [phone]           1. Laboratory test are to be obtained by your doctor prior to the exam (Hgb/Hct, platelet count, INR) 2. Someone will need to drive you to and from the hospital if you feel you may need sedation for the procedure. 3. Bring a list of all drugs you are taking; include  supplements and over-the-counter medications. 4. Wear comfortable clothes and leave your valuables at home. 5. If you are or may be pregnant, be sure to contact your doctor or a Radiology nurse prior to the day of the exam. 6. If you have diabetes, check with your doctor or a Radiology nurse to see if your insulin needs to be adjusted for the exam. 7. If you are taking Coumadin (to thin your blood) please contact your doctor or a Radiology nurse at least 3 days before the exam for special instructions (only need the INR to be <2.0). 8. The day before your exam you may eat your regular diet. Drink no alcoholic beverages for 24 hours prior to the exam. 9. Do not eat any solid food or milk products for 6 hours prior to the exam. You may drink clear liquids until 2 hours prior to the exam. Clear liquids include the following: water, Jell-O, clear broth, apple juice or any noncarbonated drink that you can see through (no pop!) 10. The morning of the exam you may brush your teeth and take medications as directed with a sip of water. 11. Tell the Radiology nurse if you have any allergies. 12. If the tube needs to remain in place you will remain in the hospital until the tube can be removed 13. If the tube is removed immediately you may leave the hospital following your exam. However, if you received sedation you will need to stay 1-2 hours. You may be asked to stay longer and have a chest x-ray sometime after the procedure. 14. If you received sedation, you cannot drive until morning, and an adult must be with you until then. If you live more than one hour away you should stay in the [place] area overnight.            May 09, 2018  9:30 AM CDT   Paracentesis Visit with  Spec Inf Para Provider,  39 ATC   [facility] ([facility])    [address]  [address]  [facility] [address] [phone]            May 16, 2018  9:30 AM CDT    Paracentesis Visit with Uc Spec Inf Para Provider, UC 40 ATC   [facility] ([facility])    [address]  [address]  [facility] [address]   [phone]            May 23, 2018  9:30 AM CDT   Paracentesis Visit with Uc Spec Inf Para Provider, UC 39 ATC   [facility] ([facility])    [address]  [address]  [facility] [address] [phone]            May 30, 2018  9:30 AM CDT   Paracentesis Visit with Uc Spec Inf Para Provider, UC 40 ATC   [facility] ([facility])    [address]  [address]  [facility] [address] [phone]              24 Hour Appointment Hotline       To make an appointment at any [facility], call [phone] ([phone]). If you don't have a family doctor or clinic, we will help you find one. [facility] are conveniently located to serve the needs of you and your family.             Review of your medicines      Our records show that you are taking the medicines listed below. If these are incorrect, please call your family doctor or clinic.        Dose / Directions Last dose taken    albuterol (2.5 MG/3ML) 0.083% neb solution   Dose:  1 vial   Quantity:  25 vial        Take 1 vial (2.5 mg) by nebulization every 6 hours as needed for shortness of breath / dyspnea or wheezing   Refills:  3        artificial saliva Liqd liquid   Dose:  10 mL   Quantity:  473 mL        Swish and spit 10 mLs in mouth 4 times daily   Refills:  0        artificial saliva Soln solution   Dose:  2 spray   Quantity:  1 Bottle        Take 2 mLs (2 sprays) by mouth 4 times daily as needed for dry mouth   Refills:  0        Benzocaine 20 % Aero spray   Commonly known as:  HURRICAINE/TOPEX   Dose:  1-2 spray   Quantity:  1 Bottle        Take 0.5-1 mLs by  mouth every 3 hours as needed for moderate pain   Refills:  0        benzocaine-menthol 15-3.6 MG lozenge   Commonly known as:  CEPACOL   Dose:  1 lozenge   Quantity:  84 lozenge        Place 1 lozenge inside cheek every hour as needed for sore throat   Refills:  0        BOOST CALORIE SMART Liqd   Dose:  3 Can   Quantity:  90 Bottle        Take 3 Cans by mouth daily   Refills:  11        calcium carbonate 500 MG chewable tablet   Commonly known as:  TUMS   Dose:  1 chew tab   Quantity:  150 tablet        Take 1 tablet (500 mg) by mouth daily as needed for heartburn   Refills:  0        cetirizine 10 MG tablet   Commonly known as:  zyrTEC   Dose:  10 mg   Quantity:  30 tablet        Take 1 tablet (10 mg) by mouth every evening   Refills:  6        ciprofloxacin 500 MG tablet   Commonly known as:  CIPRO   Dose:  500 mg   Indication:  SBP prophylaxis   Quantity:  30 tablet        Take 1 tablet (500 mg) by mouth every 24 hours   Refills:  3        DAILY MULTIPLE VITAMIN/IRON Tabs   Dose:  1 tablet   Quantity:  30 tablet        Take 1 tablet by mouth daily   Refills:  11        furosemide 20 MG tablet   Commonly known as:  LASIX   Dose:  20 mg   Quantity:  180 tablet        Take 1 tablet (20 mg) by mouth daily   Refills:  1        glycerin-hypromellose- 0.2-0.2-1 % Soln ophthalmic solution   Commonly known as:  ARTIFICIAL TEARS   Dose:  1 drop   Quantity:  1 Bottle        Place 1 drop into both eyes 2 times daily as needed for dry eyes   Refills:  0        ipratropium - albuterol 0.5 mg/2.5 mg/3 mL 0.5-2.5 (3) MG/3ML neb solution   Commonly known as:  DUONEB   Dose:  1 vial   Quantity:  360 mL        Take 1 vial (3 mLs) by nebulization every 6 hours as needed for shortness of breath / dyspnea or wheezing   Refills:  0        lactulose 10 GM/15ML solution   Commonly known as:  CHRONULAC   Dose:  10 g   Quantity:  1892 mL        Take 15 mLs (10 g) by mouth daily   Refills:  0        mometasone-formoterol 200-5  MCG/ACT oral inhaler   Commonly known as:  DULERA   Dose:  2 puff   Quantity:  1 Inhaler        Inhale 2 puffs into the lungs 2 times daily   Refills:  12        omeprazole 40 MG capsule   Commonly known as:  priLOSEC   Dose:  40 mg   Quantity:  90 capsule        Take 1 capsule (40 mg) by mouth daily Take 30-60 minutes before a meal.   Refills:  3        ondansetron 4 MG ODT tab   Commonly known as:  ZOFRAN-ODT   Dose:  4 mg   Quantity:  120 tablet        Take 1 tablet (4 mg) by mouth every 6 hours as needed for nausea or vomiting   Refills:  0        order for DME   Quantity:  1 Units        Equipment being ordered: Nebulizer   Refills:  0        polyethylene glycol powder   Commonly known as:  MIRALAX   Dose:  1 capful   Quantity:  850 g        Take 17 g (1 capful) by mouth 3 times daily as needed for constipation   Refills:  3        spironolactone 50 MG tablet   Commonly known as:  ALDACTONE   Dose:  50 mg   Quantity:  30 tablet        Take 1 tablet (50 mg) by mouth daily   Refills:  1        traMADol 50 MG tablet   Commonly known as:  ULTRAM   Dose:  25-50 mg   Quantity:  40 tablet        Take 0.5-1 tablets (25-50 mg) by mouth every 8 hours as needed for moderate pain   Refills:  0                Procedures and tests performed during your visit     CBC with platelets differential    Comprehensive metabolic panel    EKG 12-lead, tracing only    INR    IR Thoracentesis    Interventional Radiology Adult/Peds IP Consult: Patient to be seen: URGENT within 4 hours; Call back #: Call ER. Dr. Sood; Staged thoracentesis    Partial thromboplastin time    Peripheral IV catheter    Pulse oximetry nursing    XR Chest Port 1 View      Orders Needing Specimen Collection     None      Pending Results     Date and Time Order Name Status Description    5/4/2018 1242 IR Thoracentesis Preliminary             Pending Culture Results     No orders found from 5/2/2018 to 5/5/2018.            Pending Results Instructions     If you  had any lab results that were not finalized at the time of your Discharge, you can call the ED Lab Result RN at [phone]. You will be contacted by this team for any positive Lab results or changes in treatment. The nurses are available 7 days a week from 10A to 6:30P.  You can leave a message 24 hours per day and they will return your call.        Thank you for choosing [facility]       Thank you for choosing [facility] for your care. Our goal is always to provide you with excellent care. Hearing back from our patients is one way we can continue to improve our services. Please take a few minutes to complete the written survey that you may receive in the mail after you visit with us. Thank you!        Care EveryWhere ID     This is your Care EveryWhere ID. This could be used by other organizations to access your [facility] medical records  [id number]        Equal Access to Services     LUKE DUVAL : Mukul Jose, lyudmila rivera, [address]. So [facility] [phone].    ATENCIÓN: Si habla español, tiene a martinez disposición servicios gratuitos de asistencia lingüística. Llame al [phone].    We comply with applicable federal civil rights laws and [place] laws. We do not discriminate on the basis of race, color, national origin, age, disability, sex, sexual orientation, or gender identity.            After Visit Summary       This is your record. Keep this with you and show to your community pharmacist(s) and doctor(s) at your next visit.

## 2018-05-04 NOTE — PROGRESS NOTES
Thoracentesis resumed after greater than 60 minutes interval. Another 800 mL fluid aspirated; total 2400 mL. US shows very little remaining effusion. Yeuh removed. Patient with some slight chest discomfort. Will monitor in recovery. Staff updated.

## 2018-05-04 NOTE — ED TRIAGE NOTES
Pt presents to ED with family member. Had a scheduled paracentesis on Wednesday. SOB has been increasing, called to make appointment for another paracentesis and was told no appt until Tuesday.

## 2018-05-04 NOTE — PROGRESS NOTES
Patient Name: Yue Portillo  Medical Record Number: [id number]  Today's Date: 5/4/2018    Procedure: Right sided thoracentesis (staged)  Proceduralist: Basil Oh PA-C    Sedation start time: N/A    Procedure start time: 1428  Puncture time: 1429  Procedure end time: 1445 (1st drainage)    Report given to: Katey MONTAÑO RN  : pt refused formal , son, Fred, present throughout procedure    Other Notes: Pt arrived to IR room 1 from ED19. Pt denies any questions or concerns regarding procedure. Pt positioned sitting up and monitored per protocol. Pt tolerated procedure without any noted complications.  1650 ml drained, yellow, clear fluid. Pt transferred back to ED19 with catheter securely in place on right side.  Will return for second draining in 1 hour.     Cady Rivero RN

## 2018-05-09 NOTE — MR AVS SNAPSHOT
After Visit Summary   5/9/2018    Yue Portillo    MRN: [id number]           Patient Information     Date Of Birth          1940        Visit Information        Provider Department      5/9/2018 9:30 AM Provider, Uc Spec Inf Para; [employer];  [facility] Specialty and Procedure        Today's Diagnoses     HCC (hepatocellular carcinoma) (H)    -  1    Cirrhosis of liver with ascites, unspecified hepatic cirrhosis type (H)          Care Instructions    Dear Yue Portillo    Thank you for choosing [facility] Special[facility] Center ([facility]) for your Paracentesis.  The following information is a summary of our appointment as well as important reminders.    Discharge Instructions for Paracentesis  Paracentesis is a procedure to remove extra fluid from your belly (abdomen). This fluid buildup in the abdomen is called ascites. The procedure may have been done to take a sample of the fluid. Or, it may have been done to drain the extra fluid from your abdomen and help make you more comfortable.     Ascites is buildup of excess fluid in the abdomen.   Home care    If you have pain after the procedure, your healthcare provider can prescribe or recommend pain medicines. Take these exactly as directed. If you stopped taking other medicines before the procedure, ask your provider when you can start them again.    Take it easy for 24 hours after the procedure. Avoid physical activity until your provider says it s OK.    You will have a small bandage over the puncture site. Stitches (sutures), surgical staples, adhesive tapes, adhesive strips, or surgical glue may be used to close the incision. They also help stop bleeding and speed healing. You may take the bandage off in 24 hours.    Check the puncture site for the signs of infection listed below.  Follow-up care  Make a follow-up appointment with your healthcare provider as  directed. During your follow-up visit, your provider will check your healing. Let your provider know how you are feeling. You can also discuss the cause of your ascites and if you need any further treatment.  When to seek medical advice  Call your healthcare provider if you have any of the following after the procedure:    A fever of 100.4 F (38.0 C) or higher    Trouble breathing    Pain that doesn't go away even after taking pain medicine    Belly pain not caused by having the skin punctured    Bleeding from the puncture site    More than a small amount of fluid leaking from the puncture site    Swollen belly    Signs of infection at the puncture site. These include increased pain, redness, or swelling, warmth, or bad-smelling drainage.    Blood in your urine    Feeling dizzy or lightheaded, or fainting   Date Last Reviewed: 7/1/2016 2000-2017 [employer]. [address] [place] [address]. All rights reserved. This information is not intended as a substitute for professional medical care. Always follow your healthcare professional's instructions.        We look forward in seeing you on your next appointment here at [facility].  Please don t hesitate to call us at [phone] to reschedule any of your appointments or to speak with one of the [facility] registered nurses.  It was a pleasure taking care of you today.    Sincerely,    [facility]  Specialty Infusion & Procedure Center  [address] [place] [address]  Phone:  [phone]            Follow-ups after your visit        Your next 10 appointments already scheduled     May 11, 2018   Procedure with Alban Barahona PA-C   [facility] ([facility])    [address] [place] [address]  5th [facility] [address] [phone]           Located in the Clinics and Surgery Center at [address] [place] [address].   parking is very  convenient and highly recommended.  is a $6 flat rate fee.  Both  and self parkers should enter the main arrival plaza from [facility]; parking attendants will direct you based on your parking preference.            May 11, 2018 12:30 PM CDT   (Arrive by 11:30 AM)   IR THORACENTESIS with [id number]   [facility] ([facility])    [address] [facility] [address]  5th Floor  [facility] [address] [phone]           1. Laboratory test are to be obtained by your doctor prior to the exam (Hgb/Hct, platelet count, INR) 2. Someone will need to drive you to and from the hospital if you feel you may need sedation for the procedure. 3. Bring a list of all drugs you are taking; include supplements and over-the-counter medications. 4. Wear comfortable clothes and leave your valuables at home. 5. If you are or may be pregnant, be sure to contact your doctor or a Radiology nurse prior to the day of the exam. 6. If you have diabetes, check with your doctor or a Radiology nurse to see if your insulin needs to be adjusted for the exam. 7. If you are taking Coumadin (to thin your blood) please contact your doctor or a Radiology nurse at least 3 days before the exam for special instructions (only need the INR to be <2.0). 8. The day before your exam you may eat your regular diet. Drink no alcoholic beverages for 24 hours prior to the exam. 9. Do not eat any solid food or milk products for 6 hours prior to the exam. You may drink clear liquids until 2 hours prior to the exam. Clear liquids include the following: water, Jell-O, clear broth, apple juice or any noncarbonated drink that you can see through (no pop!) 10. The morning of the exam you may brush your teeth and take medications as directed with a sip of water. 11. Tell the Radiology nurse if you have any allergies. 12. If the tube needs to remain in place you will remain in the hospital until the tube can be removed 13. If the tube is  removed immediately you may leave the hospital following your exam. However, if you received sedation you will need to stay 1-2 hours. You may be asked to stay longer and have a chest x-ray sometime after the procedure. 14. If you received sedation, you cannot drive until morning, and an adult must be with you until then. If you live more than one hour away you should stay in the [place] area overnight.            May 16, 2018  9:30 AM CDT   Paracentesis Visit with Uc Spec Inf Para Provider, UC 40 ATC   [facility] Specialty and Procedure ([facility])    [address] [facility]  [address]  [facility] [address] [phone]            May 23, 2018  9:30 AM CDT   Paracentesis Visit with Uc Spec Inf Para Provider, UC 39 ATC   [facility] Specialty and Procedure ([facility])    [address]  [address]  [facility] [id number] [phone]            May 30, 2018  9:30 AM CDT   Paracentesis Visit with Uc Spec Inf Para Provider, UC 40 ATC   [facility] Specialty and Procedure ([facility])    [address]  [address]  [facility] [address] [phone]            Aug 08, 2018 10:00 AM CDT   Lab with UC LAB   [facility] ([facility])    [address] [facility] [address]  1st Floor  [facility] [id number] [phone]            Aug 08, 2018 11:00 AM CDT   (Arrive by 10:45 AM)   New General Liver with Tala Meyer MD   [facility] ([facility])    [address]  [address]  [facility] [id number] [phone]              Who to contact     If you have questions or need follow up information about today's clinic visit or your schedule please contact [facility] SPECIALTY AND PROCEDURE directly at [phone].  Normal or non-critical lab and imaging  results will be communicated to you by MyChart, letter or phone within 4 business days after the clinic has received the results. If you do not hear from us within 7 days, please contact the clinic through MyChart or phone. If you have a critical or abnormal lab result, we will notify you by phone as soon as possible.  Submit refill requests through [employer] or call your pharmacy and they will forward the refill request to us. Please allow 3 business days for your refill to be completed.          Additional Information About Your Visit        Care EveryWhere ID     This is your Care EveryWhere ID. This could be used by other organizations to access your [facility] medical records  [id number]        Your Vitals Were     Pulse Temperature Respirations Pulse Oximetry BMI (Body Mass Index)       88 97.9  F (36.6  C) (Oral) 16 100% 19.28 kg/m2        Blood Pressure from Last 3 Encounters:   05/09/18 113/65   05/04/18 100/66   05/02/18 108/57    Weight from Last 3 Encounters:   05/09/18 47.8 kg (105 lb 6.4 oz)   05/02/18 45.1 kg (99 lb 6.4 oz)   04/25/18 44.2 kg (97 lb 8 oz)              We Performed the Following     US Paracentesis        Primary Care Provider Office Phone # Fax #    Felicitas Truman Horton [phone]       [address] [address]        Equal Access to Services     [facility]: Hadii nico Jose, waaxda nicole, qaybta rosana rodriguez . So [facility] [phone].    ATENCIÓN: Si habla español, tiene a martinez disposición servicios gratuitos de asistencia lingüística. Yonathan al [phone].    We comply with applicable federal civil rights laws and [place] laws. We do not discriminate on the basis of race, color, national origin, age, disability, sex, sexual orientation, or gender identity.            Thank you!     Thank you for choosing [facility] SPECIALTY AND PROCEDURE  for your  care. Our goal is always to provide you with excellent care. Hearing back from our patients is one way we can continue to improve our services. Please take a few minutes to complete the written survey that you may receive in the mail after your visit with us. Thank you!             Your Updated Medication List - Protect others around you: Learn how to safely use, store and throw away your medicines at www.disposemymeds.org.          This list is accurate as of 5/9/18 11:19 AM.  Always use your most recent med list.                   Brand Name Dispense Instructions for use Diagnosis    albuterol (2.5 MG/3ML) 0.083% neb solution     25 vial    Take 1 vial (2.5 mg) by nebulization every 6 hours as needed for shortness of breath / dyspnea or wheezing    Mild intermittent asthma with acute exacerbation       artificial saliva Liqd liquid     473 mL    Swish and spit 10 mLs in mouth 4 times daily    Dry mouth       artificial saliva Soln solution     1 Bottle    Take 2 mLs (2 sprays) by mouth 4 times daily as needed for dry mouth    Dry mouth       Benzocaine 20 % Aero spray    HURRICAINE/TOPEX    1 Bottle    Take 0.5-1 mLs by mouth every 3 hours as needed for moderate pain    Throat pain       benzocaine-menthol 15-3.6 MG lozenge    CEPACOL    84 lozenge    Place 1 lozenge inside cheek every hour as needed for sore throat    Throat pain       BOOST CALORIE SMART Liqd     90 Bottle    Take 3 Cans by mouth daily    HCC (hepatocellular carcinoma) (H)       calcium carbonate 500 MG chewable tablet    TUMS    150 tablet    Take 1 tablet (500 mg) by mouth daily as needed for heartburn    Gastroesophageal reflux disease without esophagitis       cetirizine 10 MG tablet    zyrTEC    30 tablet    Take 1 tablet (10 mg) by mouth every evening    Chronic allergic conjunctivitis       ciprofloxacin 500 MG tablet    CIPRO    30 tablet    Take 1 tablet (500 mg) by mouth every 24 hours    SBP (spontaneous bacterial peritonitis) (H)        DAILY MULTIPLE VITAMIN/IRON Tabs     30 tablet    Take 1 tablet by mouth daily    Chronic hepatitis C without hepatic coma (H)       furosemide 20 MG tablet    LASIX    180 tablet    Take 1 tablet (20 mg) by mouth daily    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)       glycerin-hypromellose- 0.2-0.2-1 % Soln ophthalmic solution    ARTIFICIAL TEARS    1 Bottle    Place 1 drop into both eyes 2 times daily as needed for dry eyes    Dry eyes       ipratropium - albuterol 0.5 mg/2.5 mg/3 mL 0.5-2.5 (3) MG/3ML neb solution    DUONEB    360 mL    Take 1 vial (3 mLs) by nebulization every 6 hours as needed for shortness of breath / dyspnea or wheezing    Mild persistent asthma without complication       lactulose 10 GM/15ML solution    CHRONULAC    1892 mL    Take 15 mLs (10 g) by mouth daily    Cirrhosis of liver with ascites, unspecified hepatic cirrhosis type (H)       mometasone-formoterol 200-5 MCG/ACT oral inhaler    DULERA    1 Inhaler    Inhale 2 puffs into the lungs 2 times daily    Moderate persistent asthma without complication       omeprazole 40 MG capsule    priLOSEC    90 capsule    Take 1 capsule (40 mg) by mouth daily Take 30-60 minutes before a meal.    Gastroesophageal reflux disease without esophagitis       ondansetron 4 MG ODT tab    ZOFRAN-ODT    120 tablet    Take 1 tablet (4 mg) by mouth every 6 hours as needed for nausea or vomiting    HCC (hepatocellular carcinoma) (H)       order for DME     1 Units    Equipment being ordered: Nebulizer    Mild persistent asthma without complication       polyethylene glycol powder    MIRALAX    850 g    Take 17 g (1 capful) by mouth 3 times daily as needed for constipation    Constipation, unspecified constipation type       spironolactone 50 MG tablet    ALDACTONE    30 tablet    Take 1 tablet (50 mg) by mouth daily    Chronic hepatitis C without hepatic coma (H)       traMADol 50 MG tablet    ULTRAM    40 tablet    Take 0.5-1 tablets (25-50 mg)  by mouth every 8 hours as needed for moderate pain    HCC (hepatocellular carcinoma) (H)

## 2018-05-09 NOTE — PATIENT INSTRUCTIONS
Dear Yue Portillo    Thank you for choosing [facility] Specialty Infusion and Procedure Center ([facility]) for your Paracentesis.  The following information is a summary of our appointment as well as important reminders.    Discharge Instructions for Paracentesis  Paracentesis is a procedure to remove extra fluid from your belly (abdomen). This fluid buildup in the abdomen is called ascites. The procedure may have been done to take a sample of the fluid. Or, it may have been done to drain the extra fluid from your abdomen and help make you more comfortable.     Ascites is buildup of excess fluid in the abdomen.   Home care    If you have pain after the procedure, your healthcare provider can prescribe or recommend pain medicines. Take these exactly as directed. If you stopped taking other medicines before the procedure, ask your provider when you can start them again.    Take it easy for 24 hours after the procedure. Avoid physical activity until your provider says it s OK.    You will have a small bandage over the puncture site. Stitches (sutures), surgical staples, adhesive tapes, adhesive strips, or surgical glue may be used to close the incision. They also help stop bleeding and speed healing. You may take the bandage off in 24 hours.    Check the puncture site for the signs of infection listed below.  Follow-up care  Make a follow-up appointment with your healthcare provider as directed. During your follow-up visit, your provider will check your healing. Let your provider know how you are feeling. You can also discuss the cause of your ascites and if you need any further treatment.  When to seek medical advice  Call your healthcare provider if you have any of the following after the procedure:    A fever of 100.4 F (38.0 C) or higher    Trouble breathing    Pain that doesn't go away even after taking pain medicine    Belly pain not caused by having the skin punctured    Bleeding from the  puncture site    More than a small amount of fluid leaking from the puncture site    Swollen belly    Signs of infection at the puncture site. These include increased pain, redness, or swelling, warmth, or bad-smelling drainage.    Blood in your urine    Feeling dizzy or lightheaded, or fainting   Date Last Reviewed: 7/1/2016 2000-2017 [employer]. [address] [place] [address]. All rights reserved. This information is not intended as a substitute for professional medical care. Always follow your healthcare professional's instructions.        We look forward in seeing you on your next appointment here at [facility].  Please don t hesitate to call us at [phone] to reschedule any of your appointments or to speak with one of the [facility] registered nurses.  It was a pleasure taking care of you today.    Sincerely,    [facility]  Specialty Infusion & Procedure Center  [address] [place]  [address]  Phone:  [phone]

## 2018-05-11 NOTE — IP AVS SNAPSHOT
[facility] Surgery and Procedure Center    [address] [place] [address]    Phone:  [phone]    Fax:  [phone]                                       After Visit Summary   5/11/2018    Yue Portillo    MRN: [id number]           After Visit Summary Signature Page     I have received my discharge instructions, and my questions have been answered. I have discussed any challenges I see with this plan with the nurse or doctor.    ..........................................................................................................................................  Patient/Patient Representative Signature      ..........................................................................................................................................  Patient Representative Print Name and Relationship to Patient    ..................................................               ................................................  Date                                            Time    ..........................................................................................................................................  Reviewed by Signature/Title    ...................................................              ..............................................  Date                                                            Time

## 2018-05-11 NOTE — OR NURSING
1340  2000cc's fluid removed right mid back (thoracentesis).  To be observed for one hour before removing any more via Temo Barahona.

## 2018-05-11 NOTE — BRIEF OP NOTE
Interventional Radiology Brief Post Procedure Note    Procedure:  IR thoracentesis.    Procedural[facility]    Assistant: MAGDA Bui    Time Out: Prior to the start of the procedure and with procedural staff participation, I verbally confirmed the patient s identity using two indicators, relevant allergies, that the procedure was appropriate and matched the consent or emergent situation, and that the correct equipment/implants were available. Immediately prior to starting the procedure I conducted the Time Out with the procedural staff and re-confirmed the patient s name, procedure, and site/side. (The Joint Commission universal protocol was followed.)  Yes        Sedation: None. Local Anesthestic used    Findings: U/S guided right therapeutic thoracentesis, with return of clear serous fluid. Thoracentesis performed in a staged manner, with 2000 cc, then 1100 cc of fluid aspirated.    Estimated Blood Loss: Minimal    Fluoroscopy Time:None     SPECIMENS: None    Complications: 1. None     Condition: Stable    Plan: Follow up per primary team.    Comments: See dictated procedure note for full details.    Alban Barahona PA-C

## 2018-05-11 NOTE — IP AVS SNAPSHOT
MRN:[id number]                      After Visit Summary   5/11/2018    Yue Portillo    MRN: [id number]           Thank you!     Thank you for choosing [facility] for your care. Our goal is always to provide you with excellent care. Hearing back from our patients is one way we can continue to improve our services. Please take a few minutes to complete the written survey that you may receive in the mail after you visit with us. Thank you!        Patient Information     Date Of Birth          1940        About your hospital stay     You were admitted on:  May 11, 2018 You last received care in the:  [facility]    You were discharged on:  May 11, 2018       Who to Call     For medical emergencies, please call 911.  For non-urgent questions about your medical care, please call your primary care provider or clinic, [phone]  For questions related to your surgery, please call your surgery clinic        Attending Provider     Provider Specialty    Alban Barahona PA-C Radiology       Primary Care Provider Office Phone # Fax #    Felicitas Horton [phone]      Your next 10 appointments already scheduled     May 16, 2018  9:30 AM CDT   Paracentesis Visit with Uc Spec Inf Para Provider, UC 40 ATC   [facility] ([facility])    [address] [place] [address]  [address] [place] [id number] [phone]            May 23, 2018  9:30 AM CDT   Paracentesis Visit with Uc Spec Inf Para Provider, UC 39 ATC   [facility] ([facility])    [address] [place] [address]  [address] [place] [id number] [phone]            May 30, 2018  9:30 AM CDT   Paracentesis Visit with Uc Spec Inf Para Provider, UC 40 ATC   [facility] ([facility])    [facility]  [facility]  [address]  [facility] [address]   [phone]            Aug 08, 2018 10:00 AM CDT   Lab with UC LAB   [facility] ([facility])    [address] [place] [address]  1st Floor  [facility] [address]   [phone]            Aug 08, 2018 11:00 AM CDT   (Arrive by 10:45 AM)   New General Liver with Tala Meyer MD   [facility] ([facility])    [address] [place] [address]  [address]  [facility] [address]   [phone]            Feb 04, 2019 12:40 PM CST   CT CHEST W/O CONTRAST with UCCT1   [facility] [place] [facility] ([facility])    [address] [place] [address]  [address] [facility] [id number] [phone]           Please bring any scans or X-rays taken at other hospitals, if similar tests were done. Also bring a list of your medicines, including vitamins, minerals and over-the-counter drugs. It is safest to leave personal items at home.  Be sure to tell your doctor:   If you have any allergies.   If there s any chance you are pregnant.   If you are breastfeeding.  You do not need to do anything special to prepare for this exam.  Please wear loose clothing, such as a sweat suit or jogging clothes. Avoid snaps, zippers and other metal. We may ask you to undress and put on a hospital gown.            Feb 04, 2019  1:00 PM CST   (Arrive by 12:45 PM)   Return Visit with Darlene Renteria MD   [facility] ([facility])    [address] [place] [address]  [address]  [facility] [address] [phone]              Further instructions from your care team       Discharge Instructions for Paracentesis or Thoracentesis     Paracentesis is a procedure to remove extra fluid from your belly (abdomen). Thoracentesis is a procedure to remove extra fluid from your chest.  This fluid buildup is called ascites. The procedure may have been done to take a sample of  the fluid. Or, it may have been done to drain the extra fluid from your abdomen or chest to help make you more comfortable.     Home care    If you have pain after the procedure, your healthcare provider can prescribe or recommend pain medicines. Take these exactly as directed. If you stopped taking other medicines before the procedure, ask your provider when you can start them again.    Avoid strenuous activity for 48 hours.    You will have a small bandage over the puncture site. Adhesive tapes, adhesive strips, or surgical glue may also be used to close the incision. They also help stop bleeding and promote healing. You may take the bandage off in 24-48 hours. Once there is a scab over the site, no bandages are required.    Check the puncture site for the signs of infection listed below.     Follow-up care  Make a follow-up appointment with your healthcare provider as directed. During your follow-up visit, your provider will check your healing. Let your provider know how you are feeling. You can also discuss the cause of your fluid, and if you need any further treatment.    When to seek medical advice:  Call your healthcare provider if you have any of the following after the procedure:    A fever of 100.4 F (38.0 C) or higher    Trouble breathing    Abdominal pain that is worse than expected    Belly Abdominal pain not caused by having the skin punctured that is worse than expected    Persistent bleeding from the puncture site    More than a small amount of fluid leaking from the puncture site    Swollen belly    Signs of infection at the puncture site. These include increased pain, redness, or swelling, warmth, or bad-smelling drainage.    Feeling dizzy or lightheaded, or fainting     Call our Interventional Radiology (IR) service if:  If you start bleeding from the procedure site.  If you do start to bleed from the site, lie down and hold some pressure on the site.  Our radiology provider can help you decide if  "you need to return to the hospital.  If you have new or worsening pain related to the procedure.  If you have pronounced swelling at the procedure site.  If you develop persistent nausea or vomiting.  If you develop hives or a rash or any unexplained itching.  If you have a fever of greater than 100.4  F and chills in the first 5 days after procedure.  Any other concerns related to your procedure.      [facility]  Interventional Radiology (IR)  [address] [facility]  2nd Floor, [facility] [place] [address]    Contact Number:  [phone]  (IR control desk)  Monday - Friday 8:00 am - 4:30 pm    After hours for urgent concerns:  [phone]  After 4:30 pm Monday - Friday, Weekends and Holidays.   Ask for Interventional Radiology on-call.  Someone is available 24 hours a day.  [facility] toll free number:  [phone]    Pending Results     Date and Time Order Name Status Description    5/11/2018 1214 IR THORACENTESIS In process             Admission Information     Date & Time Provider Department Dept. Phone    5/11/2018 Alban Barahona PA-C [facility] [phone]      Your Vitals Were     Blood Pressure Pulse Temperature Respirations Height Weight    101/63 101 98.4  F (36.9  C) (Oral) 14 1.575 m (5' 2\") 47.6 kg (105 lb)    Pulse Oximetry BMI (Body Mass Index)                100% 19.2 kg/m2          Care EveryWhere ID     This is your Care EveryWhere ID. This could be used by other organizations to access your [facility] medical records  [id number]        Equal Access to Services     LUKE DUVAL : Hadii aad ku hadasho Soomaali, waaxda luqadaha, qaybta kaalmada aderenny, rosana lance. So [facility] [phone].    ATENCIÓN: Si habla español, tiene a martinez disposición servicios gratuitos de asistencia lingüística. Llame al [phone].    We comply with applicable federal civil rights laws and [place] laws. We do not " discriminate on the basis of race, color, national origin, age, disability, sex, sexual orientation, or gender identity.               Review of your medicines      UNREVIEWED medicines. Ask your doctor about these medicines        Dose / Directions    albuterol (2.5 MG/3ML) 0.083% neb solution   Used for:  Mild intermittent asthma with acute exacerbation        Dose:  1 vial   Take 1 vial (2.5 mg) by nebulization every 6 hours as needed for shortness of breath / dyspnea or wheezing   Quantity:  25 vial   Refills:  3       artificial saliva Liqd liquid   Used for:  Dry mouth        Dose:  10 mL   Swish and spit 10 mLs in mouth 4 times daily   Quantity:  473 mL   Refills:  0       artificial saliva Soln solution   Used for:  Dry mouth        Dose:  2 spray   Take 2 mLs (2 sprays) by mouth 4 times daily as needed for dry mouth   Quantity:  1 Bottle   Refills:  0       Benzocaine 20 % Aero spray   Commonly known as:  HURRICAINE/TOPEX   Used for:  Throat pain        Dose:  1-2 spray   Take 0.5-1 mLs by mouth every 3 hours as needed for moderate pain   Quantity:  1 Bottle   Refills:  0       benzocaine-menthol 15-3.6 MG lozenge   Commonly known as:  CEPACOL   Used for:  Throat pain        Dose:  1 lozenge   Place 1 lozenge inside cheek every hour as needed for sore throat   Quantity:  84 lozenge   Refills:  0       BOOST CALORIE SMART Liqd   Used for:  HCC (hepatocellular carcinoma) (H)        Dose:  3 Can   Take 3 Cans by mouth daily   Quantity:  90 Bottle   Refills:  11       calcium carbonate 500 MG chewable tablet   Commonly known as:  TUMS   Used for:  Gastroesophageal reflux disease without esophagitis        Dose:  1 chew tab   Take 1 tablet (500 mg) by mouth daily as needed for heartburn   Quantity:  150 tablet   Refills:  0       cetirizine 10 MG tablet   Commonly known as:  zyrTEC   Used for:  Chronic allergic conjunctivitis        Dose:  10 mg   Take 1 tablet (10 mg) by mouth every evening   Quantity:  30  tablet   Refills:  6       ciprofloxacin 500 MG tablet   Commonly known as:  CIPRO   Indication:  SBP prophylaxis   Used for:  SBP (spontaneous bacterial peritonitis) (H)        Dose:  500 mg   Take 1 tablet (500 mg) by mouth every 24 hours   Quantity:  30 tablet   Refills:  3       DAILY MULTIPLE VITAMIN/IRON Tabs   Used for:  Chronic hepatitis C without hepatic coma (H)        Dose:  1 tablet   Take 1 tablet by mouth daily   Quantity:  30 tablet   Refills:  11       furosemide 20 MG tablet   Commonly known as:  LASIX   Used for:  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)        Dose:  20 mg   Take 1 tablet (20 mg) by mouth daily   Quantity:  180 tablet   Refills:  1       glycerin-hypromellose- 0.2-0.2-1 % Soln ophthalmic solution   Commonly known as:  ARTIFICIAL TEARS   Used for:  Dry eyes        Dose:  1 drop   Place 1 drop into both eyes 2 times daily as needed for dry eyes   Quantity:  1 Bottle   Refills:  0       ipratropium - albuterol 0.5 mg/2.5 mg/3 mL 0.5-2.5 (3) MG/3ML neb solution   Commonly known as:  DUONEB   Used for:  Mild persistent asthma without complication        Dose:  1 vial   Take 1 vial (3 mLs) by nebulization every 6 hours as needed for shortness of breath / dyspnea or wheezing   Quantity:  360 mL   Refills:  0       lactulose 10 GM/15ML solution   Commonly known as:  CHRONULAC   Used for:  Cirrhosis of liver with ascites, unspecified hepatic cirrhosis type (H)        Dose:  10 g   Take 15 mLs (10 g) by mouth daily   Quantity:  1892 mL   Refills:  0       mometasone-formoterol 200-5 MCG/ACT oral inhaler   Commonly known as:  DULERA   Used for:  Moderate persistent asthma without complication        Dose:  2 puff   Inhale 2 puffs into the lungs 2 times daily   Quantity:  1 Inhaler   Refills:  12       omeprazole 40 MG capsule   Commonly known as:  priLOSEC   Used for:  Gastroesophageal reflux disease without esophagitis        Dose:  40 mg   Take 1 capsule (40 mg) by mouth daily  Take 30-60 minutes before a meal.   Quantity:  90 capsule   Refills:  3       ondansetron 4 MG ODT tab   Commonly known as:  ZOFRAN-ODT   Used for:  HCC (hepatocellular carcinoma) (H)        Dose:  4 mg   Take 1 tablet (4 mg) by mouth every 6 hours as needed for nausea or vomiting   Quantity:  120 tablet   Refills:  0       polyethylene glycol powder   Commonly known as:  MIRALAX   Used for:  Constipation, unspecified constipation type        Dose:  1 capful   Take 17 g (1 capful) by mouth 3 times daily as needed for constipation   Quantity:  850 g   Refills:  3       spironolactone 50 MG tablet   Commonly known as:  ALDACTONE   Used for:  Chronic hepatitis C without hepatic coma (H)        Dose:  50 mg   Take 1 tablet (50 mg) by mouth daily   Quantity:  30 tablet   Refills:  1       traMADol 50 MG tablet   Commonly known as:  ULTRAM   Used for:  HCC (hepatocellular carcinoma) (H)        Dose:  25-50 mg   Take 0.5-1 tablets (25-50 mg) by mouth every 8 hours as needed for moderate pain   Quantity:  40 tablet   Refills:  0         CONTINUE these medicines which have NOT CHANGED        Dose / Directions    order for DME   Used for:  Mild persistent asthma without complication        Equipment being ordered: Nebulizer   Quantity:  1 Units   Refills:  0                Protect others around you: Learn how to safely use, store and throw away your medicines at www.disposemymeds.org.             Medication List: This is a list of all your medications and when to take them. Check marks below indicate your daily home schedule. Keep this list as a reference.      Medications           Morning Afternoon Evening Bedtime As Needed    albuterol (2.5 MG/3ML) 0.083% neb solution   Take 1 vial (2.5 mg) by nebulization every 6 hours as needed for shortness of breath / dyspnea or wheezing                                artificial saliva Liqd liquid   Swish and spit 10 mLs in mouth 4 times daily                                artificial  saliva Soln solution   Take 2 mLs (2 sprays) by mouth 4 times daily as needed for dry mouth                                Benzocaine 20 % Aero spray   Commonly known as:  HURRICAINE/TOPEX   Take 0.5-1 mLs by mouth every 3 hours as needed for moderate pain                                benzocaine-menthol 15-3.6 MG lozenge   Commonly known as:  CEPACOL   Place 1 lozenge inside cheek every hour as needed for sore throat                                BOOST CALORIE SMART Liqd   Take 3 Cans by mouth daily                                calcium carbonate 500 MG chewable tablet   Commonly known as:  TUMS   Take 1 tablet (500 mg) by mouth daily as needed for heartburn                                cetirizine 10 MG tablet   Commonly known as:  zyrTEC   Take 1 tablet (10 mg) by mouth every evening                                ciprofloxacin 500 MG tablet   Commonly known as:  CIPRO   Take 1 tablet (500 mg) by mouth every 24 hours                                DAILY MULTIPLE VITAMIN/IRON Tabs   Take 1 tablet by mouth daily                                furosemide 20 MG tablet   Commonly known as:  LASIX   Take 1 tablet (20 mg) by mouth daily                                glycerin-hypromellose- 0.2-0.2-1 % Soln ophthalmic solution   Commonly known as:  ARTIFICIAL TEARS   Place 1 drop into both eyes 2 times daily as needed for dry eyes                                ipratropium - albuterol 0.5 mg/2.5 mg/3 mL 0.5-2.5 (3) MG/3ML neb solution   Commonly known as:  DUONEB   Take 1 vial (3 mLs) by nebulization every 6 hours as needed for shortness of breath / dyspnea or wheezing                                lactulose 10 GM/15ML solution   Commonly known as:  CHRONULAC   Take 15 mLs (10 g) by mouth daily                                mometasone-formoterol 200-5 MCG/ACT oral inhaler   Commonly known as:  DULERA   Inhale 2 puffs into the lungs 2 times daily                                omeprazole 40 MG capsule    Commonly known as:  priLOSEC   Take 1 capsule (40 mg) by mouth daily Take 30-60 minutes before a meal.                                ondansetron 4 MG ODT tab   Commonly known as:  ZOFRAN-ODT   Take 1 tablet (4 mg) by mouth every 6 hours as needed for nausea or vomiting                                order for DME   Equipment being ordered: Nebulizer                                polyethylene glycol powder   Commonly known as:  MIRALAX   Take 17 g (1 capful) by mouth 3 times daily as needed for constipation                                spironolactone 50 MG tablet   Commonly known as:  ALDACTONE   Take 1 tablet (50 mg) by mouth daily                                traMADol 50 MG tablet   Commonly known as:  ULTRAM   Take 0.5-1 tablets (25-50 mg) by mouth every 8 hours as needed for moderate pain

## 2018-05-11 NOTE — DISCHARGE INSTRUCTIONS
Discharge Instructions for Paracentesis or Thoracentesis     Paracentesis is a procedure to remove extra fluid from your belly (abdomen). Thoracentesis is a procedure to remove extra fluid from your chest.  This fluid buildup is called ascites. The procedure may have been done to take a sample of the fluid. Or, it may have been done to drain the extra fluid from your abdomen or chest to help make you more comfortable.     Home care    If you have pain after the procedure, your healthcare provider can prescribe or recommend pain medicines. Take these exactly as directed. If you stopped taking other medicines before the procedure, ask your provider when you can start them again.    Avoid strenuous activity for 48 hours.    You will have a small bandage over the puncture site. Adhesive tapes, adhesive strips, or surgical glue may also be used to close the incision. They also help stop bleeding and promote healing. You may take the bandage off in 24-48 hours. Once there is a scab over the site, no bandages are required.    Check the puncture site for the signs of infection listed below.     Follow-up care  Make a follow-up appointment with your healthcare provider as directed. During your follow-up visit, your provider will check your healing. Let your provider know how you are feeling. You can also discuss the cause of your fluid, and if you need any further treatment.    When to seek medical advice:  Call your healthcare provider if you have any of the following after the procedure:    A fever of 100.4 F (38.0 C) or higher    Trouble breathing    Abdominal pain that is worse than expected    Belly Abdominal pain not caused by having the skin punctured that is worse than expected    Persistent bleeding from the puncture site    More than a small amount of fluid leaking from the puncture site    Swollen belly    Signs of infection at the puncture site. These include increased pain, redness, or swelling, warmth, or  bad-smelling drainage.    Feeling dizzy or lightheaded, or fainting     Call our Interventional Radiology (IR) service if:  If you start bleeding from the procedure site.  If you do start to bleed from the site, lie down and hold some pressure on the site.  Our radiology provider can help you decide if you need to return to the hospital.  If you have new or worsening pain related to the procedure.  If you have pronounced swelling at the procedure site.  If you develop persistent nausea or vomiting.  If you develop hives or a rash or any unexplained itching.  If you have a fever of greater than 100.4  F and chills in the first 5 days after procedure.  Any other concerns related to your procedure.      [facility]  Interventional Radiology (IR)  [address] [facility]  [facility] [place] [address]    Contact Number:  [phone]  (IR control desk)  Monday - Friday 8:00 am - 4:30 pm    After hours for urgent concerns:  [phone]  After 4:30 pm Monday - Friday, Weekends and Holidays.   Ask for Interventional Radiology on-call.  Someone is available 24 hours a day.  [facility] toll free number:  [phone]

## 2018-05-11 NOTE — OR NURSING
Additional 1100cc   (3100 cc ml) total drained via thoracentesis.  Drainage catheter d/catalino by radiology tech.  tegaderm dressing applied .  VSS.  Strong cough.  Will discharge home with son

## 2018-05-16 NOTE — MR AVS SNAPSHOT
After Visit Summary   5/16/2018    Yue Portillo    MRN: [id number]           Patient Information     Date Of Birth          1940        Visit Information        Provider Department      5/16/2018 9:30 AM Provider, [facility] Spec [facility]; [employer]; UC 40 ATC [facility]        Today's Diagnoses     HCC (hepatocellular carcinoma) (H)    -  1    Cirrhosis of liver with ascites, unspecified hepatic cirrhosis type (H)           Follow-ups after your visit        Your next 10 appointments already scheduled     May 23, 2018  9:30 AM CDT   Paracentesis Visit with Uc Spec Inf Para Provider, UC 39 ATC   [facility] ([facility])    [address]  [address]  [place] [facility] [address]   [phone]            May 30, 2018  9:30 AM CDT   Paracentesis Visit with Uc Spec Inf Para Provider, UC 40 ATC   [facility] ([facility])    [address]  [address]  [place] [facility] [address]   [phone]            Aug 08, 2018 10:00 AM CDT   Lab with KIEL LAB   [facility] ([facility])    [address]  1st Floor  [place] [facility] [address]   [phone]            Aug 08, 2018 11:00 AM CDT   (Arrive by 10:45 AM)   New General Liver with Tala Meyer MD   [facility] ([facility])    [address]  [address]  [place] [facility] [address] [phone]            Feb 04, 2019 12:40 PM CST   CT CHEST W/O CONTRAST with UCCT1   [facility] ([facility])    [address]  1st [address]  [place] [facility] [id number] [phone]           Please bring any scans or X-rays taken at other hospitals, if similar tests were done. Also bring a list of your medicines, including vitamins, minerals and  "over-the-counter drugs. It is safest to leave personal items at home.  Be sure to tell your doctor:   If you have any allergies.   If there s any chance you are pregnant.   If you are breastfeeding.  You do not need to do anything special to prepare for this exam.  Please wear loose clothing, such as a sweat suit or jogging clothes. Avoid snaps, zippers and other metal. We may ask you to undress and put on a hospital gown.            Feb 04, 2019  1:00 PM CST   (Arrive by 12:45 PM)   Return Visit with Darlene Renteria MD   [facility] ([facility])    [address]  [address] [place] [address] [phone]              Who to contact     If you have questions or need follow up information about today's clinic visit or your schedule please contact [facility] directly at [phone].  Normal or non-critical lab and imaging results will be communicated to you by [employer], letter or phone within 4 business days after the clinic has received the results. If you do not hear from us within 7 days, please contact the clinic through [employer] or phone. If you have a critical or abnormal lab result, we will notify you by phone as soon as possible.  Submit refill requests through [employer] or call your pharmacy and they will forward the refill request to us. Please allow 3 business days for your refill to be completed.          Additional Information About Your Visit        [employer] Information     [employer] lets you send messages to your doctor, view your test results, renew your prescriptions, schedule appointments and more. To sign up, go to [email or web address] . Click on \"Log in\" on the left side of the screen, which will take you to the Welcome page. Then click on \"Sign up Now\" on the right side of the page.     You will be asked to enter the access code listed below, as well as some personal " information. Please follow the directions to create your username and password.     Your access code is: [email or web address]  Expires: 2018  6:30 AM     Your access code will  in 90 days. If you need help or a new code, please call your [facility] clinic or [phone].        Care EveryWhere ID     This is your Care EveryWhere ID. This could be used by other organizations to access your [facility] medical records  [id number]        Your Vitals Were     Pulse Temperature Pulse Oximetry BMI (Body Mass Index)          85 98.3  F (36.8  C) (Oral) 99% 19.24 kg/m2         Blood Pressure from Last 3 Encounters:   18 91/48   18 109/62   18 113/65    Weight from Last 3 Encounters:   18 47.7 kg (105 lb 3.2 oz)   18 47.6 kg (105 lb)   18 47.8 kg (105 lb 6.4 oz)              We Performed the Following     US Paracentesis        Primary Care Provider Office Phone # Fax #    Felicitasfinn Horton [phone]       [address] [address]        Equal Access to Services     LUKE DUVAL : Hadii aad ku hadasho Somichealali, waaxda luqadaha, qaybta kaalmada adeegyada, rosana ricks . So [facility] [phone].    ATENCIÓN: Si habla español, tiene a martinez disposición servicios gratuitos de asistencia lingüística. faye al [phone].    We comply with applicable federal civil rights laws and [place] laws. We do not discriminate on the basis of race, color, national origin, age, disability, sex, sexual orientation, or gender identity.            Thank you!     Thank you for choosing [facility]  for your care. Our goal is always to provide you with excellent care. Hearing back from our patients is one way we can continue to improve our services. Please take a few minutes to complete the written survey that you may receive in the mail after your visit with us. Thank you!             Your  Updated Medication List - Protect others around you: Learn how to safely use, store and throw away your medicines at www.disposemymeds.org.          This list is accurate as of 5/16/18 11:29 AM.  Always use your most recent med list.                   Brand Name Dispense Instructions for use Diagnosis    albuterol (2.5 MG/3ML) 0.083% neb solution     25 vial    Take 1 vial (2.5 mg) by nebulization every 6 hours as needed for shortness of breath / dyspnea or wheezing    Mild intermittent asthma with acute exacerbation       artificial saliva Liqd liquid     473 mL    Swish and spit 10 mLs in mouth 4 times daily    Dry mouth       artificial saliva Soln solution     1 Bottle    Take 2 mLs (2 sprays) by mouth 4 times daily as needed for dry mouth    Dry mouth       Benzocaine 20 % Aero spray    HURRICAINE/TOPEX    1 Bottle    Take 0.5-1 mLs by mouth every 3 hours as needed for moderate pain    Throat pain       benzocaine-menthol 15-3.6 MG lozenge    CEPACOL    84 lozenge    Place 1 lozenge inside cheek every hour as needed for sore throat    Throat pain       BOOST CALORIE SMART Liqd     90 Bottle    Take 3 Cans by mouth daily    HCC (hepatocellular carcinoma) (H)       calcium carbonate 500 MG chewable tablet    TUMS    150 tablet    Take 1 tablet (500 mg) by mouth daily as needed for heartburn    Gastroesophageal reflux disease without esophagitis       cetirizine 10 MG tablet    zyrTEC    30 tablet    Take 1 tablet (10 mg) by mouth every evening    Chronic allergic conjunctivitis       ciprofloxacin 500 MG tablet    CIPRO    30 tablet    Take 1 tablet (500 mg) by mouth every 24 hours    SBP (spontaneous bacterial peritonitis) (H)       DAILY MULTIPLE VITAMIN/IRON Tabs     30 tablet    Take 1 tablet by mouth daily    Chronic hepatitis C without hepatic coma (H)       furosemide 20 MG tablet    LASIX    180 tablet    Take 1 tablet (20 mg) by mouth daily    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)        glycerin-hypromellose- 0.2-0.2-1 % Soln ophthalmic solution    ARTIFICIAL TEARS    1 Bottle    Place 1 drop into both eyes 2 times daily as needed for dry eyes    Dry eyes       ipratropium - albuterol 0.5 mg/2.5 mg/3 mL 0.5-2.5 (3) MG/3ML neb solution    DUONEB    360 mL    Take 1 vial (3 mLs) by nebulization every 6 hours as needed for shortness of breath / dyspnea or wheezing    Mild persistent asthma without complication       lactulose 10 GM/15ML solution    CHRONULAC    1892 mL    Take 15 mLs (10 g) by mouth daily    Cirrhosis of liver with ascites, unspecified hepatic cirrhosis type (H)       mometasone-formoterol 200-5 MCG/ACT oral inhaler    DULERA    1 Inhaler    Inhale 2 puffs into the lungs 2 times daily    Moderate persistent asthma without complication       omeprazole 40 MG capsule    priLOSEC    90 capsule    Take 1 capsule (40 mg) by mouth daily Take 30-60 minutes before a meal.    Gastroesophageal reflux disease without esophagitis       ondansetron 4 MG ODT tab    ZOFRAN-ODT    120 tablet    Take 1 tablet (4 mg) by mouth every 6 hours as needed for nausea or vomiting    HCC (hepatocellular carcinoma) (H)       order for DME     1 Units    Equipment being ordered: Nebulizer    Mild persistent asthma without complication       polyethylene glycol powder    MIRALAX    850 g    Take 17 g (1 capful) by mouth 3 times daily as needed for constipation    Constipation, unspecified constipation type       spironolactone 50 MG tablet    ALDACTONE    30 tablet    Take 1 tablet (50 mg) by mouth daily    Chronic hepatitis C without hepatic coma (H)       traMADol 50 MG tablet    ULTRAM    40 tablet    Take 0.5-1 tablets (25-50 mg) by mouth every 8 hours as needed for moderate pain    HCC (hepatocellular carcinoma) (H)

## 2018-05-16 NOTE — PROGRESS NOTES
Paracentesis Nursing Note  Yue Portillo [facility].    During today's appointment orders from Tala Meyer MD were completed.    Progress Note:  Patient identification verified by name and date of birth.  Assessment completed.  Vitals monitored throughout appointment and recorded in Doc Flowsheets.  See proceduralist note in ultrasound.    Date of consent or authorization: 4/11/18.  Invasive Procedure Safety Checklist was completed and sent for scanning.     Paracentesis performed by Juan Colon PA-C Radiology  The following labs were communicated to provider performing paracentesis:  Lab Results   Component Value Date    PLT 64 04/06/2018       Total amount of ascites fluid drained: 2.3 liters.  Color of ascites fluid: clear yellow.  Total amount of albumin given: 25  grams.    Patient tolerated procedure well.    Post procedure,denies pain or discomfort post paracentesis.      Discharge Plan:  Discharge instructions were reviewed with patient.  Patient/Representative verbalized understanding and all questions were answered.   Discharged [facility] in stable condition.    Cindy Baez RN    Administrations This Visit     albumin human 25 % injection 12.5 g     Admin Date Action Dose Route Administered By             05/16/2018 New Bag 25 g Intravenous Cindy Baez RN                    lidocaine 1 % 20 mL     Admin Date Action Dose Route Administered By             05/16/2018 Given 10 mL Other Cindy Baez RN                               BP 91/48  Pulse 85  Temp 98.3  F (36.8  C) (Oral)  Wt 50.9 kg (112 lb 4.8 oz)  SpO2 99%  BMI 20.54 kg/m2

## 2018-05-18 NOTE — ED PROVIDER NOTES
History     Chief Complaint   Patient presents with     Shortness of Breath     HPI  Yue Portillo is a 78 year old female with a history of asthma, hepatitis C, HCC requiring weekly paracentesis and thoracentesis, SBP, liver cirrhosis and cachexia who presents with shortness of breath. Of note, the patient was evaluated here in the ED on 5/4/18 (2 weeks ago) for similar complaints of shortness of breath. At that time she was found to have a large right pleural effusion on chest x-ray. The patient underwent a two-phase thoracentesis procedure with IR with improvement in her symptoms prior to being discharged home. The patient's family members report she has been having weekly paracentesis and thoracentesis procedures. Her last thoracentesis was on Friday, 1 week ago, and her last paracentesis was on Wednesday, 2 days ago. She was supposed to undergo a thoracentesis today, though they were unable to get it scheduled, prompting her to come to the ED for evaluation. Currently the patient complains of shortness of breath that worsens with lying flat and with exertion. She complains of associated chest pressure and cough. She denies fever. The patient also reports a small amount of leakage from her abdomen at her recent paracentesis site. She has been taking her medications regularly and denies missing any doses.     PAST MEDICAL HISTORY:   Past Medical History:   Diagnosis Date     Cachexia (H)      Hepatitis C      Hepatocellular carcinoma (H)      Hx of gastric ulcer 2014     Liver disease      Mild intermittent asthma      Pleural effusion associated with hepatic disorder        PAST SURGICAL HISTORY:   Past Surgical History:   Procedure Laterality Date     CATARACT IOL, RT/LT Right 08/22/2017    s/p CE/IOL right eye     EYE SURGERY       H PYLORI SHYLA SCREEN      was treated for h pylori     NO HISTORY OF SURGERY       THORACENTESIS N/A 5/11/2018    Procedure: THORACENTESIS;  Thoracentesis;  Surgeon: [place]  Alban Zhu PA-C;  Location:  OR       FAMILY HISTORY:   Family History   Problem Relation Age of Onset     Respiratory Father      asthma     DIABETES Son      Glaucoma No family hx of      Macular Degeneration No family hx of      Retinal detachment No family hx of      Amblyopia No family hx of        SOCIAL HISTORY:   Social History   Substance Use Topics     Smoking status: Never Smoker     Smokeless tobacco: Never Used     Alcohol use No     Current Facility-Administered Medications   Medication     sodium chloride 0.9% infusion     Current Outpatient Prescriptions   Medication     albuterol (2.5 MG/3ML) 0.083% neb solution     artificial saliva (BIOTENE DRY MOUTHWASH) LIQD liquid     artificial saliva (BIOTENE MT) SOLN solution     Benzocaine (HURRICAINE/TOPEX) 20 % AERO spray     benzocaine-menthol (CEPACOL) 15-3.6 MG lozenge     calcium carbonate (TUMS) 500 MG chewable tablet     cetirizine (ZYRTEC) 10 MG tablet     ciprofloxacin (CIPRO) 500 MG tablet     furosemide (LASIX) 20 MG tablet     glycerin-hypromellose- (ARTIFICIAL TEARS) 0.2-0.2-1 % SOLN ophthalmic solution     ipratropium - albuterol 0.5 mg/2.5 mg/3 mL (DUONEB) 0.5-2.5 (3) MG/3ML neb solution     lactulose (CHRONULAC) 10 GM/15ML solution     mometasone-formoterol (DULERA) 200-5 MCG/ACT oral inhaler     Multiple Vitamins-Iron (DAILY MULTIPLE VITAMIN/IRON) TABS     Nutritional Supplements (BOOST CALORIE SMART) LIQD     omeprazole (PRILOSEC) 40 MG capsule     ondansetron (ZOFRAN-ODT) 4 MG ODT tab     order for DME     polyethylene glycol (MIRALAX) powder     spironolactone (ALDACTONE) 50 MG tablet     traMADol (ULTRAM) 50 MG tablet        Allergies   Allergen Reactions     Dust Mites Cough     Sneezing      Seasonal Allergies       I have reviewed the Medications, Allergies, Past Medical and Surgical History, and Social History in the Epic system.    Review of Systems   Constitutional: Negative for fever.   Respiratory: Positive for  "cough and shortness of breath.    Cardiovascular: Positive for chest pain (pressure).   All other systems reviewed and are negative.      Physical Exam   BP: 91/48  Pulse: 100  Temp: 98.2  F (36.8  C)  Resp: 12  Height: 162.6 cm (5' 4\")  Weight: 47.7 kg (105 lb 2.6 oz)  SpO2: 100 %      Physical Exam   Constitutional: She is oriented to person, place, and time. She appears well-developed and well-nourished.   Chronically ill appearing;    HENT:   Head: Normocephalic and atraumatic.   Neck: Normal range of motion.   Cardiovascular: Normal rate, regular rhythm and normal heart sounds.    Pulmonary/Chest: Effort normal. No respiratory distress. She has no wheezes. She has rales (right sided chest with decreased breath sounds ). She exhibits no tenderness.   sats 100%; no tachypnea    Abdominal: Soft. She exhibits distension (mild fluid shifts; no tenderness with palpation ). There is no tenderness. There is no rebound.   Musculoskeletal: She exhibits edema (2+ lower extremity edema). She exhibits no tenderness.   Neurological: She is alert and oriented to person, place, and time.   Skin: Skin is warm and dry.   Psychiatric: She has a normal mood and affect. Her behavior is normal. Thought content normal.       ED Course     ED Course     Procedures     5:25 PM  The patient was seen and examined by Dr. Marquez in Room 19.               EKG Interpretation:      Interpreted by Chel Marquez  Time reviewed: 1851  Symptoms at time of EKG: None   Rhythm: normal sinus   Rate: Normal  Axis: Normal  Ectopy: none  Conduction: normal  ST Segments/ T Waves: No ST-T wave changes and No acute ischemic changes  Q Waves: none  Comparison to prior: low voltage compared to prior    Clinical Impression: non-specific EKG                Critical Care time:  none         Results for orders placed or performed during the hospital encounter of 05/18/18   XR Chest 2 Views    Narrative    Exam: XR CHEST 2 VW, 5/18/2018 6:26 " PM    Indication: sob;     Comparison: Radiograph the chest 5/4/2018, CT abdomen pelvis 4/1/2018  and CT chest 11/18/2017    Findings:   2 views of the chest AP and lateral. There is a large right pleural  effusion with leftward mediastinal shift, obscuration of the right  hemidiaphragm and right heart border. Unchanged interstitial and  airspace opacities in the left lung with perihilar opacities. Upper  abdomen is unremarkable. No acute osseous abnormality.      Impression    Impression:   1. Redemonstration of the large right pleural effusion with near  complete opacification of the right hemithorax, with slightly improved  aeration of the right upper lung.  2. Unchanged mixed airspace opacities throughout the left lung.    I have personally reviewed the examination and initial interpretation  and I agree with the findings.    DA SINGER MD   CBC with platelets differential   Result Value Ref Range    WBC 6.1 4.0 - 11.0 10e9/L    RBC Count 2.58 (L) 3.8 - 5.2 10e12/L    Hemoglobin 8.6 (L) 11.7 - 15.7 g/dL    Hematocrit 25.1 (L) 35.0 - 47.0 %    MCV 97 78 - 100 fl    MCH 33.3 (H) 26.5 - 33.0 pg    MCHC 34.3 31.5 - 36.5 g/dL    RDW 16.3 (H) 10.0 - 15.0 %    Platelet Count 87 (L) 150 - 450 10e9/L    Diff Method Automated Method     % Neutrophils 79.4 %    % Lymphocytes 9.6 %    % Monocytes 7.4 %    % Eosinophils 3.3 %    % Basophils 0.0 %    % Immature Granulocytes 0.3 %    Nucleated RBCs 0 0 /100    Absolute Neutrophil 4.8 1.6 - 8.3 10e9/L    Absolute Lymphocytes 0.6 (L) 0.8 - 5.3 10e9/L    Absolute Monocytes 0.5 0.0 - 1.3 10e9/L    Absolute Eosinophils 0.2 0.0 - 0.7 10e9/L    Absolute Basophils 0.0 0.0 - 0.2 10e9/L    Abs Immature Granulocytes 0.0 0 - 0.4 10e9/L    Absolute Nucleated RBC 0.0    Comprehensive metabolic panel   Result Value Ref Range    Sodium 122 (L) 133 - 144 mmol/L    Potassium 5.6 (H) 3.4 - 5.3 mmol/L    Chloride 96 94 - 109 mmol/L    Carbon Dioxide 19 (L) 20 - 32 mmol/L    Anion Gap 8 3 - 14  mmol/L    Glucose 71 70 - 99 mg/dL    Urea Nitrogen 42 (H) 7 - 30 mg/dL    Creatinine 1.16 (H) 0.52 - 1.04 mg/dL    GFR Estimate 45 (L) >60 mL/min/1.7m2    GFR Estimate If Black 54 (L) >60 mL/min/1.7m2    Calcium 8.1 (L) 8.5 - 10.1 mg/dL    Bilirubin Total 8.4 (H) 0.2 - 1.3 mg/dL    Albumin 2.5 (L) 3.4 - 5.0 g/dL    Protein Total 6.0 (L) 6.8 - 8.8 g/dL    Alkaline Phosphatase 264 (H) 40 - 150 U/L     (H) 0 - 50 U/L     (H) 0 - 45 U/L   INR   Result Value Ref Range    INR 1.98 (H) 0.86 - 1.14   Basic metabolic panel   Result Value Ref Range    Sodium 124 (L) 133 - 144 mmol/L    Potassium 5.5 (H) 3.4 - 5.3 mmol/L    Chloride 98 94 - 109 mmol/L    Carbon Dioxide 18 (L) 20 - 32 mmol/L    Anion Gap 8 3 - 14 mmol/L    Glucose 89 70 - 99 mg/dL    Urea Nitrogen 44 (H) 7 - 30 mg/dL    Creatinine 1.11 (H) 0.52 - 1.04 mg/dL    GFR Estimate 47 (L) >60 mL/min/1.7m2    GFR Estimate If Black 57 (L) >60 mL/min/1.7m2    Calcium 7.8 (L) 8.5 - 10.1 mg/dL   EKG 12-lead, tracing only   Result Value Ref Range    Interpretation ECG Click View Image link to view waveform and result      Medications   sodium chloride 0.9% infusion ( Intravenous Rate/Dose Verify 5/19/18 0016)            Labs Ordered and Resulted from Time of ED Arrival Up to the Time of Departure from the ED   CBC WITH PLATELETS DIFFERENTIAL - Abnormal; Notable for the following:        Result Value    RBC Count 2.58 (*)     Hemoglobin 8.6 (*)     Hematocrit 25.1 (*)     MCH 33.3 (*)     RDW 16.3 (*)     Platelet Count 87 (*)     Absolute Lymphocytes 0.6 (*)     All other components within normal limits   COMPREHENSIVE METABOLIC PANEL - Abnormal; Notable for the following:     Sodium 122 (*)     Potassium 5.6 (*)     Carbon Dioxide 19 (*)     Urea Nitrogen 42 (*)     Creatinine 1.16 (*)     GFR Estimate 45 (*)     GFR Estimate If Black 54 (*)     Calcium 8.1 (*)     Bilirubin Total 8.4 (*)     Albumin 2.5 (*)     Protein Total 6.0 (*)     Alkaline Phosphatase  264 (*)      (*)      (*)     All other components within normal limits   INR - Abnormal; Notable for the following:     INR 1.98 (*)     All other components within normal limits   BASIC METABOLIC PANEL            Assessments & Plan (with Medical Decision Making): Patient is an 78-year-old female with known history of hepatocellular carcinoma who generally gets weekly paracentesis and thoracentesis who is brought to the ER by her family due to her missing her thoracentesis today.  Patient's family was supposed to schedule a thoracentesis every Friday, but they did not schedule it today.  When I tried to schedule it later this evening they were unable to get an appointment so that came to the ER.  Patient here was satting 100% on room air.  She was not tachypneic and was breathing at around 12 breaths per minute.  Patient did have crackles in her right lower lobe and significant amount of fluid on chest x-ray in her right lung.  Patient however was well-appearing and was in no acute distress.  We obtained labs that showed the patient had severe hyponatremia.  Patient has a history of having hyponatremia in the past, but this is worse today.  Patient's sodium today is 122 with a creatinine that is elevated to 1.6 from a baseline of 0.8.  I discussed the patient's case with IR.  IR said that patient had no emergent needs for thoracentesis they would be unable to perform it today.  They said they would be able to perform thoracentesis in the morning.  The patient however is unable to be discharged home due to her severe hyponatremia.  I therefore recommended that we get the patient admitted to the hospital for slow hydration and then with plan for thoracentesis in the morning.  I discussed this plan of care with the family who is very upset that we would not be able to perform the thoracentesis today. They said that it has been performed 2 times in the past from the ER and they did not understand why it  would not be done today.  I told them that at this time the patient had no emergent reasons to have a thoracentesis done and she was breathing well.  Therefore she would have the procedure done in the morning on an urgent basis.  Family was still upset even though I tried to tell them the process on multiple occasions.  Family was initially very reluctant to admit her for hyponatremia, but eventually agreed.  Report was given to the [facility] medicine team who accepted the patient.  Patient was also seen by the ER  who will have the patient in the future scheduled for thoracentesis and paracentesis on a regular basis.  This is the patient's third ER visit for similar failed appointments as an outpatient in need of the procedure.  The  said that she would try to figure this out so this did not continue to be a problem.  No other acute issues at this time.   This part of the document was transcribed by Selam Terry, Medical Scribe.     I have reviewed the nursing notes.    I have reviewed the findings, diagnosis, plan and need for follow up with the patient.    New Prescriptions    No medications on file       Final diagnoses:   Pleural effusion   End-stage liver disease (H)   Hyponatremia   Hyperkalemia     I, Alfredo Chery, am serving as a trained medical scribe to document services personally performed by Chel Marquez MD, based on the provider's statements to me.   Chel BLANCHARD MD, was physically present and have reviewed and verified the accuracy of this note documented by Alfredo Chery.     5/18/2018   [facility], [place], EMERGENCY DEPARTMENT     Chle Marquez MD  05/19/18 0051

## 2018-05-18 NOTE — IP AVS SNAPSHOT
MRN:[id number]                      After Visit Summary   5/18/2018    Yue Portillo    MRN: [id number]           Thank you!     Thank you for choosing [facility] for your care. Our goal is always to provide you with excellent care. Hearing back from our patients is one way we can continue to improve our services. Please take a few minutes to complete the written survey that you may receive in the mail after you visit with us. Thank you!        Patient Information     Date Of Birth          1940        Designated Caregiver       Most Recent Value    Caregiver    Will someone help with your care after discharge? yes    Name of designated caregiver Granddaughter    Phone number of caregiver Chart     Caregiver address [place]       About your hospital stay     You were admitted on:  May 19, 2018 You last received care in the:  [address] [facility]    You were discharged on:  May 19, 2018        Reason for your hospital stay       You were admitted for worsening shortness of breath due to recurrent pleural effusion.                  Who to Call     For medical emergencies, please call 911.  For non-urgent questions about your medical care, please call your primary care provider or clinic, [phone]          Attending Provider     Provider Specialty    Chel Marquez MD Emergency Medicine    Lizet Roach MD Family Practice       Primary Care Provider Office Phone # Fax #    Felicitas Horton [phone]      After Care Instructions     Activity       Your activity upon discharge: activity as tolerated            Diet       Follow this diet upon discharge: Orders Placed This Encounter      Snacks/Supplements Adult: Ensure Plus (Adult); Between Meals      Combination Diet Regular Diet Adult                  Follow-up Appointments     Adult [facility] Follow-up and recommended labs and tests       Follow up with primary care provider, rangel Zaragoza  7 days for hospital follow- up.  The following labs/tests are recommended: as indicated.      We also recommend home visit with our Beninese speaking  from [place] clinic to go over goals of care and to discuss code status.      Appointments on [place] and/or [facility] (with [facility] or [facility] provider or service). Call [phone] if you haven't heard regarding these appointments within 7 days of discharge.            Follow Up and recommended labs and tests       Continue with scheduled paracentesis every Wednesday.   Family advised to scheudle ahead of time thoracentesis (orderes placed by Hepatology team).  Follow up with Dr Meyer form Hepatology as needed.                  Your next 10 appointments already scheduled     May 23, 2018  9:30 AM CDT   Paracentesis Visit with [facility]c Spec Inf Para Provider, UC 39 ATC   [facility] ([facility])    [address]  [address]  [place] [address]   [phone]            May 30, 2018  9:30 AM CDT   Paracentesis Visit with Uc Spec Inf Para Provider, UC 40 ATC   [facility] ([facility])    [address]  [address]  [place] [address]   [phone]            Jun 13, 2018  2:00 PM CDT   Paracentesis Visit with Uc Spec Inf Para Provider, UC 40 ATC   [facility] ([facility])    [address]  [address]  [place] [address]   [phone]            Jun 18, 2018  2:00 PM CDT   Paracentesis Visit with Uc Spec Inf Para Provider, UC 39 ATC   [facility] ([facility])    [address]  [address]  [place] [address]   [phone]            Jun 25, 2018  2:00 PM CDT   Paracentesis Visit with Uc Spec Inf Para Provider, UC 39 ATC   [facility] ([facility])    [address]  [address]  [place] [address] [phone]              Pending Results     Date and Time Order Name Status Description    2018 1153 IR Thoracentesis Preliminary             Statement of Approval     Ordered          18 1538  I have reviewed and agree with all the recommendations and orders detailed in this document.  EFFECTIVE NOW     Approved and electronically signed by:  Stacey Cowan MD             Admission Information     Date & Time Provider Department Dept. Phone    2018 Lizet Roach MD [address] [facility] [place] [phone]      Your Vitals Were     Blood Pressure Pulse Temperature Respirations Height Weight    102/61 (BP Location: Right arm) 89 97.8  F (36.6  C) (Oral) 16 1.626 m (5' 4\") 48.2 kg (106 lb 4.8 oz)    Pulse Oximetry BMI (Body Mass Index)                100% 18.25 kg/m2          [employer] Information     [employer] lets you send messages to your doctor, view your test results, renew your prescriptions, schedule appointments and more. To sign up, go to [email or web address] . Click on \"Log in\" on the left side of the screen, which will take you to the Welcome page. Then click on \"Sign up Now\" on the right side of the page.     You will be asked to enter the access code listed below, as well as some personal information. Please follow the directions to create your username and password.     Your access code is: [email or web address]  Expires: 2018  6:30 AM     Your access code will  in 90 days. If you need help or a new code, please call your [place] clinic or [phone].        Care EveryWhere ID     This is your Care EveryWhere ID. This could be used by other organizations to access your [place] [facility] medical records  [id number]        Equal Access to Services     LUKE DUVAL AH: Mukul Jose, lyudmila rivera, raffy davis " rosana sheridankeli lanzaaan ah. So [facility] [phone].    ATENCIÓN: Si habla rebeca, tiene a maritnez disposición servicios gratuitos de asistencia lingüística. Yonathan al [phone].    We comply with applicable federal civil rights laws and [place] laws. We do not discriminate on the basis of race, color, national origin, age, disability, sex, sexual orientation, or gender identity.               Review of your medicines      CONTINUE these medicines which have NOT CHANGED        Dose / Directions    albuterol (2.5 MG/3ML) 0.083% neb solution   Used for:  Mild intermittent asthma with acute exacerbation        Dose:  1 vial   Take 1 vial (2.5 mg) by nebulization every 6 hours as needed for shortness of breath / dyspnea or wheezing   Quantity:  25 vial   Refills:  3       artificial saliva Soln solution   Used for:  Dry mouth        Dose:  2 spray   Take 2 mLs (2 sprays) by mouth 4 times daily as needed for dry mouth   Quantity:  1 Bottle   Refills:  0       Benzocaine 20 % Aero spray   Commonly known as:  HURRICAINE/TOPEX   Used for:  Throat pain        Dose:  1-2 spray   Take 0.5-1 mLs by mouth every 3 hours as needed for moderate pain   Quantity:  1 Bottle   Refills:  0       benzocaine-menthol 15-3.6 MG lozenge   Commonly known as:  CEPACOL   Used for:  Throat pain        Dose:  1 lozenge   Place 1 lozenge inside cheek every hour as needed for sore throat   Quantity:  84 lozenge   Refills:  0       BOOST CALORIE SMART Liqd   Used for:  HCC (hepatocellular carcinoma) (H)        Dose:  3 Can   Take 3 Cans by mouth daily   Quantity:  90 Bottle   Refills:  11       calcium carbonate 500 MG chewable tablet   Commonly known as:  TUMS   Used for:  Gastroesophageal reflux disease without esophagitis        Dose:  1 chew tab   Take 1 tablet (500 mg) by mouth daily as needed for heartburn   Quantity:  150 tablet   Refills:  0       cetirizine 10 MG tablet   Commonly known as:  zyrTEC   Used for:   Chronic allergic conjunctivitis        Dose:  10 mg   Take 1 tablet (10 mg) by mouth every evening   Quantity:  30 tablet   Refills:  6       ciprofloxacin 500 MG tablet   Commonly known as:  CIPRO   Indication:  SBP prophylaxis   Used for:  SBP (spontaneous bacterial peritonitis) (H)        Dose:  500 mg   Take 1 tablet (500 mg) by mouth every 24 hours   Quantity:  30 tablet   Refills:  3       DAILY MULTIPLE VITAMIN/IRON Tabs   Used for:  Chronic hepatitis C without hepatic coma (H)        Dose:  1 tablet   Take 1 tablet by mouth daily   Quantity:  30 tablet   Refills:  11       furosemide 20 MG tablet   Commonly known as:  LASIX   Used for:  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)        Dose:  20 mg   Take 1 tablet (20 mg) by mouth daily   Quantity:  180 tablet   Refills:  1       glycerin-hypromellose- 0.2-0.2-1 % Soln ophthalmic solution   Commonly known as:  ARTIFICIAL TEARS   Used for:  Dry eyes        Dose:  1 drop   Place 1 drop into both eyes 2 times daily as needed for dry eyes   Quantity:  1 Bottle   Refills:  0       ipratropium - albuterol 0.5 mg/2.5 mg/3 mL 0.5-2.5 (3) MG/3ML neb solution   Commonly known as:  DUONEB   Used for:  Mild persistent asthma without complication        Dose:  1 vial   Take 1 vial (3 mLs) by nebulization every 6 hours as needed for shortness of breath / dyspnea or wheezing   Quantity:  360 mL   Refills:  0       lactulose 10 GM/15ML solution   Commonly known as:  CHRONULAC   Used for:  Cirrhosis of liver with ascites, unspecified hepatic cirrhosis type (H)        Dose:  10 g   Take 15 mLs (10 g) by mouth daily   Quantity:  1892 mL   Refills:  0       mometasone-formoterol 200-5 MCG/ACT oral inhaler   Commonly known as:  DULERA   Used for:  Moderate persistent asthma without complication        Dose:  2 puff   Inhale 2 puffs into the lungs 2 times daily   Quantity:  1 Inhaler   Refills:  12       omeprazole 40 MG capsule   Commonly known as:  priLOSEC   Used  for:  Gastroesophageal reflux disease without esophagitis        Dose:  40 mg   Take 1 capsule (40 mg) by mouth daily Take 30-60 minutes before a meal.   Quantity:  90 capsule   Refills:  3       ondansetron 4 MG ODT tab   Commonly known as:  ZOFRAN-ODT   Used for:  HCC (hepatocellular carcinoma) (H)        Dose:  4 mg   Take 1 tablet (4 mg) by mouth every 6 hours as needed for nausea or vomiting   Quantity:  120 tablet   Refills:  0       order for DME   Used for:  Mild persistent asthma without complication        Equipment being ordered: Nebulizer   Quantity:  1 Units   Refills:  0       polyethylene glycol powder   Commonly known as:  MIRALAX   Used for:  Constipation, unspecified constipation type        Dose:  1 capful   Take 17 g (1 capful) by mouth 3 times daily as needed for constipation   Quantity:  850 g   Refills:  3       spironolactone 50 MG tablet   Commonly known as:  ALDACTONE   Used for:  Chronic hepatitis C without hepatic coma (H)        Dose:  50 mg   Take 1 tablet (50 mg) by mouth daily   Quantity:  30 tablet   Refills:  1       traMADol 50 MG tablet   Commonly known as:  ULTRAM   Used for:  HCC (hepatocellular carcinoma) (H)        Dose:  25-50 mg   Take 0.5-1 tablets (25-50 mg) by mouth every 8 hours as needed for moderate pain   Quantity:  40 tablet   Refills:  0         STOP taking     artificial saliva Liqd liquid                    Protect others around you: Learn how to safely use, store and throw away your medicines at www.disposemymeds.org.             Medication List: This is a list of all your medications and when to take them. Check marks below indicate your daily home schedule. Keep this list as a reference.      Medications           Morning Afternoon Evening Bedtime As Needed    albuterol (2.5 MG/3ML) 0.083% neb solution   Take 1 vial (2.5 mg) by nebulization every 6 hours as needed for shortness of breath / dyspnea or wheezing                                artificial saliva  Soln solution   Take 2 mLs (2 sprays) by mouth 4 times daily as needed for dry mouth                                Benzocaine 20 % Aero spray   Commonly known as:  HURRICAINE/TOPEX   Take 0.5-1 mLs by mouth every 3 hours as needed for moderate pain                                benzocaine-menthol 15-3.6 MG lozenge   Commonly known as:  CEPACOL   Place 1 lozenge inside cheek every hour as needed for sore throat                                BOOST CALORIE SMART Liqd   Take 3 Cans by mouth daily                                calcium carbonate 500 MG chewable tablet   Commonly known as:  TUMS   Take 1 tablet (500 mg) by mouth daily as needed for heartburn                                cetirizine 10 MG tablet   Commonly known as:  zyrTEC   Take 1 tablet (10 mg) by mouth every evening                                ciprofloxacin 500 MG tablet   Commonly known as:  CIPRO   Take 1 tablet (500 mg) by mouth every 24 hours   Last time this was given:  500 mg on 5/19/2018  8:14 AM                                DAILY MULTIPLE VITAMIN/IRON Tabs   Take 1 tablet by mouth daily                                furosemide 20 MG tablet   Commonly known as:  LASIX   Take 1 tablet (20 mg) by mouth daily                                glycerin-hypromellose- 0.2-0.2-1 % Soln ophthalmic solution   Commonly known as:  ARTIFICIAL TEARS   Place 1 drop into both eyes 2 times daily as needed for dry eyes                                ipratropium - albuterol 0.5 mg/2.5 mg/3 mL 0.5-2.5 (3) MG/3ML neb solution   Commonly known as:  DUONEB   Take 1 vial (3 mLs) by nebulization every 6 hours as needed for shortness of breath / dyspnea or wheezing                                lactulose 10 GM/15ML solution   Commonly known as:  CHRONULAC   Take 15 mLs (10 g) by mouth daily   Last time this was given:  10 g on 5/19/2018  8:13 AM                                mometasone-formoterol 200-5 MCG/ACT oral inhaler   Commonly known as:  DULERA    Inhale 2 puffs into the lungs 2 times daily                                omeprazole 40 MG capsule   Commonly known as:  priLOSEC   Take 1 capsule (40 mg) by mouth daily Take 30-60 minutes before a meal.   Last time this was given:  40 mg on 5/19/2018  8:14 AM                                ondansetron 4 MG ODT tab   Commonly known as:  ZOFRAN-ODT   Take 1 tablet (4 mg) by mouth every 6 hours as needed for nausea or vomiting                                order for DME   Equipment being ordered: Nebulizer                                polyethylene glycol powder   Commonly known as:  MIRALAX   Take 17 g (1 capful) by mouth 3 times daily as needed for constipation                                spironolactone 50 MG tablet   Commonly known as:  ALDACTONE   Take 1 tablet (50 mg) by mouth daily                                traMADol 50 MG tablet   Commonly known as:  ULTRAM   Take 0.5-1 tablets (25-50 mg) by mouth every 8 hours as needed for moderate pain

## 2018-05-18 NOTE — ED TRIAGE NOTES
Yue to have a thoracentesis today, but appoint didn't get scheduled. She was directed to come to ED. Had paracentesis done Wednesday.

## 2018-05-18 NOTE — IP AVS SNAPSHOT
[address] [address]    Phone:  [phone]                                       After Visit Summary   5/18/2018    Yue Portillo    MRN: [id number]           After Visit Summary Signature Page     I have received my discharge instructions, and my questions have been answered. I have discussed any challenges I see with this plan with the nurse or doctor.    ..........................................................................................................................................  Patient/Patient Representative Signature      ..........................................................................................................................................  Patient Representative Print Name and Relationship to Patient    ..................................................               ................................................  Date                                            Time    ..........................................................................................................................................  Reviewed by Signature/Title    ...................................................              ..............................................  Date                                                            Time

## 2018-05-18 NOTE — PROGRESS NOTES
Emergency Social Work Services Note    Date of  Intervention: 05/18/18  Last Emergency Department Visit:  05/04/18  Care Plan:  none  Collaborated with:  Chart review, patient, patient's son    Data:  Yue Portillo is an 78 year old Micronesian female with liver cirrhosis and cachexia who presents with SOB and family requesting thoracentesis. Yue is typically seen weekly for paracentesis and thoracentesis but there appears to be some scheduling issues. ED SW consulted to re-assess Yue's home situation and supports, accessibility to outpatient providers.     Intervention:  SW met with uYe along with her son. Offered to obtain , son declined and provided information. Yue noted to be short of breath, fatigued and defers conversation to son.     Yue continues to reside alone in a [place] apartment and has 24 hour/caregivers (PCA 10 hours day, other family members provide caregiving, homemaker 5 hours/week). She is followed by [facility] CABRERA/[facility]. She has DME incl: hospital bed, commode, bath chair and walker. She has liver cirrhosis and has been recommended to have weekly paracentesis and thoracentesis. Inquired about scheduling, transportation and family involvement. Per son, his brother does most of the scheduling and family always drives Yue to her appointments. He noted they thought they had standing appointments but sounds like his brother called this week to get Friday appointment and unable to do so. We discussed having regularly scheduled appointments, he was under impression every Wed paracentesis and every Fri thoracentesis. He noted his brother Sammy Cantor is best contact for scheduling. Agreed to relay information to clinic  and [facility] MCGINNIS CM.     Assessment:  chronic need for para & thoracentesis    Plan:    Anticipated Disposition:  Home with services    Barriers to d/c plan:  May need thoracentesis    Follow Up:  ED Sw will send in-basket message for clinic RN  CC and RAS CABRERA re: follow-up to see if standing appts avail for both paracentesis and thoracentesis. Best to coordinate appointment schedule with lori Cantor (p) [phone] (English speaking). Family provides transportation but transport also avail through [facility].     Family contact - lori Cantor (p) [phone]   [facility] Mary Rosenberg (d) [phone]  [facility] [phone]    CHRISTINE Dobson, [facility]  Social Work Services, Emergency Dept [facility]  Pager: [phone] Mon-Sat 9 am - 9 pm, on-call/after hours pager [phone]

## 2018-05-19 PROBLEM — E87.1 HYPONATREMIA: Status: ACTIVE | Noted: 2018-01-01

## 2018-05-19 NOTE — DISCHARGE SUMMARY
[facility] [place] [facility] Discharge Summary- Teodora  Service    Date of Admission:  5/18/2018  Date of Service: 5/19/2018  Date of Discharge:  5/19/2018  Discharging Attending Provider: Dr Osmany Serra  Discharge Team: Teodora    Discharge Diagnoses   Intermittent pleural effusion due to end stage HCC    Follow-ups Needed After Discharge   Paracentesis - Scheduled for 5/23/18 at  [facility] - Located in the [facility] at [address] [place] [address].      Follow-up at [facility] next week for hospital follow up    Hospital Course   Yue Portillo was admitted on 5/18/2018 for worsening shortness of breath due to intermittent R sided pleural effusion seondary to end stage HCC.  The following problems were addressed during her hospitalization:    # SOB, resolved after thoracentesis  # Hepatitis C  # Hx of HCC  # Recurrent hepatic hydrothorax  # Decompensated cirrhosis  Meld score today: 30  Serum Creatinine: 1.16 mg/dL at 5/18/2018  17:51  Serum Sodium: 122 mmol/L at 5/18/2018  17:51  Total Bilirubin: 8.4 mg/dL at 5/18/2018  17:51  INR(ratio): 1.98 at 5/18/2018  17:51  Age: 78 years    Patient with recurrent hepatic hydrothorax and ascites. Frequent ED visits to get paracentesis and thoracentesis done. She has scheduled paracentesis every Wednesday and per her family not able to have scheduled thoracentesis. They have discussed with the Hepatology nurse that she will place 2 orders for thoracentesis so the family could call and schedule those appointment ahead.    IR consulted for thoracentesis which was performed today. Patient requested to be discharged to home and will follow up with her PCP in the beginning of the next week.      # Hyponatremia   # Hyperkalemia   Patient has had hyponatremia noted over the past month on labs ranging from 127 to 130. On admission patient was found to be hyponatremic to 122. This is most likely  hypervolemic hypotonic hypnatremia secondary to what appears to be worsening cirrhosis. BUN/Cr ratio >20 thus most likely pre renal etiology. Patient was given  cc/hr. Patient refused blood work prior to dishcarge and requested to be discharged to home. We strongly recommended the family to follow up with her PCP at [facility] beginning next week. We sent a message to out RN team to further coordinate care.     # JOSE, resolving with IV resuscitation, most likely secondary to hepatorenal syndrome.  Patient with elevated Creatinine on admission of 1.16 trended down to 1.02 with baseline Cr of 0.85-0.93. Differential includes third spacing, hypovolemia, worsening liver failure, ACS, medication induced or other etiology.      # Goals of Care  Further discuss code status along with general goals of care with family. We recommend home visit with [place] speaking  form [facility] to further discuss goals of care and code status.      # Discharge Pain Plan:    The pain plan for discharge was discussed with Yue and her son and daugther and the plan was created in a collaborative fashion.    - Chronic/continued opioids: Tramadol    Consultations This Hospital Stay   INTERVENTIONAL RADIOLOGY ADULT/PEDS IP CONSULT  NUTRITION SERVICES ADULT IP CONSULT  VASCULAR ACCESS CARE ADULT IP CONSULT     Code Status   Full Code     The patient was seen and discussed with Dr. Serra.     [facility]  [facility]   Pager:[phone]_  ___________________________________________________________________  Review of Systems:  Constitutional: Positive for appetite change with poor appetite and fatigue. Negative for chills and fever.   Respiratory: Negative for shortness of breath. Negative for cough and chest tightness.    Cardiovascular: Negative for chest pain.   Gastrointestinal: Positive for abdominal distention, abdominal pain, nausea and vomiting.   Neurological:  Negative for dizziness and headaches.    Physical Exam   Vital Signs: Temp: 98  F (36.7  C) Temp src: Oral BP: 109/55 Pulse: 89 Heart Rate: 89 Resp: 16 SpO2: 100 % O2 Device: Nasal cannula Oxygen Delivery: 2 LPM  Weight: 106 lbs 4.8 oz    Physical Exam   Constitutional: ill appearing cachectic elderly woman  Eyes: Right eye exhibits no discharge. Left eye exhibits no discharge. Scleral icterus is present.   Cardiovascular: Normal rate, regular rhythm and intact distal pulses.  Exam reveals no gallop and no friction rub.    Murmur (3/6 systolic ) heard.  Pulmonary/Chest: slightly elaborated breathing before thoracentesis and improved after thoracentesis  Improved breath sounds at R lung; left lung clear to ascultation    Abdominal: Soft. Bowel sounds are normal. She exhibits distension and mild tenderness. There is no rebound and no guarding. Appreciable fluid wave.   Musculoskeletal: Edema:  +1 pitting bilateral lower extremities and feet.   Neurological: She is alert and oriented to person, place, and time.     Significant Results and Procedures   Most Recent 3 BMP's:  Recent Labs   Lab Test  05/19/18   0230  05/18/18   2333  05/18/18   1751  05/04/18   1247   NA  125*  124*  122*  130*   POTASSIUM   --   5.5*  5.6*  5.6*   CHLORIDE   --   98  96  102   CO2   --   18*  19*  21   BUN  44*  44*  42*  34*   CR  1.02  1.11*  1.16*  0.85   ANIONGAP   --   8  8  6   MACY   --   7.8*  8.1*  8.5   GLC   --   89  71  79   ,   Results for orders placed or performed during the hospital encounter of 05/18/18   XR Chest 2 Views    Narrative    Exam: XR CHEST 2 VW, 5/18/2018 6:26 PM    Indication: sob;     Comparison: Radiograph the chest 5/4/2018, CT abdomen pelvis 4/1/2018  and CT chest 11/18/2017    Findings:   2 views of the chest AP and lateral. There is a large right pleural  effusion with leftward mediastinal shift, obscuration of the right  hemidiaphragm and right heart border. Unchanged interstitial and  airspace  opacities in the left lung with perihilar opacities. Upper  abdomen is unremarkable. No acute osseous abnormality.      Impression    Impression:   1. Redemonstration of the large right pleural effusion with near  complete opacification of the right hemithorax, with slightly improved  aeration of the right upper lung.  2. Unchanged mixed airspace opacities throughout the left lung.    I have personally reviewed the examination and initial interpretation  and I agree with the findings.    DA SINGER MD   IR Thoracentesis    Narrative    Procedure date: 5/19/2018.    History: Patient with history of hep C cirrhosis, HCC, obtaining  approximately weekly paracentesis and thoracentesis. Patient presents  to the ED for thoracentesis.    Procedure: Therapeutic thoracentesis.    Preop diagnosis: Right pleural effusion.    Postop diagnosis: Right  pleural effusion, resolved.    Staff: Bharath Miranda M.D.    Resident: Tapan Biggs MD    Medications: 5 ml 1% lidocaine used for local anesthesia    Nursing: Throughout the procedure the patient's vital signs and oxygen  saturations were continuously monitored by nursing staff under my  supervision, and remained stable.    Fluoroscopy time: None.    IV contrast: None.    Consent: Informed written and verbal consent was obtained.    Procedure/Findings:  The patient was placed in the upright position on  the gurney, and limited ultrasound evaluation of the right posterior  hemithorax revealed a large pleural effusion. The area overlying  the  right posterior hemithorax was prepped and draped in usual sterile  fashion. Under ultrasound guidance, the area overlying the effusion  was anesthetized to the pleura with 1% lidocaine. Under ultrasound  guidance a 5 Algerian, 10 cm straight Yueh needle/catheter was advanced  into the fluid collection, with initial return of straw-colored  translucent fluid. The catheter was advanced off the needle into the  pleural space and the needle  was removed. Extension tubing was then  connected to the catheter and vacuum drainage. 1900 cc' s of fluid was  aspirated.  Repeat ultrasound revealed small amount of fluid  remaining.  The catheter was removed with the patient performing a  Valsalva maneuver.  The site was cleansed and dressed.  Images were  saved throughout the procedure. The procedure was well tolerated, with  no immediate complications.    Estimate blood loss: Less than 1 cc.      Impression    Impression: Ultrasound guided right thoracentesis. Total of 1900 cc of  translucent straw-colored pleural fluid drained.       Pending Results   Unresulted Labs Ordered in the Past 30 Days of this Admission     No orders found for last 61 day(s).             Primary Care Physician   Felicitas Horton    Discharge Disposition   Discharged to home  Condition at discharge: Satisfactory    Discharge Orders     Reason for your hospital stay   You were admitted for worsening shortness of breath due to recurrent pleural effusion.     Adult [facility] Follow-up and recommended labs and tests   Follow up with primary care provider, Felicitas Horton, within 7 days for hospital follow- up.  The following labs/tests are recommended: as indicated.      We also recommend home visit with our [place] speaking  from [place] clinic to go over goals of care and to discuss code status.      Appointments on [place] and/or [facility] (with [facility] or [facility] provider or service). Call [phone] if you haven't heard regarding these appointments within 7 days of discharge.     Follow Up and recommended labs and tests   Continue with scheduled paracentesis every Wednesday.   Family advised to scheudle ahead of time thoracentesis (orderes placed by Hepatology team).  Follow up with Dr Meyer form Hepatology as needed.     Activity   Your activity upon discharge: activity as tolerated     Full Code     Diet   Follow this diet upon discharge: Orders Placed  This Encounter     Snacks/Supplements Adult: Ensure Plus (Adult); Between Meals     Combination Diet Regular Diet Adult       Discharge Medications   Current Discharge Medication List      CONTINUE these medications which have NOT CHANGED    Details   albuterol (2.5 MG/3ML) 0.083% neb solution Take 1 vial (2.5 mg) by nebulization every 6 hours as needed for shortness of breath / dyspnea or wheezing  Qty: 25 vial, Refills: 3    Associated Diagnoses: Mild intermittent asthma with acute exacerbation      artificial saliva (BIOTENE MT) SOLN solution Take 2 mLs (2 sprays) by mouth 4 times daily as needed for dry mouth  Qty: 1 Bottle, Refills: 0    Associated Diagnoses: Dry mouth      Benzocaine (HURRICAINE/TOPEX) 20 % AERO spray Take 0.5-1 mLs by mouth every 3 hours as needed for moderate pain  Qty: 1 Bottle, Refills: 0    Associated Diagnoses: Throat pain      benzocaine-menthol (CEPACOL) 15-3.6 MG lozenge Place 1 lozenge inside cheek every hour as needed for sore throat  Qty: 84 lozenge, Refills: 0    Associated Diagnoses: Throat pain      calcium carbonate (TUMS) 500 MG chewable tablet Take 1 tablet (500 mg) by mouth daily as needed for heartburn  Qty: 150 tablet, Refills: 0    Associated Diagnoses: Gastroesophageal reflux disease without esophagitis      cetirizine (ZYRTEC) 10 MG tablet Take 1 tablet (10 mg) by mouth every evening  Qty: 30 tablet, Refills: 6    Associated Diagnoses: Chronic allergic conjunctivitis      ciprofloxacin (CIPRO) 500 MG tablet Take 1 tablet (500 mg) by mouth every 24 hours  Qty: 30 tablet, Refills: 3    Associated Diagnoses: SBP (spontaneous bacterial peritonitis) (H)      furosemide (LASIX) 20 MG tablet Take 1 tablet (20 mg) by mouth daily  Qty: 180 tablet, Refills: 1    Associated Diagnoses: 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)      glycerin-hypromellose- (ARTIFICIAL TEARS) 0.2-0.2-1 % SOLN ophthalmic solution Place 1 drop into both eyes 2 times daily as needed for dry  eyes  Qty: 1 Bottle, Refills: 0    Associated Diagnoses: Dry eyes      ipratropium - albuterol 0.5 mg/2.5 mg/3 mL (DUONEB) 0.5-2.5 (3) MG/3ML neb solution Take 1 vial (3 mLs) by nebulization every 6 hours as needed for shortness of breath / dyspnea or wheezing  Qty: 360 mL, Refills: 0    Associated Diagnoses: Mild persistent asthma without complication      lactulose (CHRONULAC) 10 GM/15ML solution Take 15 mLs (10 g) by mouth daily  Qty: 1892 mL, Refills: 0    Associated Diagnoses: Cirrhosis of liver with ascites, unspecified hepatic cirrhosis type (H)      mometasone-formoterol (DULERA) 200-5 MCG/ACT oral inhaler Inhale 2 puffs into the lungs 2 times daily  Qty: 1 Inhaler, Refills: 12    Associated Diagnoses: Moderate persistent asthma without complication      Multiple Vitamins-Iron (DAILY MULTIPLE VITAMIN/IRON) TABS Take 1 tablet by mouth daily  Qty: 30 tablet, Refills: 11    Associated Diagnoses: Chronic hepatitis C without hepatic coma (H)      Nutritional Supplements (BOOST CALORIE SMART) LIQD Take 3 Cans by mouth daily  Qty: 90 Bottle, Refills: 11    Associated Diagnoses: HCC (hepatocellular carcinoma) (H)      omeprazole (PRILOSEC) 40 MG capsule Take 1 capsule (40 mg) by mouth daily Take 30-60 minutes before a meal.  Qty: 90 capsule, Refills: 3    Associated Diagnoses: Gastroesophageal reflux disease without esophagitis      ondansetron (ZOFRAN-ODT) 4 MG ODT tab Take 1 tablet (4 mg) by mouth every 6 hours as needed for nausea or vomiting  Qty: 120 tablet, Refills: 0    Associated Diagnoses: HCC (hepatocellular carcinoma) (H)      order for DME Equipment being ordered: Nebulizer  Qty: 1 Units, Refills: 0    Associated Diagnoses: Mild persistent asthma without complication      polyethylene glycol (MIRALAX) powder Take 17 g (1 capful) by mouth 3 times daily as needed for constipation  Qty: 850 g, Refills: 3    Associated Diagnoses: Constipation, unspecified constipation type      spironolactone (ALDACTONE) 50  MG tablet Take 1 tablet (50 mg) by mouth daily  Qty: 30 tablet, Refills: 1    Associated Diagnoses: Chronic hepatitis C without hepatic coma (H)      traMADol (ULTRAM) 50 MG tablet Take 0.5-1 tablets (25-50 mg) by mouth every 8 hours as needed for moderate pain  Qty: 40 tablet, Refills: 0    Associated Diagnoses: HCC (hepatocellular carcinoma) (H)         STOP taking these medications       artificial saliva (BIOTENE DRY MOUTHWASH) LIQD liquid Comments:   Reason for Stopping:             Allergies   Allergies   Allergen Reactions     Dust Mites Cough     Sneezing      Seasonal Allergies

## 2018-05-19 NOTE — PLAN OF CARE
Problem: Patient Care Overview  Goal: Plan of Care/Patient Progress Review  Outcome: Improving  Pt DC'd to home with family at 1640. Dr. Cowan had ordered a BMP prior to DC, but patient refused stating she would discharge regardless of results. DC orders placed and were reviewed with family. Aware of need to follow up with PCP within a week. Scheduled para for Wed. VSS prior to DC. PIV removed.

## 2018-05-19 NOTE — PROGRESS NOTES
Patient Name: Yue Portillo  Medical Record Number: [id number]  Today's Date: 5/19/2018    Procedure: Right Thoracentesis  Proceduralist: DELIA Biggs MD with OLE Miranda MD    Sedation start time: no sedation      Procedure start time: 1348  Puncture time: 1350  Procedure end time: 1406    Report given to:  AMIE Sierra  : Tyler Mercado Notes: Pt arrived to IR room 7 from . Pt denies any questions or concerns regarding procedure. Pt positioned sitting, leaning onto pillow on procedure table and monitored per protocol. Pt tolerated procedure without any noted complications.  Total of 1900 ml pleural fluid, clear mariluz, drained from right pleural space. Pt transferred back to . Tegaderm with 2x2 is clean, dry and intact lower right posterior.

## 2018-05-19 NOTE — ED NOTES
[facility], [place]   ED Nurse to Floor Handoff     Yue Portillo is a 78 year old female who speaks Nepalese and lives with family members,  in a home  They arrived in the ED by car from home    ED Chief Complaint: Shortness of Breath    ED Dx;   Final diagnoses:   Pleural effusion   End-stage liver disease (H)   Hyponatremia   Hyperkalemia         Needed?: Yes Daughter providing interpretation    Allergies:   Allergies   Allergen Reactions     Dust Mites Cough     Sneezing      Seasonal Allergies    .  Past Medical Hx:   Past Medical History:   Diagnosis Date     Cachexia (H)      Hepatitis C      Hepatocellular carcinoma (H)      Hx of gastric ulcer 2014     Liver disease      Mild intermittent asthma      Pleural effusion associated with hepatic disorder       Baseline Mental status: WDL  Current Mental Status changes: at basesline    Infection present or suspected this encounter: no  Sepsis suspected: No  Isolation type: No active isolations     Activity level - Baseline/Home:  Stand with Assist of 2  Activity Level - Current:   Stand with Assist of 2    Bariatric equipment needed?: No    In the ED these meds were given:   Medications   sodium chloride 0.9% infusion ( Intravenous New Bag 5/18/18 1923)       Drips running?  Yes  NS @ 100 ml/hr  Home pump  No    Current LDAs  Peripheral IV 05/18/18 Left Lower forearm (Active)   Number of days:0       Incision/Surgical Site 04/04/18 Anterior Abdomen (Active)   Number of days:44       Incision/Surgical Site 05/11/18 Right Back (Active)   Number of days:7       Labs results:   Labs Ordered and Resulted from Time of ED Arrival Up to the Time of Departure from the ED   CBC WITH PLATELETS DIFFERENTIAL - Abnormal; Notable for the following:        Result Value    RBC Count 2.58 (*)     Hemoglobin 8.6 (*)     Hematocrit 25.1 (*)     MCH 33.3 (*)     RDW 16.3 (*)     Platelet Count 87 (*)     Absolute Lymphocytes 0.6 (*)     All other  "components within normal limits   COMPREHENSIVE METABOLIC PANEL - Abnormal; Notable for the following:     Sodium 122 (*)     Potassium 5.6 (*)     Carbon Dioxide 19 (*)     Urea Nitrogen 42 (*)     Creatinine 1.16 (*)     GFR Estimate 45 (*)     GFR Estimate If Black 54 (*)     Calcium 8.1 (*)     Bilirubin Total 8.4 (*)     Albumin 2.5 (*)     Protein Total 6.0 (*)     Alkaline Phosphatase 264 (*)      (*)      (*)     All other components within normal limits   INR - Abnormal; Notable for the following:     INR 1.98 (*)     All other components within normal limits       Imaging Studies:   Recent Results (from the past 24 hour(s))   XR Chest 2 Views    Narrative    Exam: XR CHEST 2 VW, 5/18/2018 6:26 PM    Indication: sob;     Comparison: Radiograph the chest 5/4/2018, CT abdomen pelvis 4/1/2018  and CT chest 11/18/2017    Findings:   2 views of the chest AP and lateral. There is a large right pleural  effusion with leftward mediastinal shift, obscuration of the right  hemidiaphragm and right heart border. Unchanged interstitial and  airspace opacities in the left lung with perihilar opacities. Upper  abdomen is unremarkable. No acute osseous abnormality.      Impression    Impression:   1. Redemonstration of the large right pleural effusion with near  complete opacification of the right hemithorax, with slightly improved  aeration of the right upper lung.  2. Unchanged mixed airspace opacities throughout the left lung.    I have personally reviewed the examination and initial interpretation  and I agree with the findings.    DA SINGER MD       Recent vital signs:   /69  Pulse 100  Temp 98.2  F (36.8  C) (Oral)  Resp 12  Ht 1.626 m (5' 4\")  Wt 47.7 kg (105 lb 2.6 oz)  SpO2 100%  BMI 18.05 kg/m2    Cardiac Rhythm: Normal Sinus  Pt needs tele? Yes  Skin/wound Issues: None    Code Status: Full Code    Pain control: good    Nausea control: good    Abnormal labs/tests/findings requiring " intervention: Sodium being slowly corrected    Family present during ED course? Yes   Family Comments/Social Situation comments:     Tasks needing completion: None    Lexis Haider RN  [facility]--   [phone] [place] [facility]  [phone] [facility]

## 2018-05-19 NOTE — PROCEDURES
Interventional Radiology Brief Post Procedure Note    Procedure: IR THORACENTESIS    Proceduralist: Bharath Miranda MD    Assistant: Radiology Resident Physician, Tapan Biggs MD    Time Out: Prior to the start of the procedure and with procedural staff participation, I verbally confirmed the patient s identity using two indicators, relevant allergies, that the procedure was appropriate and matched the consent or emergent situation, and that the correct equipment/implants were available. Immediately prior to starting the procedure I conducted the Time Out with the procedural staff and re-confirmed the patient s name, procedure, and site/side. (The Joint Commission universal protocol was followed.)  Yes    Medications   Medication Event Details Admin User Admin Time       Sedation: None. Local Anesthestic used    Findings: Straw colored translucent fluid    Estimated Blood Loss: None    Fluoroscopy Time: 0 minute(s)    SPECIMENS: None    Complications: 1. None     Condition: Stable    Plan: Bedrest for 1 hour then return to previous activity    Comments: See dictated procedure note for full details.    Danielito Biggs MD

## 2018-05-19 NOTE — PROGRESS NOTES
"CLINICAL NUTRITION SERVICES - ASSESSMENT NOTE     Nutrition Prescription    RECOMMENDATIONS FOR MDs/PROVIDERS TO ORDER:  Recommend continue with liberal diet as patient not consuming much PO and needs the calories and protein.     Malnutrition Status:    Severe malnutrition in the context of acute illness.     Recommendations already ordered by Registered Dietitian (RD):  Supplements BID (Boost Plus vanilla)    Future/Additional Recommendations:  Encourage intake of small frequent meals and Boost Plus. If anticipate pt to remain inpatient at least 3 days, order calorie counts to quantify PO intake and need for further nutrition support.      REASON FOR ASSESSMENT  Yue Portillo is a/an 78 year old female assessed by the dietitian for Provider Order - Boost supplements at home    NUTRITION HISTORY  Pt with hx of hepatitis C, weekly paracentesis and thoracentesis r/t liver cirrhosis and is admitted with SOB. Spoke to pt's sons who translated for patient. Per sons, pt has only been eating ~2 cups homemade juice per day and sometimes 1 Ensure Plus for over a month now. The homemade juice is made with apples, carrots, celery, cucumber, spinach, beet root, and sometimes other fruits. Sometimes the pt will have a hard cooked egg but other than that, pt has eaten very little solid foods over the past month.     CURRENT NUTRITION ORDERS  Diet: Combination Diet Regular  Intake/Tolerance: Nothing recorded in floweet    LABS  Labs reviewed    MEDICATIONS  Medications reviewed    ANTHROPOMETRICS  Height: 162.6 cm (5' 4\")  Most Recent Weight: 48.2 kg (106 lb 4.8 oz)    IBW: 54.5 kg  BMI: Underweight BMI <18.5  Weight History: no recent weight loss to meet malnutrition criteria, however pt fluid up with thoracentesis and paracentesis weekly  Wt Readings from Last 10 Encounters:   05/19/18 48.2 kg (106 lb 4.8 oz)   05/16/18 47.7 kg (105 lb 3.2 oz)   05/11/18 47.6 kg (105 lb)   05/09/18 47.8 kg (105 lb 6.4 oz)   05/02/18 45.1 kg " "(99 lb 6.4 oz)   04/25/18 44.2 kg (97 lb 8 oz)   04/13/18 51.5 kg (113 lb 8 oz)   04/11/18 51.5 kg (113 lb 8 oz)   04/06/18 52.1 kg (114 lb 14.4 oz)   03/24/18 45.6 kg (100 lb 10.1 oz)     Dosing Weight: 48 kg (actual) - based on lowest documented wt so far this adm (47.7 kg on 5/18) and IBW of 54.5 kg.     ASSESSED NUTRITION NEEDS  Estimated Energy Needs: 5637-3186 kcals/day (25 - 30 kcals/kg)  Justification: Maintenance  Estimated Protein Needs: 58-72 grams protein/day (1.2 - 1.5 grams of pro/kg)  Justification: Increased needs w/ thoracentesis and Maintenance  Estimated Fluid Needs: (1 mL/kcal)   Justification: Maintenance and Per provider pending fluid status    PHYSICAL FINDINGS  See malnutrition section below.  Pt with ascites requiring weekly paracentesis    MALNUTRITION  % Intake: </=75% for >/= 1 month (severe)  % Weight Loss: Weight loss does not meet criteria  Subcutaneous Fat Loss: Facial region and Upper arm:  Severe  Muscle Loss: Temporal, Thoracic region (clavicle, acromium bone, deltoid, trapezius, pectoral), Lower arm  (forearm) and Dorsal hand:  Severe  Fluid Accumulation/Edema: Does not meet criteria (ascites)  Malnutrition Diagnosis: Severe malnutrition in the context of acute illness.     NUTRITION DIAGNOSIS  Inadequate oral intake related to reduced appetite and early satiety as evidenced by pt report of eating <75% baseline intake for >1 month.      INTERVENTIONS  Implementation  1. Nutrition Education: Discussed pt's protein and calorie goals with sons and encouraged them to incorporate a protein powder with at least 20 g protein per serving into the daily juice blend. Provided handouts: \"High Calorie, High Protein Meal Plan\", \"Sources of Protein\", and \"Tips to Increase Protein in Your Diet\". Also discussed avoiding high sodium foods to avoid fluid retention.   2. Multi-trace element supplement therapy     Goals  Patient to consume % of nutritionally adequate meal trays TID, or the " equivalent with supplements/snacks.     Monitoring/Evaluation  Progress toward goals will be monitored and evaluated per protocol.    Lizzeth Ellington RD, SIDNEY  Weekend pager: [phone]

## 2018-05-19 NOTE — CONSULTS
NTERVENTIONAL RADIOLOGY CONSULT    Yue Portillo is a 78 year old female with hep C cirrhosis and HCC with recurrent approximately weekly thoracenteses and paracenteses. She presents to the ED for thoracentesis. Stable vitals.    Patient is on IR schedule 5/19/2018 for a thoracentesis. Labs WNL for procedure. Consent will be done prior to procedure.     Discussed with Dr. Miranda.    Tapan Biggs MD  Radiology Resident, PGY-5

## 2018-05-19 NOTE — PLAN OF CARE
Problem: Patient Care Overview  Goal: Plan of Care/Patient Progress Review  Outcome: No Change  VS: VSS on 2L NC (95% RA, pt requesting NC). Afebrile.   Mental Status: A&Ox4, [place] speaking,  ordered for day shift   Cardiac: hypotensive, systolic 's, baseline  Respiratory: LS diminished, no SOB or cough noted  GI/: voiding urine, mixed with stool, unmeasured. Abdomen distended, old paracentesis site covered, CDI. + diarrhea stool secondary to lactulose. x2 BM this shift. Denies nausea.   Pain: + abdominal pain, PRN tylenol given x1  Diet: Tolerating a regular diet, poor PO intake   Mobility: [place] with two[place] [facility]  Treatment: Plan for thoracentesis today for R pleural effusion, NS IVF for low sodium (last sodium check 125)  DC plan: Home with family

## 2018-05-19 NOTE — PROGRESS NOTES
ED Boarding Patient Plan of Care    Admitting service: [facility]  For all critical changes in vital sign or critical lab values please notify the ED Physician immediately.     For non-critical changes or any other patient care questions, please contact the admitting service at:   Pager #[phone]    While the patient remains in the ED please follow all active orders written by the ED providers or by the Medicine Team. Do not release the sign and held orders until the patient arrives on the floor.    Once a bed is available please send text message to IP team to notify them that the patient is moving to the floor.    Priscilla Vora MD  Pager: [phone]

## 2018-05-19 NOTE — PLAN OF CARE
Problem: Patient Care Overview  Goal: Plan of Care/Patient Progress Review  Outcome: No Change  BP soft /60's, OVSS. 2L NC. Speaks [place]ralt[place], A &O. Family at bedside. Went to IR for Thoracentesis at 1pm. Had 3 loose brown stools- on lactulose. NS at 100ml/hr. NPO. Up to commode with 2 assist due to weakness.     Problem: Breathing Pattern Ineffective (Adult)  Goal: Effective Oxygenation/Ventilation  Patient will demonstrate the desired outcomes by discharge/transition of care.   05/19/18 1352   Breathing Pattern Ineffective (Adult)   Effective Oxygenation/Ventilation making progress toward outcome

## 2018-05-19 NOTE — H&P
[facility] Family Medicine History and Physical        Date of Admission:  5/18/2018  Date of Service: 5/18/2018         HPI      Chief Complaint   SOB    History is obtained from the patient through Citizen of [place] interpretation through her family at bedside.   Family declined use of iPad .     History of Present Illness   Yue Portillo is a 78 year old female who has a history of HCC, asthma, hepatitis C with weekly paracentesis and thoracentesis, liver cirrhosis and cachexia and is admitted for shortness of breath. Patient was suppose to get a thoracentesis today, but unable due to scheduling. She presents with increasing shortness of breath, nausea & vomiting and increasing abdominal distension. When discussed with the family, patient did have had a scheduled thoracentesis today and that one of her sons has been responsible for scheduling these appointments. Patient has presented at least 2 times with similar presentations to the ED and was able to have thoracentesis done. Patient and family upset that procedure cannot be done today. Expressed understanding, but reassured that patient vitals has show the procedure is non-emergent. Patient has LE edema at baseline that reports has not changed.     In the emergency department, IR was contacted, but unable to do procedure today. Patient has been saturating well on room air. Patient was placed on NC for comfort. Labs with appreciable hyponatremia, hyperkalemia, no leukocytosis, elevated LFTs and thrombocytopenia. CXR with redemonstration of large rt pleural effusion and unchanged opacities throughout left lung.            History:   Review of Systems   The 10 point Review of Systems is negative other than noted in the HPI or here.   Review of Systems   Constitutional: Positive for appetite change (minimal appetite) and fatigue. Negative for chills and fever.   Respiratory: Positive for shortness of breath.  Negative for cough and chest tightness.    Cardiovascular: Positive for chest pain.   Gastrointestinal: Positive for abdominal distention, abdominal pain, nausea and vomiting.   Neurological: Negative for dizziness and headaches.     Past Medical History    I have reviewed this patient's medical history and updated it with pertinent information if needed.   Past Medical History:   Diagnosis Date     Cachexia (H)      Hepatitis C      Hepatocellular carcinoma (H)      Hx of gastric ulcer 2014     Liver disease      Mild intermittent asthma      Pleural effusion associated with hepatic disorder      Past Surgical History   I have reviewed this patient's surgical history and updated it with pertinent information if needed.  Past Surgical History:   Procedure Laterality Date     CATARACT IOL, RT/LT Right 08/22/2017    s/p CE/IOL right eye     EYE SURGERY       H PYLORI SHYLA SCREEN      was treated for h pylori     NO HISTORY OF SURGERY       THORACENTESIS N/A 5/11/2018    Procedure: THORACENTESIS;  Thoracentesis;  Surgeon: Alban Barahona PA-C;  Location:  OR     Social History   Social History   Substance Use Topics     Smoking status: Never Smoker     Smokeless tobacco: Never Used     Alcohol use No     Family History   I have reviewed this patient's family history and updated it with pertinent information if needed.   Family History   Problem Relation Age of Onset     Respiratory Father      asthma     DIABETES Son      Glaucoma No family hx of      Macular Degeneration No family hx of      Retinal detachment No family hx of      Amblyopia No family hx of      Prior to Admission Medications   Prior to Admission Medications   Prescriptions Last Dose Informant Patient Reported? Taking?   Benzocaine (HURRICAINE/TOPEX) 20 % AERO spray   No No   Sig: Take 0.5-1 mLs by mouth every 3 hours as needed for moderate pain   Multiple Vitamins-Iron (DAILY MULTIPLE VITAMIN/IRON) TABS  Pharmacy No No   Sig: Take 1 tablet by  mouth daily   Nutritional Supplements (BOOST CALORIE SMART) LIQD  Pharmacy No No   Sig: Take 3 Cans by mouth daily   albuterol (2.5 MG/3ML) 0.083% neb solution  Pharmacy No No   Sig: Take 1 vial (2.5 mg) by nebulization every 6 hours as needed for shortness of breath / dyspnea or wheezing   artificial saliva (BIOTENE DRY MOUTHWASH) LIQD liquid   No No   Sig: Swish and spit 10 mLs in mouth 4 times daily   artificial saliva (BIOTENE MT) SOLN solution   No No   Sig: Take 2 mLs (2 sprays) by mouth 4 times daily as needed for dry mouth   benzocaine-menthol (CEPACOL) 15-3.6 MG lozenge   No No   Sig: Place 1 lozenge inside cheek every hour as needed for sore throat   calcium carbonate (TUMS) 500 MG chewable tablet   No No   Sig: Take 1 tablet (500 mg) by mouth daily as needed for heartburn   cetirizine (ZYRTEC) 10 MG tablet  Pharmacy No No   Sig: Take 1 tablet (10 mg) by mouth every evening   ciprofloxacin (CIPRO) 500 MG tablet   No No   Sig: Take 1 tablet (500 mg) by mouth every 24 hours   furosemide (LASIX) 20 MG tablet  Pharmacy No No   Sig: Take 1 tablet (20 mg) by mouth daily   glycerin-hypromellose- (ARTIFICIAL TEARS) 0.2-0.2-1 % SOLN ophthalmic solution  Pharmacy No No   Sig: Place 1 drop into both eyes 2 times daily as needed for dry eyes   ipratropium - albuterol 0.5 mg/2.5 mg/3 mL (DUONEB) 0.5-2.5 (3) MG/3ML neb solution   No No   Sig: Take 1 vial (3 mLs) by nebulization every 6 hours as needed for shortness of breath / dyspnea or wheezing   lactulose (CHRONULAC) 10 GM/15ML solution   No No   Sig: Take 15 mLs (10 g) by mouth daily   mometasone-formoterol (DULERA) 200-5 MCG/ACT oral inhaler  Pharmacy No No   Sig: Inhale 2 puffs into the lungs 2 times daily   omeprazole (PRILOSEC) 40 MG capsule  Pharmacy No No   Sig: Take 1 capsule (40 mg) by mouth daily Take 30-60 minutes before a meal.   ondansetron (ZOFRAN-ODT) 4 MG ODT tab   No No   Sig: Take 1 tablet (4 mg) by mouth every 6 hours as needed for nausea or  vomiting   order for DME   No No   Sig: Equipment being ordered: Nebulizer   polyethylene glycol (MIRALAX) powder  Pharmacy No No   Sig: Take 17 g (1 capful) by mouth 3 times daily as needed for constipation   spironolactone (ALDACTONE) 50 MG tablet  Pharmacy No No   Sig: Take 1 tablet (50 mg) by mouth daily   traMADol (ULTRAM) 50 MG tablet   No No   Sig: Take 0.5-1 tablets (25-50 mg) by mouth every 8 hours as needed for moderate pain      Facility-Administered Medications: None     Allergies   Allergies   Allergen Reactions     Dust Mites Cough     Sneezing      Seasonal Allergies            Physical Exam      Vital Signs: Temp: 98.2  F (36.8  C) Temp src: Oral BP: 110/53 Pulse: 100   Resp: 12 SpO2: 99 % O2 Device: Nasal cannula Oxygen Delivery: 2 LPM  Weight: 105 lbs 2.55 oz    Physical Exam   Constitutional: She is oriented to person, place, and time. No distress.   Very thin elderly woman laying on ED bed   Eyes: Right eye exhibits no discharge. Left eye exhibits no discharge. Scleral icterus is present.   Arcus senilis bilaterally   Neck: Normal range of motion.   Cardiovascular: Normal rate, regular rhythm and intact distal pulses.  Exam reveals no gallop and no friction rub.    Murmur (3/6 systolic ) heard.  Pulmonary/Chest: Effort normal.   Minimal breath sounds at upper rt lung, no breath sounds through rest of rt lung; left lung clear to ascultation    Abdominal: Soft. Bowel sounds are normal. She exhibits distension. She exhibits no mass. There is no tenderness. There is no rebound and no guarding.   Appreciable fluid wave   Musculoskeletal: Edema:  +1 pitting bilateral lower extremities and feet.   Neurological: She is alert and oriented to person, place, and time.   Skin: Skin is warm.       Assessment & Plan      Yue Portillo is a 78 year old female admitted on 5/18/2018. She has a history of HCC, asthma, hepatitis C, liver cirrhosis and cachexia and is admitted for shortness of breath and  hyponatremia.    # SOB  # Right pleural effusion, recurrent   # Hepatitis C  # Recurrent hepatic hydrothorax  # Hx of HCC  # Decompensated cirrhosis  # Abnormal LFTs  Patient presents with worsening shortness of breath and was unable to get her weekly thoracentesis today. She has presented at least 2 in the past month with similar presentations and was able to get procedure done. Due to her needing a 2 step thoracentesis, IR unable to do the procedure today and patient is saturating well, patient was admitted for procedure to be done tomorrow. Presentation concerning for worsening liver failure. Differential for effusion include, but not limited to liver cirrhosis, left heart failure, hypothyroidism, malignancy, PE, pancreatitis or other etiology. Patient denies this being similar to previous episodes of SBP and has remained afebrile with non tender abdomen. At this time, most likely secondary to liver cirrhosis.    - IR consulted for thoracentesis, contacted in the ED and agreeable to do procedure tomorrow, 5/19   - Hold PTA lasix in setting of JOSE  - Continue PTA lactulose   - Hold PTA spironolactone in setting of JOSE  - Strict I&Os  - Continue PTA SBP prophylaxis cipro   - May consider Hepatology consult for medical optimization   - Meld score today: 30  Serum Creatinine: 1.16 mg/dL at 5/18/2018  17:51  Serum Sodium: 122 mmol/L at 5/18/2018  17:51  Total Bilirubin: 8.4 mg/dL at 5/18/2018  17:51  INR(ratio): 1.98 at 5/18/2018  17:51  Age: 78 years    # Hyponatremia   # Hyperkalemia   Patient has had hyponatremia noted over the past month on labs ranging from 127 to 130. On admission patient was found to be hyponatremic to 122. This is most likely hypervolemic hypotonic hypnatremia secondary to what appears to be worsening cirrhosis. BUN/Cr ratio >20 thus most likely pre renal etiology.    - Continuous  cc/hr  - Recheck Na overnight and as appropriate    # JOSE  Patient with elevated Creatinine on admission of  1.16 with baseline Cr of 0.85-0.93. Differential includes third spacing, hypovolemia, worsening liver failure, ACS, medication induced or other etiology. Most likely secondary to hepatorenal syndrome.  - Fluids as above with repeat BUN and Cr overnight    # Thrombocytopenia   Appears to be improved from baseline. Will continue to monitor.     # Goals of Care  Further discuss code status along with general goals of care with family.     # Anemia   On admission Hgb of 8.6 with normal MCV. This appears to be around patient's baseline and she denies any symptoms in relation to this including dizziness or lightheadedness. Likely secondary to chronic disease. Patient appears at baseline. Will continue to monitor.    # Asthma  - Continue PTA duonebs Q6H PRN  - Continue PTA Dulera    # Seasonal allergies  - Continue PTA zyrtec    #Cachexia  #Malnutrition   #FTT  - Boost 3 cans daily     # Hx of gastric ulcer   - Continue omeprazole 40 mg daily     # Pain Assessment:  Current Pain Score 5/16/2018   Patient currently in pain? denies   Pain location -   Pain descriptors -   Yue georges pain level was assessed and she currently denies pain.      Diet: Regular Diet, NPO at midnight    Fluids: 100 cc/hr NS  DVT Prophylaxis: Pneumatic Compression Devices  Code Status: Full Code    Disposition Plan   Expected discharge: 1-3 days; recommended to prior living arrangement once procedure has been complete, appropriately trending electrolytes and clinica improvement. Dispo: Expected Discharge Date: 05/20/18    Entered: Priscilla Vora 05/18/2018, 8:58 PM    The patient was discussed with Dr. Doc Vora    [facility]  [facility]   Pager: [phone]  Please see sticky note for cross cover information      Results:     Data   Data     Recent Labs  Lab 05/18/18  1751   WBC 6.1   HGB 8.6*   MCV 97   PLT 87*   INR 1.98*   *   POTASSIUM 5.6*   CHLORIDE 96   CO2 19*   BUN 42*   CR 1.16*    ANIONGAP 8   MACY 8.1*   GLC 71   ALBUMIN 2.5*   PROTTOTAL 6.0*   BILITOTAL 8.4*   ALKPHOS 264*   *   *     Recent Results (from the past 24 hour(s))   XR Chest 2 Views    Narrative    Exam: XR CHEST 2 VW, 5/18/2018 6:26 PM    Indication: sob;     Comparison: Radiograph the chest 5/4/2018, CT abdomen pelvis 4/1/2018  and CT chest 11/18/2017    Findings:   2 views of the chest AP and lateral. There is a large right pleural  effusion with leftward mediastinal shift, obscuration of the right  hemidiaphragm and right heart border. Unchanged interstitial and  airspace opacities in the left lung with perihilar opacities. Upper  abdomen is unremarkable. No acute osseous abnormality.      Impression    Impression:   1. Redemonstration of the large right pleural effusion with near  complete opacification of the right hemithorax, with slightly improved  aeration of the right upper lung.  2. Unchanged mixed airspace opacities throughout the left lung.    I have personally reviewed the examination and initial interpretation  and I agree with the findings.    DA SINGER MD

## 2018-05-21 NOTE — PROGRESS NOTES
Attempted to reach patient to follow up after recent hospitalization, unable to reach. Left VM with name and call back number.  Patient was admitted to [facility] for R sided pleural effusion , discharged 5/18.    FD: If patient calls back, please schedule hospital follow up visit, then transfer to RN.    Marifer Sprague RN

## 2018-05-21 NOTE — LETTER
May 23, 2018    Yue Portillo  [address]  [facility] [address]        Dear Yue Portillo,    I have been unable to reach via your prefered telephone number.  Please call [phone] in order to schedule a post-hospital visit with your primary provider.      We look seeing you in clinic.        Sincerely,      Marifer Sprague RN

## 2018-05-21 NOTE — TELEPHONE ENCOUNTER
Request for medication refill:    Date of last visit at clinic: 2/26/18    Please complete refill if appropriate and CLOSE ENCOUNTER.    Closing the encounter signifies the refill is complete.    If refill has been denied, please complete the smart phrase .smirefuse and route it to the [facility] RN TRIAGE pool to inform the patient and the pharmacy.    DOUGIE Tillman 5:08 PM May 21, 2018

## 2018-05-22 NOTE — PROGRESS NOTES
Second attempt to reach patient for TCM post hospitalization call.  Language line used and voice mail was left with request for a call back.    FD  If patient call back, please schedule patient and warm transfer to RN.

## 2018-05-23 NOTE — PROGRESS NOTES
Third attempt to reach patient via phone using  line.   Unable to leave voice mail as it is full.    Letter was sent to patient to schedule with PCP.    To be noted ER note from yesterday states she will be transferring all of her care to [place].    Marifer Sprague RN

## 2018-05-25 NOTE — PROGRESS NOTES
Yue passed away today, 5/25/18 at 1:30am at [facility]. She had been there since 5/23/18. Dr. Horton and Dr. Meyer notified, as well as the [facility] department, [facility] and [facility].    Mary Rosenberg, [facility]  [phone]

## 2019-06-12 NOTE — CONSULTS
A collaboration between [facility] and [facility]  Experts in minimally invasive, targeted treatments performed using imaging guidance    Interventional Radiology Consult Service Note    Patient Name:  Yue Portillo   YOB: 1940  Medical Record Number (MRN):  [id number]  Age:  78 year old female  Patient location / Unit:  ER    Requested IR consult:  R thoracentesis    Indication / Associated Diagnosis: R pleural effusion, recurrent    Complete Blood Count:  Lab Results   Component Value Date    PLT 73 02/12/2018       Coagulation:  Lab Results   Component Value Date    INR 1.56 02/12/2018       -----    Associated Imaging:  CXR2/12/2018        Pertinent imaging reviewed with IR Dr. Worthington    -----    Patient will be placed on the IR schedule 2/12/2018 for a R thoracentesis    Pre-procedure hematology and coagulation labs per IR protocols are WNL for procedure.      Pre-procedure for NPO status, skin site cleansing scrubs No NPO required.    Medications to be held include: None    Consent will be done prior to procedure.     You may contact the IR charge RN at [phone] for an estimated time of procedure for this inpatient.     Case discussed with Dr. Frank Sood.      [phone] (IR RN control desk)  [phone] ([place] IR call pager)        Patient Data:    Past Medical History:   Diagnosis Date     Hepatitis C      Hepatocellular carcinoma (H)      Liver disease      Mild intermittent asthma      Pleural effusion associated with hepatic disorder          Patient Active Problem List    Diagnosis Date Noted     Mild persistent asthma without complication 05/09/2016     Priority: Medium     HCC (hepatocellular carcinoma) (H) 12/22/2014     Priority: Medium     Diagnosed at [facility]. In records from September 2014. Son does not believe pt has cancer. See note 7/13/15.       Cirrhosis of liver (H) 01/23/2014     Priority: Medium     Postmenopausal  bleeding 04/01/2013     Priority: Medium     4/2013-HPV negative, EMB done, pelvic U/S done; referred to OB/GYN for possible D&C.       Vitamin D deficiency 01/04/2013     Priority: Medium     Ear pain 11/26/2012     Priority: Medium     Unknown etiology, controlled with dermotic       Health Care Home 09/11/2012     Priority: Medium     Tier 1  DX V65.8 REPLACED WITH [id number] HEALTH CARE HOME (04/08/2013)       Allergic rhinitis 09/11/2012     Priority: Medium     Problem list name updated by automated process. 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) 09/11/2012     Priority: Medium     Chronic allergic conjunctivitis 09/11/2012     Priority: Medium     Chronic tension type headache 09/11/2012     Priority: Medium     Constipation 09/11/2012     Priority: Medium     Depression 09/11/2012     Priority: Medium     Esophageal reflux 09/11/2012     Priority: Medium     Generalized osteoarthritis 09/11/2012     Priority: Medium     Generalized pain 09/11/2012     Priority: Medium     Heartburn 09/11/2012     Priority: Medium     Insomnia 09/11/2012     Priority: Medium     Lightheadedness 09/11/2012     Priority: Medium     Lower back pain 09/11/2012     Priority: Medium         Prescription Medications as of 2/12/2018             furosemide (LASIX) 20 MG tablet Take 1 tablet (20 mg) by mouth daily    spironolactone (ALDACTONE) 50 MG tablet Take 1 tablet (50 mg) by mouth daily    lactulose 20 GM/30ML SOLN Take 30 mLs by mouth daily    triamcinolone (NASACORT AQ) 55 MCG/ACT Inhaler Spray 2 sprays into both nostrils daily    sodium chloride (SALINE MIST) 0.65 % nasal spray Spray 1 spray into both nostrils 2 times daily    polyethylene glycol (MIRALAX) powder Take 17 g (1 capful) by mouth 3 times daily as needed for constipation    albuterol (2.5 MG/3ML) 0.083% neb solution Take 1 vial (2.5 mg) by nebulization every 6 hours as needed for shortness of breath / dyspnea or wheezing    Multiple Vitamins-Iron  (DAILY MULTIPLE VITAMIN/IRON) TABS Take 1 tablet by mouth daily    omeprazole (PRILOSEC) 40 MG capsule Take 1 capsule (40 mg) by mouth daily Take 30-60 minutes before a meal.    cetirizine (ZYRTEC) 10 MG tablet Take 1 tablet (10 mg) by mouth every evening    Nutritional Supplements (BOOST CALORIE SMART) LIQD Take 3 Cans by mouth daily    mometasone-formoterol (DULERA) 200-5 MCG/ACT oral inhaler Inhale 2 puffs into the lungs 2 times daily    albuterol (PROAIR HFA/PROVENTIL HFA/VENTOLIN HFA) 108 (90 BASE) MCG/ACT Inhaler Inhale 2 puffs into the lungs every 6 hours as needed for shortness of breath / dyspnea or wheezing      Facility Administered Medications as of 2/12/2018             lidocaine 1 % 1 mL 1 mL by Other route every hour as needed (mild pain with VAD insertion or accessing implanted port)    lidocaine (LMX4) kit Apply topically every hour as needed for pain (with VAD insertion or accessing implanted port.)    sodium chloride (PF) 0.9% PF flush 3 mL 3 mLs by Intracatheter route every hour as needed for line flush (for peripheral IV flush post IV meds)    sodium chloride (PF) 0.9% PF flush 3 mL 3 mLs by Intracatheter route every 8 hours    ipratropium - albuterol 0.5 mg/2.5 mg/3 mL (DUONEB) neb solution 3 mL Take 3 mLs by nebulization once            Allergies   Allergen Reactions     Dust Mites Cough     Sneezing      Seasonal Allergies          Complete Blood Count:  Lab Results   Component Value Date    PLT 73 02/12/2018       Coagulation:  Lab Results   Component Value Date    INR 1.56 02/12/2018       Vital Signs:  /73  Temp 97.9  F (36.6  C) (Oral)  Resp 26  SpO2 98%   beer

## 2021-01-01 NOTE — ED PROVIDER NOTES
History     Chief Complaint   Patient presents with     Shortness of Breath     HPI  Yue Portillo is a 78 year old female with a history of asthma, HCC (regular paracentesis and thoracentesis), SBP, liver cirrhosis, and cachexia who presents to the ED with shortness of breath. The patient's history is provided via a family member as a Haitian  and is limited by the language barrier. Per review of her chart, she was last seen in the ED on 4/13 with shortness of breath. She underwent thoracentesis performed by IR and had improvement of her shortness of breath. She underwent repeat thoracentesis performed by IR on 4/24/18 with a total of 4L removed. Her most recent paracentesis was on 5/2 (two days ago) with a total of 2.6L of fluid removed with ascites. Today, her son reports that since her last thoracentesis she has had increased shortness of breath. He contacted her IR team and their next available appointment for thoracentesis was next Tuesday. He felt the patient could not wait that long so they instructed him to come to the ED. The patient notes she does have some improvement of breathing with paracentesis, but considerably more relief with thoracentesis. She denies any other symptoms or complaints.    Past Medical History:   Diagnosis Date     Cachexia (H)      Hepatitis C      Hepatocellular carcinoma (H)      Hx of gastric ulcer 2014     Liver disease      Mild intermittent asthma      Pleural effusion associated with hepatic disorder        Past Surgical History:   Procedure Laterality Date     CATARACT IOL, RT/LT Right 08/22/2017    s/p CE/IOL right eye     EYE SURGERY       H PYLORI SHYLA SCREEN      was treated for h pylori     NO HISTORY OF SURGERY         Family History   Problem Relation Age of Onset     Respiratory Father      asthma     DIABETES Son      Glaucoma No family hx of      Macular Degeneration No family hx of      Retinal detachment No family hx of      Amblyopia No family hx  of        Social History   Substance Use Topics     Smoking status: Never Smoker     Smokeless tobacco: Never Used     Alcohol use No       No current facility-administered medications for this encounter.      Current Outpatient Prescriptions   Medication     albuterol (2.5 MG/3ML) 0.083% neb solution     artificial saliva (BIOTENE DRY MOUTHWASH) LIQD liquid     artificial saliva (BIOTENE MT) SOLN solution     Benzocaine (HURRICAINE/TOPEX) 20 % AERO spray     benzocaine-menthol (CEPACOL) 15-3.6 MG lozenge     calcium carbonate (TUMS) 500 MG chewable tablet     cetirizine (ZYRTEC) 10 MG tablet     ciprofloxacin (CIPRO) 500 MG tablet     furosemide (LASIX) 20 MG tablet     glycerin-hypromellose- (ARTIFICIAL TEARS) 0.2-0.2-1 % SOLN ophthalmic solution     ipratropium - albuterol 0.5 mg/2.5 mg/3 mL (DUONEB) 0.5-2.5 (3) MG/3ML neb solution     lactulose (CHRONULAC) 10 GM/15ML solution     mometasone-formoterol (DULERA) 200-5 MCG/ACT oral inhaler     Multiple Vitamins-Iron (DAILY MULTIPLE VITAMIN/IRON) TABS     Nutritional Supplements (BOOST CALORIE SMART) LIQD     omeprazole (PRILOSEC) 40 MG capsule     ondansetron (ZOFRAN-ODT) 4 MG ODT tab     order for DME     polyethylene glycol (MIRALAX) powder     spironolactone (ALDACTONE) 50 MG tablet     traMADol (ULTRAM) 50 MG tablet        Allergies   Allergen Reactions     Dust Mites Cough     Sneezing      Seasonal Allergies          I have reviewed the Medications, Allergies, Past Medical and Surgical History, and Social History in the Epic system.    Review of Systems   Constitutional: Positive for activity change and fatigue. Negative for appetite change and fever.   HENT: Negative for congestion.    Eyes: Negative for redness.   Respiratory: Positive for cough and shortness of breath.    Cardiovascular: Negative for chest pain.   Gastrointestinal: Positive for abdominal pain. Negative for nausea and vomiting.   Genitourinary: Negative for difficulty urinating and  frequency.   Musculoskeletal: Negative for arthralgias and neck stiffness.   Skin: Negative for color change.   Neurological: Negative for headaches.   Hematological: Bruises/bleeds easily.   Psychiatric/Behavioral: Positive for decreased concentration and dysphoric mood. Negative for confusion.   All other systems reviewed and are negative.      Physical Exam   BP: 110/72  Pulse: 99  Heart Rate: 93  Temp: 97.9  F (36.6  C)  Resp: 22  SpO2: 100 %      Physical Exam   Constitutional: She is oriented to person, place, and time. She appears well-developed and well-nourished. She appears distressed.   Patient ER here with her son.  He does interpret.  Patient moderate distress primarily shortness of breath noted.   HENT:   Head: Normocephalic and atraumatic.   Eyes: EOM are normal. Pupils are equal, round, and reactive to light. No scleral icterus.   Neck: Normal range of motion. Neck supple. No JVD present.   Cardiovascular: Regular rhythm.    Pulmonary/Chest: No stridor. She is in respiratory distress.   Decreased breath sounds on the right.   Abdominal: She exhibits distension. There is no tenderness.   Abdomen mildly distended but soft no significant tenderness rebound or guarding.  Patient had paracentesis in 2 days ago.   Musculoskeletal: She exhibits edema. She exhibits no tenderness.   Neurological: She is alert and oriented to person, place, and time. She has normal reflexes.   Nonfocal   Skin: Skin is warm and dry. No rash noted. She is not diaphoretic. No erythema. No pallor.   Psychiatric:   Flat uncomfortable   Nursing note and vitals reviewed.      ED Course     ED Course     Procedures         Patient evaluated in the ER.  White count 3.3 hemoglobin is 8.8 platelets are 64.  INR 1.94.  PTT 40 potassium 5.6 sodium 130    Patient had thoracentesis done by IR in a 2 phase procedure.  Patient tolerated procedure well feeling better will be discharged home and follow-up.       EKG Interpretation:       Interpreted by Frank Sood  Time reviewed: 1204  Symptoms at time of EKG: Shortness breath hyperacute  Rhythm: normal sinus   Rate: normal  Axis: normal  Ectopy: none  Conduction: normal  ST Segments/ T Waves: No ST-T wave changes  Q Waves: none  Comparison to prior: No old EKG available    Clinical Impression: Normal sinus rhythm nonspecific ST changes            Critical Care time:  none             Labs Ordered and Resulted from Time of ED Arrival Up to the Time of Departure from the ED   CBC WITH PLATELETS DIFFERENTIAL - Abnormal; Notable for the following:        Result Value    WBC 3.3 (*)     RBC Count 2.66 (*)     Hemoglobin 8.8 (*)     Hematocrit 25.7 (*)     MCH 33.1 (*)     RDW 17.2 (*)     Platelet Count 64 (*)     Absolute Lymphocytes 0.7 (*)     All other components within normal limits   INR - Abnormal; Notable for the following:     INR 1.94 (*)     All other components within normal limits   PARTIAL THROMBOPLASTIN TIME - Abnormal; Notable for the following:     PTT 40 (*)     All other components within normal limits   COMPREHENSIVE METABOLIC PANEL - Abnormal; Notable for the following:     Sodium 130 (*)     Potassium 5.6 (*)     Urea Nitrogen 34 (*)     Bilirubin Total 5.5 (*)     Albumin 2.9 (*)     Protein Total 6.3 (*)     Alkaline Phosphatase 259 (*)     ALT 62 (*)      (*)     All other components within normal limits   PULSE OXIMETRY NURSING   PERIPHERAL IV CATHETER     Results for orders placed or performed during the hospital encounter of 05/04/18   XR Chest Port 1 View    Narrative    Exam: XR CHEST PORT 1 VW, 5/4/2018 12:55 PM    Indication: sob hx of pleural effusion;     Additional information: HCC/cirrhosis and repeated paracentesis and  thoracentesis.    Comparison: Chest radiographs dated 4/13/2018, CT of the abdomen dated  4/1/2018.    Findings:   Single AP view of the chest. Redemonstration of a large right pleural  effusion with near complete opacification of the  right hemithorax.  Small area. Lung noted in the right upper thorax. Aeration is slightly  improved compared to prior. The cardiac silhouette is partially  obscured. Left border appears moderately enlarged. Unchanged mixed  interstitial and patchy airspace opacities noted throughout the left  lung. No left pleural effusion or pneumothorax. Visualized portion of  the upper abdomen is unremarkable. Bones are diffusely osteopenic. No  acute osseous pathology.      Impression    Impression:   1. Redemonstration of large right pleural effusion with near complete  opacification of the right hemithorax.  2. Small area of aerated lung in the right upper thorax is slightly  increased compared to prior.  3. Unchanged mixed interstitial and patchy airspace opacities  throughout the left lung.    I have personally reviewed the examination and initial interpretation  and I agree with the findings.    DA SINGER MD   IR Thoracentesis    Narrative    Yue Portillo  MRN: [id number]    Completed ultrasound guided placement of RIGHT-sided chest tube. A 5  [place] Yeuh centesis catheter was placed and 1600 mL of fluid removed.  Patient experienced discomforting cough and aspiration was halted.  Chest tube secured. A large effusion remains. Patient transported to  ED for further monitoring and waiting to resume aspiration. Aspiration  may resume no sooner than 1545. A maximum of another 1500 mL may then  be aspirated if the patient tolerates, at which time the Yeuh should  be discontinued. No immediate complication. Dx: Pleural effusion.  Al[place].   CBC with platelets differential   Result Value Ref Range    WBC 3.3 (L) 4.0 - 11.0 10e9/L    RBC Count 2.66 (L) 3.8 - 5.2 10e12/L    Hemoglobin 8.8 (L) 11.7 - 15.7 g/dL    Hematocrit 25.7 (L) 35.0 - 47.0 %    MCV 97 78 - 100 fl    MCH 33.1 (H) 26.5 - 33.0 pg    MCHC 34.2 31.5 - 36.5 g/dL    RDW 17.2 (H) 10.0 - 15.0 %    Platelet Count 64 (L) 150 - 450 10e9/L    Diff Method Automated  Method     % Neutrophils 63.1 %    % Lymphocytes 20.7 %    % Monocytes 9.5 %    % Eosinophils 6.1 %    % Basophils 0.3 %    % Immature Granulocytes 0.3 %    Nucleated RBCs 0 0 /100    Absolute Neutrophil 2.1 1.6 - 8.3 10e9/L    Absolute Lymphocytes 0.7 (L) 0.8 - 5.3 10e9/L    Absolute Monocytes 0.3 0.0 - 1.3 10e9/L    Absolute Eosinophils 0.2 0.0 - 0.7 10e9/L    Absolute Basophils 0.0 0.0 - 0.2 10e9/L    Abs Immature Granulocytes 0.0 0 - 0.4 10e9/L    Absolute Nucleated RBC 0.0    INR   Result Value Ref Range    INR 1.94 (H) 0.86 - 1.14   Partial thromboplastin time   Result Value Ref Range    PTT 40 (H) 22 - 37 sec   Comprehensive metabolic panel   Result Value Ref Range    Sodium 130 (L) 133 - 144 mmol/L    Potassium 5.6 (H) 3.4 - 5.3 mmol/L    Chloride 102 94 - 109 mmol/L    Carbon Dioxide 21 20 - 32 mmol/L    Anion Gap 6 3 - 14 mmol/L    Glucose 79 70 - 99 mg/dL    Urea Nitrogen 34 (H) 7 - 30 mg/dL    Creatinine 0.85 0.52 - 1.04 mg/dL    GFR Estimate 64 >60 mL/min/1.7m2    GFR Estimate If Black 78 >60 mL/min/1.7m2    Calcium 8.5 8.5 - 10.1 mg/dL    Bilirubin Total 5.5 (H) 0.2 - 1.3 mg/dL    Albumin 2.9 (L) 3.4 - 5.0 g/dL    Protein Total 6.3 (L) 6.8 - 8.8 g/dL    Alkaline Phosphatase 259 (H) 40 - 150 U/L    ALT 62 (H) 0 - 50 U/L     (H) 0 - 45 U/L   EKG 12-lead, tracing only   Result Value Ref Range    Interpretation ECG Click View Image link to view waveform and result    Interventional Radiology Adult/Peds IP Consult: Patient to be seen: URGENT within 4 hours; Call back #: Call ER. Dr. Sood; Staged thoracentesis    Narrative    Candace Lee APRN CNP     5/4/2018 12:54 PM  Patient is on IR schedule 5/4/2018 for a right sided   thoracentesis.   Labs pending.  No NPO required.  Consent will be done prior to procedure.     Please contact the [place] RN [place] [phone] for estimated time of   procedure.     Case discussed with Dr. Rabago and Dr. Ulloa from IR.    Candace Lee DNP,  [facility]  Interventional Radiology  Phone: [phone]  Pager: [phone]                Assessments & Plan (with Medical Decision Making)  78-year-old female with history of liver disease with scheduled paracentesis along with thoracentesis noted wheezing shortness of breath last few days.  Decreased breath sounds chest x-ray done revealing large right pleural effusion.  Discussed with IR they did do a two-phase thoracentesis patient tolerated procedure well feeling better was then discharged with son and will follow up with regular scheduled procedures return if any concerns.           I have reviewed the nursing notes.    I have reviewed the findings, diagnosis, plan and need for follow up with the patient.    Discharge Medication List as of 5/4/2018  5:16 PM          Final diagnoses:   Recurrent right pleural effusion   S/P thoracentesis   I, Arun Plasencia, am serving as a trained medical scribe to document services personally performed by Frank Sood MD, based on the provider's statements to me.      IFrank MD, was physically present and have reviewed and verified the accuracy of this note documented by Arun Plasencia.       5/4/2018   [facility] EMERGENCY DEPARTMENTc    This note was created at least in part by the use of dragon voice dictation system. Inadvertent typographical errors may still exist.  Frank Sood MD.         Frank Sood MD  05/04/18 1209     Statement Selected

## 2023-01-13 NOTE — PROGRESS NOTES
Paracentesis Nursing Note  Yue Portillo presents today to [facility] for a paracentesis.    During today's appointment orders from Tala Meyer MD were completed.    Progress Note:  Patient identification verified by name and date of birth.  Assessment completed.  Vitals monitored throughout appointment and recorded in Doc Flowsheets.  See proceduralist note in ultrasound.    Date of consent or authorization: 12/13/18.  Invasive Procedure Safety Checklist was completed and sent for scanning.     Paracentesis performed by Cornelia Fleming PA-C Radiology.    The following labs were communicated to provider performing paracentesis:  Lab Results   Component Value Date    PLT 79 02/14/2018       Total amount of ascites fluid drained: 2.0 liters-Paracentesis stopped at 2liters due to tachycardia with HR in the 140-150's.  Color of ascites fluid: dark yellow.  Total amount of albumin given: 25  grams.    Paracentesis stopped after 2liters removed due to tachycardia with HR in the 140-150's. -120's. Patient initially with HR in the low 100's. O2% mid to upper 90's on room air. MD paged- unable to reach [place] Meyer MD therefore paged inpatient on call physician Dr. Kellogg. LASHELL to instruct patient to go to the ER for further management. Of note patient had left the ER AMA 2 days prior to this appointment. Report given to EMS personnel. PIV left in place for ER evaluation.    Report called to ER.    Post procedure: Patient still reporting pain mild to moderate beneath rib cage area and paracentesis puncture site.      Discharge Plan:  Discharge instructions were reviewed with patient.  Patient/Representative verbalized understanding and all questions were answered.   Discharged from [facility] with EMS to ER via non emergent transportation.    Ericka Severson, RN    Administrations This Visit     albumin human 25 % injection 12.5 g     Admin  [facility]  Emergency Department  Faculty Attestation     I performed a history and physical examination of the patient and discussed management with the resident. I reviewed the residents note and agree with the documented findings and plan of care. Any areas of disagreement are noted on the chart. I was personally present for the key portions of any procedures. I have documented in the chart those procedures where I was not present during the key portions. I have reviewed the emergency nurses triage note. I agree with the chief complaint, past medical history, past surgical history, allergies, medications, social and family history as documented unless otherwise noted below. For Physician Assistant/ Nurse Practitioner cases/documentation I have personally evaluated this patient and have completed at least one if not all key elements of the E/M (history, physical exam, and MDM). Additional findings are as noted.       Primary Care Physician:  Padmini Barragan M.D., MD    Screenings:  [unfilled]    [facility]       Chief Complaint   Patient presents with    Diarrhea     Started today    Nausea       RECENT VITALS:   Temp: 98.6 °F (37 °C),  Heart Rate: 95, Resp: 17, BP: (!) 152/92    LABS:  Labs Reviewed   CBC WITH AUTO DIFFERENTIAL - Abnormal; Notable for the following components:       Result Value    RBC 5.44 (*)     MCV 80.5 (*)     Seg Neutrophils 88 (*)     Lymphocytes 9 (*)     Eosinophils % 0 (*)     Segs Absolute 8.01 (*)     Absolute Lymph # 0.82 (*)     All other components within normal limits   COMPREHENSIVE METABOLIC PANEL - Abnormal; Notable for the following components:    Glucose 111 (*)     Sodium 133 (*)     All other components within normal limits   URINALYSIS WITH REFLEX TO CULTURE - Abnormal; Notable for the following components:    Turbidity UA Cloudy (*)     Ketones, Urine TRACE (*)     Urine Hgb MODERATE (*)     Protein, UA TRACE (*) Date Action Dose Route Administered By             02/14/2018 New Bag 12.5 g Intravenous Severson, Ericka, AMIE              Admin Date Action Dose Route Administered By             02/14/2018 New Bag 12.5 g Intravenous Severson, Ericka, AMIE                    lidocaine 1 % 20 mL     Admin Date Action Dose Route Administered By             02/14/2018 Given by Other Clinician 20 mL Other Severson, Ericka, AMIE                          /76  Pulse 147  SpO2 99%            Leukocyte Esterase, Urine SMALL (*)     All other components within normal limits   MICROSCOPIC URINALYSIS - Abnormal; Notable for the following components:    Bacteria, UA MODERATE (*)     All other components within normal limits   LIPASE   HCG, SERUM, QUALITATIVE       Radiology  No orders to display         Attending Physician Additional  Notes    Patient's been ill since yesterday with nausea vomiting diarrhea and diffuse crampy abdominal pain. No blood in the emesis. No fever chills or sweats. She does have fatigue. No cough rhinorrhea or congestion. Her daughters had similar symptoms. Past medical history is significant for pancreatitis, alcohol use, postpartum depression, chlamydia, migraines. On exam she is nontoxic afebrile hypertensive and borderline tachycardia, anicteric. Skin is warm and dry. Normal cap refill abdomen is soft and nontender. Mucous membranes moist.  Patient states she feels markedly better after fluids and medications here. Impression is gastroenteritis consider other viral syndrome, unlikely pancreatitis. Plan is laboratory studies, IV fluids, antiemetics, analgesics as needed, reassess, anticipate discharge. Roz Hunt.  Angelina Murray MD, [facility]  Attending Emergency  Physician                Rashaad Ramesh MD  01/12/23 [id number]

## 2025-01-05 NOTE — NURSING NOTE
Chief Complaints and History of Present Illnesses   Patient presents with     Post Op (Ophthalmology) Right Eye     POD #1 s/p CE/IOL Right eye 08/22/2017     HPI    Affected eye(s):  Right   Symptoms:     No floaters   No flashes   No redness   No Dryness         Do you have eye pain now?:  No      Comments:  Pt did not sleep well last night. Slight itching and throbbing in RE today.    Quang ALVAREZ August 23, 2017 3:27 PM               
Med Safety

## (undated) DEVICE — GOWN XLG DISP 9545

## (undated) DEVICE — DRSG TEGADERM 2 3/8X2 3/4" 1624W

## (undated) DEVICE — Device

## (undated) DEVICE — NDL 15GA 1.5" 8881200029

## (undated) DEVICE — DRSG PRIMAPORE 02X3" 7133

## (undated) DEVICE — KNIFE HANDLE W/15 BLADE 371615

## (undated) DEVICE — LINEN TOWEL PACK X5 5464

## (undated) DEVICE — CONNECTOR STOPCOCK 3 WAY MALE LL MX4311L

## (undated) DEVICE — SYR 50ML LL W/O NDL 309653

## (undated) DEVICE — TUBING MEDRAD 48" HIGH PRESSURE MX694

## (undated) RX ORDER — LIDOCAINE HYDROCHLORIDE 10 MG/ML
INJECTION, SOLUTION EPIDURAL; INFILTRATION; INTRACAUDAL; PERINEURAL
Status: DISPENSED
Start: 2018-01-01

## (undated) RX ORDER — ONDANSETRON 2 MG/ML
INJECTION INTRAMUSCULAR; INTRAVENOUS
Status: DISPENSED
Start: 2017-01-01

## (undated) RX ORDER — LIDOCAINE HYDROCHLORIDE 10 MG/ML
INJECTION, SOLUTION EPIDURAL; INFILTRATION; INTRACAUDAL; PERINEURAL
Status: DISPENSED
Start: 2017-01-01

## (undated) RX ORDER — ALBUMIN (HUMAN) 12.5 G/50ML
SOLUTION INTRAVENOUS
Status: DISPENSED
Start: 2018-01-01

## (undated) RX ORDER — LIDOCAINE HYDROCHLORIDE 10 MG/ML
INJECTION, SOLUTION INFILTRATION; PERINEURAL
Status: DISPENSED
Start: 2018-01-01

## (undated) RX ORDER — LIDOCAINE HYDROCHLORIDE 10 MG/ML
INJECTION, SOLUTION INFILTRATION; PERINEURAL
Status: DISPENSED
Start: 2017-01-01

## (undated) RX ORDER — ALBUMIN (HUMAN) 12.5 G/50ML
SOLUTION INTRAVENOUS
Status: DISPENSED
Start: 2017-01-01